# Patient Record
Sex: MALE | Race: WHITE | NOT HISPANIC OR LATINO | URBAN - METROPOLITAN AREA
[De-identification: names, ages, dates, MRNs, and addresses within clinical notes are randomized per-mention and may not be internally consistent; named-entity substitution may affect disease eponyms.]

---

## 2017-10-26 ENCOUNTER — ON CAMPUS - OUTPATIENT (AMBULATORY)
Dept: URBAN - METROPOLITAN AREA HOSPITAL 2 | Facility: HOSPITAL | Age: 57
End: 2017-10-26
Payer: COMMERCIAL

## 2017-10-26 ENCOUNTER — OFFICE (AMBULATORY)
Dept: URBAN - METROPOLITAN AREA CLINIC 64 | Facility: CLINIC | Age: 57
End: 2017-10-26

## 2017-10-26 VITALS
HEART RATE: 64 BPM | HEART RATE: 57 BPM | DIASTOLIC BLOOD PRESSURE: 84 MMHG | SYSTOLIC BLOOD PRESSURE: 209 MMHG | SYSTOLIC BLOOD PRESSURE: 157 MMHG | SYSTOLIC BLOOD PRESSURE: 140 MMHG | SYSTOLIC BLOOD PRESSURE: 167 MMHG | HEIGHT: 69 IN | SYSTOLIC BLOOD PRESSURE: 175 MMHG | OXYGEN SATURATION: 97 % | HEART RATE: 59 BPM | SYSTOLIC BLOOD PRESSURE: 134 MMHG | SYSTOLIC BLOOD PRESSURE: 122 MMHG | DIASTOLIC BLOOD PRESSURE: 76 MMHG | HEART RATE: 62 BPM | DIASTOLIC BLOOD PRESSURE: 92 MMHG | SYSTOLIC BLOOD PRESSURE: 166 MMHG | OXYGEN SATURATION: 98 % | DIASTOLIC BLOOD PRESSURE: 89 MMHG | DIASTOLIC BLOOD PRESSURE: 101 MMHG | OXYGEN SATURATION: 96 % | TEMPERATURE: 97.5 F | DIASTOLIC BLOOD PRESSURE: 95 MMHG | SYSTOLIC BLOOD PRESSURE: 138 MMHG | HEART RATE: 55 BPM | HEART RATE: 54 BPM | HEART RATE: 51 BPM | DIASTOLIC BLOOD PRESSURE: 75 MMHG | DIASTOLIC BLOOD PRESSURE: 67 MMHG | RESPIRATION RATE: 16 BRPM | WEIGHT: 184 LBS | HEART RATE: 52 BPM | DIASTOLIC BLOOD PRESSURE: 87 MMHG | RESPIRATION RATE: 17 BRPM

## 2017-10-26 DIAGNOSIS — D12.3 BENIGN NEOPLASM OF TRANSVERSE COLON: ICD-10-CM

## 2017-10-26 DIAGNOSIS — Z86.010 PERSONAL HISTORY OF COLONIC POLYPS: ICD-10-CM

## 2017-10-26 LAB
GI HISTOLOGY: A. UNSPECIFIED: (no result)
GI HISTOLOGY: PDF REPORT: (no result)

## 2017-10-26 PROCEDURE — 88305 TISSUE EXAM BY PATHOLOGIST: CPT

## 2017-10-26 PROCEDURE — 45385 COLONOSCOPY W/LESION REMOVAL: CPT | Mod: 33

## 2017-10-26 RX ADMIN — PROPOFOL: 10 INJECTION, EMULSION INTRAVENOUS at 07:31

## 2017-11-22 ENCOUNTER — ON CAMPUS - OUTPATIENT (AMBULATORY)
Dept: URBAN - METROPOLITAN AREA HOSPITAL 2 | Facility: HOSPITAL | Age: 57
End: 2017-11-22

## 2017-11-22 ENCOUNTER — HOSPITAL ENCOUNTER (OUTPATIENT)
Dept: OTHER | Facility: HOSPITAL | Age: 57
Setting detail: SPECIMEN
Discharge: HOME | End: 2017-11-22
Attending: INTERNAL MEDICINE | Admitting: INTERNAL MEDICINE

## 2017-11-22 ENCOUNTER — OFFICE (AMBULATORY)
Dept: URBAN - METROPOLITAN AREA CLINIC 64 | Facility: CLINIC | Age: 57
End: 2017-11-22
Payer: COMMERCIAL

## 2017-11-22 VITALS
SYSTOLIC BLOOD PRESSURE: 126 MMHG | OXYGEN SATURATION: 97 % | RESPIRATION RATE: 18 BRPM | SYSTOLIC BLOOD PRESSURE: 197 MMHG | WEIGHT: 182 LBS | HEIGHT: 69 IN | SYSTOLIC BLOOD PRESSURE: 143 MMHG | TEMPERATURE: 97.2 F | OXYGEN SATURATION: 100 % | DIASTOLIC BLOOD PRESSURE: 100 MMHG | OXYGEN SATURATION: 93 % | DIASTOLIC BLOOD PRESSURE: 111 MMHG | HEART RATE: 61 BPM | SYSTOLIC BLOOD PRESSURE: 195 MMHG | DIASTOLIC BLOOD PRESSURE: 88 MMHG | HEART RATE: 55 BPM | DIASTOLIC BLOOD PRESSURE: 92 MMHG | DIASTOLIC BLOOD PRESSURE: 83 MMHG | RESPIRATION RATE: 16 BRPM | HEART RATE: 60 BPM | HEART RATE: 54 BPM | SYSTOLIC BLOOD PRESSURE: 140 MMHG

## 2017-11-22 DIAGNOSIS — R10.13 EPIGASTRIC PAIN: ICD-10-CM

## 2017-11-22 DIAGNOSIS — K29.70 GASTRITIS, UNSPECIFIED, WITHOUT BLEEDING: ICD-10-CM

## 2017-11-22 DIAGNOSIS — R93.3 ABNORMAL FINDINGS ON DIAGNOSTIC IMAGING OF OTHER PARTS OF DI: ICD-10-CM

## 2017-11-22 DIAGNOSIS — B96.81 HELICOBACTER PYLORI [H. PYLORI] AS THE CAUSE OF DISEASES CLA: ICD-10-CM

## 2017-11-22 DIAGNOSIS — K26.9 DUODENAL ULCER, UNSPECIFIED AS ACUTE OR CHRONIC, WITHOUT HEM: ICD-10-CM

## 2017-11-22 DIAGNOSIS — K20.9 ESOPHAGITIS, UNSPECIFIED: ICD-10-CM

## 2017-11-22 DIAGNOSIS — K29.50 UNSPECIFIED CHRONIC GASTRITIS WITHOUT BLEEDING: ICD-10-CM

## 2017-11-22 LAB
GI HISTOLOGY: A. SELECT: (no result)
GI HISTOLOGY: PDF REPORT: (no result)

## 2017-11-22 PROCEDURE — 88305 TISSUE EXAM BY PATHOLOGIST: CPT

## 2017-11-22 PROCEDURE — 43239 EGD BIOPSY SINGLE/MULTIPLE: CPT

## 2017-11-22 RX ORDER — OMEPRAZOLE 40 MG/1
40 CAPSULE, DELAYED RELEASE ORAL
Qty: 30 | Refills: 11 | Status: ACTIVE
Start: 2017-11-22

## 2017-11-22 RX ADMIN — PROPOFOL: 10 INJECTION, EMULSION INTRAVENOUS at 13:29

## 2017-12-28 ENCOUNTER — CONVERSION ENCOUNTER (OUTPATIENT)
Dept: FAMILY MEDICINE CLINIC | Facility: CLINIC | Age: 57
End: 2017-12-28

## 2017-12-30 ENCOUNTER — HOSPITAL ENCOUNTER (OUTPATIENT)
Dept: MRI IMAGING | Facility: HOSPITAL | Age: 57
Discharge: HOME | End: 2017-12-30
Attending: FAMILY MEDICINE | Admitting: FAMILY MEDICINE

## 2018-01-03 LAB
25(OH)D3 SERPL-MCNC: 27 NG/ML (ref 30–100)
ALBUMIN SERPL-MCNC: 4.6 G/DL (ref 3.6–5.1)
ALP SERPL-CCNC: 124 U/L (ref 40–115)
ALT SERPL-CCNC: 18 U/L (ref 9–46)
AST SERPL-CCNC: 13 U/L (ref 10–35)
BASOPHILS # BLD AUTO: 31 CELLS/UL (ref 0–200)
BASOPHILS NFR BLD AUTO: 0.4 %
BILIRUB SERPL-MCNC: 0.6 MG/DL (ref 0.2–1.2)
BUN SERPL-MCNC: 16 MG/DL (ref 7–25)
BUN/CREAT SERPL: ABNORMAL (CALC) (ref 6–22)
CALCIUM SERPL-MCNC: 9.7 MG/DL (ref 8.6–10.3)
CHLORIDE SERPL-SCNC: 105 MMOL/L (ref 98–110)
CHOLEST SERPL-MCNC: 218 MG/DL
CHOLEST/HDLC SERPL: 4.4 (CALC)
CONV CO2: 30 MMOL/L (ref 20–31)
CONV TOTAL PROTEIN: 7.5 G/DL (ref 6.1–8.1)
CREAT UR-MCNC: 0.75 MG/DL (ref 0.7–1.33)
EOSINOPHIL # BLD AUTO: 0 %
EOSINOPHIL # BLD AUTO: 0 CELLS/UL (ref 15–500)
ERYTHROCYTE [DISTWIDTH] IN BLOOD BY AUTOMATED COUNT: 12.5 % (ref 11–15)
FOLATE SERPL-MCNC: 6.6 NG/ML
GLOBULIN UR ELPH-MCNC: 2.9 MG/DL (ref 1.9–3.7)
GLUCOSE UR QL: 89 MG/DL (ref 65–99)
HCT VFR BLD AUTO: 46.6 % (ref 38.5–50)
HDLC SERPL-MCNC: 49 MG/DL
HGB BLD-MCNC: 16.3 G/DL (ref 13.2–17.1)
INSULIN SERPL-ACNC: 1.6 (CALC) (ref 1–2.5)
LDLC SERPL CALC-MCNC: 147 MG/DL
LYMPHOCYTES # BLD AUTO: 2010 CELLS/UL (ref 850–3900)
LYMPHOCYTES NFR BLD AUTO: 26.1 %
MCH RBC QN AUTO: 31.3 PG (ref 27–33)
MCHC RBC AUTO-ENTMCNC: 35 G/DL (ref 32–36)
MCV RBC AUTO: 89.4 FL (ref 80–100)
MONOCYTES # BLD AUTO: 578 CELLS/UL (ref 200–950)
MONOCYTES NFR BLD AUTO: 7.5 %
NEUTROPHILS # BLD AUTO: 5082 CELLS/UL (ref 1500–7800)
NEUTROPHILS NFR BLD AUTO: 66 %
NONHDLC SERPL-MCNC: 169 MG/DL
PLATELET # BLD AUTO: 164 10*3/UL (ref 140–400)
PMV BLD AUTO: 11.8 FL (ref 7.5–12.5)
POTASSIUM SERPL-SCNC: 4.7 MMOL/L (ref 3.5–5.3)
RBC # BLD AUTO: 5.21 MILLION/UL (ref 4.2–5.8)
SODIUM SERPL-SCNC: 143 MMOL/L (ref 135–146)
T3 SERPL-MCNC: 129 NG/DL (ref 76–181)
T4 SERPL-MCNC: 9.3 MCG/DL (ref 4.5–12)
TRIGL SERPL-MCNC: 105 MG/DL
TSH SERPL-ACNC: 3.18 MIU/L (ref 0.4–4.5)
VIT B12 SERPL-MCNC: 465 PG/ML (ref 200–1100)
WBC # BLD AUTO: 7.7 10*3/UL (ref 3.8–10.8)

## 2018-02-19 ENCOUNTER — HOSPITAL ENCOUNTER (OUTPATIENT)
Dept: OTHER | Facility: HOSPITAL | Age: 58
Setting detail: SPECIMEN
Discharge: HOME | End: 2018-02-19
Attending: UROLOGY | Admitting: UROLOGY

## 2019-07-08 PROBLEM — E78.5 HYPERLIPIDEMIA: Status: ACTIVE | Noted: 2019-07-08

## 2019-07-08 PROBLEM — I63.9 CVA (CEREBRAL VASCULAR ACCIDENT): Status: ACTIVE | Noted: 2019-07-08

## 2019-07-08 PROBLEM — F33.9 RECURRENT MAJOR DEPRESSIVE DISORDER (HCC): Status: ACTIVE | Noted: 2019-07-08

## 2019-07-08 PROBLEM — I10 HYPERTENSION: Status: ACTIVE | Noted: 2019-07-08

## 2019-07-08 RX ORDER — SILDENAFIL 100 MG/1
100 TABLET, FILM COATED ORAL DAILY PRN
COMMUNITY
End: 2021-04-25

## 2019-07-08 RX ORDER — TERBINAFINE HYDROCHLORIDE 250 MG/1
250 TABLET ORAL DAILY
COMMUNITY
End: 2021-04-25

## 2019-07-08 RX ORDER — AMLODIPINE BESYLATE AND BENAZEPRIL HYDROCHLORIDE 5; 10 MG/1; MG/1
1 CAPSULE ORAL DAILY
COMMUNITY
End: 2021-04-21 | Stop reason: SDUPTHER

## 2019-07-08 RX ORDER — VENLAFAXINE HYDROCHLORIDE 150 MG/1
1 TABLET, EXTENDED RELEASE ORAL DAILY
COMMUNITY
End: 2021-07-22

## 2019-07-08 RX ORDER — PROCHLORPERAZINE MALEATE 10 MG
10 TABLET ORAL EVERY 6 HOURS PRN
COMMUNITY
End: 2021-04-25

## 2019-07-08 RX ORDER — ATORVASTATIN CALCIUM 20 MG/1
20 TABLET, FILM COATED ORAL DAILY
COMMUNITY
End: 2021-04-21 | Stop reason: SDUPTHER

## 2019-07-08 RX ORDER — ASPIRIN 81 MG/1
81 TABLET, CHEWABLE ORAL DAILY
COMMUNITY
End: 2022-01-27 | Stop reason: HOSPADM

## 2019-07-09 ENCOUNTER — OFFICE VISIT (OUTPATIENT)
Dept: FAMILY MEDICINE CLINIC | Facility: CLINIC | Age: 59
End: 2019-07-09

## 2019-07-09 VITALS
TEMPERATURE: 97.7 F | SYSTOLIC BLOOD PRESSURE: 122 MMHG | BODY MASS INDEX: 28.05 KG/M2 | DIASTOLIC BLOOD PRESSURE: 80 MMHG | OXYGEN SATURATION: 98 % | HEIGHT: 69 IN | RESPIRATION RATE: 18 BRPM | WEIGHT: 189.4 LBS | HEART RATE: 80 BPM

## 2019-07-09 DIAGNOSIS — R05.9 COUGH: Primary | ICD-10-CM

## 2019-07-09 PROBLEM — G47.52 RBD (REM BEHAVIORAL DISORDER): Status: ACTIVE | Noted: 2018-08-17

## 2019-07-09 PROBLEM — G62.9 NEUROPATHY: Status: ACTIVE | Noted: 2019-02-26

## 2019-07-09 PROBLEM — R26.89 BALANCE PROBLEM: Status: ACTIVE | Noted: 2019-02-26

## 2019-07-09 PROBLEM — G20 PARKINSON DISEASE: Status: ACTIVE | Noted: 2018-08-17

## 2019-07-09 PROBLEM — C85.89: Status: ACTIVE | Noted: 2018-03-06

## 2019-07-09 PROBLEM — G20.A1 PARKINSON DISEASE: Status: ACTIVE | Noted: 2018-08-17

## 2019-07-09 PROCEDURE — 99213 OFFICE O/P EST LOW 20 MIN: CPT | Performed by: FAMILY MEDICINE

## 2019-07-09 RX ORDER — ACYCLOVIR 800 MG/1
800 TABLET ORAL 2 TIMES DAILY
Refills: 1 | COMMUNITY
Start: 2019-04-25 | End: 2021-04-25

## 2019-07-09 RX ORDER — CLONAZEPAM 0.5 MG/1
0.5 TABLET ORAL
Refills: 4 | COMMUNITY
Start: 2019-04-24 | End: 2021-04-21 | Stop reason: SDUPTHER

## 2019-07-09 RX ORDER — AZITHROMYCIN 250 MG/1
TABLET, FILM COATED ORAL
Qty: 6 TABLET | Refills: 0 | Status: SHIPPED | OUTPATIENT
Start: 2019-07-09 | End: 2021-04-21 | Stop reason: SDUPTHER

## 2019-07-22 ENCOUNTER — TELEPHONE (OUTPATIENT)
Dept: FAMILY MEDICINE CLINIC | Facility: CLINIC | Age: 59
End: 2019-07-22

## 2019-07-25 ENCOUNTER — OFFICE VISIT (OUTPATIENT)
Dept: FAMILY MEDICINE CLINIC | Facility: CLINIC | Age: 59
End: 2019-07-25

## 2019-07-25 VITALS
TEMPERATURE: 98.2 F | HEIGHT: 70 IN | WEIGHT: 189.6 LBS | SYSTOLIC BLOOD PRESSURE: 137 MMHG | HEART RATE: 68 BPM | RESPIRATION RATE: 17 BRPM | DIASTOLIC BLOOD PRESSURE: 87 MMHG | OXYGEN SATURATION: 95 % | BODY MASS INDEX: 27.14 KG/M2

## 2019-07-25 DIAGNOSIS — R05.9 COUGH: ICD-10-CM
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
## 2019-07-25 DIAGNOSIS — S20.212A BRUISED RIB, LEFT, INITIAL ENCOUNTER: ICD-10-CM

## 2019-07-25 DIAGNOSIS — E34.9 TESTOSTERONE DEFICIENCY: ICD-10-CM

## 2019-07-25 PROBLEM — G20 PARKINSON DISEASE: Status: ACTIVE | Noted: 2019-07-25

## 2019-07-25 PROBLEM — G20.A1 PARKINSON DISEASE: Status: ACTIVE | Noted: 2019-07-25

## 2019-07-25 PROCEDURE — 99214 OFFICE O/P EST MOD 30 MIN: CPT | Performed by: FAMILY MEDICINE

## 2019-07-25 RX ORDER — AZITHROMYCIN 250 MG/1
TABLET, FILM COATED ORAL SEE ADMIN INSTRUCTIONS
Refills: 0 | COMMUNITY
Start: 2019-07-09 | End: 2021-04-21 | Stop reason: SDUPTHER

## 2019-07-25 RX ORDER — CIPROFLOXACIN 500 MG/1
500 TABLET, FILM COATED ORAL 2 TIMES DAILY
Qty: 20 TABLET | Refills: 0 | Status: SHIPPED | OUTPATIENT
Start: 2019-07-25 | End: 2021-04-21 | Stop reason: SDUPTHER

## 2019-07-25 RX ORDER — HYDROCODONE BITARTRATE AND ACETAMINOPHEN 5; 325 MG/1; MG/1
1 TABLET ORAL EVERY 6 HOURS PRN
Qty: 28 TABLET | Refills: 0 | Status: SHIPPED | OUTPATIENT
Start: 2019-07-25 | End: 2019-08-01

## 2019-07-25 RX ORDER — TERBINAFINE HYDROCHLORIDE 250 MG/1
TABLET ORAL
Refills: 3 | COMMUNITY
Start: 2019-06-20 | End: 2021-04-21 | Stop reason: SDUPTHER

## 2019-07-25 RX ORDER — ATORVASTATIN CALCIUM 20 MG/1
20 TABLET, FILM COATED ORAL DAILY
Refills: 0 | COMMUNITY
Start: 2019-07-03 | End: 2019-09-24 | Stop reason: SDUPTHER

## 2019-07-25 RX ORDER — AMLODIPINE BESYLATE AND BENAZEPRIL HYDROCHLORIDE 5; 10 MG/1; MG/1
CAPSULE ORAL
COMMUNITY
Start: 2019-07-24 | End: 2019-10-26 | Stop reason: SDUPTHER

## 2019-07-25 RX ORDER — TESTOSTERONE CYPIONATE 200 MG/ML
200 INJECTION, SOLUTION INTRAMUSCULAR
Qty: 10 ML | Refills: 0 | Status: SHIPPED | OUTPATIENT
Start: 2019-07-25 | End: 2021-04-25

## 2019-07-25 RX ORDER — ASPIRIN 81 MG/1
TABLET ORAL
COMMUNITY
Start: 2015-05-07 | End: 2021-04-21 | Stop reason: SDUPTHER

## 2019-07-25 RX ORDER — VENLAFAXINE HYDROCHLORIDE 150 MG/1
150 CAPSULE, EXTENDED RELEASE ORAL DAILY
Refills: 0 | COMMUNITY
Start: 2019-07-03 | End: 2019-12-14 | Stop reason: SDUPTHER

## 2019-07-25 RX ORDER — ACYCLOVIR 800 MG/1
800 TABLET ORAL 2 TIMES DAILY
Refills: 0 | COMMUNITY
Start: 2019-07-09 | End: 2021-04-21 | Stop reason: SDUPTHER

## 2019-09-24 RX ORDER — ATORVASTATIN CALCIUM 20 MG/1
TABLET, FILM COATED ORAL
Qty: 90 TABLET | Refills: 2 | Status: SHIPPED | OUTPATIENT
Start: 2019-09-24 | End: 2020-06-22

## 2019-10-27 RX ORDER — AMLODIPINE BESYLATE AND BENAZEPRIL HYDROCHLORIDE 5; 10 MG/1; MG/1
CAPSULE ORAL
Qty: 90 CAPSULE | Refills: 2 | Status: SHIPPED | OUTPATIENT
Start: 2019-10-27 | End: 2020-08-17

## 2019-12-18 RX ORDER — VENLAFAXINE HYDROCHLORIDE 150 MG/1
CAPSULE, EXTENDED RELEASE ORAL
Qty: 90 CAPSULE | Refills: 3 | Status: SHIPPED | OUTPATIENT
Start: 2019-12-18 | End: 2020-08-17

## 2020-06-22 RX ORDER — ATORVASTATIN CALCIUM 20 MG/1
TABLET, FILM COATED ORAL
Qty: 30 TABLET | Refills: 5 | Status: SHIPPED | OUTPATIENT
Start: 2020-06-22 | End: 2021-05-14

## 2020-08-12 RX ORDER — AMLODIPINE BESYLATE AND BENAZEPRIL HYDROCHLORIDE 5; 10 MG/1; MG/1
CAPSULE ORAL
Qty: 90 CAPSULE | Refills: 2 | OUTPATIENT
Start: 2020-08-12

## 2020-08-17 RX ORDER — VENLAFAXINE HYDROCHLORIDE 150 MG/1
CAPSULE, EXTENDED RELEASE ORAL
Qty: 90 CAPSULE | Refills: 3 | Status: SHIPPED | OUTPATIENT
Start: 2020-08-17 | End: 2021-04-25

## 2020-08-17 RX ORDER — AMLODIPINE BESYLATE AND BENAZEPRIL HYDROCHLORIDE 5; 10 MG/1; MG/1
CAPSULE ORAL
Qty: 90 CAPSULE | Refills: 2 | Status: SHIPPED | OUTPATIENT
Start: 2020-08-17 | End: 2021-05-12

## 2020-12-17 RX ORDER — ATORVASTATIN CALCIUM 20 MG/1
TABLET, FILM COATED ORAL
Qty: 30 TABLET | Refills: 5 | OUTPATIENT
Start: 2020-12-17

## 2021-03-31 DIAGNOSIS — E78.2 MIXED HYPERLIPIDEMIA: Primary | ICD-10-CM

## 2021-04-01 RX ORDER — ATORVASTATIN CALCIUM 20 MG/1
TABLET, FILM COATED ORAL
Qty: 30 TABLET | Refills: 5 | OUTPATIENT
Start: 2021-04-01

## 2021-04-09 ENCOUNTER — TELEPHONE (OUTPATIENT)
Dept: FAMILY MEDICINE CLINIC | Facility: CLINIC | Age: 61
End: 2021-04-09

## 2021-04-21 ENCOUNTER — OFFICE VISIT (OUTPATIENT)
Dept: FAMILY MEDICINE CLINIC | Facility: CLINIC | Age: 61
End: 2021-04-21

## 2021-04-21 VITALS
SYSTOLIC BLOOD PRESSURE: 139 MMHG | WEIGHT: 194.6 LBS | HEIGHT: 69 IN | DIASTOLIC BLOOD PRESSURE: 84 MMHG | RESPIRATION RATE: 18 BRPM | OXYGEN SATURATION: 98 % | BODY MASS INDEX: 28.82 KG/M2 | HEART RATE: 70 BPM

## 2021-04-21 DIAGNOSIS — R53.81 MALAISE AND FATIGUE: ICD-10-CM

## 2021-04-21 DIAGNOSIS — F10.20 UNCOMPLICATED ALCOHOL DEPENDENCE (HCC): ICD-10-CM

## 2021-04-21 DIAGNOSIS — R53.1 WEAKNESS: ICD-10-CM

## 2021-04-21 DIAGNOSIS — E34.9 TESTOSTERONE DEFICIENCY: ICD-10-CM

## 2021-04-21 DIAGNOSIS — E78.2 MIXED HYPERLIPIDEMIA: ICD-10-CM

## 2021-04-21 DIAGNOSIS — I10 ESSENTIAL HYPERTENSION: ICD-10-CM

## 2021-04-21 DIAGNOSIS — E03.9 ACQUIRED HYPOTHYROIDISM: ICD-10-CM

## 2021-04-21 DIAGNOSIS — R53.83 MALAISE AND FATIGUE: ICD-10-CM

## 2021-04-21 DIAGNOSIS — Z00.00 ANNUAL PHYSICAL EXAM: Primary | ICD-10-CM

## 2021-04-21 DIAGNOSIS — R07.81 RIB PAIN ON LEFT SIDE: ICD-10-CM

## 2021-04-21 DIAGNOSIS — G20 PARKINSON DISEASE (HCC): ICD-10-CM

## 2021-04-21 DIAGNOSIS — F41.8 MIXED ANXIETY DEPRESSIVE DISORDER: ICD-10-CM

## 2021-04-21 DIAGNOSIS — Z12.5 SCREENING FOR PROSTATE CANCER: ICD-10-CM

## 2021-04-21 PROCEDURE — 99396 PREV VISIT EST AGE 40-64: CPT | Performed by: FAMILY MEDICINE

## 2021-04-21 PROCEDURE — 99214 OFFICE O/P EST MOD 30 MIN: CPT | Performed by: FAMILY MEDICINE

## 2021-04-21 RX ORDER — GABAPENTIN 300 MG/1
300 CAPSULE ORAL DAILY
Qty: 30 CAPSULE | Refills: 12 | Status: SHIPPED | OUTPATIENT
Start: 2021-04-21 | End: 2021-08-30 | Stop reason: SDUPTHER

## 2021-04-21 RX ORDER — DONEPEZIL HYDROCHLORIDE 5 MG/1
5 TABLET, FILM COATED ORAL NIGHTLY
Qty: 30 TABLET | Refills: 12 | Status: SHIPPED | OUTPATIENT
Start: 2021-04-21 | End: 2021-10-20 | Stop reason: SDUPTHER

## 2021-04-21 RX ORDER — VENLAFAXINE HYDROCHLORIDE 225 MG/1
225 TABLET, EXTENDED RELEASE ORAL DAILY
COMMUNITY
Start: 2021-02-15 | End: 2022-07-14 | Stop reason: SDUPTHER

## 2021-04-21 RX ORDER — CLONAZEPAM 1 MG/1
TABLET ORAL
COMMUNITY
Start: 2021-04-12 | End: 2021-08-30 | Stop reason: SDUPTHER

## 2021-04-22 PROBLEM — Z00.00 ANNUAL PHYSICAL EXAM: Status: ACTIVE | Noted: 2021-04-22

## 2021-04-25 PROBLEM — C85.90 LYMPHOMA (HCC): Status: ACTIVE | Noted: 2021-04-25

## 2021-04-25 PROBLEM — Z92.3 HX OF RADIATION THERAPY: Status: ACTIVE | Noted: 2021-04-25

## 2021-04-25 PROBLEM — Z92.21 HISTORY OF CHEMOTHERAPY: Status: ACTIVE | Noted: 2021-04-25

## 2021-04-25 PROBLEM — R07.81 RIB PAIN ON LEFT SIDE: Status: ACTIVE | Noted: 2021-04-25

## 2021-04-25 PROBLEM — F10.20 UNCOMPLICATED ALCOHOL DEPENDENCE: Status: ACTIVE | Noted: 2021-04-25

## 2021-04-25 PROBLEM — R53.1 WEAKNESS: Status: ACTIVE | Noted: 2021-04-25

## 2021-04-25 PROBLEM — R45.3 APATHY: Status: ACTIVE | Noted: 2021-04-12

## 2021-04-25 PROBLEM — C62.90 MALIGNANT NEOPLASM OF TESTICLE: Status: ACTIVE | Noted: 2021-04-25

## 2021-04-25 LAB
25(OH)D3+25(OH)D2 SERPL-MCNC: 38.3 NG/ML (ref 30–100)
ALBUMIN SERPL-MCNC: 4.2 G/DL (ref 3.8–4.8)
ALBUMIN/GLOB SERPL: 1.7 {RATIO} (ref 1.2–2.2)
ALP SERPL-CCNC: 174 IU/L (ref 39–117)
ALT SERPL-CCNC: 14 IU/L (ref 0–44)
AST SERPL-CCNC: 16 IU/L (ref 0–40)
BASOPHILS # BLD AUTO: 0 X10E3/UL (ref 0–0.2)
BASOPHILS NFR BLD AUTO: 0 %
BILIRUB SERPL-MCNC: 0.4 MG/DL (ref 0–1.2)
BUN SERPL-MCNC: 10 MG/DL (ref 8–27)
BUN/CREAT SERPL: 17 (ref 10–24)
CALCIUM SERPL-MCNC: 9.4 MG/DL (ref 8.6–10.2)
CHLORIDE SERPL-SCNC: 105 MMOL/L (ref 96–106)
CHOLEST SERPL-MCNC: 171 MG/DL (ref 100–199)
CHOLEST/HDLC SERPL: 4.3 RATIO (ref 0–5)
CO2 SERPL-SCNC: 25 MMOL/L (ref 20–29)
CREAT SERPL-MCNC: 0.6 MG/DL (ref 0.76–1.27)
EOSINOPHIL # BLD AUTO: 0 X10E3/UL (ref 0–0.4)
EOSINOPHIL NFR BLD AUTO: 0 %
ERYTHROCYTE [DISTWIDTH] IN BLOOD BY AUTOMATED COUNT: 12.6 % (ref 11.6–15.4)
FOLATE SERPL-MCNC: 7.2 NG/ML
GLOBULIN SER CALC-MCNC: 2.5 G/DL (ref 1.5–4.5)
GLUCOSE SERPL-MCNC: 97 MG/DL (ref 65–99)
HCT VFR BLD AUTO: 39.3 % (ref 37.5–51)
HDLC SERPL-MCNC: 40 MG/DL
HGB BLD-MCNC: 13.6 G/DL (ref 13–17.7)
IMM GRANULOCYTES # BLD AUTO: 0 X10E3/UL (ref 0–0.1)
IMM GRANULOCYTES NFR BLD AUTO: 0 %
LDLC SERPL CALC-MCNC: 108 MG/DL (ref 0–99)
LYMPHOCYTES # BLD AUTO: 2.8 X10E3/UL (ref 0.7–3.1)
LYMPHOCYTES NFR BLD AUTO: 32 %
MCH RBC QN AUTO: 32.9 PG (ref 26.6–33)
MCHC RBC AUTO-ENTMCNC: 34.6 G/DL (ref 31.5–35.7)
MCV RBC AUTO: 95 FL (ref 79–97)
MONOCYTES # BLD AUTO: 0.7 X10E3/UL (ref 0.1–0.9)
MONOCYTES NFR BLD AUTO: 8 %
NEUTROPHILS # BLD AUTO: 5.3 X10E3/UL (ref 1.4–7)
NEUTROPHILS NFR BLD AUTO: 60 %
PLATELET # BLD AUTO: 192 X10E3/UL (ref 150–450)
POTASSIUM SERPL-SCNC: 4.6 MMOL/L (ref 3.5–5.2)
PROT SERPL-MCNC: 6.7 G/DL (ref 6–8.5)
PSA SERPL-MCNC: 0.4 NG/ML (ref 0–4)
RBC # BLD AUTO: 4.14 X10E6/UL (ref 4.14–5.8)
SODIUM SERPL-SCNC: 143 MMOL/L (ref 134–144)
TESTOST FREE SERPL-MCNC: 1.8 PG/ML (ref 6.6–18.1)
TESTOST SERPL-MCNC: 74.8 NG/DL (ref 264–916)
TRIGL SERPL-MCNC: 129 MG/DL (ref 0–149)
TSH SERPL DL<=0.005 MIU/L-ACNC: 1.59 UIU/ML (ref 0.45–4.5)
VIT B12 SERPL-MCNC: 506 PG/ML (ref 232–1245)
VLDLC SERPL CALC-MCNC: 23 MG/DL (ref 5–40)
WBC # BLD AUTO: 8.7 X10E3/UL (ref 3.4–10.8)

## 2021-05-12 RX ORDER — AMLODIPINE BESYLATE AND BENAZEPRIL HYDROCHLORIDE 5; 10 MG/1; MG/1
CAPSULE ORAL
Qty: 30 CAPSULE | Refills: 8 | Status: SHIPPED | OUTPATIENT
Start: 2021-05-12 | End: 2022-01-07

## 2021-05-14 RX ORDER — ATORVASTATIN CALCIUM 20 MG/1
TABLET, FILM COATED ORAL
Qty: 30 TABLET | Refills: 5 | Status: SHIPPED | OUTPATIENT
Start: 2021-05-14 | End: 2021-05-19 | Stop reason: SDUPTHER

## 2021-05-19 ENCOUNTER — HOSPITAL ENCOUNTER (OUTPATIENT)
Dept: GENERAL RADIOLOGY | Facility: HOSPITAL | Age: 61
Discharge: HOME | End: 2021-05-19
Admitting: FAMILY MEDICINE

## 2021-05-19 ENCOUNTER — OFFICE VISIT (OUTPATIENT)
Dept: FAMILY MEDICINE CLINIC | Facility: CLINIC | Age: 61
End: 2021-05-19

## 2021-05-19 VITALS
DIASTOLIC BLOOD PRESSURE: 84 MMHG | BODY MASS INDEX: 28.14 KG/M2 | SYSTOLIC BLOOD PRESSURE: 135 MMHG | WEIGHT: 190 LBS | TEMPERATURE: 98 F | HEIGHT: 69 IN | RESPIRATION RATE: 18 BRPM | OXYGEN SATURATION: 96 % | HEART RATE: 64 BPM

## 2021-05-19 DIAGNOSIS — G89.29 CHRONIC BILATERAL LOW BACK PAIN WITHOUT SCIATICA: Primary | ICD-10-CM

## 2021-05-19 DIAGNOSIS — E78.2 MIXED HYPERLIPIDEMIA: ICD-10-CM

## 2021-05-19 DIAGNOSIS — R06.2 WHEEZING: ICD-10-CM

## 2021-05-19 DIAGNOSIS — M54.50 CHRONIC BILATERAL LOW BACK PAIN WITHOUT SCIATICA: Primary | ICD-10-CM
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
## 2021-05-19 PROCEDURE — 99215 OFFICE O/P EST HI 40 MIN: CPT | Performed by: FAMILY MEDICINE

## 2021-05-19 PROCEDURE — 72110 X-RAY EXAM L-2 SPINE 4/>VWS: CPT

## 2021-05-19 RX ORDER — BUDESONIDE AND FORMOTEROL FUMARATE DIHYDRATE 160; 4.5 UG/1; UG/1
2 AEROSOL RESPIRATORY (INHALATION)
Qty: 10.2 G | Refills: 1 | Status: SHIPPED | OUTPATIENT
Start: 2021-05-19 | End: 2021-07-15

## 2021-05-19 RX ORDER — DOXYCYCLINE 100 MG/1
100 CAPSULE ORAL 2 TIMES DAILY
Qty: 28 CAPSULE | Refills: 0 | Status: SHIPPED | OUTPATIENT
Start: 2021-05-19 | End: 2021-06-02

## 2021-05-19 RX ORDER — ATORVASTATIN CALCIUM 20 MG/1
20 TABLET, FILM COATED ORAL DAILY
Qty: 90 TABLET | Refills: 1 | Status: SHIPPED | OUTPATIENT
Start: 2021-05-19 | End: 2022-01-26 | Stop reason: SDUPTHER

## 2021-05-20 PROBLEM — R06.2 WHEEZING: Status: ACTIVE | Noted: 2021-05-20

## 2021-05-20 PROBLEM — M54.50 CHRONIC BILATERAL LOW BACK PAIN WITHOUT SCIATICA: Status: ACTIVE | Noted: 2021-05-20
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
## 2021-05-20 PROBLEM — G89.29 CHRONIC BILATERAL LOW BACK PAIN WITHOUT SCIATICA: Status: ACTIVE | Noted: 2021-05-20

## 2021-07-14 DIAGNOSIS — R06.2 WHEEZING: ICD-10-CM

## 2021-07-15 RX ORDER — BUDESONIDE AND FORMOTEROL FUMARATE DIHYDRATE 160; 4.5 UG/1; UG/1
AEROSOL RESPIRATORY (INHALATION)
Qty: 10.2 G | Refills: 4 | Status: SHIPPED | OUTPATIENT
Start: 2021-07-15 | End: 2022-01-26 | Stop reason: SDUPTHER

## 2021-07-22 ENCOUNTER — OFFICE VISIT (OUTPATIENT)
Dept: FAMILY MEDICINE CLINIC | Facility: CLINIC | Age: 61
End: 2021-07-22

## 2021-07-22 VITALS
HEIGHT: 69 IN | RESPIRATION RATE: 18 BRPM | BODY MASS INDEX: 28.14 KG/M2 | WEIGHT: 190 LBS | HEART RATE: 65 BPM | OXYGEN SATURATION: 97 % | SYSTOLIC BLOOD PRESSURE: 115 MMHG | DIASTOLIC BLOOD PRESSURE: 75 MMHG | TEMPERATURE: 98 F

## 2021-07-22 DIAGNOSIS — F33.1 MODERATE EPISODE OF RECURRENT MAJOR DEPRESSIVE DISORDER (HCC): Primary | ICD-10-CM

## 2021-07-22 DIAGNOSIS — M41.80 DEXTROSCOLIOSIS: ICD-10-CM

## 2021-07-22 DIAGNOSIS — S32.010A COMPRESSION FRACTURE OF L1 VERTEBRA, INITIAL ENCOUNTER (HCC): ICD-10-CM

## 2021-07-22 PROCEDURE — 99214 OFFICE O/P EST MOD 30 MIN: CPT | Performed by: FAMILY MEDICINE

## 2021-07-22 RX ORDER — ARIPIPRAZOLE 2 MG/1
2 TABLET ORAL DAILY
Qty: 30 TABLET | Refills: 2 | Status: SHIPPED | OUTPATIENT
Start: 2021-07-22 | End: 2021-08-13

## 2021-08-01 PROBLEM — M41.80 DEXTROSCOLIOSIS: Status: ACTIVE | Noted: 2021-08-01

## 2021-08-01 PROBLEM — S32.010A COMPRESSION FRACTURE OF L1 LUMBAR VERTEBRA: Status: ACTIVE | Noted: 2021-08-01

## 2021-08-11 ENCOUNTER — TELEPHONE (OUTPATIENT)
Dept: FAMILY MEDICINE CLINIC | Facility: CLINIC | Age: 61
End: 2021-08-11

## 2021-08-13 DIAGNOSIS — F33.1 MODERATE EPISODE OF RECURRENT MAJOR DEPRESSIVE DISORDER (HCC): ICD-10-CM

## 2021-08-13 RX ORDER — ARIPIPRAZOLE 2 MG/1
TABLET ORAL
Qty: 30 TABLET | Refills: 2 | Status: SHIPPED | OUTPATIENT
Start: 2021-08-13 | End: 2021-11-15

## 2021-08-28 ENCOUNTER — APPOINTMENT (OUTPATIENT)
Dept: CT IMAGING | Facility: HOSPITAL | Age: 61
End: 2021-08-28

## 2021-08-28 ENCOUNTER — APPOINTMENT (OUTPATIENT)
Dept: GENERAL RADIOLOGY | Facility: HOSPITAL | Age: 61
End: 2021-08-28

## 2021-08-28 ENCOUNTER — HOSPITAL ENCOUNTER (OUTPATIENT)
Dept: GENERAL RADIOLOGY | Facility: HOSPITAL | Age: 61
Discharge: HOME | End: 2021-08-28

## 2021-08-28 ENCOUNTER — HOSPITAL ENCOUNTER (EMERGENCY)
Facility: HOSPITAL | Age: 61
Discharge: HOME | End: 2021-08-28
Admitting: EMERGENCY MEDICINE

## 2021-08-28 VITALS
TEMPERATURE: 97.8 F | HEART RATE: 68 BPM | BODY MASS INDEX: 27.16 KG/M2 | OXYGEN SATURATION: 97 % | WEIGHT: 194 LBS | SYSTOLIC BLOOD PRESSURE: 141 MMHG | HEIGHT: 71 IN | DIASTOLIC BLOOD PRESSURE: 82 MMHG | RESPIRATION RATE: 17 BRPM

## 2021-08-28 DIAGNOSIS — S61.411A LACERATION OF RIGHT HAND WITHOUT FOREIGN BODY, INITIAL ENCOUNTER: ICD-10-CM

## 2021-08-28 DIAGNOSIS — R07.9 CHEST PAIN: ICD-10-CM

## 2021-08-28 DIAGNOSIS — J45.20 MILD INTERMITTENT ASTHMA WITHOUT COMPLICATION: ICD-10-CM

## 2021-08-28 DIAGNOSIS — S51.811A LACERATION OF RIGHT FOREARM, INITIAL ENCOUNTER: ICD-10-CM

## 2021-08-28 DIAGNOSIS — Z79.899 CHRONIC PRESCRIPTION BENZODIAZEPINE USE: ICD-10-CM

## 2021-08-28 DIAGNOSIS — R07.89 LEFT-SIDED CHEST WALL PAIN: ICD-10-CM

## 2021-08-28 DIAGNOSIS — F10.920 ALCOHOLIC INTOXICATION WITHOUT COMPLICATION (HCC): Primary | ICD-10-CM
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
## 2021-08-28 LAB
ALBUMIN SERPL-MCNC: 4.7 G/DL (ref 3.5–5.2)
ALBUMIN/GLOB SERPL: 1.7 G/DL
ALP SERPL-CCNC: 161 U/L (ref 39–117)
ALT SERPL W P-5'-P-CCNC: 5 U/L (ref 1–41)
AMPHET+METHAMPHET UR QL: NEGATIVE
ANION GAP SERPL CALCULATED.3IONS-SCNC: 15 MMOL/L (ref 5–15)
AST SERPL-CCNC: 26 U/L (ref 1–40)
BARBITURATES UR QL SCN: NEGATIVE
BASOPHILS # BLD AUTO: 0.1 10*3/MM3 (ref 0–0.2)
BASOPHILS NFR BLD AUTO: 1.3 % (ref 0–1.5)
BENZODIAZ UR QL SCN: POSITIVE
BILIRUB SERPL-MCNC: 0.4 MG/DL (ref 0–1.2)
BILIRUB UR QL STRIP: NEGATIVE
BUN SERPL-MCNC: 10 MG/DL (ref 8–23)
BUN/CREAT SERPL: 17.2 (ref 7–25)
CALCIUM SPEC-SCNC: 9.3 MG/DL (ref 8.6–10.5)
CANNABINOIDS SERPL QL: NEGATIVE
CHLORIDE SERPL-SCNC: 101 MMOL/L (ref 98–107)
CLARITY UR: ABNORMAL
CO2 SERPL-SCNC: 24 MMOL/L (ref 22–29)
COCAINE UR QL: NEGATIVE
COLOR UR: YELLOW
CREAT SERPL-MCNC: 0.58 MG/DL (ref 0.76–1.27)
DEPRECATED RDW RBC AUTO: 44.2 FL (ref 37–54)
EOSINOPHIL # BLD AUTO: 0.3 10*3/MM3 (ref 0–0.4)
EOSINOPHIL NFR BLD AUTO: 3.7 % (ref 0.3–6.2)
ERYTHROCYTE [DISTWIDTH] IN BLOOD BY AUTOMATED COUNT: 13.1 % (ref 12.3–15.4)
ETHANOL UR QL: 0.01 %
GFR SERPL CREATININE-BSD FRML MDRD: 142 ML/MIN/1.73
GLOBULIN UR ELPH-MCNC: 2.8 GM/DL
GLUCOSE SERPL-MCNC: 84 MG/DL (ref 65–99)
GLUCOSE UR STRIP-MCNC: NEGATIVE MG/DL
HCT VFR BLD AUTO: 42 % (ref 37.5–51)
HGB BLD-MCNC: 14.4 G/DL (ref 13–17.7)
HGB UR QL STRIP.AUTO: NEGATIVE
KETONES UR QL STRIP: NEGATIVE
LEUKOCYTE ESTERASE UR QL STRIP.AUTO: NEGATIVE
LYMPHOCYTES # BLD AUTO: 2.5 10*3/MM3 (ref 0.7–3.1)
LYMPHOCYTES NFR BLD AUTO: 32 % (ref 19.6–45.3)
MCH RBC QN AUTO: 32.8 PG (ref 26.6–33)
MCHC RBC AUTO-ENTMCNC: 34.2 G/DL (ref 31.5–35.7)
MCV RBC AUTO: 95.9 FL (ref 79–97)
METHADONE UR QL SCN: NEGATIVE
MONOCYTES # BLD AUTO: 0.6 10*3/MM3 (ref 0.1–0.9)
MONOCYTES NFR BLD AUTO: 7.2 % (ref 5–12)
NEUTROPHILS NFR BLD AUTO: 4.3 10*3/MM3 (ref 1.7–7)
NEUTROPHILS NFR BLD AUTO: 55.8 % (ref 42.7–76)
NITRITE UR QL STRIP: NEGATIVE
NRBC BLD AUTO-RTO: 0.1 /100 WBC (ref 0–0.2)
OPIATES UR QL: NEGATIVE
OXYCODONE UR QL SCN: NEGATIVE
PH UR STRIP.AUTO: 5.5 [PH] (ref 5–8)
PLATELET # BLD AUTO: 169 10*3/MM3 (ref 140–450)
PMV BLD AUTO: 8.7 FL (ref 6–12)
POTASSIUM SERPL-SCNC: 3.5 MMOL/L (ref 3.5–5.2)
PROT SERPL-MCNC: 7.5 G/DL (ref 6–8.5)
PROT UR QL STRIP: NEGATIVE
QT INTERVAL: 431 MS
RBC # BLD AUTO: 4.38 10*6/MM3 (ref 4.14–5.8)
SODIUM SERPL-SCNC: 140 MMOL/L (ref 136–145)
SP GR UR STRIP: 1.02 (ref 1–1.03)
UROBILINOGEN UR QL STRIP: ABNORMAL
WBC # BLD AUTO: 7.7 10*3/MM3 (ref 3.4–10.8)

## 2021-08-28 PROCEDURE — 80307 DRUG TEST PRSMV CHEM ANLYZR: CPT | Performed by: NURSE PRACTITIONER

## 2021-08-28 PROCEDURE — 71045 X-RAY EXAM CHEST 1 VIEW: CPT

## 2021-08-28 PROCEDURE — 80053 COMPREHEN METABOLIC PANEL: CPT | Performed by: NURSE PRACTITIONER

## 2021-08-28 PROCEDURE — 70450 CT HEAD/BRAIN W/O DYE: CPT

## 2021-08-28 PROCEDURE — 94799 UNLISTED PULMONARY SVC/PX: CPT

## 2021-08-28 PROCEDURE — 71100 X-RAY EXAM RIBS UNI 2 VIEWS: CPT

## 2021-08-28 PROCEDURE — 99283 EMERGENCY DEPT VISIT LOW MDM: CPT

## 2021-08-28 PROCEDURE — 81003 URINALYSIS AUTO W/O SCOPE: CPT | Performed by: NURSE PRACTITIONER

## 2021-08-28 PROCEDURE — 93005 ELECTROCARDIOGRAM TRACING: CPT

## 2021-08-28 PROCEDURE — 93005 ELECTROCARDIOGRAM TRACING: CPT | Performed by: NURSE PRACTITIONER

## 2021-08-28 PROCEDURE — 85025 COMPLETE CBC W/AUTO DIFF WBC: CPT | Performed by: NURSE PRACTITIONER

## 2021-08-28 PROCEDURE — 71101 X-RAY EXAM UNILAT RIBS/CHEST: CPT

## 2021-08-28 PROCEDURE — 82077 ASSAY SPEC XCP UR&BREATH IA: CPT | Performed by: NURSE PRACTITIONER

## 2021-08-28 PROCEDURE — 94640 AIRWAY INHALATION TREATMENT: CPT

## 2021-08-28 RX ORDER — IPRATROPIUM BROMIDE AND ALBUTEROL SULFATE 2.5; .5 MG/3ML; MG/3ML
3 SOLUTION RESPIRATORY (INHALATION) ONCE
Status: COMPLETED | OUTPATIENT
Start: 2021-08-28 | End: 2021-08-28

## 2021-08-28 RX ORDER — SODIUM CHLORIDE 0.9 % (FLUSH) 0.9 %
10 SYRINGE (ML) INJECTION AS NEEDED
Status: DISCONTINUED | OUTPATIENT
Start: 2021-08-28 | End: 2021-08-29 | Stop reason: HOSPADM

## 2021-08-28 RX ADMIN — IPRATROPIUM BROMIDE AND ALBUTEROL SULFATE 3 ML: 2.5; .5 SOLUTION RESPIRATORY (INHALATION) at 20:23

## 2021-08-30 ENCOUNTER — OFFICE VISIT (OUTPATIENT)
Dept: FAMILY MEDICINE CLINIC | Facility: CLINIC | Age: 61
End: 2021-08-30

## 2021-08-30 VITALS
SYSTOLIC BLOOD PRESSURE: 118 MMHG | HEIGHT: 71 IN | HEART RATE: 70 BPM | RESPIRATION RATE: 18 BRPM | WEIGHT: 193.4 LBS | DIASTOLIC BLOOD PRESSURE: 66 MMHG | TEMPERATURE: 97.3 F | BODY MASS INDEX: 27.07 KG/M2 | OXYGEN SATURATION: 97 %

## 2021-08-30 DIAGNOSIS — G20 PARKINSON DISEASE (HCC): Primary | ICD-10-CM

## 2021-08-30 DIAGNOSIS — R06.2 WHEEZING: ICD-10-CM

## 2021-08-30 DIAGNOSIS — M54.50 CHRONIC BILATERAL LOW BACK PAIN WITHOUT SCIATICA: ICD-10-CM

## 2021-08-30 DIAGNOSIS — S32.010A COMPRESSION FRACTURE OF L1 VERTEBRA, INITIAL ENCOUNTER (HCC): ICD-10-CM

## 2021-08-30 DIAGNOSIS — F41.8 MIXED ANXIETY DEPRESSIVE DISORDER: ICD-10-CM

## 2021-08-30 DIAGNOSIS — F41.9 ANXIETY: ICD-10-CM

## 2021-08-30 DIAGNOSIS — G89.29 CHRONIC BILATERAL LOW BACK PAIN WITHOUT SCIATICA: ICD-10-CM

## 2021-08-30 PROCEDURE — 99214 OFFICE O/P EST MOD 30 MIN: CPT | Performed by: FAMILY MEDICINE

## 2021-08-30 RX ORDER — GABAPENTIN 300 MG/1
300 CAPSULE ORAL 3 TIMES DAILY
Qty: 90 CAPSULE | Refills: 2 | Status: SHIPPED | OUTPATIENT
Start: 2021-08-30 | End: 2021-10-21 | Stop reason: DRUGHIGH

## 2021-08-30 RX ORDER — ALBUTEROL SULFATE 90 UG/1
2 AEROSOL, METERED RESPIRATORY (INHALATION) EVERY 4 HOURS PRN
Qty: 18 G | Refills: 3 | Status: SHIPPED | OUTPATIENT
Start: 2021-08-30 | End: 2021-10-13 | Stop reason: SDUPTHER

## 2021-08-30 RX ORDER — CLONAZEPAM 0.5 MG/1
0.5 TABLET ORAL 3 TIMES DAILY PRN
Qty: 90 TABLET | Refills: 1 | Status: SHIPPED | OUTPATIENT
Start: 2021-08-30 | End: 2021-11-22 | Stop reason: SDUPTHER

## 2021-09-09 ENCOUNTER — OFFICE VISIT (OUTPATIENT)
Dept: FAMILY MEDICINE CLINIC | Facility: CLINIC | Age: 61
End: 2021-09-09

## 2021-09-09 VITALS
HEIGHT: 71 IN | OXYGEN SATURATION: 97 % | SYSTOLIC BLOOD PRESSURE: 120 MMHG | WEIGHT: 193.2 LBS | HEART RATE: 73 BPM | DIASTOLIC BLOOD PRESSURE: 74 MMHG | TEMPERATURE: 97.3 F | RESPIRATION RATE: 18 BRPM | BODY MASS INDEX: 27.05 KG/M2

## 2021-09-09 DIAGNOSIS — Z48.02 VISIT FOR SUTURE REMOVAL: Primary | ICD-10-CM

## 2021-09-09 PROCEDURE — 99212 OFFICE O/P EST SF 10 MIN: CPT | Performed by: FAMILY MEDICINE

## 2021-09-14 ENCOUNTER — TELEPHONE (OUTPATIENT)
Dept: FAMILY MEDICINE CLINIC | Facility: CLINIC | Age: 61
End: 2021-09-14

## 2021-09-17 ENCOUNTER — OFFICE VISIT (OUTPATIENT)
Dept: NEUROSURGERY | Facility: CLINIC | Age: 61
End: 2021-09-17

## 2021-09-17 ENCOUNTER — TELEMEDICINE (OUTPATIENT)
Dept: FAMILY MEDICINE CLINIC | Facility: CLINIC | Age: 61
End: 2021-09-17

## 2021-09-17 VITALS
BODY MASS INDEX: 27.3 KG/M2 | HEART RATE: 72 BPM | DIASTOLIC BLOOD PRESSURE: 80 MMHG | WEIGHT: 195 LBS | HEIGHT: 71 IN | SYSTOLIC BLOOD PRESSURE: 127 MMHG | TEMPERATURE: 98.2 F

## 2021-09-17 DIAGNOSIS — R06.2 WHEEZING: Primary | ICD-10-CM

## 2021-09-17 DIAGNOSIS — G89.29 CHRONIC BILATERAL LOW BACK PAIN WITHOUT SCIATICA: Primary | ICD-10-CM

## 2021-09-17 DIAGNOSIS — M41.80 DEXTROSCOLIOSIS: ICD-10-CM

## 2021-09-17 DIAGNOSIS — S32.010D COMPRESSION FRACTURE OF L1 VERTEBRA WITH ROUTINE HEALING, SUBSEQUENT ENCOUNTER: ICD-10-CM

## 2021-09-17 DIAGNOSIS — M54.50 CHRONIC BILATERAL LOW BACK PAIN WITHOUT SCIATICA: Primary | ICD-10-CM

## 2021-09-17 PROCEDURE — 99213 OFFICE O/P EST LOW 20 MIN: CPT | Performed by: FAMILY MEDICINE

## 2021-09-17 PROCEDURE — 99204 OFFICE O/P NEW MOD 45 MIN: CPT | Performed by: NEUROLOGICAL SURGERY

## 2021-09-17 RX ORDER — ALBUTEROL SULFATE 2.5 MG/3ML
2.5 SOLUTION RESPIRATORY (INHALATION) EVERY 4 HOURS PRN
Qty: 300 ML | Refills: 1 | Status: SHIPPED | OUTPATIENT
Start: 2021-09-17 | End: 2022-07-14 | Stop reason: SDUPTHER

## 2021-09-17 RX ORDER — PREDNISONE 20 MG/1
40 TABLET ORAL DAILY
Qty: 10 TABLET | Refills: 0 | Status: SHIPPED | OUTPATIENT
Start: 2021-09-17 | End: 2021-09-22

## 2021-09-17 RX ORDER — MELOXICAM 15 MG/1
15 TABLET ORAL DAILY
Qty: 30 TABLET | Refills: 2 | Status: SHIPPED | OUTPATIENT
Start: 2021-09-17 | End: 2021-12-22

## 2021-09-17 RX ORDER — GUAIFENESIN AND CODEINE PHOSPHATE 100; 10 MG/5ML; MG/5ML
5 SOLUTION ORAL 3 TIMES DAILY PRN
Qty: 180 ML | Refills: 0 | Status: SHIPPED | OUTPATIENT
Start: 2021-09-17 | End: 2021-09-27

## 2021-10-01 PROBLEM — Z48.02 VISIT FOR SUTURE REMOVAL: Status: ACTIVE | Noted: 2021-10-01

## 2021-10-13 ENCOUNTER — HOSPITAL ENCOUNTER (OUTPATIENT)
Dept: GENERAL RADIOLOGY | Facility: HOSPITAL | Age: 61
Discharge: HOME | End: 2021-10-13
Admitting: FAMILY MEDICINE

## 2021-10-13 ENCOUNTER — OFFICE VISIT (OUTPATIENT)
Dept: FAMILY MEDICINE CLINIC | Facility: CLINIC | Age: 61
End: 2021-10-13

## 2021-10-13 VITALS
WEIGHT: 203.6 LBS | HEART RATE: 76 BPM | DIASTOLIC BLOOD PRESSURE: 70 MMHG | SYSTOLIC BLOOD PRESSURE: 132 MMHG | HEIGHT: 69 IN | TEMPERATURE: 96.6 F | OXYGEN SATURATION: 98 % | BODY MASS INDEX: 30.16 KG/M2

## 2021-10-13 DIAGNOSIS — R06.2 WHEEZING: ICD-10-CM

## 2021-10-13 DIAGNOSIS — R91.1 PULMONARY NODULE: ICD-10-CM

## 2021-10-13 DIAGNOSIS — R06.02 SHORTNESS OF BREATH: Primary | ICD-10-CM

## 2021-10-13 DIAGNOSIS — Z72.0 TOBACCO ABUSE: ICD-10-CM

## 2021-10-13 PROCEDURE — 71046 X-RAY EXAM CHEST 2 VIEWS: CPT

## 2021-10-13 PROCEDURE — 99213 OFFICE O/P EST LOW 20 MIN: CPT | Performed by: FAMILY MEDICINE

## 2021-10-13 RX ORDER — FUROSEMIDE 20 MG/1
20 TABLET ORAL DAILY
Qty: 5 TABLET | Refills: 0 | Status: SHIPPED | OUTPATIENT
Start: 2021-10-13 | End: 2021-10-20 | Stop reason: DRUGHIGH

## 2021-10-13 RX ORDER — METHYLPREDNISOLONE 4 MG/1
TABLET ORAL
Qty: 21 TABLET | Refills: 0 | Status: SHIPPED | OUTPATIENT
Start: 2021-10-13 | End: 2021-12-22

## 2021-10-13 RX ORDER — AZITHROMYCIN 250 MG/1
TABLET, FILM COATED ORAL
Qty: 6 TABLET | Refills: 0 | Status: SHIPPED | OUTPATIENT
Start: 2021-10-13 | End: 2021-10-20

## 2021-10-13 RX ORDER — ALBUTEROL SULFATE 90 UG/1
2 AEROSOL, METERED RESPIRATORY (INHALATION) EVERY 4 HOURS PRN
Qty: 18 G | Refills: 0 | Status: SHIPPED | OUTPATIENT
Start: 2021-10-13 | End: 2022-01-26 | Stop reason: SDUPTHER

## 2021-10-14 ENCOUNTER — TELEPHONE (OUTPATIENT)
Dept: FAMILY MEDICINE CLINIC | Facility: CLINIC | Age: 61
End: 2021-10-14

## 2021-10-15 ENCOUNTER — HOSPITAL ENCOUNTER (OUTPATIENT)
Dept: CT IMAGING | Facility: HOSPITAL | Age: 61
Discharge: HOME | End: 2021-10-15
Admitting: FAMILY MEDICINE

## 2021-10-15 ENCOUNTER — TELEPHONE (OUTPATIENT)
Dept: FAMILY MEDICINE CLINIC | Facility: CLINIC | Age: 61
End: 2021-10-15

## 2021-10-15 PROCEDURE — 71250 CT THORAX DX C-: CPT

## 2021-10-20 ENCOUNTER — OFFICE VISIT (OUTPATIENT)
Dept: FAMILY MEDICINE CLINIC | Facility: CLINIC | Age: 61
End: 2021-10-20

## 2021-10-20 VITALS
OXYGEN SATURATION: 96 % | DIASTOLIC BLOOD PRESSURE: 82 MMHG | RESPIRATION RATE: 17 BRPM | BODY MASS INDEX: 30.54 KG/M2 | WEIGHT: 206.2 LBS | SYSTOLIC BLOOD PRESSURE: 127 MMHG | HEART RATE: 68 BPM | HEIGHT: 69 IN

## 2021-10-20 DIAGNOSIS — R60.9 EDEMA, UNSPECIFIED TYPE: ICD-10-CM

## 2021-10-20 DIAGNOSIS — K59.00 CONSTIPATION, UNSPECIFIED CONSTIPATION TYPE: ICD-10-CM

## 2021-10-20 DIAGNOSIS — R53.83 FATIGUE, UNSPECIFIED TYPE: ICD-10-CM

## 2021-10-20 DIAGNOSIS — E34.9 TESTOSTERONE DEFICIENCY: ICD-10-CM

## 2021-10-20 DIAGNOSIS — G62.9 NEUROPATHY: ICD-10-CM

## 2021-10-20 DIAGNOSIS — R06.02 SHORTNESS OF BREATH: ICD-10-CM

## 2021-10-20 DIAGNOSIS — G20 PARKINSON DISEASE (HCC): Primary | ICD-10-CM

## 2021-10-20 PROCEDURE — 99214 OFFICE O/P EST MOD 30 MIN: CPT | Performed by: FAMILY MEDICINE

## 2021-10-20 RX ORDER — DONEPEZIL HYDROCHLORIDE 10 MG/1
10 TABLET, FILM COATED ORAL NIGHTLY
Qty: 90 TABLET | Refills: 2 | Status: SHIPPED | OUTPATIENT
Start: 2021-10-20 | End: 2022-10-04 | Stop reason: SDUPTHER

## 2021-10-20 RX ORDER — SYRINGE W-NEEDLE,DISPOSAB,3 ML 25GX5/8"
1 SYRINGE, EMPTY DISPOSABLE MISCELLANEOUS WEEKLY
Qty: 50 EACH | Refills: 3 | Status: SHIPPED | OUTPATIENT
Start: 2021-10-20 | End: 2022-02-21 | Stop reason: SDUPTHER

## 2021-10-20 RX ORDER — TESTOSTERONE CYPIONATE 200 MG/ML
100 INJECTION, SOLUTION INTRAMUSCULAR
Qty: 10 ML | Refills: 0 | Status: SHIPPED | OUTPATIENT
Start: 2021-10-20 | End: 2022-02-21 | Stop reason: SDUPTHER

## 2021-10-20 RX ORDER — DOCUSATE SODIUM 100 MG/1
100 CAPSULE, LIQUID FILLED ORAL 2 TIMES DAILY
Qty: 60 CAPSULE | Refills: 3 | Status: SHIPPED | OUTPATIENT
Start: 2021-10-20 | End: 2021-12-22

## 2021-10-20 RX ORDER — FUROSEMIDE 40 MG/1
40 TABLET ORAL DAILY
Qty: 30 TABLET | Refills: 1 | Status: SHIPPED | OUTPATIENT
Start: 2021-10-20 | End: 2021-11-10 | Stop reason: SDUPTHER

## 2021-10-20 RX ORDER — POTASSIUM CHLORIDE 20 MEQ/1
20 TABLET, EXTENDED RELEASE ORAL DAILY
Qty: 30 TABLET | Refills: 2 | Status: SHIPPED | OUTPATIENT
Start: 2021-10-20 | End: 2021-11-11

## 2021-10-21 ENCOUNTER — TELEPHONE (OUTPATIENT)
Dept: FAMILY MEDICINE CLINIC | Facility: CLINIC | Age: 61
End: 2021-10-21

## 2021-10-21 RX ORDER — GABAPENTIN 600 MG/1
600 TABLET ORAL 3 TIMES DAILY
Qty: 90 TABLET | Refills: 1 | Status: SHIPPED | OUTPATIENT
Start: 2021-10-21 | End: 2021-11-23 | Stop reason: SDUPTHER

## 2021-11-10 ENCOUNTER — OFFICE VISIT (OUTPATIENT)
Dept: FAMILY MEDICINE CLINIC | Facility: CLINIC | Age: 61
End: 2021-11-10

## 2021-11-10 VITALS
TEMPERATURE: 98 F | BODY MASS INDEX: 30.01 KG/M2 | DIASTOLIC BLOOD PRESSURE: 60 MMHG | OXYGEN SATURATION: 98 % | HEIGHT: 69 IN | WEIGHT: 202.6 LBS | HEART RATE: 71 BPM | SYSTOLIC BLOOD PRESSURE: 140 MMHG | RESPIRATION RATE: 18 BRPM

## 2021-11-10 DIAGNOSIS — R60.9 EDEMA, UNSPECIFIED TYPE: ICD-10-CM

## 2021-11-10 DIAGNOSIS — M79.604 PAIN OF RIGHT LOWER EXTREMITY: Primary | ICD-10-CM

## 2021-11-10 PROCEDURE — 99214 OFFICE O/P EST MOD 30 MIN: CPT | Performed by: FAMILY MEDICINE

## 2021-11-10 RX ORDER — TRAMADOL HYDROCHLORIDE 50 MG/1
50 TABLET ORAL EVERY 6 HOURS PRN
Qty: 28 TABLET | Refills: 0 | Status: SHIPPED | OUTPATIENT
Start: 2021-11-10 | End: 2022-07-14

## 2021-11-10 RX ORDER — GABAPENTIN 300 MG/1
CAPSULE ORAL
COMMUNITY
Start: 2021-11-07 | End: 2022-05-12 | Stop reason: SDUPTHER

## 2021-11-10 RX ORDER — FUROSEMIDE 40 MG/1
40 TABLET ORAL 2 TIMES DAILY
Qty: 60 TABLET | Refills: 1 | Status: SHIPPED | OUTPATIENT
Start: 2021-11-10 | End: 2021-11-11

## 2021-11-10 RX ORDER — SYRINGE WITH NEEDLE, 1 ML 25GX5/8"
SYRINGE, EMPTY DISPOSABLE MISCELLANEOUS
COMMUNITY
Start: 2021-10-20 | End: 2022-11-02 | Stop reason: SDUPTHER

## 2021-11-11 ENCOUNTER — TELEPHONE (OUTPATIENT)
Dept: FAMILY MEDICINE CLINIC | Facility: CLINIC | Age: 61
End: 2021-11-11

## 2021-11-11 DIAGNOSIS — E78.2 MIXED HYPERLIPIDEMIA: ICD-10-CM

## 2021-11-11 DIAGNOSIS — R53.81 MALAISE AND FATIGUE: ICD-10-CM

## 2021-11-11 DIAGNOSIS — R53.83 MALAISE AND FATIGUE: ICD-10-CM

## 2021-11-11 DIAGNOSIS — E34.9 TESTOSTERONE DEFICIENCY: ICD-10-CM

## 2021-11-11 DIAGNOSIS — E03.9 ACQUIRED HYPOTHYROIDISM: ICD-10-CM

## 2021-11-11 DIAGNOSIS — E55.9 VITAMIN D DEFICIENCY: ICD-10-CM

## 2021-11-11 DIAGNOSIS — I10 PRIMARY HYPERTENSION: Primary | ICD-10-CM

## 2021-11-11 DIAGNOSIS — R60.9 EDEMA, UNSPECIFIED TYPE: ICD-10-CM

## 2021-11-11 RX ORDER — POTASSIUM CHLORIDE 1500 MG/1
TABLET, EXTENDED RELEASE ORAL
Qty: 30 TABLET | Refills: 2 | Status: SHIPPED | OUTPATIENT
Start: 2021-11-11 | End: 2021-11-24

## 2021-11-11 RX ORDER — FUROSEMIDE 40 MG/1
TABLET ORAL
Qty: 30 TABLET | Refills: 1 | Status: SHIPPED | OUTPATIENT
Start: 2021-11-11 | End: 2021-12-02

## 2021-11-12 DIAGNOSIS — R06.2 WHEEZING: ICD-10-CM

## 2021-11-14 PROBLEM — K59.00 CONSTIPATION: Status: ACTIVE | Noted: 2021-11-14

## 2021-11-14 PROBLEM — R60.9 EDEMA: Status: ACTIVE | Noted: 2021-11-14

## 2021-11-14 PROBLEM — R06.02 SHORTNESS OF BREATH: Status: ACTIVE | Noted: 2021-11-14

## 2021-11-15 DIAGNOSIS — F33.1 MODERATE EPISODE OF RECURRENT MAJOR DEPRESSIVE DISORDER (HCC): ICD-10-CM

## 2021-11-15 RX ORDER — ARIPIPRAZOLE 2 MG/1
TABLET ORAL
Qty: 90 TABLET | Refills: 1 | Status: SHIPPED | OUTPATIENT
Start: 2021-11-15 | End: 2022-04-25 | Stop reason: SDUPTHER

## 2021-11-22 DIAGNOSIS — F41.9 ANXIETY: ICD-10-CM

## 2021-11-22 RX ORDER — CLONAZEPAM 0.5 MG/1
0.5 TABLET ORAL 3 TIMES DAILY PRN
Qty: 90 TABLET | Refills: 1 | Status: SHIPPED | OUTPATIENT
Start: 2021-11-22 | End: 2022-01-24 | Stop reason: SDUPTHER

## 2021-11-23 DIAGNOSIS — G62.9 NEUROPATHY: ICD-10-CM

## 2021-11-23 DIAGNOSIS — R60.9 EDEMA, UNSPECIFIED TYPE: ICD-10-CM

## 2021-11-24 RX ORDER — GABAPENTIN 600 MG/1
600 TABLET ORAL 3 TIMES DAILY
Qty: 90 TABLET | Refills: 1 | Status: SHIPPED | OUTPATIENT
Start: 2021-11-24 | End: 2022-02-23 | Stop reason: SDUPTHER

## 2021-11-24 RX ORDER — POTASSIUM CHLORIDE 1500 MG/1
TABLET, EXTENDED RELEASE ORAL
Qty: 90 TABLET | Refills: 1 | Status: SHIPPED | OUTPATIENT
Start: 2021-11-24 | End: 2022-08-16 | Stop reason: SDUPTHER

## 2021-11-25 PROBLEM — M79.604 PAIN OF RIGHT LOWER EXTREMITY: Status: ACTIVE | Noted: 2021-11-25

## 2021-12-02 DIAGNOSIS — R60.9 EDEMA, UNSPECIFIED TYPE: ICD-10-CM

## 2021-12-02 RX ORDER — FUROSEMIDE 40 MG/1
TABLET ORAL
Qty: 60 TABLET | Refills: 1 | Status: SHIPPED | OUTPATIENT
Start: 2021-12-02 | End: 2021-12-10 | Stop reason: SDUPTHER

## 2021-12-10 DIAGNOSIS — R60.9 EDEMA, UNSPECIFIED TYPE: ICD-10-CM

## 2021-12-10 RX ORDER — FUROSEMIDE 40 MG/1
40 TABLET ORAL DAILY
Qty: 60 TABLET | Refills: 1 | Status: SHIPPED | OUTPATIENT
Start: 2021-12-10 | End: 2022-01-03

## 2021-12-15 DIAGNOSIS — M79.604 PAIN OF RIGHT LOWER EXTREMITY: ICD-10-CM

## 2021-12-15 RX ORDER — TRAMADOL HYDROCHLORIDE 50 MG/1
50 TABLET ORAL EVERY 6 HOURS PRN
Refills: 0 | OUTPATIENT
Start: 2021-12-15

## 2021-12-22 ENCOUNTER — OFFICE VISIT (OUTPATIENT)
Dept: FAMILY MEDICINE CLINIC | Facility: CLINIC | Age: 61
End: 2021-12-22

## 2021-12-22 VITALS
DIASTOLIC BLOOD PRESSURE: 62 MMHG | RESPIRATION RATE: 18 BRPM | WEIGHT: 192 LBS | OXYGEN SATURATION: 97 % | SYSTOLIC BLOOD PRESSURE: 138 MMHG | TEMPERATURE: 97.3 F | HEART RATE: 70 BPM | BODY MASS INDEX: 28.44 KG/M2 | HEIGHT: 69 IN

## 2021-12-22 DIAGNOSIS — M54.50 CHRONIC BILATERAL LOW BACK PAIN WITHOUT SCIATICA: ICD-10-CM

## 2021-12-22 DIAGNOSIS — C62.90 MALIGNANT NEOPLASM OF TESTICLE, UNSPECIFIED LATERALITY, UNSPECIFIED WHETHER DESCENDED OR UNDESCENDED (HCC): Primary | ICD-10-CM

## 2021-12-22 DIAGNOSIS — N52.9 MALE ERECTILE DISORDER: ICD-10-CM

## 2021-12-22 DIAGNOSIS — G89.29 CHRONIC BILATERAL LOW BACK PAIN WITHOUT SCIATICA: ICD-10-CM

## 2021-12-22 PROCEDURE — 99214 OFFICE O/P EST MOD 30 MIN: CPT | Performed by: FAMILY MEDICINE

## 2021-12-22 RX ORDER — HYDROCODONE BITARTRATE AND ACETAMINOPHEN 5; 325 MG/1; MG/1
1 TABLET ORAL EVERY 6 HOURS PRN
Qty: 28 TABLET | Refills: 0 | Status: SHIPPED | OUTPATIENT
Start: 2021-12-22 | End: 2021-12-30 | Stop reason: SDUPTHER

## 2021-12-22 RX ORDER — SILDENAFIL CITRATE 20 MG/1
20 TABLET ORAL 3 TIMES DAILY
Qty: 90 TABLET | Refills: 3 | Status: SHIPPED | OUTPATIENT
Start: 2021-12-22 | End: 2022-07-14

## 2021-12-23 LAB
25(OH)D3+25(OH)D2 SERPL-MCNC: 60.4 NG/ML (ref 30–100)
ALBUMIN SERPL-MCNC: 5 G/DL (ref 3.8–4.8)
ALBUMIN/GLOB SERPL: 1.6 {RATIO} (ref 1.2–2.2)
ALP SERPL-CCNC: 166 IU/L (ref 44–121)
ALT SERPL-CCNC: 17 IU/L (ref 0–44)
AST SERPL-CCNC: 23 IU/L (ref 0–40)
BASOPHILS # BLD AUTO: 0 X10E3/UL (ref 0–0.2)
BASOPHILS NFR BLD AUTO: 1 %
BILIRUB SERPL-MCNC: 0.5 MG/DL (ref 0–1.2)
BUN SERPL-MCNC: 12 MG/DL (ref 8–27)
BUN/CREAT SERPL: 14 (ref 10–24)
CALCIUM SERPL-MCNC: 10 MG/DL (ref 8.6–10.2)
CHLORIDE SERPL-SCNC: 98 MMOL/L (ref 96–106)
CHOLEST SERPL-MCNC: 135 MG/DL (ref 100–199)
CHOLEST/HDLC SERPL: 4.1 RATIO (ref 0–5)
CO2 SERPL-SCNC: 26 MMOL/L (ref 20–29)
CREAT SERPL-MCNC: 0.87 MG/DL (ref 0.76–1.27)
EOSINOPHIL # BLD AUTO: 0.2 X10E3/UL (ref 0–0.4)
EOSINOPHIL NFR BLD AUTO: 2 %
ERYTHROCYTE [DISTWIDTH] IN BLOOD BY AUTOMATED COUNT: 11.8 % (ref 11.6–15.4)
FOLATE SERPL-MCNC: >20 NG/ML
GLOBULIN SER CALC-MCNC: 3.2 G/DL (ref 1.5–4.5)
GLUCOSE SERPL-MCNC: 89 MG/DL (ref 65–99)
HCT VFR BLD AUTO: 51.7 % (ref 37.5–51)
HDLC SERPL-MCNC: 33 MG/DL
HGB BLD-MCNC: 17.3 G/DL (ref 13–17.7)
IMM GRANULOCYTES # BLD AUTO: 0 X10E3/UL (ref 0–0.1)
IMM GRANULOCYTES NFR BLD AUTO: 0 %
LDLC SERPL CALC-MCNC: 70 MG/DL (ref 0–99)
LYMPHOCYTES # BLD AUTO: 3.3 X10E3/UL (ref 0.7–3.1)
LYMPHOCYTES NFR BLD AUTO: 43 %
MCH RBC QN AUTO: 30.9 PG (ref 26.6–33)
MCHC RBC AUTO-ENTMCNC: 33.5 G/DL (ref 31.5–35.7)
MCV RBC AUTO: 93 FL (ref 79–97)
MONOCYTES # BLD AUTO: 0.5 X10E3/UL (ref 0.1–0.9)
MONOCYTES NFR BLD AUTO: 7 %
NEUTROPHILS # BLD AUTO: 3.7 X10E3/UL (ref 1.4–7)
NEUTROPHILS NFR BLD AUTO: 47 %
PLATELET # BLD AUTO: 174 X10E3/UL (ref 150–450)
POTASSIUM SERPL-SCNC: 4.8 MMOL/L (ref 3.5–5.2)
PROT SERPL-MCNC: 8.2 G/DL (ref 6–8.5)
RBC # BLD AUTO: 5.59 X10E6/UL (ref 4.14–5.8)
SODIUM SERPL-SCNC: 139 MMOL/L (ref 134–144)
T3 SERPL-MCNC: 159 NG/DL (ref 71–180)
T3FREE SERPL-MCNC: 4.2 PG/ML (ref 2–4.4)
T4 FREE SERPL-MCNC: 1.34 NG/DL (ref 0.82–1.77)
T4 SERPL-MCNC: 8.6 UG/DL (ref 4.5–12)
TRIGL SERPL-MCNC: 189 MG/DL (ref 0–149)
TSH SERPL DL<=0.005 MIU/L-ACNC: 4.07 UIU/ML (ref 0.45–4.5)
VIT B12 SERPL-MCNC: 796 PG/ML (ref 232–1245)
VLDLC SERPL CALC-MCNC: 32 MG/DL (ref 5–40)
WBC # BLD AUTO: 7.7 X10E3/UL (ref 3.4–10.8)

## 2021-12-27 LAB
TESTOST FREE SERPL-MCNC: 19.7 PG/ML (ref 6.6–18.1)
TESTOST SERPL-MCNC: 349.6 NG/DL (ref 264–916)

## 2021-12-30 ENCOUNTER — TELEPHONE (OUTPATIENT)
Dept: FAMILY MEDICINE CLINIC | Facility: CLINIC | Age: 61
End: 2021-12-30

## 2021-12-30 DIAGNOSIS — C62.90 MALIGNANT NEOPLASM OF TESTICLE, UNSPECIFIED LATERALITY, UNSPECIFIED WHETHER DESCENDED OR UNDESCENDED (HCC): ICD-10-CM

## 2021-12-30 RX ORDER — HYDROCODONE BITARTRATE AND ACETAMINOPHEN 5; 325 MG/1; MG/1
1 TABLET ORAL EVERY 6 HOURS PRN
Qty: 28 TABLET | Refills: 0 | Status: SHIPPED | OUTPATIENT
Start: 2021-12-30 | End: 2022-01-13 | Stop reason: SDUPTHER

## 2021-12-31 DIAGNOSIS — R60.9 EDEMA, UNSPECIFIED TYPE: ICD-10-CM

## 2022-01-03 RX ORDER — FUROSEMIDE 40 MG/1
TABLET ORAL
Qty: 60 TABLET | Refills: 1 | Status: SHIPPED | OUTPATIENT
Start: 2022-01-03 | End: 2022-02-21 | Stop reason: SDUPTHER

## 2022-01-07 RX ORDER — AMLODIPINE BESYLATE AND BENAZEPRIL HYDROCHLORIDE 5; 10 MG/1; MG/1
CAPSULE ORAL
Qty: 30 CAPSULE | Refills: 8 | Status: SHIPPED | OUTPATIENT
Start: 2022-01-07 | End: 2022-11-08 | Stop reason: SDUPTHER

## 2022-01-08 DIAGNOSIS — R06.2 WHEEZING: ICD-10-CM

## 2022-01-13 DIAGNOSIS — C62.90 MALIGNANT NEOPLASM OF TESTICLE, UNSPECIFIED LATERALITY, UNSPECIFIED WHETHER DESCENDED OR UNDESCENDED: ICD-10-CM

## 2022-01-13 RX ORDER — HYDROCODONE BITARTRATE AND ACETAMINOPHEN 5; 325 MG/1; MG/1
1 TABLET ORAL EVERY 6 HOURS PRN
Qty: 28 TABLET | Refills: 0 | Status: SHIPPED | OUTPATIENT
Start: 2022-01-13 | End: 2022-01-19 | Stop reason: SDUPTHER

## 2022-01-19 DIAGNOSIS — C62.90 MALIGNANT NEOPLASM OF TESTICLE, UNSPECIFIED LATERALITY, UNSPECIFIED WHETHER DESCENDED OR UNDESCENDED: ICD-10-CM

## 2022-01-19 RX ORDER — HYDROCODONE BITARTRATE AND ACETAMINOPHEN 5; 325 MG/1; MG/1
1 TABLET ORAL EVERY 6 HOURS PRN
Qty: 28 TABLET | Refills: 0 | Status: SHIPPED | OUTPATIENT
Start: 2022-01-19 | End: 2022-02-02 | Stop reason: SDUPTHER

## 2022-01-24 DIAGNOSIS — F41.9 ANXIETY: ICD-10-CM

## 2022-01-24 RX ORDER — CLONAZEPAM 0.5 MG/1
0.5 TABLET ORAL 3 TIMES DAILY PRN
Qty: 90 TABLET | Refills: 1 | Status: SHIPPED | OUTPATIENT
Start: 2022-01-24 | End: 2022-02-21 | Stop reason: SDUPTHER

## 2022-01-26 ENCOUNTER — OFFICE VISIT (OUTPATIENT)
Dept: FAMILY MEDICINE CLINIC | Facility: CLINIC | Age: 62
End: 2022-01-26

## 2022-01-26 VITALS
DIASTOLIC BLOOD PRESSURE: 76 MMHG | HEIGHT: 69 IN | OXYGEN SATURATION: 98 % | HEART RATE: 69 BPM | BODY MASS INDEX: 28.76 KG/M2 | RESPIRATION RATE: 18 BRPM | SYSTOLIC BLOOD PRESSURE: 132 MMHG | TEMPERATURE: 97.5 F | WEIGHT: 194.2 LBS

## 2022-01-26 DIAGNOSIS — I10 PRIMARY HYPERTENSION: Primary | ICD-10-CM

## 2022-01-26 DIAGNOSIS — R06.2 WHEEZING: ICD-10-CM

## 2022-01-26 DIAGNOSIS — E78.2 MIXED HYPERLIPIDEMIA: ICD-10-CM

## 2022-01-26 DIAGNOSIS — G89.29 CHRONIC BILATERAL LOW BACK PAIN WITHOUT SCIATICA: ICD-10-CM

## 2022-01-26 DIAGNOSIS — M54.50 CHRONIC BILATERAL LOW BACK PAIN WITHOUT SCIATICA: ICD-10-CM

## 2022-01-26 PROCEDURE — 99214 OFFICE O/P EST MOD 30 MIN: CPT | Performed by: FAMILY MEDICINE

## 2022-01-26 RX ORDER — ATORVASTATIN CALCIUM 20 MG/1
20 TABLET, FILM COATED ORAL DAILY
Qty: 90 TABLET | Refills: 1 | Status: SHIPPED | OUTPATIENT
Start: 2022-01-26 | End: 2022-04-25 | Stop reason: SDUPTHER

## 2022-01-26 RX ORDER — BUDESONIDE AND FORMOTEROL FUMARATE DIHYDRATE 160; 4.5 UG/1; UG/1
2 AEROSOL RESPIRATORY (INHALATION) 2 TIMES DAILY
Qty: 10.2 G | Refills: 4 | Status: SHIPPED | OUTPATIENT
Start: 2022-01-26 | End: 2022-04-25 | Stop reason: SDUPTHER

## 2022-01-26 RX ORDER — ALBUTEROL SULFATE 90 UG/1
2 AEROSOL, METERED RESPIRATORY (INHALATION) EVERY 4 HOURS PRN
Qty: 18 G | Refills: 0 | Status: SHIPPED | OUTPATIENT
Start: 2022-01-26 | End: 2022-03-21 | Stop reason: SDUPTHER

## 2022-01-26 RX ORDER — DOCUSATE SODIUM 100 MG/1
CAPSULE, LIQUID FILLED ORAL
COMMUNITY
Start: 2022-01-12 | End: 2022-03-02

## 2022-01-27 ENCOUNTER — HOSPITAL ENCOUNTER (OUTPATIENT)
Facility: HOSPITAL | Age: 62
Discharge: HOME | End: 2022-01-27
Attending: INTERNAL MEDICINE | Admitting: INTERNAL MEDICINE

## 2022-01-27 ENCOUNTER — READMISSION MANAGEMENT (OUTPATIENT)
Dept: CALL CENTER | Facility: HOSPITAL | Age: 62
End: 2022-01-27

## 2022-01-27 ENCOUNTER — INPATIENT HOSPITAL (AMBULATORY)
Dept: URBAN - METROPOLITAN AREA HOSPITAL 84 | Facility: HOSPITAL | Age: 62
End: 2022-01-27
Payer: COMMERCIAL

## 2022-01-27 ENCOUNTER — INPATIENT HOSPITAL (AMBULATORY)
Dept: URBAN - METROPOLITAN AREA HOSPITAL 84 | Facility: HOSPITAL | Age: 62
End: 2022-01-27

## 2022-01-27 ENCOUNTER — ANESTHESIA EVENT (OUTPATIENT)
Dept: GASTROENTEROLOGY | Facility: HOSPITAL | Age: 62
End: 2022-01-27

## 2022-01-27 ENCOUNTER — ANESTHESIA (OUTPATIENT)
Dept: GASTROENTEROLOGY | Facility: HOSPITAL | Age: 62
End: 2022-01-27

## 2022-01-27 VITALS
DIASTOLIC BLOOD PRESSURE: 83 MMHG | RESPIRATION RATE: 18 BRPM | HEIGHT: 69 IN | WEIGHT: 194 LBS | HEART RATE: 72 BPM | OXYGEN SATURATION: 93 % | SYSTOLIC BLOOD PRESSURE: 144 MMHG | BODY MASS INDEX: 28.73 KG/M2 | TEMPERATURE: 97.7 F

## 2022-01-27 DIAGNOSIS — K21.00 GASTRO-ESOPHAGEAL REFLUX DISEASE WITH ESOPHAGITIS, WITHOUT B: ICD-10-CM

## 2022-01-27 DIAGNOSIS — K52.9 NONINFECTIVE GASTROENTERITIS AND COLITIS, UNSPECIFIED: ICD-10-CM

## 2022-01-27 DIAGNOSIS — T18.128A FOOD IMPACTION OF ESOPHAGUS, INITIAL ENCOUNTER: Primary | ICD-10-CM

## 2022-01-27 DIAGNOSIS — K26.9 DUODENAL ULCER, UNSPECIFIED AS ACUTE OR CHRONIC, WITHOUT HEM: ICD-10-CM

## 2022-01-27 DIAGNOSIS — T18.128A FOOD IN ESOPHAGUS CAUSING OTHER INJURY, INITIAL ENCOUNTER: ICD-10-CM

## 2022-01-27 DIAGNOSIS — K22.2 ESOPHAGEAL OBSTRUCTION: ICD-10-CM

## 2022-01-27 DIAGNOSIS — K25.9 GASTRIC ULCER, UNSPECIFIED AS ACUTE OR CHRONIC, WITHOUT HEMO: ICD-10-CM

## 2022-01-27 DIAGNOSIS — K29.50 UNSPECIFIED CHRONIC GASTRITIS WITHOUT BLEEDING: ICD-10-CM

## 2022-01-27 PROBLEM — K29.60 EROSIVE GASTRITIS: Status: ACTIVE | Noted: 2022-01-27

## 2022-01-27 PROBLEM — Z00.00 ANNUAL PHYSICAL EXAM: Status: RESOLVED | Noted: 2021-04-22 | Resolved: 2022-01-27
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
## 2022-01-27 PROBLEM — K29.80 DUODENITIS: Status: ACTIVE | Noted: 2022-01-27

## 2022-01-27 PROBLEM — G20.A1 PARKINSON'S DISEASE: Status: RESOLVED | Noted: 2019-07-25 | Resolved: 2022-01-27

## 2022-01-27 PROBLEM — K22.10 ULCER OF ESOPHAGUS WITHOUT BLEEDING: Status: ACTIVE | Noted: 2022-01-27

## 2022-01-27 PROBLEM — Z48.02 VISIT FOR SUTURE REMOVAL: Status: RESOLVED | Noted: 2021-10-01 | Resolved: 2022-01-27

## 2022-01-27 PROBLEM — F33.9 RECURRENT MAJOR DEPRESSIVE DISORDER: Status: RESOLVED | Noted: 2019-07-08 | Resolved: 2022-01-27

## 2022-01-27 PROBLEM — R07.81 RIB PAIN ON LEFT SIDE: Status: RESOLVED | Noted: 2021-04-25 | Resolved: 2022-01-27

## 2022-01-27 PROBLEM — G20 PARKINSON'S DISEASE: Status: RESOLVED | Noted: 2019-07-25 | Resolved: 2022-01-27

## 2022-01-27 PROBLEM — C62.90 MALIGNANT NEOPLASM OF TESTICLE: Status: RESOLVED | Noted: 2021-04-25 | Resolved: 2022-01-27

## 2022-01-27 LAB
ALBUMIN SERPL-MCNC: 4.2 G/DL (ref 3.5–5.2)
ALBUMIN/GLOB SERPL: 1.5 G/DL
ALP SERPL-CCNC: 143 U/L (ref 39–117)
ALT SERPL W P-5'-P-CCNC: 20 U/L (ref 1–41)
ANION GAP SERPL CALCULATED.3IONS-SCNC: 13 MMOL/L (ref 5–15)
AST SERPL-CCNC: 16 U/L (ref 1–40)
BASOPHILS # BLD AUTO: 0 10*3/MM3 (ref 0–0.2)
BASOPHILS NFR BLD AUTO: 0.4 % (ref 0–1.5)
BILIRUB SERPL-MCNC: 0.5 MG/DL (ref 0–1.2)
BUN SERPL-MCNC: 14 MG/DL (ref 8–23)
BUN/CREAT SERPL: 20.6 (ref 7–25)
CALCIUM SPEC-SCNC: 9.2 MG/DL (ref 8.6–10.5)
CHLORIDE SERPL-SCNC: 103 MMOL/L (ref 98–107)
CO2 SERPL-SCNC: 24 MMOL/L (ref 22–29)
CREAT SERPL-MCNC: 0.68 MG/DL (ref 0.76–1.27)
DEPRECATED RDW RBC AUTO: 43.8 FL (ref 37–54)
EOSINOPHIL # BLD AUTO: 0.2 10*3/MM3 (ref 0–0.4)
EOSINOPHIL NFR BLD AUTO: 1.6 % (ref 0.3–6.2)
ERYTHROCYTE [DISTWIDTH] IN BLOOD BY AUTOMATED COUNT: 13.4 % (ref 12.3–15.4)
GFR SERPL CREATININE-BSD FRML MDRD: 118 ML/MIN/1.73
GLOBULIN UR ELPH-MCNC: 2.8 GM/DL
GLUCOSE SERPL-MCNC: 88 MG/DL (ref 65–99)
HCT VFR BLD AUTO: 43.7 % (ref 37.5–51)
HGB BLD-MCNC: 15.3 G/DL (ref 13–17.7)
LYMPHOCYTES # BLD AUTO: 2.1 10*3/MM3 (ref 0.7–3.1)
LYMPHOCYTES NFR BLD AUTO: 21.7 % (ref 19.6–45.3)
MCH RBC QN AUTO: 32.4 PG (ref 26.6–33)
MCHC RBC AUTO-ENTMCNC: 35.1 G/DL (ref 31.5–35.7)
MCV RBC AUTO: 92.2 FL (ref 79–97)
MONOCYTES # BLD AUTO: 0.8 10*3/MM3 (ref 0.1–0.9)
MONOCYTES NFR BLD AUTO: 8.8 % (ref 5–12)
NEUTROPHILS NFR BLD AUTO: 6.5 10*3/MM3 (ref 1.7–7)
NEUTROPHILS NFR BLD AUTO: 67.5 % (ref 42.7–76)
NRBC BLD AUTO-RTO: 0.1 /100 WBC (ref 0–0.2)
PLATELET # BLD AUTO: 163 10*3/MM3 (ref 140–450)
PMV BLD AUTO: 8.9 FL (ref 6–12)
POTASSIUM SERPL-SCNC: 4 MMOL/L (ref 3.5–5.2)
PROT SERPL-MCNC: 7 G/DL (ref 6–8.5)
RBC # BLD AUTO: 4.74 10*6/MM3 (ref 4.14–5.8)
SARS-COV-2 RNA PNL SPEC NAA+PROBE: NOT DETECTED
SODIUM SERPL-SCNC: 140 MMOL/L (ref 136–145)
WBC NRBC COR # BLD: 9.6 10*3/MM3 (ref 3.4–10.8)

## 2022-01-27 PROCEDURE — G0378 HOSPITAL OBSERVATION PER HR: HCPCS

## 2022-01-27 PROCEDURE — 80053 COMPREHEN METABOLIC PANEL: CPT | Performed by: INTERNAL MEDICINE

## 2022-01-27 PROCEDURE — 25010000002 PROPOFOL 200 MG/20ML EMULSION

## 2022-01-27 PROCEDURE — 43247 EGD REMOVE FOREIGN BODY: CPT | Performed by: INTERNAL MEDICINE

## 2022-01-27 PROCEDURE — G0379 DIRECT REFER HOSPITAL OBSERV: HCPCS

## 2022-01-27 PROCEDURE — 43239 EGD BIOPSY SINGLE/MULTIPLE: CPT | Performed by: INTERNAL MEDICINE

## 2022-01-27 PROCEDURE — C9803 HOPD COVID-19 SPEC COLLECT: HCPCS

## 2022-01-27 PROCEDURE — 88305 TISSUE EXAM BY PATHOLOGIST: CPT | Performed by: INTERNAL MEDICINE

## 2022-01-27 PROCEDURE — 25010000002 ONDANSETRON PER 1 MG

## 2022-01-27 PROCEDURE — 87635 SARS-COV-2 COVID-19 AMP PRB: CPT | Performed by: INTERNAL MEDICINE

## 2022-01-27 PROCEDURE — 99234 HOSP IP/OBS SM DT SF/LOW 45: CPT | Performed by: INTERNAL MEDICINE

## 2022-01-27 PROCEDURE — 25010000002 DEXAMETHASONE PER 1 MG

## 2022-01-27 PROCEDURE — 85025 COMPLETE CBC W/AUTO DIFF WBC: CPT | Performed by: INTERNAL MEDICINE

## 2022-01-27 PROCEDURE — 25010000002 SUCCINYLCHOLINE PER 20 MG

## 2022-01-27 RX ORDER — ONDANSETRON 2 MG/ML
INJECTION INTRAMUSCULAR; INTRAVENOUS AS NEEDED
Status: DISCONTINUED | OUTPATIENT
Start: 2022-01-27 | End: 2022-01-27 | Stop reason: SURG

## 2022-01-27 RX ORDER — ACETAMINOPHEN 650 MG/1
650 SUPPOSITORY RECTAL ONCE AS NEEDED
Status: DISCONTINUED | OUTPATIENT
Start: 2022-01-27 | End: 2022-01-27 | Stop reason: HOSPADM

## 2022-01-27 RX ORDER — PANTOPRAZOLE SODIUM 40 MG/1
40 TABLET, DELAYED RELEASE ORAL
Qty: 60 TABLET | Refills: 1 | Status: SHIPPED | OUTPATIENT
Start: 2022-01-27 | End: 2022-12-04 | Stop reason: SDUPTHER

## 2022-01-27 RX ORDER — ONDANSETRON 2 MG/ML
4 INJECTION INTRAMUSCULAR; INTRAVENOUS ONCE AS NEEDED
Status: DISCONTINUED | OUTPATIENT
Start: 2022-01-27 | End: 2022-01-27 | Stop reason: HOSPADM

## 2022-01-27 RX ORDER — CLONAZEPAM 0.5 MG/1
0.5 TABLET ORAL 3 TIMES DAILY PRN
Status: DISCONTINUED | OUTPATIENT
Start: 2022-01-27 | End: 2022-01-27 | Stop reason: HOSPADM

## 2022-01-27 RX ORDER — ONDANSETRON 4 MG/1
4 TABLET, FILM COATED ORAL EVERY 6 HOURS PRN
Status: DISCONTINUED | OUTPATIENT
Start: 2022-01-27 | End: 2022-01-27 | Stop reason: HOSPADM

## 2022-01-27 RX ORDER — BUDESONIDE AND FORMOTEROL FUMARATE DIHYDRATE 160; 4.5 UG/1; UG/1
2 AEROSOL RESPIRATORY (INHALATION) 2 TIMES DAILY
Status: DISCONTINUED | OUTPATIENT
Start: 2022-01-27 | End: 2022-01-27 | Stop reason: HOSPADM

## 2022-01-27 RX ORDER — ARIPIPRAZOLE 2 MG/1
2 TABLET ORAL DAILY
Status: DISCONTINUED | OUTPATIENT
Start: 2022-01-27 | End: 2022-01-27 | Stop reason: HOSPADM

## 2022-01-27 RX ORDER — SUCCINYLCHOLINE CHLORIDE 20 MG/ML
INJECTION INTRAMUSCULAR; INTRAVENOUS AS NEEDED
Status: DISCONTINUED | OUTPATIENT
Start: 2022-01-27 | End: 2022-01-27 | Stop reason: SURG

## 2022-01-27 RX ORDER — SODIUM CHLORIDE 0.9 % (FLUSH) 0.9 %
10 SYRINGE (ML) INJECTION EVERY 12 HOURS SCHEDULED
Status: DISCONTINUED | OUTPATIENT
Start: 2022-01-27 | End: 2022-01-27 | Stop reason: HOSPADM

## 2022-01-27 RX ORDER — SODIUM CHLORIDE 0.9 % (FLUSH) 0.9 %
3-10 SYRINGE (ML) INJECTION AS NEEDED
Status: DISCONTINUED | OUTPATIENT
Start: 2022-01-27 | End: 2022-01-27 | Stop reason: HOSPADM

## 2022-01-27 RX ORDER — SODIUM CHLORIDE 0.9 % (FLUSH) 0.9 %
3 SYRINGE (ML) INJECTION EVERY 12 HOURS SCHEDULED
Status: DISCONTINUED | OUTPATIENT
Start: 2022-01-27 | End: 2022-01-27 | Stop reason: HOSPADM

## 2022-01-27 RX ORDER — PROMETHAZINE HYDROCHLORIDE 25 MG/1
25 TABLET ORAL ONCE AS NEEDED
Status: DISCONTINUED | OUTPATIENT
Start: 2022-01-27 | End: 2022-01-27 | Stop reason: HOSPADM

## 2022-01-27 RX ORDER — DOCUSATE SODIUM 100 MG/1
100 CAPSULE, LIQUID FILLED ORAL DAILY PRN
Status: DISCONTINUED | OUTPATIENT
Start: 2022-01-27 | End: 2022-01-27 | Stop reason: HOSPADM

## 2022-01-27 RX ORDER — GABAPENTIN 600 MG/1
600 TABLET ORAL 3 TIMES DAILY
Status: DISCONTINUED | OUTPATIENT
Start: 2022-01-27 | End: 2022-01-27 | Stop reason: HOSPADM

## 2022-01-27 RX ORDER — SODIUM CHLORIDE 0.9 % (FLUSH) 0.9 %
10 SYRINGE (ML) INJECTION AS NEEDED
Status: DISCONTINUED | OUTPATIENT
Start: 2022-01-27 | End: 2022-01-27 | Stop reason: HOSPADM

## 2022-01-27 RX ORDER — HEPARIN SODIUM 5000 [USP'U]/ML
5000 INJECTION, SOLUTION INTRAVENOUS; SUBCUTANEOUS EVERY 8 HOURS SCHEDULED
Status: DISCONTINUED | OUTPATIENT
Start: 2022-01-27 | End: 2022-01-27

## 2022-01-27 RX ORDER — SODIUM CHLORIDE 9 MG/ML
100 INJECTION, SOLUTION INTRAVENOUS CONTINUOUS
Status: DISCONTINUED | OUTPATIENT
Start: 2022-01-27 | End: 2022-01-27 | Stop reason: HOSPADM

## 2022-01-27 RX ORDER — HYDROCODONE BITARTRATE AND ACETAMINOPHEN 5; 325 MG/1; MG/1
1 TABLET ORAL EVERY 6 HOURS PRN
Status: DISCONTINUED | OUTPATIENT
Start: 2022-01-27 | End: 2022-01-27 | Stop reason: HOSPADM

## 2022-01-27 RX ORDER — ACETAMINOPHEN 325 MG/1
650 TABLET ORAL ONCE AS NEEDED
Status: DISCONTINUED | OUTPATIENT
Start: 2022-01-27 | End: 2022-01-27 | Stop reason: HOSPADM

## 2022-01-27 RX ORDER — LIDOCAINE HYDROCHLORIDE 20 MG/ML
INJECTION, SOLUTION EPIDURAL; INFILTRATION; INTRACAUDAL; PERINEURAL AS NEEDED
Status: DISCONTINUED | OUTPATIENT
Start: 2022-01-27 | End: 2022-01-27 | Stop reason: SURG

## 2022-01-27 RX ORDER — DIPHENHYDRAMINE HYDROCHLORIDE 50 MG/ML
12.5 INJECTION INTRAMUSCULAR; INTRAVENOUS
Status: DISCONTINUED | OUTPATIENT
Start: 2022-01-27 | End: 2022-01-27 | Stop reason: HOSPADM

## 2022-01-27 RX ORDER — DONEPEZIL HYDROCHLORIDE 5 MG/1
10 TABLET, FILM COATED ORAL NIGHTLY
Status: DISCONTINUED | OUTPATIENT
Start: 2022-01-27 | End: 2022-01-27 | Stop reason: HOSPADM

## 2022-01-27 RX ORDER — PROPOFOL 10 MG/ML
INJECTION, EMULSION INTRAVENOUS AS NEEDED
Status: DISCONTINUED | OUTPATIENT
Start: 2022-01-27 | End: 2022-01-27 | Stop reason: SURG

## 2022-01-27 RX ORDER — PANTOPRAZOLE SODIUM 40 MG/1
40 TABLET, DELAYED RELEASE ORAL
Status: DISCONTINUED | OUTPATIENT
Start: 2022-01-27 | End: 2022-01-27 | Stop reason: HOSPADM

## 2022-01-27 RX ORDER — ALBUTEROL SULFATE 2.5 MG/3ML
2.5 SOLUTION RESPIRATORY (INHALATION) EVERY 4 HOURS PRN
Status: DISCONTINUED | OUTPATIENT
Start: 2022-01-27 | End: 2022-01-27 | Stop reason: HOSPADM

## 2022-01-27 RX ORDER — NALOXONE HCL 0.4 MG/ML
0.4 VIAL (ML) INJECTION AS NEEDED
Status: DISCONTINUED | OUTPATIENT
Start: 2022-01-27 | End: 2022-01-27 | Stop reason: HOSPADM

## 2022-01-27 RX ORDER — DEXAMETHASONE SODIUM PHOSPHATE 4 MG/ML
INJECTION, SOLUTION INTRA-ARTICULAR; INTRALESIONAL; INTRAMUSCULAR; INTRAVENOUS; SOFT TISSUE AS NEEDED
Status: DISCONTINUED | OUTPATIENT
Start: 2022-01-27 | End: 2022-01-27 | Stop reason: SURG

## 2022-01-27 RX ORDER — ATORVASTATIN CALCIUM 20 MG/1
20 TABLET, FILM COATED ORAL NIGHTLY
Status: DISCONTINUED | OUTPATIENT
Start: 2022-01-27 | End: 2022-01-27 | Stop reason: HOSPADM

## 2022-01-27 RX ORDER — SODIUM CHLORIDE 9 MG/ML
100 INJECTION, SOLUTION INTRAVENOUS CONTINUOUS
Status: DISCONTINUED | OUTPATIENT
Start: 2022-01-27 | End: 2022-01-27

## 2022-01-27 RX ORDER — DEXAMETHASONE SODIUM PHOSPHATE 4 MG/ML
8 INJECTION, SOLUTION INTRA-ARTICULAR; INTRALESIONAL; INTRAMUSCULAR; INTRAVENOUS; SOFT TISSUE ONCE AS NEEDED
Status: DISCONTINUED | OUTPATIENT
Start: 2022-01-27 | End: 2022-01-27 | Stop reason: HOSPADM

## 2022-01-27 RX ORDER — FLUMAZENIL 0.1 MG/ML
0.2 INJECTION INTRAVENOUS AS NEEDED
Status: DISCONTINUED | OUTPATIENT
Start: 2022-01-27 | End: 2022-01-27 | Stop reason: HOSPADM

## 2022-01-27 RX ORDER — ONDANSETRON 2 MG/ML
4 INJECTION INTRAMUSCULAR; INTRAVENOUS EVERY 6 HOURS PRN
Status: DISCONTINUED | OUTPATIENT
Start: 2022-01-27 | End: 2022-01-27 | Stop reason: HOSPADM

## 2022-01-27 RX ORDER — PROMETHAZINE HYDROCHLORIDE 25 MG/1
25 SUPPOSITORY RECTAL ONCE AS NEEDED
Status: DISCONTINUED | OUTPATIENT
Start: 2022-01-27 | End: 2022-01-27 | Stop reason: HOSPADM

## 2022-01-27 RX ADMIN — SODIUM CHLORIDE 100 ML/HR: 0.9 INJECTION, SOLUTION INTRAVENOUS at 10:16

## 2022-01-27 RX ADMIN — LIDOCAINE HYDROCHLORIDE 100 MG: 20 INJECTION, SOLUTION EPIDURAL; INFILTRATION; INTRACAUDAL; PERINEURAL at 08:26

## 2022-01-27 RX ADMIN — HYDROCODONE BITARTRATE AND ACETAMINOPHEN 1 TABLET: 5; 325 TABLET ORAL at 11:01

## 2022-01-27 RX ADMIN — PANTOPRAZOLE SODIUM 40 MG: 40 TABLET, DELAYED RELEASE ORAL at 11:01

## 2022-01-27 RX ADMIN — CARBIDOPA AND LEVODOPA 1.5 TABLET: 25; 100 TABLET ORAL at 11:01

## 2022-01-27 RX ADMIN — Medication 3 ML: at 10:16

## 2022-01-27 RX ADMIN — LISINOPRIL: 5 TABLET ORAL at 11:01

## 2022-01-27 RX ADMIN — GABAPENTIN 600 MG: 600 TABLET, FILM COATED ORAL at 11:01

## 2022-01-27 RX ADMIN — PROPOFOL 200 MG: 10 INJECTION, EMULSION INTRAVENOUS at 08:26

## 2022-01-27 RX ADMIN — DEXAMETHASONE SODIUM PHOSPHATE 4 MG: 4 INJECTION, SOLUTION INTRAMUSCULAR; INTRAVENOUS at 08:26

## 2022-01-27 RX ADMIN — ONDANSETRON 4 MG: 2 INJECTION INTRAMUSCULAR; INTRAVENOUS at 08:26

## 2022-01-27 RX ADMIN — Medication 10 ML: at 08:16

## 2022-01-27 RX ADMIN — SODIUM CHLORIDE 100 ML/HR: 0.9 INJECTION, SOLUTION INTRAVENOUS at 07:19

## 2022-01-27 RX ADMIN — SUCCINYLCHOLINE CHLORIDE 100 MG: 20 INJECTION INTRAMUSCULAR; INTRAVENOUS at 08:26

## 2022-01-28 ENCOUNTER — TRANSITIONAL CARE MANAGEMENT TELEPHONE ENCOUNTER (OUTPATIENT)
Dept: CALL CENTER | Facility: HOSPITAL | Age: 62
End: 2022-01-28

## 2022-01-28 LAB
LAB AP CASE REPORT: NORMAL
LAB AP DIAGNOSIS COMMENT: NORMAL
PATH REPORT.FINAL DX SPEC: NORMAL
PATH REPORT.GROSS SPEC: NORMAL

## 2022-01-31 ENCOUNTER — TRANSITIONAL CARE MANAGEMENT TELEPHONE ENCOUNTER (OUTPATIENT)
Dept: CALL CENTER | Facility: HOSPITAL | Age: 62
End: 2022-01-31

## 2022-02-02 DIAGNOSIS — C62.90 MALIGNANT NEOPLASM OF TESTICLE, UNSPECIFIED LATERALITY, UNSPECIFIED WHETHER DESCENDED OR UNDESCENDED: ICD-10-CM

## 2022-02-03 RX ORDER — HYDROCODONE BITARTRATE AND ACETAMINOPHEN 5; 325 MG/1; MG/1
1 TABLET ORAL EVERY 6 HOURS PRN
Qty: 28 TABLET | Refills: 0 | Status: SHIPPED | OUTPATIENT
Start: 2022-02-03 | End: 2022-02-11 | Stop reason: SDUPTHER

## 2022-02-11 DIAGNOSIS — C62.90 MALIGNANT NEOPLASM OF TESTICLE, UNSPECIFIED LATERALITY, UNSPECIFIED WHETHER DESCENDED OR UNDESCENDED: ICD-10-CM

## 2022-02-11 RX ORDER — HYDROCODONE BITARTRATE AND ACETAMINOPHEN 5; 325 MG/1; MG/1
1 TABLET ORAL EVERY 6 HOURS PRN
Qty: 28 TABLET | Refills: 0 | Status: SHIPPED | OUTPATIENT
Start: 2022-02-11 | End: 2022-03-02 | Stop reason: SDUPTHER

## 2022-02-14 ENCOUNTER — TELEPHONE (OUTPATIENT)
Dept: FAMILY MEDICINE CLINIC | Facility: CLINIC | Age: 62
End: 2022-02-14

## 2022-02-14 DIAGNOSIS — G20 PARKINSON'S DISEASE: Primary | ICD-10-CM

## 2022-02-21 DIAGNOSIS — E34.9 TESTOSTERONE DEFICIENCY: ICD-10-CM

## 2022-02-21 DIAGNOSIS — R60.9 EDEMA, UNSPECIFIED TYPE: ICD-10-CM

## 2022-02-21 DIAGNOSIS — F41.9 ANXIETY: ICD-10-CM

## 2022-02-21 RX ORDER — FUROSEMIDE 40 MG/1
40 TABLET ORAL 2 TIMES DAILY
Qty: 60 TABLET | Refills: 1 | Status: SHIPPED | OUTPATIENT
Start: 2022-02-21 | End: 2022-03-09

## 2022-02-22 RX ORDER — CLONAZEPAM 0.5 MG/1
0.5 TABLET ORAL 3 TIMES DAILY PRN
Qty: 90 TABLET | Refills: 1 | Status: SHIPPED | OUTPATIENT
Start: 2022-02-22 | End: 2022-03-21 | Stop reason: SDUPTHER

## 2022-02-22 RX ORDER — SYRINGE W-NEEDLE,DISPOSAB,3 ML 25GX5/8"
1 SYRINGE, EMPTY DISPOSABLE MISCELLANEOUS WEEKLY
Qty: 50 EACH | Refills: 3 | Status: SHIPPED | OUTPATIENT
Start: 2022-02-22 | End: 2022-07-14 | Stop reason: SDUPTHER

## 2022-02-22 RX ORDER — TESTOSTERONE CYPIONATE 200 MG/ML
100 INJECTION, SOLUTION INTRAMUSCULAR
Qty: 10 ML | Refills: 0 | Status: SHIPPED | OUTPATIENT
Start: 2022-02-22 | End: 2022-06-06 | Stop reason: SDUPTHER

## 2022-02-23 DIAGNOSIS — G62.9 NEUROPATHY: ICD-10-CM

## 2022-02-23 RX ORDER — GABAPENTIN 600 MG/1
600 TABLET ORAL 3 TIMES DAILY
Qty: 90 TABLET | Refills: 1 | Status: SHIPPED | OUTPATIENT
Start: 2022-02-23 | End: 2022-03-21 | Stop reason: SDUPTHER

## 2022-03-02 DIAGNOSIS — C62.90 MALIGNANT NEOPLASM OF TESTICLE, UNSPECIFIED LATERALITY, UNSPECIFIED WHETHER DESCENDED OR UNDESCENDED: ICD-10-CM

## 2022-03-02 DIAGNOSIS — K59.00 CONSTIPATION, UNSPECIFIED: ICD-10-CM

## 2022-03-02 RX ORDER — DOCUSATE SODIUM 100 MG/1
CAPSULE, LIQUID FILLED ORAL
Qty: 60 CAPSULE | Refills: 3 | Status: ON HOLD | OUTPATIENT
Start: 2022-03-02 | End: 2023-03-25

## 2022-03-03 RX ORDER — HYDROCODONE BITARTRATE AND ACETAMINOPHEN 5; 325 MG/1; MG/1
1 TABLET ORAL EVERY 6 HOURS PRN
Qty: 28 TABLET | Refills: 0 | Status: SHIPPED | OUTPATIENT
Start: 2022-03-03 | End: 2022-03-21 | Stop reason: SDUPTHER

## 2022-03-07 ENCOUNTER — TELEPHONE (OUTPATIENT)
Dept: FAMILY MEDICINE CLINIC | Facility: CLINIC | Age: 62
End: 2022-03-07

## 2022-03-09 DIAGNOSIS — R60.9 EDEMA, UNSPECIFIED TYPE: ICD-10-CM

## 2022-03-09 RX ORDER — FUROSEMIDE 40 MG/1
TABLET ORAL
Qty: 90 TABLET | Refills: 1 | Status: SHIPPED | OUTPATIENT
Start: 2022-03-09 | End: 2022-03-21 | Stop reason: SDUPTHER

## 2022-03-21 DIAGNOSIS — G62.9 NEUROPATHY: ICD-10-CM

## 2022-03-21 DIAGNOSIS — C62.90 MALIGNANT NEOPLASM OF TESTICLE, UNSPECIFIED LATERALITY, UNSPECIFIED WHETHER DESCENDED OR UNDESCENDED: ICD-10-CM

## 2022-03-21 DIAGNOSIS — R60.9 EDEMA, UNSPECIFIED TYPE: ICD-10-CM

## 2022-03-21 DIAGNOSIS — F41.9 ANXIETY: ICD-10-CM

## 2022-03-21 DIAGNOSIS — R06.2 WHEEZING: ICD-10-CM

## 2022-03-21 RX ORDER — GABAPENTIN 600 MG/1
600 TABLET ORAL 3 TIMES DAILY
Qty: 90 TABLET | Refills: 1 | Status: SHIPPED | OUTPATIENT
Start: 2022-03-21 | End: 2022-04-20 | Stop reason: SDUPTHER

## 2022-03-21 RX ORDER — CLONAZEPAM 0.5 MG/1
0.5 TABLET ORAL 3 TIMES DAILY PRN
Qty: 90 TABLET | Refills: 1 | Status: SHIPPED | OUTPATIENT
Start: 2022-03-21 | End: 2022-04-20 | Stop reason: SDUPTHER

## 2022-03-21 RX ORDER — HYDROCODONE BITARTRATE AND ACETAMINOPHEN 5; 325 MG/1; MG/1
1 TABLET ORAL EVERY 6 HOURS PRN
Qty: 28 TABLET | Refills: 0 | Status: SHIPPED | OUTPATIENT
Start: 2022-03-21 | End: 2022-04-01 | Stop reason: SDUPTHER

## 2022-03-21 RX ORDER — ALBUTEROL SULFATE 90 UG/1
2 AEROSOL, METERED RESPIRATORY (INHALATION) EVERY 4 HOURS PRN
Qty: 18 G | Refills: 0 | Status: SHIPPED | OUTPATIENT
Start: 2022-03-21 | End: 2022-04-25 | Stop reason: SDUPTHER

## 2022-03-21 RX ORDER — FUROSEMIDE 40 MG/1
40 TABLET ORAL DAILY
Qty: 90 TABLET | Refills: 1 | Status: SHIPPED | OUTPATIENT
Start: 2022-03-21 | End: 2022-11-21 | Stop reason: SDUPTHER

## 2022-04-01 DIAGNOSIS — C62.90 MALIGNANT NEOPLASM OF TESTICLE, UNSPECIFIED LATERALITY, UNSPECIFIED WHETHER DESCENDED OR UNDESCENDED: ICD-10-CM

## 2022-04-02 RX ORDER — HYDROCODONE BITARTRATE AND ACETAMINOPHEN 5; 325 MG/1; MG/1
1 TABLET ORAL EVERY 6 HOURS PRN
Qty: 28 TABLET | Refills: 0 | Status: SHIPPED | OUTPATIENT
Start: 2022-04-02 | End: 2022-04-13 | Stop reason: SDUPTHER

## 2022-04-13 DIAGNOSIS — C62.90 MALIGNANT NEOPLASM OF TESTICLE, UNSPECIFIED LATERALITY, UNSPECIFIED WHETHER DESCENDED OR UNDESCENDED: ICD-10-CM

## 2022-04-13 RX ORDER — HYDROCODONE BITARTRATE AND ACETAMINOPHEN 5; 325 MG/1; MG/1
1 TABLET ORAL EVERY 6 HOURS PRN
Qty: 28 TABLET | Refills: 0 | Status: SHIPPED | OUTPATIENT
Start: 2022-04-13 | End: 2022-04-25 | Stop reason: SDUPTHER

## 2022-04-20 DIAGNOSIS — G62.9 NEUROPATHY: ICD-10-CM

## 2022-04-20 DIAGNOSIS — F41.9 ANXIETY: ICD-10-CM

## 2022-04-20 RX ORDER — GABAPENTIN 600 MG/1
600 TABLET ORAL 3 TIMES DAILY
Qty: 90 TABLET | Refills: 1 | Status: SHIPPED | OUTPATIENT
Start: 2022-04-20 | End: 2022-08-16 | Stop reason: SDUPTHER

## 2022-04-20 RX ORDER — CLONAZEPAM 0.5 MG/1
0.5 TABLET ORAL 3 TIMES DAILY PRN
Qty: 90 TABLET | Refills: 1 | Status: SHIPPED | OUTPATIENT
Start: 2022-04-20 | End: 2022-05-12 | Stop reason: SDUPTHER

## 2022-04-25 DIAGNOSIS — C62.90 MALIGNANT NEOPLASM OF TESTICLE, UNSPECIFIED LATERALITY, UNSPECIFIED WHETHER DESCENDED OR UNDESCENDED: ICD-10-CM

## 2022-04-25 DIAGNOSIS — F33.1 MODERATE EPISODE OF RECURRENT MAJOR DEPRESSIVE DISORDER: ICD-10-CM

## 2022-04-25 DIAGNOSIS — E78.2 MIXED HYPERLIPIDEMIA: ICD-10-CM

## 2022-04-25 DIAGNOSIS — R06.2 WHEEZING: ICD-10-CM

## 2022-04-25 RX ORDER — ALBUTEROL SULFATE 90 UG/1
2 AEROSOL, METERED RESPIRATORY (INHALATION) EVERY 4 HOURS PRN
Qty: 18 G | Refills: 0 | Status: ON HOLD | OUTPATIENT
Start: 2022-04-25 | End: 2023-03-25

## 2022-04-25 RX ORDER — BUDESONIDE AND FORMOTEROL FUMARATE DIHYDRATE 160; 4.5 UG/1; UG/1
2 AEROSOL RESPIRATORY (INHALATION) 2 TIMES DAILY
Qty: 10.2 G | Refills: 4 | Status: ON HOLD | OUTPATIENT
Start: 2022-04-25

## 2022-04-25 RX ORDER — HYDROCODONE BITARTRATE AND ACETAMINOPHEN 5; 325 MG/1; MG/1
1 TABLET ORAL EVERY 6 HOURS PRN
Qty: 28 TABLET | Refills: 0 | Status: SHIPPED | OUTPATIENT
Start: 2022-04-25 | End: 2022-05-02 | Stop reason: SDUPTHER

## 2022-04-25 RX ORDER — ATORVASTATIN CALCIUM 20 MG/1
20 TABLET, FILM COATED ORAL DAILY
Qty: 90 TABLET | Refills: 1 | Status: SHIPPED | OUTPATIENT
Start: 2022-04-25 | End: 2022-11-21 | Stop reason: SDUPTHER

## 2022-04-25 RX ORDER — ARIPIPRAZOLE 2 MG/1
2 TABLET ORAL DAILY
Qty: 90 TABLET | Refills: 1 | Status: SHIPPED | OUTPATIENT
Start: 2022-04-25 | End: 2022-07-14 | Stop reason: SDUPTHER

## 2022-05-02 DIAGNOSIS — C62.90 MALIGNANT NEOPLASM OF TESTICLE, UNSPECIFIED LATERALITY, UNSPECIFIED WHETHER DESCENDED OR UNDESCENDED: ICD-10-CM

## 2022-05-02 RX ORDER — HYDROCODONE BITARTRATE AND ACETAMINOPHEN 5; 325 MG/1; MG/1
1 TABLET ORAL EVERY 6 HOURS PRN
Qty: 28 TABLET | Refills: 0 | Status: SHIPPED | OUTPATIENT
Start: 2022-05-02 | End: 2022-05-12 | Stop reason: SDUPTHER

## 2022-05-12 ENCOUNTER — OFFICE VISIT (OUTPATIENT)
Dept: FAMILY MEDICINE CLINIC | Facility: CLINIC | Age: 62
End: 2022-05-12

## 2022-05-12 VITALS
SYSTOLIC BLOOD PRESSURE: 120 MMHG | HEART RATE: 75 BPM | RESPIRATION RATE: 18 BRPM | WEIGHT: 192.2 LBS | BODY MASS INDEX: 28.47 KG/M2 | HEIGHT: 69 IN | TEMPERATURE: 97.3 F | DIASTOLIC BLOOD PRESSURE: 71 MMHG | OXYGEN SATURATION: 95 %

## 2022-05-12 DIAGNOSIS — G89.29 CHRONIC LOW BACK PAIN WITHOUT SCIATICA, UNSPECIFIED BACK PAIN LATERALITY: ICD-10-CM

## 2022-05-12 DIAGNOSIS — C62.90 MALIGNANT NEOPLASM OF TESTICLE, UNSPECIFIED LATERALITY, UNSPECIFIED WHETHER DESCENDED OR UNDESCENDED: ICD-10-CM

## 2022-05-12 DIAGNOSIS — F41.9 ANXIETY: ICD-10-CM

## 2022-05-12 DIAGNOSIS — M54.50 CHRONIC LOW BACK PAIN WITHOUT SCIATICA, UNSPECIFIED BACK PAIN LATERALITY: ICD-10-CM

## 2022-05-12 DIAGNOSIS — E34.9 TESTOSTERONE DEFICIENCY: ICD-10-CM

## 2022-05-12 DIAGNOSIS — F51.01 PRIMARY INSOMNIA: Primary | ICD-10-CM

## 2022-05-12 PROCEDURE — 99213 OFFICE O/P EST LOW 20 MIN: CPT | Performed by: HOSPITALIST

## 2022-05-12 RX ORDER — HYDROCODONE BITARTRATE AND ACETAMINOPHEN 5; 325 MG/1; MG/1
1 TABLET ORAL EVERY 6 HOURS PRN
Qty: 28 TABLET | Refills: 0 | Status: SHIPPED | OUTPATIENT
Start: 2022-05-12 | End: 2022-05-19 | Stop reason: SDUPTHER

## 2022-05-12 RX ORDER — ZOLPIDEM TARTRATE 5 MG/1
5 TABLET ORAL NIGHTLY PRN
Qty: 30 TABLET | Refills: 0 | Status: SHIPPED | OUTPATIENT
Start: 2022-05-12 | End: 2022-07-14 | Stop reason: ALTCHOICE

## 2022-05-12 RX ORDER — HYDROCODONE BITARTRATE AND ACETAMINOPHEN 5; 325 MG/1; MG/1
1 TABLET ORAL EVERY 6 HOURS PRN
Qty: 28 TABLET | Refills: 0 | OUTPATIENT
Start: 2022-05-12

## 2022-05-12 RX ORDER — GABAPENTIN 300 MG/1
300 CAPSULE ORAL 3 TIMES DAILY
Qty: 90 CAPSULE | Refills: 3 | Status: SHIPPED | OUTPATIENT
Start: 2022-05-12 | End: 2022-08-23 | Stop reason: HOSPADM

## 2022-05-12 RX ORDER — CLONAZEPAM 0.5 MG/1
0.5 TABLET ORAL 3 TIMES DAILY PRN
Qty: 90 TABLET | Refills: 1 | Status: SHIPPED | OUTPATIENT
Start: 2022-05-12 | End: 2022-06-22 | Stop reason: SDUPTHER

## 2022-05-14 RX ORDER — PREDNISONE 10 MG/1
10 TABLET ORAL DAILY
Qty: 7 TABLET | Refills: 0 | Status: SHIPPED | OUTPATIENT
Start: 2022-05-14 | End: 2022-05-21

## 2022-05-17 DIAGNOSIS — F41.9 ANXIETY: Primary | ICD-10-CM

## 2022-05-19 DIAGNOSIS — C62.90 MALIGNANT NEOPLASM OF TESTICLE, UNSPECIFIED LATERALITY, UNSPECIFIED WHETHER DESCENDED OR UNDESCENDED: ICD-10-CM

## 2022-05-19 RX ORDER — HYDROCODONE BITARTRATE AND ACETAMINOPHEN 5; 325 MG/1; MG/1
1 TABLET ORAL EVERY 6 HOURS PRN
Qty: 28 TABLET | Refills: 0 | Status: SHIPPED | OUTPATIENT
Start: 2022-05-19 | End: 2022-05-28 | Stop reason: SDUPTHER

## 2022-05-28 DIAGNOSIS — C62.90 MALIGNANT NEOPLASM OF TESTICLE, UNSPECIFIED LATERALITY, UNSPECIFIED WHETHER DESCENDED OR UNDESCENDED: ICD-10-CM

## 2022-05-28 DIAGNOSIS — R52 PAIN: Primary | ICD-10-CM

## 2022-05-28 RX ORDER — HYDROCODONE BITARTRATE AND ACETAMINOPHEN 5; 325 MG/1; MG/1
1 TABLET ORAL EVERY 6 HOURS PRN
Qty: 28 TABLET | Refills: 0 | Status: SHIPPED | OUTPATIENT
Start: 2022-05-28 | End: 2022-06-22 | Stop reason: SDUPTHER

## 2022-06-04 DIAGNOSIS — E34.9 TESTOSTERONE DEFICIENCY: ICD-10-CM

## 2022-06-05 RX ORDER — TESTOSTERONE CYPIONATE 200 MG/ML
100 INJECTION, SOLUTION INTRAMUSCULAR
Qty: 10 ML | Refills: 0 | OUTPATIENT
Start: 2022-06-05

## 2022-06-05 RX ORDER — SYRINGE W-NEEDLE,DISPOSAB,3 ML 25GX5/8"
1 SYRINGE, EMPTY DISPOSABLE MISCELLANEOUS WEEKLY
Qty: 50 EACH | Refills: 3 | OUTPATIENT
Start: 2022-06-05

## 2022-06-06 RX ORDER — TESTOSTERONE CYPIONATE 200 MG/ML
100 INJECTION, SOLUTION INTRAMUSCULAR
Qty: 10 ML | Refills: 0 | Status: SHIPPED | OUTPATIENT
Start: 2022-06-06 | End: 2022-09-08 | Stop reason: SDUPTHER

## 2022-06-16 DIAGNOSIS — C62.90 MALIGNANT NEOPLASM OF TESTICLE, UNSPECIFIED LATERALITY, UNSPECIFIED WHETHER DESCENDED OR UNDESCENDED: ICD-10-CM

## 2022-06-16 RX ORDER — HYDROCODONE BITARTRATE AND ACETAMINOPHEN 5; 325 MG/1; MG/1
1 TABLET ORAL EVERY 6 HOURS PRN
Qty: 28 TABLET | Refills: 0 | Status: CANCELLED | OUTPATIENT
Start: 2022-06-16

## 2022-06-21 ENCOUNTER — TELEPHONE (OUTPATIENT)
Dept: FAMILY MEDICINE CLINIC | Facility: CLINIC | Age: 62
End: 2022-06-21

## 2022-06-21 DIAGNOSIS — C62.90 MALIGNANT NEOPLASM OF TESTICLE, UNSPECIFIED LATERALITY, UNSPECIFIED WHETHER DESCENDED OR UNDESCENDED: ICD-10-CM

## 2022-06-22 DIAGNOSIS — G62.9 NEUROPATHY: Primary | ICD-10-CM

## 2022-06-22 DIAGNOSIS — F41.9 ANXIETY: ICD-10-CM

## 2022-06-22 DIAGNOSIS — C62.90 MALIGNANT NEOPLASM OF TESTICLE, UNSPECIFIED LATERALITY, UNSPECIFIED WHETHER DESCENDED OR UNDESCENDED: ICD-10-CM

## 2022-06-22 RX ORDER — CLONAZEPAM 0.5 MG/1
0.5 TABLET ORAL 3 TIMES DAILY PRN
Qty: 90 TABLET | Refills: 1 | Status: SHIPPED | OUTPATIENT
Start: 2022-06-22 | End: 2022-07-14 | Stop reason: SDUPTHER

## 2022-06-22 RX ORDER — HYDROCODONE BITARTRATE AND ACETAMINOPHEN 5; 325 MG/1; MG/1
1 TABLET ORAL EVERY 6 HOURS PRN
Qty: 28 TABLET | Refills: 0 | Status: SHIPPED | OUTPATIENT
Start: 2022-06-22 | End: 2022-07-20 | Stop reason: SDUPTHER

## 2022-07-14 ENCOUNTER — LAB (OUTPATIENT)
Dept: FAMILY MEDICINE CLINIC | Facility: CLINIC | Age: 62
End: 2022-07-14

## 2022-07-14 ENCOUNTER — OFFICE VISIT (OUTPATIENT)
Dept: FAMILY MEDICINE CLINIC | Facility: CLINIC | Age: 62
End: 2022-07-14

## 2022-07-14 VITALS
OXYGEN SATURATION: 100 % | TEMPERATURE: 97.3 F | DIASTOLIC BLOOD PRESSURE: 70 MMHG | HEIGHT: 69 IN | RESPIRATION RATE: 18 BRPM | SYSTOLIC BLOOD PRESSURE: 132 MMHG | WEIGHT: 197.4 LBS | BODY MASS INDEX: 29.24 KG/M2 | HEART RATE: 60 BPM

## 2022-07-14 DIAGNOSIS — M54.50 CHRONIC LOW BACK PAIN WITHOUT SCIATICA, UNSPECIFIED BACK PAIN LATERALITY: Primary | ICD-10-CM

## 2022-07-14 DIAGNOSIS — Z12.5 SCREENING PSA (PROSTATE SPECIFIC ANTIGEN): ICD-10-CM

## 2022-07-14 DIAGNOSIS — F33.1 MODERATE EPISODE OF RECURRENT MAJOR DEPRESSIVE DISORDER: ICD-10-CM

## 2022-07-14 DIAGNOSIS — F41.9 ANXIETY: ICD-10-CM

## 2022-07-14 DIAGNOSIS — E34.9 TESTOSTERONE DEFICIENCY: ICD-10-CM

## 2022-07-14 DIAGNOSIS — J44.9 CHRONIC OBSTRUCTIVE PULMONARY DISEASE, UNSPECIFIED COPD TYPE: ICD-10-CM

## 2022-07-14 DIAGNOSIS — G89.29 CHRONIC LOW BACK PAIN WITHOUT SCIATICA, UNSPECIFIED BACK PAIN LATERALITY: Primary | ICD-10-CM

## 2022-07-14 DIAGNOSIS — G20 PARKINSON'S DISEASE: ICD-10-CM

## 2022-07-14 PROBLEM — R06.02 SHORTNESS OF BREATH: Status: RESOLVED | Noted: 2021-11-14 | Resolved: 2022-07-14

## 2022-07-14 PROBLEM — C62.90 MALIGNANT NEOPLASM OF TESTICLE (HCC): Status: ACTIVE | Noted: 2022-07-14

## 2022-07-14 PROBLEM — R06.2 WHEEZING: Status: RESOLVED | Noted: 2021-05-20 | Resolved: 2022-07-14

## 2022-07-14 LAB
ALBUMIN SERPL-MCNC: 4.5 G/DL (ref 3.5–5.2)
ALBUMIN/GLOB SERPL: 2 G/DL
ALP SERPL-CCNC: 137 U/L (ref 39–117)
ALT SERPL W P-5'-P-CCNC: 19 U/L (ref 1–41)
ANION GAP SERPL CALCULATED.3IONS-SCNC: 13.6 MMOL/L (ref 5–15)
AST SERPL-CCNC: 17 U/L (ref 1–40)
BASOPHILS # BLD AUTO: 0.02 10*3/MM3 (ref 0–0.2)
BASOPHILS NFR BLD AUTO: 0.3 % (ref 0–1.5)
BILIRUB SERPL-MCNC: <0.2 MG/DL (ref 0–1.2)
BUN SERPL-MCNC: 12 MG/DL (ref 8–23)
BUN/CREAT SERPL: 18.5 (ref 7–25)
CALCIUM SPEC-SCNC: 9.3 MG/DL (ref 8.6–10.5)
CHLORIDE SERPL-SCNC: 106 MMOL/L (ref 98–107)
CO2 SERPL-SCNC: 24.4 MMOL/L (ref 22–29)
CREAT SERPL-MCNC: 0.65 MG/DL (ref 0.76–1.27)
DEPRECATED RDW RBC AUTO: 45.7 FL (ref 37–54)
EGFRCR SERPLBLD CKD-EPI 2021: 105.9 ML/MIN/1.73
EOSINOPHIL # BLD AUTO: 0.16 10*3/MM3 (ref 0–0.4)
EOSINOPHIL NFR BLD AUTO: 2.7 % (ref 0.3–6.2)
ERYTHROCYTE [DISTWIDTH] IN BLOOD BY AUTOMATED COUNT: 13.1 % (ref 12.3–15.4)
GLOBULIN UR ELPH-MCNC: 2.2 GM/DL
GLUCOSE SERPL-MCNC: 110 MG/DL (ref 65–99)
HCT VFR BLD AUTO: 38 % (ref 37.5–51)
HGB BLD-MCNC: 13.3 G/DL (ref 13–17.7)
IMM GRANULOCYTES # BLD AUTO: 0.01 10*3/MM3 (ref 0–0.05)
IMM GRANULOCYTES NFR BLD AUTO: 0.2 % (ref 0–0.5)
LYMPHOCYTES # BLD AUTO: 1.85 10*3/MM3 (ref 0.7–3.1)
LYMPHOCYTES NFR BLD AUTO: 31.6 % (ref 19.6–45.3)
MCH RBC QN AUTO: 33.8 PG (ref 26.6–33)
MCHC RBC AUTO-ENTMCNC: 35 G/DL (ref 31.5–35.7)
MCV RBC AUTO: 96.4 FL (ref 79–97)
MONOCYTES # BLD AUTO: 0.56 10*3/MM3 (ref 0.1–0.9)
MONOCYTES NFR BLD AUTO: 9.6 % (ref 5–12)
NEUTROPHILS NFR BLD AUTO: 3.26 10*3/MM3 (ref 1.7–7)
NEUTROPHILS NFR BLD AUTO: 55.6 % (ref 42.7–76)
NRBC BLD AUTO-RTO: 0 /100 WBC (ref 0–0.2)
PLATELET # BLD AUTO: 163 10*3/MM3 (ref 140–450)
PMV BLD AUTO: 11.1 FL (ref 6–12)
POTASSIUM SERPL-SCNC: 3.7 MMOL/L (ref 3.5–5.2)
PROT SERPL-MCNC: 6.7 G/DL (ref 6–8.5)
PSA SERPL-MCNC: 1.34 NG/ML (ref 0–4)
RBC # BLD AUTO: 3.94 10*6/MM3 (ref 4.14–5.8)
SODIUM SERPL-SCNC: 144 MMOL/L (ref 136–145)
WBC NRBC COR # BLD: 5.86 10*3/MM3 (ref 3.4–10.8)

## 2022-07-14 PROCEDURE — 36415 COLL VENOUS BLD VENIPUNCTURE: CPT

## 2022-07-14 PROCEDURE — G0103 PSA SCREENING: HCPCS | Performed by: PHYSICIAN ASSISTANT

## 2022-07-14 PROCEDURE — 80053 COMPREHEN METABOLIC PANEL: CPT | Performed by: PHYSICIAN ASSISTANT

## 2022-07-14 PROCEDURE — 99214 OFFICE O/P EST MOD 30 MIN: CPT | Performed by: PHYSICIAN ASSISTANT

## 2022-07-14 PROCEDURE — 85025 COMPLETE CBC W/AUTO DIFF WBC: CPT | Performed by: PHYSICIAN ASSISTANT

## 2022-07-14 RX ORDER — ALBUTEROL SULFATE 2.5 MG/3ML
2.5 SOLUTION RESPIRATORY (INHALATION) EVERY 4 HOURS PRN
Qty: 300 ML | Refills: 1 | Status: ON HOLD | OUTPATIENT
Start: 2022-07-14 | End: 2023-03-25

## 2022-07-14 RX ORDER — SYRINGE W-NEEDLE,DISPOSAB,3 ML 25GX5/8"
1 SYRINGE, EMPTY DISPOSABLE MISCELLANEOUS WEEKLY
Qty: 50 EACH | Refills: 3 | Status: SHIPPED | OUTPATIENT
Start: 2022-07-14 | End: 2022-11-08 | Stop reason: SDUPTHER

## 2022-07-14 RX ORDER — CARBIDOPA AND LEVODOPA 50; 200 MG/1; MG/1
1 TABLET, EXTENDED RELEASE ORAL DAILY
COMMUNITY
Start: 2022-05-19 | End: 2022-12-08 | Stop reason: HOSPADM

## 2022-07-14 RX ORDER — TADALAFIL 20 MG/1
20 TABLET ORAL DAILY PRN
Qty: 30 TABLET | Refills: 0 | Status: ON HOLD | OUTPATIENT
Start: 2022-07-14

## 2022-07-14 RX ORDER — CLONAZEPAM 0.5 MG/1
0.5 TABLET ORAL 4 TIMES DAILY PRN
Qty: 120 TABLET | Refills: 0 | Status: SHIPPED | OUTPATIENT
Start: 2022-07-14 | End: 2022-08-18

## 2022-07-14 RX ORDER — VENLAFAXINE HYDROCHLORIDE 225 MG/1
225 TABLET, EXTENDED RELEASE ORAL DAILY
Qty: 90 EACH | Refills: 3 | Status: SHIPPED | OUTPATIENT
Start: 2022-07-14 | End: 2022-07-23 | Stop reason: SDUPTHER

## 2022-07-14 RX ORDER — ARIPIPRAZOLE 2 MG/1
2 TABLET ORAL DAILY
Qty: 90 TABLET | Refills: 1 | Status: SHIPPED | OUTPATIENT
Start: 2022-07-14 | End: 2023-01-09

## 2022-07-15 DIAGNOSIS — R60.9 EDEMA, UNSPECIFIED TYPE: ICD-10-CM

## 2022-07-15 DIAGNOSIS — G20 PARKINSON DISEASE: ICD-10-CM

## 2022-07-15 RX ORDER — POTASSIUM CHLORIDE 1500 MG/1
TABLET, EXTENDED RELEASE ORAL
Qty: 90 TABLET | Refills: 1 | OUTPATIENT
Start: 2022-07-15

## 2022-07-15 RX ORDER — DONEPEZIL HYDROCHLORIDE 10 MG/1
TABLET, FILM COATED ORAL
Qty: 90 TABLET | Refills: 2 | OUTPATIENT
Start: 2022-07-15

## 2022-07-20 DIAGNOSIS — C62.90 MALIGNANT NEOPLASM OF TESTICLE, UNSPECIFIED LATERALITY, UNSPECIFIED WHETHER DESCENDED OR UNDESCENDED: ICD-10-CM

## 2022-07-20 DIAGNOSIS — G62.9 NEUROPATHY: ICD-10-CM

## 2022-07-21 ENCOUNTER — TELEPHONE (OUTPATIENT)
Dept: FAMILY MEDICINE CLINIC | Facility: CLINIC | Age: 62
End: 2022-07-21

## 2022-07-21 DIAGNOSIS — G62.9 NEUROPATHY: ICD-10-CM

## 2022-07-21 DIAGNOSIS — C62.90 MALIGNANT NEOPLASM OF TESTICLE, UNSPECIFIED LATERALITY, UNSPECIFIED WHETHER DESCENDED OR UNDESCENDED: ICD-10-CM

## 2022-07-21 DIAGNOSIS — F41.9 ANXIETY: ICD-10-CM

## 2022-07-21 RX ORDER — HYDROCODONE BITARTRATE AND ACETAMINOPHEN 5; 325 MG/1; MG/1
1 TABLET ORAL EVERY 6 HOURS PRN
Qty: 28 TABLET | Refills: 0 | Status: CANCELLED | OUTPATIENT
Start: 2022-07-21

## 2022-07-21 RX ORDER — HYDROCODONE BITARTRATE AND ACETAMINOPHEN 5; 325 MG/1; MG/1
1 TABLET ORAL EVERY 6 HOURS PRN
Qty: 28 TABLET | Refills: 0 | Status: SHIPPED | OUTPATIENT
Start: 2022-07-21 | End: 2022-08-03 | Stop reason: SDUPTHER

## 2022-07-25 RX ORDER — VENLAFAXINE HYDROCHLORIDE 225 MG/1
225 TABLET, EXTENDED RELEASE ORAL DAILY
Qty: 90 EACH | Refills: 3 | Status: SHIPPED | OUTPATIENT
Start: 2022-07-25 | End: 2022-12-04

## 2022-08-03 DIAGNOSIS — G62.9 NEUROPATHY: ICD-10-CM

## 2022-08-03 DIAGNOSIS — R60.9 EDEMA, UNSPECIFIED TYPE: ICD-10-CM

## 2022-08-03 DIAGNOSIS — C62.90 MALIGNANT NEOPLASM OF TESTICLE, UNSPECIFIED LATERALITY, UNSPECIFIED WHETHER DESCENDED OR UNDESCENDED: ICD-10-CM

## 2022-08-03 RX ORDER — POTASSIUM CHLORIDE 20 MEQ/1
20 TABLET, EXTENDED RELEASE ORAL DAILY
Qty: 90 TABLET | Refills: 1 | OUTPATIENT
Start: 2022-08-03

## 2022-08-04 RX ORDER — HYDROCODONE BITARTRATE AND ACETAMINOPHEN 5; 325 MG/1; MG/1
1 TABLET ORAL EVERY 6 HOURS PRN
Qty: 28 TABLET | Refills: 0 | Status: SHIPPED | OUTPATIENT
Start: 2022-08-04 | End: 2022-08-15 | Stop reason: SDUPTHER

## 2022-08-15 DIAGNOSIS — C62.90 MALIGNANT NEOPLASM OF TESTICLE, UNSPECIFIED LATERALITY, UNSPECIFIED WHETHER DESCENDED OR UNDESCENDED: ICD-10-CM

## 2022-08-15 DIAGNOSIS — G62.9 NEUROPATHY: ICD-10-CM

## 2022-08-15 DIAGNOSIS — R60.9 EDEMA, UNSPECIFIED TYPE: ICD-10-CM

## 2022-08-15 RX ORDER — GABAPENTIN 600 MG/1
600 TABLET ORAL 3 TIMES DAILY
Qty: 90 TABLET | Refills: 1 | OUTPATIENT
Start: 2022-08-15

## 2022-08-15 RX ORDER — POTASSIUM CHLORIDE 20 MEQ/1
20 TABLET, EXTENDED RELEASE ORAL DAILY
Qty: 90 TABLET | Refills: 1 | OUTPATIENT
Start: 2022-08-15

## 2022-08-15 RX ORDER — ASPIRIN 81 MG/1
81 TABLET ORAL DAILY
COMMUNITY
End: 2023-02-17 | Stop reason: SDUPTHER

## 2022-08-16 DIAGNOSIS — R60.9 EDEMA, UNSPECIFIED TYPE: ICD-10-CM

## 2022-08-16 DIAGNOSIS — G62.9 NEUROPATHY: ICD-10-CM

## 2022-08-16 RX ORDER — HYDROCODONE BITARTRATE AND ACETAMINOPHEN 5; 325 MG/1; MG/1
1 TABLET ORAL EVERY 6 HOURS PRN
Qty: 28 TABLET | Refills: 0 | Status: SHIPPED | OUTPATIENT
Start: 2022-08-16 | End: 2022-08-29 | Stop reason: SDUPTHER

## 2022-08-16 RX ORDER — GABAPENTIN 600 MG/1
600 TABLET ORAL 3 TIMES DAILY
Qty: 90 TABLET | Refills: 1 | Status: SHIPPED | OUTPATIENT
Start: 2022-08-16 | End: 2022-10-10 | Stop reason: SDUPTHER

## 2022-08-16 RX ORDER — POTASSIUM CHLORIDE 20 MEQ/1
20 TABLET, EXTENDED RELEASE ORAL DAILY
Qty: 90 TABLET | Refills: 1 | Status: SHIPPED | OUTPATIENT
Start: 2022-08-16 | End: 2023-01-09

## 2022-08-17 DIAGNOSIS — R06.2 WHEEZING: ICD-10-CM

## 2022-08-17 DIAGNOSIS — K59.00 CONSTIPATION, UNSPECIFIED: ICD-10-CM

## 2022-08-17 DIAGNOSIS — G20 PARKINSON DISEASE: ICD-10-CM

## 2022-08-17 DIAGNOSIS — F41.9 ANXIETY: ICD-10-CM

## 2022-08-17 RX ORDER — DOCUSATE SODIUM 100 MG/1
CAPSULE, LIQUID FILLED ORAL
Qty: 60 CAPSULE | Refills: 3 | OUTPATIENT
Start: 2022-08-17

## 2022-08-17 RX ORDER — ALBUTEROL SULFATE 90 UG/1
AEROSOL, METERED RESPIRATORY (INHALATION)
OUTPATIENT
Start: 2022-08-17

## 2022-08-17 RX ORDER — DONEPEZIL HYDROCHLORIDE 10 MG/1
TABLET, FILM COATED ORAL
Qty: 90 TABLET | Refills: 2 | OUTPATIENT
Start: 2022-08-17

## 2022-08-18 RX ORDER — CLONAZEPAM 0.5 MG/1
TABLET ORAL
Qty: 120 TABLET | Refills: 0 | Status: SHIPPED | OUTPATIENT
Start: 2022-08-18 | End: 2022-09-21 | Stop reason: SDUPTHER

## 2022-08-20 ENCOUNTER — LAB (OUTPATIENT)
Dept: LAB | Facility: HOSPITAL | Age: 62
End: 2022-08-20

## 2022-08-20 LAB — SARS-COV-2 ORF1AB RESP QL NAA+PROBE: NOT DETECTED

## 2022-08-20 PROCEDURE — U0004 COV-19 TEST NON-CDC HGH THRU: HCPCS

## 2022-08-20 PROCEDURE — C9803 HOPD COVID-19 SPEC COLLECT: HCPCS

## 2022-08-20 PROCEDURE — U0005 INFEC AGEN DETEC AMPLI PROBE: HCPCS

## 2022-08-23 ENCOUNTER — ANESTHESIA (OUTPATIENT)
Dept: GASTROENTEROLOGY | Facility: HOSPITAL | Age: 62
End: 2022-08-23

## 2022-08-23 ENCOUNTER — HOSPITAL ENCOUNTER (OUTPATIENT)
Facility: HOSPITAL | Age: 62
Setting detail: HOSPITAL OUTPATIENT SURGERY
Discharge: HOME | End: 2022-08-23
Attending: INTERNAL MEDICINE | Admitting: INTERNAL MEDICINE

## 2022-08-23 ENCOUNTER — ANESTHESIA EVENT (OUTPATIENT)
Dept: GASTROENTEROLOGY | Facility: HOSPITAL | Age: 62
End: 2022-08-23

## 2022-08-23 ENCOUNTER — ON CAMPUS - OUTPATIENT (AMBULATORY)
Dept: URBAN - METROPOLITAN AREA HOSPITAL 85 | Facility: HOSPITAL | Age: 62
End: 2022-08-23

## 2022-08-23 VITALS
WEIGHT: 191.14 LBS | RESPIRATION RATE: 14 BRPM | BODY MASS INDEX: 28.31 KG/M2 | DIASTOLIC BLOOD PRESSURE: 79 MMHG | OXYGEN SATURATION: 98 % | TEMPERATURE: 98 F | HEIGHT: 69 IN | HEART RATE: 58 BPM | SYSTOLIC BLOOD PRESSURE: 130 MMHG

## 2022-08-23 DIAGNOSIS — K29.60 EROSIVE GASTRITIS: ICD-10-CM

## 2022-08-23 DIAGNOSIS — K29.70 GASTRITIS, UNSPECIFIED, WITHOUT BLEEDING: ICD-10-CM

## 2022-08-23 DIAGNOSIS — K21.00 GASTRO-ESOPHAGEAL REFLUX DISEASE WITH ESOPHAGITIS, WITHOUT B: ICD-10-CM

## 2022-08-23 DIAGNOSIS — R13.10 DYSPHAGIA: ICD-10-CM

## 2022-08-23 DIAGNOSIS — R13.10 DYSPHAGIA, UNSPECIFIED: ICD-10-CM

## 2022-08-23 DIAGNOSIS — K26.9 DUODENAL ULCER, UNSPECIFIED AS ACUTE OR CHRONIC, WITHOUT HEM: ICD-10-CM

## 2022-08-23 DIAGNOSIS — K22.10 ESOPHAGEAL ULCER: ICD-10-CM

## 2022-08-23 PROCEDURE — 43450 DILATE ESOPHAGUS 1/MULT PASS: CPT | Mod: 59 | Performed by: INTERNAL MEDICINE

## 2022-08-23 PROCEDURE — 88305 TISSUE EXAM BY PATHOLOGIST: CPT | Performed by: INTERNAL MEDICINE

## 2022-08-23 PROCEDURE — 25010000002 PROPOFOL 10 MG/ML EMULSION: Performed by: ANESTHESIOLOGY

## 2022-08-23 PROCEDURE — 43239 EGD BIOPSY SINGLE/MULTIPLE: CPT | Performed by: INTERNAL MEDICINE

## 2022-08-23 RX ORDER — PROPOFOL 10 MG/ML
VIAL (ML) INTRAVENOUS AS NEEDED
Status: DISCONTINUED | OUTPATIENT
Start: 2022-08-23 | End: 2022-08-23 | Stop reason: SURG

## 2022-08-23 RX ORDER — ONDANSETRON 2 MG/ML
4 INJECTION INTRAMUSCULAR; INTRAVENOUS ONCE AS NEEDED
Status: DISCONTINUED | OUTPATIENT
Start: 2022-08-23 | End: 2022-08-23 | Stop reason: HOSPADM

## 2022-08-23 RX ORDER — SODIUM CHLORIDE 0.9 % (FLUSH) 0.9 %
3-10 SYRINGE (ML) INJECTION AS NEEDED
Status: DISCONTINUED | OUTPATIENT
Start: 2022-08-23 | End: 2022-08-23 | Stop reason: HOSPADM

## 2022-08-23 RX ORDER — SODIUM CHLORIDE 9 MG/ML
INJECTION, SOLUTION INTRAVENOUS CONTINUOUS PRN
Status: DISCONTINUED | OUTPATIENT
Start: 2022-08-23 | End: 2022-08-23 | Stop reason: SURG

## 2022-08-23 RX ORDER — SODIUM CHLORIDE 9 MG/ML
9 INJECTION, SOLUTION INTRAVENOUS CONTINUOUS
Status: DISCONTINUED | OUTPATIENT
Start: 2022-08-23 | End: 2022-08-23 | Stop reason: HOSPADM

## 2022-08-23 RX ORDER — SODIUM CHLORIDE 0.9 % (FLUSH) 0.9 %
3 SYRINGE (ML) INJECTION EVERY 12 HOURS SCHEDULED
Status: DISCONTINUED | OUTPATIENT
Start: 2022-08-23 | End: 2022-08-23 | Stop reason: HOSPADM

## 2022-08-23 RX ORDER — SODIUM CHLORIDE 9 MG/ML
30 INJECTION, SOLUTION INTRAVENOUS CONTINUOUS PRN
Status: DISCONTINUED | OUTPATIENT
Start: 2022-08-23 | End: 2022-08-23 | Stop reason: HOSPADM

## 2022-08-23 RX ADMIN — SODIUM CHLORIDE: 0.9 INJECTION, SOLUTION INTRAVENOUS at 11:14

## 2022-08-23 RX ADMIN — PROPOFOL 20 MG: 10 INJECTION, EMULSION INTRAVENOUS at 11:20

## 2022-08-23 RX ADMIN — PROPOFOL 150 MG: 10 INJECTION, EMULSION INTRAVENOUS at 11:14

## 2022-08-23 RX ADMIN — PROPOFOL 50 MG: 10 INJECTION, EMULSION INTRAVENOUS at 11:17

## 2022-08-23 RX ADMIN — SODIUM CHLORIDE 9 ML/HR: 9 INJECTION, SOLUTION INTRAVENOUS at 11:08

## 2022-08-24 LAB
LAB AP CASE REPORT: NORMAL
PATH REPORT.FINAL DX SPEC: NORMAL
PATH REPORT.GROSS SPEC: NORMAL

## 2022-08-29 DIAGNOSIS — C62.90 MALIGNANT NEOPLASM OF TESTICLE, UNSPECIFIED LATERALITY, UNSPECIFIED WHETHER DESCENDED OR UNDESCENDED: ICD-10-CM

## 2022-08-29 DIAGNOSIS — G62.9 NEUROPATHY: ICD-10-CM

## 2022-08-29 RX ORDER — HYDROCODONE BITARTRATE AND ACETAMINOPHEN 5; 325 MG/1; MG/1
1 TABLET ORAL EVERY 6 HOURS PRN
Qty: 28 TABLET | Refills: 0 | Status: SHIPPED | OUTPATIENT
Start: 2022-08-29 | End: 2022-09-08 | Stop reason: SDUPTHER

## 2022-08-29 RX ORDER — PANTOPRAZOLE SODIUM 40 MG/1
40 TABLET, DELAYED RELEASE ORAL DAILY
Qty: 90 TABLET | Refills: 3 | Status: SHIPPED | OUTPATIENT
Start: 2022-08-29 | End: 2022-11-08 | Stop reason: SDUPTHER

## 2022-09-01 DIAGNOSIS — N52.9 ERECTILE DYSFUNCTION, UNSPECIFIED ERECTILE DYSFUNCTION TYPE: Primary | ICD-10-CM

## 2022-09-02 ENCOUNTER — TELEPHONE (OUTPATIENT)
Dept: FAMILY MEDICINE CLINIC | Facility: CLINIC | Age: 62
End: 2022-09-02

## 2022-09-02 DIAGNOSIS — G20 PARKINSON DISEASE: Primary | ICD-10-CM

## 2022-09-08 DIAGNOSIS — C62.90 MALIGNANT NEOPLASM OF TESTICLE, UNSPECIFIED LATERALITY, UNSPECIFIED WHETHER DESCENDED OR UNDESCENDED: ICD-10-CM

## 2022-09-08 DIAGNOSIS — G62.9 NEUROPATHY: ICD-10-CM

## 2022-09-08 DIAGNOSIS — E34.9 TESTOSTERONE DEFICIENCY: ICD-10-CM

## 2022-09-08 RX ORDER — HYDROCODONE BITARTRATE AND ACETAMINOPHEN 5; 325 MG/1; MG/1
1 TABLET ORAL EVERY 6 HOURS PRN
Qty: 28 TABLET | Refills: 0 | Status: SHIPPED | OUTPATIENT
Start: 2022-09-08 | End: 2022-12-04 | Stop reason: SDUPTHER

## 2022-09-08 RX ORDER — TESTOSTERONE CYPIONATE 200 MG/ML
100 INJECTION, SOLUTION INTRAMUSCULAR
Qty: 10 ML | Refills: 0 | Status: SHIPPED | OUTPATIENT
Start: 2022-09-08 | End: 2022-10-10 | Stop reason: SDUPTHER

## 2022-09-12 DIAGNOSIS — G20 PARKINSON DISEASE: ICD-10-CM

## 2022-09-12 DIAGNOSIS — K59.00 CONSTIPATION, UNSPECIFIED: ICD-10-CM

## 2022-09-12 RX ORDER — DONEPEZIL HYDROCHLORIDE 10 MG/1
TABLET, FILM COATED ORAL
Qty: 90 TABLET | Refills: 2 | OUTPATIENT
Start: 2022-09-12

## 2022-09-12 RX ORDER — AMLODIPINE BESYLATE AND BENAZEPRIL HYDROCHLORIDE 5; 10 MG/1; MG/1
CAPSULE ORAL
Qty: 90 CAPSULE | Refills: 2 | OUTPATIENT
Start: 2022-09-12

## 2022-09-12 RX ORDER — DOCUSATE SODIUM 100 MG/1
CAPSULE, LIQUID FILLED ORAL
Qty: 60 CAPSULE | Refills: 3 | OUTPATIENT
Start: 2022-09-12

## 2022-09-13 ENCOUNTER — OFFICE VISIT (OUTPATIENT)
Dept: FAMILY MEDICINE CLINIC | Facility: CLINIC | Age: 62
End: 2022-09-13

## 2022-09-13 ENCOUNTER — LAB (OUTPATIENT)
Dept: FAMILY MEDICINE CLINIC | Facility: CLINIC | Age: 62
End: 2022-09-13

## 2022-09-13 VITALS
TEMPERATURE: 97.7 F | HEART RATE: 71 BPM | DIASTOLIC BLOOD PRESSURE: 82 MMHG | WEIGHT: 194.6 LBS | SYSTOLIC BLOOD PRESSURE: 118 MMHG | BODY MASS INDEX: 29.49 KG/M2 | OXYGEN SATURATION: 98 % | HEIGHT: 68 IN | RESPIRATION RATE: 24 BRPM

## 2022-09-13 DIAGNOSIS — G20 PARKINSON DISEASE: ICD-10-CM

## 2022-09-13 DIAGNOSIS — Z79.891 LONG TERM (CURRENT) USE OF OPIATE ANALGESIC: ICD-10-CM

## 2022-09-13 DIAGNOSIS — M54.50 CHRONIC LOW BACK PAIN WITHOUT SCIATICA, UNSPECIFIED BACK PAIN LATERALITY: Primary | ICD-10-CM

## 2022-09-13 DIAGNOSIS — G62.9 NEUROPATHY: ICD-10-CM

## 2022-09-13 DIAGNOSIS — G89.29 CHRONIC LOW BACK PAIN WITHOUT SCIATICA, UNSPECIFIED BACK PAIN LATERALITY: Primary | ICD-10-CM

## 2022-09-13 DIAGNOSIS — F11.90 CHRONIC, CONTINUOUS USE OF OPIOIDS: ICD-10-CM

## 2022-09-13 PROBLEM — G20.A1 PARKINSON'S DISEASE: Status: RESOLVED | Noted: 2019-07-25 | Resolved: 2022-09-13

## 2022-09-13 PROCEDURE — 80307 DRUG TEST PRSMV CHEM ANLYZR: CPT | Performed by: PHYSICIAN ASSISTANT

## 2022-09-13 PROCEDURE — 99214 OFFICE O/P EST MOD 30 MIN: CPT | Performed by: PHYSICIAN ASSISTANT

## 2022-09-13 RX ORDER — MELOXICAM 15 MG/1
15 TABLET ORAL DAILY
Qty: 90 TABLET | Refills: 3 | Status: ON HOLD | OUTPATIENT
Start: 2022-09-13

## 2022-09-13 RX ORDER — HYDROCODONE BITARTRATE AND ACETAMINOPHEN 7.5; 325 MG/1; MG/1
1 TABLET ORAL EVERY 8 HOURS PRN
Qty: 60 TABLET | Refills: 0 | Status: SHIPPED | OUTPATIENT
Start: 2022-09-13 | End: 2022-10-10 | Stop reason: SDUPTHER

## 2022-09-14 LAB
AMPHET+METHAMPHET UR QL: NEGATIVE
BARBITURATES UR QL SCN: NEGATIVE
BENZODIAZ UR QL SCN: POSITIVE
CANNABINOIDS SERPL QL: POSITIVE
COCAINE UR QL: NEGATIVE
METHADONE UR QL SCN: NEGATIVE
OPIATES UR QL: NEGATIVE
OXYCODONE UR QL SCN: NEGATIVE

## 2022-09-19 DIAGNOSIS — G20 PARKINSON DISEASE: ICD-10-CM

## 2022-09-21 DIAGNOSIS — F41.9 ANXIETY: ICD-10-CM

## 2022-09-21 RX ORDER — DONEPEZIL HYDROCHLORIDE 10 MG/1
10 TABLET, FILM COATED ORAL NIGHTLY
Qty: 90 TABLET | Refills: 2 | OUTPATIENT
Start: 2022-09-21

## 2022-09-22 RX ORDER — CLONAZEPAM 0.5 MG/1
0.5 TABLET ORAL 4 TIMES DAILY
Qty: 120 TABLET | Refills: 1 | Status: SHIPPED | OUTPATIENT
Start: 2022-09-22 | End: 2022-11-28 | Stop reason: SDUPTHER

## 2022-10-04 DIAGNOSIS — G20 PARKINSON DISEASE: ICD-10-CM

## 2022-10-04 RX ORDER — DONEPEZIL HYDROCHLORIDE 10 MG/1
10 TABLET, FILM COATED ORAL NIGHTLY
Qty: 90 TABLET | Refills: 2 | Status: ON HOLD | OUTPATIENT
Start: 2022-10-04

## 2022-10-10 DIAGNOSIS — M54.50 CHRONIC LOW BACK PAIN WITHOUT SCIATICA, UNSPECIFIED BACK PAIN LATERALITY: ICD-10-CM

## 2022-10-10 DIAGNOSIS — G89.29 CHRONIC LOW BACK PAIN WITHOUT SCIATICA, UNSPECIFIED BACK PAIN LATERALITY: ICD-10-CM

## 2022-10-10 DIAGNOSIS — E34.9 TESTOSTERONE DEFICIENCY: ICD-10-CM

## 2022-10-10 DIAGNOSIS — G62.9 NEUROPATHY: ICD-10-CM

## 2022-10-10 RX ORDER — HYDROCODONE BITARTRATE AND ACETAMINOPHEN 7.5; 325 MG/1; MG/1
1 TABLET ORAL EVERY 8 HOURS PRN
Qty: 60 TABLET | Refills: 0 | Status: SHIPPED | OUTPATIENT
Start: 2022-10-10 | End: 2022-11-02 | Stop reason: SDUPTHER

## 2022-10-10 RX ORDER — GABAPENTIN 600 MG/1
600 TABLET ORAL 3 TIMES DAILY
Qty: 90 TABLET | Refills: 1 | Status: SHIPPED | OUTPATIENT
Start: 2022-10-10 | End: 2022-12-16 | Stop reason: SDUPTHER

## 2022-10-10 RX ORDER — TESTOSTERONE CYPIONATE 200 MG/ML
100 INJECTION, SOLUTION INTRAMUSCULAR
Qty: 10 ML | Refills: 0 | Status: SHIPPED | OUTPATIENT
Start: 2022-10-10 | End: 2022-12-16 | Stop reason: SDUPTHER

## 2022-10-26 DIAGNOSIS — K59.00 CONSTIPATION, UNSPECIFIED: ICD-10-CM

## 2022-10-26 DIAGNOSIS — R60.9 EDEMA, UNSPECIFIED TYPE: ICD-10-CM

## 2022-10-26 DIAGNOSIS — R06.2 WHEEZING: ICD-10-CM

## 2022-10-26 RX ORDER — ALBUTEROL SULFATE 90 UG/1
AEROSOL, METERED RESPIRATORY (INHALATION)
OUTPATIENT
Start: 2022-10-26

## 2022-10-26 RX ORDER — DOCUSATE SODIUM 100 MG/1
CAPSULE, LIQUID FILLED ORAL
Qty: 60 CAPSULE | Refills: 3 | OUTPATIENT
Start: 2022-10-26

## 2022-10-26 RX ORDER — FUROSEMIDE 40 MG/1
TABLET ORAL
Qty: 60 TABLET | Refills: 1 | OUTPATIENT
Start: 2022-10-26

## 2022-10-26 RX ORDER — AMLODIPINE BESYLATE AND BENAZEPRIL HYDROCHLORIDE 5; 10 MG/1; MG/1
CAPSULE ORAL
Qty: 90 CAPSULE | Refills: 2 | OUTPATIENT
Start: 2022-10-26

## 2022-11-01 RX ORDER — AMLODIPINE BESYLATE AND BENAZEPRIL HYDROCHLORIDE 5; 10 MG/1; MG/1
CAPSULE ORAL
Qty: 90 CAPSULE | Refills: 2 | OUTPATIENT
Start: 2022-11-01

## 2022-11-02 DIAGNOSIS — E34.9 TESTOSTERONE DEFICIENCY: ICD-10-CM

## 2022-11-02 DIAGNOSIS — M54.50 CHRONIC LOW BACK PAIN WITHOUT SCIATICA, UNSPECIFIED BACK PAIN LATERALITY: ICD-10-CM

## 2022-11-02 DIAGNOSIS — G89.29 CHRONIC LOW BACK PAIN WITHOUT SCIATICA, UNSPECIFIED BACK PAIN LATERALITY: ICD-10-CM

## 2022-11-02 RX ORDER — SYRINGE W-NEEDLE,DISPOSAB,3 ML 25GX5/8"
1 SYRINGE, EMPTY DISPOSABLE MISCELLANEOUS WEEKLY
Qty: 50 EACH | Refills: 3 | Status: CANCELLED | OUTPATIENT
Start: 2022-11-02

## 2022-11-02 RX ORDER — SYRINGE WITH NEEDLE, 1 ML 25GX5/8"
SYRINGE, EMPTY DISPOSABLE MISCELLANEOUS
Status: CANCELLED | OUTPATIENT
Start: 2022-11-02

## 2022-11-02 RX ORDER — SYRINGE WITH NEEDLE, 1 ML 25GX5/8"
1 SYRINGE, EMPTY DISPOSABLE MISCELLANEOUS
Qty: 12 EACH | Refills: 1 | Status: SHIPPED | OUTPATIENT
Start: 2022-11-02 | End: 2022-12-04

## 2022-11-03 RX ORDER — HYDROCODONE BITARTRATE AND ACETAMINOPHEN 7.5; 325 MG/1; MG/1
1 TABLET ORAL EVERY 8 HOURS PRN
Qty: 60 TABLET | Refills: 0 | Status: SHIPPED | OUTPATIENT
Start: 2022-11-03 | End: 2022-11-28 | Stop reason: SDUPTHER

## 2022-11-03 RX ORDER — AMLODIPINE BESYLATE AND BENAZEPRIL HYDROCHLORIDE 5; 10 MG/1; MG/1
1 CAPSULE ORAL DAILY
Qty: 30 CAPSULE | Refills: 8 | OUTPATIENT
Start: 2022-11-03

## 2022-11-08 DIAGNOSIS — E34.9 TESTOSTERONE DEFICIENCY: ICD-10-CM

## 2022-11-08 RX ORDER — PANTOPRAZOLE SODIUM 40 MG/1
40 TABLET, DELAYED RELEASE ORAL DAILY
Qty: 90 TABLET | Refills: 3 | Status: SHIPPED | OUTPATIENT
Start: 2022-11-08 | End: 2023-01-16 | Stop reason: SDUPTHER

## 2022-11-08 RX ORDER — AMLODIPINE BESYLATE AND BENAZEPRIL HYDROCHLORIDE 5; 10 MG/1; MG/1
1 CAPSULE ORAL DAILY
Qty: 30 CAPSULE | Refills: 8 | Status: SHIPPED | OUTPATIENT
Start: 2022-11-08 | End: 2023-01-16 | Stop reason: SDUPTHER

## 2022-11-08 RX ORDER — SYRINGE W-NEEDLE,DISPOSAB,3 ML 25GX5/8"
1 SYRINGE, EMPTY DISPOSABLE MISCELLANEOUS WEEKLY
Qty: 50 EACH | Refills: 3 | Status: SHIPPED | OUTPATIENT
Start: 2022-11-08 | End: 2022-12-04

## 2022-11-21 DIAGNOSIS — E78.2 MIXED HYPERLIPIDEMIA: ICD-10-CM

## 2022-11-21 DIAGNOSIS — R60.9 EDEMA, UNSPECIFIED TYPE: ICD-10-CM

## 2022-11-21 RX ORDER — ATORVASTATIN CALCIUM 20 MG/1
TABLET, FILM COATED ORAL
Qty: 90 TABLET | Refills: 1 | OUTPATIENT
Start: 2022-11-21

## 2022-11-21 RX ORDER — FUROSEMIDE 40 MG/1
40 TABLET ORAL DAILY
Qty: 90 TABLET | Refills: 1 | Status: ON HOLD | OUTPATIENT
Start: 2022-11-21

## 2022-11-21 RX ORDER — ATORVASTATIN CALCIUM 20 MG/1
20 TABLET, FILM COATED ORAL DAILY
Qty: 90 TABLET | Refills: 1 | Status: ON HOLD | OUTPATIENT
Start: 2022-11-21

## 2022-11-28 DIAGNOSIS — G89.29 CHRONIC LOW BACK PAIN WITHOUT SCIATICA, UNSPECIFIED BACK PAIN LATERALITY: ICD-10-CM

## 2022-11-28 DIAGNOSIS — F41.9 ANXIETY: ICD-10-CM

## 2022-11-28 DIAGNOSIS — M54.50 CHRONIC LOW BACK PAIN WITHOUT SCIATICA, UNSPECIFIED BACK PAIN LATERALITY: ICD-10-CM

## 2022-11-28 RX ORDER — HYDROCODONE BITARTRATE AND ACETAMINOPHEN 7.5; 325 MG/1; MG/1
1 TABLET ORAL EVERY 8 HOURS PRN
Qty: 60 TABLET | Refills: 0 | Status: SHIPPED | OUTPATIENT
Start: 2022-11-28 | End: 2022-12-16 | Stop reason: SDUPTHER

## 2022-11-28 RX ORDER — CLONAZEPAM 0.5 MG/1
0.5 TABLET ORAL 4 TIMES DAILY
Qty: 120 TABLET | Refills: 1 | Status: SHIPPED | OUTPATIENT
Start: 2022-11-28 | End: 2023-01-25 | Stop reason: SDUPTHER

## 2022-12-03 ENCOUNTER — APPOINTMENT (OUTPATIENT)
Dept: GENERAL RADIOLOGY | Facility: HOSPITAL | Age: 62
End: 2022-12-03

## 2022-12-03 ENCOUNTER — HOSPITAL ENCOUNTER (INPATIENT)
Facility: HOSPITAL | Age: 62
LOS: 2 days | Discharge: HOME | End: 2022-12-08
Attending: EMERGENCY MEDICINE

## 2022-12-03 DIAGNOSIS — J18.9 PNEUMONIA OF RIGHT UPPER LOBE DUE TO INFECTIOUS ORGANISM: Primary | ICD-10-CM

## 2022-12-03 DIAGNOSIS — R53.1 WEAKNESS: ICD-10-CM

## 2022-12-03 LAB
ALBUMIN SERPL-MCNC: 4 G/DL (ref 3.5–5.2)
ALBUMIN/GLOB SERPL: 1.1 G/DL
ALP SERPL-CCNC: 117 U/L (ref 39–117)
ALT SERPL W P-5'-P-CCNC: <5 U/L (ref 1–41)
ANION GAP SERPL CALCULATED.3IONS-SCNC: 16 MMOL/L (ref 5–15)
AST SERPL-CCNC: 16 U/L (ref 1–40)
B PARAPERT DNA SPEC QL NAA+PROBE: NOT DETECTED
B PERT DNA SPEC QL NAA+PROBE: NOT DETECTED
BASOPHILS # BLD AUTO: 0.1 10*3/MM3 (ref 0–0.2)
BASOPHILS NFR BLD AUTO: 1 % (ref 0–1.5)
BILIRUB SERPL-MCNC: 0.4 MG/DL (ref 0–1.2)
BUN SERPL-MCNC: 14 MG/DL (ref 8–23)
BUN/CREAT SERPL: 16.1 (ref 7–25)
C PNEUM DNA NPH QL NAA+NON-PROBE: NOT DETECTED
CALCIUM SPEC-SCNC: 9 MG/DL (ref 8.6–10.5)
CHLORIDE SERPL-SCNC: 98 MMOL/L (ref 98–107)
CO2 SERPL-SCNC: 24 MMOL/L (ref 22–29)
CREAT SERPL-MCNC: 0.87 MG/DL (ref 0.76–1.27)
DEPRECATED RDW RBC AUTO: 46.4 FL (ref 37–54)
EGFRCR SERPLBLD CKD-EPI 2021: 96.4 ML/MIN/1.73
EOSINOPHIL # BLD AUTO: 0.1 10*3/MM3 (ref 0–0.4)
EOSINOPHIL NFR BLD AUTO: 0.4 % (ref 0.3–6.2)
ERYTHROCYTE [DISTWIDTH] IN BLOOD BY AUTOMATED COUNT: 13.2 % (ref 12.3–15.4)
FLUAV SUBTYP SPEC NAA+PROBE: NOT DETECTED
FLUBV RNA ISLT QL NAA+PROBE: NOT DETECTED
GLOBULIN UR ELPH-MCNC: 3.6 GM/DL
GLUCOSE SERPL-MCNC: 146 MG/DL (ref 65–99)
HADV DNA SPEC NAA+PROBE: NOT DETECTED
HCOV 229E RNA SPEC QL NAA+PROBE: NOT DETECTED
HCOV HKU1 RNA SPEC QL NAA+PROBE: NOT DETECTED
HCOV NL63 RNA SPEC QL NAA+PROBE: NOT DETECTED
HCOV OC43 RNA SPEC QL NAA+PROBE: NOT DETECTED
HCT VFR BLD AUTO: 37.8 % (ref 37.5–51)
HGB BLD-MCNC: 12.7 G/DL (ref 13–17.7)
HMPV RNA NPH QL NAA+NON-PROBE: NOT DETECTED
HPIV1 RNA ISLT QL NAA+PROBE: NOT DETECTED
HPIV2 RNA SPEC QL NAA+PROBE: NOT DETECTED
HPIV3 RNA NPH QL NAA+PROBE: NOT DETECTED
HPIV4 P GENE NPH QL NAA+PROBE: NOT DETECTED
LYMPHOCYTES # BLD AUTO: 2.6 10*3/MM3 (ref 0.7–3.1)
LYMPHOCYTES NFR BLD AUTO: 20 % (ref 19.6–45.3)
M PNEUMO IGG SER IA-ACNC: NOT DETECTED
MCH RBC QN AUTO: 31.9 PG (ref 26.6–33)
MCHC RBC AUTO-ENTMCNC: 33.7 G/DL (ref 31.5–35.7)
MCV RBC AUTO: 94.6 FL (ref 79–97)
MONOCYTES # BLD AUTO: 1.3 10*3/MM3 (ref 0.1–0.9)
MONOCYTES NFR BLD AUTO: 9.8 % (ref 5–12)
NEUTROPHILS NFR BLD AUTO: 68.8 % (ref 42.7–76)
NEUTROPHILS NFR BLD AUTO: 9 10*3/MM3 (ref 1.7–7)
NRBC BLD AUTO-RTO: 0 /100 WBC (ref 0–0.2)
NT-PROBNP SERPL-MCNC: 197.3 PG/ML (ref 0–900)
PLATELET # BLD AUTO: 271 10*3/MM3 (ref 140–450)
PMV BLD AUTO: 8.3 FL (ref 6–12)
POTASSIUM SERPL-SCNC: 4.1 MMOL/L (ref 3.5–5.2)
PROT SERPL-MCNC: 7.6 G/DL (ref 6–8.5)
RBC # BLD AUTO: 3.99 10*6/MM3 (ref 4.14–5.8)
RHINOVIRUS RNA SPEC NAA+PROBE: NOT DETECTED
RSV RNA NPH QL NAA+NON-PROBE: NOT DETECTED
SARS-COV-2 RNA NPH QL NAA+NON-PROBE: NOT DETECTED
SODIUM SERPL-SCNC: 138 MMOL/L (ref 136–145)
TROPONIN T SERPL-MCNC: 0.05 NG/ML (ref 0–0.03)
WBC NRBC COR # BLD: 13.1 10*3/MM3 (ref 3.4–10.8)

## 2022-12-03 PROCEDURE — 85025 COMPLETE CBC W/AUTO DIFF WBC: CPT | Performed by: PHYSICIAN ASSISTANT

## 2022-12-03 PROCEDURE — 99285 EMERGENCY DEPT VISIT HI MDM: CPT

## 2022-12-03 PROCEDURE — 83880 ASSAY OF NATRIURETIC PEPTIDE: CPT | Performed by: PHYSICIAN ASSISTANT

## 2022-12-03 PROCEDURE — 71045 X-RAY EXAM CHEST 1 VIEW: CPT

## 2022-12-03 PROCEDURE — 0202U NFCT DS 22 TRGT SARS-COV-2: CPT | Performed by: PHYSICIAN ASSISTANT

## 2022-12-03 PROCEDURE — 93005 ELECTROCARDIOGRAM TRACING: CPT | Performed by: PHYSICIAN ASSISTANT

## 2022-12-03 PROCEDURE — 80053 COMPREHEN METABOLIC PANEL: CPT | Performed by: PHYSICIAN ASSISTANT

## 2022-12-03 PROCEDURE — 84145 PROCALCITONIN (PCT): CPT | Performed by: EMERGENCY MEDICINE

## 2022-12-03 PROCEDURE — 84484 ASSAY OF TROPONIN QUANT: CPT | Performed by: PHYSICIAN ASSISTANT

## 2022-12-03 RX ORDER — SODIUM CHLORIDE 0.9 % (FLUSH) 0.9 %
10 SYRINGE (ML) INJECTION AS NEEDED
Status: DISCONTINUED | OUTPATIENT
Start: 2022-12-03 | End: 2022-12-08 | Stop reason: HOSPADM

## 2022-12-04 PROBLEM — J18.9 PNEUMONIA OF RIGHT UPPER LOBE DUE TO INFECTIOUS ORGANISM: Status: ACTIVE | Noted: 2022-12-04

## 2022-12-04 LAB
ANION GAP SERPL CALCULATED.3IONS-SCNC: 13 MMOL/L (ref 5–15)
BASOPHILS # BLD AUTO: 0.1 10*3/MM3 (ref 0–0.2)
BASOPHILS NFR BLD AUTO: 0.4 % (ref 0–1.5)
BUN SERPL-MCNC: 13 MG/DL (ref 8–23)
BUN/CREAT SERPL: 21 (ref 7–25)
CALCIUM SPEC-SCNC: 8.4 MG/DL (ref 8.6–10.5)
CHLORIDE SERPL-SCNC: 103 MMOL/L (ref 98–107)
CO2 SERPL-SCNC: 21 MMOL/L (ref 22–29)
CREAT SERPL-MCNC: 0.62 MG/DL (ref 0.76–1.27)
DEPRECATED RDW RBC AUTO: 42.9 FL (ref 37–54)
EGFRCR SERPLBLD CKD-EPI 2021: 106.7 ML/MIN/1.73
EOSINOPHIL # BLD AUTO: 0.1 10*3/MM3 (ref 0–0.4)
EOSINOPHIL NFR BLD AUTO: 0.5 % (ref 0.3–6.2)
ERYTHROCYTE [DISTWIDTH] IN BLOOD BY AUTOMATED COUNT: 12.8 % (ref 12.3–15.4)
GLUCOSE SERPL-MCNC: 114 MG/DL (ref 65–99)
HCT VFR BLD AUTO: 37.1 % (ref 37.5–51)
HGB BLD-MCNC: 12.2 G/DL (ref 13–17.7)
LYMPHOCYTES # BLD AUTO: 1.5 10*3/MM3 (ref 0.7–3.1)
LYMPHOCYTES NFR BLD AUTO: 11.4 % (ref 19.6–45.3)
MCH RBC QN AUTO: 31.5 PG (ref 26.6–33)
MCHC RBC AUTO-ENTMCNC: 32.7 G/DL (ref 31.5–35.7)
MCV RBC AUTO: 96.1 FL (ref 79–97)
MONOCYTES # BLD AUTO: 1.4 10*3/MM3 (ref 0.1–0.9)
MONOCYTES NFR BLD AUTO: 11.1 % (ref 5–12)
NEUTROPHILS NFR BLD AUTO: 76.6 % (ref 42.7–76)
NEUTROPHILS NFR BLD AUTO: 9.8 10*3/MM3 (ref 1.7–7)
NRBC BLD AUTO-RTO: 0 /100 WBC (ref 0–0.2)
PLATELET # BLD AUTO: 234 10*3/MM3 (ref 140–450)
PMV BLD AUTO: 8.3 FL (ref 6–12)
POTASSIUM SERPL-SCNC: 3.8 MMOL/L (ref 3.5–5.2)
PROCALCITONIN SERPL-MCNC: 0.13 NG/ML (ref 0–0.25)
RBC # BLD AUTO: 3.86 10*6/MM3 (ref 4.14–5.8)
SODIUM SERPL-SCNC: 137 MMOL/L (ref 136–145)
TROPONIN T SERPL-MCNC: 0.02 NG/ML (ref 0–0.03)
TROPONIN T SERPL-MCNC: 0.02 NG/ML (ref 0–0.03)
WBC NRBC COR # BLD: 12.8 10*3/MM3 (ref 3.4–10.8)

## 2022-12-04 PROCEDURE — 94761 N-INVAS EAR/PLS OXIMETRY MLT: CPT

## 2022-12-04 PROCEDURE — 87040 BLOOD CULTURE FOR BACTERIA: CPT | Performed by: EMERGENCY MEDICINE

## 2022-12-04 PROCEDURE — 84484 ASSAY OF TROPONIN QUANT: CPT | Performed by: INTERNAL MEDICINE

## 2022-12-04 PROCEDURE — 94799 UNLISTED PULMONARY SVC/PX: CPT

## 2022-12-04 PROCEDURE — 25010000002 CEFTRIAXONE PER 250 MG: Performed by: EMERGENCY MEDICINE

## 2022-12-04 PROCEDURE — 36415 COLL VENOUS BLD VENIPUNCTURE: CPT | Performed by: INTERNAL MEDICINE

## 2022-12-04 PROCEDURE — G0378 HOSPITAL OBSERVATION PER HR: HCPCS

## 2022-12-04 PROCEDURE — 25010000002 ENOXAPARIN PER 10 MG: Performed by: INTERNAL MEDICINE

## 2022-12-04 PROCEDURE — 94664 DEMO&/EVAL PT USE INHALER: CPT

## 2022-12-04 PROCEDURE — 94760 N-INVAS EAR/PLS OXIMETRY 1: CPT

## 2022-12-04 PROCEDURE — 80048 BASIC METABOLIC PNL TOTAL CA: CPT | Performed by: INTERNAL MEDICINE

## 2022-12-04 PROCEDURE — 83036 HEMOGLOBIN GLYCOSYLATED A1C: CPT | Performed by: INTERNAL MEDICINE

## 2022-12-04 PROCEDURE — 94640 AIRWAY INHALATION TREATMENT: CPT

## 2022-12-04 PROCEDURE — 92610 EVALUATE SWALLOWING FUNCTION: CPT

## 2022-12-04 PROCEDURE — 85025 COMPLETE CBC W/AUTO DIFF WBC: CPT | Performed by: INTERNAL MEDICINE

## 2022-12-04 RX ORDER — SODIUM CHLORIDE 0.9 % (FLUSH) 0.9 %
10 SYRINGE (ML) INJECTION AS NEEDED
Status: DISCONTINUED | OUTPATIENT
Start: 2022-12-04 | End: 2022-12-08 | Stop reason: HOSPADM

## 2022-12-04 RX ORDER — NICOTINE 21 MG/24HR
1 PATCH, TRANSDERMAL 24 HOURS TRANSDERMAL
Status: DISCONTINUED | OUTPATIENT
Start: 2022-12-04 | End: 2022-12-08 | Stop reason: HOSPADM

## 2022-12-04 RX ORDER — HYDROCODONE BITARTRATE AND ACETAMINOPHEN 5; 325 MG/1; MG/1
1 TABLET ORAL EVERY 6 HOURS PRN
Status: DISCONTINUED | OUTPATIENT
Start: 2022-12-04 | End: 2022-12-08 | Stop reason: HOSPADM

## 2022-12-04 RX ORDER — VENLAFAXINE HYDROCHLORIDE 150 MG/1
150 CAPSULE, EXTENDED RELEASE ORAL DAILY
Status: ON HOLD | COMMUNITY

## 2022-12-04 RX ORDER — ARIPIPRAZOLE 2 MG/1
2 TABLET ORAL DAILY
Status: DISCONTINUED | OUTPATIENT
Start: 2022-12-04 | End: 2022-12-08 | Stop reason: HOSPADM

## 2022-12-04 RX ORDER — SODIUM CHLORIDE 0.9 % (FLUSH) 0.9 %
10 SYRINGE (ML) INJECTION EVERY 12 HOURS SCHEDULED
Status: DISCONTINUED | OUTPATIENT
Start: 2022-12-04 | End: 2022-12-08 | Stop reason: HOSPADM

## 2022-12-04 RX ORDER — ACETAMINOPHEN 325 MG/1
650 TABLET ORAL EVERY 4 HOURS PRN
Status: DISCONTINUED | OUTPATIENT
Start: 2022-12-04 | End: 2022-12-08 | Stop reason: HOSPADM

## 2022-12-04 RX ORDER — DOCUSATE SODIUM 100 MG/1
100 CAPSULE, LIQUID FILLED ORAL 2 TIMES DAILY
Status: DISCONTINUED | OUTPATIENT
Start: 2022-12-04 | End: 2022-12-08 | Stop reason: HOSPADM

## 2022-12-04 RX ORDER — ATORVASTATIN CALCIUM 20 MG/1
20 TABLET, FILM COATED ORAL DAILY
Status: DISCONTINUED | OUTPATIENT
Start: 2022-12-04 | End: 2022-12-08 | Stop reason: HOSPADM

## 2022-12-04 RX ORDER — FUROSEMIDE 40 MG/1
40 TABLET ORAL DAILY
Status: DISCONTINUED | OUTPATIENT
Start: 2022-12-04 | End: 2022-12-08 | Stop reason: HOSPADM

## 2022-12-04 RX ORDER — AMLODIPINE BESYLATE 5 MG/1
5 TABLET ORAL
Status: DISCONTINUED | OUTPATIENT
Start: 2022-12-04 | End: 2022-12-08 | Stop reason: HOSPADM

## 2022-12-04 RX ORDER — ARFORMOTEROL TARTRATE 15 UG/2ML
15 SOLUTION RESPIRATORY (INHALATION)
Status: DISCONTINUED | OUTPATIENT
Start: 2022-12-04 | End: 2022-12-06

## 2022-12-04 RX ORDER — CARBIDOPA AND LEVODOPA 25; 100 MG/1; MG/1
2 TABLET, EXTENDED RELEASE ORAL DAILY
Status: DISCONTINUED | OUTPATIENT
Start: 2022-12-04 | End: 2022-12-06

## 2022-12-04 RX ORDER — PANTOPRAZOLE SODIUM 40 MG/1
40 TABLET, DELAYED RELEASE ORAL
Status: DISCONTINUED | OUTPATIENT
Start: 2022-12-05 | End: 2022-12-06

## 2022-12-04 RX ORDER — CLONAZEPAM 0.5 MG/1
0.5 TABLET ORAL 4 TIMES DAILY
Status: DISCONTINUED | OUTPATIENT
Start: 2022-12-04 | End: 2022-12-08 | Stop reason: HOSPADM

## 2022-12-04 RX ORDER — ONDANSETRON 2 MG/ML
4 INJECTION INTRAMUSCULAR; INTRAVENOUS EVERY 6 HOURS PRN
Status: DISCONTINUED | OUTPATIENT
Start: 2022-12-04 | End: 2022-12-08 | Stop reason: HOSPADM

## 2022-12-04 RX ORDER — DONEPEZIL HYDROCHLORIDE 5 MG/1
10 TABLET, FILM COATED ORAL NIGHTLY
Status: DISCONTINUED | OUTPATIENT
Start: 2022-12-04 | End: 2022-12-08 | Stop reason: HOSPADM

## 2022-12-04 RX ORDER — ALBUTEROL SULFATE 2.5 MG/3ML
2.5 SOLUTION RESPIRATORY (INHALATION) EVERY 6 HOURS PRN
Status: DISCONTINUED | OUTPATIENT
Start: 2022-12-04 | End: 2022-12-08 | Stop reason: HOSPADM

## 2022-12-04 RX ORDER — ENOXAPARIN SODIUM 100 MG/ML
40 INJECTION SUBCUTANEOUS DAILY
Status: DISCONTINUED | OUTPATIENT
Start: 2022-12-04 | End: 2022-12-08 | Stop reason: HOSPADM

## 2022-12-04 RX ORDER — GABAPENTIN 600 MG/1
600 TABLET ORAL 3 TIMES DAILY
Status: DISCONTINUED | OUTPATIENT
Start: 2022-12-04 | End: 2022-12-08 | Stop reason: HOSPADM

## 2022-12-04 RX ORDER — DOXYCYCLINE 100 MG/1
100 TABLET ORAL EVERY 12 HOURS SCHEDULED
Status: DISCONTINUED | OUTPATIENT
Start: 2022-12-04 | End: 2022-12-06

## 2022-12-04 RX ORDER — SODIUM CHLORIDE 9 MG/ML
40 INJECTION, SOLUTION INTRAVENOUS AS NEEDED
Status: DISCONTINUED | OUTPATIENT
Start: 2022-12-04 | End: 2022-12-08 | Stop reason: HOSPADM

## 2022-12-04 RX ORDER — ASPIRIN 81 MG/1
81 TABLET ORAL DAILY
Status: DISCONTINUED | OUTPATIENT
Start: 2022-12-04 | End: 2022-12-08 | Stop reason: HOSPADM

## 2022-12-04 RX ORDER — LISINOPRIL 5 MG/1
10 TABLET ORAL
Status: DISCONTINUED | OUTPATIENT
Start: 2022-12-04 | End: 2022-12-08 | Stop reason: HOSPADM

## 2022-12-04 RX ORDER — PANTOPRAZOLE SODIUM 40 MG/1
40 TABLET, DELAYED RELEASE ORAL
Status: DISCONTINUED | OUTPATIENT
Start: 2022-12-04 | End: 2022-12-04

## 2022-12-04 RX ORDER — ASPIRIN 81 MG/1
324 TABLET, CHEWABLE ORAL ONCE
Status: COMPLETED | OUTPATIENT
Start: 2022-12-04 | End: 2022-12-04

## 2022-12-04 RX ADMIN — DOCUSATE SODIUM 100 MG: 100 CAPSULE, LIQUID FILLED ORAL at 20:58

## 2022-12-04 RX ADMIN — CLONAZEPAM 0.5 MG: 0.5 TABLET ORAL at 16:43

## 2022-12-04 RX ADMIN — ENOXAPARIN SODIUM 40 MG: 100 INJECTION SUBCUTANEOUS at 05:15

## 2022-12-04 RX ADMIN — SODIUM CHLORIDE 1000 ML: 9 INJECTION, SOLUTION INTRAVENOUS at 01:05

## 2022-12-04 RX ADMIN — Medication 10 ML: at 05:16

## 2022-12-04 RX ADMIN — Medication 10 ML: at 20:53

## 2022-12-04 RX ADMIN — ASPIRIN 81 MG CHEWABLE TABLET 324 MG: 81 TABLET CHEWABLE at 01:06

## 2022-12-04 RX ADMIN — CARBIDOPA AND LEVODOPA 2 TABLET: 25; 100 TABLET, EXTENDED RELEASE ORAL at 11:11

## 2022-12-04 RX ADMIN — GABAPENTIN 600 MG: 600 TABLET, FILM COATED ORAL at 16:43

## 2022-12-04 RX ADMIN — ALBUTEROL SULFATE 2.5 MG: 2.5 SOLUTION RESPIRATORY (INHALATION) at 11:18

## 2022-12-04 RX ADMIN — CEFTRIAXONE 2 G: 2 INJECTION, POWDER, FOR SOLUTION INTRAMUSCULAR; INTRAVENOUS at 01:05

## 2022-12-04 RX ADMIN — CLONAZEPAM 0.5 MG: 0.5 TABLET ORAL at 20:53

## 2022-12-04 RX ADMIN — DOXYCYCLINE 100 MG: 100 TABLET ORAL at 20:53

## 2022-12-04 RX ADMIN — ENOXAPARIN SODIUM 40 MG: 100 INJECTION SUBCUTANEOUS at 17:11

## 2022-12-04 RX ADMIN — LISINOPRIL 10 MG: 5 TABLET ORAL at 11:11

## 2022-12-04 RX ADMIN — AMLODIPINE BESYLATE 5 MG: 5 TABLET ORAL at 11:11

## 2022-12-04 RX ADMIN — DOXYCYCLINE 100 MG: 100 TABLET ORAL at 11:14

## 2022-12-04 RX ADMIN — NICOTINE 1 PATCH: 21 PATCH, EXTENDED RELEASE TRANSDERMAL at 09:04

## 2022-12-04 RX ADMIN — ALBUTEROL SULFATE 2.5 MG: 2.5 SOLUTION RESPIRATORY (INHALATION) at 17:25

## 2022-12-04 RX ADMIN — GABAPENTIN 600 MG: 600 TABLET, FILM COATED ORAL at 20:53

## 2022-12-04 RX ADMIN — DOXYCYCLINE 100 MG: 100 INJECTION, POWDER, LYOPHILIZED, FOR SOLUTION INTRAVENOUS at 05:16

## 2022-12-04 RX ADMIN — ALBUTEROL SULFATE 2.5 MG: 2.5 SOLUTION RESPIRATORY (INHALATION) at 20:59

## 2022-12-04 RX ADMIN — DONEPEZIL HYDROCHLORIDE 10 MG: 5 TABLET, FILM COATED ORAL at 20:53

## 2022-12-04 RX ADMIN — Medication 10 ML: at 11:11

## 2022-12-05 ENCOUNTER — TELEPHONE (OUTPATIENT)
Dept: FAMILY MEDICINE CLINIC | Facility: CLINIC | Age: 62
End: 2022-12-05

## 2022-12-05 ENCOUNTER — APPOINTMENT (OUTPATIENT)
Dept: GENERAL RADIOLOGY | Facility: HOSPITAL | Age: 62
End: 2022-12-05

## 2022-12-05 LAB
ALBUMIN SERPL-MCNC: 3.6 G/DL (ref 3.5–5.2)
ALBUMIN/GLOB SERPL: 1.1 G/DL
ALP SERPL-CCNC: 105 U/L (ref 39–117)
ALT SERPL W P-5'-P-CCNC: 17 U/L (ref 1–41)
ANION GAP SERPL CALCULATED.3IONS-SCNC: 14 MMOL/L (ref 5–15)
AST SERPL-CCNC: 16 U/L (ref 1–40)
BASOPHILS # BLD AUTO: 0 10*3/MM3 (ref 0–0.2)
BASOPHILS NFR BLD AUTO: 0.5 % (ref 0–1.5)
BILIRUB SERPL-MCNC: 0.4 MG/DL (ref 0–1.2)
BUN SERPL-MCNC: 11 MG/DL (ref 8–23)
BUN/CREAT SERPL: 16.9 (ref 7–25)
CALCIUM SPEC-SCNC: 8.8 MG/DL (ref 8.6–10.5)
CHLORIDE SERPL-SCNC: 104 MMOL/L (ref 98–107)
CO2 SERPL-SCNC: 22 MMOL/L (ref 22–29)
CREAT SERPL-MCNC: 0.65 MG/DL (ref 0.76–1.27)
CRP SERPL-MCNC: 22.09 MG/DL (ref 0–0.5)
DEPRECATED RDW RBC AUTO: 43.8 FL (ref 37–54)
EGFRCR SERPLBLD CKD-EPI 2021: 105.2 ML/MIN/1.73
EOSINOPHIL # BLD AUTO: 0.1 10*3/MM3 (ref 0–0.4)
EOSINOPHIL NFR BLD AUTO: 0.6 % (ref 0.3–6.2)
ERYTHROCYTE [DISTWIDTH] IN BLOOD BY AUTOMATED COUNT: 13 % (ref 12.3–15.4)
ERYTHROCYTE [SEDIMENTATION RATE] IN BLOOD: 66 MM/HR (ref 0–20)
GLOBULIN UR ELPH-MCNC: 3.3 GM/DL
GLUCOSE SERPL-MCNC: 95 MG/DL (ref 65–99)
HBA1C MFR BLD: 5.6 % (ref 3.5–5.6)
HCT VFR BLD AUTO: 37.4 % (ref 37.5–51)
HGB BLD-MCNC: 12.2 G/DL (ref 13–17.7)
LYMPHOCYTES # BLD AUTO: 1.8 10*3/MM3 (ref 0.7–3.1)
LYMPHOCYTES NFR BLD AUTO: 17.4 % (ref 19.6–45.3)
MAGNESIUM SERPL-MCNC: 1.6 MG/DL (ref 1.6–2.4)
MCH RBC QN AUTO: 31.6 PG (ref 26.6–33)
MCHC RBC AUTO-ENTMCNC: 32.7 G/DL (ref 31.5–35.7)
MCV RBC AUTO: 96.8 FL (ref 79–97)
MONOCYTES # BLD AUTO: 0.9 10*3/MM3 (ref 0.1–0.9)
MONOCYTES NFR BLD AUTO: 8.7 % (ref 5–12)
NEUTROPHILS NFR BLD AUTO: 7.6 10*3/MM3 (ref 1.7–7)
NEUTROPHILS NFR BLD AUTO: 72.8 % (ref 42.7–76)
NRBC BLD AUTO-RTO: 0 /100 WBC (ref 0–0.2)
PHOSPHATE SERPL-MCNC: 2.5 MG/DL (ref 2.5–4.5)
PLATELET # BLD AUTO: 227 10*3/MM3 (ref 140–450)
PMV BLD AUTO: 8.5 FL (ref 6–12)
POTASSIUM SERPL-SCNC: 3.5 MMOL/L (ref 3.5–5.2)
PROT SERPL-MCNC: 6.9 G/DL (ref 6–8.5)
RBC # BLD AUTO: 3.86 10*6/MM3 (ref 4.14–5.8)
SODIUM SERPL-SCNC: 140 MMOL/L (ref 136–145)
WBC NRBC COR # BLD: 10.5 10*3/MM3 (ref 3.4–10.8)

## 2022-12-05 PROCEDURE — 85025 COMPLETE CBC W/AUTO DIFF WBC: CPT | Performed by: INTERNAL MEDICINE

## 2022-12-05 PROCEDURE — 25010000002 CEFTRIAXONE PER 250 MG: Performed by: INTERNAL MEDICINE

## 2022-12-05 PROCEDURE — 84100 ASSAY OF PHOSPHORUS: CPT | Performed by: INTERNAL MEDICINE

## 2022-12-05 PROCEDURE — 94799 UNLISTED PULMONARY SVC/PX: CPT

## 2022-12-05 PROCEDURE — G0378 HOSPITAL OBSERVATION PER HR: HCPCS

## 2022-12-05 PROCEDURE — 86140 C-REACTIVE PROTEIN: CPT | Performed by: INTERNAL MEDICINE

## 2022-12-05 PROCEDURE — 25010000002 ENOXAPARIN PER 10 MG: Performed by: INTERNAL MEDICINE

## 2022-12-05 PROCEDURE — 36415 COLL VENOUS BLD VENIPUNCTURE: CPT | Performed by: INTERNAL MEDICINE

## 2022-12-05 PROCEDURE — 94761 N-INVAS EAR/PLS OXIMETRY MLT: CPT

## 2022-12-05 PROCEDURE — 92611 MOTION FLUOROSCOPY/SWALLOW: CPT

## 2022-12-05 PROCEDURE — 74230 X-RAY XM SWLNG FUNCJ C+: CPT

## 2022-12-05 PROCEDURE — 83735 ASSAY OF MAGNESIUM: CPT | Performed by: INTERNAL MEDICINE

## 2022-12-05 PROCEDURE — 85652 RBC SED RATE AUTOMATED: CPT | Performed by: INTERNAL MEDICINE

## 2022-12-05 PROCEDURE — 94664 DEMO&/EVAL PT USE INHALER: CPT

## 2022-12-05 PROCEDURE — 80053 COMPREHEN METABOLIC PANEL: CPT | Performed by: INTERNAL MEDICINE

## 2022-12-05 RX ORDER — IPRATROPIUM BROMIDE AND ALBUTEROL SULFATE 2.5; .5 MG/3ML; MG/3ML
3 SOLUTION RESPIRATORY (INHALATION)
Status: DISCONTINUED | OUTPATIENT
Start: 2022-12-05 | End: 2022-12-05

## 2022-12-05 RX ORDER — DIPHENHYDRAMINE HYDROCHLORIDE AND LIDOCAINE HYDROCHLORIDE AND ALUMINUM HYDROXIDE AND MAGNESIUM HYDRO
10 KIT EVERY 6 HOURS
Status: DISCONTINUED | OUTPATIENT
Start: 2022-12-05 | End: 2022-12-08 | Stop reason: HOSPADM

## 2022-12-05 RX ADMIN — ARFORMOTEROL TARTRATE 15 MCG: 15 SOLUTION RESPIRATORY (INHALATION) at 19:30

## 2022-12-05 RX ADMIN — BARIUM SULFATE 50 ML: 400 SUSPENSION ORAL at 10:31

## 2022-12-05 RX ADMIN — DONEPEZIL HYDROCHLORIDE 10 MG: 5 TABLET, FILM COATED ORAL at 21:49

## 2022-12-05 RX ADMIN — GABAPENTIN 600 MG: 600 TABLET, FILM COATED ORAL at 21:34

## 2022-12-05 RX ADMIN — ATORVASTATIN CALCIUM 20 MG: 20 TABLET, FILM COATED ORAL at 08:32

## 2022-12-05 RX ADMIN — CEFTRIAXONE 1 G: 1 INJECTION, POWDER, FOR SOLUTION INTRAMUSCULAR; INTRAVENOUS at 00:10

## 2022-12-05 RX ADMIN — HYDROCODONE BITARTRATE AND ACETAMINOPHEN 1 TABLET: 5; 325 TABLET ORAL at 12:01

## 2022-12-05 RX ADMIN — FUROSEMIDE 40 MG: 40 TABLET ORAL at 08:32

## 2022-12-05 RX ADMIN — CLONAZEPAM 0.5 MG: 0.5 TABLET ORAL at 21:35

## 2022-12-05 RX ADMIN — DOCUSATE SODIUM 100 MG: 100 CAPSULE, LIQUID FILLED ORAL at 21:34

## 2022-12-05 RX ADMIN — DIPHENHYDRAMINE HYDROCHLORIDE AND LIDOCAINE HYDROCHLORIDE AND ALUMINUM HYDROXIDE AND MAGNESIUM HYDRO 10 ML: KIT at 21:49

## 2022-12-05 RX ADMIN — AMLODIPINE BESYLATE 5 MG: 5 TABLET ORAL at 08:31

## 2022-12-05 RX ADMIN — ENOXAPARIN SODIUM 40 MG: 100 INJECTION SUBCUTANEOUS at 15:31

## 2022-12-05 RX ADMIN — Medication 10 ML: at 23:18

## 2022-12-05 RX ADMIN — Medication 10 ML: at 08:32

## 2022-12-05 RX ADMIN — DOXYCYCLINE 100 MG: 100 TABLET ORAL at 21:34

## 2022-12-05 RX ADMIN — CARBIDOPA AND LEVODOPA 2 TABLET: 25; 100 TABLET, EXTENDED RELEASE ORAL at 08:32

## 2022-12-05 RX ADMIN — CEFTRIAXONE 1 G: 1 INJECTION, POWDER, FOR SOLUTION INTRAMUSCULAR; INTRAVENOUS at 23:18

## 2022-12-05 RX ADMIN — CLONAZEPAM 0.5 MG: 0.5 TABLET ORAL at 17:02

## 2022-12-05 RX ADMIN — Medication 10 ML: at 21:35

## 2022-12-05 RX ADMIN — CLONAZEPAM 0.5 MG: 0.5 TABLET ORAL at 08:32

## 2022-12-05 RX ADMIN — HYDROCODONE BITARTRATE AND ACETAMINOPHEN 1 TABLET: 5; 325 TABLET ORAL at 21:59

## 2022-12-05 RX ADMIN — DOXYCYCLINE 100 MG: 100 TABLET ORAL at 08:31

## 2022-12-05 RX ADMIN — DOCUSATE SODIUM 100 MG: 100 CAPSULE, LIQUID FILLED ORAL at 08:31

## 2022-12-05 RX ADMIN — NICOTINE 1 PATCH: 21 PATCH, EXTENDED RELEASE TRANSDERMAL at 08:30

## 2022-12-05 RX ADMIN — PANTOPRAZOLE SODIUM 40 MG: 40 TABLET, DELAYED RELEASE ORAL at 05:51

## 2022-12-05 RX ADMIN — DIPHENHYDRAMINE HYDROCHLORIDE AND LIDOCAINE HYDROCHLORIDE AND ALUMINUM HYDROXIDE AND MAGNESIUM HYDRO 10 ML: KIT at 15:31

## 2022-12-05 RX ADMIN — ARFORMOTEROL TARTRATE 15 MCG: 15 SOLUTION RESPIRATORY (INHALATION) at 11:11

## 2022-12-05 RX ADMIN — CLONAZEPAM 0.5 MG: 0.5 TABLET ORAL at 12:01

## 2022-12-05 RX ADMIN — ARIPIPRAZOLE 2 MG: 2 TABLET ORAL at 08:32

## 2022-12-05 RX ADMIN — ASPIRIN 81 MG: 81 TABLET, COATED ORAL at 08:31

## 2022-12-05 RX ADMIN — GABAPENTIN 600 MG: 600 TABLET, FILM COATED ORAL at 15:31

## 2022-12-05 RX ADMIN — GABAPENTIN 600 MG: 600 TABLET, FILM COATED ORAL at 08:32

## 2022-12-05 RX ADMIN — LISINOPRIL 10 MG: 5 TABLET ORAL at 08:32

## 2022-12-06 LAB
ALBUMIN SERPL-MCNC: 3.4 G/DL (ref 3.5–5.2)
ALBUMIN/GLOB SERPL: 1 G/DL
ALP SERPL-CCNC: 99 U/L (ref 39–117)
ALT SERPL W P-5'-P-CCNC: 20 U/L (ref 1–41)
ANION GAP SERPL CALCULATED.3IONS-SCNC: 11 MMOL/L (ref 5–15)
AST SERPL-CCNC: 18 U/L (ref 1–40)
BASOPHILS # BLD AUTO: 0 10*3/MM3 (ref 0–0.2)
BASOPHILS NFR BLD AUTO: 0.2 % (ref 0–1.5)
BILIRUB SERPL-MCNC: 0.2 MG/DL (ref 0–1.2)
BUN SERPL-MCNC: 12 MG/DL (ref 8–23)
BUN/CREAT SERPL: 17.9 (ref 7–25)
CALCIUM SPEC-SCNC: 9 MG/DL (ref 8.6–10.5)
CHLORIDE SERPL-SCNC: 104 MMOL/L (ref 98–107)
CO2 SERPL-SCNC: 27 MMOL/L (ref 22–29)
CREAT SERPL-MCNC: 0.67 MG/DL (ref 0.76–1.27)
CRP SERPL-MCNC: 14.95 MG/DL (ref 0–0.5)
DEPRECATED RDW RBC AUTO: 46.8 FL (ref 37–54)
EGFRCR SERPLBLD CKD-EPI 2021: 104.3 ML/MIN/1.73
EOSINOPHIL # BLD AUTO: 0.2 10*3/MM3 (ref 0–0.4)
EOSINOPHIL NFR BLD AUTO: 2.4 % (ref 0.3–6.2)
ERYTHROCYTE [DISTWIDTH] IN BLOOD BY AUTOMATED COUNT: 13.2 % (ref 12.3–15.4)
ERYTHROCYTE [SEDIMENTATION RATE] IN BLOOD: 64 MM/HR (ref 0–20)
GLOBULIN UR ELPH-MCNC: 3.3 GM/DL
GLUCOSE SERPL-MCNC: 93 MG/DL (ref 65–99)
HCT VFR BLD AUTO: 34.5 % (ref 37.5–51)
HGB BLD-MCNC: 11.7 G/DL (ref 13–17.7)
LYMPHOCYTES # BLD AUTO: 1.6 10*3/MM3 (ref 0.7–3.1)
LYMPHOCYTES NFR BLD AUTO: 22.7 % (ref 19.6–45.3)
MAGNESIUM SERPL-MCNC: 1.7 MG/DL (ref 1.6–2.4)
MCH RBC QN AUTO: 32.3 PG (ref 26.6–33)
MCHC RBC AUTO-ENTMCNC: 33.9 G/DL (ref 31.5–35.7)
MCV RBC AUTO: 95.3 FL (ref 79–97)
MONOCYTES # BLD AUTO: 0.7 10*3/MM3 (ref 0.1–0.9)
MONOCYTES NFR BLD AUTO: 10 % (ref 5–12)
NEUTROPHILS NFR BLD AUTO: 4.6 10*3/MM3 (ref 1.7–7)
NEUTROPHILS NFR BLD AUTO: 64.7 % (ref 42.7–76)
NRBC BLD AUTO-RTO: 0.1 /100 WBC (ref 0–0.2)
PHOSPHATE SERPL-MCNC: 2.8 MG/DL (ref 2.5–4.5)
PLATELET # BLD AUTO: 212 10*3/MM3 (ref 140–450)
PMV BLD AUTO: 8.9 FL (ref 6–12)
POTASSIUM SERPL-SCNC: 3.5 MMOL/L (ref 3.5–5.2)
PROCALCITONIN SERPL-MCNC: 0.07 NG/ML (ref 0–0.25)
PROT SERPL-MCNC: 6.7 G/DL (ref 6–8.5)
QT INTERVAL: 395 MS
RBC # BLD AUTO: 3.62 10*6/MM3 (ref 4.14–5.8)
SODIUM SERPL-SCNC: 142 MMOL/L (ref 136–145)
WBC NRBC COR # BLD: 7.1 10*3/MM3 (ref 3.4–10.8)

## 2022-12-06 PROCEDURE — 83735 ASSAY OF MAGNESIUM: CPT | Performed by: INTERNAL MEDICINE

## 2022-12-06 PROCEDURE — 86140 C-REACTIVE PROTEIN: CPT | Performed by: INTERNAL MEDICINE

## 2022-12-06 PROCEDURE — 94799 UNLISTED PULMONARY SVC/PX: CPT

## 2022-12-06 PROCEDURE — 84100 ASSAY OF PHOSPHORUS: CPT | Performed by: INTERNAL MEDICINE

## 2022-12-06 PROCEDURE — 25010000002 AMPICILLIN-SULBACTAM PER 1.5 G: Performed by: INTERNAL MEDICINE

## 2022-12-06 PROCEDURE — 97162 PT EVAL MOD COMPLEX 30 MIN: CPT

## 2022-12-06 PROCEDURE — 84145 PROCALCITONIN (PCT): CPT | Performed by: INTERNAL MEDICINE

## 2022-12-06 PROCEDURE — 85652 RBC SED RATE AUTOMATED: CPT | Performed by: INTERNAL MEDICINE

## 2022-12-06 PROCEDURE — 94664 DEMO&/EVAL PT USE INHALER: CPT

## 2022-12-06 PROCEDURE — 80053 COMPREHEN METABOLIC PANEL: CPT | Performed by: INTERNAL MEDICINE

## 2022-12-06 PROCEDURE — 92526 ORAL FUNCTION THERAPY: CPT

## 2022-12-06 PROCEDURE — 25010000002 ENOXAPARIN PER 10 MG: Performed by: INTERNAL MEDICINE

## 2022-12-06 PROCEDURE — 85025 COMPLETE CBC W/AUTO DIFF WBC: CPT | Performed by: INTERNAL MEDICINE

## 2022-12-06 RX ORDER — BUDESONIDE AND FORMOTEROL FUMARATE DIHYDRATE 160; 4.5 UG/1; UG/1
2 AEROSOL RESPIRATORY (INHALATION) 2 TIMES DAILY
Status: DISCONTINUED | OUTPATIENT
Start: 2022-12-06 | End: 2022-12-08 | Stop reason: HOSPADM

## 2022-12-06 RX ORDER — PANTOPRAZOLE SODIUM 40 MG/1
40 TABLET, DELAYED RELEASE ORAL DAILY
Status: DISCONTINUED | OUTPATIENT
Start: 2022-12-07 | End: 2022-12-08 | Stop reason: HOSPADM

## 2022-12-06 RX ORDER — VENLAFAXINE HYDROCHLORIDE 75 MG/1
150 CAPSULE, EXTENDED RELEASE ORAL DAILY
Status: DISCONTINUED | OUTPATIENT
Start: 2022-12-06 | End: 2022-12-08 | Stop reason: HOSPADM

## 2022-12-06 RX ORDER — CARBIDOPA AND LEVODOPA 25; 100 MG/1; MG/1
2 TABLET, EXTENDED RELEASE ORAL NIGHTLY
Status: DISCONTINUED | OUTPATIENT
Start: 2022-12-06 | End: 2022-12-07

## 2022-12-06 RX ORDER — POTASSIUM CHLORIDE 20 MEQ/1
20 TABLET, EXTENDED RELEASE ORAL DAILY
Status: DISCONTINUED | OUTPATIENT
Start: 2022-12-06 | End: 2022-12-08 | Stop reason: HOSPADM

## 2022-12-06 RX ORDER — HYDROCODONE BITARTRATE AND ACETAMINOPHEN 7.5; 325 MG/1; MG/1
1 TABLET ORAL EVERY 8 HOURS PRN
Status: DISCONTINUED | OUTPATIENT
Start: 2022-12-06 | End: 2022-12-06

## 2022-12-06 RX ORDER — ALBUTEROL SULFATE 2.5 MG/3ML
2.5 SOLUTION RESPIRATORY (INHALATION) EVERY 4 HOURS PRN
Status: DISCONTINUED | OUTPATIENT
Start: 2022-12-06 | End: 2022-12-08 | Stop reason: HOSPADM

## 2022-12-06 RX ADMIN — CLONAZEPAM 0.5 MG: 0.5 TABLET ORAL at 21:31

## 2022-12-06 RX ADMIN — Medication 10 ML: at 21:31

## 2022-12-06 RX ADMIN — GABAPENTIN 600 MG: 600 TABLET, FILM COATED ORAL at 21:31

## 2022-12-06 RX ADMIN — ENOXAPARIN SODIUM 40 MG: 100 INJECTION SUBCUTANEOUS at 16:59

## 2022-12-06 RX ADMIN — DOCUSATE SODIUM 100 MG: 100 CAPSULE, LIQUID FILLED ORAL at 09:09

## 2022-12-06 RX ADMIN — PANTOPRAZOLE SODIUM 40 MG: 40 TABLET, DELAYED RELEASE ORAL at 06:30

## 2022-12-06 RX ADMIN — VENLAFAXINE HYDROCHLORIDE 150 MG: 75 CAPSULE, EXTENDED RELEASE ORAL at 12:59

## 2022-12-06 RX ADMIN — GABAPENTIN 600 MG: 600 TABLET, FILM COATED ORAL at 16:59

## 2022-12-06 RX ADMIN — AMLODIPINE BESYLATE 5 MG: 5 TABLET ORAL at 09:09

## 2022-12-06 RX ADMIN — CLONAZEPAM 0.5 MG: 0.5 TABLET ORAL at 09:09

## 2022-12-06 RX ADMIN — FUROSEMIDE 40 MG: 40 TABLET ORAL at 09:09

## 2022-12-06 RX ADMIN — DOCUSATE SODIUM 100 MG: 100 CAPSULE, LIQUID FILLED ORAL at 21:31

## 2022-12-06 RX ADMIN — Medication 10 ML: at 09:20

## 2022-12-06 RX ADMIN — LISINOPRIL 10 MG: 5 TABLET ORAL at 09:09

## 2022-12-06 RX ADMIN — DIPHENHYDRAMINE HYDROCHLORIDE AND LIDOCAINE HYDROCHLORIDE AND ALUMINUM HYDROXIDE AND MAGNESIUM HYDRO 10 ML: KIT at 21:00

## 2022-12-06 RX ADMIN — BUDESONIDE AND FORMOTEROL FUMARATE DIHYDRATE 2 PUFF: 160; 4.5 AEROSOL RESPIRATORY (INHALATION) at 18:39

## 2022-12-06 RX ADMIN — SODIUM CHLORIDE 1.5 G: 900 INJECTION INTRAVENOUS at 16:59

## 2022-12-06 RX ADMIN — ASPIRIN 81 MG: 81 TABLET, COATED ORAL at 09:09

## 2022-12-06 RX ADMIN — DOXYCYCLINE 100 MG: 100 TABLET ORAL at 09:09

## 2022-12-06 RX ADMIN — CLONAZEPAM 0.5 MG: 0.5 TABLET ORAL at 17:00

## 2022-12-06 RX ADMIN — CLONAZEPAM 0.5 MG: 0.5 TABLET ORAL at 12:59

## 2022-12-06 RX ADMIN — DIPHENHYDRAMINE HYDROCHLORIDE AND LIDOCAINE HYDROCHLORIDE AND ALUMINUM HYDROXIDE AND MAGNESIUM HYDRO 10 ML: KIT at 16:58

## 2022-12-06 RX ADMIN — ARFORMOTEROL TARTRATE 15 MCG: 15 SOLUTION RESPIRATORY (INHALATION) at 07:32

## 2022-12-06 RX ADMIN — GABAPENTIN 600 MG: 600 TABLET, FILM COATED ORAL at 09:09

## 2022-12-06 RX ADMIN — HYDROCODONE BITARTRATE AND ACETAMINOPHEN 1 TABLET: 5; 325 TABLET ORAL at 17:14

## 2022-12-06 RX ADMIN — DONEPEZIL HYDROCHLORIDE 10 MG: 5 TABLET, FILM COATED ORAL at 21:31

## 2022-12-06 RX ADMIN — CARBIDOPA AND LEVODOPA 2 TABLET: 25; 100 TABLET, EXTENDED RELEASE ORAL at 21:31

## 2022-12-06 RX ADMIN — SODIUM CHLORIDE 1.5 G: 900 INJECTION INTRAVENOUS at 09:21

## 2022-12-06 RX ADMIN — ARIPIPRAZOLE 2 MG: 2 TABLET ORAL at 09:09

## 2022-12-06 RX ADMIN — POTASSIUM CHLORIDE 20 MEQ: 1500 TABLET, EXTENDED RELEASE ORAL at 12:59

## 2022-12-06 RX ADMIN — SODIUM CHLORIDE 1.5 G: 900 INJECTION INTRAVENOUS at 21:30

## 2022-12-06 RX ADMIN — ATORVASTATIN CALCIUM 20 MG: 20 TABLET, FILM COATED ORAL at 09:09

## 2022-12-06 RX ADMIN — NICOTINE 1 PATCH: 21 PATCH, EXTENDED RELEASE TRANSDERMAL at 09:09

## 2022-12-07 LAB
ANION GAP SERPL CALCULATED.3IONS-SCNC: 13 MMOL/L (ref 5–15)
BASOPHILS # BLD AUTO: 0 10*3/MM3 (ref 0–0.2)
BASOPHILS NFR BLD AUTO: 0.3 % (ref 0–1.5)
BUN SERPL-MCNC: 12 MG/DL (ref 8–23)
BUN/CREAT SERPL: 21.1 (ref 7–25)
CALCIUM SPEC-SCNC: 9 MG/DL (ref 8.6–10.5)
CHLORIDE SERPL-SCNC: 106 MMOL/L (ref 98–107)
CO2 SERPL-SCNC: 27 MMOL/L (ref 22–29)
CREAT SERPL-MCNC: 0.57 MG/DL (ref 0.76–1.27)
DEPRECATED RDW RBC AUTO: 45.9 FL (ref 37–54)
EGFRCR SERPLBLD CKD-EPI 2021: 109.5 ML/MIN/1.73
EOSINOPHIL # BLD AUTO: 0.2 10*3/MM3 (ref 0–0.4)
EOSINOPHIL NFR BLD AUTO: 2.8 % (ref 0.3–6.2)
ERYTHROCYTE [DISTWIDTH] IN BLOOD BY AUTOMATED COUNT: 13.1 % (ref 12.3–15.4)
GLUCOSE SERPL-MCNC: 120 MG/DL (ref 65–99)
HCT VFR BLD AUTO: 35.6 % (ref 37.5–51)
HGB BLD-MCNC: 11.8 G/DL (ref 13–17.7)
LYMPHOCYTES # BLD AUTO: 1.6 10*3/MM3 (ref 0.7–3.1)
LYMPHOCYTES NFR BLD AUTO: 24.4 % (ref 19.6–45.3)
MCH RBC QN AUTO: 31.8 PG (ref 26.6–33)
MCHC RBC AUTO-ENTMCNC: 33.3 G/DL (ref 31.5–35.7)
MCV RBC AUTO: 95.4 FL (ref 79–97)
MONOCYTES # BLD AUTO: 0.7 10*3/MM3 (ref 0.1–0.9)
MONOCYTES NFR BLD AUTO: 10.5 % (ref 5–12)
NEUTROPHILS NFR BLD AUTO: 4 10*3/MM3 (ref 1.7–7)
NEUTROPHILS NFR BLD AUTO: 62 % (ref 42.7–76)
NRBC BLD AUTO-RTO: 0 /100 WBC (ref 0–0.2)
PLATELET # BLD AUTO: 214 10*3/MM3 (ref 140–450)
PMV BLD AUTO: 9 FL (ref 6–12)
POTASSIUM SERPL-SCNC: 3.5 MMOL/L (ref 3.5–5.2)
RBC # BLD AUTO: 3.73 10*6/MM3 (ref 4.14–5.8)
SODIUM SERPL-SCNC: 146 MMOL/L (ref 136–145)
WBC NRBC COR # BLD: 6.4 10*3/MM3 (ref 3.4–10.8)

## 2022-12-07 PROCEDURE — 25010000002 ENOXAPARIN PER 10 MG: Performed by: INTERNAL MEDICINE

## 2022-12-07 PROCEDURE — 80048 BASIC METABOLIC PNL TOTAL CA: CPT | Performed by: INTERNAL MEDICINE

## 2022-12-07 PROCEDURE — 94799 UNLISTED PULMONARY SVC/PX: CPT

## 2022-12-07 PROCEDURE — 85025 COMPLETE CBC W/AUTO DIFF WBC: CPT | Performed by: INTERNAL MEDICINE

## 2022-12-07 PROCEDURE — 94664 DEMO&/EVAL PT USE INHALER: CPT

## 2022-12-07 PROCEDURE — 25010000002 AMPICILLIN-SULBACTAM PER 1.5 G: Performed by: INTERNAL MEDICINE

## 2022-12-07 RX ORDER — CARBIDOPA AND LEVODOPA 25; 100 MG/1; MG/1
2 TABLET, EXTENDED RELEASE ORAL NIGHTLY
Status: DISCONTINUED | OUTPATIENT
Start: 2022-12-07 | End: 2022-12-08 | Stop reason: HOSPADM

## 2022-12-07 RX ORDER — AMOXICILLIN AND CLAVULANATE POTASSIUM 875; 125 MG/1; MG/1
1 TABLET, FILM COATED ORAL EVERY 12 HOURS SCHEDULED
Status: DISCONTINUED | OUTPATIENT
Start: 2022-12-07 | End: 2022-12-08 | Stop reason: HOSPADM

## 2022-12-07 RX ADMIN — AMOXICILLIN AND CLAVULANATE POTASSIUM 1 TABLET: 875; 125 TABLET, FILM COATED ORAL at 11:01

## 2022-12-07 RX ADMIN — DIPHENHYDRAMINE HYDROCHLORIDE AND LIDOCAINE HYDROCHLORIDE AND ALUMINUM HYDROXIDE AND MAGNESIUM HYDRO 10 ML: KIT at 03:00

## 2022-12-07 RX ADMIN — ASPIRIN 81 MG: 81 TABLET, COATED ORAL at 09:19

## 2022-12-07 RX ADMIN — DIPHENHYDRAMINE HYDROCHLORIDE AND LIDOCAINE HYDROCHLORIDE AND ALUMINUM HYDROXIDE AND MAGNESIUM HYDRO 10 ML: KIT at 14:20

## 2022-12-07 RX ADMIN — LISINOPRIL 10 MG: 5 TABLET ORAL at 09:19

## 2022-12-07 RX ADMIN — Medication 10 ML: at 09:21

## 2022-12-07 RX ADMIN — FUROSEMIDE 40 MG: 40 TABLET ORAL at 09:19

## 2022-12-07 RX ADMIN — DIPHENHYDRAMINE HYDROCHLORIDE AND LIDOCAINE HYDROCHLORIDE AND ALUMINUM HYDROXIDE AND MAGNESIUM HYDRO 10 ML: KIT at 20:15

## 2022-12-07 RX ADMIN — ALBUTEROL SULFATE 2.5 MG: 2.5 SOLUTION RESPIRATORY (INHALATION) at 12:35

## 2022-12-07 RX ADMIN — GABAPENTIN 600 MG: 600 TABLET, FILM COATED ORAL at 15:48

## 2022-12-07 RX ADMIN — POTASSIUM CHLORIDE 20 MEQ: 1500 TABLET, EXTENDED RELEASE ORAL at 09:19

## 2022-12-07 RX ADMIN — AMOXICILLIN AND CLAVULANATE POTASSIUM 1 TABLET: 875; 125 TABLET, FILM COATED ORAL at 20:15

## 2022-12-07 RX ADMIN — ATORVASTATIN CALCIUM 20 MG: 20 TABLET, FILM COATED ORAL at 09:19

## 2022-12-07 RX ADMIN — DOCUSATE SODIUM 100 MG: 100 CAPSULE, LIQUID FILLED ORAL at 09:19

## 2022-12-07 RX ADMIN — BUDESONIDE AND FORMOTEROL FUMARATE DIHYDRATE 2 PUFF: 160; 4.5 AEROSOL RESPIRATORY (INHALATION) at 19:16

## 2022-12-07 RX ADMIN — VENLAFAXINE HYDROCHLORIDE 150 MG: 75 CAPSULE, EXTENDED RELEASE ORAL at 09:19

## 2022-12-07 RX ADMIN — GABAPENTIN 600 MG: 600 TABLET, FILM COATED ORAL at 09:19

## 2022-12-07 RX ADMIN — BUDESONIDE AND FORMOTEROL FUMARATE DIHYDRATE 2 PUFF: 160; 4.5 AEROSOL RESPIRATORY (INHALATION) at 07:15

## 2022-12-07 RX ADMIN — CLONAZEPAM 0.5 MG: 0.5 TABLET ORAL at 20:15

## 2022-12-07 RX ADMIN — SODIUM CHLORIDE 1.5 G: 900 INJECTION INTRAVENOUS at 03:02

## 2022-12-07 RX ADMIN — CLONAZEPAM 0.5 MG: 0.5 TABLET ORAL at 09:19

## 2022-12-07 RX ADMIN — PANTOPRAZOLE SODIUM 40 MG: 40 TABLET, DELAYED RELEASE ORAL at 09:19

## 2022-12-07 RX ADMIN — Medication 10 ML: at 20:15

## 2022-12-07 RX ADMIN — CLONAZEPAM 0.5 MG: 0.5 TABLET ORAL at 11:01

## 2022-12-07 RX ADMIN — AMLODIPINE BESYLATE 5 MG: 5 TABLET ORAL at 09:19

## 2022-12-07 RX ADMIN — DOCUSATE SODIUM 100 MG: 100 CAPSULE, LIQUID FILLED ORAL at 20:15

## 2022-12-07 RX ADMIN — HYDROCODONE BITARTRATE AND ACETAMINOPHEN 1 TABLET: 5; 325 TABLET ORAL at 00:08

## 2022-12-07 RX ADMIN — CLONAZEPAM 0.5 MG: 0.5 TABLET ORAL at 17:02

## 2022-12-07 RX ADMIN — CARBIDOPA AND LEVODOPA 2 TABLET: 25; 100 TABLET ORAL at 11:01

## 2022-12-07 RX ADMIN — ARIPIPRAZOLE 2 MG: 2 TABLET ORAL at 09:19

## 2022-12-07 RX ADMIN — DONEPEZIL HYDROCHLORIDE 10 MG: 5 TABLET, FILM COATED ORAL at 20:15

## 2022-12-07 RX ADMIN — CARBIDOPA AND LEVODOPA 2 TABLET: 25; 100 TABLET, EXTENDED RELEASE ORAL at 20:15

## 2022-12-07 RX ADMIN — CARBIDOPA AND LEVODOPA 2 TABLET: 25; 100 TABLET ORAL at 17:02

## 2022-12-07 RX ADMIN — ENOXAPARIN SODIUM 40 MG: 100 INJECTION SUBCUTANEOUS at 15:48

## 2022-12-07 RX ADMIN — GABAPENTIN 600 MG: 600 TABLET, FILM COATED ORAL at 20:15

## 2022-12-07 RX ADMIN — NICOTINE 1 PATCH: 21 PATCH, EXTENDED RELEASE TRANSDERMAL at 09:33

## 2022-12-07 RX ADMIN — DIPHENHYDRAMINE HYDROCHLORIDE AND LIDOCAINE HYDROCHLORIDE AND ALUMINUM HYDROXIDE AND MAGNESIUM HYDRO 10 ML: KIT at 09:20

## 2022-12-08 ENCOUNTER — TELEPHONE (OUTPATIENT)
Dept: FAMILY MEDICINE CLINIC | Facility: CLINIC | Age: 62
End: 2022-12-08

## 2022-12-08 ENCOUNTER — READMISSION MANAGEMENT (OUTPATIENT)
Dept: CALL CENTER | Facility: HOSPITAL | Age: 62
End: 2022-12-08

## 2022-12-08 VITALS
BODY MASS INDEX: 31.41 KG/M2 | TEMPERATURE: 98.4 F | HEIGHT: 69 IN | OXYGEN SATURATION: 96 % | DIASTOLIC BLOOD PRESSURE: 83 MMHG | WEIGHT: 212.08 LBS | RESPIRATION RATE: 18 BRPM | HEART RATE: 62 BPM | SYSTOLIC BLOOD PRESSURE: 144 MMHG

## 2022-12-08 DIAGNOSIS — G89.29 CHRONIC LOW BACK PAIN WITHOUT SCIATICA, UNSPECIFIED BACK PAIN LATERALITY: Primary | ICD-10-CM

## 2022-12-08 DIAGNOSIS — M54.50 CHRONIC LOW BACK PAIN WITHOUT SCIATICA, UNSPECIFIED BACK PAIN LATERALITY: Primary | ICD-10-CM

## 2022-12-08 LAB
ANION GAP SERPL CALCULATED.3IONS-SCNC: 9 MMOL/L (ref 5–15)
BASOPHILS # BLD AUTO: 0 10*3/MM3 (ref 0–0.2)
BASOPHILS NFR BLD AUTO: 0.4 % (ref 0–1.5)
BUN SERPL-MCNC: 10 MG/DL (ref 8–23)
BUN/CREAT SERPL: 18.9 (ref 7–25)
CALCIUM SPEC-SCNC: 9 MG/DL (ref 8.6–10.5)
CHLORIDE SERPL-SCNC: 102 MMOL/L (ref 98–107)
CO2 SERPL-SCNC: 29 MMOL/L (ref 22–29)
CREAT SERPL-MCNC: 0.53 MG/DL (ref 0.76–1.27)
DEPRECATED RDW RBC AUTO: 44.2 FL (ref 37–54)
EGFRCR SERPLBLD CKD-EPI 2021: 111.9 ML/MIN/1.73
EOSINOPHIL # BLD AUTO: 0.2 10*3/MM3 (ref 0–0.4)
EOSINOPHIL NFR BLD AUTO: 2.9 % (ref 0.3–6.2)
ERYTHROCYTE [DISTWIDTH] IN BLOOD BY AUTOMATED COUNT: 13 % (ref 12.3–15.4)
GLUCOSE SERPL-MCNC: 118 MG/DL (ref 65–99)
HCT VFR BLD AUTO: 36.1 % (ref 37.5–51)
HGB BLD-MCNC: 11.8 G/DL (ref 13–17.7)
LYMPHOCYTES # BLD AUTO: 1.7 10*3/MM3 (ref 0.7–3.1)
LYMPHOCYTES NFR BLD AUTO: 26.4 % (ref 19.6–45.3)
MCH RBC QN AUTO: 31.8 PG (ref 26.6–33)
MCHC RBC AUTO-ENTMCNC: 32.8 G/DL (ref 31.5–35.7)
MCV RBC AUTO: 97.1 FL (ref 79–97)
MONOCYTES # BLD AUTO: 0.6 10*3/MM3 (ref 0.1–0.9)
MONOCYTES NFR BLD AUTO: 9.6 % (ref 5–12)
NEUTROPHILS NFR BLD AUTO: 3.9 10*3/MM3 (ref 1.7–7)
NEUTROPHILS NFR BLD AUTO: 60.7 % (ref 42.7–76)
NRBC BLD AUTO-RTO: 0 /100 WBC (ref 0–0.2)
PLATELET # BLD AUTO: 233 10*3/MM3 (ref 140–450)
PMV BLD AUTO: 9.2 FL (ref 6–12)
POTASSIUM SERPL-SCNC: 3.6 MMOL/L (ref 3.5–5.2)
RBC # BLD AUTO: 3.72 10*6/MM3 (ref 4.14–5.8)
SODIUM SERPL-SCNC: 140 MMOL/L (ref 136–145)
WBC NRBC COR # BLD: 6.4 10*3/MM3 (ref 3.4–10.8)

## 2022-12-08 PROCEDURE — 94664 DEMO&/EVAL PT USE INHALER: CPT

## 2022-12-08 PROCEDURE — 97116 GAIT TRAINING THERAPY: CPT

## 2022-12-08 PROCEDURE — 85025 COMPLETE CBC W/AUTO DIFF WBC: CPT | Performed by: INTERNAL MEDICINE

## 2022-12-08 PROCEDURE — 80048 BASIC METABOLIC PNL TOTAL CA: CPT | Performed by: INTERNAL MEDICINE

## 2022-12-08 PROCEDURE — 94799 UNLISTED PULMONARY SVC/PX: CPT

## 2022-12-08 PROCEDURE — 97530 THERAPEUTIC ACTIVITIES: CPT

## 2022-12-08 PROCEDURE — 94618 PULMONARY STRESS TESTING: CPT

## 2022-12-08 RX ORDER — CARBIDOPA AND LEVODOPA 25; 100 MG/1; MG/1
2 TABLET, EXTENDED RELEASE ORAL NIGHTLY
Qty: 60 TABLET | Refills: 11
Start: 2022-12-08 | End: 2022-12-29

## 2022-12-08 RX ORDER — AMOXICILLIN AND CLAVULANATE POTASSIUM 875; 125 MG/1; MG/1
1 TABLET, FILM COATED ORAL EVERY 12 HOURS SCHEDULED
Qty: 5 TABLET | Refills: 0 | Status: SHIPPED | OUTPATIENT
Start: 2022-12-08 | End: 2022-12-11

## 2022-12-08 RX ADMIN — GABAPENTIN 600 MG: 600 TABLET, FILM COATED ORAL at 08:06

## 2022-12-08 RX ADMIN — CARBIDOPA AND LEVODOPA 2 TABLET: 25; 100 TABLET ORAL at 05:51

## 2022-12-08 RX ADMIN — CLONAZEPAM 0.5 MG: 0.5 TABLET ORAL at 08:06

## 2022-12-08 RX ADMIN — POTASSIUM CHLORIDE 20 MEQ: 1500 TABLET, EXTENDED RELEASE ORAL at 08:06

## 2022-12-08 RX ADMIN — HYDROCODONE BITARTRATE AND ACETAMINOPHEN 1 TABLET: 5; 325 TABLET ORAL at 12:16

## 2022-12-08 RX ADMIN — DIPHENHYDRAMINE HYDROCHLORIDE AND LIDOCAINE HYDROCHLORIDE AND ALUMINUM HYDROXIDE AND MAGNESIUM HYDRO 10 ML: KIT at 02:13

## 2022-12-08 RX ADMIN — DIPHENHYDRAMINE HYDROCHLORIDE AND LIDOCAINE HYDROCHLORIDE AND ALUMINUM HYDROXIDE AND MAGNESIUM HYDRO 10 ML: KIT at 08:44

## 2022-12-08 RX ADMIN — DOCUSATE SODIUM 100 MG: 100 CAPSULE, LIQUID FILLED ORAL at 08:06

## 2022-12-08 RX ADMIN — CARBIDOPA AND LEVODOPA 2 TABLET: 25; 100 TABLET ORAL at 11:54

## 2022-12-08 RX ADMIN — AMLODIPINE BESYLATE 5 MG: 5 TABLET ORAL at 08:06

## 2022-12-08 RX ADMIN — LISINOPRIL 10 MG: 5 TABLET ORAL at 08:05

## 2022-12-08 RX ADMIN — CLONAZEPAM 0.5 MG: 0.5 TABLET ORAL at 11:54

## 2022-12-08 RX ADMIN — NICOTINE 1 PATCH: 21 PATCH, EXTENDED RELEASE TRANSDERMAL at 08:06

## 2022-12-08 RX ADMIN — Medication 10 ML: at 08:07

## 2022-12-08 RX ADMIN — AMOXICILLIN AND CLAVULANATE POTASSIUM 1 TABLET: 875; 125 TABLET, FILM COATED ORAL at 08:05

## 2022-12-08 RX ADMIN — PANTOPRAZOLE SODIUM 40 MG: 40 TABLET, DELAYED RELEASE ORAL at 08:06

## 2022-12-08 RX ADMIN — ASPIRIN 81 MG: 81 TABLET, COATED ORAL at 08:05

## 2022-12-08 RX ADMIN — ATORVASTATIN CALCIUM 20 MG: 20 TABLET, FILM COATED ORAL at 08:06

## 2022-12-08 RX ADMIN — BUDESONIDE AND FORMOTEROL FUMARATE DIHYDRATE 2 PUFF: 160; 4.5 AEROSOL RESPIRATORY (INHALATION) at 07:05

## 2022-12-08 RX ADMIN — ARIPIPRAZOLE 2 MG: 2 TABLET ORAL at 08:06

## 2022-12-08 RX ADMIN — VENLAFAXINE HYDROCHLORIDE 150 MG: 75 CAPSULE, EXTENDED RELEASE ORAL at 08:06

## 2022-12-08 RX ADMIN — FUROSEMIDE 40 MG: 40 TABLET ORAL at 08:06

## 2022-12-09 ENCOUNTER — TRANSITIONAL CARE MANAGEMENT TELEPHONE ENCOUNTER (OUTPATIENT)
Dept: CALL CENTER | Facility: HOSPITAL | Age: 62
End: 2022-12-09

## 2022-12-09 LAB
BACTERIA SPEC AEROBE CULT: NORMAL
BACTERIA SPEC AEROBE CULT: NORMAL

## 2022-12-13 DIAGNOSIS — G89.29 CHRONIC LOW BACK PAIN WITHOUT SCIATICA, UNSPECIFIED BACK PAIN LATERALITY: Primary | ICD-10-CM

## 2022-12-13 DIAGNOSIS — G20 PARKINSON'S DISEASE: ICD-10-CM

## 2022-12-13 DIAGNOSIS — M54.50 CHRONIC LOW BACK PAIN WITHOUT SCIATICA, UNSPECIFIED BACK PAIN LATERALITY: Primary | ICD-10-CM

## 2022-12-16 DIAGNOSIS — E34.9 TESTOSTERONE DEFICIENCY: ICD-10-CM

## 2022-12-16 DIAGNOSIS — G62.9 NEUROPATHY: ICD-10-CM

## 2022-12-16 DIAGNOSIS — M54.50 CHRONIC LOW BACK PAIN WITHOUT SCIATICA, UNSPECIFIED BACK PAIN LATERALITY: ICD-10-CM

## 2022-12-16 DIAGNOSIS — G89.29 CHRONIC LOW BACK PAIN WITHOUT SCIATICA, UNSPECIFIED BACK PAIN LATERALITY: ICD-10-CM

## 2022-12-16 RX ORDER — TESTOSTERONE CYPIONATE 200 MG/ML
100 INJECTION, SOLUTION INTRAMUSCULAR
Qty: 10 ML | Refills: 0 | Status: SHIPPED | OUTPATIENT
Start: 2022-12-16 | End: 2022-12-22

## 2022-12-16 RX ORDER — GABAPENTIN 600 MG/1
600 TABLET ORAL 3 TIMES DAILY
Qty: 90 TABLET | Refills: 1 | Status: SHIPPED | OUTPATIENT
Start: 2022-12-16 | End: 2023-01-16 | Stop reason: SDUPTHER

## 2022-12-16 RX ORDER — HYDROCODONE BITARTRATE AND ACETAMINOPHEN 7.5; 325 MG/1; MG/1
1 TABLET ORAL EVERY 8 HOURS PRN
Qty: 60 TABLET | Refills: 0 | Status: SHIPPED | OUTPATIENT
Start: 2022-12-16 | End: 2023-01-06 | Stop reason: SDUPTHER

## 2022-12-21 DIAGNOSIS — E34.9 TESTOSTERONE DEFICIENCY: ICD-10-CM

## 2022-12-22 RX ORDER — TESTOSTERONE CYPIONATE 200 MG/ML
100 INJECTION, SOLUTION INTRAMUSCULAR
Qty: 6 ML | Refills: 1 | Status: SHIPPED | OUTPATIENT
Start: 2022-12-22 | End: 2023-03-16

## 2022-12-29 ENCOUNTER — OFFICE VISIT (OUTPATIENT)
Dept: FAMILY MEDICINE CLINIC | Facility: CLINIC | Age: 62
End: 2022-12-29

## 2022-12-29 VITALS
BODY MASS INDEX: 28.56 KG/M2 | RESPIRATION RATE: 16 BRPM | SYSTOLIC BLOOD PRESSURE: 104 MMHG | HEART RATE: 67 BPM | WEIGHT: 192.8 LBS | TEMPERATURE: 98 F | DIASTOLIC BLOOD PRESSURE: 72 MMHG | OXYGEN SATURATION: 97 % | HEIGHT: 69 IN

## 2022-12-29 DIAGNOSIS — E53.9 VITAMIN B DEFICIENCY: ICD-10-CM

## 2022-12-29 DIAGNOSIS — J18.9 PNEUMONIA OF RIGHT UPPER LOBE DUE TO INFECTIOUS ORGANISM: Primary | ICD-10-CM

## 2022-12-29 DIAGNOSIS — T17.928A ASPIRATION OF FOOD, INITIAL ENCOUNTER: ICD-10-CM

## 2022-12-29 DIAGNOSIS — E53.8 B12 DEFICIENCY: Primary | ICD-10-CM

## 2022-12-29 PROCEDURE — 1111F DSCHRG MED/CURRENT MED MERGE: CPT | Performed by: PHYSICIAN ASSISTANT

## 2022-12-29 PROCEDURE — 96372 THER/PROPH/DIAG INJ SC/IM: CPT | Performed by: PHYSICIAN ASSISTANT

## 2022-12-29 PROCEDURE — 99496 TRANSJ CARE MGMT HIGH F2F 7D: CPT | Performed by: PHYSICIAN ASSISTANT

## 2022-12-29 RX ORDER — CARBIDOPA AND LEVODOPA 50; 200 MG/1; MG/1
1 TABLET, EXTENDED RELEASE ORAL NIGHTLY
Status: ON HOLD | COMMUNITY
Start: 2022-12-28

## 2022-12-29 RX ORDER — CYANOCOBALAMIN 1000 UG/ML
1000 INJECTION, SOLUTION INTRAMUSCULAR; SUBCUTANEOUS
Status: DISCONTINUED | OUTPATIENT
Start: 2022-12-29 | End: 2023-04-06

## 2022-12-29 RX ADMIN — CYANOCOBALAMIN 1000 MCG: 1000 INJECTION, SOLUTION INTRAMUSCULAR; SUBCUTANEOUS at 12:03

## 2023-01-02 DIAGNOSIS — R13.10 DYSPHAGIA, UNSPECIFIED TYPE: Primary | ICD-10-CM

## 2023-01-02 DIAGNOSIS — G20 PARKINSON'S DISEASE: ICD-10-CM

## 2023-01-02 DIAGNOSIS — T17.928A ASPIRATION OF FOOD, INITIAL ENCOUNTER: ICD-10-CM

## 2023-01-06 DIAGNOSIS — G89.29 CHRONIC LOW BACK PAIN WITHOUT SCIATICA, UNSPECIFIED BACK PAIN LATERALITY: ICD-10-CM

## 2023-01-06 DIAGNOSIS — M54.50 CHRONIC LOW BACK PAIN WITHOUT SCIATICA, UNSPECIFIED BACK PAIN LATERALITY: ICD-10-CM

## 2023-01-06 RX ORDER — HYDROCODONE BITARTRATE AND ACETAMINOPHEN 7.5; 325 MG/1; MG/1
1 TABLET ORAL EVERY 8 HOURS PRN
Qty: 60 TABLET | Refills: 0 | Status: SHIPPED | OUTPATIENT
Start: 2023-01-06 | End: 2023-01-25 | Stop reason: SDUPTHER

## 2023-01-09 DIAGNOSIS — F33.1 MODERATE EPISODE OF RECURRENT MAJOR DEPRESSIVE DISORDER: ICD-10-CM

## 2023-01-09 DIAGNOSIS — R60.9 EDEMA, UNSPECIFIED TYPE: ICD-10-CM

## 2023-01-09 RX ORDER — ARIPIPRAZOLE 2 MG/1
TABLET ORAL
Qty: 90 TABLET | Refills: 1 | Status: SHIPPED | OUTPATIENT
Start: 2023-01-09

## 2023-01-09 RX ORDER — POTASSIUM CHLORIDE 1500 MG/1
TABLET, EXTENDED RELEASE ORAL
Qty: 90 TABLET | Refills: 1 | Status: SHIPPED | OUTPATIENT
Start: 2023-01-09 | End: 2023-04-07 | Stop reason: HOSPADM

## 2023-01-16 DIAGNOSIS — G62.9 NEUROPATHY: ICD-10-CM

## 2023-01-17 RX ORDER — PANTOPRAZOLE SODIUM 40 MG/1
40 TABLET, DELAYED RELEASE ORAL DAILY
Qty: 90 TABLET | Refills: 3 | Status: SHIPPED | OUTPATIENT
Start: 2023-01-17

## 2023-01-17 RX ORDER — AMLODIPINE BESYLATE AND BENAZEPRIL HYDROCHLORIDE 5; 10 MG/1; MG/1
1 CAPSULE ORAL DAILY
Qty: 30 CAPSULE | Refills: 8 | Status: SHIPPED | OUTPATIENT
Start: 2023-01-17 | End: 2023-04-07 | Stop reason: HOSPADM

## 2023-01-17 RX ORDER — GABAPENTIN 600 MG/1
600 TABLET ORAL 3 TIMES DAILY
Qty: 90 TABLET | Refills: 1 | Status: SHIPPED | OUTPATIENT
Start: 2023-01-17 | End: 2023-02-17 | Stop reason: SDUPTHER

## 2023-01-25 DIAGNOSIS — G89.29 CHRONIC LOW BACK PAIN WITHOUT SCIATICA, UNSPECIFIED BACK PAIN LATERALITY: ICD-10-CM

## 2023-01-25 DIAGNOSIS — F41.9 ANXIETY: ICD-10-CM

## 2023-01-25 DIAGNOSIS — M54.50 CHRONIC LOW BACK PAIN WITHOUT SCIATICA, UNSPECIFIED BACK PAIN LATERALITY: ICD-10-CM

## 2023-01-26 RX ORDER — CLONAZEPAM 0.5 MG/1
0.5 TABLET ORAL 4 TIMES DAILY
Qty: 120 TABLET | Refills: 1 | Status: SHIPPED | OUTPATIENT
Start: 2023-01-26 | End: 2023-02-27 | Stop reason: SDUPTHER

## 2023-01-26 RX ORDER — HYDROCODONE BITARTRATE AND ACETAMINOPHEN 7.5; 325 MG/1; MG/1
1 TABLET ORAL EVERY 8 HOURS PRN
Qty: 60 TABLET | Refills: 0 | Status: SHIPPED | OUTPATIENT
Start: 2023-01-26 | End: 2023-02-17 | Stop reason: SDUPTHER

## 2023-02-17 DIAGNOSIS — G89.29 CHRONIC LOW BACK PAIN WITHOUT SCIATICA, UNSPECIFIED BACK PAIN LATERALITY: ICD-10-CM

## 2023-02-17 DIAGNOSIS — G62.9 NEUROPATHY: ICD-10-CM

## 2023-02-17 DIAGNOSIS — M54.50 CHRONIC LOW BACK PAIN WITHOUT SCIATICA, UNSPECIFIED BACK PAIN LATERALITY: ICD-10-CM

## 2023-02-17 RX ORDER — GABAPENTIN 600 MG/1
600 TABLET ORAL 3 TIMES DAILY
Qty: 90 TABLET | Refills: 1 | Status: SHIPPED | OUTPATIENT
Start: 2023-02-17 | End: 2023-03-09 | Stop reason: SDUPTHER

## 2023-02-17 RX ORDER — HYDROCODONE BITARTRATE AND ACETAMINOPHEN 7.5; 325 MG/1; MG/1
1 TABLET ORAL EVERY 8 HOURS PRN
Qty: 60 TABLET | Refills: 0 | Status: SHIPPED | OUTPATIENT
Start: 2023-02-17 | End: 2023-03-09 | Stop reason: SDUPTHER

## 2023-02-17 RX ORDER — ASPIRIN 81 MG/1
81 TABLET ORAL DAILY
Qty: 90 TABLET | Refills: 3 | Status: SHIPPED | OUTPATIENT
Start: 2023-02-17

## 2023-02-27 DIAGNOSIS — F41.9 ANXIETY: ICD-10-CM

## 2023-02-27 RX ORDER — CLONAZEPAM 0.5 MG/1
0.5 TABLET ORAL 4 TIMES DAILY
Qty: 120 TABLET | Refills: 1 | Status: SHIPPED | OUTPATIENT
Start: 2023-02-27 | End: 2023-03-27 | Stop reason: SDUPTHER

## 2023-03-09 DIAGNOSIS — G62.9 NEUROPATHY: ICD-10-CM

## 2023-03-09 DIAGNOSIS — G89.29 CHRONIC LOW BACK PAIN WITHOUT SCIATICA, UNSPECIFIED BACK PAIN LATERALITY: ICD-10-CM

## 2023-03-09 DIAGNOSIS — M54.50 CHRONIC LOW BACK PAIN WITHOUT SCIATICA, UNSPECIFIED BACK PAIN LATERALITY: ICD-10-CM

## 2023-03-09 RX ORDER — GABAPENTIN 600 MG/1
600 TABLET ORAL 3 TIMES DAILY
Qty: 90 TABLET | Refills: 1 | Status: SHIPPED | OUTPATIENT
Start: 2023-03-09

## 2023-03-09 RX ORDER — HYDROCODONE BITARTRATE AND ACETAMINOPHEN 7.5; 325 MG/1; MG/1
1 TABLET ORAL EVERY 8 HOURS PRN
Qty: 60 TABLET | Refills: 0 | Status: SHIPPED | OUTPATIENT
Start: 2023-03-09

## 2023-03-16 DIAGNOSIS — E34.9 TESTOSTERONE DEFICIENCY: ICD-10-CM

## 2023-03-16 RX ORDER — TESTOSTERONE CYPIONATE 200 MG/ML
100 INJECTION, SOLUTION INTRAMUSCULAR
Qty: 6 ML | Refills: 1 | Status: SHIPPED | OUTPATIENT
Start: 2023-03-16

## 2023-03-24 ENCOUNTER — APPOINTMENT (OUTPATIENT)
Dept: CT IMAGING | Facility: HOSPITAL | Age: 63
DRG: 177 | End: 2023-03-24
Payer: COMMERCIAL

## 2023-03-24 ENCOUNTER — HOSPITAL ENCOUNTER (INPATIENT)
Facility: HOSPITAL | Age: 63
LOS: 2 days | Discharge: HOME | DRG: 177 | End: 2023-03-26
Attending: EMERGENCY MEDICINE | Admitting: STUDENT IN AN ORGANIZED HEALTH CARE EDUCATION/TRAINING PROGRAM
Payer: COMMERCIAL

## 2023-03-24 ENCOUNTER — APPOINTMENT (OUTPATIENT)
Dept: GENERAL RADIOLOGY | Facility: HOSPITAL | Age: 63
DRG: 177 | End: 2023-03-24
Payer: COMMERCIAL

## 2023-03-24 DIAGNOSIS — R26.89 BALANCE PROBLEM: ICD-10-CM

## 2023-03-24 DIAGNOSIS — R41.82 ALTERED MENTAL STATUS, UNSPECIFIED ALTERED MENTAL STATUS TYPE: Primary | ICD-10-CM

## 2023-03-24 DIAGNOSIS — J18.9 PNEUMONIA OF LEFT LUNG DUE TO INFECTIOUS ORGANISM, UNSPECIFIED PART OF LUNG: ICD-10-CM

## 2023-03-24 LAB
ALBUMIN SERPL-MCNC: 4.7 G/DL (ref 3.5–5.2)
ALBUMIN/GLOB SERPL: 1.6 G/DL
ALP SERPL-CCNC: 151 U/L (ref 39–117)
ALT SERPL W P-5'-P-CCNC: 7 U/L (ref 1–41)
ANION GAP SERPL CALCULATED.3IONS-SCNC: 11 MMOL/L (ref 5–15)
ARTERIAL PATENCY WRIST A: POSITIVE
AST SERPL-CCNC: 27 U/L (ref 1–40)
ATMOSPHERIC PRESS: ABNORMAL MM[HG]
B PARAPERT DNA SPEC QL NAA+PROBE: NOT DETECTED
B PERT DNA SPEC QL NAA+PROBE: NOT DETECTED
BASE EXCESS BLDA CALC-SCNC: 1.7 MMOL/L (ref 0–3)
BASOPHILS # BLD AUTO: 0 10*3/MM3 (ref 0–0.2)
BASOPHILS NFR BLD AUTO: 0.6 % (ref 0–1.5)
BDY SITE: ABNORMAL
BILIRUB SERPL-MCNC: 0.8 MG/DL (ref 0–1.2)
BUN SERPL-MCNC: 11 MG/DL (ref 8–23)
BUN/CREAT SERPL: 13.3 (ref 7–25)
C PNEUM DNA NPH QL NAA+NON-PROBE: NOT DETECTED
CALCIUM SPEC-SCNC: 9.4 MG/DL (ref 8.6–10.5)
CHLORIDE SERPL-SCNC: 101 MMOL/L (ref 98–107)
CO2 BLDA-SCNC: 28.6 MMOL/L (ref 22–29)
CO2 SERPL-SCNC: 28 MMOL/L (ref 22–29)
CREAT SERPL-MCNC: 0.83 MG/DL (ref 0.76–1.27)
D-LACTATE SERPL-SCNC: 1 MMOL/L (ref 0.3–2)
DEPRECATED RDW RBC AUTO: 48.1 FL (ref 37–54)
EGFRCR SERPLBLD CKD-EPI 2021: 97.7 ML/MIN/1.73
EOSINOPHIL # BLD AUTO: 0.2 10*3/MM3 (ref 0–0.4)
EOSINOPHIL NFR BLD AUTO: 2.4 % (ref 0.3–6.2)
ERYTHROCYTE [DISTWIDTH] IN BLOOD BY AUTOMATED COUNT: 14.6 % (ref 12.3–15.4)
FLUAV SUBTYP SPEC NAA+PROBE: NOT DETECTED
FLUBV RNA ISLT QL NAA+PROBE: NOT DETECTED
GLOBULIN UR ELPH-MCNC: 2.9 GM/DL
GLUCOSE SERPL-MCNC: 121 MG/DL (ref 65–99)
HADV DNA SPEC NAA+PROBE: NOT DETECTED
HCO3 BLDA-SCNC: 27.2 MMOL/L (ref 21–28)
HCOV 229E RNA SPEC QL NAA+PROBE: NOT DETECTED
HCOV HKU1 RNA SPEC QL NAA+PROBE: NOT DETECTED
HCOV NL63 RNA SPEC QL NAA+PROBE: NOT DETECTED
HCOV OC43 RNA SPEC QL NAA+PROBE: NOT DETECTED
HCT VFR BLD AUTO: 40.9 % (ref 37.5–51)
HEMODILUTION: NO
HGB BLD-MCNC: 13.5 G/DL (ref 13–17.7)
HMPV RNA NPH QL NAA+NON-PROBE: NOT DETECTED
HPIV1 RNA ISLT QL NAA+PROBE: NOT DETECTED
HPIV2 RNA SPEC QL NAA+PROBE: NOT DETECTED
HPIV3 RNA NPH QL NAA+PROBE: NOT DETECTED
HPIV4 P GENE NPH QL NAA+PROBE: NOT DETECTED
INHALED O2 CONCENTRATION: 21 %
LYMPHOCYTES # BLD AUTO: 2.4 10*3/MM3 (ref 0.7–3.1)
LYMPHOCYTES NFR BLD AUTO: 31.8 % (ref 19.6–45.3)
M PNEUMO IGG SER IA-ACNC: NOT DETECTED
MCH RBC QN AUTO: 31.5 PG (ref 26.6–33)
MCHC RBC AUTO-ENTMCNC: 33.1 G/DL (ref 31.5–35.7)
MCV RBC AUTO: 95 FL (ref 79–97)
MODALITY: ABNORMAL
MONOCYTES # BLD AUTO: 0.7 10*3/MM3 (ref 0.1–0.9)
MONOCYTES NFR BLD AUTO: 9.8 % (ref 5–12)
NEUTROPHILS NFR BLD AUTO: 4.2 10*3/MM3 (ref 1.7–7)
NEUTROPHILS NFR BLD AUTO: 55.4 % (ref 42.7–76)
NRBC BLD AUTO-RTO: 0 /100 WBC (ref 0–0.2)
PCO2 BLDA: 45 MM HG (ref 35–48)
PH BLDA: 7.39 PH UNITS (ref 7.35–7.45)
PLATELET # BLD AUTO: 176 10*3/MM3 (ref 140–450)
PMV BLD AUTO: 8.3 FL (ref 6–12)
PO2 BLDA: 65.2 MM HG (ref 83–108)
POTASSIUM SERPL-SCNC: 3.9 MMOL/L (ref 3.5–5.2)
PROCALCITONIN SERPL-MCNC: 0.08 NG/ML (ref 0–0.25)
PROT SERPL-MCNC: 7.6 G/DL (ref 6–8.5)
RBC # BLD AUTO: 4.3 10*6/MM3 (ref 4.14–5.8)
RHINOVIRUS RNA SPEC NAA+PROBE: NOT DETECTED
RSV RNA NPH QL NAA+NON-PROBE: NOT DETECTED
SAO2 % BLDCOA: 92.1 % (ref 94–98)
SARS-COV-2 RNA NPH QL NAA+NON-PROBE: NOT DETECTED
SODIUM SERPL-SCNC: 140 MMOL/L (ref 136–145)
WBC NRBC COR # BLD: 7.5 10*3/MM3 (ref 3.4–10.8)

## 2023-03-24 PROCEDURE — 25010000002 LEVOFLOXACIN PER 250 MG: Performed by: STUDENT IN AN ORGANIZED HEALTH CARE EDUCATION/TRAINING PROGRAM

## 2023-03-24 PROCEDURE — 87040 BLOOD CULTURE FOR BACTERIA: CPT | Performed by: NURSE PRACTITIONER

## 2023-03-24 PROCEDURE — 70450 CT HEAD/BRAIN W/O DYE: CPT

## 2023-03-24 PROCEDURE — 25010000002 PIPERACILLIN SOD-TAZOBACTAM PER 1 G: Performed by: EMERGENCY MEDICINE

## 2023-03-24 PROCEDURE — 83605 ASSAY OF LACTIC ACID: CPT

## 2023-03-24 PROCEDURE — 84145 PROCALCITONIN (PCT): CPT | Performed by: NURSE PRACTITIONER

## 2023-03-24 PROCEDURE — 94640 AIRWAY INHALATION TREATMENT: CPT

## 2023-03-24 PROCEDURE — 71045 X-RAY EXAM CHEST 1 VIEW: CPT

## 2023-03-24 PROCEDURE — 99285 EMERGENCY DEPT VISIT HI MDM: CPT

## 2023-03-24 PROCEDURE — 94761 N-INVAS EAR/PLS OXIMETRY MLT: CPT

## 2023-03-24 PROCEDURE — 82803 BLOOD GASES ANY COMBINATION: CPT

## 2023-03-24 PROCEDURE — 85025 COMPLETE CBC W/AUTO DIFF WBC: CPT | Performed by: NURSE PRACTITIONER

## 2023-03-24 PROCEDURE — 0202U NFCT DS 22 TRGT SARS-COV-2: CPT | Performed by: NURSE PRACTITIONER

## 2023-03-24 PROCEDURE — 80053 COMPREHEN METABOLIC PANEL: CPT | Performed by: NURSE PRACTITIONER

## 2023-03-24 PROCEDURE — 94799 UNLISTED PULMONARY SVC/PX: CPT

## 2023-03-24 PROCEDURE — 93005 ELECTROCARDIOGRAM TRACING: CPT | Performed by: NURSE PRACTITIONER

## 2023-03-24 PROCEDURE — 36415 COLL VENOUS BLD VENIPUNCTURE: CPT

## 2023-03-24 PROCEDURE — 25010000002 CEFEPIME PER 500 MG: Performed by: EMERGENCY MEDICINE

## 2023-03-24 PROCEDURE — 36600 WITHDRAWAL OF ARTERIAL BLOOD: CPT

## 2023-03-24 RX ORDER — VENLAFAXINE HYDROCHLORIDE 75 MG/1
150 CAPSULE, EXTENDED RELEASE ORAL DAILY
Status: DISCONTINUED | OUTPATIENT
Start: 2023-03-25 | End: 2023-03-26 | Stop reason: HOSPADM

## 2023-03-24 RX ORDER — IPRATROPIUM BROMIDE AND ALBUTEROL SULFATE 2.5; .5 MG/3ML; MG/3ML
3 SOLUTION RESPIRATORY (INHALATION)
Status: DISCONTINUED | OUTPATIENT
Start: 2023-03-24 | End: 2023-03-26 | Stop reason: HOSPADM

## 2023-03-24 RX ORDER — HYDRALAZINE HYDROCHLORIDE 20 MG/ML
10 INJECTION INTRAMUSCULAR; INTRAVENOUS EVERY 6 HOURS PRN
Status: DISCONTINUED | OUTPATIENT
Start: 2023-03-24 | End: 2023-03-26 | Stop reason: HOSPADM

## 2023-03-24 RX ORDER — DONEPEZIL HYDROCHLORIDE 5 MG/1
10 TABLET, FILM COATED ORAL NIGHTLY
Status: DISCONTINUED | OUTPATIENT
Start: 2023-03-24 | End: 2023-03-26 | Stop reason: HOSPADM

## 2023-03-24 RX ORDER — ONDANSETRON 2 MG/ML
4 INJECTION INTRAMUSCULAR; INTRAVENOUS EVERY 6 HOURS PRN
Status: DISCONTINUED | OUTPATIENT
Start: 2023-03-24 | End: 2023-03-26 | Stop reason: HOSPADM

## 2023-03-24 RX ORDER — NITROGLYCERIN 0.4 MG/1
0.4 TABLET SUBLINGUAL
Status: DISCONTINUED | OUTPATIENT
Start: 2023-03-24 | End: 2023-03-26 | Stop reason: HOSPADM

## 2023-03-24 RX ORDER — SODIUM CHLORIDE 0.9 % (FLUSH) 0.9 %
10 SYRINGE (ML) INJECTION AS NEEDED
Status: DISCONTINUED | OUTPATIENT
Start: 2023-03-24 | End: 2023-03-26 | Stop reason: HOSPADM

## 2023-03-24 RX ORDER — BUDESONIDE AND FORMOTEROL FUMARATE DIHYDRATE 160; 4.5 UG/1; UG/1
2 AEROSOL RESPIRATORY (INHALATION)
Status: DISCONTINUED | OUTPATIENT
Start: 2023-03-24 | End: 2023-03-26 | Stop reason: HOSPADM

## 2023-03-24 RX ORDER — PANTOPRAZOLE SODIUM 40 MG/10ML
40 INJECTION, POWDER, LYOPHILIZED, FOR SOLUTION INTRAVENOUS
Status: DISCONTINUED | OUTPATIENT
Start: 2023-03-25 | End: 2023-03-26 | Stop reason: HOSPADM

## 2023-03-24 RX ORDER — HYDROCODONE BITARTRATE AND ACETAMINOPHEN 7.5; 325 MG/1; MG/1
1 TABLET ORAL EVERY 8 HOURS PRN
Status: DISCONTINUED | OUTPATIENT
Start: 2023-03-24 | End: 2023-03-26 | Stop reason: HOSPADM

## 2023-03-24 RX ORDER — GABAPENTIN 600 MG/1
600 TABLET ORAL 3 TIMES DAILY
Status: DISCONTINUED | OUTPATIENT
Start: 2023-03-24 | End: 2023-03-26 | Stop reason: HOSPADM

## 2023-03-24 RX ORDER — SODIUM CHLORIDE 9 MG/ML
40 INJECTION, SOLUTION INTRAVENOUS AS NEEDED
Status: DISCONTINUED | OUTPATIENT
Start: 2023-03-24 | End: 2023-03-26 | Stop reason: HOSPADM

## 2023-03-24 RX ORDER — BUDESONIDE AND FORMOTEROL FUMARATE DIHYDRATE 160; 4.5 UG/1; UG/1
2 AEROSOL RESPIRATORY (INHALATION) 2 TIMES DAILY
Status: DISCONTINUED | OUTPATIENT
Start: 2023-03-24 | End: 2023-03-24

## 2023-03-24 RX ORDER — SODIUM CHLORIDE, SODIUM LACTATE, POTASSIUM CHLORIDE, CALCIUM CHLORIDE 600; 310; 30; 20 MG/100ML; MG/100ML; MG/100ML; MG/100ML
75 INJECTION, SOLUTION INTRAVENOUS CONTINUOUS
Status: DISCONTINUED | OUTPATIENT
Start: 2023-03-24 | End: 2023-03-26

## 2023-03-24 RX ORDER — LEVOFLOXACIN 5 MG/ML
750 INJECTION, SOLUTION INTRAVENOUS EVERY 24 HOURS
Status: DISCONTINUED | OUTPATIENT
Start: 2023-03-25 | End: 2023-03-26 | Stop reason: HOSPADM

## 2023-03-24 RX ORDER — ONDANSETRON 4 MG/1
4 TABLET, FILM COATED ORAL EVERY 6 HOURS PRN
Status: DISCONTINUED | OUTPATIENT
Start: 2023-03-24 | End: 2023-03-26 | Stop reason: HOSPADM

## 2023-03-24 RX ORDER — CARBIDOPA AND LEVODOPA 25; 100 MG/1; MG/1
2 TABLET, EXTENDED RELEASE ORAL NIGHTLY
Status: DISCONTINUED | OUTPATIENT
Start: 2023-03-24 | End: 2023-03-26 | Stop reason: HOSPADM

## 2023-03-24 RX ORDER — SODIUM CHLORIDE 0.9 % (FLUSH) 0.9 %
10 SYRINGE (ML) INJECTION EVERY 12 HOURS SCHEDULED
Status: DISCONTINUED | OUTPATIENT
Start: 2023-03-24 | End: 2023-03-26 | Stop reason: HOSPADM

## 2023-03-24 RX ADMIN — Medication 10 ML: at 22:38

## 2023-03-24 RX ADMIN — SODIUM CHLORIDE, POTASSIUM CHLORIDE, SODIUM LACTATE AND CALCIUM CHLORIDE 75 ML/HR: 600; 310; 30; 20 INJECTION, SOLUTION INTRAVENOUS at 22:39

## 2023-03-24 RX ADMIN — IPRATROPIUM BROMIDE AND ALBUTEROL SULFATE 3 ML: .5; 2.5 SOLUTION RESPIRATORY (INHALATION) at 23:47

## 2023-03-24 RX ADMIN — CEFEPIME 2 G: 2 INJECTION, POWDER, FOR SOLUTION INTRAVENOUS at 17:27

## 2023-03-24 RX ADMIN — LEVOFLOXACIN 750 MG: 5 INJECTION, SOLUTION INTRAVENOUS at 23:06

## 2023-03-24 RX ADMIN — BUDESONIDE AND FORMOTEROL FUMARATE DIHYDRATE 2 PUFF: 160; 4.5 AEROSOL RESPIRATORY (INHALATION) at 23:47

## 2023-03-24 RX ADMIN — PIPERACILLIN AND TAZOBACTAM 3.38 G: 3; .375 INJECTION, POWDER, LYOPHILIZED, FOR SOLUTION INTRAVENOUS at 18:43

## 2023-03-25 ENCOUNTER — APPOINTMENT (OUTPATIENT)
Dept: GENERAL RADIOLOGY | Facility: HOSPITAL | Age: 63
DRG: 177 | End: 2023-03-25
Payer: COMMERCIAL

## 2023-03-25 LAB
ANION GAP SERPL CALCULATED.3IONS-SCNC: 11 MMOL/L (ref 5–15)
BUN SERPL-MCNC: 13 MG/DL (ref 8–23)
BUN/CREAT SERPL: 18.3 (ref 7–25)
CALCIUM SPEC-SCNC: 9 MG/DL (ref 8.6–10.5)
CHLORIDE SERPL-SCNC: 102 MMOL/L (ref 98–107)
CO2 SERPL-SCNC: 25 MMOL/L (ref 22–29)
CREAT SERPL-MCNC: 0.71 MG/DL (ref 0.76–1.27)
DEPRECATED RDW RBC AUTO: 47.7 FL (ref 37–54)
EGFRCR SERPLBLD CKD-EPI 2021: 102.5 ML/MIN/1.73
ERYTHROCYTE [DISTWIDTH] IN BLOOD BY AUTOMATED COUNT: 14.5 % (ref 12.3–15.4)
GLUCOSE BLDC GLUCOMTR-MCNC: 90 MG/DL (ref 70–105)
GLUCOSE BLDC GLUCOMTR-MCNC: 91 MG/DL (ref 70–105)
GLUCOSE SERPL-MCNC: 84 MG/DL (ref 65–99)
HCT VFR BLD AUTO: 40.8 % (ref 37.5–51)
HGB BLD-MCNC: 13.3 G/DL (ref 13–17.7)
MCH RBC QN AUTO: 31 PG (ref 26.6–33)
MCHC RBC AUTO-ENTMCNC: 32.6 G/DL (ref 31.5–35.7)
MCV RBC AUTO: 95.1 FL (ref 79–97)
PLATELET # BLD AUTO: 162 10*3/MM3 (ref 140–450)
PMV BLD AUTO: 8.6 FL (ref 6–12)
POTASSIUM SERPL-SCNC: 3.7 MMOL/L (ref 3.5–5.2)
QT INTERVAL: 408 MS
RBC # BLD AUTO: 4.29 10*6/MM3 (ref 4.14–5.8)
SODIUM SERPL-SCNC: 138 MMOL/L (ref 136–145)
WBC NRBC COR # BLD: 5.8 10*3/MM3 (ref 3.4–10.8)

## 2023-03-25 PROCEDURE — 94664 DEMO&/EVAL PT USE INHALER: CPT

## 2023-03-25 PROCEDURE — 80048 BASIC METABOLIC PNL TOTAL CA: CPT | Performed by: STUDENT IN AN ORGANIZED HEALTH CARE EDUCATION/TRAINING PROGRAM

## 2023-03-25 PROCEDURE — 92610 EVALUATE SWALLOWING FUNCTION: CPT

## 2023-03-25 PROCEDURE — 82962 GLUCOSE BLOOD TEST: CPT

## 2023-03-25 PROCEDURE — 94761 N-INVAS EAR/PLS OXIMETRY MLT: CPT

## 2023-03-25 PROCEDURE — 85027 COMPLETE CBC AUTOMATED: CPT | Performed by: STUDENT IN AN ORGANIZED HEALTH CARE EDUCATION/TRAINING PROGRAM

## 2023-03-25 PROCEDURE — 97162 PT EVAL MOD COMPLEX 30 MIN: CPT

## 2023-03-25 PROCEDURE — 74230 X-RAY XM SWLNG FUNCJ C+: CPT

## 2023-03-25 PROCEDURE — 94799 UNLISTED PULMONARY SVC/PX: CPT

## 2023-03-25 PROCEDURE — 92611 MOTION FLUOROSCOPY/SWALLOW: CPT

## 2023-03-25 PROCEDURE — 25010000002 HYDRALAZINE PER 20 MG: Performed by: STUDENT IN AN ORGANIZED HEALTH CARE EDUCATION/TRAINING PROGRAM

## 2023-03-25 RX ORDER — IPRATROPIUM BROMIDE AND ALBUTEROL SULFATE 2.5; .5 MG/3ML; MG/3ML
3 SOLUTION RESPIRATORY (INHALATION) EVERY 4 HOURS PRN
Status: DISCONTINUED | OUTPATIENT
Start: 2023-03-25 | End: 2023-03-26 | Stop reason: HOSPADM

## 2023-03-25 RX ORDER — ASPIRIN 81 MG/1
81 TABLET, CHEWABLE ORAL DAILY
Status: DISCONTINUED | OUTPATIENT
Start: 2023-03-25 | End: 2023-03-26 | Stop reason: HOSPADM

## 2023-03-25 RX ORDER — CLONAZEPAM 0.5 MG/1
0.5 TABLET ORAL ONCE AS NEEDED
Status: COMPLETED | OUTPATIENT
Start: 2023-03-25 | End: 2023-03-25

## 2023-03-25 RX ORDER — NICOTINE 21 MG/24HR
1 PATCH, TRANSDERMAL 24 HOURS TRANSDERMAL
Status: DISCONTINUED | OUTPATIENT
Start: 2023-03-25 | End: 2023-03-26 | Stop reason: HOSPADM

## 2023-03-25 RX ADMIN — HYDRALAZINE HYDROCHLORIDE 10 MG: 20 INJECTION INTRAMUSCULAR; INTRAVENOUS at 05:12

## 2023-03-25 RX ADMIN — Medication 10 ML: at 09:31

## 2023-03-25 RX ADMIN — CARBIDOPA AND LEVODOPA 2 TABLET: 25; 100 TABLET ORAL at 12:54

## 2023-03-25 RX ADMIN — DONEPEZIL HYDROCHLORIDE 10 MG: 5 TABLET ORAL at 20:44

## 2023-03-25 RX ADMIN — IPRATROPIUM BROMIDE AND ALBUTEROL SULFATE 3 ML: .5; 2.5 SOLUTION RESPIRATORY (INHALATION) at 14:57

## 2023-03-25 RX ADMIN — HYDROCODONE BITARTRATE AND ACETAMINOPHEN 1 TABLET: 7.5; 325 TABLET ORAL at 05:11

## 2023-03-25 RX ADMIN — CARBIDOPA AND LEVODOPA 2 TABLET: 25; 100 TABLET, EXTENDED RELEASE ORAL at 20:44

## 2023-03-25 RX ADMIN — CARBIDOPA AND LEVODOPA 2 TABLET: 25; 100 TABLET ORAL at 17:39

## 2023-03-25 RX ADMIN — BUDESONIDE AND FORMOTEROL FUMARATE DIHYDRATE 2 PUFF: 160; 4.5 AEROSOL RESPIRATORY (INHALATION) at 07:30

## 2023-03-25 RX ADMIN — NICOTINE 1 PATCH: 21 PATCH, EXTENDED RELEASE TRANSDERMAL at 20:46

## 2023-03-25 RX ADMIN — IPRATROPIUM BROMIDE AND ALBUTEROL SULFATE 3 ML: .5; 2.5 SOLUTION RESPIRATORY (INHALATION) at 07:26

## 2023-03-25 RX ADMIN — BARIUM SULFATE 50 ML: 400 SUSPENSION ORAL at 10:56

## 2023-03-25 RX ADMIN — ASPIRIN 81 MG CHEWABLE TABLET 81 MG: 81 TABLET CHEWABLE at 12:54

## 2023-03-25 RX ADMIN — PANTOPRAZOLE SODIUM 40 MG: 40 INJECTION, POWDER, LYOPHILIZED, FOR SOLUTION INTRAVENOUS at 05:06

## 2023-03-25 RX ADMIN — BARIUM SULFATE 1 TEASPOON(S): 0.6 CREAM ORAL at 19:09

## 2023-03-25 RX ADMIN — Medication 10 ML: at 20:46

## 2023-03-25 RX ADMIN — IPRATROPIUM BROMIDE AND ALBUTEROL SULFATE 3 ML: .5; 2.5 SOLUTION RESPIRATORY (INHALATION) at 11:26

## 2023-03-25 RX ADMIN — IPRATROPIUM BROMIDE AND ALBUTEROL SULFATE 3 ML: .5; 2.5 SOLUTION RESPIRATORY (INHALATION) at 19:47

## 2023-03-25 RX ADMIN — GABAPENTIN 600 MG: 600 TABLET, FILM COATED ORAL at 20:45

## 2023-03-25 RX ADMIN — BUDESONIDE AND FORMOTEROL FUMARATE DIHYDRATE 2 PUFF: 160; 4.5 AEROSOL RESPIRATORY (INHALATION) at 19:44

## 2023-03-25 RX ADMIN — CLONAZEPAM 0.5 MG: 0.5 TABLET ORAL at 20:44

## 2023-03-25 RX ADMIN — GABAPENTIN 600 MG: 600 TABLET, FILM COATED ORAL at 17:39

## 2023-03-26 ENCOUNTER — READMISSION MANAGEMENT (OUTPATIENT)
Dept: CALL CENTER | Facility: HOSPITAL | Age: 63
End: 2023-03-26
Payer: COMMERCIAL

## 2023-03-26 VITALS
DIASTOLIC BLOOD PRESSURE: 77 MMHG | WEIGHT: 192.02 LBS | OXYGEN SATURATION: 96 % | SYSTOLIC BLOOD PRESSURE: 126 MMHG | HEART RATE: 55 BPM | RESPIRATION RATE: 16 BRPM | HEIGHT: 69 IN | TEMPERATURE: 98.9 F | BODY MASS INDEX: 28.44 KG/M2

## 2023-03-26 LAB
ANION GAP SERPL CALCULATED.3IONS-SCNC: 12 MMOL/L (ref 5–15)
BUN SERPL-MCNC: 12 MG/DL (ref 8–23)
BUN/CREAT SERPL: 16.7 (ref 7–25)
CALCIUM SPEC-SCNC: 9 MG/DL (ref 8.6–10.5)
CHLORIDE SERPL-SCNC: 105 MMOL/L (ref 98–107)
CO2 SERPL-SCNC: 24 MMOL/L (ref 22–29)
CREAT SERPL-MCNC: 0.72 MG/DL (ref 0.76–1.27)
DEPRECATED RDW RBC AUTO: 46.4 FL (ref 37–54)
EGFRCR SERPLBLD CKD-EPI 2021: 102 ML/MIN/1.73
ERYTHROCYTE [DISTWIDTH] IN BLOOD BY AUTOMATED COUNT: 13.8 % (ref 12.3–15.4)
GLUCOSE SERPL-MCNC: 93 MG/DL (ref 65–99)
HCT VFR BLD AUTO: 38.3 % (ref 37.5–51)
HGB BLD-MCNC: 12.5 G/DL (ref 13–17.7)
MCH RBC QN AUTO: 31.1 PG (ref 26.6–33)
MCHC RBC AUTO-ENTMCNC: 32.7 G/DL (ref 31.5–35.7)
MCV RBC AUTO: 95.3 FL (ref 79–97)
PLATELET # BLD AUTO: 153 10*3/MM3 (ref 140–450)
PMV BLD AUTO: 8.9 FL (ref 6–12)
POTASSIUM SERPL-SCNC: 3.6 MMOL/L (ref 3.5–5.2)
RBC # BLD AUTO: 4.02 10*6/MM3 (ref 4.14–5.8)
SODIUM SERPL-SCNC: 141 MMOL/L (ref 136–145)
WBC NRBC COR # BLD: 4.9 10*3/MM3 (ref 3.4–10.8)

## 2023-03-26 PROCEDURE — 97165 OT EVAL LOW COMPLEX 30 MIN: CPT

## 2023-03-26 PROCEDURE — 94761 N-INVAS EAR/PLS OXIMETRY MLT: CPT

## 2023-03-26 PROCEDURE — 25010000002 LEVOFLOXACIN PER 250 MG: Performed by: STUDENT IN AN ORGANIZED HEALTH CARE EDUCATION/TRAINING PROGRAM

## 2023-03-26 PROCEDURE — 94664 DEMO&/EVAL PT USE INHALER: CPT

## 2023-03-26 PROCEDURE — 85027 COMPLETE CBC AUTOMATED: CPT | Performed by: STUDENT IN AN ORGANIZED HEALTH CARE EDUCATION/TRAINING PROGRAM

## 2023-03-26 PROCEDURE — 80048 BASIC METABOLIC PNL TOTAL CA: CPT | Performed by: STUDENT IN AN ORGANIZED HEALTH CARE EDUCATION/TRAINING PROGRAM

## 2023-03-26 PROCEDURE — 94799 UNLISTED PULMONARY SVC/PX: CPT

## 2023-03-26 RX ORDER — LEVOFLOXACIN 750 MG/1
750 TABLET ORAL DAILY
Qty: 3 TABLET | Refills: 0 | Status: ON HOLD | OUTPATIENT
Start: 2023-03-27 | End: 2023-04-06

## 2023-03-26 RX ADMIN — BUDESONIDE AND FORMOTEROL FUMARATE DIHYDRATE 2 PUFF: 160; 4.5 AEROSOL RESPIRATORY (INHALATION) at 07:18

## 2023-03-26 RX ADMIN — VENLAFAXINE HYDROCHLORIDE 150 MG: 75 CAPSULE, EXTENDED RELEASE ORAL at 08:42

## 2023-03-26 RX ADMIN — ASPIRIN 81 MG CHEWABLE TABLET 81 MG: 81 TABLET CHEWABLE at 08:42

## 2023-03-26 RX ADMIN — CARBIDOPA AND LEVODOPA 2 TABLET: 25; 100 TABLET ORAL at 08:42

## 2023-03-26 RX ADMIN — CARBIDOPA AND LEVODOPA 2 TABLET: 25; 100 TABLET ORAL at 11:47

## 2023-03-26 RX ADMIN — Medication 10 ML: at 08:42

## 2023-03-26 RX ADMIN — GABAPENTIN 600 MG: 600 TABLET, FILM COATED ORAL at 08:41

## 2023-03-26 RX ADMIN — LEVOFLOXACIN 750 MG: 5 INJECTION, SOLUTION INTRAVENOUS at 00:23

## 2023-03-26 RX ADMIN — IPRATROPIUM BROMIDE AND ALBUTEROL SULFATE 3 ML: .5; 2.5 SOLUTION RESPIRATORY (INHALATION) at 07:14

## 2023-03-26 RX ADMIN — PANTOPRAZOLE SODIUM 40 MG: 40 INJECTION, POWDER, LYOPHILIZED, FOR SOLUTION INTRAVENOUS at 05:30

## 2023-03-26 RX ADMIN — NICOTINE 1 PATCH: 21 PATCH, EXTENDED RELEASE TRANSDERMAL at 08:47

## 2023-03-27 ENCOUNTER — TRANSITIONAL CARE MANAGEMENT TELEPHONE ENCOUNTER (OUTPATIENT)
Dept: CALL CENTER | Facility: HOSPITAL | Age: 63
End: 2023-03-27
Payer: COMMERCIAL

## 2023-03-27 DIAGNOSIS — F41.9 ANXIETY: ICD-10-CM

## 2023-03-28 RX ORDER — CLONAZEPAM 0.5 MG/1
0.5 TABLET ORAL 4 TIMES DAILY
Qty: 120 TABLET | Refills: 1 | Status: SHIPPED | OUTPATIENT
Start: 2023-03-28

## 2023-03-29 LAB
BACTERIA SPEC AEROBE CULT: NORMAL
BACTERIA SPEC AEROBE CULT: NORMAL

## 2023-04-05 ENCOUNTER — HOSPITAL ENCOUNTER (INPATIENT)
Facility: HOSPITAL | Age: 63
LOS: 2 days | Discharge: HOME HEALTH | DRG: 177 | End: 2023-04-07
Attending: EMERGENCY MEDICINE | Admitting: INTERNAL MEDICINE
Payer: MEDICARE

## 2023-04-05 ENCOUNTER — APPOINTMENT (OUTPATIENT)
Dept: GENERAL RADIOLOGY | Facility: HOSPITAL | Age: 63
DRG: 177 | End: 2023-04-05
Payer: MEDICARE

## 2023-04-05 ENCOUNTER — APPOINTMENT (OUTPATIENT)
Dept: CT IMAGING | Facility: HOSPITAL | Age: 63
DRG: 177 | End: 2023-04-05
Payer: MEDICARE

## 2023-04-05 DIAGNOSIS — J18.9 PNEUMONIA OF RIGHT LOWER LOBE DUE TO INFECTIOUS ORGANISM: Primary | ICD-10-CM

## 2023-04-05 DIAGNOSIS — G20 PARKINSON DISEASE: ICD-10-CM

## 2023-04-05 DIAGNOSIS — R53.1 GENERALIZED WEAKNESS: ICD-10-CM

## 2023-04-05 LAB
ALBUMIN SERPL-MCNC: 4.5 G/DL (ref 3.5–5.2)
ALBUMIN/GLOB SERPL: 2.3 G/DL
ALP SERPL-CCNC: 120 U/L (ref 39–117)
ALT SERPL W P-5'-P-CCNC: 7 U/L (ref 1–41)
ANION GAP SERPL CALCULATED.3IONS-SCNC: 10 MMOL/L (ref 5–15)
ARTERIAL PATENCY WRIST A: POSITIVE
AST SERPL-CCNC: 23 U/L (ref 1–40)
ATMOSPHERIC PRESS: ABNORMAL MM[HG]
BASE EXCESS BLDA CALC-SCNC: 3.5 MMOL/L (ref 0–3)
BASOPHILS # BLD AUTO: 0.1 10*3/MM3 (ref 0–0.2)
BASOPHILS NFR BLD AUTO: 0.7 % (ref 0–1.5)
BDY SITE: ABNORMAL
BILIRUB SERPL-MCNC: <0.2 MG/DL (ref 0–1.2)
BILIRUB UR QL STRIP: NEGATIVE
BUN SERPL-MCNC: 10 MG/DL (ref 8–23)
BUN/CREAT SERPL: 14.3 (ref 7–25)
CALCIUM SPEC-SCNC: 9 MG/DL (ref 8.6–10.5)
CHLORIDE SERPL-SCNC: 107 MMOL/L (ref 98–107)
CLARITY UR: CLEAR
CO2 BLDA-SCNC: 31.2 MMOL/L (ref 22–29)
CO2 SERPL-SCNC: 27 MMOL/L (ref 22–29)
COLOR UR: ABNORMAL
CREAT SERPL-MCNC: 0.7 MG/DL (ref 0.76–1.27)
D-LACTATE SERPL-SCNC: 1.7 MMOL/L (ref 0.3–2)
DEPRECATED RDW RBC AUTO: 47.7 FL (ref 37–54)
EGFRCR SERPLBLD CKD-EPI 2021: 102.9 ML/MIN/1.73
EOSINOPHIL # BLD AUTO: 0.2 10*3/MM3 (ref 0–0.4)
EOSINOPHIL NFR BLD AUTO: 3 % (ref 0.3–6.2)
ERYTHROCYTE [DISTWIDTH] IN BLOOD BY AUTOMATED COUNT: 13.7 % (ref 12.3–15.4)
FLUAV RNA RESP QL NAA+PROBE: NOT DETECTED
FLUBV RNA RESP QL NAA+PROBE: NOT DETECTED
GEN 5 2HR TROPONIN T REFLEX: 40 NG/L
GLOBULIN UR ELPH-MCNC: 2 GM/DL
GLUCOSE SERPL-MCNC: 115 MG/DL (ref 65–99)
GLUCOSE UR STRIP-MCNC: NEGATIVE MG/DL
HCO3 BLDA-SCNC: 29.6 MMOL/L (ref 21–28)
HCT VFR BLD AUTO: 39 % (ref 37.5–51)
HEMODILUTION: NO
HGB BLD-MCNC: 13.4 G/DL (ref 13–17.7)
HGB UR QL STRIP.AUTO: NEGATIVE
HOLD SPECIMEN: NORMAL
HOLD SPECIMEN: NORMAL
INHALED O2 CONCENTRATION: 21 %
KETONES UR QL STRIP: ABNORMAL
LEUKOCYTE ESTERASE UR QL STRIP.AUTO: NEGATIVE
LYMPHOCYTES # BLD AUTO: 3 10*3/MM3 (ref 0.7–3.1)
LYMPHOCYTES NFR BLD AUTO: 43.4 % (ref 19.6–45.3)
MCH RBC QN AUTO: 32.5 PG (ref 26.6–33)
MCHC RBC AUTO-ENTMCNC: 34.3 G/DL (ref 31.5–35.7)
MCV RBC AUTO: 94.5 FL (ref 79–97)
MODALITY: ABNORMAL
MONOCYTES # BLD AUTO: 0.6 10*3/MM3 (ref 0.1–0.9)
MONOCYTES NFR BLD AUTO: 9.2 % (ref 5–12)
NEUTROPHILS NFR BLD AUTO: 3.1 10*3/MM3 (ref 1.7–7)
NEUTROPHILS NFR BLD AUTO: 43.7 % (ref 42.7–76)
NITRITE UR QL STRIP: NEGATIVE
NRBC BLD AUTO-RTO: 0.1 /100 WBC (ref 0–0.2)
NT-PROBNP SERPL-MCNC: 105.3 PG/ML (ref 0–900)
PCO2 BLDA: 50.7 MM HG (ref 35–48)
PH BLDA: 7.38 PH UNITS (ref 7.35–7.45)
PH UR STRIP.AUTO: 5.5 [PH] (ref 5–8)
PLATELET # BLD AUTO: 200 10*3/MM3 (ref 140–450)
PMV BLD AUTO: 8.6 FL (ref 6–12)
PO2 BLDA: 65.6 MM HG (ref 83–108)
POTASSIUM SERPL-SCNC: 3.9 MMOL/L (ref 3.5–5.2)
PROT SERPL-MCNC: 6.5 G/DL (ref 6–8.5)
PROT UR QL STRIP: ABNORMAL
RBC # BLD AUTO: 4.13 10*6/MM3 (ref 4.14–5.8)
SAO2 % BLDCOA: 91.7 % (ref 94–98)
SARS-COV-2 RNA RESP QL NAA+PROBE: NOT DETECTED
SODIUM SERPL-SCNC: 144 MMOL/L (ref 136–145)
SP GR UR STRIP: 1.04 (ref 1–1.03)
TROPONIN T DELTA: -3 NG/L
TROPONIN T SERPL HS-MCNC: 43 NG/L
UROBILINOGEN UR QL STRIP: ABNORMAL
WBC NRBC COR # BLD: 7 10*3/MM3 (ref 3.4–10.8)
WHOLE BLOOD HOLD COAG: NORMAL
WHOLE BLOOD HOLD SPECIMEN: NORMAL

## 2023-04-05 PROCEDURE — 99285 EMERGENCY DEPT VISIT HI MDM: CPT

## 2023-04-05 PROCEDURE — 85025 COMPLETE CBC W/AUTO DIFF WBC: CPT | Performed by: NURSE PRACTITIONER

## 2023-04-05 PROCEDURE — 87636 SARSCOV2 & INF A&B AMP PRB: CPT | Performed by: NURSE PRACTITIONER

## 2023-04-05 PROCEDURE — 93005 ELECTROCARDIOGRAM TRACING: CPT | Performed by: NURSE PRACTITIONER

## 2023-04-05 PROCEDURE — 83880 ASSAY OF NATRIURETIC PEPTIDE: CPT | Performed by: NURSE PRACTITIONER

## 2023-04-05 PROCEDURE — 36600 WITHDRAWAL OF ARTERIAL BLOOD: CPT

## 2023-04-05 PROCEDURE — 81003 URINALYSIS AUTO W/O SCOPE: CPT | Performed by: NURSE PRACTITIONER

## 2023-04-05 PROCEDURE — 80053 COMPREHEN METABOLIC PANEL: CPT | Performed by: NURSE PRACTITIONER

## 2023-04-05 PROCEDURE — 83605 ASSAY OF LACTIC ACID: CPT

## 2023-04-05 PROCEDURE — 70450 CT HEAD/BRAIN W/O DYE: CPT

## 2023-04-05 PROCEDURE — 25010000002 VANCOMYCIN HCL IN NACL 2-0.9 GM/500ML-% SOLUTION: Performed by: NURSE PRACTITIONER

## 2023-04-05 PROCEDURE — 94799 UNLISTED PULMONARY SVC/PX: CPT

## 2023-04-05 PROCEDURE — 71045 X-RAY EXAM CHEST 1 VIEW: CPT

## 2023-04-05 PROCEDURE — 82803 BLOOD GASES ANY COMBINATION: CPT

## 2023-04-05 PROCEDURE — 84484 ASSAY OF TROPONIN QUANT: CPT | Performed by: NURSE PRACTITIONER

## 2023-04-05 PROCEDURE — 25010000002 CEFEPIME PER 500 MG: Performed by: NURSE PRACTITIONER

## 2023-04-05 PROCEDURE — 94640 AIRWAY INHALATION TREATMENT: CPT

## 2023-04-05 PROCEDURE — 87040 BLOOD CULTURE FOR BACTERIA: CPT | Performed by: NURSE PRACTITIONER

## 2023-04-05 RX ORDER — ENOXAPARIN SODIUM 100 MG/ML
40 INJECTION SUBCUTANEOUS DAILY
Status: DISCONTINUED | OUTPATIENT
Start: 2023-04-05 | End: 2023-04-07 | Stop reason: HOSPADM

## 2023-04-05 RX ORDER — HYDRALAZINE HYDROCHLORIDE 20 MG/ML
10 INJECTION INTRAMUSCULAR; INTRAVENOUS EVERY 6 HOURS PRN
Status: DISCONTINUED | OUTPATIENT
Start: 2023-04-05 | End: 2023-04-07 | Stop reason: HOSPADM

## 2023-04-05 RX ORDER — SODIUM CHLORIDE 0.9 % (FLUSH) 0.9 %
10 SYRINGE (ML) INJECTION EVERY 12 HOURS SCHEDULED
Status: DISCONTINUED | OUTPATIENT
Start: 2023-04-05 | End: 2023-04-07 | Stop reason: HOSPADM

## 2023-04-05 RX ORDER — ACETAMINOPHEN 325 MG/1
650 TABLET ORAL EVERY 4 HOURS PRN
Status: DISCONTINUED | OUTPATIENT
Start: 2023-04-05 | End: 2023-04-07 | Stop reason: HOSPADM

## 2023-04-05 RX ORDER — NICOTINE 21 MG/24HR
1 PATCH, TRANSDERMAL 24 HOURS TRANSDERMAL
Status: DISCONTINUED | OUTPATIENT
Start: 2023-04-05 | End: 2023-04-07 | Stop reason: HOSPADM

## 2023-04-05 RX ORDER — SODIUM CHLORIDE 0.9 % (FLUSH) 0.9 %
10 SYRINGE (ML) INJECTION AS NEEDED
Status: DISCONTINUED | OUTPATIENT
Start: 2023-04-05 | End: 2023-04-07 | Stop reason: HOSPADM

## 2023-04-05 RX ORDER — ONDANSETRON 2 MG/ML
4 INJECTION INTRAMUSCULAR; INTRAVENOUS EVERY 6 HOURS PRN
Status: DISCONTINUED | OUTPATIENT
Start: 2023-04-05 | End: 2023-04-07 | Stop reason: HOSPADM

## 2023-04-05 RX ORDER — SODIUM CHLORIDE 9 MG/ML
40 INJECTION, SOLUTION INTRAVENOUS AS NEEDED
Status: DISCONTINUED | OUTPATIENT
Start: 2023-04-05 | End: 2023-04-07 | Stop reason: HOSPADM

## 2023-04-05 RX ORDER — VANCOMYCIN 2 GRAM/500 ML IN 0.9 % SODIUM CHLORIDE INTRAVENOUS
20 ONCE
Status: COMPLETED | OUTPATIENT
Start: 2023-04-05 | End: 2023-04-05

## 2023-04-05 RX ORDER — FAMOTIDINE 10 MG/ML
20 INJECTION, SOLUTION INTRAVENOUS EVERY 12 HOURS SCHEDULED
Status: DISCONTINUED | OUTPATIENT
Start: 2023-04-05 | End: 2023-04-07

## 2023-04-05 RX ORDER — SODIUM CHLORIDE 9 MG/ML
100 INJECTION, SOLUTION INTRAVENOUS CONTINUOUS
Status: DISCONTINUED | OUTPATIENT
Start: 2023-04-05 | End: 2023-04-07

## 2023-04-05 RX ORDER — IPRATROPIUM BROMIDE AND ALBUTEROL SULFATE 2.5; .5 MG/3ML; MG/3ML
3 SOLUTION RESPIRATORY (INHALATION)
Status: DISCONTINUED | OUTPATIENT
Start: 2023-04-05 | End: 2023-04-06

## 2023-04-05 RX ADMIN — Medication 2000 MG: at 21:49

## 2023-04-05 RX ADMIN — IPRATROPIUM BROMIDE AND ALBUTEROL SULFATE 3 ML: 2.5; .5 SOLUTION RESPIRATORY (INHALATION) at 23:01

## 2023-04-05 RX ADMIN — NICOTINE 1 PATCH: 21 PATCH, EXTENDED RELEASE TRANSDERMAL at 20:53

## 2023-04-05 RX ADMIN — CEFEPIME 2 G: 2 INJECTION, POWDER, FOR SOLUTION INTRAVENOUS at 21:19

## 2023-04-06 LAB
ANION GAP SERPL CALCULATED.3IONS-SCNC: 10 MMOL/L (ref 5–15)
BASOPHILS # BLD AUTO: 0 10*3/MM3 (ref 0–0.2)
BASOPHILS NFR BLD AUTO: 0.7 % (ref 0–1.5)
BUN SERPL-MCNC: 11 MG/DL (ref 8–23)
BUN/CREAT SERPL: 18.3 (ref 7–25)
CALCIUM SPEC-SCNC: 8.6 MG/DL (ref 8.6–10.5)
CHLORIDE SERPL-SCNC: 109 MMOL/L (ref 98–107)
CO2 SERPL-SCNC: 26 MMOL/L (ref 22–29)
CREAT SERPL-MCNC: 0.6 MG/DL (ref 0.76–1.27)
D-LACTATE SERPL-SCNC: 1.7 MMOL/L (ref 0.5–2)
DEPRECATED RDW RBC AUTO: 49 FL (ref 37–54)
EGFRCR SERPLBLD CKD-EPI 2021: 107.8 ML/MIN/1.73
EOSINOPHIL # BLD AUTO: 0.2 10*3/MM3 (ref 0–0.4)
EOSINOPHIL NFR BLD AUTO: 4.1 % (ref 0.3–6.2)
ERYTHROCYTE [DISTWIDTH] IN BLOOD BY AUTOMATED COUNT: 14.1 % (ref 12.3–15.4)
GLUCOSE BLDC GLUCOMTR-MCNC: 82 MG/DL (ref 70–105)
GLUCOSE SERPL-MCNC: 99 MG/DL (ref 65–99)
HCT VFR BLD AUTO: 36.5 % (ref 37.5–51)
HGB BLD-MCNC: 12.4 G/DL (ref 13–17.7)
LYMPHOCYTES # BLD AUTO: 2.9 10*3/MM3 (ref 0.7–3.1)
LYMPHOCYTES NFR BLD AUTO: 53.4 % (ref 19.6–45.3)
MCH RBC QN AUTO: 31.7 PG (ref 26.6–33)
MCHC RBC AUTO-ENTMCNC: 33.8 G/DL (ref 31.5–35.7)
MCV RBC AUTO: 93.8 FL (ref 79–97)
MONOCYTES # BLD AUTO: 0.5 10*3/MM3 (ref 0.1–0.9)
MONOCYTES NFR BLD AUTO: 9.2 % (ref 5–12)
MRSA DNA SPEC QL NAA+PROBE: NORMAL
NEUTROPHILS NFR BLD AUTO: 1.7 10*3/MM3 (ref 1.7–7)
NEUTROPHILS NFR BLD AUTO: 32.6 % (ref 42.7–76)
NRBC BLD AUTO-RTO: 0.4 /100 WBC (ref 0–0.2)
PLATELET # BLD AUTO: 174 10*3/MM3 (ref 140–450)
PMV BLD AUTO: 8.1 FL (ref 6–12)
POTASSIUM SERPL-SCNC: 3.8 MMOL/L (ref 3.5–5.2)
QT INTERVAL: 428 MS
RBC # BLD AUTO: 3.89 10*6/MM3 (ref 4.14–5.8)
SODIUM SERPL-SCNC: 145 MMOL/L (ref 136–145)
TROPONIN T SERPL HS-MCNC: 33 NG/L
WBC NRBC COR # BLD: 5.4 10*3/MM3 (ref 3.4–10.8)

## 2023-04-06 PROCEDURE — 82962 GLUCOSE BLOOD TEST: CPT

## 2023-04-06 PROCEDURE — 84484 ASSAY OF TROPONIN QUANT: CPT | Performed by: INTERNAL MEDICINE

## 2023-04-06 PROCEDURE — 94761 N-INVAS EAR/PLS OXIMETRY MLT: CPT

## 2023-04-06 PROCEDURE — 94664 DEMO&/EVAL PT USE INHALER: CPT

## 2023-04-06 PROCEDURE — 25010000002 AMPICILLIN-SULBACTAM PER 1.5 G: Performed by: INTERNAL MEDICINE

## 2023-04-06 PROCEDURE — 97161 PT EVAL LOW COMPLEX 20 MIN: CPT

## 2023-04-06 PROCEDURE — 36415 COLL VENOUS BLD VENIPUNCTURE: CPT | Performed by: INTERNAL MEDICINE

## 2023-04-06 PROCEDURE — 25010000002 ENOXAPARIN PER 10 MG: Performed by: INTERNAL MEDICINE

## 2023-04-06 PROCEDURE — 87641 MR-STAPH DNA AMP PROBE: CPT | Performed by: INTERNAL MEDICINE

## 2023-04-06 PROCEDURE — 85025 COMPLETE CBC W/AUTO DIFF WBC: CPT | Performed by: INTERNAL MEDICINE

## 2023-04-06 PROCEDURE — 83605 ASSAY OF LACTIC ACID: CPT | Performed by: INTERNAL MEDICINE

## 2023-04-06 PROCEDURE — 80048 BASIC METABOLIC PNL TOTAL CA: CPT | Performed by: INTERNAL MEDICINE

## 2023-04-06 PROCEDURE — 94799 UNLISTED PULMONARY SVC/PX: CPT

## 2023-04-06 PROCEDURE — 25010000002 HYDRALAZINE PER 20 MG: Performed by: INTERNAL MEDICINE

## 2023-04-06 RX ORDER — ASPIRIN 81 MG/1
81 TABLET ORAL DAILY
Status: DISCONTINUED | OUTPATIENT
Start: 2023-04-07 | End: 2023-04-07 | Stop reason: HOSPADM

## 2023-04-06 RX ORDER — LISINOPRIL 5 MG/1
10 TABLET ORAL
Status: DISCONTINUED | OUTPATIENT
Start: 2023-04-07 | End: 2023-04-07 | Stop reason: HOSPADM

## 2023-04-06 RX ORDER — DONEPEZIL HYDROCHLORIDE 5 MG/1
10 TABLET, FILM COATED ORAL NIGHTLY
Status: DISCONTINUED | OUTPATIENT
Start: 2023-04-06 | End: 2023-04-07 | Stop reason: HOSPADM

## 2023-04-06 RX ORDER — ATORVASTATIN CALCIUM 20 MG/1
20 TABLET, FILM COATED ORAL DAILY
Status: DISCONTINUED | OUTPATIENT
Start: 2023-04-07 | End: 2023-04-07 | Stop reason: HOSPADM

## 2023-04-06 RX ORDER — GABAPENTIN 600 MG/1
600 TABLET ORAL 3 TIMES DAILY
Status: DISCONTINUED | OUTPATIENT
Start: 2023-04-06 | End: 2023-04-07 | Stop reason: HOSPADM

## 2023-04-06 RX ORDER — BUDESONIDE AND FORMOTEROL FUMARATE DIHYDRATE 160; 4.5 UG/1; UG/1
2 AEROSOL RESPIRATORY (INHALATION)
Status: DISCONTINUED | OUTPATIENT
Start: 2023-04-06 | End: 2023-04-07 | Stop reason: HOSPADM

## 2023-04-06 RX ORDER — VENLAFAXINE HYDROCHLORIDE 75 MG/1
150 CAPSULE, EXTENDED RELEASE ORAL DAILY
Status: DISCONTINUED | OUTPATIENT
Start: 2023-04-07 | End: 2023-04-07 | Stop reason: HOSPADM

## 2023-04-06 RX ORDER — IPRATROPIUM BROMIDE AND ALBUTEROL SULFATE 2.5; .5 MG/3ML; MG/3ML
3 SOLUTION RESPIRATORY (INHALATION) EVERY 6 HOURS PRN
Status: DISCONTINUED | OUTPATIENT
Start: 2023-04-06 | End: 2023-04-07 | Stop reason: HOSPADM

## 2023-04-06 RX ORDER — CARBIDOPA AND LEVODOPA 25; 100 MG/1; MG/1
2 TABLET, EXTENDED RELEASE ORAL NIGHTLY
Status: DISCONTINUED | OUTPATIENT
Start: 2023-04-06 | End: 2023-04-07 | Stop reason: HOSPADM

## 2023-04-06 RX ORDER — AMLODIPINE BESYLATE 5 MG/1
5 TABLET ORAL
Status: DISCONTINUED | OUTPATIENT
Start: 2023-04-07 | End: 2023-04-07 | Stop reason: HOSPADM

## 2023-04-06 RX ADMIN — GABAPENTIN 600 MG: 600 TABLET, FILM COATED ORAL at 16:11

## 2023-04-06 RX ADMIN — Medication 10 ML: at 09:16

## 2023-04-06 RX ADMIN — AMPICILLIN SODIUM AND SULBACTAM SODIUM 1.5 G: 1; .5 INJECTION, POWDER, FOR SOLUTION INTRAMUSCULAR; INTRAVENOUS at 19:39

## 2023-04-06 RX ADMIN — BUDESONIDE AND FORMOTEROL FUMARATE DIHYDRATE 2 PUFF: 160; 4.5 AEROSOL RESPIRATORY (INHALATION) at 21:57

## 2023-04-06 RX ADMIN — DOXYCYCLINE 100 MG: 100 INJECTION, POWDER, LYOPHILIZED, FOR SOLUTION INTRAVENOUS at 02:17

## 2023-04-06 RX ADMIN — FAMOTIDINE 20 MG: 10 INJECTION INTRAVENOUS at 09:15

## 2023-04-06 RX ADMIN — HYDRALAZINE HYDROCHLORIDE 10 MG: 20 INJECTION INTRAMUSCULAR; INTRAVENOUS at 04:35

## 2023-04-06 RX ADMIN — AMPICILLIN SODIUM AND SULBACTAM SODIUM 1.5 G: 1; .5 INJECTION, POWDER, FOR SOLUTION INTRAMUSCULAR; INTRAVENOUS at 09:15

## 2023-04-06 RX ADMIN — Medication 10 ML: at 02:16

## 2023-04-06 RX ADMIN — IPRATROPIUM BROMIDE AND ALBUTEROL SULFATE 3 ML: 2.5; .5 SOLUTION RESPIRATORY (INHALATION) at 15:33

## 2023-04-06 RX ADMIN — AMPICILLIN SODIUM AND SULBACTAM SODIUM 1.5 G: 1; .5 INJECTION, POWDER, FOR SOLUTION INTRAMUSCULAR; INTRAVENOUS at 02:21

## 2023-04-06 RX ADMIN — ENOXAPARIN SODIUM 40 MG: 100 INJECTION SUBCUTANEOUS at 02:21

## 2023-04-06 RX ADMIN — AMPICILLIN SODIUM AND SULBACTAM SODIUM 1.5 G: 1; .5 INJECTION, POWDER, FOR SOLUTION INTRAMUSCULAR; INTRAVENOUS at 14:10

## 2023-04-06 RX ADMIN — IPRATROPIUM BROMIDE AND ALBUTEROL SULFATE 3 ML: 2.5; .5 SOLUTION RESPIRATORY (INHALATION) at 11:14

## 2023-04-06 RX ADMIN — CARBIDOPA AND LEVODOPA 2 TABLET: 25; 100 TABLET ORAL at 16:11

## 2023-04-06 RX ADMIN — ENOXAPARIN SODIUM 40 MG: 100 INJECTION SUBCUTANEOUS at 16:11

## 2023-04-06 RX ADMIN — FAMOTIDINE 20 MG: 10 INJECTION INTRAVENOUS at 22:38

## 2023-04-06 RX ADMIN — SODIUM CHLORIDE 100 ML/HR: 0.9 INJECTION, SOLUTION INTRAVENOUS at 02:22

## 2023-04-06 RX ADMIN — FAMOTIDINE 20 MG: 10 INJECTION INTRAVENOUS at 02:21

## 2023-04-06 RX ADMIN — IPRATROPIUM BROMIDE AND ALBUTEROL SULFATE 3 ML: 2.5; .5 SOLUTION RESPIRATORY (INHALATION) at 07:12

## 2023-04-06 RX ADMIN — NICOTINE 1 PATCH: 21 PATCH, EXTENDED RELEASE TRANSDERMAL at 09:15

## 2023-04-06 RX ADMIN — Medication 10 ML: at 22:38

## 2023-04-07 ENCOUNTER — APPOINTMENT (OUTPATIENT)
Dept: GENERAL RADIOLOGY | Facility: HOSPITAL | Age: 63
DRG: 177 | End: 2023-04-07
Payer: MEDICARE

## 2023-04-07 ENCOUNTER — READMISSION MANAGEMENT (OUTPATIENT)
Dept: CALL CENTER | Facility: HOSPITAL | Age: 63
End: 2023-04-07
Payer: MEDICARE

## 2023-04-07 VITALS
HEIGHT: 69 IN | TEMPERATURE: 98.3 F | WEIGHT: 205.03 LBS | OXYGEN SATURATION: 94 % | RESPIRATION RATE: 20 BRPM | HEART RATE: 63 BPM | DIASTOLIC BLOOD PRESSURE: 71 MMHG | SYSTOLIC BLOOD PRESSURE: 149 MMHG | BODY MASS INDEX: 30.37 KG/M2

## 2023-04-07 LAB
ALBUMIN SERPL-MCNC: 3.8 G/DL (ref 3.5–5.2)
ALBUMIN/GLOB SERPL: 1.7 G/DL
ALP SERPL-CCNC: 114 U/L (ref 39–117)
ALT SERPL W P-5'-P-CCNC: 15 U/L (ref 1–41)
AMMONIA BLD-SCNC: 24 UMOL/L (ref 16–60)
ANION GAP SERPL CALCULATED.3IONS-SCNC: 10 MMOL/L (ref 5–15)
AST SERPL-CCNC: 16 U/L (ref 1–40)
BASOPHILS # BLD AUTO: 0 10*3/MM3 (ref 0–0.2)
BASOPHILS NFR BLD AUTO: 0.7 % (ref 0–1.5)
BILIRUB SERPL-MCNC: 0.4 MG/DL (ref 0–1.2)
BUN SERPL-MCNC: 8 MG/DL (ref 8–23)
BUN/CREAT SERPL: 14.5 (ref 7–25)
CALCIUM SPEC-SCNC: 8.8 MG/DL (ref 8.6–10.5)
CHLORIDE SERPL-SCNC: 112 MMOL/L (ref 98–107)
CO2 SERPL-SCNC: 23 MMOL/L (ref 22–29)
CREAT SERPL-MCNC: 0.55 MG/DL (ref 0.76–1.27)
DEPRECATED RDW RBC AUTO: 45.9 FL (ref 37–54)
EGFRCR SERPLBLD CKD-EPI 2021: 110.7 ML/MIN/1.73
EOSINOPHIL # BLD AUTO: 0.2 10*3/MM3 (ref 0–0.4)
EOSINOPHIL NFR BLD AUTO: 3.5 % (ref 0.3–6.2)
ERYTHROCYTE [DISTWIDTH] IN BLOOD BY AUTOMATED COUNT: 13.7 % (ref 12.3–15.4)
FOLATE SERPL-MCNC: >20 NG/ML (ref 4.78–24.2)
GLOBULIN UR ELPH-MCNC: 2.3 GM/DL
GLUCOSE SERPL-MCNC: 87 MG/DL (ref 65–99)
HCT VFR BLD AUTO: 39.7 % (ref 37.5–51)
HGB BLD-MCNC: 13 G/DL (ref 13–17.7)
LYMPHOCYTES # BLD AUTO: 2.6 10*3/MM3 (ref 0.7–3.1)
LYMPHOCYTES NFR BLD AUTO: 41.8 % (ref 19.6–45.3)
MAGNESIUM SERPL-MCNC: 1.9 MG/DL (ref 1.6–2.4)
MCH RBC QN AUTO: 31.4 PG (ref 26.6–33)
MCHC RBC AUTO-ENTMCNC: 32.7 G/DL (ref 31.5–35.7)
MCV RBC AUTO: 96 FL (ref 79–97)
MONOCYTES # BLD AUTO: 0.5 10*3/MM3 (ref 0.1–0.9)
MONOCYTES NFR BLD AUTO: 7.3 % (ref 5–12)
NEUTROPHILS NFR BLD AUTO: 2.9 10*3/MM3 (ref 1.7–7)
NEUTROPHILS NFR BLD AUTO: 46.7 % (ref 42.7–76)
NRBC BLD AUTO-RTO: 0.2 /100 WBC (ref 0–0.2)
PHOSPHATE SERPL-MCNC: 2.8 MG/DL (ref 2.5–4.5)
PLATELET # BLD AUTO: 184 10*3/MM3 (ref 140–450)
PMV BLD AUTO: 8.4 FL (ref 6–12)
POTASSIUM SERPL-SCNC: 3.6 MMOL/L (ref 3.5–5.2)
PROT SERPL-MCNC: 6.1 G/DL (ref 6–8.5)
RBC # BLD AUTO: 4.14 10*6/MM3 (ref 4.14–5.8)
SODIUM SERPL-SCNC: 145 MMOL/L (ref 136–145)
TSH SERPL DL<=0.05 MIU/L-ACNC: 4.21 UIU/ML (ref 0.27–4.2)
VIT B12 BLD-MCNC: 498 PG/ML (ref 211–946)
WBC NRBC COR # BLD: 6.2 10*3/MM3 (ref 3.4–10.8)

## 2023-04-07 PROCEDURE — 97166 OT EVAL MOD COMPLEX 45 MIN: CPT

## 2023-04-07 PROCEDURE — 25010000002 AMPICILLIN-SULBACTAM PER 1.5 G: Performed by: INTERNAL MEDICINE

## 2023-04-07 PROCEDURE — 82746 ASSAY OF FOLIC ACID SERUM: CPT | Performed by: HOSPITALIST

## 2023-04-07 PROCEDURE — 83735 ASSAY OF MAGNESIUM: CPT | Performed by: HOSPITALIST

## 2023-04-07 PROCEDURE — 84443 ASSAY THYROID STIM HORMONE: CPT | Performed by: HOSPITALIST

## 2023-04-07 PROCEDURE — 84100 ASSAY OF PHOSPHORUS: CPT | Performed by: HOSPITALIST

## 2023-04-07 PROCEDURE — 92611 MOTION FLUOROSCOPY/SWALLOW: CPT

## 2023-04-07 PROCEDURE — 80053 COMPREHEN METABOLIC PANEL: CPT | Performed by: HOSPITALIST

## 2023-04-07 PROCEDURE — 74230 X-RAY XM SWLNG FUNCJ C+: CPT

## 2023-04-07 PROCEDURE — 85025 COMPLETE CBC W/AUTO DIFF WBC: CPT | Performed by: HOSPITALIST

## 2023-04-07 PROCEDURE — 82140 ASSAY OF AMMONIA: CPT | Performed by: HOSPITALIST

## 2023-04-07 PROCEDURE — 82607 VITAMIN B-12: CPT | Performed by: HOSPITALIST

## 2023-04-07 RX ORDER — FAMOTIDINE 20 MG/1
20 TABLET, FILM COATED ORAL
Status: DISCONTINUED | OUTPATIENT
Start: 2023-04-07 | End: 2023-04-07 | Stop reason: HOSPADM

## 2023-04-07 RX ORDER — POTASSIUM CHLORIDE 7.45 MG/ML
10 INJECTION INTRAVENOUS
Status: DISPENSED | OUTPATIENT
Start: 2023-04-07 | End: 2023-04-07

## 2023-04-07 RX ORDER — AMOXICILLIN AND CLAVULANATE POTASSIUM 875; 125 MG/1; MG/1
1 TABLET, FILM COATED ORAL EVERY 12 HOURS SCHEDULED
Qty: 7 TABLET | Refills: 0 | Status: SHIPPED | OUTPATIENT
Start: 2023-04-07 | End: 2023-04-11

## 2023-04-07 RX ORDER — AMOXICILLIN AND CLAVULANATE POTASSIUM 875; 125 MG/1; MG/1
1 TABLET, FILM COATED ORAL EVERY 12 HOURS SCHEDULED
Status: DISCONTINUED | OUTPATIENT
Start: 2023-04-07 | End: 2023-04-07 | Stop reason: HOSPADM

## 2023-04-07 RX ORDER — AMLODIPINE BESYLATE AND BENAZEPRIL HYDROCHLORIDE 10; 20 MG/1; MG/1
1 CAPSULE ORAL DAILY
Qty: 30 CAPSULE | Refills: 0 | Status: SHIPPED | OUTPATIENT
Start: 2023-04-07

## 2023-04-07 RX ADMIN — Medication 10 ML: at 12:51

## 2023-04-07 RX ADMIN — NICOTINE 1 PATCH: 21 PATCH, EXTENDED RELEASE TRANSDERMAL at 10:01

## 2023-04-07 RX ADMIN — VENLAFAXINE HYDROCHLORIDE 150 MG: 75 CAPSULE, EXTENDED RELEASE ORAL at 12:52

## 2023-04-07 RX ADMIN — FAMOTIDINE 20 MG: 10 INJECTION INTRAVENOUS at 10:00

## 2023-04-07 RX ADMIN — AMPICILLIN SODIUM AND SULBACTAM SODIUM 1.5 G: 1; .5 INJECTION, POWDER, FOR SOLUTION INTRAMUSCULAR; INTRAVENOUS at 10:00

## 2023-04-07 RX ADMIN — AMPICILLIN SODIUM AND SULBACTAM SODIUM 1.5 G: 1; .5 INJECTION, POWDER, FOR SOLUTION INTRAMUSCULAR; INTRAVENOUS at 01:06

## 2023-04-07 RX ADMIN — ASPIRIN 81 MG: 81 TABLET, COATED ORAL at 12:52

## 2023-04-07 RX ADMIN — AMLODIPINE BESYLATE 5 MG: 5 TABLET ORAL at 12:53

## 2023-04-07 RX ADMIN — LISINOPRIL 10 MG: 5 TABLET ORAL at 12:52

## 2023-04-07 RX ADMIN — CARBIDOPA AND LEVODOPA 2 TABLET: 25; 100 TABLET ORAL at 12:52

## 2023-04-07 RX ADMIN — AMOXICILLIN AND CLAVULANATE POTASSIUM 1 TABLET: 875; 125 TABLET, FILM COATED ORAL at 12:53

## 2023-04-07 RX ADMIN — GABAPENTIN 600 MG: 600 TABLET, FILM COATED ORAL at 12:53

## 2023-04-07 RX ADMIN — ATORVASTATIN CALCIUM 20 MG: 20 TABLET, FILM COATED ORAL at 12:53

## 2023-04-10 ENCOUNTER — TRANSITIONAL CARE MANAGEMENT TELEPHONE ENCOUNTER (OUTPATIENT)
Dept: CALL CENTER | Facility: HOSPITAL | Age: 63
End: 2023-04-10
Payer: MEDICARE

## 2023-04-10 ENCOUNTER — TELEPHONE (OUTPATIENT)
Dept: FAMILY MEDICINE CLINIC | Facility: CLINIC | Age: 63
End: 2023-04-10
Payer: MEDICARE

## 2023-04-10 DIAGNOSIS — M54.50 CHRONIC LOW BACK PAIN WITHOUT SCIATICA, UNSPECIFIED BACK PAIN LATERALITY: ICD-10-CM

## 2023-04-10 DIAGNOSIS — G47.30 SLEEP APNEA, UNSPECIFIED TYPE: Primary | ICD-10-CM

## 2023-04-10 DIAGNOSIS — G89.29 CHRONIC LOW BACK PAIN WITHOUT SCIATICA, UNSPECIFIED BACK PAIN LATERALITY: ICD-10-CM

## 2023-04-10 LAB
BACTERIA SPEC AEROBE CULT: NORMAL
BACTERIA SPEC AEROBE CULT: NORMAL

## 2023-04-11 RX ORDER — HYDROCODONE BITARTRATE AND ACETAMINOPHEN 7.5; 325 MG/1; MG/1
1 TABLET ORAL EVERY 8 HOURS PRN
Qty: 60 TABLET | Refills: 0 | Status: SHIPPED | OUTPATIENT
Start: 2023-04-11

## 2023-04-13 ENCOUNTER — TELEPHONE (OUTPATIENT)
Dept: FAMILY MEDICINE CLINIC | Facility: CLINIC | Age: 63
End: 2023-04-13

## 2023-04-14 ENCOUNTER — HOME HEALTH ADMISSION (OUTPATIENT)
Dept: HOME HEALTH SERVICES | Facility: HOME HEALTHCARE | Age: 63
End: 2023-04-14
Payer: MEDICARE

## 2023-04-14 DIAGNOSIS — G47.19 EXCESSIVE DAYTIME SLEEPINESS: Primary | ICD-10-CM

## 2023-04-18 ENCOUNTER — READMISSION MANAGEMENT (OUTPATIENT)
Dept: CALL CENTER | Facility: HOSPITAL | Age: 63
End: 2023-04-18
Payer: MEDICARE

## 2023-05-01 DIAGNOSIS — G89.29 CHRONIC LOW BACK PAIN WITHOUT SCIATICA, UNSPECIFIED BACK PAIN LATERALITY: ICD-10-CM

## 2023-05-01 DIAGNOSIS — M54.50 CHRONIC LOW BACK PAIN WITHOUT SCIATICA, UNSPECIFIED BACK PAIN LATERALITY: ICD-10-CM

## 2023-05-01 RX ORDER — PANTOPRAZOLE SODIUM 40 MG/1
40 TABLET, DELAYED RELEASE ORAL DAILY
Qty: 90 TABLET | Refills: 3 | Status: SHIPPED | OUTPATIENT
Start: 2023-05-01

## 2023-05-02 ENCOUNTER — TELEPHONE (OUTPATIENT)
Dept: FAMILY MEDICINE CLINIC | Facility: CLINIC | Age: 63
End: 2023-05-02
Payer: COMMERCIAL

## 2023-05-02 DIAGNOSIS — R09.02 HYPOXIA: Primary | ICD-10-CM

## 2023-05-02 RX ORDER — HYDROCODONE BITARTRATE AND ACETAMINOPHEN 7.5; 325 MG/1; MG/1
1 TABLET ORAL EVERY 8 HOURS PRN
Qty: 60 TABLET | Refills: 0 | Status: SHIPPED | OUTPATIENT
Start: 2023-05-02

## 2023-05-04 ENCOUNTER — HOSPITAL ENCOUNTER (OUTPATIENT)
Dept: GENERAL RADIOLOGY | Facility: HOSPITAL | Age: 63
Discharge: HOME | End: 2023-05-04
Payer: MEDICARE

## 2023-05-04 DIAGNOSIS — R09.02 HYPOXIA: ICD-10-CM

## 2023-05-04 PROCEDURE — 71046 X-RAY EXAM CHEST 2 VIEWS: CPT

## 2023-05-05 ENCOUNTER — HOSPITAL ENCOUNTER (OUTPATIENT)
Facility: HOSPITAL | Age: 63
Setting detail: OBSERVATION
Discharge: HOME | End: 2023-05-06
Attending: EMERGENCY MEDICINE | Admitting: EMERGENCY MEDICINE
Payer: COMMERCIAL

## 2023-05-05 ENCOUNTER — TELEPHONE (OUTPATIENT)
Dept: FAMILY MEDICINE CLINIC | Facility: CLINIC | Age: 63
End: 2023-05-05
Payer: COMMERCIAL

## 2023-05-05 DIAGNOSIS — R06.2 WHEEZING: ICD-10-CM

## 2023-05-05 DIAGNOSIS — J44.1 ACUTE EXACERBATION OF CHRONIC OBSTRUCTIVE PULMONARY DISEASE (COPD): Primary | ICD-10-CM

## 2023-05-05 DIAGNOSIS — J44.1 COPD EXACERBATION: ICD-10-CM

## 2023-05-05 LAB
ALBUMIN SERPL-MCNC: 4.4 G/DL (ref 3.5–5.2)
ALBUMIN/GLOB SERPL: 1.8 G/DL
ALP SERPL-CCNC: 141 U/L (ref 39–117)
ALT SERPL W P-5'-P-CCNC: 7 U/L (ref 1–41)
ANION GAP SERPL CALCULATED.3IONS-SCNC: 14 MMOL/L (ref 5–15)
ARTERIAL PATENCY WRIST A: POSITIVE
AST SERPL-CCNC: 14 U/L (ref 1–40)
ATMOSPHERIC PRESS: ABNORMAL MM[HG]
BASE EXCESS BLDA CALC-SCNC: 1.9 MMOL/L (ref 0–3)
BASOPHILS # BLD AUTO: 0.1 10*3/MM3 (ref 0–0.2)
BASOPHILS NFR BLD AUTO: 0.9 % (ref 0–1.5)
BDY SITE: ABNORMAL
BILIRUB SERPL-MCNC: 0.2 MG/DL (ref 0–1.2)
BUN SERPL-MCNC: 8 MG/DL (ref 8–23)
BUN/CREAT SERPL: 11.3 (ref 7–25)
CALCIUM SPEC-SCNC: 9.1 MG/DL (ref 8.6–10.5)
CHLORIDE SERPL-SCNC: 107 MMOL/L (ref 98–107)
CO2 BLDA-SCNC: 28.2 MMOL/L (ref 22–29)
CO2 SERPL-SCNC: 22 MMOL/L (ref 22–29)
CREAT SERPL-MCNC: 0.71 MG/DL (ref 0.76–1.27)
D DIMER PPP FEU-MCNC: 0.28 MG/L (FEU) (ref 0–0.64)
D-LACTATE SERPL-SCNC: 0.6 MMOL/L (ref 0.2–2)
DEPRECATED RDW RBC AUTO: 46.4 FL (ref 37–54)
EGFRCR SERPLBLD CKD-EPI 2021: 102.5 ML/MIN/1.73
EOSINOPHIL # BLD AUTO: 0.2 10*3/MM3 (ref 0–0.4)
EOSINOPHIL NFR BLD AUTO: 3.1 % (ref 0.3–6.2)
ERYTHROCYTE [DISTWIDTH] IN BLOOD BY AUTOMATED COUNT: 13.4 % (ref 12.3–15.4)
GEN 5 2HR TROPONIN T REFLEX: 24 NG/L
GLOBULIN UR ELPH-MCNC: 2.4 GM/DL
GLUCOSE BLDC GLUCOMTR-MCNC: 126 MG/DL (ref 70–105)
GLUCOSE SERPL-MCNC: 133 MG/DL (ref 65–99)
HBA1C MFR BLD: 5.5 % (ref 4.8–5.6)
HCO3 BLDA-SCNC: 26.9 MMOL/L (ref 21–28)
HCT VFR BLD AUTO: 41 % (ref 37.5–51)
HEMODILUTION: NO
HGB BLD-MCNC: 13.8 G/DL (ref 13–17.7)
INHALED O2 CONCENTRATION: 21 %
LYMPHOCYTES # BLD AUTO: 3.1 10*3/MM3 (ref 0.7–3.1)
LYMPHOCYTES NFR BLD AUTO: 42.2 % (ref 19.6–45.3)
MCH RBC QN AUTO: 31.7 PG (ref 26.6–33)
MCHC RBC AUTO-ENTMCNC: 33.5 G/DL (ref 31.5–35.7)
MCV RBC AUTO: 94.5 FL (ref 79–97)
MODALITY: ABNORMAL
MONOCYTES # BLD AUTO: 0.5 10*3/MM3 (ref 0.1–0.9)
MONOCYTES NFR BLD AUTO: 6.3 % (ref 5–12)
NEUTROPHILS NFR BLD AUTO: 3.5 10*3/MM3 (ref 1.7–7)
NEUTROPHILS NFR BLD AUTO: 47.5 % (ref 42.7–76)
NRBC BLD AUTO-RTO: 0.1 /100 WBC (ref 0–0.2)
NT-PROBNP SERPL-MCNC: 53.1 PG/ML (ref 0–900)
PCO2 BLDA: 42.5 MM HG (ref 35–48)
PH BLDA: 7.41 PH UNITS (ref 7.35–7.45)
PLATELET # BLD AUTO: 179 10*3/MM3 (ref 140–450)
PMV BLD AUTO: 8.6 FL (ref 6–12)
PO2 BLDA: 53.6 MM HG (ref 83–108)
POTASSIUM SERPL-SCNC: 3.7 MMOL/L (ref 3.5–5.2)
PROT SERPL-MCNC: 6.8 G/DL (ref 6–8.5)
RBC # BLD AUTO: 4.34 10*6/MM3 (ref 4.14–5.8)
SAO2 % BLDCOA: 87.5 % (ref 94–98)
SODIUM SERPL-SCNC: 143 MMOL/L (ref 136–145)
TROPONIN T DELTA: 3 NG/L
TROPONIN T SERPL HS-MCNC: 21 NG/L
TSH SERPL DL<=0.05 MIU/L-ACNC: 1.74 UIU/ML (ref 0.27–4.2)
WBC NRBC COR # BLD: 7.3 10*3/MM3 (ref 3.4–10.8)

## 2023-05-05 PROCEDURE — 94640 AIRWAY INHALATION TREATMENT: CPT

## 2023-05-05 PROCEDURE — 80050 GENERAL HEALTH PANEL: CPT | Performed by: EMERGENCY MEDICINE

## 2023-05-05 PROCEDURE — 82803 BLOOD GASES ANY COMBINATION: CPT

## 2023-05-05 PROCEDURE — 93005 ELECTROCARDIOGRAM TRACING: CPT | Performed by: EMERGENCY MEDICINE

## 2023-05-05 PROCEDURE — 83036 HEMOGLOBIN GLYCOSYLATED A1C: CPT | Performed by: EMERGENCY MEDICINE

## 2023-05-05 PROCEDURE — 85379 FIBRIN DEGRADATION QUANT: CPT | Performed by: EMERGENCY MEDICINE

## 2023-05-05 PROCEDURE — 83605 ASSAY OF LACTIC ACID: CPT

## 2023-05-05 PROCEDURE — 93005 ELECTROCARDIOGRAM TRACING: CPT

## 2023-05-05 PROCEDURE — 83880 ASSAY OF NATRIURETIC PEPTIDE: CPT | Performed by: EMERGENCY MEDICINE

## 2023-05-05 PROCEDURE — 99285 EMERGENCY DEPT VISIT HI MDM: CPT

## 2023-05-05 PROCEDURE — 25010000002 METHYLPREDNISOLONE PER 125 MG: Performed by: EMERGENCY MEDICINE

## 2023-05-05 PROCEDURE — 36600 WITHDRAWAL OF ARTERIAL BLOOD: CPT

## 2023-05-05 PROCEDURE — 94799 UNLISTED PULMONARY SVC/PX: CPT

## 2023-05-05 PROCEDURE — 36415 COLL VENOUS BLD VENIPUNCTURE: CPT

## 2023-05-05 PROCEDURE — 84484 ASSAY OF TROPONIN QUANT: CPT | Performed by: EMERGENCY MEDICINE

## 2023-05-05 PROCEDURE — 82948 REAGENT STRIP/BLOOD GLUCOSE: CPT

## 2023-05-05 PROCEDURE — 96374 THER/PROPH/DIAG INJ IV PUSH: CPT

## 2023-05-05 RX ORDER — IPRATROPIUM BROMIDE AND ALBUTEROL SULFATE 2.5; .5 MG/3ML; MG/3ML
3 SOLUTION RESPIRATORY (INHALATION) ONCE
Status: COMPLETED | OUTPATIENT
Start: 2023-05-05 | End: 2023-05-05

## 2023-05-05 RX ORDER — PREDNISONE 20 MG/1
20 TABLET ORAL DAILY
Qty: 3 TABLET | Refills: 0 | Status: SHIPPED | OUTPATIENT
Start: 2023-05-05 | End: 2023-05-06 | Stop reason: SDUPTHER

## 2023-05-05 RX ORDER — SODIUM CHLORIDE 0.9 % (FLUSH) 0.9 %
10 SYRINGE (ML) INJECTION AS NEEDED
Status: DISCONTINUED | OUTPATIENT
Start: 2023-05-05 | End: 2023-05-06 | Stop reason: HOSPADM

## 2023-05-05 RX ORDER — DOXYCYCLINE 100 MG/1
100 CAPSULE ORAL 2 TIMES DAILY
Qty: 10 CAPSULE | Refills: 0 | Status: SHIPPED | OUTPATIENT
Start: 2023-05-05 | End: 2023-05-06 | Stop reason: SDUPTHER

## 2023-05-05 RX ORDER — ALBUTEROL SULFATE 2.5 MG/3ML
2.5 SOLUTION RESPIRATORY (INHALATION) ONCE
Status: COMPLETED | OUTPATIENT
Start: 2023-05-05 | End: 2023-05-05

## 2023-05-05 RX ORDER — METHYLPREDNISOLONE SODIUM SUCCINATE 125 MG/2ML
125 INJECTION, POWDER, LYOPHILIZED, FOR SOLUTION INTRAMUSCULAR; INTRAVENOUS ONCE
Status: COMPLETED | OUTPATIENT
Start: 2023-05-05 | End: 2023-05-05

## 2023-05-05 RX ADMIN — ALBUTEROL SULFATE 2.5 MG: 2.5 SOLUTION RESPIRATORY (INHALATION) at 22:10

## 2023-05-05 RX ADMIN — METHYLPREDNISOLONE SODIUM SUCCINATE 125 MG: 125 INJECTION, POWDER, FOR SOLUTION INTRAMUSCULAR; INTRAVENOUS at 22:02

## 2023-05-05 RX ADMIN — IPRATROPIUM BROMIDE AND ALBUTEROL SULFATE 3 ML: .5; 3 SOLUTION RESPIRATORY (INHALATION) at 20:00

## 2023-05-06 ENCOUNTER — READMISSION MANAGEMENT (OUTPATIENT)
Dept: CALL CENTER | Facility: HOSPITAL | Age: 63
End: 2023-05-06
Payer: COMMERCIAL

## 2023-05-06 VITALS
DIASTOLIC BLOOD PRESSURE: 81 MMHG | RESPIRATION RATE: 16 BRPM | TEMPERATURE: 97.8 F | HEART RATE: 74 BPM | WEIGHT: 208.56 LBS | BODY MASS INDEX: 30.89 KG/M2 | OXYGEN SATURATION: 100 % | HEIGHT: 69 IN | SYSTOLIC BLOOD PRESSURE: 153 MMHG

## 2023-05-06 PROBLEM — J44.1 COPD EXACERBATION: Status: ACTIVE | Noted: 2023-05-06

## 2023-05-06 PROCEDURE — 94618 PULMONARY STRESS TESTING: CPT

## 2023-05-06 PROCEDURE — 94799 UNLISTED PULMONARY SVC/PX: CPT

## 2023-05-06 PROCEDURE — 96376 TX/PRO/DX INJ SAME DRUG ADON: CPT

## 2023-05-06 PROCEDURE — G0378 HOSPITAL OBSERVATION PER HR: HCPCS

## 2023-05-06 PROCEDURE — 94761 N-INVAS EAR/PLS OXIMETRY MLT: CPT

## 2023-05-06 PROCEDURE — 25010000002 METHYLPREDNISOLONE PER 125 MG: Performed by: PHYSICIAN ASSISTANT

## 2023-05-06 RX ORDER — CARBIDOPA AND LEVODOPA 25; 100 MG/1; MG/1
2 TABLET, EXTENDED RELEASE ORAL EVERY 12 HOURS
Status: DISCONTINUED | OUTPATIENT
Start: 2023-05-06 | End: 2023-05-06

## 2023-05-06 RX ORDER — HYDROCODONE BITARTRATE AND ACETAMINOPHEN 7.5; 325 MG/1; MG/1
1 TABLET ORAL EVERY 8 HOURS PRN
Status: DISCONTINUED | OUTPATIENT
Start: 2023-05-06 | End: 2023-05-06 | Stop reason: HOSPADM

## 2023-05-06 RX ORDER — BUDESONIDE AND FORMOTEROL FUMARATE DIHYDRATE 160; 4.5 UG/1; UG/1
2 AEROSOL RESPIRATORY (INHALATION)
Status: DISCONTINUED | OUTPATIENT
Start: 2023-05-06 | End: 2023-05-06 | Stop reason: HOSPADM

## 2023-05-06 RX ORDER — METHYLPREDNISOLONE SODIUM SUCCINATE 125 MG/2ML
0.5 INJECTION, POWDER, LYOPHILIZED, FOR SOLUTION INTRAMUSCULAR; INTRAVENOUS 2 TIMES DAILY
Status: DISCONTINUED | OUTPATIENT
Start: 2023-05-06 | End: 2023-05-06 | Stop reason: HOSPADM

## 2023-05-06 RX ORDER — HYDROCODONE BITARTRATE AND ACETAMINOPHEN 7.5; 325 MG/1; MG/1
1 TABLET ORAL ONCE
Status: COMPLETED | OUTPATIENT
Start: 2023-05-06 | End: 2023-05-06

## 2023-05-06 RX ORDER — ONDANSETRON 2 MG/ML
4 INJECTION INTRAMUSCULAR; INTRAVENOUS EVERY 6 HOURS PRN
Status: DISCONTINUED | OUTPATIENT
Start: 2023-05-06 | End: 2023-05-06 | Stop reason: HOSPADM

## 2023-05-06 RX ORDER — ACETAMINOPHEN 325 MG/1
650 TABLET ORAL EVERY 4 HOURS PRN
Status: DISCONTINUED | OUTPATIENT
Start: 2023-05-06 | End: 2023-05-06 | Stop reason: HOSPADM

## 2023-05-06 RX ORDER — BUDESONIDE AND FORMOTEROL FUMARATE DIHYDRATE 160; 4.5 UG/1; UG/1
2 AEROSOL RESPIRATORY (INHALATION) 2 TIMES DAILY
Qty: 10.2 G | Refills: 4 | Status: SHIPPED | OUTPATIENT
Start: 2023-05-06

## 2023-05-06 RX ORDER — GABAPENTIN 600 MG/1
600 TABLET ORAL 3 TIMES DAILY
Status: DISCONTINUED | OUTPATIENT
Start: 2023-05-06 | End: 2023-05-06 | Stop reason: HOSPADM

## 2023-05-06 RX ORDER — GUAIFENESIN/DEXTROMETHORPHAN 100-10MG/5
10 SYRUP ORAL EVERY 6 HOURS PRN
Status: DISCONTINUED | OUTPATIENT
Start: 2023-05-06 | End: 2023-05-06 | Stop reason: HOSPADM

## 2023-05-06 RX ORDER — CLONAZEPAM 0.5 MG/1
0.5 TABLET ORAL 4 TIMES DAILY
Status: DISCONTINUED | OUTPATIENT
Start: 2023-05-06 | End: 2023-05-06 | Stop reason: HOSPADM

## 2023-05-06 RX ORDER — SODIUM CHLORIDE 9 MG/ML
40 INJECTION, SOLUTION INTRAVENOUS AS NEEDED
Status: DISCONTINUED | OUTPATIENT
Start: 2023-05-06 | End: 2023-05-06 | Stop reason: HOSPADM

## 2023-05-06 RX ORDER — DOXYCYCLINE 100 MG/1
100 CAPSULE ORAL 2 TIMES DAILY
Qty: 10 CAPSULE | Refills: 0 | Status: SHIPPED | OUTPATIENT
Start: 2023-05-06 | End: 2023-05-11

## 2023-05-06 RX ORDER — DONEPEZIL HYDROCHLORIDE 5 MG/1
10 TABLET, FILM COATED ORAL NIGHTLY
Status: DISCONTINUED | OUTPATIENT
Start: 2023-05-06 | End: 2023-05-06 | Stop reason: HOSPADM

## 2023-05-06 RX ORDER — IPRATROPIUM BROMIDE AND ALBUTEROL SULFATE 2.5; .5 MG/3ML; MG/3ML
3 SOLUTION RESPIRATORY (INHALATION)
Status: DISCONTINUED | OUTPATIENT
Start: 2023-05-06 | End: 2023-05-06 | Stop reason: HOSPADM

## 2023-05-06 RX ORDER — ASPIRIN 81 MG/1
81 TABLET ORAL DAILY
Status: DISCONTINUED | OUTPATIENT
Start: 2023-05-06 | End: 2023-05-06 | Stop reason: HOSPADM

## 2023-05-06 RX ORDER — ARIPIPRAZOLE 2 MG/1
2 TABLET ORAL DAILY
Status: DISCONTINUED | OUTPATIENT
Start: 2023-05-06 | End: 2023-05-06 | Stop reason: HOSPADM

## 2023-05-06 RX ORDER — IPRATROPIUM BROMIDE AND ALBUTEROL SULFATE 2.5; .5 MG/3ML; MG/3ML
3 SOLUTION RESPIRATORY (INHALATION) EVERY 6 HOURS PRN
Qty: 360 ML | Refills: 0 | Status: SHIPPED | OUTPATIENT
Start: 2023-05-06

## 2023-05-06 RX ORDER — PREDNISONE 20 MG/1
20 TABLET ORAL DAILY
Qty: 3 TABLET | Refills: 0 | Status: SHIPPED | OUTPATIENT
Start: 2023-05-06 | End: 2023-05-09

## 2023-05-06 RX ORDER — AMLODIPINE BESYLATE 5 MG/1
10 TABLET ORAL
Status: DISCONTINUED | OUTPATIENT
Start: 2023-05-06 | End: 2023-05-06 | Stop reason: HOSPADM

## 2023-05-06 RX ORDER — CHOLECALCIFEROL (VITAMIN D3) 125 MCG
5 CAPSULE ORAL NIGHTLY PRN
Status: DISCONTINUED | OUTPATIENT
Start: 2023-05-06 | End: 2023-05-06 | Stop reason: HOSPADM

## 2023-05-06 RX ORDER — SODIUM CHLORIDE 0.9 % (FLUSH) 0.9 %
10 SYRINGE (ML) INJECTION AS NEEDED
Status: DISCONTINUED | OUTPATIENT
Start: 2023-05-06 | End: 2023-05-06 | Stop reason: HOSPADM

## 2023-05-06 RX ORDER — CARBIDOPA AND LEVODOPA 25; 100 MG/1; MG/1
2 TABLET, EXTENDED RELEASE ORAL NIGHTLY
Status: DISCONTINUED | OUTPATIENT
Start: 2023-05-06 | End: 2023-05-06 | Stop reason: HOSPADM

## 2023-05-06 RX ORDER — SODIUM CHLORIDE 0.9 % (FLUSH) 0.9 %
10 SYRINGE (ML) INJECTION EVERY 12 HOURS SCHEDULED
Status: DISCONTINUED | OUTPATIENT
Start: 2023-05-06 | End: 2023-05-06 | Stop reason: HOSPADM

## 2023-05-06 RX ORDER — ATORVASTATIN CALCIUM 20 MG/1
20 TABLET, FILM COATED ORAL DAILY
Status: DISCONTINUED | OUTPATIENT
Start: 2023-05-06 | End: 2023-05-06 | Stop reason: HOSPADM

## 2023-05-06 RX ORDER — VENLAFAXINE HYDROCHLORIDE 75 MG/1
150 CAPSULE, EXTENDED RELEASE ORAL DAILY
Status: DISCONTINUED | OUTPATIENT
Start: 2023-05-06 | End: 2023-05-06 | Stop reason: HOSPADM

## 2023-05-06 RX ORDER — LISINOPRIL 20 MG/1
20 TABLET ORAL
Status: DISCONTINUED | OUTPATIENT
Start: 2023-05-06 | End: 2023-05-06 | Stop reason: HOSPADM

## 2023-05-06 RX ORDER — PANTOPRAZOLE SODIUM 40 MG/1
40 TABLET, DELAYED RELEASE ORAL
Status: DISCONTINUED | OUTPATIENT
Start: 2023-05-06 | End: 2023-05-06 | Stop reason: HOSPADM

## 2023-05-06 RX ORDER — MELOXICAM 15 MG/1
15 TABLET ORAL DAILY
Status: DISCONTINUED | OUTPATIENT
Start: 2023-05-06 | End: 2023-05-06 | Stop reason: HOSPADM

## 2023-05-06 RX ADMIN — CLONAZEPAM 0.5 MG: 0.5 TABLET ORAL at 14:25

## 2023-05-06 RX ADMIN — CLONAZEPAM 0.5 MG: 0.5 TABLET ORAL at 09:45

## 2023-05-06 RX ADMIN — ARIPIPRAZOLE 2 MG: 2 TABLET ORAL at 09:43

## 2023-05-06 RX ADMIN — PANTOPRAZOLE SODIUM 40 MG: 40 TABLET, DELAYED RELEASE ORAL at 08:00

## 2023-05-06 RX ADMIN — HYDROCODONE BITARTRATE AND ACETAMINOPHEN 1 TABLET: 7.5; 325 TABLET ORAL at 10:11

## 2023-05-06 RX ADMIN — VENLAFAXINE HYDROCHLORIDE 150 MG: 75 CAPSULE, EXTENDED RELEASE ORAL at 09:45

## 2023-05-06 RX ADMIN — IPRATROPIUM BROMIDE AND ALBUTEROL SULFATE 3 ML: .5; 3 SOLUTION RESPIRATORY (INHALATION) at 11:26

## 2023-05-06 RX ADMIN — IPRATROPIUM BROMIDE AND ALBUTEROL SULFATE 3 ML: .5; 3 SOLUTION RESPIRATORY (INHALATION) at 07:30

## 2023-05-06 RX ADMIN — MELOXICAM 15 MG: 15 TABLET ORAL at 09:44

## 2023-05-06 RX ADMIN — METHYLPREDNISOLONE SODIUM SUCCINATE 47.5 MG: 125 INJECTION, POWDER, FOR SOLUTION INTRAMUSCULAR; INTRAVENOUS at 09:42

## 2023-05-06 RX ADMIN — CARBIDOPA AND LEVODOPA 2 TABLET: 25; 100 TABLET ORAL at 14:25

## 2023-05-06 RX ADMIN — Medication 10 ML: at 09:45

## 2023-05-06 RX ADMIN — AMLODIPINE BESYLATE 10 MG: 5 TABLET ORAL at 09:42

## 2023-05-06 RX ADMIN — HYDROCODONE BITARTRATE AND ACETAMINOPHEN 1 TABLET: 7.5; 325 TABLET ORAL at 14:25

## 2023-05-06 RX ADMIN — LISINOPRIL 20 MG: 20 TABLET ORAL at 09:44

## 2023-05-06 RX ADMIN — CARBIDOPA AND LEVODOPA 2 TABLET: 25; 100 TABLET ORAL at 09:43

## 2023-05-06 RX ADMIN — BUDESONIDE AND FORMOTEROL FUMARATE DIHYDRATE 2 PUFF: 160; 4.5 AEROSOL RESPIRATORY (INHALATION) at 07:30

## 2023-05-06 RX ADMIN — ATORVASTATIN CALCIUM 20 MG: 20 TABLET, FILM COATED ORAL at 09:45

## 2023-05-06 RX ADMIN — GABAPENTIN 600 MG: 600 TABLET, FILM COATED ORAL at 09:44

## 2023-05-06 RX ADMIN — Medication 81 MG: at 09:43

## 2023-05-07 LAB — QT INTERVAL: 413 MS

## 2023-05-08 ENCOUNTER — TRANSITIONAL CARE MANAGEMENT TELEPHONE ENCOUNTER (OUTPATIENT)
Dept: CALL CENTER | Facility: HOSPITAL | Age: 63
End: 2023-05-08
Payer: COMMERCIAL

## 2023-05-15 ENCOUNTER — OFFICE VISIT (OUTPATIENT)
Dept: FAMILY MEDICINE CLINIC | Facility: CLINIC | Age: 63
End: 2023-05-15
Payer: MEDICARE

## 2023-05-15 VITALS
SYSTOLIC BLOOD PRESSURE: 126 MMHG | HEART RATE: 67 BPM | HEIGHT: 69 IN | BODY MASS INDEX: 30.57 KG/M2 | DIASTOLIC BLOOD PRESSURE: 82 MMHG | RESPIRATION RATE: 20 BRPM | TEMPERATURE: 98.2 F | WEIGHT: 206.4 LBS | OXYGEN SATURATION: 96 %

## 2023-05-15 DIAGNOSIS — I35.9 AORTIC VALVE CALCIFICATION: ICD-10-CM

## 2023-05-15 DIAGNOSIS — M54.50 CHRONIC BILATERAL LOW BACK PAIN WITHOUT SCIATICA: Primary | ICD-10-CM

## 2023-05-15 DIAGNOSIS — R01.1 HEART MURMUR: ICD-10-CM

## 2023-05-15 DIAGNOSIS — G89.29 CHRONIC BILATERAL LOW BACK PAIN WITHOUT SCIATICA: Primary | ICD-10-CM

## 2023-05-15 RX ORDER — AMLODIPINE BESYLATE AND BENAZEPRIL HYDROCHLORIDE 10; 20 MG/1; MG/1
1 CAPSULE ORAL NIGHTLY
Qty: 90 CAPSULE | Refills: 3 | Status: SHIPPED | OUTPATIENT
Start: 2023-05-15

## 2023-05-16 ENCOUNTER — READMISSION MANAGEMENT (OUTPATIENT)
Dept: CALL CENTER | Facility: HOSPITAL | Age: 63
End: 2023-05-16
Payer: COMMERCIAL

## 2023-05-19 ENCOUNTER — TELEPHONE (OUTPATIENT)
Dept: FAMILY MEDICINE CLINIC | Facility: CLINIC | Age: 63
End: 2023-05-19
Payer: COMMERCIAL

## 2023-05-22 ENCOUNTER — READMISSION MANAGEMENT (OUTPATIENT)
Dept: CALL CENTER | Facility: HOSPITAL | Age: 63
End: 2023-05-22
Payer: COMMERCIAL

## 2023-05-22 DIAGNOSIS — G89.29 CHRONIC LOW BACK PAIN WITHOUT SCIATICA, UNSPECIFIED BACK PAIN LATERALITY: ICD-10-CM

## 2023-05-22 DIAGNOSIS — M54.50 CHRONIC LOW BACK PAIN WITHOUT SCIATICA, UNSPECIFIED BACK PAIN LATERALITY: ICD-10-CM

## 2023-05-24 ENCOUNTER — TELEPHONE (OUTPATIENT)
Dept: FAMILY MEDICINE CLINIC | Facility: CLINIC | Age: 63
End: 2023-05-24
Payer: COMMERCIAL

## 2023-05-25 ENCOUNTER — TELEPHONE (OUTPATIENT)
Dept: FAMILY MEDICINE CLINIC | Facility: CLINIC | Age: 63
End: 2023-05-25

## 2023-05-25 DIAGNOSIS — M54.50 CHRONIC LOW BACK PAIN WITHOUT SCIATICA, UNSPECIFIED BACK PAIN LATERALITY: Primary | ICD-10-CM

## 2023-05-25 DIAGNOSIS — G89.29 CHRONIC LOW BACK PAIN WITHOUT SCIATICA, UNSPECIFIED BACK PAIN LATERALITY: Primary | ICD-10-CM

## 2023-05-25 RX ORDER — HYDROCODONE BITARTRATE AND ACETAMINOPHEN 10; 325 MG/1; MG/1
1 TABLET ORAL EVERY 8 HOURS PRN
Qty: 45 TABLET | Refills: 0 | Status: SHIPPED | OUTPATIENT
Start: 2023-05-25

## 2023-05-25 RX ORDER — HYDROCODONE BITARTRATE AND ACETAMINOPHEN 7.5; 325 MG/1; MG/1
1 TABLET ORAL EVERY 8 HOURS PRN
Qty: 60 TABLET | Refills: 0 | Status: SHIPPED | OUTPATIENT
Start: 2023-05-25

## 2023-05-26 ENCOUNTER — OFFICE VISIT (OUTPATIENT)
Dept: CARDIOLOGY | Facility: CLINIC | Age: 63
End: 2023-05-26
Payer: MEDICARE

## 2023-05-26 VITALS
WEIGHT: 200 LBS | BODY MASS INDEX: 29.62 KG/M2 | DIASTOLIC BLOOD PRESSURE: 72 MMHG | SYSTOLIC BLOOD PRESSURE: 102 MMHG | OXYGEN SATURATION: 97 % | HEIGHT: 69 IN | HEART RATE: 71 BPM

## 2023-05-26 DIAGNOSIS — E78.2 MIXED HYPERLIPIDEMIA: ICD-10-CM

## 2023-05-26 DIAGNOSIS — I25.10 CORONARY ARTERY CALCIFICATION SEEN ON CAT SCAN: ICD-10-CM

## 2023-05-26 DIAGNOSIS — I63.9 CEREBROVASCULAR ACCIDENT (CVA), UNSPECIFIED MECHANISM: ICD-10-CM

## 2023-05-26 DIAGNOSIS — M54.50 CHRONIC LOW BACK PAIN WITHOUT SCIATICA, UNSPECIFIED BACK PAIN LATERALITY: Primary | ICD-10-CM

## 2023-05-26 DIAGNOSIS — G89.29 CHRONIC LOW BACK PAIN WITHOUT SCIATICA, UNSPECIFIED BACK PAIN LATERALITY: Primary | ICD-10-CM

## 2023-05-26 DIAGNOSIS — I73.9 PVD (PERIPHERAL VASCULAR DISEASE) WITH CLAUDICATION: Primary | ICD-10-CM

## 2023-05-26 DIAGNOSIS — Z72.0 TOBACCO ABUSE: ICD-10-CM

## 2023-05-26 DIAGNOSIS — I10 PRIMARY HYPERTENSION: ICD-10-CM

## 2023-05-26 DIAGNOSIS — E78.5 HYPERLIPIDEMIA LDL GOAL <70: ICD-10-CM

## 2023-05-26 RX ORDER — OXYCODONE AND ACETAMINOPHEN 7.5; 325 MG/1; MG/1
1 TABLET ORAL EVERY 6 HOURS PRN
Qty: 60 TABLET | Refills: 0 | Status: SHIPPED | OUTPATIENT
Start: 2023-05-26

## 2023-05-30 ENCOUNTER — TELEPHONE (OUTPATIENT)
Dept: FAMILY MEDICINE CLINIC | Facility: CLINIC | Age: 63
End: 2023-05-30

## 2023-05-31 ENCOUNTER — READMISSION MANAGEMENT (OUTPATIENT)
Dept: CALL CENTER | Facility: HOSPITAL | Age: 63
End: 2023-05-31

## 2023-06-01 DIAGNOSIS — G62.9 NEUROPATHY: ICD-10-CM

## 2023-06-01 DIAGNOSIS — M54.50 CHRONIC LOW BACK PAIN WITHOUT SCIATICA, UNSPECIFIED BACK PAIN LATERALITY: ICD-10-CM

## 2023-06-01 DIAGNOSIS — G89.29 CHRONIC LOW BACK PAIN WITHOUT SCIATICA, UNSPECIFIED BACK PAIN LATERALITY: ICD-10-CM

## 2023-06-01 DIAGNOSIS — F41.9 ANXIETY: ICD-10-CM

## 2023-06-01 RX ORDER — GABAPENTIN 600 MG/1
600 TABLET ORAL 3 TIMES DAILY
Qty: 90 TABLET | Refills: 1 | Status: SHIPPED | OUTPATIENT
Start: 2023-06-01

## 2023-06-01 RX ORDER — ASPIRIN 81 MG/1
81 TABLET ORAL DAILY
Qty: 90 TABLET | Refills: 3 | Status: SHIPPED | OUTPATIENT
Start: 2023-06-01

## 2023-06-01 RX ORDER — OXYCODONE AND ACETAMINOPHEN 7.5; 325 MG/1; MG/1
1 TABLET ORAL EVERY 6 HOURS PRN
Qty: 60 TABLET | Refills: 0 | Status: SHIPPED | OUTPATIENT
Start: 2023-06-01

## 2023-06-01 RX ORDER — CLONAZEPAM 0.5 MG/1
0.5 TABLET ORAL 4 TIMES DAILY
Qty: 120 TABLET | Refills: 1 | Status: SHIPPED | OUTPATIENT
Start: 2023-06-01

## 2023-06-12 ENCOUNTER — OFFICE VISIT (OUTPATIENT)
Dept: SLEEP MEDICINE | Facility: CLINIC | Age: 63
End: 2023-06-12
Payer: COMMERCIAL

## 2023-06-12 VITALS
HEART RATE: 68 BPM | BODY MASS INDEX: 30.66 KG/M2 | WEIGHT: 207 LBS | OXYGEN SATURATION: 94 % | RESPIRATION RATE: 14 BRPM | HEIGHT: 69 IN

## 2023-06-12 DIAGNOSIS — G47.8 NON-RESTORATIVE SLEEP: ICD-10-CM

## 2023-06-12 DIAGNOSIS — E66.3 OVERWEIGHT WITH BODY MASS INDEX (BMI) 25.0-29.9: ICD-10-CM

## 2023-06-12 DIAGNOSIS — R06.83 SNORING: ICD-10-CM

## 2023-06-12 DIAGNOSIS — G47.10 HYPERSOMNIA: ICD-10-CM

## 2023-06-12 DIAGNOSIS — I63.9 CEREBROVASCULAR ACCIDENT (CVA), UNSPECIFIED MECHANISM: ICD-10-CM

## 2023-06-12 DIAGNOSIS — G47.34 SLEEP RELATED HYPOXIA: ICD-10-CM

## 2023-06-12 DIAGNOSIS — G47.30 SLEEP APNEA, UNSPECIFIED TYPE: Primary | ICD-10-CM

## 2023-06-12 DIAGNOSIS — N39.44 NOCTURNAL ENURESIS: ICD-10-CM

## 2023-06-12 DIAGNOSIS — G47.52 RBD (REM BEHAVIORAL DISORDER): ICD-10-CM

## 2023-07-13 PROBLEM — J44.9 COPD (CHRONIC OBSTRUCTIVE PULMONARY DISEASE): Status: ACTIVE | Noted: 2023-07-13

## 2023-07-25 ENCOUNTER — READMISSION MANAGEMENT (OUTPATIENT)
Dept: CALL CENTER | Facility: HOSPITAL | Age: 63
End: 2023-07-25
Payer: MEDICARE

## 2023-07-28 ENCOUNTER — OFFICE VISIT (OUTPATIENT)
Dept: PAIN MEDICINE | Facility: CLINIC | Age: 63
End: 2023-07-28
Payer: MEDICARE

## 2023-07-28 VITALS
SYSTOLIC BLOOD PRESSURE: 128 MMHG | HEART RATE: 72 BPM | OXYGEN SATURATION: 96 % | RESPIRATION RATE: 16 BRPM | DIASTOLIC BLOOD PRESSURE: 83 MMHG

## 2023-07-28 DIAGNOSIS — M51.36 DDD (DEGENERATIVE DISC DISEASE), LUMBAR: ICD-10-CM

## 2023-07-28 DIAGNOSIS — M25.562 CHRONIC PAIN OF BOTH KNEES: ICD-10-CM

## 2023-07-28 DIAGNOSIS — G89.29 CHRONIC PAIN OF BOTH KNEES: ICD-10-CM

## 2023-07-28 DIAGNOSIS — M47.816 SPONDYLOSIS OF LUMBAR REGION WITHOUT MYELOPATHY OR RADICULOPATHY: Primary | ICD-10-CM

## 2023-07-28 DIAGNOSIS — M25.561 CHRONIC PAIN OF BOTH KNEES: ICD-10-CM

## 2023-07-28 PROCEDURE — 1159F MED LIST DOCD IN RCRD: CPT | Performed by: STUDENT IN AN ORGANIZED HEALTH CARE EDUCATION/TRAINING PROGRAM

## 2023-07-28 PROCEDURE — 3074F SYST BP LT 130 MM HG: CPT | Performed by: STUDENT IN AN ORGANIZED HEALTH CARE EDUCATION/TRAINING PROGRAM

## 2023-07-28 PROCEDURE — 99204 OFFICE O/P NEW MOD 45 MIN: CPT | Performed by: STUDENT IN AN ORGANIZED HEALTH CARE EDUCATION/TRAINING PROGRAM

## 2023-07-28 PROCEDURE — G0463 HOSPITAL OUTPT CLINIC VISIT: HCPCS | Performed by: STUDENT IN AN ORGANIZED HEALTH CARE EDUCATION/TRAINING PROGRAM

## 2023-07-28 PROCEDURE — 3079F DIAST BP 80-89 MM HG: CPT | Performed by: STUDENT IN AN ORGANIZED HEALTH CARE EDUCATION/TRAINING PROGRAM

## 2023-07-28 PROCEDURE — 1125F AMNT PAIN NOTED PAIN PRSNT: CPT | Performed by: STUDENT IN AN ORGANIZED HEALTH CARE EDUCATION/TRAINING PROGRAM

## 2023-07-28 PROCEDURE — 1160F RVW MEDS BY RX/DR IN RCRD: CPT | Performed by: STUDENT IN AN ORGANIZED HEALTH CARE EDUCATION/TRAINING PROGRAM

## 2023-07-28 RX ORDER — BACLOFEN 10 MG/1
10 TABLET ORAL 3 TIMES DAILY
Qty: 90 TABLET | Refills: 0 | Status: SHIPPED | OUTPATIENT
Start: 2023-07-28

## 2023-08-01 ENCOUNTER — OFFICE VISIT (OUTPATIENT)
Dept: FAMILY MEDICINE CLINIC | Facility: CLINIC | Age: 63
End: 2023-08-01
Payer: COMMERCIAL

## 2023-08-01 VITALS
HEIGHT: 69 IN | SYSTOLIC BLOOD PRESSURE: 100 MMHG | BODY MASS INDEX: 31.37 KG/M2 | DIASTOLIC BLOOD PRESSURE: 64 MMHG | WEIGHT: 211.8 LBS | HEART RATE: 64 BPM | OXYGEN SATURATION: 92 %

## 2023-08-01 DIAGNOSIS — Z72.0 TOBACCO USE: ICD-10-CM

## 2023-08-01 DIAGNOSIS — E53.8 B12 DEFICIENCY: ICD-10-CM

## 2023-08-01 DIAGNOSIS — J41.1 MUCOPURULENT CHRONIC BRONCHITIS: Primary | ICD-10-CM

## 2023-08-01 DIAGNOSIS — B07.9 WART OF HAND: ICD-10-CM

## 2023-08-01 RX ORDER — BUDESONIDE, GLYCOPYRROLATE, AND FORMOTEROL FUMARATE 160; 9; 4.8 UG/1; UG/1; UG/1
2 AEROSOL, METERED RESPIRATORY (INHALATION) 2 TIMES DAILY
Qty: 10.7 G | Refills: 6 | Status: SHIPPED | OUTPATIENT
Start: 2023-08-01

## 2023-08-01 RX ORDER — CYANOCOBALAMIN 1000 UG/ML
1000 INJECTION, SOLUTION INTRAMUSCULAR; SUBCUTANEOUS
Status: SHIPPED | OUTPATIENT
Start: 2023-08-01

## 2023-08-01 RX ORDER — VARENICLINE TARTRATE 1 MG/1
1 TABLET, FILM COATED ORAL 2 TIMES DAILY
Qty: 56 TABLET | Refills: 1 | Status: SHIPPED | OUTPATIENT
Start: 2023-08-29 | End: 2023-10-24

## 2023-08-02 ENCOUNTER — READMISSION MANAGEMENT (OUTPATIENT)
Dept: CALL CENTER | Facility: HOSPITAL | Age: 63
End: 2023-08-02
Payer: MEDICARE

## 2023-08-03 ENCOUNTER — TELEPHONE (OUTPATIENT)
Dept: FAMILY MEDICINE CLINIC | Facility: CLINIC | Age: 63
End: 2023-08-03
Payer: MEDICARE

## 2023-08-07 DIAGNOSIS — M54.50 CHRONIC LOW BACK PAIN WITHOUT SCIATICA, UNSPECIFIED BACK PAIN LATERALITY: ICD-10-CM

## 2023-08-07 DIAGNOSIS — F41.9 ANXIETY: ICD-10-CM

## 2023-08-07 DIAGNOSIS — G89.29 CHRONIC LOW BACK PAIN WITHOUT SCIATICA, UNSPECIFIED BACK PAIN LATERALITY: ICD-10-CM

## 2023-08-07 RX ORDER — AMLODIPINE BESYLATE AND BENAZEPRIL HYDROCHLORIDE 10; 20 MG/1; MG/1
1 CAPSULE ORAL NIGHTLY
Qty: 90 CAPSULE | Refills: 1 | Status: SHIPPED | OUTPATIENT
Start: 2023-08-07

## 2023-08-07 RX ORDER — CLONAZEPAM 0.5 MG/1
0.5 TABLET ORAL 4 TIMES DAILY
Qty: 120 TABLET | Refills: 1 | Status: SHIPPED | OUTPATIENT
Start: 2023-08-07

## 2023-08-07 RX ORDER — OXYCODONE AND ACETAMINOPHEN 7.5; 325 MG/1; MG/1
1 TABLET ORAL EVERY 6 HOURS PRN
Qty: 60 TABLET | Refills: 0 | Status: SHIPPED | OUTPATIENT
Start: 2023-08-07

## 2023-08-10 ENCOUNTER — HOSPITAL ENCOUNTER (OUTPATIENT)
Dept: GENERAL RADIOLOGY | Facility: HOSPITAL | Age: 63
Discharge: HOME | End: 2023-08-10
Payer: MEDICARE

## 2023-08-10 ENCOUNTER — OFFICE VISIT (OUTPATIENT)
Dept: FAMILY MEDICINE CLINIC | Facility: CLINIC | Age: 63
End: 2023-08-10
Payer: MEDICARE

## 2023-08-10 ENCOUNTER — TELEPHONE (OUTPATIENT)
Dept: PAIN MEDICINE | Facility: CLINIC | Age: 63
End: 2023-08-10

## 2023-08-10 VITALS
DIASTOLIC BLOOD PRESSURE: 77 MMHG | RESPIRATION RATE: 12 BRPM | HEIGHT: 69 IN | WEIGHT: 210.6 LBS | TEMPERATURE: 98.2 F | OXYGEN SATURATION: 95 % | HEART RATE: 68 BPM | SYSTOLIC BLOOD PRESSURE: 132 MMHG | BODY MASS INDEX: 31.19 KG/M2

## 2023-08-10 DIAGNOSIS — M25.561 CHRONIC PAIN OF BOTH KNEES: ICD-10-CM

## 2023-08-10 DIAGNOSIS — G89.29 CHRONIC PAIN OF BOTH KNEES: ICD-10-CM

## 2023-08-10 DIAGNOSIS — M25.562 CHRONIC PAIN OF BOTH KNEES: ICD-10-CM

## 2023-08-10 DIAGNOSIS — R41.3 MEMORY LOSS: Primary | ICD-10-CM

## 2023-08-10 DIAGNOSIS — M51.36 DDD (DEGENERATIVE DISC DISEASE), LUMBAR: ICD-10-CM

## 2023-08-10 PROCEDURE — 73560 X-RAY EXAM OF KNEE 1 OR 2: CPT

## 2023-08-10 PROCEDURE — 72100 X-RAY EXAM L-S SPINE 2/3 VWS: CPT

## 2023-08-10 RX ORDER — MEMANTINE HYDROCHLORIDE 5 MG/1
5 TABLET ORAL DAILY
Qty: 30 TABLET | Refills: 2 | Status: SHIPPED | OUTPATIENT
Start: 2023-08-10 | End: 2023-11-08

## 2023-08-12 DIAGNOSIS — E78.2 MIXED HYPERLIPIDEMIA: ICD-10-CM

## 2023-08-14 RX ORDER — ATORVASTATIN CALCIUM 20 MG/1
TABLET, FILM COATED ORAL
Qty: 90 TABLET | Refills: 1 | Status: SHIPPED | OUTPATIENT
Start: 2023-08-14

## 2023-08-23 ENCOUNTER — OFFICE VISIT (OUTPATIENT)
Dept: PULMONOLOGY | Facility: HOSPITAL | Age: 63
End: 2023-08-23
Payer: MEDICARE

## 2023-08-23 VITALS
RESPIRATION RATE: 12 BRPM | SYSTOLIC BLOOD PRESSURE: 160 MMHG | BODY MASS INDEX: 30.96 KG/M2 | DIASTOLIC BLOOD PRESSURE: 90 MMHG | HEIGHT: 69 IN | OXYGEN SATURATION: 94 % | HEART RATE: 58 BPM | WEIGHT: 209 LBS

## 2023-08-23 DIAGNOSIS — F17.210 NICOTINE DEPENDENCE, CIGARETTES, UNCOMPLICATED: ICD-10-CM

## 2023-08-23 DIAGNOSIS — M54.50 CHRONIC LOW BACK PAIN WITHOUT SCIATICA, UNSPECIFIED BACK PAIN LATERALITY: ICD-10-CM

## 2023-08-23 DIAGNOSIS — G89.29 CHRONIC LOW BACK PAIN WITHOUT SCIATICA, UNSPECIFIED BACK PAIN LATERALITY: ICD-10-CM

## 2023-08-23 DIAGNOSIS — G47.33 OSA (OBSTRUCTIVE SLEEP APNEA): Primary | ICD-10-CM

## 2023-08-23 DIAGNOSIS — J44.9 CHRONIC OBSTRUCTIVE PULMONARY DISEASE, UNSPECIFIED COPD TYPE: ICD-10-CM

## 2023-08-23 PROCEDURE — G0463 HOSPITAL OUTPT CLINIC VISIT: HCPCS

## 2023-08-23 RX ORDER — VENLAFAXINE HYDROCHLORIDE 75 MG/1
75 CAPSULE, EXTENDED RELEASE ORAL DAILY
Qty: 30 CAPSULE | Refills: 2 | COMMUNITY
Start: 2023-08-15 | End: 2023-11-13

## 2023-08-24 DIAGNOSIS — Z72.0 TOBACCO USE: ICD-10-CM

## 2023-08-24 RX ORDER — OXYCODONE AND ACETAMINOPHEN 7.5; 325 MG/1; MG/1
1 TABLET ORAL EVERY 6 HOURS PRN
Qty: 60 TABLET | Refills: 0 | OUTPATIENT
Start: 2023-08-24

## 2023-08-24 RX ORDER — VARENICLINE TARTRATE 1 MG/1
1 TABLET, FILM COATED ORAL 2 TIMES DAILY
Qty: 56 TABLET | Refills: 1 | OUTPATIENT
Start: 2023-08-24 | End: 2023-10-19

## 2023-09-06 ENCOUNTER — HOSPITAL ENCOUNTER (OUTPATIENT)
Dept: SLEEP MEDICINE | Facility: HOSPITAL | Age: 63
End: 2023-09-06
Payer: MEDICARE

## 2023-09-06 VITALS — HEIGHT: 69 IN | WEIGHT: 209 LBS | BODY MASS INDEX: 30.96 KG/M2

## 2023-09-06 DIAGNOSIS — G47.33 OSA (OBSTRUCTIVE SLEEP APNEA): ICD-10-CM

## 2023-09-06 PROCEDURE — 95810 POLYSOM 6/> YRS 4/> PARAM: CPT

## 2023-09-12 DIAGNOSIS — M54.50 CHRONIC LOW BACK PAIN WITHOUT SCIATICA, UNSPECIFIED BACK PAIN LATERALITY: ICD-10-CM

## 2023-09-12 DIAGNOSIS — G89.29 CHRONIC LOW BACK PAIN WITHOUT SCIATICA, UNSPECIFIED BACK PAIN LATERALITY: ICD-10-CM

## 2023-09-12 RX ORDER — OXYCODONE AND ACETAMINOPHEN 7.5; 325 MG/1; MG/1
1 TABLET ORAL EVERY 6 HOURS PRN
Qty: 60 TABLET | Refills: 0 | Status: SHIPPED | OUTPATIENT
Start: 2023-09-12

## 2023-09-18 RX ORDER — MEMANTINE HYDROCHLORIDE 5 MG/1
TABLET ORAL
Qty: 30 TABLET | Refills: 2 | Status: SHIPPED | OUTPATIENT
Start: 2023-09-18

## 2023-09-19 ENCOUNTER — HOSPITAL ENCOUNTER (OUTPATIENT)
Dept: CT IMAGING | Facility: HOSPITAL | Age: 63
Discharge: HOME | End: 2023-09-19
Payer: MEDICARE

## 2023-09-19 ENCOUNTER — HOSPITAL ENCOUNTER (OUTPATIENT)
Dept: RESPIRATORY THERAPY | Facility: HOSPITAL | Age: 63
Discharge: HOME | End: 2023-09-19
Payer: MEDICARE

## 2023-09-19 VITALS — RESPIRATION RATE: 12 BRPM | HEART RATE: 64 BPM | OXYGEN SATURATION: 91 %

## 2023-09-19 DIAGNOSIS — F17.210 NICOTINE DEPENDENCE, CIGARETTES, UNCOMPLICATED: ICD-10-CM

## 2023-09-19 DIAGNOSIS — J44.9 CHRONIC OBSTRUCTIVE PULMONARY DISEASE, UNSPECIFIED COPD TYPE: ICD-10-CM

## 2023-09-19 PROCEDURE — 71271 CT THORAX LUNG CANCER SCR C-: CPT

## 2023-09-19 PROCEDURE — 94726 PLETHYSMOGRAPHY LUNG VOLUMES: CPT

## 2023-09-19 PROCEDURE — 94060 EVALUATION OF WHEEZING: CPT

## 2023-09-19 PROCEDURE — 94664 DEMO&/EVAL PT USE INHALER: CPT

## 2023-09-19 PROCEDURE — 94729 DIFFUSING CAPACITY: CPT

## 2023-09-19 PROCEDURE — 94799 UNLISTED PULMONARY SVC/PX: CPT

## 2023-09-19 RX ORDER — MELOXICAM 15 MG/1
TABLET ORAL
Qty: 90 TABLET | Refills: 3 | Status: SHIPPED | OUTPATIENT
Start: 2023-09-19

## 2023-09-19 RX ORDER — ALBUTEROL SULFATE 90 UG/1
2 AEROSOL, METERED RESPIRATORY (INHALATION) ONCE
Status: COMPLETED | OUTPATIENT
Start: 2023-09-19 | End: 2023-09-19

## 2023-09-19 RX ADMIN — ALBUTEROL SULFATE 2 PUFF: 108 AEROSOL, METERED RESPIRATORY (INHALATION) at 14:01

## 2023-09-27 DIAGNOSIS — M54.50 CHRONIC LOW BACK PAIN WITHOUT SCIATICA, UNSPECIFIED BACK PAIN LATERALITY: ICD-10-CM

## 2023-09-27 DIAGNOSIS — G89.29 CHRONIC LOW BACK PAIN WITHOUT SCIATICA, UNSPECIFIED BACK PAIN LATERALITY: ICD-10-CM

## 2023-09-28 RX ORDER — OXYCODONE AND ACETAMINOPHEN 7.5; 325 MG/1; MG/1
1 TABLET ORAL EVERY 6 HOURS PRN
Qty: 60 TABLET | Refills: 0 | Status: SHIPPED | OUTPATIENT
Start: 2023-09-28

## 2023-10-12 DIAGNOSIS — F41.9 ANXIETY: ICD-10-CM

## 2023-10-12 DIAGNOSIS — M54.50 CHRONIC LOW BACK PAIN WITHOUT SCIATICA, UNSPECIFIED BACK PAIN LATERALITY: ICD-10-CM

## 2023-10-12 DIAGNOSIS — G89.29 CHRONIC LOW BACK PAIN WITHOUT SCIATICA, UNSPECIFIED BACK PAIN LATERALITY: ICD-10-CM

## 2023-10-12 RX ORDER — PANTOPRAZOLE SODIUM 40 MG/1
40 TABLET, DELAYED RELEASE ORAL DAILY
Qty: 90 TABLET | Refills: 1 | Status: SHIPPED | OUTPATIENT
Start: 2023-10-12

## 2023-10-12 RX ORDER — VENLAFAXINE HYDROCHLORIDE 75 MG/1
75 CAPSULE, EXTENDED RELEASE ORAL DAILY
Qty: 90 CAPSULE | Refills: 1 | Status: SHIPPED | OUTPATIENT
Start: 2023-10-12

## 2023-10-13 RX ORDER — CLONAZEPAM 0.5 MG/1
0.5 TABLET ORAL 4 TIMES DAILY
Qty: 120 TABLET | Refills: 1 | Status: SHIPPED | OUTPATIENT
Start: 2023-10-13

## 2023-10-13 RX ORDER — OXYCODONE AND ACETAMINOPHEN 7.5; 325 MG/1; MG/1
1 TABLET ORAL EVERY 6 HOURS PRN
Qty: 60 TABLET | Refills: 0 | Status: SHIPPED | OUTPATIENT
Start: 2023-10-13

## 2023-10-17 ENCOUNTER — PATIENT MESSAGE (OUTPATIENT)
Dept: FAMILY MEDICINE CLINIC | Facility: CLINIC | Age: 63
End: 2023-10-17
Payer: MEDICARE

## 2023-10-19 ENCOUNTER — TELEPHONE (OUTPATIENT)
Dept: FAMILY MEDICINE CLINIC | Facility: CLINIC | Age: 63
End: 2023-10-19
Payer: MEDICARE

## 2023-10-26 DIAGNOSIS — G89.29 CHRONIC LOW BACK PAIN WITHOUT SCIATICA, UNSPECIFIED BACK PAIN LATERALITY: ICD-10-CM

## 2023-10-26 DIAGNOSIS — M54.50 CHRONIC LOW BACK PAIN WITHOUT SCIATICA, UNSPECIFIED BACK PAIN LATERALITY: ICD-10-CM

## 2023-10-26 RX ORDER — OXYCODONE AND ACETAMINOPHEN 7.5; 325 MG/1; MG/1
1 TABLET ORAL EVERY 6 HOURS PRN
Qty: 60 TABLET | Refills: 0 | Status: SHIPPED | OUTPATIENT
Start: 2023-10-26

## 2023-11-15 ENCOUNTER — APPOINTMENT (OUTPATIENT)
Dept: GENERAL RADIOLOGY | Facility: HOSPITAL | Age: 63
End: 2023-11-15
Payer: MEDICARE

## 2023-11-15 ENCOUNTER — HOSPITAL ENCOUNTER (EMERGENCY)
Facility: HOSPITAL | Age: 63
Discharge: HOME | End: 2023-11-15
Attending: EMERGENCY MEDICINE | Admitting: EMERGENCY MEDICINE
Payer: MEDICARE

## 2023-11-15 VITALS
SYSTOLIC BLOOD PRESSURE: 125 MMHG | HEIGHT: 68 IN | OXYGEN SATURATION: 94 % | BODY MASS INDEX: 27.7 KG/M2 | WEIGHT: 182.76 LBS | RESPIRATION RATE: 19 BRPM | TEMPERATURE: 98 F | HEART RATE: 55 BPM | DIASTOLIC BLOOD PRESSURE: 81 MMHG

## 2023-11-15 DIAGNOSIS — G89.29 CHRONIC LOW BACK PAIN WITHOUT SCIATICA, UNSPECIFIED BACK PAIN LATERALITY: ICD-10-CM

## 2023-11-15 DIAGNOSIS — B34.9 VIRAL SYNDROME: ICD-10-CM

## 2023-11-15 DIAGNOSIS — R53.1 WEAKNESS: Primary | ICD-10-CM

## 2023-11-15 DIAGNOSIS — M54.50 CHRONIC LOW BACK PAIN WITHOUT SCIATICA, UNSPECIFIED BACK PAIN LATERALITY: ICD-10-CM

## 2023-11-15 DIAGNOSIS — M79.10 MYALGIA: ICD-10-CM

## 2023-11-15 LAB
ANION GAP SERPL CALCULATED.3IONS-SCNC: 11 MMOL/L (ref 5–15)
B PARAPERT DNA SPEC QL NAA+PROBE: NOT DETECTED
B PERT DNA SPEC QL NAA+PROBE: NOT DETECTED
BACTERIA UR QL AUTO: NORMAL /HPF
BASOPHILS # BLD AUTO: 0 10*3/MM3 (ref 0–0.2)
BASOPHILS NFR BLD AUTO: 0.7 % (ref 0–1.5)
BILIRUB UR QL STRIP: NEGATIVE
BUN SERPL-MCNC: 16 MG/DL (ref 8–23)
BUN/CREAT SERPL: 22.5 (ref 7–25)
C PNEUM DNA NPH QL NAA+NON-PROBE: NOT DETECTED
CALCIUM SPEC-SCNC: 9.2 MG/DL (ref 8.6–10.5)
CHLORIDE SERPL-SCNC: 105 MMOL/L (ref 98–107)
CLARITY UR: CLEAR
CO2 SERPL-SCNC: 25 MMOL/L (ref 22–29)
COLOR UR: ABNORMAL
CREAT SERPL-MCNC: 0.71 MG/DL (ref 0.76–1.27)
DEPRECATED RDW RBC AUTO: 46.4 FL (ref 37–54)
EGFRCR SERPLBLD CKD-EPI 2021: 102.5 ML/MIN/1.73
EOSINOPHIL # BLD AUTO: 0.1 10*3/MM3 (ref 0–0.4)
EOSINOPHIL NFR BLD AUTO: 1.3 % (ref 0.3–6.2)
ERYTHROCYTE [DISTWIDTH] IN BLOOD BY AUTOMATED COUNT: 14.2 % (ref 12.3–15.4)
FLUAV SUBTYP SPEC NAA+PROBE: NOT DETECTED
FLUBV RNA ISLT QL NAA+PROBE: NOT DETECTED
GLUCOSE SERPL-MCNC: 94 MG/DL (ref 65–99)
GLUCOSE UR STRIP-MCNC: NEGATIVE MG/DL
HADV DNA SPEC NAA+PROBE: NOT DETECTED
HCOV 229E RNA SPEC QL NAA+PROBE: NOT DETECTED
HCOV HKU1 RNA SPEC QL NAA+PROBE: NOT DETECTED
HCOV NL63 RNA SPEC QL NAA+PROBE: NOT DETECTED
HCOV OC43 RNA SPEC QL NAA+PROBE: NOT DETECTED
HCT VFR BLD AUTO: 45.7 % (ref 37.5–51)
HGB BLD-MCNC: 15.2 G/DL (ref 13–17.7)
HGB UR QL STRIP.AUTO: NEGATIVE
HMPV RNA NPH QL NAA+NON-PROBE: NOT DETECTED
HPIV1 RNA ISLT QL NAA+PROBE: NOT DETECTED
HPIV2 RNA SPEC QL NAA+PROBE: NOT DETECTED
HPIV3 RNA NPH QL NAA+PROBE: NOT DETECTED
HPIV4 P GENE NPH QL NAA+PROBE: NOT DETECTED
HYALINE CASTS UR QL AUTO: NORMAL /LPF
KETONES UR QL STRIP: ABNORMAL
LEUKOCYTE ESTERASE UR QL STRIP.AUTO: ABNORMAL
LYMPHOCYTES # BLD AUTO: 2.3 10*3/MM3 (ref 0.7–3.1)
LYMPHOCYTES NFR BLD AUTO: 33.2 % (ref 19.6–45.3)
M PNEUMO IGG SER IA-ACNC: NOT DETECTED
MCH RBC QN AUTO: 31.6 PG (ref 26.6–33)
MCHC RBC AUTO-ENTMCNC: 33.3 G/DL (ref 31.5–35.7)
MCV RBC AUTO: 94.8 FL (ref 79–97)
MONOCYTES # BLD AUTO: 0.6 10*3/MM3 (ref 0.1–0.9)
MONOCYTES NFR BLD AUTO: 8.4 % (ref 5–12)
NEUTROPHILS NFR BLD AUTO: 3.8 10*3/MM3 (ref 1.7–7)
NEUTROPHILS NFR BLD AUTO: 56.4 % (ref 42.7–76)
NITRITE UR QL STRIP: NEGATIVE
NRBC BLD AUTO-RTO: 0 /100 WBC (ref 0–0.2)
PH UR STRIP.AUTO: 5.5 [PH] (ref 5–8)
PLATELET # BLD AUTO: 193 10*3/MM3 (ref 140–450)
PMV BLD AUTO: 8.4 FL (ref 6–12)
POTASSIUM SERPL-SCNC: 3.8 MMOL/L (ref 3.5–5.2)
PROT UR QL STRIP: ABNORMAL
RBC # BLD AUTO: 4.82 10*6/MM3 (ref 4.14–5.8)
RBC # UR STRIP: NORMAL /HPF
REF LAB TEST METHOD: NORMAL
RHINOVIRUS RNA SPEC NAA+PROBE: NOT DETECTED
RSV RNA NPH QL NAA+NON-PROBE: NOT DETECTED
SARS-COV-2 RNA NPH QL NAA+NON-PROBE: NOT DETECTED
SODIUM SERPL-SCNC: 141 MMOL/L (ref 136–145)
SP GR UR STRIP: 1.03 (ref 1–1.03)
SQUAMOUS #/AREA URNS HPF: NORMAL /HPF
UROBILINOGEN UR QL STRIP: ABNORMAL
WBC # UR STRIP: NORMAL /HPF
WBC NRBC COR # BLD: 6.8 10*3/MM3 (ref 3.4–10.8)

## 2023-11-15 PROCEDURE — 71045 X-RAY EXAM CHEST 1 VIEW: CPT

## 2023-11-15 PROCEDURE — 81001 URINALYSIS AUTO W/SCOPE: CPT | Performed by: EMERGENCY MEDICINE

## 2023-11-15 PROCEDURE — 0202U NFCT DS 22 TRGT SARS-COV-2: CPT | Performed by: EMERGENCY MEDICINE

## 2023-11-15 PROCEDURE — 80048 BASIC METABOLIC PNL TOTAL CA: CPT | Performed by: EMERGENCY MEDICINE

## 2023-11-15 PROCEDURE — 99283 EMERGENCY DEPT VISIT LOW MDM: CPT

## 2023-11-15 PROCEDURE — 85025 COMPLETE CBC W/AUTO DIFF WBC: CPT | Performed by: EMERGENCY MEDICINE

## 2023-11-15 RX ORDER — SODIUM CHLORIDE 0.9 % (FLUSH) 0.9 %
10 SYRINGE (ML) INJECTION AS NEEDED
Status: DISCONTINUED | OUTPATIENT
Start: 2023-11-15 | End: 2023-11-15 | Stop reason: HOSPADM

## 2023-11-16 ENCOUNTER — PATIENT OUTREACH (OUTPATIENT)
Dept: CASE MANAGEMENT | Facility: OTHER | Age: 63
End: 2023-11-16
Payer: MEDICARE

## 2023-11-16 RX ORDER — OXYCODONE AND ACETAMINOPHEN 7.5; 325 MG/1; MG/1
1 TABLET ORAL EVERY 6 HOURS PRN
Qty: 60 TABLET | Refills: 0 | Status: SHIPPED | OUTPATIENT
Start: 2023-11-16

## 2023-11-29 DIAGNOSIS — G89.29 CHRONIC LOW BACK PAIN WITHOUT SCIATICA, UNSPECIFIED BACK PAIN LATERALITY: ICD-10-CM

## 2023-11-29 DIAGNOSIS — M54.50 CHRONIC LOW BACK PAIN WITHOUT SCIATICA, UNSPECIFIED BACK PAIN LATERALITY: ICD-10-CM

## 2023-11-30 RX ORDER — OXYCODONE AND ACETAMINOPHEN 7.5; 325 MG/1; MG/1
1 TABLET ORAL EVERY 6 HOURS PRN
Qty: 60 TABLET | Refills: 0 | Status: SHIPPED | OUTPATIENT
Start: 2023-11-30

## 2023-12-05 ENCOUNTER — OFFICE VISIT (OUTPATIENT)
Dept: PULMONOLOGY | Facility: HOSPITAL | Age: 63
End: 2023-12-05
Payer: MEDICARE

## 2023-12-05 VITALS
WEIGHT: 186 LBS | HEART RATE: 57 BPM | RESPIRATION RATE: 14 BRPM | HEIGHT: 68 IN | SYSTOLIC BLOOD PRESSURE: 112 MMHG | DIASTOLIC BLOOD PRESSURE: 73 MMHG | BODY MASS INDEX: 28.19 KG/M2 | OXYGEN SATURATION: 95 %

## 2023-12-05 DIAGNOSIS — F17.210 NICOTINE DEPENDENCE, CIGARETTES, UNCOMPLICATED: Primary | ICD-10-CM

## 2023-12-05 PROCEDURE — G0463 HOSPITAL OUTPT CLINIC VISIT: HCPCS

## 2023-12-14 DIAGNOSIS — M54.50 CHRONIC LOW BACK PAIN WITHOUT SCIATICA, UNSPECIFIED BACK PAIN LATERALITY: ICD-10-CM

## 2023-12-14 DIAGNOSIS — G89.29 CHRONIC LOW BACK PAIN WITHOUT SCIATICA, UNSPECIFIED BACK PAIN LATERALITY: ICD-10-CM

## 2023-12-15 RX ORDER — AMLODIPINE BESYLATE AND BENAZEPRIL HYDROCHLORIDE 10; 20 MG/1; MG/1
1 CAPSULE ORAL NIGHTLY
Qty: 90 CAPSULE | Refills: 1 | Status: SHIPPED | OUTPATIENT
Start: 2023-12-15

## 2023-12-15 RX ORDER — OXYCODONE AND ACETAMINOPHEN 7.5; 325 MG/1; MG/1
1 TABLET ORAL EVERY 6 HOURS PRN
Qty: 60 TABLET | Refills: 0 | Status: SHIPPED | OUTPATIENT
Start: 2023-12-15

## 2023-12-15 RX ORDER — ASPIRIN 81 MG/1
81 TABLET ORAL DAILY
Qty: 90 TABLET | Refills: 3 | Status: SHIPPED | OUTPATIENT
Start: 2023-12-15

## 2023-12-19 DIAGNOSIS — F41.9 ANXIETY: ICD-10-CM

## 2023-12-20 DIAGNOSIS — G62.9 NEUROPATHY: ICD-10-CM

## 2023-12-20 RX ORDER — GABAPENTIN 600 MG/1
600 TABLET ORAL 3 TIMES DAILY
Qty: 90 TABLET | Refills: 1 | Status: SHIPPED | OUTPATIENT
Start: 2023-12-20

## 2023-12-21 RX ORDER — PANTOPRAZOLE SODIUM 40 MG/1
40 TABLET, DELAYED RELEASE ORAL DAILY
Qty: 90 TABLET | Refills: 1 | Status: SHIPPED | OUTPATIENT
Start: 2023-12-21

## 2023-12-21 RX ORDER — CLONAZEPAM 0.5 MG/1
0.5 TABLET ORAL 4 TIMES DAILY
Qty: 120 TABLET | Refills: 1 | Status: SHIPPED | OUTPATIENT
Start: 2023-12-21

## 2023-12-26 RX ORDER — MEMANTINE HYDROCHLORIDE 5 MG/1
TABLET ORAL
Qty: 90 TABLET | Refills: 1 | Status: SHIPPED | OUTPATIENT
Start: 2023-12-26

## 2024-01-02 DIAGNOSIS — M54.50 CHRONIC LOW BACK PAIN WITHOUT SCIATICA, UNSPECIFIED BACK PAIN LATERALITY: ICD-10-CM

## 2024-01-02 DIAGNOSIS — G89.29 CHRONIC LOW BACK PAIN WITHOUT SCIATICA, UNSPECIFIED BACK PAIN LATERALITY: ICD-10-CM

## 2024-01-02 RX ORDER — OXYCODONE AND ACETAMINOPHEN 7.5; 325 MG/1; MG/1
1 TABLET ORAL EVERY 6 HOURS PRN
Qty: 60 TABLET | Refills: 0 | Status: SHIPPED | OUTPATIENT
Start: 2024-01-02

## 2024-01-12 DIAGNOSIS — E34.9 TESTOSTERONE DEFICIENCY: ICD-10-CM

## 2024-01-12 RX ORDER — TESTOSTERONE CYPIONATE 200 MG/ML
100 INJECTION, SOLUTION INTRAMUSCULAR
Qty: 4 ML | Refills: 0 | Status: SHIPPED | OUTPATIENT
Start: 2024-01-12

## 2024-01-16 DIAGNOSIS — G89.29 CHRONIC LOW BACK PAIN WITHOUT SCIATICA, UNSPECIFIED BACK PAIN LATERALITY: ICD-10-CM

## 2024-01-16 DIAGNOSIS — G62.9 NEUROPATHY: ICD-10-CM

## 2024-01-16 DIAGNOSIS — F41.9 ANXIETY: ICD-10-CM

## 2024-01-16 DIAGNOSIS — E78.2 MIXED HYPERLIPIDEMIA: ICD-10-CM

## 2024-01-16 DIAGNOSIS — M54.50 CHRONIC LOW BACK PAIN WITHOUT SCIATICA, UNSPECIFIED BACK PAIN LATERALITY: ICD-10-CM

## 2024-01-18 ENCOUNTER — TELEPHONE (OUTPATIENT)
Dept: PAIN MEDICINE | Facility: HOSPITAL | Age: 64
End: 2024-01-18
Payer: MEDICARE

## 2024-01-18 RX ORDER — CLONAZEPAM 0.5 MG/1
0.5 TABLET ORAL 4 TIMES DAILY
Qty: 120 TABLET | Refills: 1 | Status: SHIPPED | OUTPATIENT
Start: 2024-01-18

## 2024-01-18 RX ORDER — AMLODIPINE BESYLATE AND BENAZEPRIL HYDROCHLORIDE 10; 20 MG/1; MG/1
1 CAPSULE ORAL NIGHTLY
Qty: 90 CAPSULE | Refills: 1 | Status: SHIPPED | OUTPATIENT
Start: 2024-01-18

## 2024-01-18 RX ORDER — ATORVASTATIN CALCIUM 20 MG/1
20 TABLET, FILM COATED ORAL DAILY
Qty: 90 TABLET | Refills: 1 | Status: SHIPPED | OUTPATIENT
Start: 2024-01-18

## 2024-01-18 RX ORDER — GABAPENTIN 600 MG/1
600 TABLET ORAL 3 TIMES DAILY
Qty: 90 TABLET | Refills: 1 | Status: SHIPPED | OUTPATIENT
Start: 2024-01-18

## 2024-01-18 RX ORDER — OXYCODONE AND ACETAMINOPHEN 7.5; 325 MG/1; MG/1
1 TABLET ORAL EVERY 6 HOURS PRN
Qty: 60 TABLET | Refills: 0 | Status: SHIPPED | OUTPATIENT
Start: 2024-01-18

## 2024-01-19 DIAGNOSIS — M47.816 SPONDYLOSIS OF LUMBAR REGION WITHOUT MYELOPATHY OR RADICULOPATHY: Primary | ICD-10-CM

## 2024-02-06 DIAGNOSIS — J41.1 MUCOPURULENT CHRONIC BRONCHITIS: ICD-10-CM

## 2024-02-06 DIAGNOSIS — G89.29 CHRONIC LOW BACK PAIN WITHOUT SCIATICA, UNSPECIFIED BACK PAIN LATERALITY: ICD-10-CM

## 2024-02-06 DIAGNOSIS — M54.50 CHRONIC LOW BACK PAIN WITHOUT SCIATICA, UNSPECIFIED BACK PAIN LATERALITY: ICD-10-CM

## 2024-02-06 RX ORDER — BUDESONIDE, GLYCOPYRROLATE, AND FORMOTEROL FUMARATE 160; 9; 4.8 UG/1; UG/1; UG/1
2 AEROSOL, METERED RESPIRATORY (INHALATION) 2 TIMES DAILY
Qty: 10.7 G | Refills: 6 | Status: SHIPPED | OUTPATIENT
Start: 2024-02-06

## 2024-02-08 RX ORDER — AMLODIPINE BESYLATE AND BENAZEPRIL HYDROCHLORIDE 10; 20 MG/1; MG/1
1 CAPSULE ORAL NIGHTLY
Qty: 90 CAPSULE | Refills: 1 | Status: SHIPPED | OUTPATIENT
Start: 2024-02-08

## 2024-02-08 RX ORDER — OXYCODONE AND ACETAMINOPHEN 7.5; 325 MG/1; MG/1
1 TABLET ORAL EVERY 6 HOURS PRN
Qty: 60 TABLET | Refills: 0 | Status: SHIPPED | OUTPATIENT
Start: 2024-02-08

## 2024-02-15 ENCOUNTER — OFFICE VISIT (OUTPATIENT)
Dept: CARDIOLOGY | Facility: CLINIC | Age: 64
End: 2024-02-15
Payer: MEDICARE

## 2024-02-15 VITALS
HEIGHT: 68 IN | BODY MASS INDEX: 27.81 KG/M2 | DIASTOLIC BLOOD PRESSURE: 70 MMHG | WEIGHT: 183.5 LBS | SYSTOLIC BLOOD PRESSURE: 130 MMHG | OXYGEN SATURATION: 99 % | HEART RATE: 61 BPM

## 2024-02-15 DIAGNOSIS — E78.2 MIXED HYPERLIPIDEMIA: ICD-10-CM

## 2024-02-15 DIAGNOSIS — Z72.0 TOBACCO ABUSE: ICD-10-CM

## 2024-02-15 DIAGNOSIS — E78.5 HYPERLIPIDEMIA LDL GOAL <70: ICD-10-CM

## 2024-02-15 DIAGNOSIS — Z91.89 CHRONIC CHEST PAIN WITH LOW TO MODERATE RISK FOR CAD: Primary | ICD-10-CM

## 2024-02-15 DIAGNOSIS — I10 PRIMARY HYPERTENSION: ICD-10-CM

## 2024-02-15 DIAGNOSIS — R07.9 CHRONIC CHEST PAIN WITH LOW TO MODERATE RISK FOR CAD: Primary | ICD-10-CM

## 2024-02-15 DIAGNOSIS — I25.10 CORONARY ARTERY CALCIFICATION SEEN ON CAT SCAN: ICD-10-CM

## 2024-02-15 DIAGNOSIS — I73.9 PVD (PERIPHERAL VASCULAR DISEASE) WITH CLAUDICATION: ICD-10-CM

## 2024-02-15 DIAGNOSIS — I63.9 CEREBROVASCULAR ACCIDENT (CVA), UNSPECIFIED MECHANISM: ICD-10-CM

## 2024-02-15 DIAGNOSIS — G89.29 CHRONIC CHEST PAIN WITH LOW TO MODERATE RISK FOR CAD: Primary | ICD-10-CM

## 2024-02-15 DIAGNOSIS — I35.9 AORTIC VALVE CALCIFICATION: ICD-10-CM

## 2024-02-15 PROCEDURE — 1160F RVW MEDS BY RX/DR IN RCRD: CPT | Performed by: INTERNAL MEDICINE

## 2024-02-15 PROCEDURE — 93000 ELECTROCARDIOGRAM COMPLETE: CPT | Performed by: INTERNAL MEDICINE

## 2024-02-15 PROCEDURE — 1159F MED LIST DOCD IN RCRD: CPT | Performed by: INTERNAL MEDICINE

## 2024-02-15 PROCEDURE — 99214 OFFICE O/P EST MOD 30 MIN: CPT | Performed by: INTERNAL MEDICINE

## 2024-02-15 PROCEDURE — 3078F DIAST BP <80 MM HG: CPT | Performed by: INTERNAL MEDICINE

## 2024-02-15 PROCEDURE — 3075F SYST BP GE 130 - 139MM HG: CPT | Performed by: INTERNAL MEDICINE

## 2024-02-20 ENCOUNTER — HOSPITAL ENCOUNTER (OUTPATIENT)
Dept: CARDIOLOGY | Facility: HOSPITAL | Age: 64
Discharge: HOME | End: 2024-02-20
Payer: MEDICARE

## 2024-02-20 ENCOUNTER — TELEPHONE (OUTPATIENT)
Dept: CARDIOLOGY | Facility: CLINIC | Age: 64
End: 2024-02-20

## 2024-02-20 DIAGNOSIS — Z91.89 CHRONIC CHEST PAIN WITH LOW TO MODERATE RISK FOR CAD: ICD-10-CM

## 2024-02-20 DIAGNOSIS — G89.29 CHRONIC CHEST PAIN WITH LOW TO MODERATE RISK FOR CAD: ICD-10-CM

## 2024-02-20 DIAGNOSIS — R07.9 CHRONIC CHEST PAIN WITH LOW TO MODERATE RISK FOR CAD: ICD-10-CM

## 2024-02-20 LAB
BH CV REST NUCLEAR ISOTOPE DOSE: 10.4 MCI
BH CV STRESS COMMENTS STAGE 1: NORMAL
BH CV STRESS DOSE REGADENOSON STAGE 1: 0.4
BH CV STRESS DURATION MIN STAGE 1: 0
BH CV STRESS DURATION SEC STAGE 1: 10
BH CV STRESS NUCLEAR ISOTOPE DOSE: 33.4 MCI
BH CV STRESS PROTOCOL 1: NORMAL
BH CV STRESS RECOVERY BP: NORMAL MMHG
BH CV STRESS RECOVERY HR: 73 BPM
BH CV STRESS STAGE 1: 1
LV EF NUC BP: 65 %
MAXIMAL PREDICTED HEART RATE: 155 BPM
STRESS BASELINE BP: NORMAL MMHG
STRESS BASELINE HR: 55 BPM
STRESS TARGET HR: 132 BPM

## 2024-02-20 PROCEDURE — 0 TECHNETIUM SESTAMIBI: Performed by: INTERNAL MEDICINE

## 2024-02-20 PROCEDURE — A9500 TC99M SESTAMIBI: HCPCS | Performed by: INTERNAL MEDICINE

## 2024-02-20 PROCEDURE — 25010000002 REGADENOSON 0.4 MG/5ML SOLUTION: Performed by: INTERNAL MEDICINE

## 2024-02-20 PROCEDURE — 93018 CV STRESS TEST I&R ONLY: CPT | Performed by: INTERNAL MEDICINE

## 2024-02-20 PROCEDURE — 78452 HT MUSCLE IMAGE SPECT MULT: CPT

## 2024-02-20 PROCEDURE — 78452 HT MUSCLE IMAGE SPECT MULT: CPT | Performed by: INTERNAL MEDICINE

## 2024-02-20 PROCEDURE — 93017 CV STRESS TEST TRACING ONLY: CPT

## 2024-02-20 RX ORDER — REGADENOSON 0.08 MG/ML
0.4 INJECTION, SOLUTION INTRAVENOUS
Status: COMPLETED | OUTPATIENT
Start: 2024-02-20 | End: 2024-02-20

## 2024-02-20 RX ADMIN — TECHNETIUM TC 99M SESTAMIBI 1 DOSE: 1 INJECTION INTRAVENOUS at 08:51

## 2024-02-20 RX ADMIN — REGADENOSON 0.4 MG: 0.08 INJECTION, SOLUTION INTRAVENOUS at 08:52

## 2024-02-20 RX ADMIN — TECHNETIUM TC 99M SESTAMIBI 1 DOSE: 1 INJECTION INTRAVENOUS at 08:48

## 2024-02-21 ENCOUNTER — TELEPHONE (OUTPATIENT)
Dept: CARDIOLOGY | Facility: CLINIC | Age: 64
End: 2024-02-21
Payer: MEDICARE

## 2024-02-21 DIAGNOSIS — E78.2 MIXED HYPERLIPIDEMIA: ICD-10-CM

## 2024-02-21 DIAGNOSIS — R94.39 POSITIVE CARDIAC STRESS TEST: Primary | ICD-10-CM

## 2024-02-22 PROBLEM — R94.39 POSITIVE CARDIAC STRESS TEST: Status: ACTIVE | Noted: 2024-02-21

## 2024-02-23 ENCOUNTER — LAB (OUTPATIENT)
Dept: LAB | Facility: HOSPITAL | Age: 64
End: 2024-02-23
Payer: MEDICARE

## 2024-02-23 DIAGNOSIS — R94.39 POSITIVE CARDIAC STRESS TEST: ICD-10-CM

## 2024-02-23 DIAGNOSIS — E78.2 MIXED HYPERLIPIDEMIA: ICD-10-CM

## 2024-02-23 DIAGNOSIS — M54.50 CHRONIC LOW BACK PAIN WITHOUT SCIATICA, UNSPECIFIED BACK PAIN LATERALITY: ICD-10-CM

## 2024-02-23 DIAGNOSIS — G89.29 CHRONIC LOW BACK PAIN WITHOUT SCIATICA, UNSPECIFIED BACK PAIN LATERALITY: ICD-10-CM

## 2024-02-23 LAB
ANION GAP SERPL CALCULATED.3IONS-SCNC: 8 MMOL/L (ref 5–15)
BUN SERPL-MCNC: 8 MG/DL (ref 8–23)
BUN/CREAT SERPL: 10 (ref 7–25)
CALCIUM SPEC-SCNC: 9.5 MG/DL (ref 8.6–10.5)
CHLORIDE SERPL-SCNC: 105 MMOL/L (ref 98–107)
CHOLEST SERPL-MCNC: 114 MG/DL (ref 0–200)
CO2 SERPL-SCNC: 29 MMOL/L (ref 22–29)
CREAT SERPL-MCNC: 0.8 MG/DL (ref 0.76–1.27)
DEPRECATED RDW RBC AUTO: 45.9 FL (ref 37–54)
EGFRCR SERPLBLD CKD-EPI 2021: 98.2 ML/MIN/1.73
ERYTHROCYTE [DISTWIDTH] IN BLOOD BY AUTOMATED COUNT: 13.6 % (ref 12.3–15.4)
GLUCOSE SERPL-MCNC: 87 MG/DL (ref 65–99)
HCT VFR BLD AUTO: 43.2 % (ref 37.5–51)
HDLC SERPL-MCNC: 38 MG/DL (ref 40–60)
HGB BLD-MCNC: 14.5 G/DL (ref 13–17.7)
LDLC SERPL CALC-MCNC: 50 MG/DL (ref 0–100)
LDLC/HDLC SERPL: 1.21 {RATIO}
MCH RBC QN AUTO: 32.5 PG (ref 26.6–33)
MCHC RBC AUTO-ENTMCNC: 33.5 G/DL (ref 31.5–35.7)
MCV RBC AUTO: 97 FL (ref 79–97)
PLATELET # BLD AUTO: 175 10*3/MM3 (ref 140–450)
PMV BLD AUTO: 8.8 FL (ref 6–12)
POTASSIUM SERPL-SCNC: 4 MMOL/L (ref 3.5–5.2)
RBC # BLD AUTO: 4.46 10*6/MM3 (ref 4.14–5.8)
SODIUM SERPL-SCNC: 142 MMOL/L (ref 136–145)
TRIGL SERPL-MCNC: 150 MG/DL (ref 0–150)
VLDLC SERPL-MCNC: 26 MG/DL (ref 5–40)
WBC NRBC COR # BLD AUTO: 5.7 10*3/MM3 (ref 3.4–10.8)

## 2024-02-23 PROCEDURE — 80061 LIPID PANEL: CPT

## 2024-02-23 PROCEDURE — 36415 COLL VENOUS BLD VENIPUNCTURE: CPT

## 2024-02-23 PROCEDURE — 85027 COMPLETE CBC AUTOMATED: CPT

## 2024-02-23 PROCEDURE — 80048 BASIC METABOLIC PNL TOTAL CA: CPT

## 2024-02-26 ENCOUNTER — HOSPITAL ENCOUNTER (OUTPATIENT)
Facility: HOSPITAL | Age: 64
Discharge: HOME | End: 2024-02-27
Attending: INTERNAL MEDICINE | Admitting: INTERNAL MEDICINE
Payer: MEDICARE

## 2024-02-26 DIAGNOSIS — R94.39 POSITIVE CARDIAC STRESS TEST: ICD-10-CM

## 2024-02-26 DIAGNOSIS — Z95.5 S/P CORONARY ARTERY STENT PLACEMENT: Primary | ICD-10-CM

## 2024-02-26 DIAGNOSIS — E53.8 B12 DEFICIENCY: ICD-10-CM

## 2024-02-26 PROBLEM — I25.10 CAD (CORONARY ARTERY DISEASE): Status: ACTIVE | Noted: 2024-02-26

## 2024-02-26 LAB
ACT BLD: 277 SECONDS (ref 89–137)
BASE DEFICIT: ABNORMAL
BASE DEFICIT: ABNORMAL
BASE EXCESS BLDA CALC-SCNC: <0 MMOL/L (ref 0–3)
BASE EXCESS BLDV CALC-SCNC: ABNORMAL MMOL/L
CA-I BLDA-SCNC: 1.27 MMOL/L (ref 1.12–1.32)
CA-I BLDA-SCNC: 1.28 MMOL/L (ref 1.12–1.32)
CO2 BLDA-SCNC: 25 MMOL/L (ref 23–27)
CO2 CONTENT VENOUS: ABNORMAL
GLUCOSE BLDC GLUCOMTR-MCNC: 90 MG/DL (ref 70–105)
GLUCOSE BLDC GLUCOMTR-MCNC: 93 MG/DL (ref 70–105)
HCO3 BLDA-SCNC: 23.5 MMOL/L (ref 22–26)
HCO3 BLDV-SCNC: 26.2 MMOL/L (ref 23–28)
HCT VFR BLDA CALC: 40 % (ref 38–51)
HCT VFR BLDA CALC: 40 % (ref 38–51)
HGB BLDA-MCNC: 13.6 G/DL (ref 12–17)
HGB BLDA-MCNC: 13.6 G/DL (ref 12–17)
PCO2 BLDA: 44.4 MM HG (ref 35–45)
PCO2 BLDV: 48.9 MM HG (ref 41–51)
PH BLDA: 7.33 PH UNITS (ref 7.35–7.45)
PH BLDV: 7.34 PH UNITS (ref 7.31–7.41)
PO2 BLDA: 184 MM HG (ref 80–105)
PO2 BLDV: 45 MM HG (ref 35–42)
POTASSIUM BLDA-SCNC: 4.1 MMOL/L (ref 3.5–4.9)
POTASSIUM BLDA-SCNC: 4.2 MMOL/L (ref 3.5–4.9)
SAO2 % BLDCOA: 100 % (ref 95–98)
SAO2 % BLDCOV: 28 % (ref 45–75)
SODIUM BLD-SCNC: 143 MMOL/L (ref 138–146)
SODIUM BLD-SCNC: 143 MMOL/L (ref 138–146)

## 2024-02-26 PROCEDURE — C1769 GUIDE WIRE: HCPCS | Performed by: INTERNAL MEDICINE

## 2024-02-26 PROCEDURE — 99152 MOD SED SAME PHYS/QHP 5/>YRS: CPT | Performed by: INTERNAL MEDICINE

## 2024-02-26 PROCEDURE — 85014 HEMATOCRIT: CPT

## 2024-02-26 PROCEDURE — 99153 MOD SED SAME PHYS/QHP EA: CPT | Performed by: INTERNAL MEDICINE

## 2024-02-26 PROCEDURE — C1725 CATH, TRANSLUMIN NON-LASER: HCPCS | Performed by: INTERNAL MEDICINE

## 2024-02-26 PROCEDURE — 25010000002 CYANOCOBALAMIN PER 1000 MCG: Performed by: INTERNAL MEDICINE

## 2024-02-26 PROCEDURE — 82947 ASSAY GLUCOSE BLOOD QUANT: CPT

## 2024-02-26 PROCEDURE — 82330 ASSAY OF CALCIUM: CPT

## 2024-02-26 PROCEDURE — C1887 CATHETER, GUIDING: HCPCS | Performed by: INTERNAL MEDICINE

## 2024-02-26 PROCEDURE — 93460 R&L HRT ART/VENTRICLE ANGIO: CPT | Performed by: INTERNAL MEDICINE

## 2024-02-26 PROCEDURE — 25810000003 SODIUM CHLORIDE 0.9 % SOLUTION: Performed by: INTERNAL MEDICINE

## 2024-02-26 PROCEDURE — G0378 HOSPITAL OBSERVATION PER HR: HCPCS

## 2024-02-26 PROCEDURE — C1894 INTRO/SHEATH, NON-LASER: HCPCS | Performed by: INTERNAL MEDICINE

## 2024-02-26 PROCEDURE — C1751 CATH, INF, PER/CENT/MIDLINE: HCPCS | Performed by: INTERNAL MEDICINE

## 2024-02-26 PROCEDURE — 25010000002 MIDAZOLAM PER 1 MG: Performed by: INTERNAL MEDICINE

## 2024-02-26 PROCEDURE — C9600 PERC DRUG-EL COR STENT SING: HCPCS | Performed by: INTERNAL MEDICINE

## 2024-02-26 PROCEDURE — 84295 ASSAY OF SERUM SODIUM: CPT

## 2024-02-26 PROCEDURE — 25010000002 NICARDIPINE 2.5 MG/ML SOLUTION: Performed by: INTERNAL MEDICINE

## 2024-02-26 PROCEDURE — 25010000002 HEPARIN (PORCINE) PER 1000 UNITS: Performed by: INTERNAL MEDICINE

## 2024-02-26 PROCEDURE — 25010000002 FENTANYL CITRATE (PF) 100 MCG/2ML SOLUTION: Performed by: INTERNAL MEDICINE

## 2024-02-26 PROCEDURE — C1874 STENT, COATED/COV W/DEL SYS: HCPCS | Performed by: INTERNAL MEDICINE

## 2024-02-26 PROCEDURE — 84132 ASSAY OF SERUM POTASSIUM: CPT

## 2024-02-26 PROCEDURE — 92928 PRQ TCAT PLMT NTRAC ST 1 LES: CPT | Performed by: INTERNAL MEDICINE

## 2024-02-26 PROCEDURE — C1753 CATH, INTRAVAS ULTRASOUND: HCPCS | Performed by: INTERNAL MEDICINE

## 2024-02-26 PROCEDURE — 92978 ENDOLUMINL IVUS OCT C 1ST: CPT | Performed by: INTERNAL MEDICINE

## 2024-02-26 PROCEDURE — 82803 BLOOD GASES ANY COMBINATION: CPT

## 2024-02-26 PROCEDURE — 25510000001 IOPAMIDOL PER 1 ML: Performed by: INTERNAL MEDICINE

## 2024-02-26 PROCEDURE — 25010000002 NITROGLYCERIN 5 MG/ML SOLUTION: Performed by: INTERNAL MEDICINE

## 2024-02-26 PROCEDURE — 85347 COAGULATION TIME ACTIVATED: CPT

## 2024-02-26 DEVICE — IMPLANTABLE DEVICE: Type: IMPLANTABLE DEVICE | Site: CORONARY | Status: FUNCTIONAL

## 2024-02-26 RX ORDER — DONEPEZIL HYDROCHLORIDE 5 MG/1
10 TABLET, FILM COATED ORAL NIGHTLY
Status: DISCONTINUED | OUTPATIENT
Start: 2024-02-26 | End: 2024-02-27 | Stop reason: HOSPADM

## 2024-02-26 RX ORDER — NICARDIPINE HYDROCHLORIDE 2.5 MG/ML
INJECTION INTRAVENOUS
Status: DISCONTINUED | OUTPATIENT
Start: 2024-02-26 | End: 2024-02-26 | Stop reason: HOSPADM

## 2024-02-26 RX ORDER — LIDOCAINE HYDROCHLORIDE 10 MG/ML
INJECTION, SOLUTION EPIDURAL; INFILTRATION; INTRACAUDAL; PERINEURAL
Status: DISCONTINUED | OUTPATIENT
Start: 2024-02-26 | End: 2024-02-26 | Stop reason: HOSPADM

## 2024-02-26 RX ORDER — OXYCODONE HYDROCHLORIDE 5 MG/1
7.5 TABLET ORAL EVERY 4 HOURS PRN
Status: DISCONTINUED | OUTPATIENT
Start: 2024-02-26 | End: 2024-02-27 | Stop reason: HOSPADM

## 2024-02-26 RX ORDER — ONDANSETRON 4 MG/1
4 TABLET, ORALLY DISINTEGRATING ORAL EVERY 6 HOURS PRN
Status: DISCONTINUED | OUTPATIENT
Start: 2024-02-26 | End: 2024-02-27 | Stop reason: HOSPADM

## 2024-02-26 RX ORDER — FENTANYL CITRATE 50 UG/ML
INJECTION, SOLUTION INTRAMUSCULAR; INTRAVENOUS
Status: DISCONTINUED | OUTPATIENT
Start: 2024-02-26 | End: 2024-02-26 | Stop reason: HOSPADM

## 2024-02-26 RX ORDER — CLONAZEPAM 0.5 MG/1
0.5 TABLET ORAL 4 TIMES DAILY
Status: DISCONTINUED | OUTPATIENT
Start: 2024-02-26 | End: 2024-02-27 | Stop reason: HOSPADM

## 2024-02-26 RX ORDER — ONDANSETRON 2 MG/ML
4 INJECTION INTRAMUSCULAR; INTRAVENOUS EVERY 6 HOURS PRN
Status: DISCONTINUED | OUTPATIENT
Start: 2024-02-26 | End: 2024-02-27 | Stop reason: HOSPADM

## 2024-02-26 RX ORDER — DIPHENHYDRAMINE HCL 25 MG
25 CAPSULE ORAL EVERY 6 HOURS PRN
Status: DISCONTINUED | OUTPATIENT
Start: 2024-02-26 | End: 2024-02-27 | Stop reason: HOSPADM

## 2024-02-26 RX ORDER — SODIUM CHLORIDE 9 MG/ML
INJECTION, SOLUTION INTRAVENOUS
Status: COMPLETED | OUTPATIENT
Start: 2024-02-26 | End: 2024-02-26

## 2024-02-26 RX ORDER — ATORVASTATIN CALCIUM 20 MG/1
20 TABLET, FILM COATED ORAL NIGHTLY
Status: DISCONTINUED | OUTPATIENT
Start: 2024-02-26 | End: 2024-02-27 | Stop reason: HOSPADM

## 2024-02-26 RX ORDER — AMLODIPINE BESYLATE 5 MG/1
5 TABLET ORAL
Status: DISCONTINUED | OUTPATIENT
Start: 2024-02-26 | End: 2024-02-26

## 2024-02-26 RX ORDER — MIDAZOLAM HYDROCHLORIDE 1 MG/ML
INJECTION INTRAMUSCULAR; INTRAVENOUS
Status: DISCONTINUED | OUTPATIENT
Start: 2024-02-26 | End: 2024-02-26 | Stop reason: HOSPADM

## 2024-02-26 RX ORDER — MELOXICAM 15 MG/1
15 TABLET ORAL DAILY
Status: DISCONTINUED | OUTPATIENT
Start: 2024-02-27 | End: 2024-02-27 | Stop reason: HOSPADM

## 2024-02-26 RX ORDER — CARVEDILOL 6.25 MG/1
6.25 TABLET ORAL 2 TIMES DAILY WITH MEALS
Status: DISCONTINUED | OUTPATIENT
Start: 2024-02-26 | End: 2024-02-27 | Stop reason: HOSPADM

## 2024-02-26 RX ORDER — ACETAMINOPHEN 325 MG/1
650 TABLET ORAL EVERY 4 HOURS PRN
Status: DISCONTINUED | OUTPATIENT
Start: 2024-02-26 | End: 2024-02-27 | Stop reason: HOSPADM

## 2024-02-26 RX ORDER — CARBIDOPA AND LEVODOPA 25; 100 MG/1; MG/1
2 TABLET, EXTENDED RELEASE ORAL NIGHTLY
Status: DISCONTINUED | OUTPATIENT
Start: 2024-02-26 | End: 2024-02-27 | Stop reason: HOSPADM

## 2024-02-26 RX ORDER — HEPARIN SODIUM 1000 [USP'U]/ML
INJECTION, SOLUTION INTRAVENOUS; SUBCUTANEOUS
Status: DISCONTINUED | OUTPATIENT
Start: 2024-02-26 | End: 2024-02-26 | Stop reason: HOSPADM

## 2024-02-26 RX ORDER — GABAPENTIN 300 MG/1
600 CAPSULE ORAL EVERY 8 HOURS SCHEDULED
Status: DISCONTINUED | OUTPATIENT
Start: 2024-02-26 | End: 2024-02-27 | Stop reason: HOSPADM

## 2024-02-26 RX ORDER — ASPIRIN 81 MG/1
81 TABLET ORAL DAILY
Status: DISCONTINUED | OUTPATIENT
Start: 2024-02-27 | End: 2024-02-27 | Stop reason: HOSPADM

## 2024-02-26 RX ORDER — AMLODIPINE BESYLATE 5 MG/1
10 TABLET ORAL
Status: DISCONTINUED | OUTPATIENT
Start: 2024-02-27 | End: 2024-02-27 | Stop reason: HOSPADM

## 2024-02-26 RX ORDER — SODIUM CHLORIDE 9 MG/ML
30 INJECTION, SOLUTION INTRAVENOUS CONTINUOUS
Status: DISCONTINUED | OUTPATIENT
Start: 2024-02-26 | End: 2024-02-27

## 2024-02-26 RX ORDER — VENLAFAXINE HYDROCHLORIDE 75 MG/1
75 CAPSULE, EXTENDED RELEASE ORAL DAILY
Status: DISCONTINUED | OUTPATIENT
Start: 2024-02-27 | End: 2024-02-27 | Stop reason: HOSPADM

## 2024-02-26 RX ORDER — NITROGLYCERIN 5 MG/ML
INJECTION, SOLUTION INTRAVENOUS
Status: DISCONTINUED | OUTPATIENT
Start: 2024-02-26 | End: 2024-02-26 | Stop reason: HOSPADM

## 2024-02-26 RX ORDER — CYANOCOBALAMIN 1000 UG/ML
1000 INJECTION, SOLUTION INTRAMUSCULAR; SUBCUTANEOUS
Status: DISCONTINUED | OUTPATIENT
Start: 2024-02-26 | End: 2024-02-27 | Stop reason: HOSPADM

## 2024-02-26 RX ORDER — LISINOPRIL 20 MG/1
20 TABLET ORAL
Status: DISCONTINUED | OUTPATIENT
Start: 2024-02-27 | End: 2024-02-27 | Stop reason: HOSPADM

## 2024-02-26 RX ORDER — OXYCODONE AND ACETAMINOPHEN 7.5; 325 MG/1; MG/1
1 TABLET ORAL EVERY 6 HOURS PRN
Qty: 60 TABLET | Refills: 0 | Status: SHIPPED | OUTPATIENT
Start: 2024-02-26

## 2024-02-26 RX ORDER — ASPIRIN 325 MG
TABLET ORAL
Status: DISCONTINUED | OUTPATIENT
Start: 2024-02-26 | End: 2024-02-26 | Stop reason: HOSPADM

## 2024-02-26 RX ORDER — CARVEDILOL 6.25 MG/1
6.25 TABLET ORAL 2 TIMES DAILY
Qty: 90 TABLET | Refills: 3 | Status: SHIPPED | OUTPATIENT
Start: 2024-02-26 | End: 2024-02-27 | Stop reason: SDUPTHER

## 2024-02-26 RX ORDER — CLOPIDOGREL BISULFATE 75 MG/1
75 TABLET ORAL DAILY
Status: DISCONTINUED | OUTPATIENT
Start: 2024-02-26 | End: 2024-02-26 | Stop reason: SDUPTHER

## 2024-02-26 RX ORDER — CLOPIDOGREL BISULFATE 75 MG/1
75 TABLET ORAL DAILY
Qty: 90 TABLET | Refills: 3 | Status: SHIPPED | OUTPATIENT
Start: 2024-02-26 | End: 2024-02-27 | Stop reason: SDUPTHER

## 2024-02-26 RX ORDER — GABAPENTIN 300 MG/1
600 CAPSULE ORAL EVERY 8 HOURS SCHEDULED
Status: DISCONTINUED | OUTPATIENT
Start: 2024-02-26 | End: 2024-02-26

## 2024-02-26 RX ORDER — IPRATROPIUM BROMIDE AND ALBUTEROL SULFATE 2.5; .5 MG/3ML; MG/3ML
3 SOLUTION RESPIRATORY (INHALATION) EVERY 6 HOURS PRN
Status: DISCONTINUED | OUTPATIENT
Start: 2024-02-26 | End: 2024-02-27 | Stop reason: HOSPADM

## 2024-02-26 RX ORDER — CLOPIDOGREL BISULFATE 75 MG/1
75 TABLET ORAL DAILY
Status: DISCONTINUED | OUTPATIENT
Start: 2024-02-27 | End: 2024-02-27 | Stop reason: HOSPADM

## 2024-02-26 RX ORDER — PANTOPRAZOLE SODIUM 40 MG/1
40 TABLET, DELAYED RELEASE ORAL DAILY
Status: DISCONTINUED | OUTPATIENT
Start: 2024-02-27 | End: 2024-02-27 | Stop reason: HOSPADM

## 2024-02-26 RX ORDER — CLOPIDOGREL 300 MG/1
TABLET, FILM COATED ORAL
Status: DISCONTINUED | OUTPATIENT
Start: 2024-02-26 | End: 2024-02-26 | Stop reason: HOSPADM

## 2024-02-26 RX ORDER — NITROGLYCERIN 0.4 MG/1
0.4 TABLET SUBLINGUAL
Status: DISCONTINUED | OUTPATIENT
Start: 2024-02-26 | End: 2024-02-27 | Stop reason: HOSPADM

## 2024-02-26 RX ADMIN — DONEPEZIL HYDROCHLORIDE 10 MG: 5 TABLET, FILM COATED ORAL at 22:02

## 2024-02-26 RX ADMIN — CARVEDILOL 6.25 MG: 6.25 TABLET, FILM COATED ORAL at 17:12

## 2024-02-26 RX ADMIN — CARBIDOPA AND LEVODOPA 2 TABLET: 25; 100 TABLET ORAL at 18:29

## 2024-02-26 RX ADMIN — GABAPENTIN 600 MG: 300 CAPSULE ORAL at 17:12

## 2024-02-26 RX ADMIN — CARBIDOPA AND LEVODOPA 2 TABLET: 25; 100 TABLET, EXTENDED RELEASE ORAL at 21:58

## 2024-02-26 RX ADMIN — SODIUM CHLORIDE 30 ML/HR: 9 INJECTION, SOLUTION INTRAVENOUS at 09:44

## 2024-02-26 RX ADMIN — GABAPENTIN 600 MG: 300 CAPSULE ORAL at 21:58

## 2024-02-26 RX ADMIN — CLONAZEPAM 0.5 MG: 0.5 TABLET ORAL at 21:58

## 2024-02-26 RX ADMIN — CYANOCOBALAMIN 1000 MCG: 1000 INJECTION, SOLUTION INTRAMUSCULAR; SUBCUTANEOUS at 17:09

## 2024-02-26 RX ADMIN — CLONAZEPAM 0.5 MG: 0.5 TABLET ORAL at 17:12

## 2024-02-26 RX ADMIN — ATORVASTATIN CALCIUM 20 MG: 20 TABLET, FILM COATED ORAL at 21:58

## 2024-02-27 ENCOUNTER — TELEPHONE (OUTPATIENT)
Dept: CARDIOLOGY | Facility: CLINIC | Age: 64
End: 2024-02-27
Payer: MEDICARE

## 2024-02-27 VITALS
TEMPERATURE: 97.7 F | WEIGHT: 195.99 LBS | HEIGHT: 68 IN | BODY MASS INDEX: 29.7 KG/M2 | OXYGEN SATURATION: 96 % | RESPIRATION RATE: 20 BRPM | SYSTOLIC BLOOD PRESSURE: 145 MMHG | HEART RATE: 57 BPM | DIASTOLIC BLOOD PRESSURE: 74 MMHG

## 2024-02-27 PROBLEM — N40.1 BENIGN PROSTATIC HYPERPLASIA WITH LOWER URINARY TRACT SYMPTOMS: Status: ACTIVE | Noted: 2024-02-27

## 2024-02-27 PROBLEM — R94.39 POSITIVE CARDIAC STRESS TEST: Status: RESOLVED | Noted: 2024-02-21 | Resolved: 2024-02-27

## 2024-02-27 PROBLEM — Z95.5 STATUS POST CORONARY ARTERY STENT PLACEMENT: Status: ACTIVE | Noted: 2024-02-26

## 2024-02-27 PROBLEM — N39.0 ACUTE URINARY TRACT INFECTION: Status: ACTIVE | Noted: 2024-02-27

## 2024-02-27 PROBLEM — I25.110 CORONARY ARTERY DISEASE INVOLVING NATIVE CORONARY ARTERY OF NATIVE HEART WITH UNSTABLE ANGINA PECTORIS: Status: ACTIVE | Noted: 2024-02-26

## 2024-02-27 LAB
ANION GAP SERPL CALCULATED.3IONS-SCNC: 8 MMOL/L (ref 5–15)
BILIRUB UR QL STRIP: NEGATIVE
BUN SERPL-MCNC: 17 MG/DL (ref 8–23)
BUN/CREAT SERPL: 25.8 (ref 7–25)
CALCIUM SPEC-SCNC: 9.1 MG/DL (ref 8.6–10.5)
CHLORIDE SERPL-SCNC: 110 MMOL/L (ref 98–107)
CLARITY UR: CLEAR
CO2 SERPL-SCNC: 27 MMOL/L (ref 22–29)
COLOR UR: YELLOW
CREAT SERPL-MCNC: 0.66 MG/DL (ref 0.76–1.27)
EGFRCR SERPLBLD CKD-EPI 2021: 104.1 ML/MIN/1.73
GLUCOSE SERPL-MCNC: 112 MG/DL (ref 65–99)
GLUCOSE UR STRIP-MCNC: NEGATIVE MG/DL
HGB UR QL STRIP.AUTO: NEGATIVE
KETONES UR QL STRIP: ABNORMAL
LEUKOCYTE ESTERASE UR QL STRIP.AUTO: NEGATIVE
NITRITE UR QL STRIP: NEGATIVE
PH UR STRIP.AUTO: 7 [PH] (ref 5–8)
POTASSIUM SERPL-SCNC: 3.9 MMOL/L (ref 3.5–5.2)
PROT UR QL STRIP: NEGATIVE
SODIUM SERPL-SCNC: 145 MMOL/L (ref 136–145)
SP GR UR STRIP: 1.02 (ref 1–1.03)
UROBILINOGEN UR QL STRIP: ABNORMAL

## 2024-02-27 PROCEDURE — 80048 BASIC METABOLIC PNL TOTAL CA: CPT | Performed by: INTERNAL MEDICINE

## 2024-02-27 PROCEDURE — 81003 URINALYSIS AUTO W/O SCOPE: CPT | Performed by: NURSE PRACTITIONER

## 2024-02-27 PROCEDURE — 99239 HOSP IP/OBS DSCHRG MGMT >30: CPT | Performed by: NURSE PRACTITIONER

## 2024-02-27 RX ORDER — CARVEDILOL 6.25 MG/1
6.25 TABLET ORAL 2 TIMES DAILY
Qty: 180 TABLET | Refills: 3 | Status: SHIPPED | OUTPATIENT
Start: 2024-02-27

## 2024-02-27 RX ORDER — TAMSULOSIN HYDROCHLORIDE 0.4 MG/1
1 CAPSULE ORAL DAILY
Qty: 90 CAPSULE | Refills: 3 | Status: SHIPPED | OUTPATIENT
Start: 2024-02-27

## 2024-02-27 RX ORDER — NITROGLYCERIN 0.4 MG/1
0.4 TABLET SUBLINGUAL
Qty: 25 TABLET | Refills: 1 | Status: SHIPPED | OUTPATIENT
Start: 2024-02-27

## 2024-02-27 RX ORDER — NITROFURANTOIN 25; 75 MG/1; MG/1
100 CAPSULE ORAL 2 TIMES DAILY
Qty: 14 CAPSULE | Refills: 0 | Status: SHIPPED | OUTPATIENT
Start: 2024-02-27

## 2024-02-27 RX ORDER — CLOPIDOGREL BISULFATE 75 MG/1
75 TABLET ORAL DAILY
Qty: 90 TABLET | Refills: 3 | Status: SHIPPED | OUTPATIENT
Start: 2024-02-27 | End: 2024-02-27 | Stop reason: SDUPTHER

## 2024-02-27 RX ORDER — CARVEDILOL 6.25 MG/1
6.25 TABLET ORAL 2 TIMES DAILY
Qty: 180 TABLET | Refills: 3 | Status: SHIPPED | OUTPATIENT
Start: 2024-02-27 | End: 2024-02-27 | Stop reason: SDUPTHER

## 2024-02-27 RX ORDER — NITROGLYCERIN 0.4 MG/1
0.4 TABLET SUBLINGUAL
Qty: 25 TABLET | Refills: 1 | Status: SHIPPED | OUTPATIENT
Start: 2024-02-27 | End: 2024-02-27 | Stop reason: SDUPTHER

## 2024-02-27 RX ORDER — TAMSULOSIN HYDROCHLORIDE 0.4 MG/1
1 CAPSULE ORAL DAILY
Qty: 90 CAPSULE | Refills: 3 | Status: SHIPPED | OUTPATIENT
Start: 2024-02-27 | End: 2024-02-27 | Stop reason: SDUPTHER

## 2024-02-27 RX ORDER — CLOPIDOGREL BISULFATE 75 MG/1
75 TABLET ORAL DAILY
Qty: 90 TABLET | Refills: 3 | Status: SHIPPED | OUTPATIENT
Start: 2024-02-27

## 2024-02-27 RX ORDER — NITROFURANTOIN 25; 75 MG/1; MG/1
100 CAPSULE ORAL 2 TIMES DAILY
Qty: 14 CAPSULE | Refills: 0 | Status: SHIPPED | OUTPATIENT
Start: 2024-02-27 | End: 2024-02-27 | Stop reason: SDUPTHER

## 2024-02-27 RX ADMIN — CARVEDILOL 6.25 MG: 6.25 TABLET, FILM COATED ORAL at 09:15

## 2024-02-27 RX ADMIN — CLONAZEPAM 0.5 MG: 0.5 TABLET ORAL at 11:10

## 2024-02-27 RX ADMIN — PANTOPRAZOLE SODIUM 40 MG: 40 TABLET, DELAYED RELEASE ORAL at 09:16

## 2024-02-27 RX ADMIN — CLONAZEPAM 0.5 MG: 0.5 TABLET ORAL at 09:16

## 2024-02-27 RX ADMIN — MELOXICAM 15 MG: 15 TABLET ORAL at 09:16

## 2024-02-27 RX ADMIN — CARBIDOPA AND LEVODOPA 2 TABLET: 25; 100 TABLET ORAL at 09:36

## 2024-02-27 RX ADMIN — VENLAFAXINE HYDROCHLORIDE 75 MG: 75 CAPSULE, EXTENDED RELEASE ORAL at 09:16

## 2024-02-27 RX ADMIN — GABAPENTIN 600 MG: 300 CAPSULE ORAL at 05:06

## 2024-02-27 RX ADMIN — LISINOPRIL 20 MG: 20 TABLET ORAL at 09:15

## 2024-02-27 RX ADMIN — CARBIDOPA AND LEVODOPA 2 TABLET: 25; 100 TABLET ORAL at 11:10

## 2024-02-27 RX ADMIN — AMLODIPINE BESYLATE 10 MG: 5 TABLET ORAL at 09:16

## 2024-02-29 ENCOUNTER — TELEPHONE (OUTPATIENT)
Dept: CARDIAC REHAB | Facility: HOSPITAL | Age: 64
End: 2024-02-29
Payer: MEDICARE

## 2024-03-01 ENCOUNTER — TELEPHONE (OUTPATIENT)
Dept: CARDIAC REHAB | Facility: HOSPITAL | Age: 64
End: 2024-03-01
Payer: MEDICARE

## 2024-03-10 DIAGNOSIS — M54.50 CHRONIC LOW BACK PAIN WITHOUT SCIATICA, UNSPECIFIED BACK PAIN LATERALITY: ICD-10-CM

## 2024-03-10 DIAGNOSIS — G89.29 CHRONIC LOW BACK PAIN WITHOUT SCIATICA, UNSPECIFIED BACK PAIN LATERALITY: ICD-10-CM

## 2024-03-11 RX ORDER — OXYCODONE AND ACETAMINOPHEN 7.5; 325 MG/1; MG/1
1 TABLET ORAL EVERY 6 HOURS PRN
Qty: 60 TABLET | Refills: 0 | Status: SHIPPED | OUTPATIENT
Start: 2024-03-11

## 2024-03-15 ENCOUNTER — OFFICE VISIT (OUTPATIENT)
Dept: CARDIOLOGY | Facility: CLINIC | Age: 64
End: 2024-03-15
Payer: MEDICARE

## 2024-03-15 VITALS
OXYGEN SATURATION: 94 % | BODY MASS INDEX: 27.28 KG/M2 | DIASTOLIC BLOOD PRESSURE: 69 MMHG | HEIGHT: 68 IN | WEIGHT: 180 LBS | SYSTOLIC BLOOD PRESSURE: 115 MMHG | HEART RATE: 67 BPM

## 2024-03-15 DIAGNOSIS — I10 PRIMARY HYPERTENSION: ICD-10-CM

## 2024-03-15 DIAGNOSIS — Z95.5 STATUS POST CORONARY ARTERY STENT PLACEMENT: ICD-10-CM

## 2024-03-15 DIAGNOSIS — E78.2 MIXED HYPERLIPIDEMIA: ICD-10-CM

## 2024-03-15 DIAGNOSIS — I73.9 PVD (PERIPHERAL VASCULAR DISEASE) WITH CLAUDICATION: ICD-10-CM

## 2024-03-15 DIAGNOSIS — I25.10 CORONARY ARTERY CALCIFICATION SEEN ON CAT SCAN: ICD-10-CM

## 2024-03-15 DIAGNOSIS — I25.110 CORONARY ARTERY DISEASE INVOLVING NATIVE CORONARY ARTERY OF NATIVE HEART WITH UNSTABLE ANGINA PECTORIS: Primary | ICD-10-CM

## 2024-03-15 DIAGNOSIS — I63.9 CEREBROVASCULAR ACCIDENT (CVA), UNSPECIFIED MECHANISM: ICD-10-CM

## 2024-03-15 DIAGNOSIS — Z72.0 TOBACCO ABUSE: ICD-10-CM

## 2024-03-15 DIAGNOSIS — E78.5 HYPERLIPIDEMIA LDL GOAL <70: ICD-10-CM

## 2024-03-19 DIAGNOSIS — G62.9 NEUROPATHY: ICD-10-CM

## 2024-03-20 RX ORDER — VENLAFAXINE HYDROCHLORIDE 75 MG/1
75 CAPSULE, EXTENDED RELEASE ORAL DAILY
Qty: 90 CAPSULE | Refills: 1 | Status: SHIPPED | OUTPATIENT
Start: 2024-03-20

## 2024-03-20 RX ORDER — GABAPENTIN 600 MG/1
600 TABLET ORAL 3 TIMES DAILY
Qty: 90 TABLET | Refills: 1 | Status: SHIPPED | OUTPATIENT
Start: 2024-03-20 | End: 2024-03-22 | Stop reason: SDUPTHER

## 2024-03-21 DIAGNOSIS — F41.9 ANXIETY: ICD-10-CM

## 2024-03-22 ENCOUNTER — OFFICE VISIT (OUTPATIENT)
Dept: PAIN MEDICINE | Facility: CLINIC | Age: 64
End: 2024-03-22
Payer: MEDICARE

## 2024-03-22 VITALS
SYSTOLIC BLOOD PRESSURE: 117 MMHG | OXYGEN SATURATION: 99 % | RESPIRATION RATE: 16 BRPM | HEART RATE: 64 BPM | DIASTOLIC BLOOD PRESSURE: 69 MMHG

## 2024-03-22 DIAGNOSIS — M47.816 SPONDYLOSIS OF LUMBAR REGION WITHOUT MYELOPATHY OR RADICULOPATHY: Primary | ICD-10-CM

## 2024-03-22 DIAGNOSIS — G62.9 NEUROPATHY: ICD-10-CM

## 2024-03-22 DIAGNOSIS — M51.36 DDD (DEGENERATIVE DISC DISEASE), LUMBAR: ICD-10-CM

## 2024-03-22 PROCEDURE — G0463 HOSPITAL OUTPT CLINIC VISIT: HCPCS | Performed by: STUDENT IN AN ORGANIZED HEALTH CARE EDUCATION/TRAINING PROGRAM

## 2024-03-22 PROCEDURE — 1160F RVW MEDS BY RX/DR IN RCRD: CPT | Performed by: STUDENT IN AN ORGANIZED HEALTH CARE EDUCATION/TRAINING PROGRAM

## 2024-03-22 PROCEDURE — 3078F DIAST BP <80 MM HG: CPT | Performed by: STUDENT IN AN ORGANIZED HEALTH CARE EDUCATION/TRAINING PROGRAM

## 2024-03-22 PROCEDURE — 1159F MED LIST DOCD IN RCRD: CPT | Performed by: STUDENT IN AN ORGANIZED HEALTH CARE EDUCATION/TRAINING PROGRAM

## 2024-03-22 PROCEDURE — 1125F AMNT PAIN NOTED PAIN PRSNT: CPT | Performed by: STUDENT IN AN ORGANIZED HEALTH CARE EDUCATION/TRAINING PROGRAM

## 2024-03-22 PROCEDURE — 99213 OFFICE O/P EST LOW 20 MIN: CPT | Performed by: STUDENT IN AN ORGANIZED HEALTH CARE EDUCATION/TRAINING PROGRAM

## 2024-03-22 PROCEDURE — 3074F SYST BP LT 130 MM HG: CPT | Performed by: STUDENT IN AN ORGANIZED HEALTH CARE EDUCATION/TRAINING PROGRAM

## 2024-03-22 RX ORDER — GABAPENTIN 600 MG/1
600 TABLET ORAL 3 TIMES DAILY
Qty: 90 TABLET | Refills: 1 | Status: SHIPPED | OUTPATIENT
Start: 2024-03-22

## 2024-03-22 RX ORDER — CLONAZEPAM 0.5 MG/1
0.5 TABLET ORAL 4 TIMES DAILY
Qty: 120 TABLET | Refills: 1 | Status: SHIPPED | OUTPATIENT
Start: 2024-03-22

## 2024-03-25 DIAGNOSIS — E34.9 TESTOSTERONE DEFICIENCY: ICD-10-CM

## 2024-03-25 DIAGNOSIS — M54.50 CHRONIC LOW BACK PAIN WITHOUT SCIATICA, UNSPECIFIED BACK PAIN LATERALITY: ICD-10-CM

## 2024-03-25 DIAGNOSIS — E78.2 MIXED HYPERLIPIDEMIA: ICD-10-CM

## 2024-03-25 DIAGNOSIS — G89.29 CHRONIC LOW BACK PAIN WITHOUT SCIATICA, UNSPECIFIED BACK PAIN LATERALITY: ICD-10-CM

## 2024-03-26 RX ORDER — ATORVASTATIN CALCIUM 20 MG/1
20 TABLET, FILM COATED ORAL DAILY
Qty: 90 TABLET | Refills: 1 | Status: SHIPPED | OUTPATIENT
Start: 2024-03-26

## 2024-03-26 RX ORDER — TESTOSTERONE CYPIONATE 200 MG/ML
100 INJECTION, SOLUTION INTRAMUSCULAR
Qty: 4 ML | Refills: 0 | Status: SHIPPED | OUTPATIENT
Start: 2024-03-26

## 2024-03-26 RX ORDER — AMLODIPINE BESYLATE AND BENAZEPRIL HYDROCHLORIDE 10; 20 MG/1; MG/1
1 CAPSULE ORAL NIGHTLY
Qty: 90 CAPSULE | Refills: 1 | Status: SHIPPED | OUTPATIENT
Start: 2024-03-26

## 2024-03-26 RX ORDER — OXYCODONE AND ACETAMINOPHEN 7.5; 325 MG/1; MG/1
1 TABLET ORAL EVERY 6 HOURS PRN
Qty: 60 TABLET | Refills: 0 | OUTPATIENT
Start: 2024-03-26

## 2024-03-28 ENCOUNTER — LAB (OUTPATIENT)
Dept: FAMILY MEDICINE CLINIC | Facility: CLINIC | Age: 64
End: 2024-03-28
Payer: MEDICARE

## 2024-03-28 ENCOUNTER — OFFICE VISIT (OUTPATIENT)
Dept: FAMILY MEDICINE CLINIC | Facility: CLINIC | Age: 64
End: 2024-03-28
Payer: MEDICARE

## 2024-03-28 VITALS
DIASTOLIC BLOOD PRESSURE: 78 MMHG | OXYGEN SATURATION: 90 % | WEIGHT: 185.2 LBS | HEART RATE: 61 BPM | BODY MASS INDEX: 28.07 KG/M2 | SYSTOLIC BLOOD PRESSURE: 142 MMHG | HEIGHT: 68 IN | RESPIRATION RATE: 20 BRPM

## 2024-03-28 DIAGNOSIS — G89.29 CHRONIC RIGHT-SIDED LOW BACK PAIN WITHOUT SCIATICA: ICD-10-CM

## 2024-03-28 DIAGNOSIS — J41.1 MUCOPURULENT CHRONIC BRONCHITIS: Primary | ICD-10-CM

## 2024-03-28 DIAGNOSIS — M54.50 CHRONIC RIGHT-SIDED LOW BACK PAIN WITHOUT SCIATICA: ICD-10-CM

## 2024-03-28 DIAGNOSIS — R35.0 FREQUENCY OF URINATION: ICD-10-CM

## 2024-03-28 DIAGNOSIS — G62.9 NEUROPATHY: ICD-10-CM

## 2024-03-28 DIAGNOSIS — E53.8 B12 DEFICIENCY: ICD-10-CM

## 2024-03-28 DIAGNOSIS — H61.22 HEARING LOSS DUE TO CERUMEN IMPACTION, LEFT: ICD-10-CM

## 2024-03-28 LAB
BILIRUB UR QL STRIP: NEGATIVE
CLARITY UR: CLEAR
COLOR UR: YELLOW
GLUCOSE UR STRIP-MCNC: NEGATIVE MG/DL
HGB UR QL STRIP.AUTO: NEGATIVE
KETONES UR QL STRIP: NEGATIVE
LEUKOCYTE ESTERASE UR QL STRIP.AUTO: NEGATIVE
NITRITE UR QL STRIP: NEGATIVE
PH UR STRIP.AUTO: 6.5 [PH] (ref 5–8)
PROT UR QL STRIP: NEGATIVE
SP GR UR STRIP: 1.01 (ref 1–1.03)
UROBILINOGEN UR QL STRIP: NORMAL

## 2024-03-28 PROCEDURE — 81003 URINALYSIS AUTO W/O SCOPE: CPT | Performed by: PHYSICIAN ASSISTANT

## 2024-03-28 RX ORDER — BUDESONIDE 0.5 MG/2ML
0.5 INHALANT ORAL
Qty: 180 ML | Refills: 3 | Status: SHIPPED | OUTPATIENT
Start: 2024-03-28 | End: 2025-03-23

## 2024-03-28 RX ADMIN — CYANOCOBALAMIN 1000 MCG: 1000 INJECTION, SOLUTION INTRAMUSCULAR; SUBCUTANEOUS at 15:40

## 2024-03-29 DIAGNOSIS — M54.50 CHRONIC LOW BACK PAIN WITHOUT SCIATICA, UNSPECIFIED BACK PAIN LATERALITY: ICD-10-CM

## 2024-03-29 DIAGNOSIS — G89.29 CHRONIC LOW BACK PAIN WITHOUT SCIATICA, UNSPECIFIED BACK PAIN LATERALITY: ICD-10-CM

## 2024-03-29 LAB — HOLD SPECIMEN: NORMAL

## 2024-04-01 RX ORDER — OXYCODONE AND ACETAMINOPHEN 7.5; 325 MG/1; MG/1
1 TABLET ORAL EVERY 6 HOURS PRN
Qty: 60 TABLET | Refills: 0 | Status: SHIPPED | OUTPATIENT
Start: 2024-04-01

## 2024-04-04 ENCOUNTER — TELEPHONE (OUTPATIENT)
Dept: PAIN MEDICINE | Facility: HOSPITAL | Age: 64
End: 2024-04-04
Payer: MEDICARE

## 2024-04-04 ENCOUNTER — HOSPITAL ENCOUNTER (OUTPATIENT)
Dept: PAIN MEDICINE | Facility: HOSPITAL | Age: 64
Discharge: HOME | End: 2024-04-04
Payer: MEDICARE

## 2024-04-04 ENCOUNTER — HOSPITAL ENCOUNTER (OUTPATIENT)
Dept: GENERAL RADIOLOGY | Facility: HOSPITAL | Age: 64
Discharge: HOME | End: 2024-04-04
Payer: MEDICARE

## 2024-04-04 VITALS
RESPIRATION RATE: 16 BRPM | SYSTOLIC BLOOD PRESSURE: 90 MMHG | OXYGEN SATURATION: 95 % | TEMPERATURE: 96.9 F | DIASTOLIC BLOOD PRESSURE: 53 MMHG | HEART RATE: 60 BPM

## 2024-04-04 DIAGNOSIS — M47.816 SPONDYLOSIS OF LUMBAR REGION WITHOUT MYELOPATHY OR RADICULOPATHY: Primary | ICD-10-CM

## 2024-04-04 DIAGNOSIS — R52 PAIN: ICD-10-CM

## 2024-04-04 DIAGNOSIS — M51.36 DDD (DEGENERATIVE DISC DISEASE), LUMBAR: ICD-10-CM

## 2024-04-04 PROCEDURE — 25510000001 IOPAMIDOL 41 % SOLUTION: Performed by: STUDENT IN AN ORGANIZED HEALTH CARE EDUCATION/TRAINING PROGRAM

## 2024-04-04 PROCEDURE — 64493 INJ PARAVERT F JNT L/S 1 LEV: CPT | Performed by: STUDENT IN AN ORGANIZED HEALTH CARE EDUCATION/TRAINING PROGRAM

## 2024-04-04 PROCEDURE — 64494 INJ PARAVERT F JNT L/S 2 LEV: CPT | Performed by: STUDENT IN AN ORGANIZED HEALTH CARE EDUCATION/TRAINING PROGRAM

## 2024-04-04 PROCEDURE — 25010000002 METHYLPREDNISOLONE PER 40 MG: Performed by: STUDENT IN AN ORGANIZED HEALTH CARE EDUCATION/TRAINING PROGRAM

## 2024-04-04 PROCEDURE — 77003 FLUOROGUIDE FOR SPINE INJECT: CPT

## 2024-04-04 PROCEDURE — 25010000002 BUPIVACAINE (PF) 0.25 % SOLUTION: Performed by: STUDENT IN AN ORGANIZED HEALTH CARE EDUCATION/TRAINING PROGRAM

## 2024-04-04 RX ORDER — BUPIVACAINE HYDROCHLORIDE 2.5 MG/ML
5 INJECTION, SOLUTION EPIDURAL; INFILTRATION; INTRACAUDAL ONCE
Status: COMPLETED | OUTPATIENT
Start: 2024-04-04 | End: 2024-04-04

## 2024-04-04 RX ORDER — IOPAMIDOL 408 MG/ML
5 INJECTION, SOLUTION INTRATHECAL
Status: COMPLETED | OUTPATIENT
Start: 2024-04-04 | End: 2024-04-04

## 2024-04-04 RX ORDER — METHYLPREDNISOLONE ACETATE 40 MG/ML
40 INJECTION, SUSPENSION INTRA-ARTICULAR; INTRALESIONAL; INTRAMUSCULAR; SOFT TISSUE ONCE
Status: COMPLETED | OUTPATIENT
Start: 2024-04-04 | End: 2024-04-04

## 2024-04-04 RX ADMIN — METHYLPREDNISOLONE ACETATE 40 MG: 40 INJECTION, SUSPENSION INTRA-ARTICULAR; INTRALESIONAL; INTRAMUSCULAR; SOFT TISSUE at 14:53

## 2024-04-04 RX ADMIN — BUPIVACAINE HYDROCHLORIDE 5 ML: 2.5 INJECTION, SOLUTION EPIDURAL; INFILTRATION; INTRACAUDAL; PERINEURAL at 14:53

## 2024-04-04 RX ADMIN — IOPAMIDOL 5 ML: 408 INJECTION, SOLUTION INTRATHECAL at 14:53

## 2024-04-15 DIAGNOSIS — M54.50 CHRONIC LOW BACK PAIN WITHOUT SCIATICA, UNSPECIFIED BACK PAIN LATERALITY: ICD-10-CM

## 2024-04-15 DIAGNOSIS — G89.29 CHRONIC LOW BACK PAIN WITHOUT SCIATICA, UNSPECIFIED BACK PAIN LATERALITY: ICD-10-CM

## 2024-04-15 RX ORDER — OXYCODONE AND ACETAMINOPHEN 7.5; 325 MG/1; MG/1
1 TABLET ORAL EVERY 6 HOURS PRN
Qty: 60 TABLET | Refills: 0 | Status: SHIPPED | OUTPATIENT
Start: 2024-04-15

## 2024-04-20 DIAGNOSIS — G62.9 NEUROPATHY: ICD-10-CM

## 2024-04-22 ENCOUNTER — HOSPITAL ENCOUNTER (EMERGENCY)
Facility: HOSPITAL | Age: 64
Discharge: HOME | End: 2024-04-22
Attending: EMERGENCY MEDICINE | Admitting: EMERGENCY MEDICINE
Payer: MEDICARE

## 2024-04-22 ENCOUNTER — APPOINTMENT (OUTPATIENT)
Dept: GENERAL RADIOLOGY | Facility: HOSPITAL | Age: 64
End: 2024-04-22
Payer: MEDICARE

## 2024-04-22 VITALS
SYSTOLIC BLOOD PRESSURE: 130 MMHG | HEIGHT: 69 IN | RESPIRATION RATE: 20 BRPM | HEART RATE: 54 BPM | TEMPERATURE: 97.3 F | BODY MASS INDEX: 27.23 KG/M2 | DIASTOLIC BLOOD PRESSURE: 77 MMHG | WEIGHT: 183.86 LBS | OXYGEN SATURATION: 93 %

## 2024-04-22 DIAGNOSIS — M54.16 LUMBAR RADICULOPATHY: Primary | ICD-10-CM

## 2024-04-22 PROCEDURE — 96374 THER/PROPH/DIAG INJ IV PUSH: CPT

## 2024-04-22 PROCEDURE — 25010000002 METHYLPREDNISOLONE PER 125 MG: Performed by: EMERGENCY MEDICINE

## 2024-04-22 PROCEDURE — 72100 X-RAY EXAM L-S SPINE 2/3 VWS: CPT

## 2024-04-22 PROCEDURE — 25010000002 ONDANSETRON PER 1 MG: Performed by: EMERGENCY MEDICINE

## 2024-04-22 PROCEDURE — 25010000002 MORPHINE PER 10 MG: Performed by: EMERGENCY MEDICINE

## 2024-04-22 PROCEDURE — 96375 TX/PRO/DX INJ NEW DRUG ADDON: CPT

## 2024-04-22 PROCEDURE — 99283 EMERGENCY DEPT VISIT LOW MDM: CPT

## 2024-04-22 RX ORDER — PREDNISONE 20 MG/1
20 TABLET ORAL 2 TIMES DAILY
Qty: 10 TABLET | Refills: 0 | Status: SHIPPED | OUTPATIENT
Start: 2024-04-22

## 2024-04-22 RX ORDER — GABAPENTIN 600 MG/1
600 TABLET ORAL 3 TIMES DAILY
Qty: 90 TABLET | Refills: 1 | Status: SHIPPED | OUTPATIENT
Start: 2024-04-22

## 2024-04-22 RX ORDER — METHYLPREDNISOLONE SODIUM SUCCINATE 125 MG/2ML
125 INJECTION, POWDER, LYOPHILIZED, FOR SOLUTION INTRAMUSCULAR; INTRAVENOUS ONCE
Status: COMPLETED | OUTPATIENT
Start: 2024-04-22 | End: 2024-04-22

## 2024-04-22 RX ORDER — BACLOFEN 10 MG/1
10 TABLET ORAL 3 TIMES DAILY PRN
Qty: 21 TABLET | Refills: 0 | Status: SHIPPED | OUTPATIENT
Start: 2024-04-22

## 2024-04-22 RX ORDER — ONDANSETRON 2 MG/ML
4 INJECTION INTRAMUSCULAR; INTRAVENOUS ONCE
Status: COMPLETED | OUTPATIENT
Start: 2024-04-22 | End: 2024-04-22

## 2024-04-22 RX ORDER — BACLOFEN 10 MG/1
10 TABLET ORAL ONCE
Status: COMPLETED | OUTPATIENT
Start: 2024-04-22 | End: 2024-04-22

## 2024-04-22 RX ORDER — ONDANSETRON 2 MG/ML
4 INJECTION INTRAMUSCULAR; INTRAVENOUS ONCE
Status: DISCONTINUED | OUTPATIENT
Start: 2024-04-22 | End: 2024-04-22

## 2024-04-22 RX ORDER — PANTOPRAZOLE SODIUM 40 MG/1
40 TABLET, DELAYED RELEASE ORAL DAILY
Qty: 90 TABLET | Refills: 1 | Status: SHIPPED | OUTPATIENT
Start: 2024-04-22

## 2024-04-22 RX ORDER — BACLOFEN 10 MG/1
5 TABLET ORAL ONCE
Status: DISCONTINUED | OUTPATIENT
Start: 2024-04-22 | End: 2024-04-22

## 2024-04-22 RX ADMIN — ONDANSETRON 4 MG: 2 INJECTION INTRAMUSCULAR; INTRAVENOUS at 02:15

## 2024-04-22 RX ADMIN — MORPHINE SULFATE 4 MG: 4 INJECTION, SOLUTION INTRAMUSCULAR; INTRAVENOUS at 02:16

## 2024-04-22 RX ADMIN — BACLOFEN 10 MG: 10 TABLET ORAL at 03:22

## 2024-04-22 RX ADMIN — METHYLPREDNISOLONE SODIUM SUCCINATE 125 MG: 125 INJECTION, POWDER, FOR SOLUTION INTRAMUSCULAR; INTRAVENOUS at 02:16

## 2024-04-28 DIAGNOSIS — G89.29 CHRONIC LOW BACK PAIN WITHOUT SCIATICA, UNSPECIFIED BACK PAIN LATERALITY: ICD-10-CM

## 2024-04-28 DIAGNOSIS — G20.A1 PARKINSON DISEASE: ICD-10-CM

## 2024-04-28 DIAGNOSIS — E78.2 MIXED HYPERLIPIDEMIA: ICD-10-CM

## 2024-04-28 DIAGNOSIS — M54.50 CHRONIC LOW BACK PAIN WITHOUT SCIATICA, UNSPECIFIED BACK PAIN LATERALITY: ICD-10-CM

## 2024-04-28 DIAGNOSIS — F41.9 ANXIETY: ICD-10-CM

## 2024-04-29 RX ORDER — DONEPEZIL HYDROCHLORIDE 10 MG/1
10 TABLET, FILM COATED ORAL
Qty: 90 TABLET | Refills: 2 | Status: SHIPPED | OUTPATIENT
Start: 2024-04-29

## 2024-04-29 RX ORDER — CLONAZEPAM 0.5 MG/1
0.5 TABLET ORAL 4 TIMES DAILY
Qty: 120 TABLET | Refills: 1 | Status: SHIPPED | OUTPATIENT
Start: 2024-04-29

## 2024-04-29 RX ORDER — ATORVASTATIN CALCIUM 20 MG/1
20 TABLET, FILM COATED ORAL DAILY
Qty: 90 TABLET | Refills: 1 | Status: SHIPPED | OUTPATIENT
Start: 2024-04-29

## 2024-04-29 RX ORDER — OXYCODONE AND ACETAMINOPHEN 7.5; 325 MG/1; MG/1
1 TABLET ORAL EVERY 6 HOURS PRN
Qty: 60 TABLET | Refills: 0 | Status: SHIPPED | OUTPATIENT
Start: 2024-04-29

## 2024-05-03 ENCOUNTER — OFFICE VISIT (OUTPATIENT)
Dept: PAIN MEDICINE | Facility: CLINIC | Age: 64
End: 2024-05-03
Payer: MEDICARE

## 2024-05-03 VITALS
HEART RATE: 58 BPM | RESPIRATION RATE: 16 BRPM | OXYGEN SATURATION: 95 % | DIASTOLIC BLOOD PRESSURE: 80 MMHG | SYSTOLIC BLOOD PRESSURE: 125 MMHG

## 2024-05-03 DIAGNOSIS — M47.816 SPONDYLOSIS OF LUMBAR REGION WITHOUT MYELOPATHY OR RADICULOPATHY: Primary | ICD-10-CM

## 2024-05-03 DIAGNOSIS — M51.36 DDD (DEGENERATIVE DISC DISEASE), LUMBAR: ICD-10-CM

## 2024-05-03 PROCEDURE — 1160F RVW MEDS BY RX/DR IN RCRD: CPT | Performed by: STUDENT IN AN ORGANIZED HEALTH CARE EDUCATION/TRAINING PROGRAM

## 2024-05-03 PROCEDURE — 3074F SYST BP LT 130 MM HG: CPT | Performed by: STUDENT IN AN ORGANIZED HEALTH CARE EDUCATION/TRAINING PROGRAM

## 2024-05-03 PROCEDURE — 1125F AMNT PAIN NOTED PAIN PRSNT: CPT | Performed by: STUDENT IN AN ORGANIZED HEALTH CARE EDUCATION/TRAINING PROGRAM

## 2024-05-03 PROCEDURE — G0463 HOSPITAL OUTPT CLINIC VISIT: HCPCS | Performed by: STUDENT IN AN ORGANIZED HEALTH CARE EDUCATION/TRAINING PROGRAM

## 2024-05-03 PROCEDURE — 99213 OFFICE O/P EST LOW 20 MIN: CPT | Performed by: STUDENT IN AN ORGANIZED HEALTH CARE EDUCATION/TRAINING PROGRAM

## 2024-05-03 PROCEDURE — 3079F DIAST BP 80-89 MM HG: CPT | Performed by: STUDENT IN AN ORGANIZED HEALTH CARE EDUCATION/TRAINING PROGRAM

## 2024-05-03 PROCEDURE — 1159F MED LIST DOCD IN RCRD: CPT | Performed by: STUDENT IN AN ORGANIZED HEALTH CARE EDUCATION/TRAINING PROGRAM

## 2024-05-14 DIAGNOSIS — G89.29 CHRONIC LOW BACK PAIN WITHOUT SCIATICA, UNSPECIFIED BACK PAIN LATERALITY: ICD-10-CM

## 2024-05-14 DIAGNOSIS — M54.50 CHRONIC LOW BACK PAIN WITHOUT SCIATICA, UNSPECIFIED BACK PAIN LATERALITY: ICD-10-CM

## 2024-05-14 RX ORDER — OXYCODONE AND ACETAMINOPHEN 7.5; 325 MG/1; MG/1
1 TABLET ORAL EVERY 6 HOURS PRN
Qty: 60 TABLET | Refills: 0 | Status: SHIPPED | OUTPATIENT
Start: 2024-05-14

## 2024-05-28 DIAGNOSIS — M54.50 CHRONIC LOW BACK PAIN WITHOUT SCIATICA, UNSPECIFIED BACK PAIN LATERALITY: ICD-10-CM

## 2024-05-28 DIAGNOSIS — G89.29 CHRONIC LOW BACK PAIN WITHOUT SCIATICA, UNSPECIFIED BACK PAIN LATERALITY: ICD-10-CM

## 2024-05-28 DIAGNOSIS — G62.9 NEUROPATHY: ICD-10-CM

## 2024-05-28 RX ORDER — OXYCODONE AND ACETAMINOPHEN 7.5; 325 MG/1; MG/1
1 TABLET ORAL EVERY 6 HOURS PRN
Qty: 60 TABLET | Refills: 0 | Status: SHIPPED | OUTPATIENT
Start: 2024-05-28

## 2024-05-28 RX ORDER — GABAPENTIN 600 MG/1
600 TABLET ORAL 3 TIMES DAILY
Qty: 90 TABLET | Refills: 1 | Status: SHIPPED | OUTPATIENT
Start: 2024-05-28

## 2024-06-11 ENCOUNTER — OFFICE VISIT (OUTPATIENT)
Dept: PULMONOLOGY | Facility: HOSPITAL | Age: 64
End: 2024-06-11
Payer: MEDICARE

## 2024-06-11 VITALS
DIASTOLIC BLOOD PRESSURE: 74 MMHG | RESPIRATION RATE: 14 BRPM | HEART RATE: 54 BPM | HEIGHT: 69 IN | WEIGHT: 183 LBS | BODY MASS INDEX: 27.11 KG/M2 | OXYGEN SATURATION: 98 % | SYSTOLIC BLOOD PRESSURE: 122 MMHG

## 2024-06-11 DIAGNOSIS — F17.210 NICOTINE DEPENDENCE, CIGARETTES, UNCOMPLICATED: Primary | ICD-10-CM

## 2024-06-11 PROCEDURE — G0463 HOSPITAL OUTPT CLINIC VISIT: HCPCS

## 2024-06-11 RX ORDER — ALBUTEROL SULFATE 90 UG/1
2 AEROSOL, METERED RESPIRATORY (INHALATION) EVERY 4 HOURS PRN
Qty: 18 G | Refills: 5 | Status: SHIPPED | OUTPATIENT
Start: 2024-06-11

## 2024-06-13 DIAGNOSIS — M54.50 CHRONIC LOW BACK PAIN WITHOUT SCIATICA, UNSPECIFIED BACK PAIN LATERALITY: ICD-10-CM

## 2024-06-13 DIAGNOSIS — G89.29 CHRONIC LOW BACK PAIN WITHOUT SCIATICA, UNSPECIFIED BACK PAIN LATERALITY: ICD-10-CM

## 2024-06-14 RX ORDER — OXYCODONE AND ACETAMINOPHEN 7.5; 325 MG/1; MG/1
1 TABLET ORAL EVERY 6 HOURS PRN
Qty: 60 TABLET | Refills: 0 | Status: SHIPPED | OUTPATIENT
Start: 2024-06-14

## 2024-06-27 DIAGNOSIS — G62.9 NEUROPATHY: ICD-10-CM

## 2024-06-27 DIAGNOSIS — F41.9 ANXIETY: ICD-10-CM

## 2024-06-27 DIAGNOSIS — M54.50 CHRONIC LOW BACK PAIN WITHOUT SCIATICA, UNSPECIFIED BACK PAIN LATERALITY: ICD-10-CM

## 2024-06-27 DIAGNOSIS — G89.29 CHRONIC LOW BACK PAIN WITHOUT SCIATICA, UNSPECIFIED BACK PAIN LATERALITY: ICD-10-CM

## 2024-06-28 RX ORDER — PANTOPRAZOLE SODIUM 40 MG/1
40 TABLET, DELAYED RELEASE ORAL DAILY
Qty: 90 TABLET | Refills: 1 | Status: SHIPPED | OUTPATIENT
Start: 2024-06-28

## 2024-06-28 RX ORDER — CLONAZEPAM 0.5 MG/1
0.5 TABLET ORAL 4 TIMES DAILY
Qty: 120 TABLET | Refills: 1 | Status: SHIPPED | OUTPATIENT
Start: 2024-06-28

## 2024-06-28 RX ORDER — GABAPENTIN 600 MG/1
600 TABLET ORAL 3 TIMES DAILY
Qty: 90 TABLET | Refills: 1 | Status: SHIPPED | OUTPATIENT
Start: 2024-06-28

## 2024-06-28 RX ORDER — OXYCODONE AND ACETAMINOPHEN 7.5; 325 MG/1; MG/1
1 TABLET ORAL EVERY 6 HOURS PRN
Qty: 60 TABLET | Refills: 0 | Status: SHIPPED | OUTPATIENT
Start: 2024-06-28

## 2024-07-12 DIAGNOSIS — G89.29 CHRONIC LOW BACK PAIN WITHOUT SCIATICA, UNSPECIFIED BACK PAIN LATERALITY: ICD-10-CM

## 2024-07-12 DIAGNOSIS — M54.50 CHRONIC LOW BACK PAIN WITHOUT SCIATICA, UNSPECIFIED BACK PAIN LATERALITY: ICD-10-CM

## 2024-07-15 RX ORDER — OXYCODONE AND ACETAMINOPHEN 7.5; 325 MG/1; MG/1
1 TABLET ORAL EVERY 6 HOURS PRN
Qty: 60 TABLET | Refills: 0 | Status: SHIPPED | OUTPATIENT
Start: 2024-07-15

## 2024-07-29 DIAGNOSIS — F41.9 ANXIETY: ICD-10-CM

## 2024-07-29 DIAGNOSIS — G62.9 NEUROPATHY: ICD-10-CM

## 2024-07-29 DIAGNOSIS — G89.29 CHRONIC LOW BACK PAIN WITHOUT SCIATICA, UNSPECIFIED BACK PAIN LATERALITY: ICD-10-CM

## 2024-07-29 DIAGNOSIS — M54.50 CHRONIC LOW BACK PAIN WITHOUT SCIATICA, UNSPECIFIED BACK PAIN LATERALITY: ICD-10-CM

## 2024-07-29 RX ORDER — GABAPENTIN 600 MG/1
600 TABLET ORAL 3 TIMES DAILY
Qty: 90 TABLET | Refills: 1 | Status: SHIPPED | OUTPATIENT
Start: 2024-07-29

## 2024-07-29 RX ORDER — OXYCODONE AND ACETAMINOPHEN 7.5; 325 MG/1; MG/1
1 TABLET ORAL EVERY 6 HOURS PRN
Qty: 60 TABLET | Refills: 0 | Status: SHIPPED | OUTPATIENT
Start: 2024-07-29

## 2024-07-29 RX ORDER — PANTOPRAZOLE SODIUM 40 MG/1
40 TABLET, DELAYED RELEASE ORAL DAILY
Qty: 90 TABLET | Refills: 1 | Status: SHIPPED | OUTPATIENT
Start: 2024-07-29

## 2024-07-29 RX ORDER — TAMSULOSIN HYDROCHLORIDE 0.4 MG/1
1 CAPSULE ORAL DAILY
Qty: 90 CAPSULE | Refills: 1 | Status: SHIPPED | OUTPATIENT
Start: 2024-07-29

## 2024-07-29 RX ORDER — CLONAZEPAM 0.5 MG/1
0.5 TABLET ORAL 4 TIMES DAILY
Qty: 120 TABLET | Refills: 1 | Status: SHIPPED | OUTPATIENT
Start: 2024-07-29

## 2024-08-15 DIAGNOSIS — M54.50 CHRONIC LOW BACK PAIN WITHOUT SCIATICA, UNSPECIFIED BACK PAIN LATERALITY: ICD-10-CM

## 2024-08-15 DIAGNOSIS — G89.29 CHRONIC LOW BACK PAIN WITHOUT SCIATICA, UNSPECIFIED BACK PAIN LATERALITY: ICD-10-CM

## 2024-08-15 RX ORDER — OXYCODONE AND ACETAMINOPHEN 7.5; 325 MG/1; MG/1
1 TABLET ORAL EVERY 6 HOURS PRN
Qty: 60 TABLET | Refills: 0 | Status: SHIPPED | OUTPATIENT
Start: 2024-08-15

## 2024-08-25 ENCOUNTER — HOSPITAL ENCOUNTER (INPATIENT)
Facility: HOSPITAL | Age: 64
LOS: 3 days | Discharge: NURSING, REHAB OR LONG-TERM CARE | DRG: 482 | End: 2024-08-28
Attending: EMERGENCY MEDICINE | Admitting: INTERNAL MEDICINE
Payer: MEDICARE

## 2024-08-25 ENCOUNTER — APPOINTMENT (OUTPATIENT)
Dept: GENERAL RADIOLOGY | Facility: HOSPITAL | Age: 64
DRG: 482 | End: 2024-08-25
Payer: MEDICARE
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
## 2024-08-25 DIAGNOSIS — S72.142A INTERTROCHANTERIC FRACTURE OF LEFT FEMUR, CLOSED, INITIAL ENCOUNTER: ICD-10-CM

## 2024-08-25 DIAGNOSIS — M25.552 LEFT HIP PAIN: ICD-10-CM

## 2024-08-25 DIAGNOSIS — S72.145A CLOSED NONDISPLACED INTERTROCHANTERIC FRACTURE OF LEFT FEMUR, INITIAL ENCOUNTER: Primary | ICD-10-CM

## 2024-08-25 LAB
ABO GROUP BLD: NORMAL
ANION GAP SERPL CALCULATED.3IONS-SCNC: 12.3 MMOL/L (ref 5–15)
APTT PPP: 30.2 SECONDS (ref 61–76.5)
BASOPHILS # BLD AUTO: 0.02 10*3/MM3 (ref 0–0.2)
BASOPHILS NFR BLD AUTO: 0.2 % (ref 0–1.5)
BLD GP AB SCN SERPL QL: NEGATIVE
BUN SERPL-MCNC: 16 MG/DL (ref 8–23)
BUN/CREAT SERPL: 22.9 (ref 7–25)
CALCIUM SPEC-SCNC: 9.2 MG/DL (ref 8.6–10.5)
CHLORIDE SERPL-SCNC: 103 MMOL/L (ref 98–107)
CO2 SERPL-SCNC: 24.7 MMOL/L (ref 22–29)
CREAT SERPL-MCNC: 0.7 MG/DL (ref 0.76–1.27)
DEPRECATED RDW RBC AUTO: 43.8 FL (ref 37–54)
EGFRCR SERPLBLD CKD-EPI 2021: 102.3 ML/MIN/1.73
EOSINOPHIL # BLD AUTO: 0 10*3/MM3 (ref 0–0.4)
EOSINOPHIL NFR BLD AUTO: 0 % (ref 0.3–6.2)
ERYTHROCYTE [DISTWIDTH] IN BLOOD BY AUTOMATED COUNT: 12 % (ref 12.3–15.4)
GLUCOSE SERPL-MCNC: 107 MG/DL (ref 65–99)
HCT VFR BLD AUTO: 35.7 % (ref 37.5–51)
HGB BLD-MCNC: 11.9 G/DL (ref 13–17.7)
HOLD SPECIMEN: NORMAL
IMM GRANULOCYTES # BLD AUTO: 0.02 10*3/MM3 (ref 0–0.05)
IMM GRANULOCYTES NFR BLD AUTO: 0.2 % (ref 0–0.5)
INR PPP: 0.97 (ref 0.93–1.1)
LYMPHOCYTES # BLD AUTO: 1.98 10*3/MM3 (ref 0.7–3.1)
LYMPHOCYTES NFR BLD AUTO: 24.7 % (ref 19.6–45.3)
MCH RBC QN AUTO: 32.4 PG (ref 26.6–33)
MCHC RBC AUTO-ENTMCNC: 33.3 G/DL (ref 31.5–35.7)
MCV RBC AUTO: 97.3 FL (ref 79–97)
MONOCYTES # BLD AUTO: 0.85 10*3/MM3 (ref 0.1–0.9)
MONOCYTES NFR BLD AUTO: 10.6 % (ref 5–12)
NEUTROPHILS NFR BLD AUTO: 5.15 10*3/MM3 (ref 1.7–7)
NEUTROPHILS NFR BLD AUTO: 64.3 % (ref 42.7–76)
NRBC BLD AUTO-RTO: 0 /100 WBC (ref 0–0.2)
PLATELET # BLD AUTO: 161 10*3/MM3 (ref 140–450)
PMV BLD AUTO: 10.2 FL (ref 6–12)
POTASSIUM SERPL-SCNC: 4.1 MMOL/L (ref 3.5–5.2)
PROTHROMBIN TIME: 10.6 SECONDS (ref 9.6–11.7)
RBC # BLD AUTO: 3.67 10*6/MM3 (ref 4.14–5.8)
RH BLD: POSITIVE
SODIUM SERPL-SCNC: 140 MMOL/L (ref 136–145)
T&S EXPIRATION DATE: NORMAL
WBC NRBC COR # BLD AUTO: 8.02 10*3/MM3 (ref 3.4–10.8)

## 2024-08-25 PROCEDURE — 73502 X-RAY EXAM HIP UNI 2-3 VIEWS: CPT

## 2024-08-25 PROCEDURE — 86901 BLOOD TYPING SEROLOGIC RH(D): CPT

## 2024-08-25 PROCEDURE — 86901 BLOOD TYPING SEROLOGIC RH(D): CPT | Performed by: STUDENT IN AN ORGANIZED HEALTH CARE EDUCATION/TRAINING PROGRAM

## 2024-08-25 PROCEDURE — 71045 X-RAY EXAM CHEST 1 VIEW: CPT

## 2024-08-25 PROCEDURE — 25010000002 HYDROMORPHONE 1 MG/ML SOLUTION: Performed by: NURSE PRACTITIONER

## 2024-08-25 PROCEDURE — 85730 THROMBOPLASTIN TIME PARTIAL: CPT | Performed by: PHYSICIAN ASSISTANT

## 2024-08-25 PROCEDURE — 80048 BASIC METABOLIC PNL TOTAL CA: CPT | Performed by: PHYSICIAN ASSISTANT

## 2024-08-25 PROCEDURE — 99285 EMERGENCY DEPT VISIT HI MDM: CPT

## 2024-08-25 PROCEDURE — 85610 PROTHROMBIN TIME: CPT | Performed by: PHYSICIAN ASSISTANT

## 2024-08-25 PROCEDURE — 94799 UNLISTED PULMONARY SVC/PX: CPT

## 2024-08-25 PROCEDURE — 25810000003 SODIUM CHLORIDE 0.9 % SOLUTION: Performed by: NURSE PRACTITIONER

## 2024-08-25 PROCEDURE — 25010000002 MORPHINE PER 10 MG: Performed by: PHYSICIAN ASSISTANT

## 2024-08-25 PROCEDURE — 85025 COMPLETE CBC W/AUTO DIFF WBC: CPT | Performed by: PHYSICIAN ASSISTANT

## 2024-08-25 PROCEDURE — 94761 N-INVAS EAR/PLS OXIMETRY MLT: CPT

## 2024-08-25 PROCEDURE — 36415 COLL VENOUS BLD VENIPUNCTURE: CPT

## 2024-08-25 PROCEDURE — 86900 BLOOD TYPING SEROLOGIC ABO: CPT

## 2024-08-25 PROCEDURE — 25010000002 ONDANSETRON PER 1 MG: Performed by: PHYSICIAN ASSISTANT

## 2024-08-25 PROCEDURE — 86900 BLOOD TYPING SEROLOGIC ABO: CPT | Performed by: STUDENT IN AN ORGANIZED HEALTH CARE EDUCATION/TRAINING PROGRAM

## 2024-08-25 PROCEDURE — 86850 RBC ANTIBODY SCREEN: CPT | Performed by: STUDENT IN AN ORGANIZED HEALTH CARE EDUCATION/TRAINING PROGRAM

## 2024-08-25 PROCEDURE — 25010000002 HYDROMORPHONE 1 MG/ML SOLUTION: Performed by: INTERNAL MEDICINE

## 2024-08-25 PROCEDURE — 93005 ELECTROCARDIOGRAM TRACING: CPT | Performed by: PHYSICIAN ASSISTANT

## 2024-08-25 RX ORDER — SODIUM CHLORIDE 9 MG/ML
40 INJECTION, SOLUTION INTRAVENOUS AS NEEDED
Status: DISCONTINUED | OUTPATIENT
Start: 2024-08-25 | End: 2024-08-28 | Stop reason: HOSPADM

## 2024-08-25 RX ORDER — SODIUM CHLORIDE 0.9 % (FLUSH) 0.9 %
10 SYRINGE (ML) INJECTION EVERY 12 HOURS SCHEDULED
Status: DISCONTINUED | OUTPATIENT
Start: 2024-08-25 | End: 2024-08-28 | Stop reason: HOSPADM

## 2024-08-25 RX ORDER — CARVEDILOL 6.25 MG/1
6.25 TABLET ORAL 2 TIMES DAILY
Status: DISCONTINUED | OUTPATIENT
Start: 2024-08-25 | End: 2024-08-28 | Stop reason: HOSPADM

## 2024-08-25 RX ORDER — ATORVASTATIN CALCIUM 20 MG/1
20 TABLET, FILM COATED ORAL DAILY
Status: DISCONTINUED | OUTPATIENT
Start: 2024-08-26 | End: 2024-08-28 | Stop reason: HOSPADM

## 2024-08-25 RX ORDER — SODIUM CHLORIDE 0.9 % (FLUSH) 0.9 %
10 SYRINGE (ML) INJECTION AS NEEDED
Status: DISCONTINUED | OUTPATIENT
Start: 2024-08-25 | End: 2024-08-28 | Stop reason: HOSPADM

## 2024-08-25 RX ORDER — CLONAZEPAM 0.5 MG/1
0.5 TABLET ORAL EVERY MORNING
COMMUNITY

## 2024-08-25 RX ORDER — TAMSULOSIN HYDROCHLORIDE 0.4 MG/1
0.4 CAPSULE ORAL DAILY
Status: DISCONTINUED | OUTPATIENT
Start: 2024-08-25 | End: 2024-08-28 | Stop reason: HOSPADM

## 2024-08-25 RX ORDER — NALOXONE HCL 0.4 MG/ML
0.4 VIAL (ML) INJECTION
Status: DISCONTINUED | OUTPATIENT
Start: 2024-08-25 | End: 2024-08-28 | Stop reason: HOSPADM

## 2024-08-25 RX ORDER — GABAPENTIN 300 MG/1
600 CAPSULE ORAL EVERY 8 HOURS SCHEDULED
Status: DISCONTINUED | OUTPATIENT
Start: 2024-08-25 | End: 2024-08-28 | Stop reason: HOSPADM

## 2024-08-25 RX ORDER — CARBIDOPA AND LEVODOPA 25; 100 MG/1; MG/1
2 TABLET, EXTENDED RELEASE ORAL NIGHTLY
Status: DISCONTINUED | OUTPATIENT
Start: 2024-08-25 | End: 2024-08-28 | Stop reason: HOSPADM

## 2024-08-25 RX ORDER — BISACODYL 10 MG
10 SUPPOSITORY, RECTAL RECTAL DAILY PRN
Status: DISCONTINUED | OUTPATIENT
Start: 2024-08-25 | End: 2024-08-28 | Stop reason: HOSPADM

## 2024-08-25 RX ORDER — AMOXICILLIN 250 MG
2 CAPSULE ORAL 2 TIMES DAILY PRN
Status: DISCONTINUED | OUTPATIENT
Start: 2024-08-25 | End: 2024-08-28 | Stop reason: HOSPADM

## 2024-08-25 RX ORDER — CLONAZEPAM 0.5 MG/1
0.5 TABLET ORAL
Status: DISCONTINUED | OUTPATIENT
Start: 2024-08-25 | End: 2024-08-28 | Stop reason: HOSPADM

## 2024-08-25 RX ORDER — VENLAFAXINE HYDROCHLORIDE 75 MG/1
75 CAPSULE, EXTENDED RELEASE ORAL DAILY
Status: DISCONTINUED | OUTPATIENT
Start: 2024-08-25 | End: 2024-08-28 | Stop reason: HOSPADM

## 2024-08-25 RX ORDER — AMLODIPINE BESYLATE 5 MG/1
10 TABLET ORAL
Status: DISCONTINUED | OUTPATIENT
Start: 2024-08-26 | End: 2024-08-28 | Stop reason: HOSPADM

## 2024-08-25 RX ORDER — TRANEXAMIC ACID 10 MG/ML
1000 INJECTION, SOLUTION INTRAVENOUS ONCE
Status: COMPLETED | OUTPATIENT
Start: 2024-08-25 | End: 2024-08-25

## 2024-08-25 RX ORDER — BISACODYL 5 MG/1
5 TABLET, DELAYED RELEASE ORAL DAILY PRN
Status: DISCONTINUED | OUTPATIENT
Start: 2024-08-25 | End: 2024-08-28 | Stop reason: HOSPADM

## 2024-08-25 RX ORDER — NITROGLYCERIN 0.4 MG/1
0.4 TABLET SUBLINGUAL
Status: DISCONTINUED | OUTPATIENT
Start: 2024-08-25 | End: 2024-08-28 | Stop reason: HOSPADM

## 2024-08-25 RX ORDER — HYDROCODONE BITARTRATE AND ACETAMINOPHEN 5; 325 MG/1; MG/1
1 TABLET ORAL EVERY 6 HOURS PRN
Status: DISCONTINUED | OUTPATIENT
Start: 2024-08-25 | End: 2024-08-28 | Stop reason: HOSPADM

## 2024-08-25 RX ORDER — SODIUM CHLORIDE 9 MG/ML
75 INJECTION, SOLUTION INTRAVENOUS CONTINUOUS
Status: DISCONTINUED | OUTPATIENT
Start: 2024-08-25 | End: 2024-08-28 | Stop reason: HOSPADM

## 2024-08-25 RX ORDER — CLONAZEPAM 0.5 MG/1
0.5 TABLET ORAL
COMMUNITY

## 2024-08-25 RX ORDER — CLONAZEPAM 0.5 MG/1
1 TABLET ORAL NIGHTLY
COMMUNITY

## 2024-08-25 RX ORDER — IPRATROPIUM BROMIDE AND ALBUTEROL SULFATE 2.5; .5 MG/3ML; MG/3ML
3 SOLUTION RESPIRATORY (INHALATION) EVERY 6 HOURS PRN
Status: DISCONTINUED | OUTPATIENT
Start: 2024-08-25 | End: 2024-08-28 | Stop reason: HOSPADM

## 2024-08-25 RX ORDER — AMLODIPINE AND BENAZEPRIL HYDROCHLORIDE 10; 20 MG/1; MG/1
1 CAPSULE ORAL
COMMUNITY
End: 2024-08-28 | Stop reason: HOSPADM

## 2024-08-25 RX ORDER — GUAIFENESIN 600 MG/1
600 TABLET, EXTENDED RELEASE ORAL DAILY
Status: DISCONTINUED | OUTPATIENT
Start: 2024-08-26 | End: 2024-08-28 | Stop reason: HOSPADM

## 2024-08-25 RX ORDER — CLONAZEPAM 1 MG/1
1 TABLET ORAL NIGHTLY
Status: DISCONTINUED | OUTPATIENT
Start: 2024-08-25 | End: 2024-08-28 | Stop reason: HOSPADM

## 2024-08-25 RX ORDER — ONDANSETRON 2 MG/ML
4 INJECTION INTRAMUSCULAR; INTRAVENOUS EVERY 6 HOURS PRN
Status: DISCONTINUED | OUTPATIENT
Start: 2024-08-25 | End: 2024-08-28 | Stop reason: HOSPADM

## 2024-08-25 RX ORDER — CARBIDOPA AND LEVODOPA 25; 100 MG/1; MG/1
2 TABLET ORAL 4 TIMES DAILY
Status: DISCONTINUED | OUTPATIENT
Start: 2024-08-25 | End: 2024-08-28 | Stop reason: HOSPADM

## 2024-08-25 RX ORDER — BUDESONIDE 0.5 MG/2ML
0.5 INHALANT ORAL
Status: DISCONTINUED | OUTPATIENT
Start: 2024-08-25 | End: 2024-08-28

## 2024-08-25 RX ORDER — HYDROCODONE BITARTRATE AND ACETAMINOPHEN 7.5; 325 MG/1; MG/1
1 TABLET ORAL EVERY 6 HOURS PRN
Status: DISCONTINUED | OUTPATIENT
Start: 2024-08-25 | End: 2024-08-28 | Stop reason: HOSPADM

## 2024-08-25 RX ORDER — PANTOPRAZOLE SODIUM 40 MG/1
40 TABLET, DELAYED RELEASE ORAL DAILY
Status: DISCONTINUED | OUTPATIENT
Start: 2024-08-25 | End: 2024-08-28 | Stop reason: HOSPADM

## 2024-08-25 RX ORDER — GUAIFENESIN 600 MG/1
600 TABLET, EXTENDED RELEASE ORAL DAILY
COMMUNITY

## 2024-08-25 RX ORDER — ACETAMINOPHEN 325 MG/1
650 TABLET ORAL EVERY 4 HOURS PRN
Status: DISCONTINUED | OUTPATIENT
Start: 2024-08-25 | End: 2024-08-28 | Stop reason: HOSPADM

## 2024-08-25 RX ORDER — POLYETHYLENE GLYCOL 3350 17 G/17G
17 POWDER, FOR SOLUTION ORAL DAILY PRN
Status: DISCONTINUED | OUTPATIENT
Start: 2024-08-25 | End: 2024-08-28 | Stop reason: HOSPADM

## 2024-08-25 RX ORDER — BACLOFEN 10 MG/1
10 TABLET ORAL 3 TIMES DAILY PRN
Status: DISCONTINUED | OUTPATIENT
Start: 2024-08-25 | End: 2024-08-28 | Stop reason: HOSPADM

## 2024-08-25 RX ORDER — CLONAZEPAM 0.5 MG/1
0.5 TABLET ORAL EVERY MORNING
Status: DISCONTINUED | OUTPATIENT
Start: 2024-08-26 | End: 2024-08-28 | Stop reason: HOSPADM

## 2024-08-25 RX ORDER — CARBIDOPA AND LEVODOPA 25; 100 MG/1; MG/1
2 TABLET ORAL 4 TIMES DAILY
COMMUNITY

## 2024-08-25 RX ORDER — ONDANSETRON 2 MG/ML
4 INJECTION INTRAMUSCULAR; INTRAVENOUS ONCE
Status: COMPLETED | OUTPATIENT
Start: 2024-08-25 | End: 2024-08-25

## 2024-08-25 RX ORDER — DONEPEZIL HYDROCHLORIDE 5 MG/1
10 TABLET, FILM COATED ORAL NIGHTLY
Status: DISCONTINUED | OUTPATIENT
Start: 2024-08-25 | End: 2024-08-28 | Stop reason: HOSPADM

## 2024-08-25 RX ADMIN — HYDROMORPHONE HYDROCHLORIDE 0.5 MG: 1 INJECTION, SOLUTION INTRAMUSCULAR; INTRAVENOUS; SUBCUTANEOUS at 20:53

## 2024-08-25 RX ADMIN — CARVEDILOL 6.25 MG: 6.25 TABLET, FILM COATED ORAL at 20:46

## 2024-08-25 RX ADMIN — MORPHINE SULFATE 4 MG: 4 INJECTION, SOLUTION INTRAMUSCULAR; INTRAVENOUS at 11:06

## 2024-08-25 RX ADMIN — CARBIDOPA AND LEVODOPA 2 TABLET: 25; 100 TABLET ORAL at 17:46

## 2024-08-25 RX ADMIN — GABAPENTIN 600 MG: 300 CAPSULE ORAL at 20:46

## 2024-08-25 RX ADMIN — PANTOPRAZOLE SODIUM 40 MG: 40 TABLET, DELAYED RELEASE ORAL at 17:45

## 2024-08-25 RX ADMIN — HYDROMORPHONE HYDROCHLORIDE 1 MG: 1 INJECTION, SOLUTION INTRAMUSCULAR; INTRAVENOUS; SUBCUTANEOUS at 12:17

## 2024-08-25 RX ADMIN — CLONAZEPAM 1 MG: 1 TABLET ORAL at 20:46

## 2024-08-25 RX ADMIN — CLONAZEPAM 0.5 MG: 0.5 TABLET ORAL at 17:46

## 2024-08-25 RX ADMIN — ONDANSETRON 4 MG: 2 INJECTION INTRAMUSCULAR; INTRAVENOUS at 11:06

## 2024-08-25 RX ADMIN — HYDROCODONE BITARTRATE AND ACETAMINOPHEN 1 TABLET: 7.5; 325 TABLET ORAL at 19:58

## 2024-08-25 RX ADMIN — TRANEXAMIC ACID 1000 MG: 10 INJECTION, SOLUTION INTRAVENOUS at 11:12

## 2024-08-25 RX ADMIN — SODIUM CHLORIDE 75 ML/HR: 9 INJECTION, SOLUTION INTRAVENOUS at 17:46

## 2024-08-25 RX ADMIN — VENLAFAXINE HYDROCHLORIDE 75 MG: 75 CAPSULE, EXTENDED RELEASE ORAL at 17:46

## 2024-08-25 RX ADMIN — HYDROMORPHONE HYDROCHLORIDE 1 MG: 1 INJECTION, SOLUTION INTRAMUSCULAR; INTRAVENOUS; SUBCUTANEOUS at 15:11

## 2024-08-25 RX ADMIN — HYDROCODONE BITARTRATE AND ACETAMINOPHEN 1 TABLET: 5; 325 TABLET ORAL at 12:17

## 2024-08-25 RX ADMIN — CARBIDOPA AND LEVODOPA 2 TABLET: 25; 100 TABLET, EXTENDED RELEASE ORAL at 20:46

## 2024-08-25 RX ADMIN — DONEPEZIL HYDROCHLORIDE 10 MG: 5 TABLET, FILM COATED ORAL at 20:46

## 2024-08-25 RX ADMIN — TAMSULOSIN HYDROCHLORIDE 0.4 MG: 0.4 CAPSULE ORAL at 17:45

## 2024-08-25 RX ADMIN — Medication 10 ML: at 20:46

## 2024-08-25 RX ADMIN — BUDESONIDE 0.5 MG: 0.5 INHALANT RESPIRATORY (INHALATION) at 19:46

## 2024-08-26 ENCOUNTER — ANESTHESIA (OUTPATIENT)
Dept: PERIOP | Facility: HOSPITAL | Age: 64
End: 2024-08-26
Payer: MEDICARE

## 2024-08-26 ENCOUNTER — APPOINTMENT (OUTPATIENT)
Dept: GENERAL RADIOLOGY | Facility: HOSPITAL | Age: 64
DRG: 482 | End: 2024-08-26
Payer: MEDICARE

## 2024-08-26 ENCOUNTER — ANESTHESIA EVENT (OUTPATIENT)
Dept: PERIOP | Facility: HOSPITAL | Age: 64
End: 2024-08-26
Payer: MEDICARE

## 2024-08-26 LAB
ANION GAP SERPL CALCULATED.3IONS-SCNC: 9.7 MMOL/L (ref 5–15)
BASOPHILS # BLD AUTO: 0.03 10*3/MM3 (ref 0–0.2)
BASOPHILS NFR BLD AUTO: 0.4 % (ref 0–1.5)
BUN SERPL-MCNC: 16 MG/DL (ref 8–23)
BUN/CREAT SERPL: 31.4 (ref 7–25)
CALCIUM SPEC-SCNC: 9 MG/DL (ref 8.6–10.5)
CHLORIDE SERPL-SCNC: 104 MMOL/L (ref 98–107)
CO2 SERPL-SCNC: 24.3 MMOL/L (ref 22–29)
CREAT SERPL-MCNC: 0.51 MG/DL (ref 0.76–1.27)
DEPRECATED RDW RBC AUTO: 42.8 FL (ref 37–54)
EGFRCR SERPLBLD CKD-EPI 2021: 112.5 ML/MIN/1.73
EOSINOPHIL # BLD AUTO: 0 10*3/MM3 (ref 0–0.4)
EOSINOPHIL NFR BLD AUTO: 0 % (ref 0.3–6.2)
ERYTHROCYTE [DISTWIDTH] IN BLOOD BY AUTOMATED COUNT: 11.9 % (ref 12.3–15.4)
GLUCOSE SERPL-MCNC: 124 MG/DL (ref 65–99)
HCT VFR BLD AUTO: 35.3 % (ref 37.5–51)
HGB BLD-MCNC: 11.7 G/DL (ref 13–17.7)
IMM GRANULOCYTES # BLD AUTO: 0.03 10*3/MM3 (ref 0–0.05)
IMM GRANULOCYTES NFR BLD AUTO: 0.4 % (ref 0–0.5)
LYMPHOCYTES # BLD AUTO: 2.25 10*3/MM3 (ref 0.7–3.1)
LYMPHOCYTES NFR BLD AUTO: 27.1 % (ref 19.6–45.3)
MAGNESIUM SERPL-MCNC: 2 MG/DL (ref 1.6–2.4)
MCH RBC QN AUTO: 32.1 PG (ref 26.6–33)
MCHC RBC AUTO-ENTMCNC: 33.1 G/DL (ref 31.5–35.7)
MCV RBC AUTO: 97 FL (ref 79–97)
MONOCYTES # BLD AUTO: 0.89 10*3/MM3 (ref 0.1–0.9)
MONOCYTES NFR BLD AUTO: 10.7 % (ref 5–12)
NEUTROPHILS NFR BLD AUTO: 5.09 10*3/MM3 (ref 1.7–7)
NEUTROPHILS NFR BLD AUTO: 61.4 % (ref 42.7–76)
NRBC BLD AUTO-RTO: 0 /100 WBC (ref 0–0.2)
PLATELET # BLD AUTO: 165 10*3/MM3 (ref 140–450)
PMV BLD AUTO: 9.9 FL (ref 6–12)
POTASSIUM SERPL-SCNC: 3.9 MMOL/L (ref 3.5–5.2)
QT INTERVAL: 442 MS
QTC INTERVAL: 437 MS
RBC # BLD AUTO: 3.64 10*6/MM3 (ref 4.14–5.8)
SODIUM SERPL-SCNC: 138 MMOL/L (ref 136–145)
WBC NRBC COR # BLD AUTO: 8.29 10*3/MM3 (ref 3.4–10.8)

## 2024-08-26 PROCEDURE — 73501 X-RAY EXAM HIP UNI 1 VIEW: CPT

## 2024-08-26 PROCEDURE — 73502 X-RAY EXAM HIP UNI 2-3 VIEWS: CPT

## 2024-08-26 PROCEDURE — 25010000002 MAGNESIUM SULFATE PER 500 MG OF MAGNESIUM: Performed by: NURSE ANESTHETIST, CERTIFIED REGISTERED

## 2024-08-26 PROCEDURE — 25010000002 ONDANSETRON PER 1 MG: Performed by: NURSE ANESTHETIST, CERTIFIED REGISTERED

## 2024-08-26 PROCEDURE — 85025 COMPLETE CBC W/AUTO DIFF WBC: CPT | Performed by: NURSE PRACTITIONER

## 2024-08-26 PROCEDURE — 25010000002 CEFAZOLIN PER 500 MG: Performed by: STUDENT IN AN ORGANIZED HEALTH CARE EDUCATION/TRAINING PROGRAM

## 2024-08-26 PROCEDURE — 25010000002 GLYCOPYRROLATE 1 MG/5ML SOLUTION: Performed by: NURSE ANESTHETIST, CERTIFIED REGISTERED

## 2024-08-26 PROCEDURE — 25810000003 LACTATED RINGERS PER 1000 ML: Performed by: STUDENT IN AN ORGANIZED HEALTH CARE EDUCATION/TRAINING PROGRAM

## 2024-08-26 PROCEDURE — 80048 BASIC METABOLIC PNL TOTAL CA: CPT | Performed by: NURSE PRACTITIONER

## 2024-08-26 PROCEDURE — 0QSC36Z REPOSITION LEFT LOWER FEMUR WITH INTRAMEDULLARY INTERNAL FIXATION DEVICE, PERCUTANEOUS APPROACH: ICD-10-PCS | Performed by: STUDENT IN AN ORGANIZED HEALTH CARE EDUCATION/TRAINING PROGRAM

## 2024-08-26 PROCEDURE — 25010000002 BUPIVACAINE (PF) 0.25 % SOLUTION: Performed by: STUDENT IN AN ORGANIZED HEALTH CARE EDUCATION/TRAINING PROGRAM

## 2024-08-26 PROCEDURE — C1713 ANCHOR/SCREW BN/BN,TIS/BN: HCPCS | Performed by: STUDENT IN AN ORGANIZED HEALTH CARE EDUCATION/TRAINING PROGRAM

## 2024-08-26 PROCEDURE — 25010000002 SUGAMMADEX 200 MG/2ML SOLUTION: Performed by: NURSE ANESTHETIST, CERTIFIED REGISTERED

## 2024-08-26 PROCEDURE — 83735 ASSAY OF MAGNESIUM: CPT | Performed by: NURSE PRACTITIONER

## 2024-08-26 PROCEDURE — 25010000002 DEXAMETHASONE PER 1 MG: Performed by: NURSE ANESTHETIST, CERTIFIED REGISTERED

## 2024-08-26 PROCEDURE — 76000 FLUOROSCOPY <1 HR PHYS/QHP: CPT

## 2024-08-26 PROCEDURE — 25810000003 SODIUM CHLORIDE 0.9 % SOLUTION 250 ML FLEX CONT: Performed by: NURSE ANESTHETIST, CERTIFIED REGISTERED

## 2024-08-26 PROCEDURE — 25010000002 PHENYLEPHRINE 10 MG/ML SOLUTION 5 ML VIAL: Performed by: NURSE ANESTHETIST, CERTIFIED REGISTERED

## 2024-08-26 PROCEDURE — C1769 GUIDE WIRE: HCPCS | Performed by: STUDENT IN AN ORGANIZED HEALTH CARE EDUCATION/TRAINING PROGRAM

## 2024-08-26 PROCEDURE — 25810000003 LACTATED RINGERS PER 1000 ML: Performed by: NURSE ANESTHETIST, CERTIFIED REGISTERED

## 2024-08-26 PROCEDURE — 25010000002 HYDROMORPHONE 1 MG/ML SOLUTION: Performed by: INTERNAL MEDICINE

## 2024-08-26 PROCEDURE — 25010000002 PROPOFOL 200 MG/20ML EMULSION: Performed by: NURSE ANESTHETIST, CERTIFIED REGISTERED

## 2024-08-26 DEVICE — DEV CONTRL TISS STRATAFIX SPIRAL PDS PLS CT1 2-0 1/2 30CM: Type: IMPLANTABLE DEVICE | Site: HIP | Status: FUNCTIONAL

## 2024-08-26 DEVICE — INTERTAN LAG/COMPRESSION SCREW KIT                                    95MM / 90MM
Type: IMPLANTABLE DEVICE | Site: HIP | Status: FUNCTIONAL
Brand: TRIGEN

## 2024-08-26 DEVICE — TRIGEN LOW PROFILE SCREW 5.0MM X 35MM
Type: IMPLANTABLE DEVICE | Site: HIP | Status: FUNCTIONAL
Brand: TRIGEN

## 2024-08-26 DEVICE — TRIGEN INTERTAN 10S 10MM X 18CM 125DEGREE
Type: IMPLANTABLE DEVICE | Site: HIP | Status: FUNCTIONAL
Brand: TRIGEN

## 2024-08-26 RX ORDER — SODIUM CHLORIDE, SODIUM LACTATE, POTASSIUM CHLORIDE, CALCIUM CHLORIDE 600; 310; 30; 20 MG/100ML; MG/100ML; MG/100ML; MG/100ML
1000 INJECTION, SOLUTION INTRAVENOUS CONTINUOUS
Status: DISPENSED | OUTPATIENT
Start: 2024-08-26 | End: 2024-08-28

## 2024-08-26 RX ORDER — OXYCODONE HYDROCHLORIDE 5 MG/1
10 TABLET ORAL EVERY 4 HOURS PRN
Status: DISCONTINUED | OUTPATIENT
Start: 2024-08-26 | End: 2024-08-26 | Stop reason: HOSPADM

## 2024-08-26 RX ORDER — TRANEXAMIC ACID 10 MG/ML
1000 INJECTION, SOLUTION INTRAVENOUS ONCE
Status: DISCONTINUED | OUTPATIENT
Start: 2024-08-26 | End: 2024-08-28 | Stop reason: HOSPADM

## 2024-08-26 RX ORDER — SODIUM CHLORIDE, SODIUM LACTATE, POTASSIUM CHLORIDE, CALCIUM CHLORIDE 600; 310; 30; 20 MG/100ML; MG/100ML; MG/100ML; MG/100ML
INJECTION, SOLUTION INTRAVENOUS CONTINUOUS PRN
Status: DISCONTINUED | OUTPATIENT
Start: 2024-08-26 | End: 2024-08-26 | Stop reason: SURG

## 2024-08-26 RX ORDER — TRANEXAMIC ACID 10 MG/ML
1000 INJECTION, SOLUTION INTRAVENOUS ONCE
Status: COMPLETED | OUTPATIENT
Start: 2024-08-26 | End: 2024-08-26

## 2024-08-26 RX ORDER — EPHEDRINE SULFATE 5 MG/ML
INJECTION INTRAVENOUS AS NEEDED
Status: DISCONTINUED | OUTPATIENT
Start: 2024-08-26 | End: 2024-08-26 | Stop reason: SURG

## 2024-08-26 RX ORDER — ASPIRIN 81 MG/1
81 TABLET ORAL 2 TIMES DAILY
Status: DISCONTINUED | OUTPATIENT
Start: 2024-08-27 | End: 2024-08-28 | Stop reason: HOSPADM

## 2024-08-26 RX ORDER — MAGNESIUM SULFATE HEPTAHYDRATE 500 MG/ML
INJECTION, SOLUTION INTRAMUSCULAR; INTRAVENOUS AS NEEDED
Status: DISCONTINUED | OUTPATIENT
Start: 2024-08-26 | End: 2024-08-26 | Stop reason: SURG

## 2024-08-26 RX ORDER — PROPOFOL 10 MG/ML
INJECTION, EMULSION INTRAVENOUS AS NEEDED
Status: DISCONTINUED | OUTPATIENT
Start: 2024-08-26 | End: 2024-08-26 | Stop reason: SURG

## 2024-08-26 RX ORDER — ROCURONIUM BROMIDE 10 MG/ML
INJECTION, SOLUTION INTRAVENOUS AS NEEDED
Status: DISCONTINUED | OUTPATIENT
Start: 2024-08-26 | End: 2024-08-26 | Stop reason: SURG

## 2024-08-26 RX ORDER — DIPHENHYDRAMINE HYDROCHLORIDE 50 MG/ML
12.5 INJECTION INTRAMUSCULAR; INTRAVENOUS
Status: DISCONTINUED | OUTPATIENT
Start: 2024-08-26 | End: 2024-08-26 | Stop reason: HOSPADM

## 2024-08-26 RX ORDER — ONDANSETRON 2 MG/ML
4 INJECTION INTRAMUSCULAR; INTRAVENOUS ONCE AS NEEDED
Status: DISCONTINUED | OUTPATIENT
Start: 2024-08-26 | End: 2024-08-26 | Stop reason: HOSPADM

## 2024-08-26 RX ORDER — NALOXONE HCL 0.4 MG/ML
0.4 VIAL (ML) INJECTION AS NEEDED
Status: DISCONTINUED | OUTPATIENT
Start: 2024-08-26 | End: 2024-08-26 | Stop reason: HOSPADM

## 2024-08-26 RX ORDER — ONDANSETRON 2 MG/ML
INJECTION INTRAMUSCULAR; INTRAVENOUS AS NEEDED
Status: DISCONTINUED | OUTPATIENT
Start: 2024-08-26 | End: 2024-08-26 | Stop reason: SURG

## 2024-08-26 RX ORDER — GLYCOPYRROLATE 0.2 MG/ML
INJECTION INTRAMUSCULAR; INTRAVENOUS AS NEEDED
Status: DISCONTINUED | OUTPATIENT
Start: 2024-08-26 | End: 2024-08-26 | Stop reason: SURG

## 2024-08-26 RX ORDER — BUPIVACAINE HYDROCHLORIDE 2.5 MG/ML
INJECTION, SOLUTION EPIDURAL; INFILTRATION; INTRACAUDAL AS NEEDED
Status: DISCONTINUED | OUTPATIENT
Start: 2024-08-26 | End: 2024-08-26 | Stop reason: HOSPADM

## 2024-08-26 RX ORDER — OXYCODONE HYDROCHLORIDE 5 MG/1
5 TABLET ORAL ONCE AS NEEDED
Status: DISCONTINUED | OUTPATIENT
Start: 2024-08-26 | End: 2024-08-26 | Stop reason: HOSPADM

## 2024-08-26 RX ORDER — LABETALOL HYDROCHLORIDE 5 MG/ML
5 INJECTION, SOLUTION INTRAVENOUS
Status: DISCONTINUED | OUTPATIENT
Start: 2024-08-26 | End: 2024-08-26 | Stop reason: HOSPADM

## 2024-08-26 RX ORDER — IPRATROPIUM BROMIDE AND ALBUTEROL SULFATE 2.5; .5 MG/3ML; MG/3ML
3 SOLUTION RESPIRATORY (INHALATION) ONCE AS NEEDED
Status: DISCONTINUED | OUTPATIENT
Start: 2024-08-26 | End: 2024-08-26 | Stop reason: HOSPADM

## 2024-08-26 RX ORDER — DEXMEDETOMIDINE HYDROCHLORIDE 100 UG/ML
INJECTION, SOLUTION INTRAVENOUS AS NEEDED
Status: DISCONTINUED | OUTPATIENT
Start: 2024-08-26 | End: 2024-08-26 | Stop reason: SURG

## 2024-08-26 RX ORDER — DEXAMETHASONE SODIUM PHOSPHATE 4 MG/ML
INJECTION, SOLUTION INTRA-ARTICULAR; INTRALESIONAL; INTRAMUSCULAR; INTRAVENOUS; SOFT TISSUE AS NEEDED
Status: DISCONTINUED | OUTPATIENT
Start: 2024-08-26 | End: 2024-08-26 | Stop reason: SURG

## 2024-08-26 RX ORDER — HYDRALAZINE HYDROCHLORIDE 20 MG/ML
5 INJECTION INTRAMUSCULAR; INTRAVENOUS
Status: DISCONTINUED | OUTPATIENT
Start: 2024-08-26 | End: 2024-08-26 | Stop reason: HOSPADM

## 2024-08-26 RX ORDER — EPHEDRINE SULFATE 5 MG/ML
5 INJECTION INTRAVENOUS ONCE AS NEEDED
Status: DISCONTINUED | OUTPATIENT
Start: 2024-08-26 | End: 2024-08-26 | Stop reason: HOSPADM

## 2024-08-26 RX ORDER — DIPHENHYDRAMINE HYDROCHLORIDE 50 MG/ML
12.5 INJECTION INTRAMUSCULAR; INTRAVENOUS ONCE AS NEEDED
Status: DISCONTINUED | OUTPATIENT
Start: 2024-08-26 | End: 2024-08-26 | Stop reason: HOSPADM

## 2024-08-26 RX ADMIN — PROPOFOL 150 MG: 10 INJECTION, EMULSION INTRAVENOUS at 17:37

## 2024-08-26 RX ADMIN — CARVEDILOL 6.25 MG: 6.25 TABLET, FILM COATED ORAL at 21:28

## 2024-08-26 RX ADMIN — GABAPENTIN 600 MG: 300 CAPSULE ORAL at 21:28

## 2024-08-26 RX ADMIN — MAGNESIUM SULFATE HEPTAHYDRATE 0.4 G: 500 INJECTION, SOLUTION INTRAMUSCULAR; INTRAVENOUS at 17:58

## 2024-08-26 RX ADMIN — PHENYLEPHRINE HYDROCHLORIDE 0.5 MCG/KG/MIN: 10 INJECTION INTRAVENOUS at 18:05

## 2024-08-26 RX ADMIN — EPHEDRINE SULFATE 10 MG: 5 INJECTION INTRAVENOUS at 18:01

## 2024-08-26 RX ADMIN — CARBIDOPA AND LEVODOPA 2 TABLET: 25; 100 TABLET, EXTENDED RELEASE ORAL at 21:27

## 2024-08-26 RX ADMIN — SODIUM CHLORIDE 2 G: 900 INJECTION INTRAVENOUS at 17:27

## 2024-08-26 RX ADMIN — LIDOCAINE HYDROCHLORIDE 80 MG: 20 INJECTION, SOLUTION EPIDURAL; INFILTRATION; INTRACAUDAL; PERINEURAL at 17:37

## 2024-08-26 RX ADMIN — VENLAFAXINE HYDROCHLORIDE 75 MG: 75 CAPSULE, EXTENDED RELEASE ORAL at 10:32

## 2024-08-26 RX ADMIN — SUGAMMADEX 200 MG: 100 INJECTION, SOLUTION INTRAVENOUS at 18:32

## 2024-08-26 RX ADMIN — ROCURONIUM BROMIDE 40 MG: 10 INJECTION, SOLUTION INTRAVENOUS at 17:37

## 2024-08-26 RX ADMIN — DONEPEZIL HYDROCHLORIDE 10 MG: 5 TABLET, FILM COATED ORAL at 21:27

## 2024-08-26 RX ADMIN — CLONAZEPAM 0.5 MG: 0.5 TABLET ORAL at 06:12

## 2024-08-26 RX ADMIN — CARVEDILOL 6.25 MG: 6.25 TABLET, FILM COATED ORAL at 10:06

## 2024-08-26 RX ADMIN — CARBIDOPA AND LEVODOPA 2 TABLET: 25; 100 TABLET ORAL at 12:21

## 2024-08-26 RX ADMIN — SODIUM CHLORIDE, SODIUM LACTATE, POTASSIUM CHLORIDE, AND CALCIUM CHLORIDE: .6; .31; .03; .02 INJECTION, SOLUTION INTRAVENOUS at 17:31

## 2024-08-26 RX ADMIN — ATORVASTATIN CALCIUM 20 MG: 20 TABLET, FILM COATED ORAL at 10:06

## 2024-08-26 RX ADMIN — GUAIFENESIN 600 MG: 600 TABLET, EXTENDED RELEASE ORAL at 10:06

## 2024-08-26 RX ADMIN — CLONAZEPAM 0.5 MG: 0.5 TABLET ORAL at 15:04

## 2024-08-26 RX ADMIN — MAGNESIUM SULFATE HEPTAHYDRATE 0.4 G: 500 INJECTION, SOLUTION INTRAMUSCULAR; INTRAVENOUS at 18:13

## 2024-08-26 RX ADMIN — GLYCOPYRROLATE 0.2 MG: 0.2 INJECTION INTRAMUSCULAR; INTRAVENOUS at 18:04

## 2024-08-26 RX ADMIN — MAGNESIUM SULFATE HEPTAHYDRATE 0.4 G: 500 INJECTION, SOLUTION INTRAMUSCULAR; INTRAVENOUS at 18:07

## 2024-08-26 RX ADMIN — DEXAMETHASONE SODIUM PHOSPHATE 4 MG: 4 INJECTION, SOLUTION INTRAMUSCULAR; INTRAVENOUS at 17:48

## 2024-08-26 RX ADMIN — HYDROCODONE BITARTRATE AND ACETAMINOPHEN 1 TABLET: 7.5; 325 TABLET ORAL at 06:12

## 2024-08-26 RX ADMIN — AMLODIPINE BESYLATE 10 MG: 5 TABLET ORAL at 12:21

## 2024-08-26 RX ADMIN — SODIUM CHLORIDE, POTASSIUM CHLORIDE, SODIUM LACTATE AND CALCIUM CHLORIDE 1000 ML: 600; 310; 30; 20 INJECTION, SOLUTION INTRAVENOUS at 17:28

## 2024-08-26 RX ADMIN — CARBIDOPA AND LEVODOPA 2 TABLET: 25; 100 TABLET ORAL at 07:58

## 2024-08-26 RX ADMIN — DEXMEDETOMIDINE HYDROCHLORIDE 4 MCG: 100 INJECTION, SOLUTION INTRAVENOUS at 17:57

## 2024-08-26 RX ADMIN — TRANEXAMIC ACID 1000 MG: 10 INJECTION, SOLUTION INTRAVENOUS at 17:42

## 2024-08-26 RX ADMIN — GABAPENTIN 600 MG: 300 CAPSULE ORAL at 15:04

## 2024-08-26 RX ADMIN — PANTOPRAZOLE SODIUM 40 MG: 40 TABLET, DELAYED RELEASE ORAL at 10:06

## 2024-08-26 RX ADMIN — HYDROCODONE BITARTRATE AND ACETAMINOPHEN 1 TABLET: 7.5; 325 TABLET ORAL at 21:34

## 2024-08-26 RX ADMIN — HYDROMORPHONE HYDROCHLORIDE 1 MG: 1 INJECTION, SOLUTION INTRAMUSCULAR; INTRAVENOUS; SUBCUTANEOUS at 11:13

## 2024-08-26 RX ADMIN — PROPOFOL 150 MCG/KG/MIN: 10 INJECTION, EMULSION INTRAVENOUS at 17:41

## 2024-08-26 RX ADMIN — MAGNESIUM SULFATE HEPTAHYDRATE 0.4 G: 500 INJECTION, SOLUTION INTRAMUSCULAR; INTRAVENOUS at 18:23

## 2024-08-26 RX ADMIN — ROCURONIUM BROMIDE 10 MG: 10 INJECTION, SOLUTION INTRAVENOUS at 17:52

## 2024-08-26 RX ADMIN — GABAPENTIN 600 MG: 300 CAPSULE ORAL at 06:12

## 2024-08-26 RX ADMIN — MAGNESIUM SULFATE HEPTAHYDRATE 0.4 G: 500 INJECTION, SOLUTION INTRAMUSCULAR; INTRAVENOUS at 18:19

## 2024-08-26 RX ADMIN — TAMSULOSIN HYDROCHLORIDE 0.4 MG: 0.4 CAPSULE ORAL at 10:06

## 2024-08-26 RX ADMIN — ONDANSETRON 4 MG: 2 INJECTION INTRAMUSCULAR; INTRAVENOUS at 18:31

## 2024-08-27 LAB
HCT VFR BLD AUTO: 32.9 % (ref 37.5–51)
HGB BLD-MCNC: 11.1 G/DL (ref 13–17.7)

## 2024-08-27 PROCEDURE — 97162 PT EVAL MOD COMPLEX 30 MIN: CPT

## 2024-08-27 PROCEDURE — 97116 GAIT TRAINING THERAPY: CPT

## 2024-08-27 PROCEDURE — 25010000002 HYDROMORPHONE 1 MG/ML SOLUTION: Performed by: STUDENT IN AN ORGANIZED HEALTH CARE EDUCATION/TRAINING PROGRAM

## 2024-08-27 PROCEDURE — 94799 UNLISTED PULMONARY SVC/PX: CPT

## 2024-08-27 PROCEDURE — 97166 OT EVAL MOD COMPLEX 45 MIN: CPT

## 2024-08-27 PROCEDURE — 25010000002 CEFAZOLIN PER 500 MG: Performed by: STUDENT IN AN ORGANIZED HEALTH CARE EDUCATION/TRAINING PROGRAM

## 2024-08-27 PROCEDURE — 85014 HEMATOCRIT: CPT | Performed by: STUDENT IN AN ORGANIZED HEALTH CARE EDUCATION/TRAINING PROGRAM

## 2024-08-27 PROCEDURE — 85018 HEMOGLOBIN: CPT | Performed by: STUDENT IN AN ORGANIZED HEALTH CARE EDUCATION/TRAINING PROGRAM

## 2024-08-27 RX ORDER — CLOPIDOGREL BISULFATE 75 MG/1
75 TABLET ORAL DAILY
Status: DISCONTINUED | OUTPATIENT
Start: 2024-08-27 | End: 2024-08-28 | Stop reason: HOSPADM

## 2024-08-27 RX ADMIN — GUAIFENESIN 600 MG: 600 TABLET, EXTENDED RELEASE ORAL at 09:18

## 2024-08-27 RX ADMIN — CARBIDOPA AND LEVODOPA 2 TABLET: 25; 100 TABLET ORAL at 12:58

## 2024-08-27 RX ADMIN — SENNOSIDES AND DOCUSATE SODIUM 2 TABLET: 50; 8.6 TABLET ORAL at 09:20

## 2024-08-27 RX ADMIN — CARBIDOPA AND LEVODOPA 2 TABLET: 25; 100 TABLET ORAL at 16:22

## 2024-08-27 RX ADMIN — SODIUM CHLORIDE 2000 MG: 900 INJECTION INTRAVENOUS at 09:18

## 2024-08-27 RX ADMIN — HYDROMORPHONE HYDROCHLORIDE 0.5 MG: 1 INJECTION, SOLUTION INTRAMUSCULAR; INTRAVENOUS; SUBCUTANEOUS at 21:32

## 2024-08-27 RX ADMIN — HYDROCODONE BITARTRATE AND ACETAMINOPHEN 1 TABLET: 7.5; 325 TABLET ORAL at 16:58

## 2024-08-27 RX ADMIN — IPRATROPIUM BROMIDE AND ALBUTEROL SULFATE 3 ML: .5; 3 SOLUTION RESPIRATORY (INHALATION) at 17:51

## 2024-08-27 RX ADMIN — CLONAZEPAM 1 MG: 1 TABLET ORAL at 21:33

## 2024-08-27 RX ADMIN — CARBIDOPA AND LEVODOPA 2 TABLET: 25; 100 TABLET ORAL at 19:46

## 2024-08-27 RX ADMIN — Medication 10 ML: at 21:34

## 2024-08-27 RX ADMIN — CARBIDOPA AND LEVODOPA 2 TABLET: 25; 100 TABLET, EXTENDED RELEASE ORAL at 21:34

## 2024-08-27 RX ADMIN — CLOPIDOGREL BISULFATE 75 MG: 75 TABLET ORAL at 14:38

## 2024-08-27 RX ADMIN — GABAPENTIN 600 MG: 300 CAPSULE ORAL at 14:38

## 2024-08-27 RX ADMIN — CARVEDILOL 6.25 MG: 6.25 TABLET, FILM COATED ORAL at 09:17

## 2024-08-27 RX ADMIN — CARVEDILOL 6.25 MG: 6.25 TABLET, FILM COATED ORAL at 21:34

## 2024-08-27 RX ADMIN — GABAPENTIN 600 MG: 300 CAPSULE ORAL at 06:58

## 2024-08-27 RX ADMIN — ASPIRIN 81 MG: 81 TABLET, COATED ORAL at 09:17

## 2024-08-27 RX ADMIN — DONEPEZIL HYDROCHLORIDE 10 MG: 5 TABLET, FILM COATED ORAL at 21:34

## 2024-08-27 RX ADMIN — GABAPENTIN 600 MG: 300 CAPSULE ORAL at 21:33

## 2024-08-27 RX ADMIN — TAMSULOSIN HYDROCHLORIDE 0.4 MG: 0.4 CAPSULE ORAL at 09:18

## 2024-08-27 RX ADMIN — ASPIRIN 81 MG: 81 TABLET, COATED ORAL at 21:33

## 2024-08-27 RX ADMIN — SODIUM CHLORIDE 2000 MG: 900 INJECTION INTRAVENOUS at 01:27

## 2024-08-27 RX ADMIN — Medication 10 ML: at 09:19

## 2024-08-27 RX ADMIN — ATORVASTATIN CALCIUM 20 MG: 20 TABLET, FILM COATED ORAL at 09:18

## 2024-08-27 RX ADMIN — CLONAZEPAM 0.5 MG: 0.5 TABLET ORAL at 06:58

## 2024-08-27 RX ADMIN — PANTOPRAZOLE SODIUM 40 MG: 40 TABLET, DELAYED RELEASE ORAL at 09:18

## 2024-08-27 RX ADMIN — BUDESONIDE 0.5 MG: 0.5 INHALANT RESPIRATORY (INHALATION) at 17:54

## 2024-08-27 RX ADMIN — HYDROCODONE BITARTRATE AND ACETAMINOPHEN 1 TABLET: 7.5; 325 TABLET ORAL at 11:00

## 2024-08-27 RX ADMIN — CARBIDOPA AND LEVODOPA 2 TABLET: 25; 100 TABLET ORAL at 09:18

## 2024-08-27 RX ADMIN — CLONAZEPAM 0.5 MG: 0.5 TABLET ORAL at 16:22

## 2024-08-27 RX ADMIN — AMLODIPINE BESYLATE 10 MG: 5 TABLET ORAL at 12:58

## 2024-08-27 RX ADMIN — VENLAFAXINE HYDROCHLORIDE 75 MG: 75 CAPSULE, EXTENDED RELEASE ORAL at 09:17

## 2024-08-28 VITALS
WEIGHT: 190 LBS | BODY MASS INDEX: 28.14 KG/M2 | HEIGHT: 69 IN | OXYGEN SATURATION: 99 % | HEART RATE: 65 BPM | TEMPERATURE: 98.1 F | DIASTOLIC BLOOD PRESSURE: 80 MMHG | RESPIRATION RATE: 18 BRPM | SYSTOLIC BLOOD PRESSURE: 123 MMHG

## 2024-08-28 LAB
ALBUMIN SERPL-MCNC: 3.6 G/DL (ref 3.5–5.2)
ALBUMIN/GLOB SERPL: 1.3 G/DL
ALP SERPL-CCNC: 106 U/L (ref 39–117)
ALT SERPL W P-5'-P-CCNC: 9 U/L (ref 1–41)
ANION GAP SERPL CALCULATED.3IONS-SCNC: 9 MMOL/L (ref 5–15)
AST SERPL-CCNC: 16 U/L (ref 1–40)
BASOPHILS # BLD AUTO: 0.02 10*3/MM3 (ref 0–0.2)
BASOPHILS NFR BLD AUTO: 0.3 % (ref 0–1.5)
BILIRUB SERPL-MCNC: 0.3 MG/DL (ref 0–1.2)
BUN SERPL-MCNC: 13 MG/DL (ref 8–23)
BUN/CREAT SERPL: 19.4 (ref 7–25)
CALCIUM SPEC-SCNC: 8.6 MG/DL (ref 8.6–10.5)
CHLORIDE SERPL-SCNC: 103 MMOL/L (ref 98–107)
CO2 SERPL-SCNC: 28 MMOL/L (ref 22–29)
CREAT SERPL-MCNC: 0.67 MG/DL (ref 0.76–1.27)
DEPRECATED RDW RBC AUTO: 41.7 FL (ref 37–54)
EGFRCR SERPLBLD CKD-EPI 2021: 103.6 ML/MIN/1.73
EOSINOPHIL # BLD AUTO: 0 10*3/MM3 (ref 0–0.4)
EOSINOPHIL NFR BLD AUTO: 0 % (ref 0.3–6.2)
ERYTHROCYTE [DISTWIDTH] IN BLOOD BY AUTOMATED COUNT: 11.9 % (ref 12.3–15.4)
GLOBULIN UR ELPH-MCNC: 2.7 GM/DL
GLUCOSE SERPL-MCNC: 132 MG/DL (ref 65–99)
HCT VFR BLD AUTO: 28.7 % (ref 37.5–51)
HGB BLD-MCNC: 9.7 G/DL (ref 13–17.7)
IMM GRANULOCYTES # BLD AUTO: 0.02 10*3/MM3 (ref 0–0.05)
IMM GRANULOCYTES NFR BLD AUTO: 0.3 % (ref 0–0.5)
LYMPHOCYTES # BLD AUTO: 2.75 10*3/MM3 (ref 0.7–3.1)
LYMPHOCYTES NFR BLD AUTO: 34.9 % (ref 19.6–45.3)
MCH RBC QN AUTO: 32.6 PG (ref 26.6–33)
MCHC RBC AUTO-ENTMCNC: 33.8 G/DL (ref 31.5–35.7)
MCV RBC AUTO: 96.3 FL (ref 79–97)
MONOCYTES # BLD AUTO: 0.82 10*3/MM3 (ref 0.1–0.9)
MONOCYTES NFR BLD AUTO: 10.4 % (ref 5–12)
NEUTROPHILS NFR BLD AUTO: 4.26 10*3/MM3 (ref 1.7–7)
NEUTROPHILS NFR BLD AUTO: 54.1 % (ref 42.7–76)
NRBC BLD AUTO-RTO: 0 /100 WBC (ref 0–0.2)
PLATELET # BLD AUTO: 152 10*3/MM3 (ref 140–450)
PMV BLD AUTO: 9.9 FL (ref 6–12)
POTASSIUM SERPL-SCNC: 3.5 MMOL/L (ref 3.5–5.2)
POTASSIUM SERPL-SCNC: 4.2 MMOL/L (ref 3.5–5.2)
PROT SERPL-MCNC: 6.3 G/DL (ref 6–8.5)
RBC # BLD AUTO: 2.98 10*6/MM3 (ref 4.14–5.8)
SODIUM SERPL-SCNC: 140 MMOL/L (ref 136–145)
WBC NRBC COR # BLD AUTO: 7.87 10*3/MM3 (ref 3.4–10.8)

## 2024-08-28 PROCEDURE — 84132 ASSAY OF SERUM POTASSIUM: CPT | Performed by: HOSPITALIST

## 2024-08-28 PROCEDURE — 94799 UNLISTED PULMONARY SVC/PX: CPT

## 2024-08-28 PROCEDURE — 94761 N-INVAS EAR/PLS OXIMETRY MLT: CPT

## 2024-08-28 PROCEDURE — 85025 COMPLETE CBC W/AUTO DIFF WBC: CPT | Performed by: HOSPITALIST

## 2024-08-28 PROCEDURE — 80053 COMPREHEN METABOLIC PANEL: CPT | Performed by: HOSPITALIST

## 2024-08-28 PROCEDURE — 97110 THERAPEUTIC EXERCISES: CPT

## 2024-08-28 PROCEDURE — 97116 GAIT TRAINING THERAPY: CPT

## 2024-08-28 PROCEDURE — 97530 THERAPEUTIC ACTIVITIES: CPT

## 2024-08-28 RX ORDER — POTASSIUM CHLORIDE 1500 MG/1
40 TABLET, EXTENDED RELEASE ORAL ONCE
Status: DISCONTINUED | OUTPATIENT
Start: 2024-08-28 | End: 2024-08-28

## 2024-08-28 RX ORDER — BISACODYL 5 MG/1
5 TABLET, DELAYED RELEASE ORAL DAILY PRN
Qty: 10 TABLET | Refills: 0 | Status: SHIPPED | OUTPATIENT
Start: 2024-08-28

## 2024-08-28 RX ORDER — BISACODYL 10 MG
10 SUPPOSITORY, RECTAL RECTAL DAILY PRN
Qty: 10 EACH | Refills: 0 | Status: SHIPPED | OUTPATIENT
Start: 2024-08-28

## 2024-08-28 RX ORDER — POLYETHYLENE GLYCOL 3350 17 G/17G
17 POWDER, FOR SOLUTION ORAL DAILY PRN
Qty: 238 G | Refills: 0 | Status: SHIPPED | OUTPATIENT
Start: 2024-08-28

## 2024-08-28 RX ORDER — ASPIRIN 81 MG/1
81 TABLET ORAL 2 TIMES DAILY
Qty: 60 TABLET | Refills: 1 | Status: SHIPPED | OUTPATIENT
Start: 2024-08-28 | End: 2024-10-11

## 2024-08-28 RX ORDER — POTASSIUM CHLORIDE 1500 MG/1
40 TABLET, EXTENDED RELEASE ORAL EVERY 4 HOURS
Status: COMPLETED | OUTPATIENT
Start: 2024-08-28 | End: 2024-08-28

## 2024-08-28 RX ORDER — IPRATROPIUM BROMIDE AND ALBUTEROL SULFATE 2.5; .5 MG/3ML; MG/3ML
3 SOLUTION RESPIRATORY (INHALATION)
Status: DISCONTINUED | OUTPATIENT
Start: 2024-08-28 | End: 2024-08-28 | Stop reason: HOSPADM

## 2024-08-28 RX ORDER — AMOXICILLIN 250 MG
2 CAPSULE ORAL 2 TIMES DAILY PRN
Qty: 10 TABLET | Refills: 0 | Status: SHIPPED | OUTPATIENT
Start: 2024-08-28

## 2024-08-28 RX ORDER — BUDESONIDE 0.25 MG/2ML
0.25 INHALANT ORAL
Status: DISCONTINUED | OUTPATIENT
Start: 2024-08-28 | End: 2024-08-28 | Stop reason: HOSPADM

## 2024-08-28 RX ADMIN — POTASSIUM CHLORIDE 40 MEQ: 1500 TABLET, EXTENDED RELEASE ORAL at 08:41

## 2024-08-28 RX ADMIN — TAMSULOSIN HYDROCHLORIDE 0.4 MG: 0.4 CAPSULE ORAL at 08:42

## 2024-08-28 RX ADMIN — AMLODIPINE BESYLATE 10 MG: 5 TABLET ORAL at 11:52

## 2024-08-28 RX ADMIN — ATORVASTATIN CALCIUM 20 MG: 20 TABLET, FILM COATED ORAL at 08:42

## 2024-08-28 RX ADMIN — Medication 10 ML: at 08:54

## 2024-08-28 RX ADMIN — GABAPENTIN 600 MG: 300 CAPSULE ORAL at 06:30

## 2024-08-28 RX ADMIN — VENLAFAXINE HYDROCHLORIDE 75 MG: 75 CAPSULE, EXTENDED RELEASE ORAL at 08:42

## 2024-08-28 RX ADMIN — ASPIRIN 81 MG: 81 TABLET, COATED ORAL at 08:42

## 2024-08-28 RX ADMIN — GABAPENTIN 600 MG: 300 CAPSULE ORAL at 15:04

## 2024-08-28 RX ADMIN — IPRATROPIUM BROMIDE AND ALBUTEROL SULFATE 3 ML: .5; 3 SOLUTION RESPIRATORY (INHALATION) at 15:25

## 2024-08-28 RX ADMIN — CARBIDOPA AND LEVODOPA 2 TABLET: 25; 100 TABLET ORAL at 08:42

## 2024-08-28 RX ADMIN — HYDROCODONE BITARTRATE AND ACETAMINOPHEN 1 TABLET: 7.5; 325 TABLET ORAL at 08:54

## 2024-08-28 RX ADMIN — CARBIDOPA AND LEVODOPA 2 TABLET: 25; 100 TABLET ORAL at 11:52

## 2024-08-28 RX ADMIN — HYDROCODONE BITARTRATE AND ACETAMINOPHEN 1 TABLET: 7.5; 325 TABLET ORAL at 15:04

## 2024-08-28 RX ADMIN — CLONAZEPAM 0.5 MG: 0.5 TABLET ORAL at 06:30

## 2024-08-28 RX ADMIN — POTASSIUM CHLORIDE 40 MEQ: 1500 TABLET, EXTENDED RELEASE ORAL at 06:30

## 2024-08-28 RX ADMIN — GUAIFENESIN 600 MG: 600 TABLET, EXTENDED RELEASE ORAL at 08:42

## 2024-08-28 RX ADMIN — CLONAZEPAM 0.5 MG: 0.5 TABLET ORAL at 16:58

## 2024-08-28 RX ADMIN — CARBIDOPA AND LEVODOPA 2 TABLET: 25; 100 TABLET ORAL at 16:57

## 2024-08-28 RX ADMIN — CLOPIDOGREL BISULFATE 75 MG: 75 TABLET ORAL at 08:42

## 2024-08-28 RX ADMIN — CARVEDILOL 6.25 MG: 6.25 TABLET, FILM COATED ORAL at 08:42

## 2024-08-28 RX ADMIN — PANTOPRAZOLE SODIUM 40 MG: 40 TABLET, DELAYED RELEASE ORAL at 08:42

## 2024-09-10 ENCOUNTER — HOSPITAL ENCOUNTER (INPATIENT)
Facility: HOSPITAL | Age: 64
LOS: 2 days | Discharge: NURSING, REHAB OR LONG-TERM CARE | DRG: 871 | End: 2024-09-13
Attending: EMERGENCY MEDICINE | Admitting: STUDENT IN AN ORGANIZED HEALTH CARE EDUCATION/TRAINING PROGRAM
Payer: MEDICARE

## 2024-09-10 ENCOUNTER — APPOINTMENT (OUTPATIENT)
Dept: GENERAL RADIOLOGY | Facility: HOSPITAL | Age: 64
DRG: 871 | End: 2024-09-10
Payer: MEDICARE

## 2024-09-10 DIAGNOSIS — A41.9 SEPSIS, DUE TO UNSPECIFIED ORGANISM, UNSPECIFIED WHETHER ACUTE ORGAN DYSFUNCTION PRESENT: Primary | ICD-10-CM

## 2024-09-10 DIAGNOSIS — N39.0 ACUTE UTI: ICD-10-CM

## 2024-09-10 PROBLEM — G93.40 ENCEPHALOPATHY ACUTE: Status: ACTIVE | Noted: 2024-09-10

## 2024-09-10 LAB
ARTERIAL PATENCY WRIST A: POSITIVE
ATMOSPHERIC PRESS: ABNORMAL MM[HG]
BASE EXCESS BLDA CALC-SCNC: 0.9 MMOL/L (ref 0–3)
BDY SITE: ABNORMAL
CO2 BLDA-SCNC: 26.5 MMOL/L (ref 22–29)
D-LACTATE SERPL-SCNC: 0.8 MMOL/L (ref 0.3–2)
HCO3 BLDA-SCNC: 25.3 MMOL/L (ref 21–28)
HEMODILUTION: NO
HOLD SPECIMEN: NORMAL
HOLD SPECIMEN: NORMAL
INHALED O2 CONCENTRATION: 28 %
MODALITY: ABNORMAL
PCO2 BLDA: 38.3 MM HG (ref 35–48)
PH BLDA: 7.43 PH UNITS (ref 7.35–7.45)
PO2 BLD: 275 MM[HG] (ref 0–500)
PO2 BLDA: 77.1 MM HG (ref 83–108)
PROCALCITONIN SERPL-MCNC: 1.85 NG/ML (ref 0–0.25)
QT INTERVAL: 404 MS
QTC INTERVAL: 454 MS
SAO2 % BLDCOA: 95.6 % (ref 94–98)
WHOLE BLOOD HOLD COAG: NORMAL
WHOLE BLOOD HOLD SPECIMEN: NORMAL

## 2024-09-10 PROCEDURE — G0378 HOSPITAL OBSERVATION PER HR: HCPCS

## 2024-09-10 PROCEDURE — 93005 ELECTROCARDIOGRAM TRACING: CPT

## 2024-09-10 PROCEDURE — 83605 ASSAY OF LACTIC ACID: CPT | Performed by: EMERGENCY MEDICINE

## 2024-09-10 PROCEDURE — 94799 UNLISTED PULMONARY SVC/PX: CPT

## 2024-09-10 PROCEDURE — 94640 AIRWAY INHALATION TREATMENT: CPT

## 2024-09-10 PROCEDURE — 99285 EMERGENCY DEPT VISIT HI MDM: CPT

## 2024-09-10 PROCEDURE — 25810000003 SODIUM CHLORIDE 0.9 % SOLUTION: Performed by: NURSE PRACTITIONER

## 2024-09-10 PROCEDURE — 25810000003 SODIUM CHLORIDE 0.9 % SOLUTION: Performed by: EMERGENCY MEDICINE

## 2024-09-10 PROCEDURE — 71045 X-RAY EXAM CHEST 1 VIEW: CPT

## 2024-09-10 PROCEDURE — 97165 OT EVAL LOW COMPLEX 30 MIN: CPT

## 2024-09-10 PROCEDURE — 94664 DEMO&/EVAL PT USE INHALER: CPT

## 2024-09-10 PROCEDURE — 82803 BLOOD GASES ANY COMBINATION: CPT

## 2024-09-10 PROCEDURE — 36600 WITHDRAWAL OF ARTERIAL BLOOD: CPT

## 2024-09-10 PROCEDURE — 94761 N-INVAS EAR/PLS OXIMETRY MLT: CPT

## 2024-09-10 PROCEDURE — 84145 PROCALCITONIN (PCT): CPT | Performed by: NURSE PRACTITIONER

## 2024-09-10 PROCEDURE — 25010000002 CEFTRIAXONE PER 250 MG: Performed by: EMERGENCY MEDICINE

## 2024-09-10 PROCEDURE — 93005 ELECTROCARDIOGRAM TRACING: CPT | Performed by: EMERGENCY MEDICINE

## 2024-09-10 RX ORDER — DONEPEZIL HYDROCHLORIDE 5 MG/1
10 TABLET, FILM COATED ORAL NIGHTLY
Status: DISCONTINUED | OUTPATIENT
Start: 2024-09-10 | End: 2024-09-13 | Stop reason: HOSPADM

## 2024-09-10 RX ORDER — CARBIDOPA AND LEVODOPA 25; 100 MG/1; MG/1
2 TABLET, EXTENDED RELEASE ORAL 4 TIMES DAILY
Status: ON HOLD | COMMUNITY

## 2024-09-10 RX ORDER — TRAMADOL HYDROCHLORIDE 50 MG/1
50 TABLET ORAL EVERY 6 HOURS PRN
Status: ON HOLD | COMMUNITY

## 2024-09-10 RX ORDER — CARBIDOPA AND LEVODOPA 25; 100 MG/1; MG/1
2 TABLET ORAL 4 TIMES DAILY
Status: DISCONTINUED | OUTPATIENT
Start: 2024-09-10 | End: 2024-09-13 | Stop reason: HOSPADM

## 2024-09-10 RX ORDER — LOPERAMIDE HCL 2 MG
2 CAPSULE ORAL 4 TIMES DAILY PRN
Status: ON HOLD | COMMUNITY

## 2024-09-10 RX ORDER — BACLOFEN 10 MG/1
10 TABLET ORAL 3 TIMES DAILY PRN
Status: DISCONTINUED | OUTPATIENT
Start: 2024-09-10 | End: 2024-09-13 | Stop reason: HOSPADM

## 2024-09-10 RX ORDER — TAMSULOSIN HYDROCHLORIDE 0.4 MG/1
0.4 CAPSULE ORAL DAILY
Status: DISCONTINUED | OUTPATIENT
Start: 2024-09-10 | End: 2024-09-13 | Stop reason: HOSPADM

## 2024-09-10 RX ORDER — DONEPEZIL HYDROCHLORIDE 5 MG/1
10 TABLET, FILM COATED ORAL
Status: ON HOLD | COMMUNITY

## 2024-09-10 RX ORDER — CARVEDILOL 6.25 MG/1
6.25 TABLET ORAL 2 TIMES DAILY
Status: DISCONTINUED | OUTPATIENT
Start: 2024-09-10 | End: 2024-09-13 | Stop reason: HOSPADM

## 2024-09-10 RX ORDER — CLONAZEPAM 1 MG/1
1 TABLET ORAL
COMMUNITY
End: 2024-09-23 | Stop reason: ALTCHOICE

## 2024-09-10 RX ORDER — ONDANSETRON 4 MG/1
4 TABLET, FILM COATED ORAL EVERY 4 HOURS PRN
Status: ON HOLD | COMMUNITY

## 2024-09-10 RX ORDER — DOCUSATE SODIUM 100 MG/1
100 CAPSULE, LIQUID FILLED ORAL 2 TIMES DAILY PRN
Status: DISCONTINUED | OUTPATIENT
Start: 2024-09-10 | End: 2024-09-13 | Stop reason: HOSPADM

## 2024-09-10 RX ORDER — TRAMADOL HYDROCHLORIDE 50 MG/1
50 TABLET ORAL EVERY 6 HOURS PRN
Status: DISCONTINUED | OUTPATIENT
Start: 2024-09-10 | End: 2024-09-13 | Stop reason: HOSPADM

## 2024-09-10 RX ORDER — IPRATROPIUM BROMIDE AND ALBUTEROL SULFATE 2.5; .5 MG/3ML; MG/3ML
3 SOLUTION RESPIRATORY (INHALATION) EVERY 6 HOURS PRN
Status: DISCONTINUED | OUTPATIENT
Start: 2024-09-10 | End: 2024-09-10 | Stop reason: SDUPTHER

## 2024-09-10 RX ORDER — CLOPIDOGREL BISULFATE 75 MG/1
75 TABLET ORAL DAILY
Status: DISCONTINUED | OUTPATIENT
Start: 2024-09-10 | End: 2024-09-13 | Stop reason: HOSPADM

## 2024-09-10 RX ORDER — ONDANSETRON 4 MG/1
4 TABLET, ORALLY DISINTEGRATING ORAL EVERY 6 HOURS PRN
Status: DISCONTINUED | OUTPATIENT
Start: 2024-09-10 | End: 2024-09-13 | Stop reason: HOSPADM

## 2024-09-10 RX ORDER — SODIUM CHLORIDE 9 MG/ML
40 INJECTION, SOLUTION INTRAVENOUS AS NEEDED
Status: DISCONTINUED | OUTPATIENT
Start: 2024-09-10 | End: 2024-09-13 | Stop reason: HOSPADM

## 2024-09-10 RX ORDER — SIMETHICONE 80 MG
80 TABLET,CHEWABLE ORAL EVERY 6 HOURS PRN
Status: ON HOLD | COMMUNITY

## 2024-09-10 RX ORDER — SODIUM CHLORIDE 9 MG/ML
100 INJECTION, SOLUTION INTRAVENOUS CONTINUOUS
Status: DISCONTINUED | OUTPATIENT
Start: 2024-09-10 | End: 2024-09-13 | Stop reason: HOSPADM

## 2024-09-10 RX ORDER — DIPHENHYDRAMINE HYDROCHLORIDE, ZINC ACETATE 2; .1 G/100G; G/100G
1 CREAM TOPICAL 2 TIMES DAILY PRN
Status: DISCONTINUED | OUTPATIENT
Start: 2024-09-10 | End: 2024-09-12

## 2024-09-10 RX ORDER — BISACODYL 10 MG
10 SUPPOSITORY, RECTAL RECTAL DAILY PRN
Status: DISCONTINUED | OUTPATIENT
Start: 2024-09-10 | End: 2024-09-13 | Stop reason: HOSPADM

## 2024-09-10 RX ORDER — IPRATROPIUM BROMIDE AND ALBUTEROL SULFATE 2.5; .5 MG/3ML; MG/3ML
3 SOLUTION RESPIRATORY (INHALATION) EVERY 4 HOURS PRN
Status: DISCONTINUED | OUTPATIENT
Start: 2024-09-10 | End: 2024-09-13 | Stop reason: HOSPADM

## 2024-09-10 RX ORDER — SIMETHICONE 80 MG
80 TABLET,CHEWABLE ORAL EVERY 6 HOURS PRN
Status: DISCONTINUED | OUTPATIENT
Start: 2024-09-10 | End: 2024-09-13 | Stop reason: HOSPADM

## 2024-09-10 RX ORDER — GUAIFENESIN 600 MG/1
600 TABLET, EXTENDED RELEASE ORAL DAILY
Status: DISCONTINUED | OUTPATIENT
Start: 2024-09-10 | End: 2024-09-13 | Stop reason: HOSPADM

## 2024-09-10 RX ORDER — DIPHENHYDRAMINE HYDROCHLORIDE, ZINC ACETATE 2; .1 G/100G; G/100G
1 CREAM TOPICAL 2 TIMES DAILY PRN
COMMUNITY
End: 2024-09-23 | Stop reason: ALTCHOICE

## 2024-09-10 RX ORDER — ACETAMINOPHEN 650 MG/1
650 SUPPOSITORY RECTAL EVERY 6 HOURS PRN
COMMUNITY
End: 2024-09-23 | Stop reason: ALTCHOICE

## 2024-09-10 RX ORDER — ONDANSETRON 2 MG/ML
4 INJECTION INTRAMUSCULAR; INTRAVENOUS EVERY 4 HOURS PRN
Status: DISCONTINUED | OUTPATIENT
Start: 2024-09-10 | End: 2024-09-10 | Stop reason: SDUPTHER

## 2024-09-10 RX ORDER — CLONAZEPAM 1 MG/1
1 TABLET ORAL NIGHTLY
Status: DISCONTINUED | OUTPATIENT
Start: 2024-09-10 | End: 2024-09-13 | Stop reason: HOSPADM

## 2024-09-10 RX ORDER — HYDROCODONE BITARTRATE AND ACETAMINOPHEN 5; 325 MG/1; MG/1
1 TABLET ORAL 3 TIMES DAILY PRN
COMMUNITY
End: 2024-09-23 | Stop reason: ALTCHOICE

## 2024-09-10 RX ORDER — BUDESONIDE 0.5 MG/2ML
0.5 INHALANT ORAL
Status: DISCONTINUED | OUTPATIENT
Start: 2024-09-10 | End: 2024-09-13 | Stop reason: HOSPADM

## 2024-09-10 RX ORDER — BISACODYL 5 MG/1
5 TABLET, DELAYED RELEASE ORAL DAILY PRN
Status: DISCONTINUED | OUTPATIENT
Start: 2024-09-10 | End: 2024-09-13 | Stop reason: HOSPADM

## 2024-09-10 RX ORDER — CLONAZEPAM 1 MG/1
0.5 TABLET ORAL 2 TIMES DAILY
COMMUNITY
End: 2024-09-23 | Stop reason: ALTCHOICE

## 2024-09-10 RX ORDER — HYDROCODONE BITARTRATE AND ACETAMINOPHEN 5; 325 MG/1; MG/1
1 TABLET ORAL 3 TIMES DAILY PRN
Status: DISCONTINUED | OUTPATIENT
Start: 2024-09-10 | End: 2024-09-13 | Stop reason: HOSPADM

## 2024-09-10 RX ORDER — CALCIUM CARBONATE 500 MG/1
1 TABLET, CHEWABLE ORAL 3 TIMES DAILY PRN
Status: ON HOLD | COMMUNITY

## 2024-09-10 RX ORDER — POLYETHYLENE GLYCOL 3350 17 G/17G
17 POWDER, FOR SOLUTION ORAL DAILY PRN
Status: DISCONTINUED | OUTPATIENT
Start: 2024-09-10 | End: 2024-09-13 | Stop reason: HOSPADM

## 2024-09-10 RX ORDER — DIPHENHYDRAMINE HCL 25 MG
12.5 TABLET ORAL 3 TIMES DAILY PRN
COMMUNITY
End: 2024-09-23 | Stop reason: ALTCHOICE

## 2024-09-10 RX ORDER — PANTOPRAZOLE SODIUM 40 MG/1
40 TABLET, DELAYED RELEASE ORAL
Status: DISCONTINUED | OUTPATIENT
Start: 2024-09-10 | End: 2024-09-13 | Stop reason: HOSPADM

## 2024-09-10 RX ORDER — ATORVASTATIN CALCIUM 20 MG/1
20 TABLET, FILM COATED ORAL DAILY
Status: DISCONTINUED | OUTPATIENT
Start: 2024-09-10 | End: 2024-09-13 | Stop reason: HOSPADM

## 2024-09-10 RX ORDER — SODIUM CHLORIDE 0.9 % (FLUSH) 0.9 %
10 SYRINGE (ML) INJECTION AS NEEDED
Status: DISCONTINUED | OUTPATIENT
Start: 2024-09-10 | End: 2024-09-13 | Stop reason: HOSPADM

## 2024-09-10 RX ORDER — DIPHENHYDRAMINE HCL 25 MG
25 CAPSULE ORAL EVERY 6 HOURS PRN
Status: DISCONTINUED | OUTPATIENT
Start: 2024-09-10 | End: 2024-09-12

## 2024-09-10 RX ORDER — ACETAMINOPHEN 325 MG/1
650 TABLET ORAL EVERY 6 HOURS PRN
Status: DISCONTINUED | OUTPATIENT
Start: 2024-09-10 | End: 2024-09-13 | Stop reason: HOSPADM

## 2024-09-10 RX ORDER — SODIUM CHLORIDE 0.9 % (FLUSH) 0.9 %
10 SYRINGE (ML) INJECTION EVERY 12 HOURS SCHEDULED
Status: DISCONTINUED | OUTPATIENT
Start: 2024-09-10 | End: 2024-09-13 | Stop reason: HOSPADM

## 2024-09-10 RX ORDER — DOCUSATE SODIUM 100 MG/1
100 CAPSULE, LIQUID FILLED ORAL 2 TIMES DAILY PRN
Status: ON HOLD | COMMUNITY

## 2024-09-10 RX ORDER — CARBIDOPA AND LEVODOPA 25; 100 MG/1; MG/1
2 TABLET, EXTENDED RELEASE ORAL NIGHTLY
Status: DISCONTINUED | OUTPATIENT
Start: 2024-09-10 | End: 2024-09-13 | Stop reason: HOSPADM

## 2024-09-10 RX ORDER — ASPIRIN 81 MG/1
81 TABLET ORAL 2 TIMES DAILY
Status: DISCONTINUED | OUTPATIENT
Start: 2024-09-10 | End: 2024-09-13 | Stop reason: HOSPADM

## 2024-09-10 RX ORDER — ALUMINA, MAGNESIA, AND SIMETHICONE 2400; 2400; 240 MG/30ML; MG/30ML; MG/30ML
15 SUSPENSION ORAL EVERY 6 HOURS PRN
Status: DISCONTINUED | OUTPATIENT
Start: 2024-09-10 | End: 2024-09-13 | Stop reason: HOSPADM

## 2024-09-10 RX ORDER — CLONAZEPAM 0.5 MG/1
0.5 TABLET ORAL 2 TIMES DAILY
Status: DISCONTINUED | OUTPATIENT
Start: 2024-09-10 | End: 2024-09-13 | Stop reason: HOSPADM

## 2024-09-10 RX ORDER — ONDANSETRON 2 MG/ML
4 INJECTION INTRAMUSCULAR; INTRAVENOUS EVERY 4 HOURS PRN
COMMUNITY
End: 2024-09-23 | Stop reason: ALTCHOICE

## 2024-09-10 RX ORDER — ACETAMINOPHEN 325 MG/1
650 TABLET ORAL EVERY 6 HOURS PRN
COMMUNITY
End: 2024-09-23 | Stop reason: ALTCHOICE

## 2024-09-10 RX ORDER — VENLAFAXINE HYDROCHLORIDE 75 MG/1
75 CAPSULE, EXTENDED RELEASE ORAL DAILY
Status: DISCONTINUED | OUTPATIENT
Start: 2024-09-10 | End: 2024-09-13 | Stop reason: HOSPADM

## 2024-09-10 RX ORDER — GABAPENTIN 300 MG/1
600 CAPSULE ORAL DAILY
Status: DISCONTINUED | OUTPATIENT
Start: 2024-09-10 | End: 2024-09-13 | Stop reason: HOSPADM

## 2024-09-10 RX ORDER — AMOXICILLIN 250 MG
2 CAPSULE ORAL 2 TIMES DAILY PRN
Status: DISCONTINUED | OUTPATIENT
Start: 2024-09-10 | End: 2024-09-13 | Stop reason: HOSPADM

## 2024-09-10 RX ORDER — LOPERAMIDE HCL 2 MG
2 CAPSULE ORAL 4 TIMES DAILY PRN
Status: DISCONTINUED | OUTPATIENT
Start: 2024-09-10 | End: 2024-09-13 | Stop reason: HOSPADM

## 2024-09-10 RX ORDER — ONDANSETRON 2 MG/ML
4 INJECTION INTRAMUSCULAR; INTRAVENOUS EVERY 6 HOURS PRN
Status: DISCONTINUED | OUTPATIENT
Start: 2024-09-10 | End: 2024-09-13 | Stop reason: HOSPADM

## 2024-09-10 RX ADMIN — Medication 10 ML: at 09:53

## 2024-09-10 RX ADMIN — SODIUM CHLORIDE 1000 ML: 9 INJECTION, SOLUTION INTRAVENOUS at 03:19

## 2024-09-10 RX ADMIN — SODIUM CHLORIDE 100 ML/HR: 9 INJECTION, SOLUTION INTRAVENOUS at 21:17

## 2024-09-10 RX ADMIN — ATORVASTATIN CALCIUM 20 MG: 20 TABLET, FILM COATED ORAL at 09:53

## 2024-09-10 RX ADMIN — BACLOFEN 10 MG: 10 TABLET ORAL at 21:18

## 2024-09-10 RX ADMIN — VENLAFAXINE HYDROCHLORIDE 75 MG: 75 CAPSULE, EXTENDED RELEASE ORAL at 09:53

## 2024-09-10 RX ADMIN — BUDESONIDE 0.5 MG: 0.5 INHALANT RESPIRATORY (INHALATION) at 19:59

## 2024-09-10 RX ADMIN — GABAPENTIN 600 MG: 300 CAPSULE ORAL at 09:52

## 2024-09-10 RX ADMIN — CEFTRIAXONE 2000 MG: 2 INJECTION, POWDER, FOR SOLUTION INTRAMUSCULAR; INTRAVENOUS at 01:57

## 2024-09-10 RX ADMIN — ASPIRIN 81 MG: 81 TABLET, COATED ORAL at 21:17

## 2024-09-10 RX ADMIN — DONEPEZIL HYDROCHLORIDE 10 MG: 5 TABLET, FILM COATED ORAL at 21:17

## 2024-09-10 RX ADMIN — CLOPIDOGREL BISULFATE 75 MG: 75 TABLET ORAL at 09:53

## 2024-09-10 RX ADMIN — SODIUM CHLORIDE 100 ML/HR: 9 INJECTION, SOLUTION INTRAVENOUS at 10:07

## 2024-09-10 RX ADMIN — CARBIDOPA AND LEVODOPA 2 TABLET: 25; 100 TABLET ORAL at 11:47

## 2024-09-10 RX ADMIN — TAMSULOSIN HYDROCHLORIDE 0.4 MG: 0.4 CAPSULE ORAL at 09:52

## 2024-09-10 RX ADMIN — BUDESONIDE 0.5 MG: 0.5 INHALANT RESPIRATORY (INHALATION) at 07:11

## 2024-09-10 RX ADMIN — CLONAZEPAM 0.5 MG: 0.5 TABLET ORAL at 09:53

## 2024-09-10 RX ADMIN — GUAIFENESIN 600 MG: 600 TABLET, EXTENDED RELEASE ORAL at 09:52

## 2024-09-10 RX ADMIN — ACETAMINOPHEN 650 MG: 325 TABLET, FILM COATED ORAL at 07:29

## 2024-09-10 RX ADMIN — HYDROCODONE BITARTRATE AND ACETAMINOPHEN 1 TABLET: 5; 325 TABLET ORAL at 21:17

## 2024-09-10 RX ADMIN — CARBIDOPA AND LEVODOPA 2 TABLET: 25; 100 TABLET ORAL at 09:52

## 2024-09-10 RX ADMIN — IPRATROPIUM BROMIDE AND ALBUTEROL SULFATE 3 ML: .5; 3 SOLUTION RESPIRATORY (INHALATION) at 07:11

## 2024-09-10 RX ADMIN — CARBIDOPA AND LEVODOPA 2 TABLET: 25; 100 TABLET, EXTENDED RELEASE ORAL at 22:59

## 2024-09-10 RX ADMIN — CLONAZEPAM 1 MG: 1 TABLET ORAL at 21:17

## 2024-09-10 RX ADMIN — CARBIDOPA AND LEVODOPA 2 TABLET: 25; 100 TABLET ORAL at 17:58

## 2024-09-10 RX ADMIN — CLONAZEPAM 0.5 MG: 0.5 TABLET ORAL at 15:44

## 2024-09-10 RX ADMIN — CARBIDOPA AND LEVODOPA 2 TABLET: 25; 100 TABLET, EXTENDED RELEASE ORAL at 16:24

## 2024-09-10 RX ADMIN — SODIUM CHLORIDE 1000 ML: 9 INJECTION, SOLUTION INTRAVENOUS at 08:02

## 2024-09-10 RX ADMIN — ASPIRIN 81 MG: 81 TABLET, COATED ORAL at 09:52

## 2024-09-10 RX ADMIN — Medication 10 ML: at 21:18

## 2024-09-11 LAB
ANION GAP SERPL CALCULATED.3IONS-SCNC: 9.4 MMOL/L (ref 5–15)
BASOPHILS # BLD AUTO: 0.03 10*3/MM3 (ref 0–0.2)
BASOPHILS NFR BLD AUTO: 0.5 % (ref 0–1.5)
BUN SERPL-MCNC: 13 MG/DL (ref 8–23)
BUN/CREAT SERPL: 27.1 (ref 7–25)
CALCIUM SPEC-SCNC: 8.4 MG/DL (ref 8.6–10.5)
CHLORIDE SERPL-SCNC: 109 MMOL/L (ref 98–107)
CO2 SERPL-SCNC: 24.6 MMOL/L (ref 22–29)
CREAT SERPL-MCNC: 0.48 MG/DL (ref 0.76–1.27)
DEPRECATED RDW RBC AUTO: 46.4 FL (ref 37–54)
EGFRCR SERPLBLD CKD-EPI 2021: 114.6 ML/MIN/1.73
EOSINOPHIL # BLD AUTO: 0.22 10*3/MM3 (ref 0–0.4)
EOSINOPHIL NFR BLD AUTO: 3.6 % (ref 0.3–6.2)
ERYTHROCYTE [DISTWIDTH] IN BLOOD BY AUTOMATED COUNT: 12.6 % (ref 12.3–15.4)
GLUCOSE SERPL-MCNC: 99 MG/DL (ref 65–99)
HCT VFR BLD AUTO: 27.4 % (ref 37.5–51)
HGB BLD-MCNC: 8.6 G/DL (ref 13–17.7)
IMM GRANULOCYTES # BLD AUTO: 0.02 10*3/MM3 (ref 0–0.05)
IMM GRANULOCYTES NFR BLD AUTO: 0.3 % (ref 0–0.5)
LYMPHOCYTES # BLD AUTO: 1.14 10*3/MM3 (ref 0.7–3.1)
LYMPHOCYTES NFR BLD AUTO: 18.8 % (ref 19.6–45.3)
MCH RBC QN AUTO: 31.3 PG (ref 26.6–33)
MCHC RBC AUTO-ENTMCNC: 31.4 G/DL (ref 31.5–35.7)
MCV RBC AUTO: 99.6 FL (ref 79–97)
MONOCYTES # BLD AUTO: 0.4 10*3/MM3 (ref 0.1–0.9)
MONOCYTES NFR BLD AUTO: 6.6 % (ref 5–12)
NEUTROPHILS NFR BLD AUTO: 4.24 10*3/MM3 (ref 1.7–7)
NEUTROPHILS NFR BLD AUTO: 70.2 % (ref 42.7–76)
NRBC BLD AUTO-RTO: 0 /100 WBC (ref 0–0.2)
PLATELET # BLD AUTO: 166 10*3/MM3 (ref 140–450)
PMV BLD AUTO: 10.2 FL (ref 6–12)
POTASSIUM SERPL-SCNC: 3.1 MMOL/L (ref 3.5–5.2)
RBC # BLD AUTO: 2.75 10*6/MM3 (ref 4.14–5.8)
SODIUM SERPL-SCNC: 143 MMOL/L (ref 136–145)
WBC NRBC COR # BLD AUTO: 6.05 10*3/MM3 (ref 3.4–10.8)

## 2024-09-11 PROCEDURE — 25010000002 CEFTRIAXONE PER 250 MG: Performed by: NURSE PRACTITIONER

## 2024-09-11 PROCEDURE — 94799 UNLISTED PULMONARY SVC/PX: CPT

## 2024-09-11 PROCEDURE — 80048 BASIC METABOLIC PNL TOTAL CA: CPT | Performed by: NURSE PRACTITIONER

## 2024-09-11 PROCEDURE — 97530 THERAPEUTIC ACTIVITIES: CPT

## 2024-09-11 PROCEDURE — 25810000003 SODIUM CHLORIDE 0.9 % SOLUTION: Performed by: NURSE PRACTITIONER

## 2024-09-11 PROCEDURE — 94761 N-INVAS EAR/PLS OXIMETRY MLT: CPT

## 2024-09-11 PROCEDURE — 94664 DEMO&/EVAL PT USE INHALER: CPT

## 2024-09-11 PROCEDURE — 97162 PT EVAL MOD COMPLEX 30 MIN: CPT

## 2024-09-11 PROCEDURE — 85025 COMPLETE CBC W/AUTO DIFF WBC: CPT | Performed by: NURSE PRACTITIONER

## 2024-09-11 PROCEDURE — 97535 SELF CARE MNGMENT TRAINING: CPT

## 2024-09-11 RX ORDER — POTASSIUM CHLORIDE 1.5 G/1.58G
40 POWDER, FOR SOLUTION ORAL EVERY 4 HOURS
Status: CANCELLED | OUTPATIENT
Start: 2024-09-11 | End: 2024-09-11

## 2024-09-11 RX ORDER — POTASSIUM CHLORIDE 1.5 G/1.58G
40 POWDER, FOR SOLUTION ORAL EVERY 4 HOURS
Status: COMPLETED | OUTPATIENT
Start: 2024-09-11 | End: 2024-09-11

## 2024-09-11 RX ADMIN — CEFTRIAXONE 1000 MG: 1 INJECTION, POWDER, FOR SOLUTION INTRAMUSCULAR; INTRAVENOUS at 02:14

## 2024-09-11 RX ADMIN — IPRATROPIUM BROMIDE AND ALBUTEROL SULFATE 3 ML: .5; 3 SOLUTION RESPIRATORY (INHALATION) at 12:47

## 2024-09-11 RX ADMIN — VENLAFAXINE HYDROCHLORIDE 75 MG: 75 CAPSULE, EXTENDED RELEASE ORAL at 08:58

## 2024-09-11 RX ADMIN — ASPIRIN 81 MG: 81 TABLET, COATED ORAL at 21:51

## 2024-09-11 RX ADMIN — CLONAZEPAM 0.5 MG: 0.5 TABLET ORAL at 14:32

## 2024-09-11 RX ADMIN — SODIUM CHLORIDE 100 ML/HR: 9 INJECTION, SOLUTION INTRAVENOUS at 18:29

## 2024-09-11 RX ADMIN — CARBIDOPA AND LEVODOPA 2 TABLET: 25; 100 TABLET ORAL at 11:29

## 2024-09-11 RX ADMIN — CLONAZEPAM 0.5 MG: 0.5 TABLET ORAL at 08:58

## 2024-09-11 RX ADMIN — ASPIRIN 81 MG: 81 TABLET, COATED ORAL at 08:58

## 2024-09-11 RX ADMIN — Medication 10 ML: at 21:51

## 2024-09-11 RX ADMIN — GABAPENTIN 600 MG: 300 CAPSULE ORAL at 08:58

## 2024-09-11 RX ADMIN — DONEPEZIL HYDROCHLORIDE 10 MG: 5 TABLET, FILM COATED ORAL at 21:51

## 2024-09-11 RX ADMIN — POTASSIUM CHLORIDE 40 MEQ: 1.5 POWDER, FOR SOLUTION ORAL at 11:29

## 2024-09-11 RX ADMIN — CLONAZEPAM 1 MG: 1 TABLET ORAL at 21:51

## 2024-09-11 RX ADMIN — CARBIDOPA AND LEVODOPA 2 TABLET: 25; 100 TABLET, EXTENDED RELEASE ORAL at 21:50

## 2024-09-11 RX ADMIN — CARBIDOPA AND LEVODOPA 2 TABLET: 25; 100 TABLET ORAL at 17:58

## 2024-09-11 RX ADMIN — BUDESONIDE 0.5 MG: 0.5 INHALANT RESPIRATORY (INHALATION) at 08:16

## 2024-09-11 RX ADMIN — BUDESONIDE 0.5 MG: 0.5 INHALANT RESPIRATORY (INHALATION) at 18:40

## 2024-09-11 RX ADMIN — SODIUM CHLORIDE 100 ML/HR: 9 INJECTION, SOLUTION INTRAVENOUS at 08:03

## 2024-09-11 RX ADMIN — POTASSIUM CHLORIDE 40 MEQ: 1.5 POWDER, FOR SOLUTION ORAL at 17:53

## 2024-09-11 RX ADMIN — TAMSULOSIN HYDROCHLORIDE 0.4 MG: 0.4 CAPSULE ORAL at 08:58

## 2024-09-11 RX ADMIN — CLOPIDOGREL BISULFATE 75 MG: 75 TABLET ORAL at 08:58

## 2024-09-11 RX ADMIN — PANTOPRAZOLE SODIUM 40 MG: 40 TABLET, DELAYED RELEASE ORAL at 08:58

## 2024-09-11 RX ADMIN — HYDROCODONE BITARTRATE AND ACETAMINOPHEN 1 TABLET: 5; 325 TABLET ORAL at 18:01

## 2024-09-11 RX ADMIN — CARBIDOPA AND LEVODOPA 2 TABLET: 25; 100 TABLET ORAL at 14:32

## 2024-09-11 RX ADMIN — CARBIDOPA AND LEVODOPA 2 TABLET: 25; 100 TABLET ORAL at 08:58

## 2024-09-11 RX ADMIN — ATORVASTATIN CALCIUM 20 MG: 20 TABLET, FILM COATED ORAL at 08:58

## 2024-09-11 RX ADMIN — GUAIFENESIN 600 MG: 600 TABLET, EXTENDED RELEASE ORAL at 08:58

## 2024-09-11 RX ADMIN — POTASSIUM CHLORIDE 40 MEQ: 1.5 POWDER, FOR SOLUTION ORAL at 08:59

## 2024-09-12 LAB
ANION GAP SERPL CALCULATED.3IONS-SCNC: 6.9 MMOL/L (ref 5–15)
BASOPHILS # BLD AUTO: 0.01 10*3/MM3 (ref 0–0.2)
BASOPHILS NFR BLD AUTO: 0.2 % (ref 0–1.5)
BUN SERPL-MCNC: 7 MG/DL (ref 8–23)
BUN/CREAT SERPL: 13.5 (ref 7–25)
CALCIUM SPEC-SCNC: 8.7 MG/DL (ref 8.6–10.5)
CHLORIDE SERPL-SCNC: 112 MMOL/L (ref 98–107)
CO2 SERPL-SCNC: 26.1 MMOL/L (ref 22–29)
CREAT SERPL-MCNC: 0.52 MG/DL (ref 0.76–1.27)
DEPRECATED RDW RBC AUTO: 45.6 FL (ref 37–54)
EGFRCR SERPLBLD CKD-EPI 2021: 111.9 ML/MIN/1.73
EOSINOPHIL # BLD AUTO: 0 10*3/MM3 (ref 0–0.4)
EOSINOPHIL NFR BLD AUTO: 0 % (ref 0.3–6.2)
ERYTHROCYTE [DISTWIDTH] IN BLOOD BY AUTOMATED COUNT: 12.5 % (ref 12.3–15.4)
GLUCOSE SERPL-MCNC: 126 MG/DL (ref 65–99)
HCT VFR BLD AUTO: 28.6 % (ref 37.5–51)
HGB BLD-MCNC: 8.9 G/DL (ref 13–17.7)
IMM GRANULOCYTES # BLD AUTO: 0.01 10*3/MM3 (ref 0–0.05)
IMM GRANULOCYTES NFR BLD AUTO: 0.2 % (ref 0–0.5)
LYMPHOCYTES # BLD AUTO: 1.74 10*3/MM3 (ref 0.7–3.1)
LYMPHOCYTES NFR BLD AUTO: 36.1 % (ref 19.6–45.3)
MCH RBC QN AUTO: 31 PG (ref 26.6–33)
MCHC RBC AUTO-ENTMCNC: 31.1 G/DL (ref 31.5–35.7)
MCV RBC AUTO: 99.7 FL (ref 79–97)
MONOCYTES # BLD AUTO: 0.57 10*3/MM3 (ref 0.1–0.9)
MONOCYTES NFR BLD AUTO: 11.8 % (ref 5–12)
NEUTROPHILS NFR BLD AUTO: 2.49 10*3/MM3 (ref 1.7–7)
NEUTROPHILS NFR BLD AUTO: 51.7 % (ref 42.7–76)
NRBC BLD AUTO-RTO: 0 /100 WBC (ref 0–0.2)
PLATELET # BLD AUTO: 167 10*3/MM3 (ref 140–450)
PMV BLD AUTO: 10.2 FL (ref 6–12)
POTASSIUM SERPL-SCNC: 3.7 MMOL/L (ref 3.5–5.2)
RBC # BLD AUTO: 2.87 10*6/MM3 (ref 4.14–5.8)
SODIUM SERPL-SCNC: 145 MMOL/L (ref 136–145)
WBC NRBC COR # BLD AUTO: 4.82 10*3/MM3 (ref 3.4–10.8)

## 2024-09-12 PROCEDURE — 25010000002 CEFTRIAXONE PER 250 MG: Performed by: NURSE PRACTITIONER

## 2024-09-12 PROCEDURE — 94761 N-INVAS EAR/PLS OXIMETRY MLT: CPT

## 2024-09-12 PROCEDURE — 97110 THERAPEUTIC EXERCISES: CPT

## 2024-09-12 PROCEDURE — 25010000002 HYDRALAZINE PER 20 MG: Performed by: NURSE PRACTITIONER

## 2024-09-12 PROCEDURE — 80048 BASIC METABOLIC PNL TOTAL CA: CPT | Performed by: NURSE PRACTITIONER

## 2024-09-12 PROCEDURE — 97116 GAIT TRAINING THERAPY: CPT

## 2024-09-12 PROCEDURE — 94799 UNLISTED PULMONARY SVC/PX: CPT

## 2024-09-12 PROCEDURE — 85025 COMPLETE CBC W/AUTO DIFF WBC: CPT | Performed by: NURSE PRACTITIONER

## 2024-09-12 PROCEDURE — 97530 THERAPEUTIC ACTIVITIES: CPT

## 2024-09-12 PROCEDURE — 94664 DEMO&/EVAL PT USE INHALER: CPT

## 2024-09-12 PROCEDURE — 25810000003 SODIUM CHLORIDE 0.9 % SOLUTION: Performed by: NURSE PRACTITIONER

## 2024-09-12 RX ORDER — HYDRALAZINE HYDROCHLORIDE 20 MG/ML
10 INJECTION INTRAMUSCULAR; INTRAVENOUS ONCE
Status: COMPLETED | OUTPATIENT
Start: 2024-09-12 | End: 2024-09-12

## 2024-09-12 RX ORDER — ENOXAPARIN SODIUM 100 MG/ML
40 INJECTION SUBCUTANEOUS EVERY 24 HOURS
Status: DISCONTINUED | OUTPATIENT
Start: 2024-09-12 | End: 2024-09-12

## 2024-09-12 RX ADMIN — DONEPEZIL HYDROCHLORIDE 10 MG: 5 TABLET, FILM COATED ORAL at 20:29

## 2024-09-12 RX ADMIN — CLONAZEPAM 0.5 MG: 0.5 TABLET ORAL at 16:03

## 2024-09-12 RX ADMIN — Medication 10 ML: at 09:29

## 2024-09-12 RX ADMIN — GUAIFENESIN 600 MG: 600 TABLET, EXTENDED RELEASE ORAL at 09:29

## 2024-09-12 RX ADMIN — HYDRALAZINE HYDROCHLORIDE 10 MG: 20 INJECTION INTRAMUSCULAR; INTRAVENOUS at 06:07

## 2024-09-12 RX ADMIN — ASPIRIN 81 MG: 81 TABLET, COATED ORAL at 20:29

## 2024-09-12 RX ADMIN — BUDESONIDE 0.5 MG: 0.5 INHALANT RESPIRATORY (INHALATION) at 18:35

## 2024-09-12 RX ADMIN — ATORVASTATIN CALCIUM 20 MG: 20 TABLET, FILM COATED ORAL at 09:29

## 2024-09-12 RX ADMIN — CLONAZEPAM 0.5 MG: 0.5 TABLET ORAL at 09:29

## 2024-09-12 RX ADMIN — CARBIDOPA AND LEVODOPA 2 TABLET: 25; 100 TABLET, EXTENDED RELEASE ORAL at 22:04

## 2024-09-12 RX ADMIN — CEFTRIAXONE 1000 MG: 1 INJECTION, POWDER, FOR SOLUTION INTRAMUSCULAR; INTRAVENOUS at 01:40

## 2024-09-12 RX ADMIN — CARBIDOPA AND LEVODOPA 2 TABLET: 25; 100 TABLET ORAL at 18:58

## 2024-09-12 RX ADMIN — CLONAZEPAM 1 MG: 1 TABLET ORAL at 20:29

## 2024-09-12 RX ADMIN — HYDROCODONE BITARTRATE AND ACETAMINOPHEN 1 TABLET: 5; 325 TABLET ORAL at 16:03

## 2024-09-12 RX ADMIN — HYDROCODONE BITARTRATE AND ACETAMINOPHEN 1 TABLET: 5; 325 TABLET ORAL at 09:33

## 2024-09-12 RX ADMIN — SODIUM CHLORIDE 100 ML/HR: 9 INJECTION, SOLUTION INTRAVENOUS at 01:40

## 2024-09-12 RX ADMIN — CLOPIDOGREL BISULFATE 75 MG: 75 TABLET ORAL at 09:29

## 2024-09-12 RX ADMIN — TAMSULOSIN HYDROCHLORIDE 0.4 MG: 0.4 CAPSULE ORAL at 09:29

## 2024-09-12 RX ADMIN — CARBIDOPA AND LEVODOPA 2 TABLET: 25; 100 TABLET ORAL at 12:05

## 2024-09-12 RX ADMIN — PANTOPRAZOLE SODIUM 40 MG: 40 TABLET, DELAYED RELEASE ORAL at 09:29

## 2024-09-12 RX ADMIN — BUDESONIDE 0.5 MG: 0.5 INHALANT RESPIRATORY (INHALATION) at 06:08

## 2024-09-12 RX ADMIN — IPRATROPIUM BROMIDE AND ALBUTEROL SULFATE 3 ML: .5; 3 SOLUTION RESPIRATORY (INHALATION) at 06:08

## 2024-09-12 RX ADMIN — ASPIRIN 81 MG: 81 TABLET, COATED ORAL at 09:29

## 2024-09-12 RX ADMIN — CARBIDOPA AND LEVODOPA 2 TABLET: 25; 100 TABLET ORAL at 09:32

## 2024-09-12 RX ADMIN — VENLAFAXINE HYDROCHLORIDE 75 MG: 75 CAPSULE, EXTENDED RELEASE ORAL at 09:29

## 2024-09-12 RX ADMIN — GABAPENTIN 600 MG: 300 CAPSULE ORAL at 09:29

## 2024-09-13 VITALS
HEIGHT: 69 IN | BODY MASS INDEX: 26.64 KG/M2 | DIASTOLIC BLOOD PRESSURE: 93 MMHG | RESPIRATION RATE: 19 BRPM | WEIGHT: 179.9 LBS | TEMPERATURE: 97.6 F | HEART RATE: 66 BPM | SYSTOLIC BLOOD PRESSURE: 165 MMHG | OXYGEN SATURATION: 95 %

## 2024-09-13 LAB
ANION GAP SERPL CALCULATED.3IONS-SCNC: 9.7 MMOL/L (ref 5–15)
BASOPHILS # BLD AUTO: 0.02 10*3/MM3 (ref 0–0.2)
BASOPHILS NFR BLD AUTO: 0.3 % (ref 0–1.5)
BUN SERPL-MCNC: 6 MG/DL (ref 8–23)
BUN/CREAT SERPL: 10.3 (ref 7–25)
CALCIUM SPEC-SCNC: 9.4 MG/DL (ref 8.6–10.5)
CHLORIDE SERPL-SCNC: 109 MMOL/L (ref 98–107)
CO2 SERPL-SCNC: 26.3 MMOL/L (ref 22–29)
CREAT SERPL-MCNC: 0.58 MG/DL (ref 0.76–1.27)
DEPRECATED RDW RBC AUTO: 45 FL (ref 37–54)
EGFRCR SERPLBLD CKD-EPI 2021: 108.2 ML/MIN/1.73
EOSINOPHIL # BLD AUTO: 0 10*3/MM3 (ref 0–0.4)
EOSINOPHIL NFR BLD AUTO: 0 % (ref 0.3–6.2)
ERYTHROCYTE [DISTWIDTH] IN BLOOD BY AUTOMATED COUNT: 12.5 % (ref 12.3–15.4)
GLUCOSE SERPL-MCNC: 104 MG/DL (ref 65–99)
HCT VFR BLD AUTO: 33.4 % (ref 37.5–51)
HGB BLD-MCNC: 10.5 G/DL (ref 13–17.7)
IMM GRANULOCYTES # BLD AUTO: 0.02 10*3/MM3 (ref 0–0.05)
IMM GRANULOCYTES NFR BLD AUTO: 0.3 % (ref 0–0.5)
LYMPHOCYTES # BLD AUTO: 2.24 10*3/MM3 (ref 0.7–3.1)
LYMPHOCYTES NFR BLD AUTO: 35.3 % (ref 19.6–45.3)
MCH RBC QN AUTO: 30.7 PG (ref 26.6–33)
MCHC RBC AUTO-ENTMCNC: 31.4 G/DL (ref 31.5–35.7)
MCV RBC AUTO: 97.7 FL (ref 79–97)
MONOCYTES # BLD AUTO: 0.52 10*3/MM3 (ref 0.1–0.9)
MONOCYTES NFR BLD AUTO: 8.2 % (ref 5–12)
NEUTROPHILS NFR BLD AUTO: 3.55 10*3/MM3 (ref 1.7–7)
NEUTROPHILS NFR BLD AUTO: 55.9 % (ref 42.7–76)
NRBC BLD AUTO-RTO: 0 /100 WBC (ref 0–0.2)
PLATELET # BLD AUTO: 202 10*3/MM3 (ref 140–450)
PMV BLD AUTO: 10.4 FL (ref 6–12)
POTASSIUM SERPL-SCNC: 3.4 MMOL/L (ref 3.5–5.2)
RBC # BLD AUTO: 3.42 10*6/MM3 (ref 4.14–5.8)
SODIUM SERPL-SCNC: 145 MMOL/L (ref 136–145)
WBC NRBC COR # BLD AUTO: 6.35 10*3/MM3 (ref 3.4–10.8)

## 2024-09-13 PROCEDURE — 80048 BASIC METABOLIC PNL TOTAL CA: CPT | Performed by: NURSE PRACTITIONER

## 2024-09-13 PROCEDURE — 94664 DEMO&/EVAL PT USE INHALER: CPT

## 2024-09-13 PROCEDURE — 85025 COMPLETE CBC W/AUTO DIFF WBC: CPT | Performed by: NURSE PRACTITIONER

## 2024-09-13 PROCEDURE — 94799 UNLISTED PULMONARY SVC/PX: CPT

## 2024-09-13 PROCEDURE — 94761 N-INVAS EAR/PLS OXIMETRY MLT: CPT

## 2024-09-13 RX ORDER — POTASSIUM CHLORIDE 1500 MG/1
40 TABLET, EXTENDED RELEASE ORAL EVERY 4 HOURS
Status: DISCONTINUED | OUTPATIENT
Start: 2024-09-13 | End: 2024-09-13 | Stop reason: HOSPADM

## 2024-09-13 RX ADMIN — CARBIDOPA AND LEVODOPA 2 TABLET: 25; 100 TABLET ORAL at 09:10

## 2024-09-13 RX ADMIN — ASPIRIN 81 MG: 81 TABLET, COATED ORAL at 09:09

## 2024-09-13 RX ADMIN — GUAIFENESIN 600 MG: 600 TABLET, EXTENDED RELEASE ORAL at 09:09

## 2024-09-13 RX ADMIN — AMOXICILLIN AND CLAVULANATE POTASSIUM 1 TABLET: 875; 125 TABLET, FILM COATED ORAL at 09:10

## 2024-09-13 RX ADMIN — ATORVASTATIN CALCIUM 20 MG: 20 TABLET, FILM COATED ORAL at 09:09

## 2024-09-13 RX ADMIN — CLOPIDOGREL BISULFATE 75 MG: 75 TABLET ORAL at 09:09

## 2024-09-13 RX ADMIN — CLONAZEPAM 0.5 MG: 0.5 TABLET ORAL at 09:09

## 2024-09-13 RX ADMIN — PANTOPRAZOLE SODIUM 40 MG: 40 TABLET, DELAYED RELEASE ORAL at 09:09

## 2024-09-13 RX ADMIN — POTASSIUM CHLORIDE 40 MEQ: 1500 TABLET, EXTENDED RELEASE ORAL at 09:09

## 2024-09-13 RX ADMIN — Medication 10 ML: at 09:10

## 2024-09-13 RX ADMIN — TAMSULOSIN HYDROCHLORIDE 0.4 MG: 0.4 CAPSULE ORAL at 09:09

## 2024-09-13 RX ADMIN — BUDESONIDE 0.5 MG: 0.5 INHALANT RESPIRATORY (INHALATION) at 06:48

## 2024-09-13 RX ADMIN — GABAPENTIN 600 MG: 300 CAPSULE ORAL at 09:09

## 2024-09-13 RX ADMIN — VENLAFAXINE HYDROCHLORIDE 75 MG: 75 CAPSULE, EXTENDED RELEASE ORAL at 09:09

## 2024-09-20 PROBLEM — J18.9 PNEUMONIA OF RIGHT UPPER LOBE DUE TO INFECTIOUS ORGANISM: Status: RESOLVED | Noted: 2022-12-04 | Resolved: 2024-09-20

## 2024-09-20 PROBLEM — E78.2 MIXED HYPERLIPIDEMIA: Chronic | Status: ACTIVE | Noted: 2019-07-08

## 2024-09-20 PROBLEM — N39.0 ACUTE URINARY TRACT INFECTION: Status: RESOLVED | Noted: 2024-02-27 | Resolved: 2024-09-20

## 2024-09-20 PROBLEM — J18.9 PNEUMONIA OF RIGHT LOWER LOBE DUE TO INFECTIOUS ORGANISM: Status: RESOLVED | Noted: 2023-04-05 | Resolved: 2024-09-20

## 2024-09-20 PROBLEM — I73.9 PVD (PERIPHERAL VASCULAR DISEASE) WITH CLAUDICATION: Chronic | Status: ACTIVE | Noted: 2024-09-20

## 2024-09-20 PROBLEM — R41.82 ALTERED MENTAL STATUS, UNSPECIFIED ALTERED MENTAL STATUS TYPE: Status: RESOLVED | Noted: 2023-03-24 | Resolved: 2024-09-20

## 2024-09-20 PROBLEM — Z86.73 HISTORY OF CVA (CEREBROVASCULAR ACCIDENT): Status: ACTIVE | Noted: 2019-07-08

## 2024-09-20 PROBLEM — N39.0 ACUTE UTI: Status: RESOLVED | Noted: 2024-09-10 | Resolved: 2024-09-20

## 2024-09-20 PROBLEM — J44.1 COPD EXACERBATION: Status: RESOLVED | Noted: 2023-05-06 | Resolved: 2024-09-20

## 2024-09-20 PROBLEM — G93.40 ENCEPHALOPATHY ACUTE: Status: RESOLVED | Noted: 2024-09-10 | Resolved: 2024-09-20

## 2024-09-20 PROBLEM — N39.0 ACUTE UTI (URINARY TRACT INFECTION): Status: RESOLVED | Noted: 2024-09-10 | Resolved: 2024-09-20

## 2024-09-20 PROBLEM — I10 PRIMARY HYPERTENSION: Chronic | Status: ACTIVE | Noted: 2019-07-08

## 2024-09-20 PROBLEM — S72.142A INTERTROCHANTERIC FRACTURE OF LEFT FEMUR, CLOSED, INITIAL ENCOUNTER: Status: RESOLVED | Noted: 2024-08-25 | Resolved: 2024-09-20

## 2024-09-20 PROBLEM — I25.10 CORONARY ARTERY DISEASE INVOLVING NATIVE CORONARY ARTERY OF NATIVE HEART WITHOUT ANGINA PECTORIS: Chronic | Status: ACTIVE | Noted: 2024-02-26

## 2024-09-23 ENCOUNTER — OFFICE VISIT (OUTPATIENT)
Dept: CARDIOLOGY | Facility: CLINIC | Age: 64
End: 2024-09-23
Payer: MEDICARE

## 2024-09-23 ENCOUNTER — APPOINTMENT (OUTPATIENT)
Dept: CT IMAGING | Facility: HOSPITAL | Age: 64
End: 2024-09-23
Payer: MEDICARE

## 2024-09-23 ENCOUNTER — HOSPITAL ENCOUNTER (OUTPATIENT)
Facility: HOSPITAL | Age: 64
Setting detail: OBSERVATION
Discharge: HOME | End: 2024-09-24
Attending: EMERGENCY MEDICINE | Admitting: EMERGENCY MEDICINE
Payer: MEDICARE

## 2024-09-23 ENCOUNTER — APPOINTMENT (OUTPATIENT)
Dept: GENERAL RADIOLOGY | Facility: HOSPITAL | Age: 64
End: 2024-09-23
Payer: MEDICARE

## 2024-09-23 VITALS
OXYGEN SATURATION: 96 % | SYSTOLIC BLOOD PRESSURE: 145 MMHG | HEART RATE: 57 BPM | DIASTOLIC BLOOD PRESSURE: 80 MMHG | WEIGHT: 190 LBS | HEIGHT: 69 IN | BODY MASS INDEX: 28.14 KG/M2

## 2024-09-23 DIAGNOSIS — G20.A1 PARKINSON'S DISEASE, UNSPECIFIED WHETHER DYSKINESIA PRESENT, UNSPECIFIED WHETHER MANIFESTATIONS FLUCTUATE: Chronic | ICD-10-CM

## 2024-09-23 DIAGNOSIS — I25.10 CORONARY ARTERY DISEASE INVOLVING NATIVE CORONARY ARTERY OF NATIVE HEART WITHOUT ANGINA PECTORIS: Chronic | ICD-10-CM

## 2024-09-23 DIAGNOSIS — N40.1 BENIGN PROSTATIC HYPERPLASIA WITH URINARY HESITANCY: Chronic | ICD-10-CM

## 2024-09-23 DIAGNOSIS — I10 PRIMARY HYPERTENSION: Chronic | ICD-10-CM

## 2024-09-23 DIAGNOSIS — Z86.73 HISTORY OF CVA (CEREBROVASCULAR ACCIDENT): Chronic | ICD-10-CM

## 2024-09-23 DIAGNOSIS — R06.02 SHORTNESS OF BREATH: Primary | ICD-10-CM

## 2024-09-23 DIAGNOSIS — J41.1 MUCOPURULENT CHRONIC BRONCHITIS: Chronic | ICD-10-CM

## 2024-09-23 DIAGNOSIS — E78.2 MIXED HYPERLIPIDEMIA: Chronic | ICD-10-CM

## 2024-09-23 DIAGNOSIS — I73.9 PVD (PERIPHERAL VASCULAR DISEASE) WITH CLAUDICATION: Chronic | ICD-10-CM

## 2024-09-23 DIAGNOSIS — Z72.0 TOBACCO ABUSE: Chronic | ICD-10-CM

## 2024-09-23 DIAGNOSIS — R39.11 BENIGN PROSTATIC HYPERPLASIA WITH URINARY HESITANCY: Chronic | ICD-10-CM

## 2024-09-23 PROBLEM — R06.00 DYSPNEA: Status: ACTIVE | Noted: 2024-09-23

## 2024-09-23 LAB
ALBUMIN SERPL-MCNC: 4.3 G/DL (ref 3.5–5.2)
ALBUMIN/GLOB SERPL: 1.4 G/DL
ALP SERPL-CCNC: 178 U/L (ref 39–117)
ALT SERPL W P-5'-P-CCNC: <5 U/L (ref 1–41)
ANION GAP SERPL CALCULATED.3IONS-SCNC: 10.2 MMOL/L (ref 5–15)
AST SERPL-CCNC: 15 U/L (ref 1–40)
BASOPHILS # BLD AUTO: 0.02 10*3/MM3 (ref 0–0.2)
BASOPHILS NFR BLD AUTO: 0.3 % (ref 0–1.5)
BILIRUB SERPL-MCNC: 0.4 MG/DL (ref 0–1.2)
BUN SERPL-MCNC: 12 MG/DL (ref 8–23)
BUN/CREAT SERPL: 19.7 (ref 7–25)
CALCIUM SPEC-SCNC: 9.6 MG/DL (ref 8.6–10.5)
CHLORIDE SERPL-SCNC: 104 MMOL/L (ref 98–107)
CO2 SERPL-SCNC: 26.8 MMOL/L (ref 22–29)
CREAT SERPL-MCNC: 0.61 MG/DL (ref 0.76–1.27)
DEPRECATED RDW RBC AUTO: 47.6 FL (ref 37–54)
EGFRCR SERPLBLD CKD-EPI 2021: 106.6 ML/MIN/1.73
EOSINOPHIL # BLD AUTO: 0 10*3/MM3 (ref 0–0.4)
EOSINOPHIL NFR BLD AUTO: 0 % (ref 0.3–6.2)
ERYTHROCYTE [DISTWIDTH] IN BLOOD BY AUTOMATED COUNT: 13.3 % (ref 12.3–15.4)
GEN 5 2HR TROPONIN T REFLEX: 26 NG/L
GLOBULIN UR ELPH-MCNC: 3.1 GM/DL
GLUCOSE SERPL-MCNC: 127 MG/DL (ref 65–99)
HCT VFR BLD AUTO: 37.8 % (ref 37.5–51)
HGB BLD-MCNC: 11.9 G/DL (ref 13–17.7)
IMM GRANULOCYTES # BLD AUTO: 0.01 10*3/MM3 (ref 0–0.05)
IMM GRANULOCYTES NFR BLD AUTO: 0.2 % (ref 0–0.5)
LYMPHOCYTES # BLD AUTO: 2.03 10*3/MM3 (ref 0.7–3.1)
LYMPHOCYTES NFR BLD AUTO: 32.4 % (ref 19.6–45.3)
MCH RBC QN AUTO: 30.7 PG (ref 26.6–33)
MCHC RBC AUTO-ENTMCNC: 31.5 G/DL (ref 31.5–35.7)
MCV RBC AUTO: 97.7 FL (ref 79–97)
MONOCYTES # BLD AUTO: 0.46 10*3/MM3 (ref 0.1–0.9)
MONOCYTES NFR BLD AUTO: 7.3 % (ref 5–12)
NEUTROPHILS NFR BLD AUTO: 3.75 10*3/MM3 (ref 1.7–7)
NEUTROPHILS NFR BLD AUTO: 59.8 % (ref 42.7–76)
NRBC BLD AUTO-RTO: 0 /100 WBC (ref 0–0.2)
PLATELET # BLD AUTO: 203 10*3/MM3 (ref 140–450)
PMV BLD AUTO: 10.3 FL (ref 6–12)
POTASSIUM SERPL-SCNC: 4 MMOL/L (ref 3.5–5.2)
PROT SERPL-MCNC: 7.4 G/DL (ref 6–8.5)
QT INTERVAL: 442 MS
QTC INTERVAL: 429 MS
RBC # BLD AUTO: 3.87 10*6/MM3 (ref 4.14–5.8)
SODIUM SERPL-SCNC: 141 MMOL/L (ref 136–145)
TROPONIN T DELTA: 7 NG/L
TROPONIN T SERPL HS-MCNC: 19 NG/L
WBC NRBC COR # BLD AUTO: 6.27 10*3/MM3 (ref 3.4–10.8)

## 2024-09-23 PROCEDURE — 93005 ELECTROCARDIOGRAM TRACING: CPT

## 2024-09-23 PROCEDURE — G0378 HOSPITAL OBSERVATION PER HR: HCPCS

## 2024-09-23 PROCEDURE — 99285 EMERGENCY DEPT VISIT HI MDM: CPT

## 2024-09-23 PROCEDURE — 3079F DIAST BP 80-89 MM HG: CPT | Performed by: NURSE PRACTITIONER

## 2024-09-23 PROCEDURE — 71045 X-RAY EXAM CHEST 1 VIEW: CPT

## 2024-09-23 PROCEDURE — 71275 CT ANGIOGRAPHY CHEST: CPT

## 2024-09-23 PROCEDURE — 93005 ELECTROCARDIOGRAM TRACING: CPT | Performed by: EMERGENCY MEDICINE

## 2024-09-23 PROCEDURE — 80053 COMPREHEN METABOLIC PANEL: CPT | Performed by: EMERGENCY MEDICINE

## 2024-09-23 PROCEDURE — 84484 ASSAY OF TROPONIN QUANT: CPT | Performed by: EMERGENCY MEDICINE

## 2024-09-23 PROCEDURE — 36415 COLL VENOUS BLD VENIPUNCTURE: CPT

## 2024-09-23 PROCEDURE — 99214 OFFICE O/P EST MOD 30 MIN: CPT | Performed by: NURSE PRACTITIONER

## 2024-09-23 PROCEDURE — 85025 COMPLETE CBC W/AUTO DIFF WBC: CPT

## 2024-09-23 PROCEDURE — 25510000001 IOPAMIDOL PER 1 ML: Performed by: EMERGENCY MEDICINE

## 2024-09-23 PROCEDURE — 3077F SYST BP >= 140 MM HG: CPT | Performed by: NURSE PRACTITIONER

## 2024-09-23 RX ORDER — OXYCODONE AND ACETAMINOPHEN 7.5; 325 MG/1; MG/1
1 TABLET ORAL EVERY 6 HOURS PRN
COMMUNITY
End: 2024-09-26 | Stop reason: SDUPTHER

## 2024-09-23 RX ORDER — ASPIRIN 325 MG
325 TABLET ORAL ONCE
Status: COMPLETED | OUTPATIENT
Start: 2024-09-23 | End: 2024-09-23

## 2024-09-23 RX ORDER — ACETAMINOPHEN 650 MG/1
650 SUPPOSITORY RECTAL EVERY 4 HOURS PRN
Status: DISCONTINUED | OUTPATIENT
Start: 2024-09-23 | End: 2024-09-24 | Stop reason: HOSPADM

## 2024-09-23 RX ORDER — ONDANSETRON 2 MG/ML
4 INJECTION INTRAMUSCULAR; INTRAVENOUS EVERY 6 HOURS PRN
Status: DISCONTINUED | OUTPATIENT
Start: 2024-09-23 | End: 2024-09-24 | Stop reason: HOSPADM

## 2024-09-23 RX ORDER — AMLODIPINE BESYLATE 5 MG/1
5 TABLET ORAL DAILY
Qty: 90 TABLET | Refills: 3 | Status: SHIPPED | OUTPATIENT
Start: 2024-09-23

## 2024-09-23 RX ORDER — BISACODYL 5 MG/1
5 TABLET, DELAYED RELEASE ORAL DAILY PRN
Status: DISCONTINUED | OUTPATIENT
Start: 2024-09-23 | End: 2024-09-24 | Stop reason: HOSPADM

## 2024-09-23 RX ORDER — ALUMINA, MAGNESIA, AND SIMETHICONE 2400; 2400; 240 MG/30ML; MG/30ML; MG/30ML
15 SUSPENSION ORAL EVERY 6 HOURS PRN
Status: DISCONTINUED | OUTPATIENT
Start: 2024-09-23 | End: 2024-09-24 | Stop reason: HOSPADM

## 2024-09-23 RX ORDER — NITROGLYCERIN 0.4 MG/1
0.4 TABLET SUBLINGUAL
Status: DISCONTINUED | OUTPATIENT
Start: 2024-09-23 | End: 2024-09-24 | Stop reason: HOSPADM

## 2024-09-23 RX ORDER — BISACODYL 10 MG
10 SUPPOSITORY, RECTAL RECTAL DAILY PRN
Status: DISCONTINUED | OUTPATIENT
Start: 2024-09-23 | End: 2024-09-24 | Stop reason: HOSPADM

## 2024-09-23 RX ORDER — POLYETHYLENE GLYCOL 3350 17 G/17G
17 POWDER, FOR SOLUTION ORAL DAILY PRN
Status: DISCONTINUED | OUTPATIENT
Start: 2024-09-23 | End: 2024-09-24 | Stop reason: HOSPADM

## 2024-09-23 RX ORDER — SODIUM CHLORIDE 0.9 % (FLUSH) 0.9 %
10 SYRINGE (ML) INJECTION AS NEEDED
Status: DISCONTINUED | OUTPATIENT
Start: 2024-09-23 | End: 2024-09-24 | Stop reason: HOSPADM

## 2024-09-23 RX ORDER — CLONAZEPAM 0.5 MG/1
0.5 TABLET ORAL 4 TIMES DAILY
COMMUNITY
Start: 2024-09-20 | End: 2024-09-26 | Stop reason: SDUPTHER

## 2024-09-23 RX ORDER — IOPAMIDOL 755 MG/ML
100 INJECTION, SOLUTION INTRAVASCULAR
Status: COMPLETED | OUTPATIENT
Start: 2024-09-23 | End: 2024-09-23

## 2024-09-23 RX ORDER — ACETAMINOPHEN 160 MG/5ML
650 SOLUTION ORAL EVERY 4 HOURS PRN
Status: DISCONTINUED | OUTPATIENT
Start: 2024-09-23 | End: 2024-09-24 | Stop reason: HOSPADM

## 2024-09-23 RX ORDER — SODIUM CHLORIDE 0.9 % (FLUSH) 0.9 %
10 SYRINGE (ML) INJECTION EVERY 12 HOURS SCHEDULED
Status: DISCONTINUED | OUTPATIENT
Start: 2024-09-23 | End: 2024-09-24 | Stop reason: HOSPADM

## 2024-09-23 RX ORDER — ACETAMINOPHEN 325 MG/1
650 TABLET ORAL EVERY 4 HOURS PRN
Status: DISCONTINUED | OUTPATIENT
Start: 2024-09-23 | End: 2024-09-24 | Stop reason: HOSPADM

## 2024-09-23 RX ORDER — AMOXICILLIN 250 MG
2 CAPSULE ORAL 2 TIMES DAILY PRN
Status: DISCONTINUED | OUTPATIENT
Start: 2024-09-23 | End: 2024-09-24 | Stop reason: HOSPADM

## 2024-09-23 RX ORDER — SODIUM CHLORIDE 9 MG/ML
40 INJECTION, SOLUTION INTRAVENOUS AS NEEDED
Status: DISCONTINUED | OUTPATIENT
Start: 2024-09-23 | End: 2024-09-24 | Stop reason: HOSPADM

## 2024-09-23 RX ORDER — ONDANSETRON 4 MG/1
4 TABLET, ORALLY DISINTEGRATING ORAL EVERY 6 HOURS PRN
Status: DISCONTINUED | OUTPATIENT
Start: 2024-09-23 | End: 2024-09-24 | Stop reason: HOSPADM

## 2024-09-23 RX ADMIN — ASPIRIN 325 MG ORAL TABLET 325 MG: 325 PILL ORAL at 13:10

## 2024-09-23 RX ADMIN — IOPAMIDOL 100 ML: 755 INJECTION, SOLUTION INTRAVENOUS at 14:28

## 2024-09-23 RX ADMIN — Medication 10 ML: at 21:38

## 2024-09-24 ENCOUNTER — TELEPHONE (OUTPATIENT)
Dept: CARDIOLOGY | Facility: CLINIC | Age: 64
End: 2024-09-24
Payer: MEDICARE

## 2024-09-24 ENCOUNTER — APPOINTMENT (OUTPATIENT)
Dept: CARDIOLOGY | Facility: HOSPITAL | Age: 64
End: 2024-09-24
Payer: MEDICARE

## 2024-09-24 ENCOUNTER — READMISSION MANAGEMENT (OUTPATIENT)
Dept: CALL CENTER | Facility: HOSPITAL | Age: 64
End: 2024-09-24
Payer: MEDICARE

## 2024-09-24 VITALS
SYSTOLIC BLOOD PRESSURE: 110 MMHG | TEMPERATURE: 97.5 F | DIASTOLIC BLOOD PRESSURE: 68 MMHG | HEIGHT: 69 IN | RESPIRATION RATE: 16 BRPM | BODY MASS INDEX: 28.14 KG/M2 | HEART RATE: 61 BPM | OXYGEN SATURATION: 93 % | WEIGHT: 190 LBS

## 2024-09-24 LAB
ANION GAP SERPL CALCULATED.3IONS-SCNC: 9.9 MMOL/L (ref 5–15)
AORTIC DIMENSIONLESS INDEX: 0.66 (DI)
B PARAPERT DNA SPEC QL NAA+PROBE: NOT DETECTED
B PERT DNA SPEC QL NAA+PROBE: NOT DETECTED
BASOPHILS # BLD AUTO: 0.02 10*3/MM3 (ref 0–0.2)
BASOPHILS NFR BLD AUTO: 0.3 % (ref 0–1.5)
BH CV ECHO LEFT VENTRICLE GLOBAL LONGITUDINAL STRAIN: -14.9 %
BH CV ECHO MEAS - AO MAX PG: 8.1 MMHG
BH CV ECHO MEAS - AO MEAN PG: 5 MMHG
BH CV ECHO MEAS - AO V2 MAX: 142 CM/SEC
BH CV ECHO MEAS - AO V2 VTI: 28.2 CM
BH CV ECHO MEAS - AVA(I,D): 2.19 CM2
BH CV ECHO MEAS - EDV(CUBED): 117.6 ML
BH CV ECHO MEAS - EDV(MOD-SP4): 96.4 ML
BH CV ECHO MEAS - EF(MOD-BP): 53 %
BH CV ECHO MEAS - EF(MOD-SP4): 53.5 %
BH CV ECHO MEAS - ESV(CUBED): 54.9 ML
BH CV ECHO MEAS - ESV(MOD-SP4): 44.8 ML
BH CV ECHO MEAS - FS: 22.4 %
BH CV ECHO MEAS - IVS/LVPW: 1 CM
BH CV ECHO MEAS - IVSD: 1 CM
BH CV ECHO MEAS - LA DIMENSION: 3.5 CM
BH CV ECHO MEAS - LAT PEAK E' VEL: 8.2 CM/SEC
BH CV ECHO MEAS - LV MASS(C)D: 176 GRAMS
BH CV ECHO MEAS - LV MAX PG: 3.5 MMHG
BH CV ECHO MEAS - LV MEAN PG: 2 MMHG
BH CV ECHO MEAS - LV V1 MAX: 94.2 CM/SEC
BH CV ECHO MEAS - LV V1 VTI: 19.7 CM
BH CV ECHO MEAS - LVIDD: 4.9 CM
BH CV ECHO MEAS - LVIDS: 3.8 CM
BH CV ECHO MEAS - LVOT AREA: 3.1 CM2
BH CV ECHO MEAS - LVOT DIAM: 2 CM
BH CV ECHO MEAS - LVPWD: 1 CM
BH CV ECHO MEAS - MED PEAK E' VEL: 7.4 CM/SEC
BH CV ECHO MEAS - MV A MAX VEL: 81.2 CM/SEC
BH CV ECHO MEAS - MV DEC SLOPE: 148 CM/SEC2
BH CV ECHO MEAS - MV DEC TIME: 0.3 SEC
BH CV ECHO MEAS - MV E MAX VEL: 62.4 CM/SEC
BH CV ECHO MEAS - MV E/A: 0.77
BH CV ECHO MEAS - MV MAX PG: 3.1 MMHG
BH CV ECHO MEAS - MV MEAN PG: 1 MMHG
BH CV ECHO MEAS - MV P1/2T: 103.7 MSEC
BH CV ECHO MEAS - MV V2 VTI: 25.4 CM
BH CV ECHO MEAS - MVA(P1/2T): 2.12 CM2
BH CV ECHO MEAS - MVA(VTI): 2.44 CM2
BH CV ECHO MEAS - PULM A REVS DUR: 0.07 SEC
BH CV ECHO MEAS - PULM A REVS VEL: 22.9 CM/SEC
BH CV ECHO MEAS - PULM DIAS VEL: 39.5 CM/SEC
BH CV ECHO MEAS - PULM S/D: 1.21
BH CV ECHO MEAS - PULM SYS VEL: 47.6 CM/SEC
BH CV ECHO MEAS - SV(LVOT): 61.9 ML
BH CV ECHO MEAS - SV(MOD-SP4): 51.6 ML
BH CV ECHO MEAS - TAPSE (>1.6): 1.97 CM
BH CV ECHO MEASUREMENTS AVERAGE E/E' RATIO: 8
BH CV LOW VAS LEFT LESSER SAPH VESSEL: 1
BH CV LOW VAS RIGHT LESSER SAPH VESSEL: 1
BH CV LOWER VASCULAR LEFT COMMON FEMORAL AUGMENT: NORMAL
BH CV LOWER VASCULAR LEFT COMMON FEMORAL COMPETENT: NORMAL
BH CV LOWER VASCULAR LEFT COMMON FEMORAL COMPRESS: NORMAL
BH CV LOWER VASCULAR LEFT COMMON FEMORAL PHASIC: NORMAL
BH CV LOWER VASCULAR LEFT COMMON FEMORAL SPONT: NORMAL
BH CV LOWER VASCULAR LEFT DISTAL FEMORAL COMPRESS: NORMAL
BH CV LOWER VASCULAR LEFT GASTRONEMIUS COMPRESS: NORMAL
BH CV LOWER VASCULAR LEFT GREATER SAPH AK COMPRESS: NORMAL
BH CV LOWER VASCULAR LEFT GREATER SAPH BK COMPRESS: NORMAL
BH CV LOWER VASCULAR LEFT LESSER SAPH COMPRESS: NORMAL
BH CV LOWER VASCULAR LEFT LESSER SAPH THROMBUS: NORMAL
BH CV LOWER VASCULAR LEFT MID FEMORAL AUGMENT: NORMAL
BH CV LOWER VASCULAR LEFT MID FEMORAL COMPETENT: NORMAL
BH CV LOWER VASCULAR LEFT MID FEMORAL COMPRESS: NORMAL
BH CV LOWER VASCULAR LEFT MID FEMORAL PHASIC: NORMAL
BH CV LOWER VASCULAR LEFT MID FEMORAL SPONT: NORMAL
BH CV LOWER VASCULAR LEFT PERONEAL COMPRESS: NORMAL
BH CV LOWER VASCULAR LEFT POPLITEAL AUGMENT: NORMAL
BH CV LOWER VASCULAR LEFT POPLITEAL COMPETENT: NORMAL
BH CV LOWER VASCULAR LEFT POPLITEAL COMPRESS: NORMAL
BH CV LOWER VASCULAR LEFT POPLITEAL PHASIC: NORMAL
BH CV LOWER VASCULAR LEFT POPLITEAL SPONT: NORMAL
BH CV LOWER VASCULAR LEFT POSTERIOR TIBIAL COMPRESS: NORMAL
BH CV LOWER VASCULAR LEFT PROXIMAL FEMORAL COMPRESS: NORMAL
BH CV LOWER VASCULAR LEFT SAPHENOFEMORAL JUNCTION COMPRESS: NORMAL
BH CV LOWER VASCULAR RIGHT COMMON FEMORAL AUGMENT: NORMAL
BH CV LOWER VASCULAR RIGHT COMMON FEMORAL COMPETENT: NORMAL
BH CV LOWER VASCULAR RIGHT COMMON FEMORAL COMPRESS: NORMAL
BH CV LOWER VASCULAR RIGHT COMMON FEMORAL PHASIC: NORMAL
BH CV LOWER VASCULAR RIGHT COMMON FEMORAL SPONT: NORMAL
BH CV LOWER VASCULAR RIGHT DISTAL FEMORAL COMPRESS: NORMAL
BH CV LOWER VASCULAR RIGHT GASTRONEMIUS COMPRESS: NORMAL
BH CV LOWER VASCULAR RIGHT GREATER SAPH AK COMPRESS: NORMAL
BH CV LOWER VASCULAR RIGHT GREATER SAPH BK COMPRESS: NORMAL
BH CV LOWER VASCULAR RIGHT LESSER SAPH COMPRESS: NORMAL
BH CV LOWER VASCULAR RIGHT LESSER SAPH THROMBUS: NORMAL
BH CV LOWER VASCULAR RIGHT MID FEMORAL AUGMENT: NORMAL
BH CV LOWER VASCULAR RIGHT MID FEMORAL COMPETENT: NORMAL
BH CV LOWER VASCULAR RIGHT MID FEMORAL COMPRESS: NORMAL
BH CV LOWER VASCULAR RIGHT MID FEMORAL PHASIC: NORMAL
BH CV LOWER VASCULAR RIGHT MID FEMORAL SPONT: NORMAL
BH CV LOWER VASCULAR RIGHT PERONEAL COMPRESS: NORMAL
BH CV LOWER VASCULAR RIGHT POPLITEAL AUGMENT: NORMAL
BH CV LOWER VASCULAR RIGHT POPLITEAL COMPETENT: NORMAL
BH CV LOWER VASCULAR RIGHT POPLITEAL COMPRESS: NORMAL
BH CV LOWER VASCULAR RIGHT POPLITEAL PHASIC: NORMAL
BH CV LOWER VASCULAR RIGHT POPLITEAL SPONT: NORMAL
BH CV LOWER VASCULAR RIGHT POSTERIOR TIBIAL COMPRESS: NORMAL
BH CV LOWER VASCULAR RIGHT PROXIMAL FEMORAL COMPRESS: NORMAL
BH CV LOWER VASCULAR RIGHT SAPHENOFEMORAL JUNCTION COMPRESS: NORMAL
BH CV XLRA - TDI S': 15.3 CM/SEC
BUN SERPL-MCNC: 11 MG/DL (ref 8–23)
BUN/CREAT SERPL: 19 (ref 7–25)
C PNEUM DNA NPH QL NAA+NON-PROBE: NOT DETECTED
CALCIUM SPEC-SCNC: 9.5 MG/DL (ref 8.6–10.5)
CHLORIDE SERPL-SCNC: 106 MMOL/L (ref 98–107)
CO2 SERPL-SCNC: 27.1 MMOL/L (ref 22–29)
CREAT SERPL-MCNC: 0.58 MG/DL (ref 0.76–1.27)
DACRYOCYTES BLD QL SMEAR: NORMAL
DEPRECATED RDW RBC AUTO: 47.8 FL (ref 37–54)
EGFRCR SERPLBLD CKD-EPI 2021: 108.2 ML/MIN/1.73
EOSINOPHIL # BLD AUTO: 0.16 10*3/MM3 (ref 0–0.4)
EOSINOPHIL NFR BLD AUTO: 2.5 % (ref 0.3–6.2)
ERYTHROCYTE [DISTWIDTH] IN BLOOD BY AUTOMATED COUNT: 13.2 % (ref 12.3–15.4)
FLUAV SUBTYP SPEC NAA+PROBE: NOT DETECTED
FLUBV RNA ISLT QL NAA+PROBE: NOT DETECTED
GLUCOSE SERPL-MCNC: 106 MG/DL (ref 65–99)
HADV DNA SPEC NAA+PROBE: NOT DETECTED
HCOV 229E RNA SPEC QL NAA+PROBE: NOT DETECTED
HCOV HKU1 RNA SPEC QL NAA+PROBE: NOT DETECTED
HCOV NL63 RNA SPEC QL NAA+PROBE: NOT DETECTED
HCOV OC43 RNA SPEC QL NAA+PROBE: NOT DETECTED
HCT VFR BLD AUTO: 38.4 % (ref 37.5–51)
HGB BLD-MCNC: 12 G/DL (ref 13–17.7)
HMPV RNA NPH QL NAA+NON-PROBE: NOT DETECTED
HPIV1 RNA ISLT QL NAA+PROBE: NOT DETECTED
HPIV2 RNA SPEC QL NAA+PROBE: NOT DETECTED
HPIV3 RNA NPH QL NAA+PROBE: NOT DETECTED
HPIV4 P GENE NPH QL NAA+PROBE: NOT DETECTED
IMM GRANULOCYTES # BLD AUTO: 0.02 10*3/MM3 (ref 0–0.05)
IMM GRANULOCYTES NFR BLD AUTO: 0.3 % (ref 0–0.5)
LYMPHOCYTES # BLD AUTO: 2.23 10*3/MM3 (ref 0.7–3.1)
LYMPHOCYTES NFR BLD AUTO: 34.2 % (ref 19.6–45.3)
M PNEUMO IGG SER IA-ACNC: NOT DETECTED
MCH RBC QN AUTO: 30.7 PG (ref 26.6–33)
MCHC RBC AUTO-ENTMCNC: 31.3 G/DL (ref 31.5–35.7)
MCV RBC AUTO: 98.2 FL (ref 79–97)
MONOCYTES # BLD AUTO: 0.48 10*3/MM3 (ref 0.1–0.9)
MONOCYTES NFR BLD AUTO: 7.4 % (ref 5–12)
NEUTROPHILS NFR BLD AUTO: 3.62 10*3/MM3 (ref 1.7–7)
NEUTROPHILS NFR BLD AUTO: 55.3 % (ref 42.7–76)
NRBC BLD AUTO-RTO: 0 /100 WBC (ref 0–0.2)
NT-PROBNP SERPL-MCNC: 325 PG/ML (ref 0–900)
PLATELET # BLD AUTO: 188 10*3/MM3 (ref 140–450)
PMV BLD AUTO: 10.7 FL (ref 6–12)
POIKILOCYTOSIS BLD QL SMEAR: NORMAL
POTASSIUM SERPL-SCNC: 4.4 MMOL/L (ref 3.5–5.2)
RBC # BLD AUTO: 3.91 10*6/MM3 (ref 4.14–5.8)
RHINOVIRUS RNA SPEC NAA+PROBE: NOT DETECTED
RSV RNA NPH QL NAA+NON-PROBE: NOT DETECTED
SARS-COV-2 RNA NPH QL NAA+NON-PROBE: NOT DETECTED
SMALL PLATELETS BLD QL SMEAR: ADEQUATE
SODIUM SERPL-SCNC: 143 MMOL/L (ref 136–145)
TROPONIN T SERPL HS-MCNC: 19 NG/L
WBC MORPH BLD: NORMAL
WBC NRBC COR # BLD AUTO: 6.53 10*3/MM3 (ref 3.4–10.8)

## 2024-09-24 PROCEDURE — 94761 N-INVAS EAR/PLS OXIMETRY MLT: CPT

## 2024-09-24 PROCEDURE — 80048 BASIC METABOLIC PNL TOTAL CA: CPT | Performed by: NURSE PRACTITIONER

## 2024-09-24 PROCEDURE — G0378 HOSPITAL OBSERVATION PER HR: HCPCS

## 2024-09-24 PROCEDURE — 85025 COMPLETE CBC W/AUTO DIFF WBC: CPT | Performed by: NURSE PRACTITIONER

## 2024-09-24 PROCEDURE — 25010000002 CYANOCOBALAMIN PER 1000 MCG: Performed by: NURSE PRACTITIONER

## 2024-09-24 PROCEDURE — 93306 TTE W/DOPPLER COMPLETE: CPT | Performed by: INTERNAL MEDICINE

## 2024-09-24 PROCEDURE — 93970 EXTREMITY STUDY: CPT

## 2024-09-24 PROCEDURE — 93356 MYOCRD STRAIN IMG SPCKL TRCK: CPT | Performed by: INTERNAL MEDICINE

## 2024-09-24 PROCEDURE — 83880 ASSAY OF NATRIURETIC PEPTIDE: CPT | Performed by: INTERNAL MEDICINE

## 2024-09-24 PROCEDURE — 93306 TTE W/DOPPLER COMPLETE: CPT

## 2024-09-24 PROCEDURE — 93356 MYOCRD STRAIN IMG SPCKL TRCK: CPT

## 2024-09-24 PROCEDURE — 94664 DEMO&/EVAL PT USE INHALER: CPT

## 2024-09-24 PROCEDURE — 85007 BL SMEAR W/DIFF WBC COUNT: CPT | Performed by: NURSE PRACTITIONER

## 2024-09-24 PROCEDURE — 94799 UNLISTED PULMONARY SVC/PX: CPT

## 2024-09-24 PROCEDURE — 0202U NFCT DS 22 TRGT SARS-COV-2: CPT | Performed by: NURSE PRACTITIONER

## 2024-09-24 PROCEDURE — 99214 OFFICE O/P EST MOD 30 MIN: CPT | Performed by: INTERNAL MEDICINE

## 2024-09-24 PROCEDURE — 84484 ASSAY OF TROPONIN QUANT: CPT | Performed by: NURSE PRACTITIONER

## 2024-09-24 PROCEDURE — 93970 EXTREMITY STUDY: CPT | Performed by: SURGERY

## 2024-09-24 PROCEDURE — 94640 AIRWAY INHALATION TREATMENT: CPT

## 2024-09-24 PROCEDURE — 96372 THER/PROPH/DIAG INJ SC/IM: CPT

## 2024-09-24 RX ORDER — GABAPENTIN 300 MG/1
600 CAPSULE ORAL EVERY 8 HOURS SCHEDULED
Status: DISCONTINUED | OUTPATIENT
Start: 2024-09-24 | End: 2024-09-24 | Stop reason: HOSPADM

## 2024-09-24 RX ORDER — BUDESONIDE 0.5 MG/2ML
0.5 INHALANT ORAL
Status: DISCONTINUED | OUTPATIENT
Start: 2024-09-24 | End: 2024-09-24 | Stop reason: HOSPADM

## 2024-09-24 RX ORDER — BACLOFEN 10 MG/1
10 TABLET ORAL 3 TIMES DAILY PRN
Status: DISCONTINUED | OUTPATIENT
Start: 2024-09-24 | End: 2024-09-24 | Stop reason: HOSPADM

## 2024-09-24 RX ORDER — AMLODIPINE BESYLATE 5 MG/1
5 TABLET ORAL DAILY
Status: DISCONTINUED | OUTPATIENT
Start: 2024-09-24 | End: 2024-09-24 | Stop reason: HOSPADM

## 2024-09-24 RX ORDER — IPRATROPIUM BROMIDE AND ALBUTEROL SULFATE 2.5; .5 MG/3ML; MG/3ML
3 SOLUTION RESPIRATORY (INHALATION) EVERY 6 HOURS PRN
Status: DISCONTINUED | OUTPATIENT
Start: 2024-09-24 | End: 2024-09-24 | Stop reason: HOSPADM

## 2024-09-24 RX ORDER — CLOPIDOGREL BISULFATE 75 MG/1
75 TABLET ORAL DAILY
Status: DISCONTINUED | OUTPATIENT
Start: 2024-09-24 | End: 2024-09-24 | Stop reason: HOSPADM

## 2024-09-24 RX ORDER — TAMSULOSIN HYDROCHLORIDE 0.4 MG/1
0.4 CAPSULE ORAL DAILY
Status: DISCONTINUED | OUTPATIENT
Start: 2024-09-24 | End: 2024-09-24 | Stop reason: HOSPADM

## 2024-09-24 RX ORDER — PANTOPRAZOLE SODIUM 40 MG/1
40 TABLET, DELAYED RELEASE ORAL DAILY
Status: DISCONTINUED | OUTPATIENT
Start: 2024-09-24 | End: 2024-09-24 | Stop reason: HOSPADM

## 2024-09-24 RX ORDER — SIMETHICONE 80 MG
80 TABLET,CHEWABLE ORAL EVERY 6 HOURS PRN
Status: DISCONTINUED | OUTPATIENT
Start: 2024-09-24 | End: 2024-09-24 | Stop reason: HOSPADM

## 2024-09-24 RX ORDER — GUAIFENESIN 600 MG/1
600 TABLET, EXTENDED RELEASE ORAL DAILY
Status: DISCONTINUED | OUTPATIENT
Start: 2024-09-24 | End: 2024-09-24 | Stop reason: HOSPADM

## 2024-09-24 RX ORDER — ATORVASTATIN CALCIUM 20 MG/1
20 TABLET, FILM COATED ORAL NIGHTLY
Status: DISCONTINUED | OUTPATIENT
Start: 2024-09-24 | End: 2024-09-24 | Stop reason: HOSPADM

## 2024-09-24 RX ORDER — ALBUTEROL SULFATE 0.83 MG/ML
2.5 SOLUTION RESPIRATORY (INHALATION) EVERY 6 HOURS PRN
Status: DISCONTINUED | OUTPATIENT
Start: 2024-09-24 | End: 2024-09-24 | Stop reason: HOSPADM

## 2024-09-24 RX ORDER — ASPIRIN 81 MG/1
81 TABLET ORAL 2 TIMES DAILY
Status: DISCONTINUED | OUTPATIENT
Start: 2024-09-24 | End: 2024-09-24 | Stop reason: HOSPADM

## 2024-09-24 RX ORDER — OXYCODONE HYDROCHLORIDE 5 MG/1
7.5 TABLET ORAL EVERY 4 HOURS PRN
Status: DISCONTINUED | OUTPATIENT
Start: 2024-09-24 | End: 2024-09-24 | Stop reason: HOSPADM

## 2024-09-24 RX ORDER — CARBIDOPA AND LEVODOPA 25; 100 MG/1; MG/1
2 TABLET, EXTENDED RELEASE ORAL 4 TIMES DAILY
Status: DISCONTINUED | OUTPATIENT
Start: 2024-09-24 | End: 2024-09-24 | Stop reason: HOSPADM

## 2024-09-24 RX ORDER — CARVEDILOL 6.25 MG/1
6.25 TABLET ORAL 2 TIMES DAILY
Status: DISCONTINUED | OUTPATIENT
Start: 2024-09-24 | End: 2024-09-24 | Stop reason: HOSPADM

## 2024-09-24 RX ORDER — CLONAZEPAM 0.5 MG/1
0.5 TABLET ORAL 4 TIMES DAILY
Status: DISCONTINUED | OUTPATIENT
Start: 2024-09-24 | End: 2024-09-24 | Stop reason: HOSPADM

## 2024-09-24 RX ORDER — DONEPEZIL HYDROCHLORIDE 5 MG/1
10 TABLET, FILM COATED ORAL NIGHTLY
Status: DISCONTINUED | OUTPATIENT
Start: 2024-09-24 | End: 2024-09-24 | Stop reason: HOSPADM

## 2024-09-24 RX ORDER — VENLAFAXINE HYDROCHLORIDE 75 MG/1
75 CAPSULE, EXTENDED RELEASE ORAL DAILY
Status: DISCONTINUED | OUTPATIENT
Start: 2024-09-24 | End: 2024-09-24 | Stop reason: HOSPADM

## 2024-09-24 RX ORDER — CYANOCOBALAMIN 1000 UG/ML
1000 INJECTION, SOLUTION INTRAMUSCULAR; SUBCUTANEOUS ONCE
Status: COMPLETED | OUTPATIENT
Start: 2024-09-24 | End: 2024-09-24

## 2024-09-24 RX ADMIN — CYANOCOBALAMIN 1000 MCG: 1000 INJECTION, SOLUTION INTRAMUSCULAR; SUBCUTANEOUS at 16:05

## 2024-09-24 RX ADMIN — GABAPENTIN 600 MG: 300 CAPSULE ORAL at 14:43

## 2024-09-24 RX ADMIN — PANTOPRAZOLE SODIUM 40 MG: 40 TABLET, DELAYED RELEASE ORAL at 08:59

## 2024-09-24 RX ADMIN — BUDESONIDE 0.5 MG: 0.5 INHALANT RESPIRATORY (INHALATION) at 09:02

## 2024-09-24 RX ADMIN — CLONAZEPAM 0.5 MG: 0.5 TABLET ORAL at 13:22

## 2024-09-24 RX ADMIN — GUAIFENESIN 600 MG: 600 TABLET, EXTENDED RELEASE ORAL at 08:59

## 2024-09-24 RX ADMIN — AMLODIPINE BESYLATE 5 MG: 5 TABLET ORAL at 08:59

## 2024-09-24 RX ADMIN — CARBIDOPA AND LEVODOPA 2 TABLET: 25; 100 TABLET, EXTENDED RELEASE ORAL at 13:22

## 2024-09-24 RX ADMIN — CLOPIDOGREL BISULFATE 75 MG: 75 TABLET ORAL at 08:59

## 2024-09-24 RX ADMIN — Medication 10 ML: at 09:00

## 2024-09-24 RX ADMIN — TAMSULOSIN HYDROCHLORIDE 0.4 MG: 0.4 CAPSULE ORAL at 08:59

## 2024-09-24 RX ADMIN — VENLAFAXINE HYDROCHLORIDE 75 MG: 75 CAPSULE, EXTENDED RELEASE ORAL at 08:59

## 2024-09-24 RX ADMIN — ASPIRIN 81 MG: 81 TABLET, COATED ORAL at 08:59

## 2024-09-25 ENCOUNTER — TELEPHONE (OUTPATIENT)
Dept: FAMILY MEDICINE CLINIC | Facility: CLINIC | Age: 64
End: 2024-09-25
Payer: MEDICARE

## 2024-09-25 ENCOUNTER — TRANSITIONAL CARE MANAGEMENT TELEPHONE ENCOUNTER (OUTPATIENT)
Dept: CALL CENTER | Facility: HOSPITAL | Age: 64
End: 2024-09-25
Payer: MEDICARE

## 2024-09-26 ENCOUNTER — OFFICE VISIT (OUTPATIENT)
Dept: FAMILY MEDICINE CLINIC | Facility: CLINIC | Age: 64
End: 2024-09-26
Payer: MEDICARE

## 2024-09-26 VITALS
OXYGEN SATURATION: 93 % | BODY MASS INDEX: 27.76 KG/M2 | DIASTOLIC BLOOD PRESSURE: 60 MMHG | HEART RATE: 55 BPM | WEIGHT: 187.4 LBS | HEIGHT: 69 IN | SYSTOLIC BLOOD PRESSURE: 102 MMHG | RESPIRATION RATE: 16 BRPM

## 2024-09-26 DIAGNOSIS — R06.00 DYSPNEA, UNSPECIFIED TYPE: Primary | ICD-10-CM

## 2024-09-26 DIAGNOSIS — R91.1 PULMONARY NODULE: ICD-10-CM

## 2024-09-26 DIAGNOSIS — M54.50 CHRONIC LOW BACK PAIN WITHOUT SCIATICA, UNSPECIFIED BACK PAIN LATERALITY: Primary | ICD-10-CM

## 2024-09-26 DIAGNOSIS — G62.9 NEUROPATHY: ICD-10-CM

## 2024-09-26 DIAGNOSIS — F41.9 ANXIETY: ICD-10-CM

## 2024-09-26 DIAGNOSIS — N39.0 URINARY TRACT INFECTION WITHOUT HEMATURIA, SITE UNSPECIFIED: ICD-10-CM

## 2024-09-26 DIAGNOSIS — G89.29 CHRONIC LOW BACK PAIN WITHOUT SCIATICA, UNSPECIFIED BACK PAIN LATERALITY: Primary | ICD-10-CM

## 2024-09-26 DIAGNOSIS — G93.41 METABOLIC ENCEPHALOPATHY: ICD-10-CM

## 2024-09-26 DIAGNOSIS — Z87.81 S/P LEFT HIP FRACTURE: ICD-10-CM

## 2024-09-26 PROCEDURE — 1111F DSCHRG MED/CURRENT MED MERGE: CPT | Performed by: INTERNAL MEDICINE

## 2024-09-26 PROCEDURE — 1125F AMNT PAIN NOTED PAIN PRSNT: CPT | Performed by: INTERNAL MEDICINE

## 2024-09-26 PROCEDURE — 99495 TRANSJ CARE MGMT MOD F2F 14D: CPT | Performed by: INTERNAL MEDICINE

## 2024-09-26 PROCEDURE — 3074F SYST BP LT 130 MM HG: CPT | Performed by: INTERNAL MEDICINE

## 2024-09-26 PROCEDURE — 3078F DIAST BP <80 MM HG: CPT | Performed by: INTERNAL MEDICINE

## 2024-09-26 RX ORDER — MAGNESIUM 200 MG
1 TABLET ORAL DAILY
COMMUNITY
Start: 2024-09-20

## 2024-09-26 RX ORDER — OXYCODONE AND ACETAMINOPHEN 7.5; 325 MG/1; MG/1
1 TABLET ORAL EVERY 6 HOURS PRN
Qty: 60 TABLET | Refills: 0 | Status: SHIPPED | OUTPATIENT
Start: 2024-09-26

## 2024-09-26 RX ORDER — CLONAZEPAM 0.5 MG/1
0.5 TABLET ORAL 4 TIMES DAILY
Qty: 120 TABLET | Refills: 0 | Status: SHIPPED | OUTPATIENT
Start: 2024-09-26

## 2024-09-26 RX ORDER — BUDESONIDE 0.5 MG/2ML
0.5 INHALANT ORAL
Qty: 180 ML | Refills: 3 | Status: SHIPPED | OUTPATIENT
Start: 2024-09-26 | End: 2025-09-21

## 2024-09-26 RX ORDER — IPRATROPIUM BROMIDE AND ALBUTEROL SULFATE 2.5; .5 MG/3ML; MG/3ML
3 SOLUTION RESPIRATORY (INHALATION) EVERY 6 HOURS PRN
Qty: 360 ML | Refills: 3 | Status: SHIPPED | OUTPATIENT
Start: 2024-09-26

## 2024-10-04 ENCOUNTER — READMISSION MANAGEMENT (OUTPATIENT)
Dept: CALL CENTER | Facility: HOSPITAL | Age: 64
End: 2024-10-04
Payer: MEDICARE

## 2024-10-08 ENCOUNTER — TELEPHONE (OUTPATIENT)
Dept: FAMILY MEDICINE CLINIC | Facility: CLINIC | Age: 64
End: 2024-10-08
Payer: MEDICARE

## 2024-10-08 ENCOUNTER — READMISSION MANAGEMENT (OUTPATIENT)
Dept: CALL CENTER | Facility: HOSPITAL | Age: 64
End: 2024-10-08
Payer: MEDICARE

## 2024-10-08 DIAGNOSIS — G89.29 CHRONIC LOW BACK PAIN WITHOUT SCIATICA, UNSPECIFIED BACK PAIN LATERALITY: ICD-10-CM

## 2024-10-08 DIAGNOSIS — M54.50 CHRONIC LOW BACK PAIN WITHOUT SCIATICA, UNSPECIFIED BACK PAIN LATERALITY: ICD-10-CM

## 2024-10-08 DIAGNOSIS — E34.9 TESTOSTERONE DEFICIENCY: Primary | ICD-10-CM

## 2024-10-09 RX ORDER — SYRINGE W-NEEDLE,DISPOSAB,3 ML 25GX5/8"
1 SYRINGE, EMPTY DISPOSABLE MISCELLANEOUS
Qty: 6 EACH | Refills: 1 | Status: SHIPPED | OUTPATIENT
Start: 2024-10-09

## 2024-10-09 RX ORDER — IPRATROPIUM BROMIDE AND ALBUTEROL SULFATE 2.5; .5 MG/3ML; MG/3ML
3 SOLUTION RESPIRATORY (INHALATION) EVERY 6 HOURS
Qty: 360 ML | Refills: 3 | Status: SHIPPED | OUTPATIENT
Start: 2024-10-09 | End: 2024-10-11 | Stop reason: SDUPTHER

## 2024-10-10 RX ORDER — OXYCODONE AND ACETAMINOPHEN 7.5; 325 MG/1; MG/1
1 TABLET ORAL EVERY 6 HOURS PRN
Qty: 60 TABLET | Refills: 0 | Status: SHIPPED | OUTPATIENT
Start: 2024-10-10

## 2024-10-10 RX ORDER — ASPIRIN 81 MG/1
81 TABLET ORAL 2 TIMES DAILY
Qty: 60 TABLET | Refills: 1 | Status: SHIPPED | OUTPATIENT
Start: 2024-10-10 | End: 2024-11-23

## 2024-10-10 RX ORDER — BUDESONIDE 0.5 MG/2ML
0.5 INHALANT ORAL
Qty: 180 ML | Refills: 3 | Status: SHIPPED | OUTPATIENT
Start: 2024-10-10 | End: 2025-10-05

## 2024-10-10 RX ORDER — ALBUTEROL SULFATE 90 UG/1
2 INHALANT RESPIRATORY (INHALATION) EVERY 4 HOURS PRN
Qty: 1 G | Refills: 0 | Status: SHIPPED | OUTPATIENT
Start: 2024-10-10

## 2024-10-11 ENCOUNTER — OFFICE VISIT (OUTPATIENT)
Dept: PAIN MEDICINE | Facility: CLINIC | Age: 64
End: 2024-10-11
Payer: MEDICARE

## 2024-10-11 VITALS
DIASTOLIC BLOOD PRESSURE: 81 MMHG | SYSTOLIC BLOOD PRESSURE: 146 MMHG | OXYGEN SATURATION: 96 % | RESPIRATION RATE: 16 BRPM | HEART RATE: 62 BPM

## 2024-10-11 DIAGNOSIS — M47.816 SPONDYLOSIS OF LUMBAR REGION WITHOUT MYELOPATHY OR RADICULOPATHY: Primary | ICD-10-CM

## 2024-10-11 PROCEDURE — 1125F AMNT PAIN NOTED PAIN PRSNT: CPT | Performed by: STUDENT IN AN ORGANIZED HEALTH CARE EDUCATION/TRAINING PROGRAM

## 2024-10-11 PROCEDURE — 3079F DIAST BP 80-89 MM HG: CPT | Performed by: STUDENT IN AN ORGANIZED HEALTH CARE EDUCATION/TRAINING PROGRAM

## 2024-10-11 PROCEDURE — 1159F MED LIST DOCD IN RCRD: CPT | Performed by: STUDENT IN AN ORGANIZED HEALTH CARE EDUCATION/TRAINING PROGRAM

## 2024-10-11 PROCEDURE — G0463 HOSPITAL OUTPT CLINIC VISIT: HCPCS | Performed by: STUDENT IN AN ORGANIZED HEALTH CARE EDUCATION/TRAINING PROGRAM

## 2024-10-11 PROCEDURE — 99213 OFFICE O/P EST LOW 20 MIN: CPT | Performed by: STUDENT IN AN ORGANIZED HEALTH CARE EDUCATION/TRAINING PROGRAM

## 2024-10-11 PROCEDURE — 3077F SYST BP >= 140 MM HG: CPT | Performed by: STUDENT IN AN ORGANIZED HEALTH CARE EDUCATION/TRAINING PROGRAM

## 2024-10-11 PROCEDURE — 1160F RVW MEDS BY RX/DR IN RCRD: CPT | Performed by: STUDENT IN AN ORGANIZED HEALTH CARE EDUCATION/TRAINING PROGRAM

## 2024-10-11 RX ORDER — CARBIDOPA AND LEVODOPA 25; 100 MG/1; MG/1
2 TABLET ORAL 4 TIMES DAILY
COMMUNITY

## 2024-10-11 RX ORDER — IPRATROPIUM BROMIDE AND ALBUTEROL SULFATE 2.5; .5 MG/3ML; MG/3ML
3 SOLUTION RESPIRATORY (INHALATION) EVERY 6 HOURS
Qty: 360 ML | Refills: 3 | Status: SHIPPED | OUTPATIENT
Start: 2024-10-11

## 2024-10-16 ENCOUNTER — READMISSION MANAGEMENT (OUTPATIENT)
Dept: CALL CENTER | Facility: HOSPITAL | Age: 64
End: 2024-10-16
Payer: MEDICARE

## 2024-10-22 ENCOUNTER — TELEPHONE (OUTPATIENT)
Dept: PAIN MEDICINE | Facility: CLINIC | Age: 64
End: 2024-10-22
Payer: MEDICARE

## 2024-10-22 DIAGNOSIS — G62.9 NEUROPATHY: ICD-10-CM

## 2024-10-22 RX ORDER — GABAPENTIN 600 MG/1
600 TABLET ORAL 3 TIMES DAILY
Qty: 90 TABLET | Refills: 1 | Status: SHIPPED | OUTPATIENT
Start: 2024-10-22

## 2024-10-28 DIAGNOSIS — G89.29 CHRONIC LOW BACK PAIN WITHOUT SCIATICA, UNSPECIFIED BACK PAIN LATERALITY: ICD-10-CM

## 2024-10-28 DIAGNOSIS — M54.50 CHRONIC LOW BACK PAIN WITHOUT SCIATICA, UNSPECIFIED BACK PAIN LATERALITY: ICD-10-CM

## 2024-10-28 DIAGNOSIS — F41.9 ANXIETY: ICD-10-CM

## 2024-10-28 RX ORDER — MAGNESIUM 200 MG
1 TABLET ORAL DAILY
Qty: 90 TABLET | Refills: 3 | Status: SHIPPED | OUTPATIENT
Start: 2024-10-28

## 2024-10-28 RX ORDER — OXYCODONE AND ACETAMINOPHEN 7.5; 325 MG/1; MG/1
1 TABLET ORAL EVERY 6 HOURS PRN
Qty: 60 TABLET | Refills: 0 | Status: SHIPPED | OUTPATIENT
Start: 2024-10-28

## 2024-10-28 RX ORDER — SYRINGE W-NEEDLE,DISPOSAB,3 ML 25GX5/8"
1 SYRINGE, EMPTY DISPOSABLE MISCELLANEOUS
Qty: 6 EACH | Refills: 1 | Status: SHIPPED | OUTPATIENT
Start: 2024-10-28

## 2024-10-28 RX ORDER — CLONAZEPAM 0.5 MG/1
0.5 TABLET ORAL 4 TIMES DAILY
Qty: 120 TABLET | Refills: 5 | Status: SHIPPED | OUTPATIENT
Start: 2024-10-28

## 2024-11-06 RX ORDER — ASPIRIN 81 MG/1
81 TABLET ORAL 2 TIMES DAILY
Qty: 180 TABLET | Refills: 1 | Status: SHIPPED | OUTPATIENT
Start: 2024-11-06 | End: 2025-05-05

## 2024-11-07 ENCOUNTER — HOSPITAL ENCOUNTER (OUTPATIENT)
Dept: GENERAL RADIOLOGY | Facility: HOSPITAL | Age: 64
Discharge: HOME | End: 2024-11-07
Payer: MEDICARE

## 2024-11-07 ENCOUNTER — HOSPITAL ENCOUNTER (OUTPATIENT)
Dept: PAIN MEDICINE | Facility: HOSPITAL | Age: 64
Discharge: HOME | End: 2024-11-07
Payer: MEDICARE

## 2024-11-07 VITALS
SYSTOLIC BLOOD PRESSURE: 99 MMHG | OXYGEN SATURATION: 96 % | HEART RATE: 53 BPM | DIASTOLIC BLOOD PRESSURE: 63 MMHG | RESPIRATION RATE: 16 BRPM | TEMPERATURE: 98.2 F

## 2024-11-07 DIAGNOSIS — M47.816 SPONDYLOSIS OF LUMBAR REGION WITHOUT MYELOPATHY OR RADICULOPATHY: Primary | ICD-10-CM

## 2024-11-07 DIAGNOSIS — R52 PAIN: ICD-10-CM

## 2024-11-07 PROCEDURE — 25510000001 IOPAMIDOL 41 % SOLUTION: Performed by: STUDENT IN AN ORGANIZED HEALTH CARE EDUCATION/TRAINING PROGRAM

## 2024-11-07 PROCEDURE — 25010000002 BUPIVACAINE (PF) 0.25 % SOLUTION: Performed by: STUDENT IN AN ORGANIZED HEALTH CARE EDUCATION/TRAINING PROGRAM

## 2024-11-07 PROCEDURE — 77003 FLUOROGUIDE FOR SPINE INJECT: CPT

## 2024-11-07 RX ORDER — DONEPEZIL HYDROCHLORIDE 10 MG/1
10 TABLET, FILM COATED ORAL
COMMUNITY
Start: 2024-10-24

## 2024-11-07 RX ORDER — BUPIVACAINE HYDROCHLORIDE 2.5 MG/ML
10 INJECTION, SOLUTION EPIDURAL; INFILTRATION; INTRACAUDAL ONCE
Status: COMPLETED | OUTPATIENT
Start: 2024-11-07 | End: 2024-11-07

## 2024-11-07 RX ORDER — IOPAMIDOL 408 MG/ML
3 INJECTION, SOLUTION INTRATHECAL
Status: COMPLETED | OUTPATIENT
Start: 2024-11-07 | End: 2024-11-07

## 2024-11-07 RX ADMIN — IOPAMIDOL 3 ML: 408 INJECTION, SOLUTION INTRATHECAL at 13:16

## 2024-11-07 RX ADMIN — BUPIVACAINE HYDROCHLORIDE 10 ML: 2.5 INJECTION, SOLUTION EPIDURAL; INFILTRATION; INTRACAUDAL; PERINEURAL at 13:16

## 2024-11-07 NOTE — PROCEDURES
Bilateral L3-5 Lumbar Medial Branch Blockade  [facility]      PREOPERATIVE DIAGNOSIS:  Lumbar spondylosis without myelopathy    POSTOPERATIVE DIAGNOSIS:  Lumbar spondylosis without myelopathy    PROCEDURE:   Diagnostic Bilateral Lumbar Medial Branch Nerve Blockades, with fluoroscopy:  L3, L4, and L5 nerves (at the L4 & L5 transverse processes and the sacral alar groove) to block facet joints L4-5, and L5-S1  97405-14 -- Bilateral Lumbar Facet blocks, 1st Level  [id number] -- Bilateral Lumbar Facet blocks, 2nd  Level    PRE-PROCEDURE DISCUSSION WITH PATIENT:    Risks and complications were discussed with the patient prior to starting the procedure and informed consent was obtained.      SURGEON:   Perez Hopper MD    REASON FOR PROCEDURE:    The patient complains of pain that seems to have a significant axial component, Increased back pain on range of motion exams, Pain on extension of the lumbar spine, and Positive lumbar facet loading maneuver    SEDATION:  Patient declined administration of moderate sedation    ANESTHETIC:  Marcaine 0.25%  STEROID:  NONE  TOTAL VOLUME OF SOLUTION:  6ml    DESCRIPTON OF PROCEDURE:  After obtaining informed consent, IV access was not obtained in the preoperative area.   The patient was taken to the operating room.  The patient was placed in the prone position with a pillow under the abdomen. All pressure points were well padded.  The lumbosacral area was prepped with Chloraprep and draped in a sterile fashion. Under fluoroscopic guidance the transverse processes of the L4 and L5 vertebrae at the junctions of the superior articular processes were identified on the right. Also identified was the groove between the ala and the superior articular process of the sacrum on the ipsilateral side.  Skin and subcutaneous tissue were anesthetized with 1% lidocaine above each of these points. A 22-gauge spinal needle was introduced under fluoroscopic guidance at the above  junctions. Aspiration was negative for blood and CSF.  After confirming the position of the needle with fluoroscope in all views, 0.25 mL of Omnipaque was injected, and after seeing the proper spread a total of 1 mL of the anesthetic solution noted above was injected at each of these points.  Needles were removed intact from each of the areas.  A similar procedure was repeated to block the L3, L4, and L5 nerves on the contralateral side.   Onset of analgesia was noted.  Vital signs remained stable throughout.      ESTIMATED BLOOD LOSS:  <5 mL  SPECIMENS:  none    COMPLICATIONS:   No complications were noted., There was no indication of vascular uptake on live injection of contrast dye., There was no indication of intrathecal uptake on live injection of contrast dye., and There was not any evidence of dural puncture.      TOLERANCE & DISCHARGE CONDITION:    The patient tolerated the procedure well.  The patient was transported to the recovery area without difficulties.  The patient was discharged to home under the care of family in stable and satisfactory condition.    PLAN OF CARE:  The patient was given our standard instruction sheet.  We discussed that Lumbar Medial Branch Blockade is a diagnostic procedure in consideration for radiofrequency ablation if two diagnostic procedures prove to be positive for significant benefit.  If sustained relief of 6 to eight weeks is obtained, then an alternative plan could be therapeutic lumbar branch blockades.  The patient is asked to keep a pain log each hour for 8 hours after the procedure today.  The patient will  Return to clinic PRN.  The patient will resume all medications as per the medication reconciliation sheet.

## 2024-11-12 DIAGNOSIS — M54.50 CHRONIC LOW BACK PAIN WITHOUT SCIATICA, UNSPECIFIED BACK PAIN LATERALITY: ICD-10-CM

## 2024-11-12 DIAGNOSIS — E34.9 TESTOSTERONE DEFICIENCY: ICD-10-CM

## 2024-11-12 DIAGNOSIS — G89.29 CHRONIC LOW BACK PAIN WITHOUT SCIATICA, UNSPECIFIED BACK PAIN LATERALITY: ICD-10-CM

## 2024-11-14 RX ORDER — OXYCODONE AND ACETAMINOPHEN 7.5; 325 MG/1; MG/1
1 TABLET ORAL EVERY 6 HOURS PRN
Qty: 60 TABLET | Refills: 0 | Status: SHIPPED | OUTPATIENT
Start: 2024-11-14

## 2024-11-14 RX ORDER — SYRINGE W-NEEDLE,DISPOSAB,3 ML 25GX5/8"
1 SYRINGE, EMPTY DISPOSABLE MISCELLANEOUS
Qty: 6 EACH | Refills: 1 | Status: SHIPPED | OUTPATIENT
Start: 2024-11-14

## 2024-11-18 ENCOUNTER — TELEPHONE (OUTPATIENT)
Dept: FAMILY MEDICINE CLINIC | Facility: CLINIC | Age: 64
End: 2024-11-18

## 2024-11-18 NOTE — TELEPHONE ENCOUNTER
Hub staff attempted to follow warm transfer process and was unsuccessful     Caller: REN BATISTA(POA)    Relationship to patient: Emergency Contact    Best call back number: [phone]    Patient is needing: PT WOULD LIKE TO SCHEDULE B-12 APPT FOR TOMORROW.

## 2024-11-19 ENCOUNTER — TELEPHONE (OUTPATIENT)
Dept: FAMILY MEDICINE CLINIC | Facility: CLINIC | Age: 64
End: 2024-11-19
Payer: MEDICARE

## 2024-11-26 DIAGNOSIS — G62.9 NEUROPATHY: ICD-10-CM

## 2024-11-26 DIAGNOSIS — M54.50 CHRONIC LOW BACK PAIN WITHOUT SCIATICA, UNSPECIFIED BACK PAIN LATERALITY: ICD-10-CM

## 2024-11-26 DIAGNOSIS — F41.9 ANXIETY: ICD-10-CM

## 2024-11-26 DIAGNOSIS — G89.29 CHRONIC LOW BACK PAIN WITHOUT SCIATICA, UNSPECIFIED BACK PAIN LATERALITY: ICD-10-CM

## 2024-12-02 RX ORDER — CLONAZEPAM 0.5 MG/1
0.5 TABLET ORAL 4 TIMES DAILY
Qty: 120 TABLET | Refills: 5 | Status: SHIPPED | OUTPATIENT
Start: 2024-12-02

## 2024-12-02 RX ORDER — GABAPENTIN 600 MG/1
600 TABLET ORAL 3 TIMES DAILY
Qty: 90 TABLET | Refills: 1 | Status: SHIPPED | OUTPATIENT
Start: 2024-12-02

## 2024-12-02 RX ORDER — OXYCODONE AND ACETAMINOPHEN 7.5; 325 MG/1; MG/1
1 TABLET ORAL EVERY 6 HOURS PRN
Qty: 60 TABLET | Refills: 0 | Status: SHIPPED | OUTPATIENT
Start: 2024-12-02

## 2024-12-02 RX ORDER — GUAIFENESIN 600 MG/1
600 TABLET, EXTENDED RELEASE ORAL DAILY
Qty: 90 TABLET | Refills: 0 | Status: SHIPPED | OUTPATIENT
Start: 2024-12-02 | End: 2025-03-02

## 2024-12-16 RX ORDER — VENLAFAXINE HYDROCHLORIDE 75 MG/1
75 CAPSULE, EXTENDED RELEASE ORAL DAILY
Qty: 90 CAPSULE | Refills: 1 | Status: SHIPPED | OUTPATIENT
Start: 2024-12-16

## 2024-12-17 DIAGNOSIS — M54.50 CHRONIC LOW BACK PAIN WITHOUT SCIATICA, UNSPECIFIED BACK PAIN LATERALITY: ICD-10-CM

## 2024-12-17 DIAGNOSIS — G89.29 CHRONIC LOW BACK PAIN WITHOUT SCIATICA, UNSPECIFIED BACK PAIN LATERALITY: ICD-10-CM

## 2024-12-18 RX ORDER — OXYCODONE AND ACETAMINOPHEN 7.5; 325 MG/1; MG/1
1 TABLET ORAL EVERY 6 HOURS PRN
Qty: 60 TABLET | Refills: 0 | Status: SHIPPED | OUTPATIENT
Start: 2024-12-18

## 2024-12-31 DIAGNOSIS — F41.9 ANXIETY: ICD-10-CM

## 2024-12-31 DIAGNOSIS — G62.9 NEUROPATHY: ICD-10-CM

## 2024-12-31 DIAGNOSIS — G89.29 CHRONIC LOW BACK PAIN WITHOUT SCIATICA, UNSPECIFIED BACK PAIN LATERALITY: ICD-10-CM

## 2024-12-31 DIAGNOSIS — M54.50 CHRONIC LOW BACK PAIN WITHOUT SCIATICA, UNSPECIFIED BACK PAIN LATERALITY: ICD-10-CM

## 2024-12-31 RX ORDER — CLONAZEPAM 0.5 MG/1
0.5 TABLET ORAL 4 TIMES DAILY
Qty: 120 TABLET | Refills: 5 | Status: CANCELLED | OUTPATIENT
Start: 2024-12-31

## 2024-12-31 RX ORDER — GABAPENTIN 600 MG/1
600 TABLET ORAL 3 TIMES DAILY
Qty: 90 TABLET | Refills: 1 | Status: CANCELLED | OUTPATIENT
Start: 2024-12-31

## 2024-12-31 RX ORDER — OXYCODONE AND ACETAMINOPHEN 7.5; 325 MG/1; MG/1
1 TABLET ORAL EVERY 6 HOURS PRN
Qty: 60 TABLET | Refills: 0 | Status: SHIPPED | OUTPATIENT
Start: 2024-12-31

## 2024-12-31 RX ORDER — GUAIFENESIN 600 MG/1
600 TABLET, EXTENDED RELEASE ORAL DAILY
Qty: 90 TABLET | Refills: 0 | Status: CANCELLED | OUTPATIENT
Start: 2024-12-31 | End: 2025-03-31

## 2024-12-31 RX ORDER — ONDANSETRON 4 MG/1
4 TABLET, FILM COATED ORAL EVERY 4 HOURS PRN
Status: CANCELLED | OUTPATIENT
Start: 2024-12-31

## 2024-12-31 NOTE — TELEPHONE ENCOUNTER
HUB ok to read  Patient needs to call pharmacy first for refill in the future.     Gabapentin sent with 1 refill 12/2/24  Klonopin was sent with 5 refills 12/2/24  Mucinex was sent 12/2 as 90 day   Melatonin is not on med list  and zofran is as historical medication     Oxycodone is pending to refill.  It was last filled 12/19/24 #60

## 2025-01-16 DIAGNOSIS — M54.50 CHRONIC LOW BACK PAIN WITHOUT SCIATICA, UNSPECIFIED BACK PAIN LATERALITY: ICD-10-CM

## 2025-01-16 DIAGNOSIS — G89.29 CHRONIC LOW BACK PAIN WITHOUT SCIATICA, UNSPECIFIED BACK PAIN LATERALITY: ICD-10-CM

## 2025-01-16 RX ORDER — OXYCODONE AND ACETAMINOPHEN 7.5; 325 MG/1; MG/1
1 TABLET ORAL EVERY 6 HOURS PRN
Qty: 60 TABLET | Refills: 0 | Status: SHIPPED | OUTPATIENT
Start: 2025-01-16

## 2025-01-16 RX ORDER — MAGNESIUM 200 MG
1 TABLET ORAL DAILY
Qty: 90 TABLET | Refills: 3 | Status: SHIPPED | OUTPATIENT
Start: 2025-01-16

## 2025-01-19 DIAGNOSIS — G20.A1 PARKINSON'S DISEASE WITHOUT DYSKINESIA, WITHOUT MENTION OF FLUCTUATIONS: ICD-10-CM

## 2025-01-20 RX ORDER — DONEPEZIL HYDROCHLORIDE 10 MG/1
10 TABLET, FILM COATED ORAL
Qty: 90 TABLET | Refills: 1 | Status: SHIPPED | OUTPATIENT
Start: 2025-01-20

## 2025-01-30 DIAGNOSIS — G89.29 CHRONIC LOW BACK PAIN WITHOUT SCIATICA, UNSPECIFIED BACK PAIN LATERALITY: ICD-10-CM

## 2025-01-30 DIAGNOSIS — F41.9 ANXIETY: ICD-10-CM

## 2025-01-30 DIAGNOSIS — G62.9 NEUROPATHY: ICD-10-CM

## 2025-01-30 DIAGNOSIS — M54.50 CHRONIC LOW BACK PAIN WITHOUT SCIATICA, UNSPECIFIED BACK PAIN LATERALITY: ICD-10-CM

## 2025-02-03 RX ORDER — OXYCODONE AND ACETAMINOPHEN 7.5; 325 MG/1; MG/1
1 TABLET ORAL EVERY 6 HOURS PRN
Qty: 60 TABLET | Refills: 0 | Status: SHIPPED | OUTPATIENT
Start: 2025-02-03

## 2025-02-03 RX ORDER — CLONAZEPAM 0.5 MG/1
0.5 TABLET ORAL 4 TIMES DAILY
Qty: 120 TABLET | Refills: 5 | Status: SHIPPED | OUTPATIENT
Start: 2025-02-03

## 2025-02-03 RX ORDER — GABAPENTIN 600 MG/1
600 TABLET ORAL 3 TIMES DAILY
Qty: 90 TABLET | Refills: 1 | Status: SHIPPED | OUTPATIENT
Start: 2025-02-03

## 2025-02-17 DIAGNOSIS — G89.29 CHRONIC LOW BACK PAIN WITHOUT SCIATICA, UNSPECIFIED BACK PAIN LATERALITY: ICD-10-CM

## 2025-02-17 DIAGNOSIS — M54.50 CHRONIC LOW BACK PAIN WITHOUT SCIATICA, UNSPECIFIED BACK PAIN LATERALITY: ICD-10-CM

## 2025-02-17 RX ORDER — CARBIDOPA AND LEVODOPA 25; 100 MG/1; MG/1
2 TABLET, EXTENDED RELEASE ORAL 4 TIMES DAILY
Qty: 240 TABLET | Refills: 3 | Status: SHIPPED | OUTPATIENT
Start: 2025-02-17

## 2025-02-17 RX ORDER — IPRATROPIUM BROMIDE AND ALBUTEROL SULFATE 2.5; .5 MG/3ML; MG/3ML
3 SOLUTION RESPIRATORY (INHALATION) EVERY 6 HOURS
Qty: 360 ML | Refills: 3 | Status: SHIPPED | OUTPATIENT
Start: 2025-02-17

## 2025-02-18 RX ORDER — OXYCODONE AND ACETAMINOPHEN 7.5; 325 MG/1; MG/1
1 TABLET ORAL EVERY 6 HOURS PRN
Qty: 60 TABLET | Refills: 0 | Status: SHIPPED | OUTPATIENT
Start: 2025-02-18

## 2025-03-03 DIAGNOSIS — M54.50 CHRONIC LOW BACK PAIN WITHOUT SCIATICA, UNSPECIFIED BACK PAIN LATERALITY: ICD-10-CM

## 2025-03-03 DIAGNOSIS — F41.9 ANXIETY: ICD-10-CM

## 2025-03-03 DIAGNOSIS — G89.29 CHRONIC LOW BACK PAIN WITHOUT SCIATICA, UNSPECIFIED BACK PAIN LATERALITY: ICD-10-CM

## 2025-03-04 RX ORDER — OXYCODONE AND ACETAMINOPHEN 7.5; 325 MG/1; MG/1
1 TABLET ORAL EVERY 6 HOURS PRN
Qty: 60 TABLET | Refills: 0 | Status: SHIPPED | OUTPATIENT
Start: 2025-03-04

## 2025-03-04 RX ORDER — CLONAZEPAM 0.5 MG/1
0.5 TABLET ORAL 4 TIMES DAILY
Qty: 120 TABLET | Refills: 5 | Status: SHIPPED | OUTPATIENT
Start: 2025-03-04

## 2025-03-16 NOTE — PROGRESS NOTES
Encounter Date:03/26/2025        Patient ID: Andrew Flores is a 64 y.o. male.      Chief Complaint:      History of Present Illness    Andrew Flores is a 64 y.o. male with hypertension, hyperlipidemia, coronary artery disease status post PCI to LAD in February 2024, peripheral arterial disease with claudication, stroke, Parkinson's disease, COPD and tobacco abuse who presents for follow-up.    Review of systems positive for chest pain, shortness of breath, dizziness, lightheadedness, malaise and fatigue.    Current cardiac medications include amlodipine, aspirin, atorvastatin, Plavix, Coreg    The following portions of the patient's history were reviewed and updated as appropriate: allergies, current medications, past family history, past medical history, past social history, past surgical history, and problem list.    Review of Systems   Constitutional: Positive for malaise/fatigue.   Cardiovascular:  Positive for chest pain. Negative for dyspnea on exertion, leg swelling and palpitations.   Respiratory:  Positive for shortness of breath. Negative for cough.    Gastrointestinal:  Negative for abdominal pain, nausea and vomiting.   Neurological:  Positive for dizziness, light-headedness and numbness. Negative for focal weakness and headaches.   All other systems reviewed and are negative.      Current Outpatient Medications:     albuterol sulfate  (90 Base) MCG/ACT inhaler, Inhale 2 puffs Every 4 (Four) Hours As Needed for Wheezing., Disp: 1 g, Rfl: 0    amLODIPine (NORVASC) 5 MG tablet, Take 1 tablet by mouth Daily., Disp: 90 tablet, Rfl: 3    amoxicillin-clavulanate (AUGMENTIN) 875-125 MG per tablet, Take 1 tablet by mouth 2 (Two) Times a Day., Disp: 10 tablet, Rfl: 0    aspirin 81 MG EC tablet, Take 1 tablet by mouth 2 (Two) Times a Day for 360 doses., Disp: 180 tablet, Rfl: 1    atorvastatin (LIPITOR) 20 MG tablet, Take 1 tablet by mouth Daily., Disp: 90 tablet, Rfl: 1    baclofen (LIORESAL) 10 MG  tablet, Take 1 tablet by mouth 3 (Three) Times a Day As Needed for Muscle Spasms., Disp: 21 tablet, Rfl: 0    budesonide (Pulmicort) 0.5 MG/2ML nebulizer solution, Take 2 mL by nebulization Daily for 360 days., Disp: 180 mL, Rfl: 3    LUCITA CARB-MAG HYDROX-SIMETH PO, Take 30 mL by mouth 3 (Three) Times a Day As Needed., Disp: , Rfl:     calcium carbonate (TUMS) 500 MG chewable tablet, Chew 1 tablet 3 (Three) Times a Day As Needed for Indigestion or Heartburn., Disp: , Rfl:     carbidopa-levodopa (SINEMET)  MG per tablet, Take 2 tablets by mouth 4 (Four) Times a Day. Indications: Parkinson's Disease, Parkinsonian-Like Syndrome, Disp: , Rfl:     carbidopa-levodopa ER (SINEMET CR)  MG per tablet, Take 2 tablets by mouth 4 (Four) Times a Day., Disp: 240 tablet, Rfl: 3    carvedilol (COREG) 6.25 MG tablet, Take 1 tablet by mouth 2 (Two) Times a Day., Disp: 180 tablet, Rfl: 3    clonazePAM (KlonoPIN) 0.5 MG tablet, Take 1 tablet by mouth 4 (Four) Times a Day., Disp: 120 tablet, Rfl: 5    clopidogrel (PLAVIX) 75 MG tablet, Take 1 tablet by mouth Daily., Disp: 90 tablet, Rfl: 3    Cyanocobalamin (Vitamin B-12) 1000 MCG sublingual tablet, Take 1 tablet by mouth Daily., Disp: 90 tablet, Rfl: 3    docusate sodium (COLACE) 100 MG capsule, Take 1 capsule by mouth 2 (Two) Times a Day As Needed for Constipation., Disp: , Rfl:     donepezil (ARICEPT) 10 MG tablet, TAKE 1 TABLET BY MOUTH EVERYDAY AT BEDTIME, Disp: 90 tablet, Rfl: 1    gabapentin (NEURONTIN) 600 MG tablet, Take 1 tablet by mouth 3 (Three) Times a Day., Disp: 90 tablet, Rfl: 1    ipratropium-albuterol (DUO-NEB) 0.5-2.5 mg/3 ml nebulizer, Take 3 mL by nebulization Every 6 (Six) Hours. DX J44.9 Medicare B, Disp: 360 mL, Rfl: 3    loperamide (IMODIUM) 2 MG capsule, Take 1 capsule by mouth 4 (Four) Times a Day As Needed for Diarrhea., Disp: , Rfl:     magnesium hydroxide (MILK OF MAGNESIA) 2400 MG/10ML suspension suspension, Take 30 mL by mouth Daily As Needed.,  "Disp: , Rfl:     melatonin 5 MG tablet tablet, Take 1 tablet by mouth At Night As Needed., Disp: , Rfl:     nitroglycerin (NITROSTAT) 0.4 MG SL tablet, Place 1 tablet under the tongue Every 5 (Five) Minutes As Needed for Chest Pain (Do not take if systolic BP less than 100 mmHg). Take no more than 3 doses in 15 minutes., Disp: 25 tablet, Rfl: 1    ondansetron (ZOFRAN) 4 MG tablet, Take 1 tablet by mouth Every 4 (Four) Hours As Needed for Nausea or Vomiting., Disp: , Rfl:     oxyCODONE-acetaminophen (PERCOCET) 7.5-325 MG per tablet, Take 1 tablet by mouth Every 6 (Six) Hours As Needed for Moderate Pain., Disp: 60 tablet, Rfl: 0    pantoprazole (PROTONIX) 40 MG EC tablet, Take 1 tablet by mouth Daily., Disp: 90 tablet, Rfl: 1    polyethylene glycol (MIRALAX) 17 GM/SCOOP powder, Take 17 g by mouth Daily As Needed (Use if senna-docusate is ineffective)., Disp: 238 g, Rfl: 0    sennosides-docusate (PERICOLACE) 8.6-50 MG per tablet, Take 2 tablets by mouth 2 (Two) Times a Day As Needed for Constipation., Disp: 10 tablet, Rfl: 0    simethicone (MYLICON) 80 MG chewable tablet, Chew 1 tablet Every 6 (Six) Hours As Needed for Flatulence., Disp: , Rfl:     Syringe 22G X 1-1/2\" 3 ML misc, Use 1 syringe Every 30 (Thirty) Days. With testosterone injections, Disp: 6 each, Rfl: 1    tamsulosin (FLOMAX) 0.4 MG capsule 24 hr capsule, Take 1 capsule by mouth Daily., Disp: 90 capsule, Rfl: 1    Testosterone Cypionate 200 MG/ML kit, Inject 200 mg into the appropriate muscle as directed by prescriber Every 14 (Fourteen) Days., Disp: 2 kit, Rfl: 5    traMADol (ULTRAM) 50 MG tablet, Take 1 tablet by mouth Every 6 (Six) Hours As Needed for Moderate Pain. (Patient not taking: Reported on 11/7/2024), Disp: , Rfl:     venlafaxine XR (EFFEXOR-XR) 75 MG 24 hr capsule, TAKE 1 CAPSULE BY MOUTH EVERY DAY, Disp: 90 capsule, Rfl: 1    Current outpatient and discharge medications have been reconciled for the patient.  Reviewed by: Nomi Cunningham MD   "     No Known Allergies    Family History   Problem Relation Age of Onset    Heart disease Mother     Stroke Mother     No Known Problems Father     Heart disease Brother     Heart disease Brother        Past Surgical History:   Procedure Laterality Date    CARDIAC CATHETERIZATION N/A 2/26/2024    Procedure: Left and Right Heart Cath with Coronary Angiography;  Surgeon: Lelo Tello MD;  Location: [facility] CATH INVASIVE LOCATION;  Service: Cardiology;  Laterality: N/A;    CARDIAC CATHETERIZATION N/A 2/26/2024    Procedure: Percutaneous Coronary Intervention;  Surgeon: Lelo Tello MD;  Location: [facility] CATH INVASIVE LOCATION;  Service: Cardiology;  Laterality: N/A;    ENDOSCOPY N/A 01/27/2022    Procedure: ESOPHAGOGASTRODUODENOSCOPY with foreign body removal with gastric, duodenal biopsy and GE junction biopsy;  Surgeon: Pedro De La Rosa MD;  Location: [facility] ENDOSCOPY;  Service: Gastroenterology;  Laterality: N/A;  post: foreign body removal, erosive gastritis with biopsy, erosive eduodenitis with biopsy, esophagitis, GE junction biopsy to rule out malignancy,     ENDOSCOPY N/A 08/23/2022    Procedure: ESOPHAGOGASTRODUODENOSCOPY with GE junction biopsy R/O Barretts, esophageal dilation #48 bougie;  Surgeon: Alexa Subramanian MD;  Location: [facility] ENDOSCOPY;  Service: Gastroenterology;  Laterality: N/A;  duodenal ulcer, GERD,     HIP TROCHANTERIC NAILING WITH INTRAMEDULLARY HIP SCREW Left 8/26/2024    Procedure: Left hip/femur fracture fixation with intramedullary ajit and screws;  Surgeon: Kameron Kaminski MD;  Location: [facility] MAIN OR;  Service: Orthopedics;  Laterality: Left;    INTERVENTIONAL RADIOLOGY PROCEDURE N/A 2/26/2024    Procedure: Intravascular Ultrasound;  Surgeon: Lelo Tello MD;  Location: [facility] CATH INVASIVE LOCATION;  Service: Cardiology;  Laterality: N/A;    ORCHIECTOMY  2019       Past Medical History:   Diagnosis Date    Asthma     Borderline diabetes     Cerebrovascular accident (CVA)  "2019    COPD (chronic obstructive pulmonary disease)     Dysphagia 2022    from parkinsons    Hyperlipidemia     Hypertension     Intertrochanteric fracture of left femur, closed, initial encounter 2024    Mood disorder     Non-Hodgkin's lymphoma of testis     Parkinson disease     Pneumonia of right upper lobe due to infectious organism 2022    Poor historian     PVD (peripheral vascular disease) with claudication 2024    Shortness of breath     Sleep apnea     does not have a cpap    Status post coronary artery stent placement 2024    Stroke 2017       Family History   Problem Relation Age of Onset    Heart disease Mother     Stroke Mother     No Known Problems Father     Heart disease Brother     Heart disease Brother        Social History     Socioeconomic History    Marital status: Legally    Tobacco Use    Smoking status: Former     Current packs/day: 0.00     Average packs/day: 1 pack/day for 51.3 years (51.3 ttl pk-yrs)     Types: Cigarettes     Start date:      Quit date: 2024     Years since quittin.8     Passive exposure: Current    Smokeless tobacco: Never   Vaping Use    Vaping status: Never Used   Substance and Sexual Activity    Alcohol use: Yes     Comment: \"once a month\"    Drug use: Never    Sexual activity: Defer               Objective:       Physical Exam    There were no vitals taken for this visit.  The patient is alert, oriented and in no distress.    Vital signs as noted above.    Head and neck revealed no carotid bruits or jugular venous distension.  No thyromegaly or lymphadenopathy is present.    Lungs clear.  No wheezing.  Breath sounds are normal bilaterally.    Heart normal first and second heart sounds.  No murmur..  No pericardial rub is present.  No gallop is present.    Abdomen soft and nontender.  No organomegaly is present.    Extremities revealed good peripheral pulses without any pedal edema.    Skin warm and " dry.    Musculoskeletal system is grossly normal.    CNS grossly normal.          No diagnosis found.LAB RESULTS (LAST 7 DAYS)    CBC        BMP        CMP         BNP        TROPONIN        CoAg        Creatinine Clearance  CrCl cannot be calculated (Patient's most recent lab result is older than the maximum 30 days allowed.).    ABG        Radiology  No radiology results for the last day    EKG  Procedures    Stress test  Results for orders placed during the hospital encounter of 02/20/24    Stress Test With Myocardial Perfusion One Day    Interpretation Summary    Impressions are consistent with an intermediate risk study.    Left ventricular ejection fraction is normal (Calculated EF = 65%).    Myocardial perfusion imaging indicates a small-sized infarct with mild patrica-infarct ischemia in the anteroseptal region    Findings consistent with a normal ECG stress test.      Echocardiogram  Results for orders placed during the hospital encounter of 09/23/24    Adult Transthoracic Echo Complete W/ Cont if Necessary Per Protocol    Interpretation Summary    Left ventricular systolic function is normal. Calculated left ventricular EF = 53% Left ventricular ejection fraction appears to be 51 - 55%.    Left ventricular diastolic function is consistent with (grade I) impaired relaxation. Average GLS -14.9%.    The left atrial cavity is dilated.    Estimated right ventricular systolic pressure from tricuspid regurgitation is normal (<35 mmHg).    No significant valvular abnormalities noted.      Cardiac catheterization  Results for orders placed during the hospital encounter of 02/26/24    Cardiac Catheterization/Vascular Study    Conclusion  PROCEDURES PERFORMED  Ultrasound guided Vascular access  Right heart catheterization  Left Heart Catheterization  Coronary Angiogram  Percutaneous coronary intervention with drug-eluting stent placement  Intravascular ultrasound  Moderate sedation    INDICATIONS FOR PROCEDURE  Positive  stress test, LEUNG    PROCEDURE IN DETAIL  Informed consent was obtained from the patient after explaining the risks, benefits, and alternative options of the procedure. After obtaining informed consent, the patient was brought to the cath lab and was prepped in a sterile fashion.Lidocaine 2% was used for local anesthesia into the right IJ access site. Right IJ vein was accessed using the micropuncture needle under ultrasound guidance and micropuncture wire advanced under flouroscopy. A 7 Green[place]ic vascular sheath was put into place percutaneously over guide-wire. Guide wires were removed. A 6Fr swan john catheter was advanced to wedge position. RA, RV and PA and wedge pressures were recorded.  PA sat and arterial sats recorded.    Lidocaine 2% was used for local anesthesia into the right  radial access site. The right  radial artery was accessed with a micropuncture needle via modified Seldinger technique under ultrasound guidance. A 6F introducer sheath was inserted successfully. Afterwards, 6F JR4 and JL3.5 diagnostic catheters were advanced over a wire into the ascending aorta and were used to engage the ostia of the left main and RCA respectively. JR4 used to cross the AV and obtain LV pressures and gradient across the AV measured via pullback technique. Images of the right and left coronary systems were obtained.    HEMODYNAMICS  LV: 156/8/16  AO: 149/79/107  Gradient 7 mmHg    RHC HEMODYNAMICS:  RA 9  RV 29/10/12  PA 29/10/23  PCW 11    AO Sat 100%  PA Sat 77%    Dkue CO 6.17    [facility] CI 2.93    FINDINGS  Coronary Angiogram    Right dominant circulation    Left main: Left main is a large caliber vessel which gives rise to the Left Anterior Descending and the Left circumflex.  Left main coronary artery is angiographically free from any significant disease    Left Anterior Descending Artery: LAD is a medium caliber vessel which gives rise to several septal perforators and several diagonal branches.  There is an 80%  calcified lesion in the proximal LAD at the takeoff of a large D1 branch which further bifurcates.  The ostium of D1 is free from disease.  This was followed by a 70% lesion in the mid LAD.  There is heavy calcification in the entire LAD.    Left Circumflex: Left circumflex artery runs along the AV groove and gives obtuse marginal branches.  It has moderate calcification with 30 to 40% stenosis in the midsegment.    Right Coronary Artery: The RCA is a large caliber vessel gives rise to PDA and PLV.  There is 20% stenosis in the proximal segment    Percutaneous coronary intervention:  100 units/kg of heparin was administered and ACT of more than 250 was documented.  6 Upper sorbian XB LAD 3.5 guide was used to engage the left main.  Run-through wire was advanced to the distal LAD.  IVUS of the LAD showed a distal reference vessel diameter of 3.6 mm with heavy concentric calcification in the mid to proximal LAD.  Proximal reference vessel diameter was 4.6 mm.  We then predilated the LAD with an NC 2.5 x 12 mm balloon.  It was then stented with a Xience 3.5 x 18 mm stent followed by a Xience 4.0 x 38 mm stent and postdilated with an NC 4.5 x 12 mm balloon.      Preprocedure stenosis: 80%  preprocedure ALEXI flow: 3  Lesion type: C  Postprocedure stenosis: 0%  Post procedure ALEXI flow: 3      All the catheters were exchanged over a wire and subsequently removed.    Radial sheath was removed and a TR band was applied with 15 cc of air to achieve hemostasis.        ESTIMATED BLOOD LOSS:  25 ml    COMPLICATIONS:  None    PROCEDURE DATA:  Contrast Used: 100 ml  Sedation Time:  57 minutes    IMPRESSIONS  One-vessel obstructive CAD of the proximal LAD status post successful IVUS guided PCI  No evidence of pulmonary hypertension  Normal left and right heart filling pressures    RECOMMENDATIONS  -Dual antiplatelet therapy  -Beta-blocker and high intensity statin  -ACE inhibitor if blood pressure tolerates  -Referral to cardiac  rehab  -Continue aggressive risk factor stratification and modification  -Smoking cessation          Assessment and Plan       There are no diagnoses linked to this encounter.     Shortness of breath  Pulmonary embolism ruled out  DVT has been ruled out.  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  19, 26 and 19  No ischemic workup is needed at this time.     Primary hypertension, chronic  Currently on amlodipine and Coreg.  Systolic blood pressure 200s today.  I have recommended him to take amlodipine as needed for systolic blood pressure more than 140 mmHg  Blood pressure and heart rate are well-controlled.     Mixed hyperlipidemia  Continue atorvastatin  Goal LDL <70  LDL 60, HDL 38, triglyceride 160 and total cholesterol 114  Checking A1c today     PVD (peripheral vascular disease) with claudication  Continue DAPT and statin     History of CVA (cerebrovascular accident)  Continue DAPT and statin     Parkinson's disease, unspecified whether dyskinesia present, unspecified whether manifestations fluctuate  On baclofen, Sinemet CR, clonazepam, gabapentin, and Effexor XR  Could be contributing to generalized slowing and weakness     Tobacco abuse  Mucopurulent chronic bronchitis  He no longer smokes  COPD may be contributing to shortness of breath.  Continue bronchodilators   Requesting nocturnal oxygen prescription  Follow-up with pulmonology     Benign prostatic hyperplasia with urinary hesitancy  Continue Flomax     Obesity  BMI is 30.4.  He weighs 204 pounds.  Lifestyle  modifications recommended to the patient.  Recommended dietary changes and exercise

## 2025-03-17 RX ORDER — CLOPIDOGREL BISULFATE 75 MG/1
75 TABLET ORAL DAILY
Qty: 90 TABLET | Refills: 1 | Status: SHIPPED | OUTPATIENT
Start: 2025-03-17

## 2025-03-17 RX ORDER — PANTOPRAZOLE SODIUM 40 MG/1
40 TABLET, DELAYED RELEASE ORAL DAILY
Qty: 90 TABLET | Refills: 1 | Status: SHIPPED | OUTPATIENT
Start: 2025-03-17

## 2025-03-17 RX ORDER — TAMSULOSIN HYDROCHLORIDE 0.4 MG/1
1 CAPSULE ORAL DAILY
Qty: 90 CAPSULE | Refills: 1 | Status: SHIPPED | OUTPATIENT
Start: 2025-03-17

## 2025-03-17 RX ORDER — CARVEDILOL 6.25 MG/1
6.25 TABLET ORAL 2 TIMES DAILY
Qty: 180 TABLET | Refills: 1 | Status: SHIPPED | OUTPATIENT
Start: 2025-03-17

## 2025-03-18 ENCOUNTER — APPOINTMENT (OUTPATIENT)
Dept: GENERAL RADIOLOGY | Facility: HOSPITAL | Age: 65
DRG: 178 | End: 2025-03-18
Payer: MEDICARE

## 2025-03-18 ENCOUNTER — HOSPITAL ENCOUNTER (INPATIENT)
Facility: HOSPITAL | Age: 65
LOS: 2 days | Discharge: HOME | DRG: 178 | End: 2025-03-20
Attending: EMERGENCY MEDICINE | Admitting: FAMILY MEDICINE
Payer: MEDICARE

## 2025-03-18 DIAGNOSIS — U07.1 COVID-19: ICD-10-CM

## 2025-03-18 DIAGNOSIS — J44.1 CHRONIC OBSTRUCTIVE PULMONARY DISEASE WITH ACUTE EXACERBATION: ICD-10-CM

## 2025-03-18 DIAGNOSIS — R06.03 ACUTE RESPIRATORY DISTRESS: ICD-10-CM

## 2025-03-18 DIAGNOSIS — R53.1 WEAKNESS: Primary | ICD-10-CM
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
## 2025-03-18 LAB
ALBUMIN SERPL-MCNC: 4.5 G/DL (ref 3.5–5.2)
ALBUMIN/GLOB SERPL: 1.6 G/DL
ALP SERPL-CCNC: 133 U/L (ref 39–117)
ALT SERPL W P-5'-P-CCNC: 16 U/L (ref 1–41)
AMMONIA BLD-SCNC: 30 UMOL/L (ref 16–60)
ANION GAP SERPL CALCULATED.3IONS-SCNC: 13.7 MMOL/L (ref 5–15)
ARTERIAL PATENCY WRIST A: POSITIVE
AST SERPL-CCNC: 19 U/L (ref 1–40)
ATMOSPHERIC PRESS: NORMAL MM[HG]
B PARAPERT DNA SPEC QL NAA+PROBE: NOT DETECTED
B PERT DNA SPEC QL NAA+PROBE: NOT DETECTED
BASE EXCESS BLDA CALC-SCNC: 0.2 MMOL/L (ref 0–3)
BASOPHILS # BLD AUTO: 0.01 10*3/MM3 (ref 0–0.2)
BASOPHILS NFR BLD AUTO: 0.2 % (ref 0–1.5)
BDY SITE: NORMAL
BILIRUB SERPL-MCNC: 0.4 MG/DL (ref 0–1.2)
BILIRUB UR QL STRIP: ABNORMAL
BUN SERPL-MCNC: 11 MG/DL (ref 8–23)
BUN/CREAT SERPL: 12.9 (ref 7–25)
C PNEUM DNA NPH QL NAA+NON-PROBE: NOT DETECTED
CALCIUM SPEC-SCNC: 9.6 MG/DL (ref 8.6–10.5)
CHLORIDE SERPL-SCNC: 104 MMOL/L (ref 98–107)
CLARITY UR: ABNORMAL
CO2 BLDA-SCNC: 27.1 MMOL/L (ref 22–29)
CO2 SERPL-SCNC: 22.3 MMOL/L (ref 22–29)
COLOR UR: ABNORMAL
CREAT SERPL-MCNC: 0.85 MG/DL (ref 0.76–1.27)
D-LACTATE SERPL-SCNC: 1.5 MMOL/L (ref 0.3–2)
DEPRECATED RDW RBC AUTO: 44.1 FL (ref 37–54)
EGFRCR SERPLBLD CKD-EPI 2021: 95.8 ML/MIN/1.73
EOSINOPHIL # BLD AUTO: 0 10*3/MM3 (ref 0–0.4)
EOSINOPHIL NFR BLD AUTO: 0 % (ref 0.3–6.2)
ERYTHROCYTE [DISTWIDTH] IN BLOOD BY AUTOMATED COUNT: 12 % (ref 12.3–15.4)
FLUAV SUBTYP SPEC NAA+PROBE: NOT DETECTED
FLUBV RNA ISLT QL NAA+PROBE: NOT DETECTED
GEN 5 1HR TROPONIN T REFLEX: 11 NG/L
GLOBULIN UR ELPH-MCNC: 2.8 GM/DL
GLUCOSE SERPL-MCNC: 143 MG/DL (ref 65–99)
GLUCOSE UR STRIP-MCNC: NEGATIVE MG/DL
HADV DNA SPEC NAA+PROBE: NOT DETECTED
HCO3 BLDA-SCNC: 25.8 MMOL/L (ref 21–28)
HCOV 229E RNA SPEC QL NAA+PROBE: NOT DETECTED
HCOV HKU1 RNA SPEC QL NAA+PROBE: NOT DETECTED
HCOV NL63 RNA SPEC QL NAA+PROBE: NOT DETECTED
HCOV OC43 RNA SPEC QL NAA+PROBE: NOT DETECTED
HCT VFR BLD AUTO: 42.4 % (ref 37.5–51)
HEMODILUTION: NO
HGB BLD-MCNC: 13.8 G/DL (ref 13–17.7)
HGB UR QL STRIP.AUTO: NEGATIVE
HMPV RNA NPH QL NAA+NON-PROBE: NOT DETECTED
HOLD SPECIMEN: NORMAL
HOLD SPECIMEN: NORMAL
HPIV1 RNA ISLT QL NAA+PROBE: NOT DETECTED
HPIV2 RNA SPEC QL NAA+PROBE: NOT DETECTED
HPIV3 RNA NPH QL NAA+PROBE: NOT DETECTED
HPIV4 P GENE NPH QL NAA+PROBE: NOT DETECTED
IMM GRANULOCYTES # BLD AUTO: 0.01 10*3/MM3 (ref 0–0.05)
IMM GRANULOCYTES NFR BLD AUTO: 0.2 % (ref 0–0.5)
INHALED O2 CONCENTRATION: 21 %
KETONES UR QL STRIP: ABNORMAL
LEUKOCYTE ESTERASE UR QL STRIP.AUTO: NEGATIVE
LYMPHOCYTES # BLD AUTO: 1.07 10*3/MM3 (ref 0.7–3.1)
LYMPHOCYTES NFR BLD AUTO: 16.4 % (ref 19.6–45.3)
M PNEUMO IGG SER IA-ACNC: NOT DETECTED
MCH RBC QN AUTO: 32.1 PG (ref 26.6–33)
MCHC RBC AUTO-ENTMCNC: 32.5 G/DL (ref 31.5–35.7)
MCV RBC AUTO: 98.6 FL (ref 79–97)
MODALITY: NORMAL
MONOCYTES # BLD AUTO: 0.49 10*3/MM3 (ref 0.1–0.9)
MONOCYTES NFR BLD AUTO: 7.5 % (ref 5–12)
NEUTROPHILS NFR BLD AUTO: 4.95 10*3/MM3 (ref 1.7–7)
NEUTROPHILS NFR BLD AUTO: 75.7 % (ref 42.7–76)
NITRITE UR QL STRIP: NEGATIVE
NRBC BLD AUTO-RTO: 0 /100 WBC (ref 0–0.2)
NT-PROBNP SERPL-MCNC: 195 PG/ML (ref 0–900)
PCO2 BLDA: 43.9 MM HG (ref 35–48)
PH BLDA: 7.38 PH UNITS (ref 7.35–7.45)
PH UR STRIP.AUTO: <=5 [PH] (ref 5–8)
PLATELET # BLD AUTO: 149 10*3/MM3 (ref 140–450)
PMV BLD AUTO: 10.2 FL (ref 6–12)
PO2 BLD: 397 MM[HG] (ref 0–500)
PO2 BLDA: 83.4 MM HG (ref 83–108)
POTASSIUM SERPL-SCNC: 3.7 MMOL/L (ref 3.5–5.2)
PROT SERPL-MCNC: 7.3 G/DL (ref 6–8.5)
PROT UR QL STRIP: ABNORMAL
RBC # BLD AUTO: 4.3 10*6/MM3 (ref 4.14–5.8)
RHINOVIRUS RNA SPEC NAA+PROBE: NOT DETECTED
RSV RNA NPH QL NAA+NON-PROBE: NOT DETECTED
SAO2 % BLDCOA: 95.9 % (ref 94–98)
SARS-COV-2 RNA RESP QL NAA+PROBE: DETECTED
SODIUM SERPL-SCNC: 140 MMOL/L (ref 136–145)
SP GR UR STRIP: 1.03 (ref 1–1.03)
TROPONIN T NUMERIC DELTA: -2 NG/L
TROPONIN T SERPL HS-MCNC: 13 NG/L
UROBILINOGEN UR QL STRIP: ABNORMAL
WBC NRBC COR # BLD AUTO: 6.53 10*3/MM3 (ref 3.4–10.8)
WHOLE BLOOD HOLD COAG: NORMAL
WHOLE BLOOD HOLD SPECIMEN: NORMAL

## 2025-03-18 PROCEDURE — 94799 UNLISTED PULMONARY SVC/PX: CPT

## 2025-03-18 PROCEDURE — 94640 AIRWAY INHALATION TREATMENT: CPT

## 2025-03-18 PROCEDURE — 80053 COMPREHEN METABOLIC PANEL: CPT | Performed by: EMERGENCY MEDICINE

## 2025-03-18 PROCEDURE — 84484 ASSAY OF TROPONIN QUANT: CPT | Performed by: EMERGENCY MEDICINE

## 2025-03-18 PROCEDURE — 92610 EVALUATE SWALLOWING FUNCTION: CPT

## 2025-03-18 PROCEDURE — 87040 BLOOD CULTURE FOR BACTERIA: CPT | Performed by: EMERGENCY MEDICINE

## 2025-03-18 PROCEDURE — 93005 ELECTROCARDIOGRAM TRACING: CPT

## 2025-03-18 PROCEDURE — 83735 ASSAY OF MAGNESIUM: CPT | Performed by: FAMILY MEDICINE

## 2025-03-18 PROCEDURE — 82140 ASSAY OF AMMONIA: CPT | Performed by: EMERGENCY MEDICINE

## 2025-03-18 PROCEDURE — 83880 ASSAY OF NATRIURETIC PEPTIDE: CPT | Performed by: EMERGENCY MEDICINE

## 2025-03-18 PROCEDURE — 36600 WITHDRAWAL OF ARTERIAL BLOOD: CPT

## 2025-03-18 PROCEDURE — 25010000002 DEXAMETHASONE PER 1 MG: Performed by: EMERGENCY MEDICINE

## 2025-03-18 PROCEDURE — 84100 ASSAY OF PHOSPHORUS: CPT | Performed by: FAMILY MEDICINE

## 2025-03-18 PROCEDURE — 71045 X-RAY EXAM CHEST 1 VIEW: CPT

## 2025-03-18 PROCEDURE — 81003 URINALYSIS AUTO W/O SCOPE: CPT | Performed by: EMERGENCY MEDICINE

## 2025-03-18 PROCEDURE — 94761 N-INVAS EAR/PLS OXIMETRY MLT: CPT

## 2025-03-18 PROCEDURE — 82803 BLOOD GASES ANY COMBINATION: CPT

## 2025-03-18 PROCEDURE — 93005 ELECTROCARDIOGRAM TRACING: CPT | Performed by: EMERGENCY MEDICINE

## 2025-03-18 PROCEDURE — 83605 ASSAY OF LACTIC ACID: CPT | Performed by: EMERGENCY MEDICINE

## 2025-03-18 PROCEDURE — 85027 COMPLETE CBC AUTOMATED: CPT | Performed by: FAMILY MEDICINE

## 2025-03-18 PROCEDURE — 25010000002 ENOXAPARIN PER 10 MG: Performed by: FAMILY MEDICINE

## 2025-03-18 PROCEDURE — 0202U NFCT DS 22 TRGT SARS-COV-2: CPT | Performed by: EMERGENCY MEDICINE

## 2025-03-18 PROCEDURE — 36415 COLL VENOUS BLD VENIPUNCTURE: CPT

## 2025-03-18 PROCEDURE — 99285 EMERGENCY DEPT VISIT HI MDM: CPT

## 2025-03-18 PROCEDURE — 85025 COMPLETE CBC W/AUTO DIFF WBC: CPT | Performed by: EMERGENCY MEDICINE

## 2025-03-18 RX ORDER — CARVEDILOL 6.25 MG/1
6.25 TABLET ORAL 2 TIMES DAILY WITH MEALS
Status: DISCONTINUED | OUTPATIENT
Start: 2025-03-18 | End: 2025-03-20 | Stop reason: HOSPADM

## 2025-03-18 RX ORDER — DONEPEZIL HYDROCHLORIDE 5 MG/1
10 TABLET, FILM COATED ORAL NIGHTLY
Status: DISCONTINUED | OUTPATIENT
Start: 2025-03-18 | End: 2025-03-20 | Stop reason: HOSPADM

## 2025-03-18 RX ORDER — SODIUM CHLORIDE 0.9 % (FLUSH) 0.9 %
10 SYRINGE (ML) INJECTION AS NEEDED
Status: DISCONTINUED | OUTPATIENT
Start: 2025-03-18 | End: 2025-03-20 | Stop reason: HOSPADM

## 2025-03-18 RX ORDER — ONDANSETRON 2 MG/ML
4 INJECTION INTRAMUSCULAR; INTRAVENOUS EVERY 6 HOURS PRN
Status: DISCONTINUED | OUTPATIENT
Start: 2025-03-18 | End: 2025-03-20 | Stop reason: HOSPADM

## 2025-03-18 RX ORDER — ENOXAPARIN SODIUM 100 MG/ML
40 INJECTION SUBCUTANEOUS DAILY
Status: DISCONTINUED | OUTPATIENT
Start: 2025-03-18 | End: 2025-03-20 | Stop reason: HOSPADM

## 2025-03-18 RX ORDER — GABAPENTIN 300 MG/1
600 CAPSULE ORAL EVERY 8 HOURS SCHEDULED
Status: DISCONTINUED | OUTPATIENT
Start: 2025-03-18 | End: 2025-03-20 | Stop reason: HOSPADM

## 2025-03-18 RX ORDER — BUDESONIDE 0.5 MG/2ML
0.5 INHALANT ORAL
Status: DISCONTINUED | OUTPATIENT
Start: 2025-03-19 | End: 2025-03-20 | Stop reason: HOSPADM

## 2025-03-18 RX ORDER — ALBUTEROL SULFATE 90 UG/1
2 INHALANT RESPIRATORY (INHALATION)
Status: DISCONTINUED | OUTPATIENT
Start: 2025-03-18 | End: 2025-03-20 | Stop reason: HOSPADM

## 2025-03-18 RX ORDER — CARBIDOPA AND LEVODOPA 25; 100 MG/1; MG/1
2 TABLET ORAL 4 TIMES DAILY
Status: DISCONTINUED | OUTPATIENT
Start: 2025-03-18 | End: 2025-03-20 | Stop reason: HOSPADM

## 2025-03-18 RX ORDER — BISACODYL 10 MG
10 SUPPOSITORY, RECTAL RECTAL DAILY PRN
Status: DISCONTINUED | OUTPATIENT
Start: 2025-03-18 | End: 2025-03-20 | Stop reason: HOSPADM

## 2025-03-18 RX ORDER — ALBUTEROL SULFATE 0.83 MG/ML
2.5 SOLUTION RESPIRATORY (INHALATION) ONCE
Status: COMPLETED | OUTPATIENT
Start: 2025-03-18 | End: 2025-03-18

## 2025-03-18 RX ORDER — DEXAMETHASONE SODIUM PHOSPHATE 4 MG/ML
10 INJECTION, SOLUTION INTRA-ARTICULAR; INTRALESIONAL; INTRAMUSCULAR; INTRAVENOUS; SOFT TISSUE ONCE
Status: COMPLETED | OUTPATIENT
Start: 2025-03-18 | End: 2025-03-18

## 2025-03-18 RX ORDER — AMOXICILLIN 250 MG
2 CAPSULE ORAL 2 TIMES DAILY PRN
Status: DISCONTINUED | OUTPATIENT
Start: 2025-03-18 | End: 2025-03-20 | Stop reason: HOSPADM

## 2025-03-18 RX ORDER — POLYETHYLENE GLYCOL 3350 17 G/17G
17 POWDER, FOR SOLUTION ORAL DAILY PRN
Status: DISCONTINUED | OUTPATIENT
Start: 2025-03-18 | End: 2025-03-20 | Stop reason: HOSPADM

## 2025-03-18 RX ORDER — SODIUM CHLORIDE 9 MG/ML
40 INJECTION, SOLUTION INTRAVENOUS AS NEEDED
Status: DISCONTINUED | OUTPATIENT
Start: 2025-03-18 | End: 2025-03-20 | Stop reason: HOSPADM

## 2025-03-18 RX ORDER — CLOPIDOGREL BISULFATE 75 MG/1
75 TABLET ORAL DAILY
Status: DISCONTINUED | OUTPATIENT
Start: 2025-03-19 | End: 2025-03-20 | Stop reason: HOSPADM

## 2025-03-18 RX ORDER — OXYCODONE HYDROCHLORIDE 5 MG/1
5 TABLET ORAL EVERY 6 HOURS PRN
Refills: 0 | Status: DISCONTINUED | OUTPATIENT
Start: 2025-03-18 | End: 2025-03-20 | Stop reason: HOSPADM

## 2025-03-18 RX ORDER — ACETAMINOPHEN 325 MG/1
650 TABLET ORAL EVERY 4 HOURS PRN
Status: DISCONTINUED | OUTPATIENT
Start: 2025-03-18 | End: 2025-03-20 | Stop reason: HOSPADM

## 2025-03-18 RX ORDER — PANTOPRAZOLE SODIUM 40 MG/1
40 TABLET, DELAYED RELEASE ORAL DAILY
Status: DISCONTINUED | OUTPATIENT
Start: 2025-03-19 | End: 2025-03-20 | Stop reason: HOSPADM

## 2025-03-18 RX ORDER — ASPIRIN 81 MG/1
324 TABLET, CHEWABLE ORAL ONCE
Status: DISCONTINUED | OUTPATIENT
Start: 2025-03-18 | End: 2025-03-18

## 2025-03-18 RX ORDER — ATORVASTATIN CALCIUM 20 MG/1
20 TABLET, FILM COATED ORAL DAILY
Status: DISCONTINUED | OUTPATIENT
Start: 2025-03-19 | End: 2025-03-20 | Stop reason: HOSPADM

## 2025-03-18 RX ORDER — NITROGLYCERIN 0.4 MG/1
0.4 TABLET SUBLINGUAL
Status: DISCONTINUED | OUTPATIENT
Start: 2025-03-18 | End: 2025-03-20 | Stop reason: HOSPADM

## 2025-03-18 RX ORDER — CALCIUM CARBONATE 500 MG/1
2 TABLET, CHEWABLE ORAL 2 TIMES DAILY PRN
Status: DISCONTINUED | OUTPATIENT
Start: 2025-03-18 | End: 2025-03-20 | Stop reason: HOSPADM

## 2025-03-18 RX ORDER — TAMSULOSIN HYDROCHLORIDE 0.4 MG/1
0.4 CAPSULE ORAL DAILY
Status: DISCONTINUED | OUTPATIENT
Start: 2025-03-19 | End: 2025-03-20 | Stop reason: HOSPADM

## 2025-03-18 RX ORDER — ONDANSETRON 4 MG/1
4 TABLET, ORALLY DISINTEGRATING ORAL EVERY 6 HOURS PRN
Status: DISCONTINUED | OUTPATIENT
Start: 2025-03-18 | End: 2025-03-20 | Stop reason: HOSPADM

## 2025-03-18 RX ORDER — IPRATROPIUM BROMIDE AND ALBUTEROL SULFATE 2.5; .5 MG/3ML; MG/3ML
3 SOLUTION RESPIRATORY (INHALATION)
Status: DISCONTINUED | OUTPATIENT
Start: 2025-03-18 | End: 2025-03-18

## 2025-03-18 RX ORDER — SODIUM CHLORIDE 0.9 % (FLUSH) 0.9 %
10 SYRINGE (ML) INJECTION EVERY 12 HOURS SCHEDULED
Status: DISCONTINUED | OUTPATIENT
Start: 2025-03-18 | End: 2025-03-20 | Stop reason: HOSPADM

## 2025-03-18 RX ORDER — AMLODIPINE BESYLATE 5 MG/1
5 TABLET ORAL DAILY
Status: DISCONTINUED | OUTPATIENT
Start: 2025-03-19 | End: 2025-03-20 | Stop reason: HOSPADM

## 2025-03-18 RX ORDER — ACETAMINOPHEN 650 MG/1
650 SUPPOSITORY RECTAL EVERY 4 HOURS PRN
Status: DISCONTINUED | OUTPATIENT
Start: 2025-03-18 | End: 2025-03-20 | Stop reason: HOSPADM

## 2025-03-18 RX ORDER — IPRATROPIUM BROMIDE AND ALBUTEROL SULFATE 2.5; .5 MG/3ML; MG/3ML
3 SOLUTION RESPIRATORY (INHALATION) ONCE
Status: COMPLETED | OUTPATIENT
Start: 2025-03-18 | End: 2025-03-18

## 2025-03-18 RX ORDER — TRAMADOL HYDROCHLORIDE 50 MG/1
50 TABLET ORAL EVERY 6 HOURS PRN
Status: DISCONTINUED | OUTPATIENT
Start: 2025-03-18 | End: 2025-03-20 | Stop reason: HOSPADM

## 2025-03-18 RX ORDER — ASPIRIN 81 MG/1
81 TABLET ORAL 2 TIMES DAILY
Status: DISCONTINUED | OUTPATIENT
Start: 2025-03-18 | End: 2025-03-20 | Stop reason: HOSPADM

## 2025-03-18 RX ORDER — VENLAFAXINE HYDROCHLORIDE 75 MG/1
75 CAPSULE, EXTENDED RELEASE ORAL DAILY
Status: DISCONTINUED | OUTPATIENT
Start: 2025-03-19 | End: 2025-03-20 | Stop reason: HOSPADM

## 2025-03-18 RX ORDER — ACETAMINOPHEN 160 MG/5ML
650 SOLUTION ORAL EVERY 4 HOURS PRN
Status: DISCONTINUED | OUTPATIENT
Start: 2025-03-18 | End: 2025-03-20 | Stop reason: HOSPADM

## 2025-03-18 RX ORDER — BISACODYL 5 MG/1
5 TABLET, DELAYED RELEASE ORAL DAILY PRN
Status: DISCONTINUED | OUTPATIENT
Start: 2025-03-18 | End: 2025-03-20 | Stop reason: HOSPADM

## 2025-03-18 RX ORDER — DOCUSATE SODIUM 100 MG/1
100 CAPSULE, LIQUID FILLED ORAL 2 TIMES DAILY PRN
Status: DISCONTINUED | OUTPATIENT
Start: 2025-03-18 | End: 2025-03-20 | Stop reason: HOSPADM

## 2025-03-18 RX ORDER — DEXAMETHASONE SODIUM PHOSPHATE 4 MG/ML
6 INJECTION, SOLUTION INTRA-ARTICULAR; INTRALESIONAL; INTRAMUSCULAR; INTRAVENOUS; SOFT TISSUE DAILY
Status: DISCONTINUED | OUTPATIENT
Start: 2025-03-19 | End: 2025-03-19

## 2025-03-18 RX ADMIN — ALBUTEROL SULFATE 2.5 MG: 2.5 SOLUTION RESPIRATORY (INHALATION) at 13:31

## 2025-03-18 RX ADMIN — CARVEDILOL 6.25 MG: 6.25 TABLET, FILM COATED ORAL at 20:47

## 2025-03-18 RX ADMIN — DONEPEZIL HYDROCHLORIDE 10 MG: 5 TABLET, FILM COATED ORAL at 20:47

## 2025-03-18 RX ADMIN — ALBUTEROL SULFATE 2 PUFF: 108 AEROSOL, METERED RESPIRATORY (INHALATION) at 18:38

## 2025-03-18 RX ADMIN — IPRATROPIUM BROMIDE AND ALBUTEROL SULFATE 3 ML: .5; 3 SOLUTION RESPIRATORY (INHALATION) at 13:35

## 2025-03-18 RX ADMIN — GABAPENTIN 600 MG: 300 CAPSULE ORAL at 20:48

## 2025-03-18 RX ADMIN — OXYCODONE HYDROCHLORIDE 5 MG: 5 TABLET ORAL at 20:47

## 2025-03-18 RX ADMIN — ASPIRIN 81 MG: 81 TABLET, COATED ORAL at 20:47

## 2025-03-18 RX ADMIN — DEXAMETHASONE SODIUM PHOSPHATE 10 MG: 4 INJECTION, SOLUTION INTRAMUSCULAR; INTRAVENOUS at 13:50

## 2025-03-18 RX ADMIN — ENOXAPARIN SODIUM 40 MG: 100 INJECTION SUBCUTANEOUS at 20:48

## 2025-03-18 RX ADMIN — CARBIDOPA AND LEVODOPA 2 TABLET: 25; 100 TABLET ORAL at 20:47

## 2025-03-18 RX ADMIN — Medication 10 ML: at 20:47

## 2025-03-18 NOTE — ED NOTES
Nursing report ED to floor  Andrew Flores  64 y.o.  male    HPI:   Chief Complaint   Patient presents with    Chest Pain     Chest pain, soa. Some confusion triage.  Pt was unsteady at triage, fell into family walking in.  Pt feels warm at triage.    Family states he started having issues a weeks ago, family has covid.         Admitting doctor:   Balwinder Cortez DO    Admitting diagnosis:   The primary encounter diagnosis was Weakness. Diagnoses of Chronic obstructive pulmonary disease with acute exacerbation, Acute respiratory distress, Fall, initial encounter, and COVID-19 were also pertinent to this visit.    Code status:   Current Code Status       Date Active Code Status Order ID Comments User Context       3/18/2025 1559 CPR (Attempt to Resuscitate) [id number]  Balwinder Cortez DO ED        Question Answer    Code Status (Patient has no pulse and is not breathing) CPR (Attempt to Resuscitate)    Medical Interventions (Patient has pulse or is breathing) Full Support                    Allergies:   Patient has no known allergies.    Isolation:  Enhanced Droplet/Contact      Fall Risk:  Fall Risk Assessment was completed, and patient is at moderate risk for falls.   Predictive Model Details         19 (Low) Factor Value    Calculated 3/18/2025 18:31 Age 64    Risk of Fall Model Declan Coma Scale 14     Active Peripheral IV Present     Imaging order in this encounter Present     Respiratory Rate 21     Magnesium not on file     Amado Scale not on file     Number of Distinct Medication Classes administered 2     Clinically Relevant Sex Not Female     Cardiac Assessment X     Diastolic BP 99     Duration of Current Encounter 0.263 days     Creatinine 0.85 mg/dL     Total Bilirubin 0.4 mg/dL     Tobacco Use Quit     Albumin 4.5 g/dL     Calcium 9.6 mg/dL     Chloride 104 mmol/L     Potassium 3.7 mmol/L     ALT 16 U/L         Weight:       03/18/25  1209   Weight: 92.9 kg (204 lb 12.9 oz)       Intake and  Output  No intake or output data in the 24 hours ending 03/18/25 1835    Diet:   Dietary Orders (From admission, onward)       Start     Ordered    03/18/25 1558  Diet: Cardiac, Diabetic; Healthy Heart (2-3 Na+); Consistent Carbohydrate; Fluid Consistency: Thin (IDDSI 0)  Diet Effective Now        References:    Diet Order Definitions   Question Answer Comment   Diets: Cardiac    Diets: Diabetic    Cardiac Diet: Healthy Heart (2-3 Na+)    Diabetic Diet: Consistent Carbohydrate    Fluid Consistency: Thin (IDDSI 0)        03/18/25 1559                     Most recent vitals:   Vitals:    03/18/25 1446 03/18/25 1547 03/18/25 1617 03/18/25 1649   BP: 125/79 147/90 147/76 135/99   Pulse: 61 62 65 64   Resp:       Temp:       TempSrc:       SpO2: 91% 94% 95% 95%   Weight:       Height:           Active LDAs/IV Access:   Lines, Drains & Airways       Active LDAs       Name Placement date Placement time Site Days    Peripheral IV 03/18/25 1224 Left Antecubital 03/18/25  1224  Antecubital  less than 1                    Skin Condition:   Skin Assessments (last day)       None             Labs (abnormal labs have a star):   Labs Reviewed   RESPIRATORY PANEL PCR W/ COVID-19 (SARS-COV-2), NP SWAB IN UTM/VTP, 2 HR TAT - Abnormal; Notable for the following components:       Result Value    COVID19 Detected (*)     All other components within normal limits    Narrative:     In the setting of a positive respiratory panel with a viral infection PLUS a negative procalcitonin without other underlying concern for bacterial infection, consider observing off antibiotics or discontinuation of antibiotics and continue supportive care. If the respiratory panel is positive for atypical bacterial infection (Bordetella pertussis, Chlamydophila pneumoniae, or Mycoplasma pneumoniae), consider antibiotic de-escalation to target atypical bacterial infection.   COMPREHENSIVE METABOLIC PANEL - Abnormal; Notable for the following components:     Glucose 143 (*)     Alkaline Phosphatase 133 (*)     All other components within normal limits    Narrative:     GFR Categories in Chronic Kidney Disease (CKD)      GFR Category          GFR (mL/min/1.73)    Interpretation  G1                     90 or greater         Normal or high (1)  G2                      60-89                Mild decrease (1)  G3a                   45-59                Mild to moderate decrease  G3b                   30-44                Moderate to severe decrease  G4                    15-29                Severe decrease  G5                    14 or less           Kidney failure          (1)In the absence of evidence of kidney disease, neither GFR category G1 or G2 fulfill the criteria for CKD.    eGFR calculation 2021 CKD-EPI creatinine equation, which does not include race as a factor   CBC WITH AUTO DIFFERENTIAL - Abnormal; Notable for the following components:    MCV 98.6 (*)     RDW 12.0 (*)     Lymphocyte % 16.4 (*)     Eosinophil % 0.0 (*)     All other components within normal limits   URINALYSIS W/ CULTURE IF INDICATED - Abnormal; Notable for the following components:    Color, UA Dark Yellow (*)     Appearance, UA Hazy (*)     Specific Gravity, UA 1.032 (*)     Ketones, UA 15 mg/dL (1+) (*)     Bilirubin, UA Small (1+) (*)     Protein, UA Trace (*)     All other components within normal limits    Narrative:     In absence of clinical symptoms, the presence of pyuria, bacteria, and/or nitrites on the urinalysis result does not correlate with infection.  Urine microscopic not indicated.   TROPONIN - Normal    Narrative:     High Sensitive Troponin T Reference Range:  <14.0 ng/L- Negative Female for AMI  <22.0 ng/L- Negative Male for AMI  >=14 - Abnormal Female indicating possible myocardial injury.  >=22 - Abnormal Male indicating possible myocardial injury.   Clinicians would have to utilize clinical acumen, EKG, Troponin, and serial changes to determine if it is an Acute  Myocardial Infarction or myocardial injury due to an underlying chronic condition.        BNP (IN-HOUSE) - Normal    Narrative:     This assay is used as an aid in the diagnosis of individuals suspected of having heart failure. It can be used as an aid in the diagnosis of acute decompensated heart failure (ADHF) in patients presenting with signs and symptoms of ADHF to the emergency department (ED). In addition, NT-proBNP of <300 pg/mL indicates ADHF is not likely.    Age Range Result Interpretation  NT-proBNP Concentration (pg/mL:      <50             Positive            >450                   Gray                 300-450                    Negative             <300    50-75           Positive            >900                  Gray                300-900                  Negative            <300      >75             Positive            >1800                  Gray                300-1800                  Negative            <300   AMMONIA - Normal   HIGH SENSITIVITIY TROPONIN T 1HR - Normal    Narrative:     High Sensitive Troponin T Reference Range:  <14.0 ng/L- Negative Female for AMI  <22.0 ng/L- Negative Male for AMI  >=14 - Abnormal Female indicating possible myocardial injury.  >=22 - Abnormal Male indicating possible myocardial injury.   Clinicians would have to utilize clinical acumen, EKG, Troponin, and serial changes to determine if it is an Acute Myocardial Infarction or myocardial injury due to an underlying chronic condition.        POC LACTATE - Normal   BLOOD CULTURE   BLOOD CULTURE   RAINBOW DRAW    Narrative:     The following orders were created for panel order [facility] Draw.  Procedure                               Abnormality         Status                     ---------                               -----------         ------                     Green Top (Gel)[[id number]]                                  Final result               Lavender Top[[id number]]                                     Final  result               Gold Top - [facility][[id number]]                                   Final result               Light Blue Top[[id number]]                                   Final result                 Please view results for these tests on the individual orders.   BLOOD GAS, ARTERIAL   BLOOD GAS, ARTERIAL   CBC AND DIFFERENTIAL    Narrative:     The following orders were created for panel order CBC & Differential.  Procedure                               Abnormality         Status                     ---------                               -----------         ------                     CBC Auto Differential[[id number]]        Abnormal            Final result                 Please view results for these tests on the individual orders.   GREEN TOP   LAVENDER TOP   GOLD [facility] - [facility]   LIGHT BLUE TOP       LOC: Person, Place, Time, and Situation    Telemetry:  Med/Surg    Cardiac Monitoring Ordered: yes    EKG:   ECG 12 Lead ED Triage Standing Order; Chest Pain   Preliminary Result   HEART RATE=63  bpm   RR Imwxbrjf=137  ms   [place] Ozknopmj=207  ms   P Horizontal Axis=38  deg   P Front Axis=64  deg   QRSD Ntvlmfrk=900  ms   QT Fozeruwi=215  ms   PEoE=025  ms   QRS Axis=39  deg   T Wave Axis=54  deg   - NORMAL ECG -   Sinus rhythm   Date and Time of Study:2025-03-18 12:18:57          Medications Given in the ED:   Medications   sodium chloride 0.9 % flush 10 mL (has no administration in time range)   aspirin chewable tablet 324 mg (324 mg Oral Not Given 3/18/25 1225)   sodium chloride 0.9 % flush 10 mL (has no administration in time range)   sodium chloride 0.9 % flush 10 mL (has no administration in time range)   sodium chloride 0.9 % flush 10 mL (has no administration in time range)   sodium chloride 0.9 % infusion 40 mL (has no administration in time range)   enoxaparin sodium (LOVENOX) syringe 40 mg (has no administration in time range)   nitroglycerin (NITROSTAT) SL tablet 0.4 mg (has no administration in time range)    Potassium Replacement - Follow Nurse / BPA Driven Protocol (has no administration in time range)   Magnesium Standard Dose Replacement - Follow Nurse / BPA Driven Protocol (has no administration in time range)   Phosphorus Replacement - Follow Nurse / BPA Driven Protocol (has no administration in time range)   Calcium Replacement - Follow Nurse / BPA Driven Protocol (has no administration in time range)   acetaminophen (TYLENOL) tablet 650 mg (has no administration in time range)     Or   acetaminophen (TYLENOL) 160 MG/5ML oral solution 650 mg (has no administration in time range)     Or   acetaminophen (TYLENOL) suppository 650 mg (has no administration in time range)   traMADol (ULTRAM) tablet 50 mg (has no administration in time range)   oxyCODONE (ROXICODONE) immediate release tablet 5 mg (has no administration in time range)   sennosides-docusate (PERICOLACE) 8.6-50 MG per tablet 2 tablet (has no administration in time range)     And   polyethylene glycol (MIRALAX) packet 17 g (has no administration in time range)     And   bisacodyl (DULCOLAX) EC tablet 5 mg (has no administration in time range)     And   bisacodyl (DULCOLAX) suppository 10 mg (has no administration in time range)   ondansetron ODT (ZOFRAN-ODT) disintegrating tablet 4 mg (has no administration in time range)     Or   ondansetron (ZOFRAN) injection 4 mg (has no administration in time range)   calcium carbonate (TUMS) chewable tablet 500 mg (200 mg elemental) (has no administration in time range)   dexAMETHasone (DECADRON) injection 6 mg (has no administration in time range)   albuterol sulfate HFA (PROVENTIL HFA;VENTOLIN HFA;PROAIR HFA) inhaler 2 puff (has no administration in time range)   ipratropium-albuterol (DUO-NEB) nebulizer solution 3 mL (3 mL Nebulization Given 3/18/25 1335)   albuterol (PROVENTIL) nebulizer solution 0.083% 2.5 mg/3mL (2.5 mg Nebulization Given 3/18/25 1331)   dexAMETHasone (DECADRON) injection 10 mg (10 mg  "Intravenous Given 3/18/25 1350)       Imaging results:  XR Chest 1 View  Result Date: 3/18/2025  Impression: No active disease. Electronically Signed: Nathaniel Guerrero MD  3/18/2025 12:52 PM EDT  Workstation ID: [id number]      Social issues:   Social History     Socioeconomic History    Marital status: Legally    Tobacco Use    Smoking status: Former     Current packs/day: 0.00     Average packs/day: 1 pack/day for 51.3 years (51.3 ttl pk-yrs)     Types: Cigarettes     Start date:      Quit date: 2024     Years since quittin.8     Passive exposure: Current    Smokeless tobacco: Never   Vaping Use    Vaping status: Never Used   Substance and Sexual Activity    Alcohol use: Yes     Comment: \"once a month\"    Drug use: Never    Sexual activity: Defer       NIH Stroke Scale:  Interval: (not recorded)  1a. Level of Consciousness: (not recorded)  1b. LOC Questions: (not recorded)  1c. LOC Commands: (not recorded)  2. Best Gaze: (not recorded)  3. Visual: (not recorded)  4. Facial Palsy: (not recorded)  5a. Motor Arm, Left: (not recorded)  5b. Motor Arm, Right: (not recorded)  6a. Motor Leg, Left: (not recorded)  6b. Motor Leg, Right: (not recorded)  7. Limb Ataxia: (not recorded)  8. Sensory: (not recorded)  9. Best Language: (not recorded)  10. Dysarthria: (not recorded)  11. Extinction and Inattention (formerly Neglect): (not recorded)    Total (NIH Stroke Scale): (not recorded)     Additional notable assessment information:     Nursing report ED to floor:  RENEE Ellington RN   25 18:35 EDT   "

## 2025-03-18 NOTE — THERAPY EVALUATION
Acute Care - Speech Language Pathology   Swallow Initial Evaluation  Nick     Patient Name: Andrew Flores  : 1960  MRN: [id number]  Today's Date: 3/18/2025               Admit Date: 3/18/2025    Visit Dx:     ICD-10-CM ICD-9-CM   1. Weakness  R53.1 780.79   2. Chronic obstructive pulmonary disease with acute exacerbation  J44.1 491.21   3. Acute respiratory distress  R06.03 518.82   4. Fall, initial encounter  W19.XXXA E888.9   5. COVID-19  U07.1 079.89     Patient Active Problem List   Diagnosis    Anxiety    Gastroesophageal reflux disease    Hypogonadism    Hypothyroidism    Depression    Male erectile disorder    Fall    Testosterone deficiency    Bruised rib, left, initial encounter    History of CVA (cerebrovascular accident)    Primary hypertension    Mixed hyperlipidemia    Diffuse non-Hodgkin's lymphoma of testis    Neuropathy    Parkinson disease    RBD (REM behavioral disorder)    Balance problem    Screening PSA (prostate specific antigen)    Uncomplicated alcohol dependence    Apathy    Lymphoma    Hx of radiation therapy    History of chemotherapy    Weakness    Chronic low back pain without sciatica    Tick bite    Dextroscoliosis    Compression fracture of L1 lumbar vertebra    Tobacco abuse    Edema    Constipation    Pain of right lower extremity    Food impaction of esophagus    Esophageal stricture    Ulcer of esophagus without bleeding    Erosive gastritis    Duodenitis    Malignant neoplasm of testicle    COPD (chronic obstructive pulmonary disease)    Memory loss    Coronary artery disease involving native coronary artery of native heart without angina pectoris    Status post coronary artery stent placement    Benign prostatic hyperplasia with lower urinary tract symptoms    PVD (peripheral vascular disease) with claudication    Dyspnea     Past Medical History:   Diagnosis Date    Asthma     Borderline diabetes     Cerebrovascular accident (CVA) 2019    COPD (chronic  obstructive pulmonary disease)     Dysphagia 08/2022    from parkinsons    Hyperlipidemia     Hypertension     Intertrochanteric fracture of left femur, closed, initial encounter 08/25/2024    Mood disorder     Non-Hodgkin's lymphoma of testis     Parkinson disease     Pneumonia of right upper lobe due to infectious organism 12/04/2022    Poor historian     PVD (peripheral vascular disease) with claudication 09/20/2024    Shortness of breath     Sleep apnea     does not have a cpap    Status post coronary artery stent placement 02/26/2024    Stroke 2017     Past Surgical History:   Procedure Laterality Date    CARDIAC CATHETERIZATION N/A 2/26/2024    Procedure: Left and Right Heart Cath with Coronary Angiography;  Surgeon: Lelo Tello MD;  Location: [facility] CATH INVASIVE LOCATION;  Service: Cardiology;  Laterality: N/A;    CARDIAC CATHETERIZATION N/A 2/26/2024    Procedure: Percutaneous Coronary Intervention;  Surgeon: Lelo Tello MD;  Location: [facility] CATH INVASIVE LOCATION;  Service: Cardiology;  Laterality: N/A;    ENDOSCOPY N/A 01/27/2022    Procedure: ESOPHAGOGASTRODUODENOSCOPY with foreign body removal with gastric, duodenal biopsy and GE junction biopsy;  Surgeon: Pedro De La Rosa MD;  Location: [facility] ENDOSCOPY;  Service: Gastroenterology;  Laterality: N/A;  post: foreign body removal, erosive gastritis with biopsy, erosive eduodenitis with biopsy, esophagitis, GE junction biopsy to rule out malignancy,     ENDOSCOPY N/A 08/23/2022    Procedure: ESOPHAGOGASTRODUODENOSCOPY with GE junction biopsy R/O Barretts, esophageal dilation #48 bougie;  Surgeon: Alexa Subramanian MD;  Location: [facility] ENDOSCOPY;  Service: Gastroenterology;  Laterality: N/A;  duodenal ulcer, GERD,     HIP TROCHANTERIC NAILING WITH INTRAMEDULLARY HIP SCREW Left 8/26/2024    Procedure: Left hip/femur fracture fixation with intramedullary ajit and screws;  Surgeon: Kameron Kaminski MD;  Location: [facility] MAIN OR;  Service:  "Orthopedics;  Laterality: Left;    INTERVENTIONAL RADIOLOGY PROCEDURE N/A 2/26/2024    Procedure: Intravascular Ultrasound;  Surgeon: Lelo Tello MD;  Location: [facility] INVASIVE LOCATION;  Service: Cardiology;  Laterality: N/A;    ORCHIECTOMY  2019       SLP Recommendation and Plan  SLP Swallowing Diagnosis: mild, oral dysphagia, R/O pharyngeal dysphagia (03/18/25 1500)  SLP Diet Recommendation: thin liquids, soft to chew textures, chopped (03/18/25 1500)  Recommended Precautions and Strategies: upright posture during/after eating, small bites of food and sips of liquid, general aspiration precautions, reflux precautions, alternate between small bites of food and sips of liquid (03/18/25 1500)  SLP Rec. for Method of Medication Administration: as tolerated (03/18/25 1500)     Monitor for Signs of Aspiration: yes, notify SLP if any concerns (03/18/25 1500)     Swallow Criteria for Skilled Therapeutic Interventions Met: demonstrates skilled criteria (03/18/25 1500)     Rehab Potential/Prognosis, Swallowing: good, to achieve stated therapy goals (03/18/25 1500)  Therapy Frequency (Swallow): 2 days per week, 3 days per week (03/18/25 1500)  Predicted Duration Therapy Intervention (Days): until discharge (03/18/25 1500)  Oral Care Recommendations: Oral Care BID/PRN (03/18/25 1500)    Pt seen on this date d/t orders stating: \"fal in esophagus that doesn't work right per family.\" Pt does have hx of esophageal sphincter disorder and reflux. Pt also with Parkinson's. Pt familiar to [facility] ST department, has had 3 VFSS completed in 2022 and 2023. Most recent recommendation STC/Thin liquids. Upon entry, pt awake in bed, agreeable to being raised approx 90 degrees. Pt denies any swallowing difficulties with solids or liquids, however endorses increase in mucus that he has difficulty clearing. Pt reports hx of VFSS and is knowledgeable about recommendations and compensatory strategies. Pt reports he has been eating " STC/regular textures at home. Oral mech revealed reduced strength, edentulous state (pt had teeth pulled in 2023). Pt able to self feed. PO observed: entire cup of peaches, entire fig campos, water by straw x5. Pt with adequate labial seal, slightly impaired seal with audible air escape, still able to pull liquids. Mastication prolonged and rotary chew reduced, most likely secondary to edentulous state. Good oral transit and clearance. Pt had throat clear x2, no other overt s/s of aspiration noted. Pt denies pain or globus sensation. Pt noted to take very large bites/sips in rapid succession - pt encouraged to take smaller sips/bites and finish swallowing before taking another. It is recommended pt initiate a Soft to Chew (chopped) / Thin liquid diet with meds as tolerated. Pt should utilize below listed safe swallowing strategies. ST will follow up x1-2 to ensure safety and tolerance.     SLP Plan & Recommendation:   - Soft to Chew (Chopped) / Thin liquids   - Meds: as tolerated  - Only feed when pt is fully awake and alert  - Safe swallow strategies: HOB at a 90 degree angle, SLOW rate of intake, SMALL bites/sips, alternating sips/bites  - ST continue to follow.     SWALLOW EVALUATION (Last 72 Hours)       SLP Adult Swallow Evaluation       Row Name 03/18/25 1500       Rehab Evaluation    Document Type evaluation  -AA    Subjective Information no complaints  -AA    Patient Observations alert;cooperative;agree to therapy  -AA    Patient Effort good  -AA    Symptoms Noted During/After Treatment none  -AA       General Information    Patient Profile Reviewed yes  -AA    Pertinent History Of Current Problem History of present illness is a 64-year-old gentleman with multiple health problems who was brought to the hospital today for a couple history of increasing shortness of breath cough exposure to COVID although negative test yesterday altered mental status.  He has had a cough but nonproductive.  He has had some  feeling hot and cold he has had no vomiting he has had little bit of diarrhea but seem to be black.  He denies any dysuria or frequency he denies any trauma or head injury.  No headache or speech difficulty or weakness to one-sided the other just general weakness.  He denies any long car ride plane ride immobilizations foreign travels antibiotic use.  No trauma.  No leg pain or swelling.  -AA    Current Method of Nutrition NPO  -AA    Precautions/Limitations, Vision WFL;for purposes of eval  -AA    Precautions/Limitations, Hearing WFL;for purposes of eval  -AA    Prior Level of Function-Communication other (see comments)  Mild Hypokinetic Dysarthria diagnosed in 2023  -AA    Prior Level of Function-Swallowing soft to chew;thin liquids  -AA    Plans/Goals Discussed with patient;agreed upon  -AA    Barriers to Rehab previous functional deficit  -AA       Oral Motor Structure and Function    Dentition Assessment edentulous, does not have dentures  -AA    Secretion Management WNL/WFL  -AA    Mucosal Quality moist, healthy  -AA    Volitional Swallow WFL  -AA    Volitional Cough WFL  -AA       Oral Musculature and Cranial Nerve Assessment    Oral Motor General Assessment generalized oral motor weakness  -AA       General Eating/Swallowing Observations    Respiratory Support Currently in Use room air  -AA    Eating/Swallowing Skills self-fed  -AA    Positioning During Eating upright 90 degree;upright in bed  -AA    Utensils Used spoon;straw  -AA    Consistencies Trialed thin liquids;mixed consistency;mechanical ground textures;soft to chew textures  -AA    Pre SpO2 (%) 95  -AA    Post SpO2 (%) 95  -AA       Respiratory    Respiratory Status WFL  -AA       Clinical Swallow Eval    Oral Prep Phase impaired  -AA    Oral Transit WFL  -AA    Oral Residue WFL  -AA    Pharyngeal Phase no overt signs/symptoms of pharyngeal impairment  -AA       SLP Evaluation Clinical Impression    SLP Swallowing Diagnosis mild;oral dysphagia;R/O  pharyngeal dysphagia  -AA    Functional Impact risk of aspiration/pneumonia;risk of malnutrition;risk of dehydration  -AA    Rehab Potential/Prognosis, Swallowing good, to achieve stated therapy goals  -AA    Swallow Criteria for Skilled Therapeutic Interventions Met demonstrates skilled criteria  -AA       Recommendations    Therapy Frequency (Swallow) 2 days per week;3 days per week  -AA    Predicted Duration Therapy Intervention (Days) until discharge  -AA    SLP Diet Recommendation thin liquids;soft to chew textures;chopped  -AA    Recommended Precautions and Strategies upright posture during/after eating;small bites of food and sips of liquid;general aspiration precautions;reflux precautions;alternate between small bites of food and sips of liquid  -AA    Oral Care Recommendations Oral Care BID/PRN  -AA    SLP Rec. for Method of Medication Administration as tolerated  -AA    Monitor for Signs of Aspiration yes;notify SLP if any concerns  -AA       Swallow Goals (SLP)    Swallow LTGs Swallow Long Term Goal (free text)  -AA    Swallow STGs diet tolerance goal selection (SLP)  -AA    Diet Tolerance Goal Selection (SLP) Swallow Short Term Goal 1  -AA       (LTG) Swallow    (LTG) Swallow Pt will maximize swallow function for least restrictive PO diet, exhibiting no complication associated with dysphagia, adequate PO intake, and demonstrating independent use of swallow compensation.  -AA    [place] (Swallow Long Term Goal) with minimal cues (75-90% accuracy)  -AA    Time Frame (Swallow Long Term Goal) by discharge  -AA    Progress/Outcomes (Swallow Long Term Goal) new goal  -AA       (STG) Swallow 1    (STG) Swallow 1 The patient will participate in a full meal assessment to determine safety and adequacy of recommended diet, independent use of safe swallow compensations, and additional goals/recommendations to follow.  -AA    [place] (Swallow Short Term Goal 1) with minimal cues (75-90% accuracy)  -AA     Time Frame (Swallow Short Term Goal 1) by discharge  -AA    Progress/Outcomes (Swallow Short Term Goal 1) new goal  -AA              User Key  (r) = Recorded By, (t) = Taken By, (c) = Cosigned By      Initials Name Effective Dates    Leatha Muir SLP 06/18/24 -                     EDUCATION  The patient has been educated in the following areas:   Dysphagia (Swallowing Impairment) Modified Diet Instruction.        SLP GOALS       Row Name 03/18/25 1500       (LTG) Swallow    (LTG) Swallow Pt will maximize swallow function for least restrictive PO diet, exhibiting no complication associated with dysphagia, adequate PO intake, and demonstrating independent use of swallow compensation.  -AA    [place] (Swallow Long Term Goal) with minimal cues (75-90% accuracy)  -AA    Time Frame (Swallow Long Term Goal) by discharge  -AA    Progress/Outcomes (Swallow Long Term Goal) new goal  -AA       (STG) Swallow 1    (STG) Swallow 1 The patient will participate in a full meal assessment to determine safety and adequacy of recommended diet, independent use of safe swallow compensations, and additional goals/recommendations to follow.  -AA    [place] (Swallow Short Term Goal 1) with minimal cues (75-90% accuracy)  -AA    Time Frame (Swallow Short Term Goal 1) by discharge  -AA    Progress/Outcomes (Swallow Short Term Goal 1) new goal  -AA              User Key  (r) = Recorded By, (t) = Taken By, (c) = Cosigned By      Initials Name Provider Type    Leatha Muir SLP Speech and Language Pathologist             JIM Escudero  3/18/2025

## 2025-03-18 NOTE — ED NOTES
Nursing report ED to floor  Andrew Flores  64 y.o.  male    HPI:   Chief Complaint   Patient presents with    Chest Pain     Chest pain, soa. Some confusion triage.  Pt was unsteady at triage, fell into family walking in.  Pt feels warm at triage.    Family states he started having issues a weeks ago, family has covid.         Admitting doctor:   Balwinder Cortez DO    Admitting diagnosis:   The primary encounter diagnosis was Weakness. Diagnoses of Chronic obstructive pulmonary disease with acute exacerbation, Acute respiratory distress, Fall, initial encounter, and COVID-19 were also pertinent to this visit.    Code status:   Current Code Status       Date Active Code Status Order ID Comments User Context       3/18/2025 1559 CPR (Attempt to Resuscitate) [id number]  Balwinder Cortez DO ED        Question Answer    Code Status (Patient has no pulse and is not breathing) CPR (Attempt to Resuscitate)    Medical Interventions (Patient has pulse or is breathing) Full Support                    Allergies:   Patient has no known allergies.    Isolation:  Enhanced Droplet/Contact      Fall Risk:  Fall Risk Assessment was completed, and patient is at moderate risk for falls.   Predictive Model Details         19 (Low) Factor Value    Calculated 3/18/2025 16:50 Age 64    Risk of Fall Model Declan Coma Scale 14     Active Peripheral IV Present     Imaging order in this encounter Present     Respiratory Rate 21     Magnesium not on file     Amado Scale not on file     Number of Distinct Medication Classes administered 2     Clinically Relevant Sex Not Female     Cardiac Assessment X     Diastolic BP 99     Duration of Current Encounter 0.194 days     Creatinine 0.85 mg/dL     Total Bilirubin 0.4 mg/dL     Tobacco Use Quit     Albumin 4.5 g/dL     Calcium 9.6 mg/dL     Chloride 104 mmol/L     Potassium 3.7 mmol/L     ALT 16 U/L         Weight:       03/18/25  1209   Weight: 92.9 kg (204 lb 12.9 oz)       Intake and  Output  No intake or output data in the 24 hours ending 03/18/25 1651    Diet:   Dietary Orders (From admission, onward)       Start     Ordered    03/18/25 1558  Diet: Cardiac, Diabetic; Healthy Heart (2-3 Na+); Consistent Carbohydrate; Fluid Consistency: Thin (IDDSI 0)  Diet Effective Now        References:    Diet Order Definitions   Question Answer Comment   Diets: Cardiac    Diets: Diabetic    Cardiac Diet: Healthy Heart (2-3 Na+)    Diabetic Diet: Consistent Carbohydrate    Fluid Consistency: Thin (IDDSI 0)        03/18/25 1559                     Most recent vitals:   Vitals:    03/18/25 1446 03/18/25 1547 03/18/25 1617 03/18/25 1649   BP: 125/79 147/90 147/76 135/99   Pulse: 61 62 65 64   Resp:       Temp:       TempSrc:       SpO2: 91% 94% 95% 95%   Weight:       Height:           Active LDAs/IV Access:   Lines, Drains & Airways       Active LDAs       Name Placement date Placement time Site Days    Peripheral IV 03/18/25 1224 Left Antecubital 03/18/25  1224  Antecubital  less than 1                    Skin Condition:   Skin Assessments (last day)       None             Labs (abnormal labs have a star):   Labs Reviewed   RESPIRATORY PANEL PCR W/ COVID-19 (SARS-COV-2), NP SWAB IN UTM/VTP, 2 HR TAT - Abnormal; Notable for the following components:       Result Value    COVID19 Detected (*)     All other components within normal limits    Narrative:     In the setting of a positive respiratory panel with a viral infection PLUS a negative procalcitonin without other underlying concern for bacterial infection, consider observing off antibiotics or discontinuation of antibiotics and continue supportive care. If the respiratory panel is positive for atypical bacterial infection (Bordetella pertussis, Chlamydophila pneumoniae, or Mycoplasma pneumoniae), consider antibiotic de-escalation to target atypical bacterial infection.   COMPREHENSIVE METABOLIC PANEL - Abnormal; Notable for the following components:     Glucose 143 (*)     Alkaline Phosphatase 133 (*)     All other components within normal limits    Narrative:     GFR Categories in Chronic Kidney Disease (CKD)      GFR Category          GFR (mL/min/1.73)    Interpretation  G1                     90 or greater         Normal or high (1)  G2                      60-89                Mild decrease (1)  G3a                   45-59                Mild to moderate decrease  G3b                   30-44                Moderate to severe decrease  G4                    15-29                Severe decrease  G5                    14 or less           Kidney failure          (1)In the absence of evidence of kidney disease, neither GFR category G1 or G2 fulfill the criteria for CKD.    eGFR calculation 2021 CKD-EPI creatinine equation, which does not include race as a factor   CBC WITH AUTO DIFFERENTIAL - Abnormal; Notable for the following components:    MCV 98.6 (*)     RDW 12.0 (*)     Lymphocyte % 16.4 (*)     Eosinophil % 0.0 (*)     All other components within normal limits   URINALYSIS W/ CULTURE IF INDICATED - Abnormal; Notable for the following components:    Color, UA Dark Yellow (*)     Appearance, UA Hazy (*)     Specific Gravity, UA 1.032 (*)     Ketones, UA 15 mg/dL (1+) (*)     Bilirubin, UA Small (1+) (*)     Protein, UA Trace (*)     All other components within normal limits    Narrative:     In absence of clinical symptoms, the presence of pyuria, bacteria, and/or nitrites on the urinalysis result does not correlate with infection.  Urine microscopic not indicated.   TROPONIN - Normal    Narrative:     High Sensitive Troponin T Reference Range:  <14.0 ng/L- Negative Female for AMI  <22.0 ng/L- Negative Male for AMI  >=14 - Abnormal Female indicating possible myocardial injury.  >=22 - Abnormal Male indicating possible myocardial injury.   Clinicians would have to utilize clinical acumen, EKG, Troponin, and serial changes to determine if it is an Acute  Myocardial Infarction or myocardial injury due to an underlying chronic condition.        BNP (IN-HOUSE) - Normal    Narrative:     This assay is used as an aid in the diagnosis of individuals suspected of having heart failure. It can be used as an aid in the diagnosis of acute decompensated heart failure (ADHF) in patients presenting with signs and symptoms of ADHF to the emergency department (ED). In addition, NT-proBNP of <300 pg/mL indicates ADHF is not likely.    Age Range Result Interpretation  NT-proBNP Concentration (pg/mL:      <50             Positive            >450                   Gray                 300-450                    Negative             <300    50-75           Positive            >900                  Gray                300-900                  Negative            <300      >75             Positive            >1800                  Gray                300-1800                  Negative            <300   AMMONIA - Normal   HIGH SENSITIVITIY TROPONIN T 1HR - Normal    Narrative:     High Sensitive Troponin T Reference Range:  <14.0 ng/L- Negative Female for AMI  <22.0 ng/L- Negative Male for AMI  >=14 - Abnormal Female indicating possible myocardial injury.  >=22 - Abnormal Male indicating possible myocardial injury.   Clinicians would have to utilize clinical acumen, EKG, Troponin, and serial changes to determine if it is an Acute Myocardial Infarction or myocardial injury due to an underlying chronic condition.        POC LACTATE - Normal   BLOOD CULTURE   BLOOD CULTURE   RAINBOW DRAW    Narrative:     The following orders were created for panel order [facility].  Procedure                               Abnormality         Status                     ---------                               -----------         ------                     Green Top (Gel)[[id number]]                                  Final result               Lavender Top[[id number]]                                     Final  result               Gold Top - [facility][[id number]]                                   Final result               Light Blue Top[[id number]]                                   Final result                 Please view results for these tests on the individual orders.   BLOOD GAS, ARTERIAL   BLOOD GAS, ARTERIAL   CBC AND DIFFERENTIAL    Narrative:     The following orders were created for panel order CBC & Differential.  Procedure                               Abnormality         Status                     ---------                               -----------         ------                     CBC Auto Differential[[id number]]        Abnormal            Final result                 Please view results for these tests on the individual orders.   GREEN TOP   LAVENDER TOP   GOLD [facility] - [facility]   LIGHT BLUE TOP       LOC: Person, Place, Time, and Situation    Telemetry:  Med/Surg    Cardiac Monitoring Ordered: yes    EKG:   ECG 12 Lead ED Triage Standing Order; Chest Pain   Preliminary Result   HEART RATE=63  bpm   RR Kxylhpxg=090  ms   NE Iadvyflw=514  ms   P Horizontal Axis=38  deg   P Front Axis=64  deg   QRSD Wabtouim=889  ms   QT Eiiqjmym=102  ms   SZrV=674  ms   QRS Axis=39  deg   T Wave Axis=54  deg   - NORMAL ECG -   Sinus rhythm   Date and Time of Study:2025-03-18 12:18:57          Medications Given in the ED:   Medications   sodium chloride 0.9 % flush 10 mL (has no administration in time range)   aspirin chewable tablet 324 mg (324 mg Oral Not Given 3/18/25 1225)   sodium chloride 0.9 % flush 10 mL (has no administration in time range)   sodium chloride 0.9 % flush 10 mL (has no administration in time range)   sodium chloride 0.9 % flush 10 mL (has no administration in time range)   sodium chloride 0.9 % infusion 40 mL (has no administration in time range)   enoxaparin sodium (LOVENOX) syringe 40 mg (has no administration in time range)   nitroglycerin (NITROSTAT) SL tablet 0.4 mg (has no administration in time range)    Potassium Replacement - Follow Nurse / BPA Driven Protocol (has no administration in time range)   Magnesium Standard Dose Replacement - Follow Nurse / BPA Driven Protocol (has no administration in time range)   Phosphorus Replacement - Follow Nurse / BPA Driven Protocol (has no administration in time range)   Calcium Replacement - Follow Nurse / BPA Driven Protocol (has no administration in time range)   acetaminophen (TYLENOL) tablet 650 mg (has no administration in time range)     Or   acetaminophen (TYLENOL) 160 MG/5ML oral solution 650 mg (has no administration in time range)     Or   acetaminophen (TYLENOL) suppository 650 mg (has no administration in time range)   traMADol (ULTRAM) tablet 50 mg (has no administration in time range)   oxyCODONE (ROXICODONE) immediate release tablet 5 mg (has no administration in time range)   sennosides-docusate (PERICOLACE) 8.6-50 MG per tablet 2 tablet (has no administration in time range)     And   polyethylene glycol (MIRALAX) packet 17 g (has no administration in time range)     And   bisacodyl (DULCOLAX) EC tablet 5 mg (has no administration in time range)     And   bisacodyl (DULCOLAX) suppository 10 mg (has no administration in time range)   ondansetron ODT (ZOFRAN-ODT) disintegrating tablet 4 mg (has no administration in time range)     Or   ondansetron (ZOFRAN) injection 4 mg (has no administration in time range)   calcium carbonate (TUMS) chewable tablet 500 mg (200 mg elemental) (has no administration in time range)   dexAMETHasone (DECADRON) injection 6 mg (has no administration in time range)   albuterol sulfate HFA (PROVENTIL HFA;VENTOLIN HFA;PROAIR HFA) inhaler 2 puff (has no administration in time range)   ipratropium-albuterol (DUO-NEB) nebulizer solution 3 mL (3 mL Nebulization Given 3/18/25 1335)   albuterol (PROVENTIL) nebulizer solution 0.083% 2.5 mg/3mL (2.5 mg Nebulization Given 3/18/25 1331)   dexAMETHasone (DECADRON) injection 10 mg (10 mg  "Intravenous Given 3/18/25 1350)       Imaging results:  XR Chest 1 View  Result Date: 3/18/2025  Impression: No active disease. Electronically Signed: Nathaniel Guerrero MD  3/18/2025 12:52 PM EDT  Workstation ID: [id number]      Social issues:   Social History     Socioeconomic History    Marital status: Legally    Tobacco Use    Smoking status: Former     Current packs/day: 0.00     Average packs/day: 1 pack/day for 51.3 years (51.3 ttl pk-yrs)     Types: Cigarettes     Start date:      Quit date: 2024     Years since quittin.8     Passive exposure: Current    Smokeless tobacco: Never   Vaping Use    Vaping status: Never Used   Substance and Sexual Activity    Alcohol use: Yes     Comment: \"once a month\"    Drug use: Never    Sexual activity: Defer       NIH Stroke Scale:  Interval: (not recorded)  1a. Level of Consciousness: (not recorded)  1b. LOC Questions: (not recorded)  1c. LOC Commands: (not recorded)  2. Best Gaze: (not recorded)  3. Visual: (not recorded)  4. Facial Palsy: (not recorded)  5a. Motor Arm, Left: (not recorded)  5b. Motor Arm, Right: (not recorded)  6a. Motor Leg, Left: (not recorded)  6b. Motor Leg, Right: (not recorded)  7. Limb Ataxia: (not recorded)  8. Sensory: (not recorded)  9. Best Language: (not recorded)  10. Dysarthria: (not recorded)  11. Extinction and Inattention (formerly Neglect): (not recorded)    Total (NIH Stroke Scale): (not recorded)     Additional notable assessment information:     Nursing report ED to floor:  RENEE Martins RN   25 16:51 EDT   "

## 2025-03-18 NOTE — ED PROVIDER NOTES
Subjective   History of Present Illness  Chief complaint weakness short of breath confusion    History of present illness is a 64-year-old gentleman with multiple health problems who was brought to the hospital today for a couple history of increasing shortness of breath cough exposure to COVID although negative test yesterday altered mental status.  He has had a cough but nonproductive.  He has had some feeling hot and cold he has had no vomiting he has had little bit of diarrhea but seem to be black.  He denies any dysuria or frequency he denies any trauma or head injury.  No headache or speech difficulty or weakness to one-sided the other just general weakness.  He denies any long car ride plane ride immobilizations foreign travels antibiotic use.  No trauma.  No leg pain or swelling.      Review of Systems   Constitutional:  Positive for chills, fatigue and fever.   HENT:  Positive for congestion.    Respiratory:  Positive for cough and shortness of breath. Negative for chest tightness.    Cardiovascular:  Negative for chest pain.   Gastrointestinal:  Positive for diarrhea. Negative for abdominal pain.   Genitourinary:  Negative for difficulty urinating and dysuria.   Musculoskeletal:  Negative for back pain.   Skin:  Negative for rash.   Neurological:  Positive for weakness. Negative for facial asymmetry and speech difficulty.   Psychiatric/Behavioral:  Positive for confusion.        Past Medical History:   Diagnosis Date    Asthma     Borderline diabetes     Cerebrovascular accident (CVA) 07/08/2019    COPD (chronic obstructive pulmonary disease)     Dysphagia 08/2022    from parkinsons    Hyperlipidemia     Hypertension     Intertrochanteric fracture of left femur, closed, initial encounter 08/25/2024    Mood disorder     Non-Hodgkin's lymphoma of testis     Parkinson disease     Pneumonia of right upper lobe due to infectious organism 12/04/2022    Poor historian     PVD (peripheral vascular disease) with  claudication 09/20/2024    Shortness of breath     Sleep apnea     does not have a cpap    Status post coronary artery stent placement 02/26/2024    Stroke 2017       No Known Allergies    Past Surgical History:   Procedure Laterality Date    CARDIAC CATHETERIZATION N/A 2/26/2024    Procedure: Left and Right Heart Cath with Coronary Angiography;  Surgeon: Lelo Tello MD;  Location: [facility] CATH INVASIVE LOCATION;  Service: Cardiology;  Laterality: N/A;    CARDIAC CATHETERIZATION N/A 2/26/2024    Procedure: Percutaneous Coronary Intervention;  Surgeon: Lelo Tello MD;  Location: [facility] CATH INVASIVE LOCATION;  Service: Cardiology;  Laterality: N/A;    ENDOSCOPY N/A 01/27/2022    Procedure: ESOPHAGOGASTRODUODENOSCOPY with foreign body removal with gastric, duodenal biopsy and GE junction biopsy;  Surgeon: Pedro De La Rosa MD;  Location: [facility] ENDOSCOPY;  Service: Gastroenterology;  Laterality: N/A;  post: foreign body removal, erosive gastritis with biopsy, erosive eduodenitis with biopsy, esophagitis, GE junction biopsy to rule out malignancy,     ENDOSCOPY N/A 08/23/2022    Procedure: ESOPHAGOGASTRODUODENOSCOPY with GE junction biopsy R/O Barretts, esophageal dilation #48 bougie;  Surgeon: Alexa Subramanian MD;  Location: [facility] ENDOSCOPY;  Service: Gastroenterology;  Laterality: N/A;  duodenal ulcer, GERD,     HIP TROCHANTERIC NAILING WITH INTRAMEDULLARY HIP SCREW Left 8/26/2024    Procedure: Left hip/femur fracture fixation with intramedullary ajit and screws;  Surgeon: Kameron Kaminski MD;  Location: [facility] MAIN OR;  Service: Orthopedics;  Laterality: Left;    INTERVENTIONAL RADIOLOGY PROCEDURE N/A 2/26/2024    Procedure: Intravascular Ultrasound;  Surgeon: Lelo Tello MD;  Location: [facility] CATH INVASIVE LOCATION;  Service: Cardiology;  Laterality: N/A;    ORCHIECTOMY  2019       Family History   Problem Relation Age of Onset    Heart disease Mother     Stroke Mother     No Known Problems Father      "Heart disease Brother     Heart disease Brother        Social History     Socioeconomic History    Marital status: Legally    Tobacco Use    Smoking status: Former     Current packs/day: 0.00     Average packs/day: 1 pack/day for 51.3 years (51.3 ttl pk-yrs)     Types: Cigarettes     Start date:      Quit date: 2024     Years since quittin.8     Passive exposure: Current    Smokeless tobacco: Never   Vaping Use    Vaping status: Never Used   Substance and Sexual Activity    Alcohol use: Yes     Comment: \"once a month\"    Drug use: Never    Sexual activity: Defer     Prior to Admission medications    Medication Sig Start Date End Date Taking? Authorizing Provider   albuterol sulfate  (90 Base) MCG/ACT inhaler Inhale 2 puffs Every 4 (Four) Hours As Needed for Wheezing. 10/10/24   Larry Mireles PA-C   amLODIPine (NORVASC) 5 MG tablet Take 1 tablet by mouth Daily. 24   Floresita Rubi APRN   amoxicillin-clavulanate (AUGMENTIN) 875-125 MG per tablet Take 1 tablet by mouth 2 (Two) Times a Day. 24   Sae Carter MD   aspirin 81 MG EC tablet Take 1 tablet by mouth 2 (Two) Times a Day for 360 doses. 24  Larry Mireles PA-C   atorvastatin (LIPITOR) 20 MG tablet Take 1 tablet by mouth Daily. 24   Larry Mireles PA-C   baclofen (LIORESAL) 10 MG tablet Take 1 tablet by mouth 3 (Three) Times a Day As Needed for Muscle Spasms. 24   Nathaniel Rowe MD   budesonide (Pulmicort) 0.5 MG/2ML nebulizer solution Take 2 mL by nebulization Daily for 360 days. 10/10/24 10/5/25  Larry Mireles PA-C   LUCITA CARB-MAG HYDROX-SIMETH PO Take 30 mL by mouth 3 (Three) Times a Day As Needed.    ProviderMaci MD   calcium carbonate (TUMS) 500 MG chewable tablet Chew 1 tablet 3 (Three) Times a Day As Needed for Indigestion or Heartburn.    ProviderMaci MD   carbidopa-levodopa (SINEMET)  MG per tablet Take 2 tablets by mouth 4 (Four) Times a Day. " Indications: Parkinson's Disease, Parkinsonian-Like Syndrome    Maci Downs MD   carbidopa-levodopa ER (SINEMET CR)  MG per tablet Take 2 tablets by mouth 4 (Four) Times a Day. 2/17/25   Larry Mireles PA-C   carvedilol (COREG) 6.25 MG tablet TAKE 1 TABLET BY MOUTH TWICE A DAY 3/17/25   Floresita Rubi APRN   clonazePAM (KlonoPIN) 0.5 MG tablet Take 1 tablet by mouth 4 (Four) Times a Day. 3/4/25   Larry Mireles PA-C   clopidogrel (PLAVIX) 75 MG tablet TAKE 1 TABLET BY MOUTH EVERY DAY 3/17/25   Floresita Rubi APRN   Cyanocobalamin (Vitamin B-12) 1000 MCG sublingual tablet Take 1 tablet by mouth Daily. 1/16/25   Larry Mireles PA-C   docusate sodium (COLACE) 100 MG capsule Take 1 capsule by mouth 2 (Two) Times a Day As Needed for Constipation.    Maci Downs MD   donepezil (ARICEPT) 10 MG tablet TAKE 1 TABLET BY MOUTH EVERYDAY AT BEDTIME 1/20/25   Larry Mireles PA-C   gabapentin (NEURONTIN) 600 MG tablet Take 1 tablet by mouth 3 (Three) Times a Day. 2/3/25   Larry Mireles PA-C   ipratropium-albuterol (DUO-NEB) 0.5-2.5 mg/3 ml nebulizer Take 3 mL by nebulization Every 6 (Six) Hours. DX J44.9 Medicare B 2/17/25   Larry Mireles PA-C   loperamide (IMODIUM) 2 MG capsule Take 1 capsule by mouth 4 (Four) Times a Day As Needed for Diarrhea.    Maci Downs MD   magnesium hydroxide (MILK OF MAGNESIA) 2400 MG/10ML suspension suspension Take 30 mL by mouth Daily As Needed.    Maci Downs MD   melatonin 5 MG tablet tablet Take 1 tablet by mouth At Night As Needed.    Maci Downs MD   nitroglycerin (NITROSTAT) 0.4 MG SL tablet Place 1 tablet under the tongue Every 5 (Five) Minutes As Needed for Chest Pain (Do not take if systolic BP less than 100 mmHg). Take no more than 3 doses in 15 minutes. 2/27/24   Floresita Rubi APRN   ondansetron (ZOFRAN) 4 MG tablet Take 1 tablet by mouth Every 4 (Four) Hours As Needed for Nausea or Vomiting.    Provider,  "MD Maci   oxyCODONE-acetaminophen (PERCOCET) 7.5-325 MG per tablet Take 1 tablet by mouth Every 6 (Six) Hours As Needed for Moderate Pain. 3/4/25   Larry Mireles PA-C   pantoprazole (PROTONIX) 40 MG EC tablet TAKE 1 TABLET BY MOUTH EVERY DAY 3/17/25   Larry Mireles PA-C   polyethylene glycol (MIRALAX) 17 GM/SCOOP powder Take 17 g by mouth Daily As Needed (Use if senna-docusate is ineffective). 8/28/24   Elizabeth Anna MD   sennosides-docusate (PERICOLACE) 8.6-50 MG per tablet Take 2 tablets by mouth 2 (Two) Times a Day As Needed for Constipation. 8/28/24   Elizabeth Anna MD   simethicone (MYLICON) 80 MG chewable tablet Chew 1 tablet Every 6 (Six) Hours As Needed for Flatulence.    ProviderMaci MD   Syringe 22G X 1-1/2\" 3 ML misc Use 1 syringe Every 30 (Thirty) Days. With testosterone injections 11/14/24   Larry Mireles PA-C   tamsulosin (FLOMAX) 0.4 MG capsule 24 hr capsule TAKE 1 CAPSULE BY MOUTH EVERY DAY 3/17/25   Larry Mireles PA-C   Testosterone Cypionate 200 MG/ML kit Inject 200 mg into the appropriate muscle as directed by prescriber Every 14 (Fourteen) Days. 11/14/24   Larry Mireles PA-C   traMADol (ULTRAM) 50 MG tablet Take 1 tablet by mouth Every 6 (Six) Hours As Needed for Moderate Pain.  Patient not taking: Reported on 11/7/2024    ProviderMaci MD   venlafaxine XR (EFFEXOR-XR) 75 MG 24 hr capsule TAKE 1 CAPSULE BY MOUTH EVERY DAY 12/16/24   Larry Mireles PA-C          Objective   Physical Exam  Constitutional is a 64-year-old awake alert no acute distress triage vital signs have been reviewed.  HEENT extraocular muscles are intact pupils equal round reactive sclera clear the mouth is clear neck supple no adenopathy no JV no bruits lungs diffuse scattered wheezes throughout and some rhonchi throughout but no retractions heart regular without murmur rub abdomen soft nontender good bowel sounds no peritoneal findings or pulsatile masses extremities pulses " equal upper and lower extremities no edema cords or Homans' sign no evidence of DVT skin is warm and dry without rashes neurologic he is awake alert and oriented x 3 no face asymmetry speech normal is no focal weakness  Procedures           ED Course     Results for orders placed or performed during the hospital encounter of 03/18/25   ECG 12 Lead ED Triage Standing Order; Chest Pain    Collection Time: 03/18/25 12:18 PM   Result Value Ref Range    QT Interval 423 ms    QTC Interval 434 ms   Comprehensive Metabolic Panel    Collection Time: 03/18/25 12:22 PM    Specimen: Blood   Result Value Ref Range    Glucose 143 (H) 65 - 99 mg/dL    BUN 11 8 - 23 mg/dL    Creatinine 0.85 0.76 - 1.27 mg/dL    Sodium 140 136 - 145 mmol/L    Potassium 3.7 3.5 - 5.2 mmol/L    Chloride 104 98 - 107 mmol/L    CO2 22.3 22.0 - 29.0 mmol/L    Calcium 9.6 8.6 - 10.5 mg/dL    Total Protein 7.3 6.0 - 8.5 g/dL    Albumin 4.5 3.5 - 5.2 g/dL    ALT (SGPT) 16 1 - 41 U/L    AST (SGOT) 19 1 - 40 U/L    Alkaline Phosphatase 133 (H) 39 - 117 U/L    Total Bilirubin 0.4 0.0 - 1.2 mg/dL    Globulin 2.8 gm/dL    A/G Ratio 1.6 g/dL    BUN/Creatinine Ratio 12.9 7.0 - 25.0    Anion Gap 13.7 5.0 - 15.0 mmol/L    eGFR 95.8 >60.0 mL/min/1.73   High Sensitivity Troponin T    Collection Time: 03/18/25 12:22 PM    Specimen: Blood   Result Value Ref Range    HS Troponin T 13 <22 ng/L   CBC Auto Differential    Collection Time: 03/18/25 12:22 PM    Specimen: Blood   Result Value Ref Range    WBC 6.53 3.40 - 10.80 10*3/mm3    RBC 4.30 4.14 - 5.80 10*6/mm3    Hemoglobin 13.8 13.0 - 17.7 g/dL    Hematocrit 42.4 37.5 - 51.0 %    MCV 98.6 (H) 79.0 - 97.0 fL    MCH 32.1 26.6 - 33.0 pg    MCHC 32.5 31.5 - 35.7 g/dL    RDW 12.0 (L) 12.3 - 15.4 %    RDW-SD 44.1 37.0 - 54.0 fl    MPV 10.2 6.0 - 12.0 fL    Platelets 149 140 - 450 10*3/mm3    Neutrophil % 75.7 42.7 - 76.0 %    Lymphocyte % 16.4 (L) 19.6 - 45.3 %    Monocyte % 7.5 5.0 - 12.0 %    Eosinophil % 0.0 (L) 0.3 -  6.2 %    Basophil % 0.2 0.0 - 1.5 %    Immature Grans % 0.2 0.0 - 0.5 %    Neutrophils, Absolute 4.95 1.70 - 7.00 10*3/mm3    Lymphocytes, Absolute 1.07 0.70 - 3.10 10*3/mm3    Monocytes, Absolute 0.49 0.10 - 0.90 10*3/mm3    Eosinophils, Absolute 0.00 0.00 - 0.40 10*3/mm3    Basophils, Absolute 0.01 0.00 - 0.20 10*3/mm3    Immature Grans, Absolute 0.01 0.00 - 0.05 10*3/mm3    nRBC 0.0 0.0 - 0.2 /100 WBC   BNP    Collection Time: 03/18/25 12:22 PM    Specimen: Blood   Result Value Ref Range    proBNP 195.0 0.0 - 900.0 pg/mL   Green Top (Gel)    Collection Time: 03/18/25 12:22 PM   Result Value Ref Range    Extra Tube Hold for add-ons.    Lavender Top    Collection Time: 03/18/25 12:22 PM   Result Value Ref Range    Extra Tube hold for add-on    Gold Top - SST    Collection Time: 03/18/25 12:22 PM   Result Value Ref Range    Extra Tube Hold for add-ons.    Light Blue Top    Collection Time: 03/18/25 12:22 PM   Result Value Ref Range    Extra Tube Hold for add-ons.    Respiratory Panel PCR w/COVID-19(SARS-CoV-2) [facility]STA/TYRONE/PAD/COR/PEBBLES In-House, NP Swab in UTM/VTM, 2 HR TAT - Swab, Nasopharynx    Collection Time: 03/18/25 12:24 PM    Specimen: Nasopharynx; Swab   Result Value Ref Range    ADENOVIRUS, PCR Not Detected Not Detected    Coronavirus 229E Not Detected Not Detected    Coronavirus HKU1 Not Detected Not Detected    Coronavirus NL63 Not Detected Not Detected    Coronavirus OC43 Not Detected Not Detected    COVID19 Detected (C) Not Detected - Ref. Range    Human Metapneumovirus Not Detected Not Detected    Human Rhinovirus/Enterovirus Not Detected Not Detected    Influenza A PCR Not Detected Not Detected    Influenza B PCR Not Detected Not Detected    Parainfluenza Virus 1 Not Detected Not Detected    Parainfluenza Virus 2 Not Detected Not Detected    Parainfluenza Virus 3 Not Detected Not Detected    Parainfluenza Virus 4 Not Detected Not Detected    RSV, PCR Not Detected Not Detected    Bordetella pertussis  pcr Not Detected Not Detected    Bordetella parapertussis PCR Not Detected Not Detected    Chlamydophila pneumoniae PCR Not Detected Not Detected    Mycoplasma pneumo by PCR Not Detected Not Detected   POC Lactate    Collection Time: 03/18/25 12:40 PM    Specimen: Blood   Result Value Ref Range    Lactate 1.5 0.3 - 2.0 mmol/L   Ammonia    Collection Time: 03/18/25 12:46 PM    Specimen: Blood   Result Value Ref Range    Ammonia 30 16 - 60 umol/L   Blood Gas, Arterial -    Collection Time: 03/18/25 12:47 PM    Specimen: Arterial Blood   Result Value Ref Range    Site Right Radial     Basilio's Test Positive     pH, Arterial 7.376 7.350 - 7.450 pH units    pCO2, Arterial 43.9 35.0 - 48.0 mm Hg    pO2, Arterial 83.4 83.0 - 108.0 mm Hg    HCO3, Arterial 25.8 21.0 - 28.0 mmol/L    Base Excess, Arterial 0.2 0.0 - 3.0 mmol/L    O2 Saturation, Arterial 95.9 94.0 - 98.0 %    CO2 Content 27.1 22 - 29 mmol/L    Barometric Pressure for Blood Gas      Modality Room Air     FIO2 21 %    Hemodilution No     PO2/FIO2 397 0 - 500   Urinalysis With Culture If Indicated - Urine, Clean Catch    Collection Time: 03/18/25 12:51 PM    Specimen: Urine, Clean Catch   Result Value Ref Range    Color, UA Dark Yellow (A) Yellow, Straw    Appearance, UA Hazy (A) Clear    pH, UA <=5.0 5.0 - 8.0    Specific Gravity, UA 1.032 (H) 1.005 - 1.030    Glucose, UA Negative Negative    Ketones, UA 15 mg/dL (1+) (A) Negative    Bilirubin, UA Small (1+) (A) Negative    Blood, UA Negative Negative    Protein, UA Trace (A) Negative    Leuk Esterase, UA Negative Negative    Nitrite, UA Negative Negative    Urobilinogen, UA 1.0 E.U./dL 0.2 - 1.0 E.U./dL     XR Chest 1 View  Result Date: 3/18/2025  Impression: No active disease. Electronically Signed: Nathaniel Guerrero MD  3/18/2025 12:52 PM EDT  Workstation ID: [id number]    Medications   sodium chloride 0.9 % flush 10 mL (has no administration in time range)   aspirin chewable tablet 324 mg (324 mg Oral Not Given  3/18/25 1225)   sodium chloride 0.9 % flush 10 mL (has no administration in time range)   dexAMETHasone (DECADRON) injection 10 mg (has no administration in time range)   ipratropium-albuterol (DUO-NEB) nebulizer solution 3 mL (3 mL Nebulization Given 3/18/25 1335)   albuterol (PROVENTIL) nebulizer solution 0.083% 2.5 mg/3mL (2.5 mg Nebulization Given 3/18/25 1331)                                         EKG my interpretation normal sinus rhythm rate 63 normal axis hypertrophy QTc of 434 normal EKG unchanged from 9/23/2024              Medical Decision Making  Medical Decision Making IV established monitor placement review of sinus rhythm EKG obtained my interpretation normal sinus rhythm rate of 63 normal axis hypertrophy QTc of 434 normal unchanged from 9/23/2024.  Patient given a DuoNeb and an albuterol and tendon directed on IV.  Chest x-ray my independent view no pneumonia pneumothorax failure acute findings.  Radiology review the same labs obtained by independent review comprehensive metabolic profile unremarkable troponin normal CBC normal proBNP 195 cultures pending respiratory panel was positive for COVID-19.  Other labs independently reviewed by me lactic acid was 1.5 ammonia 30 blood gas pH 7.3 pCO2 of 43 pO2 of 83.  Urine negative.  Patient repeat examination still has some diffuse scattered wheezes throughout.  The patient is been extremely weak to the point where he cannot hardly function has been falling and has been short of breath.  I do not see notes just acute DVT pulmonary embolism or dissection myocardial infarction pericarditis myocarditis pericardial effusion based on my history and physical clinical findings although not a complete list of all possibilities.  Patient will be admitted to the hospital I talked to the hospitalist we discussed the case stable otherwise unremarkable improved ER course patient agreeable    Problems Addressed:  Acute respiratory distress: complicated acute illness  or injury  Chronic obstructive pulmonary disease with acute exacerbation: complicated acute illness or injury  COVID-19: complicated acute illness or injury  Fall, initial encounter: complicated acute illness or injury  Weakness: complicated acute illness or injury    Amount and/or Complexity of Data Reviewed  External Data Reviewed: ECG.  Labs: ordered. Decision-making details documented in ED Course.  Radiology: ordered and independent interpretation performed. Decision-making details documented in ED Course.  ECG/medicine tests: ordered and independent interpretation performed. Decision-making details documented in ED Course.    Risk  Decision regarding hospitalization.        Final diagnoses:   Weakness   Chronic obstructive pulmonary disease with acute exacerbation   Acute respiratory distress   Fall, initial encounter   COVID-19       ED Disposition  ED Disposition       ED Disposition   Decision to Admit    Condition   --    Comment   Level of Care: Telemetry [5]   Admitting Physician: LUIS ANTONIO CHASE [[id number]]   Attending Physician: LUIS ANTONIO CHASE [[id number]]                 No follow-up provider specified.       Medication List      No changes were made to your prescriptions during this visit.            Nathaniel Kennedy MD  03/18/25 [id number]

## 2025-03-18 NOTE — H&P
[facility] Medicine Services  History & Physical    Patient Name: Andrew Flores  : 1960  MRN: [id number]  Primary Care Physician:  Larry Mireles PA-C  Date of admission: 3/18/2025  Date and Time of Service: 3/18/2025 at 1344    Subjective      Chief Complaint: Weakness    History of Present Illness: Andrew Flores is a 64 y.o. male with a CMH of asthma, CVA, hypertension, hyperlipidemia, mood disorder, Parkinson's disease, PVD who presented to [facility] on 3/18/2025 with weakness.    Mr. Flores is a 64-year-old gentleman as above.  He has been complaining of shortness of breath.  He was recently exposed to COVID-19.  He currently has a cough is nonproductive.  He feels very weak and has had frequent falls.  He has required rehab in the past after strokes.    Review of Systems    Review of Systems   Constitutional:  Positive for chills, fatigue and fever.   HENT:  Positive for congestion.    Respiratory:  Positive for cough and shortness of breath. Negative for chest tightness.    Cardiovascular:  Negative for chest pain.   Gastrointestinal:  Positive for diarrhea. Negative for abdominal pain.   Genitourinary:  Negative for difficulty urinating and dysuria.   Musculoskeletal:  Negative for back pain.   Skin:  Negative for rash.   Neurological:  Positive for weakness. Negative for facial asymmetry and speech difficulty.   Psychiatric/Behavioral:  Positive for confusion    Personal History     Past Medical History:   Diagnosis Date    Asthma     Borderline diabetes     Cerebrovascular accident (CVA) 2019    COPD (chronic obstructive pulmonary disease)     Dysphagia 2022    from parkinsons    Hyperlipidemia     Hypertension     Intertrochanteric fracture of left femur, closed, initial encounter 2024    Mood disorder     Non-Hodgkin's lymphoma of testis     Parkinson disease     Pneumonia of right upper lobe due to infectious organism 2022    Poor historian     PVD  (peripheral vascular disease) with claudication 09/20/2024    Shortness of breath     Sleep apnea     does not have a cpap    Status post coronary artery stent placement 02/26/2024    Stroke 2017       Past Surgical History:   Procedure Laterality Date    CARDIAC CATHETERIZATION N/A 2/26/2024    Procedure: Left and Right Heart Cath with Coronary Angiography;  Surgeon: Lelo Tello MD;  Location: [facility] CATH INVASIVE LOCATION;  Service: Cardiology;  Laterality: N/A;    CARDIAC CATHETERIZATION N/A 2/26/2024    Procedure: Percutaneous Coronary Intervention;  Surgeon: Lelo Tello MD;  Location: [facility] CATH INVASIVE LOCATION;  Service: Cardiology;  Laterality: N/A;    ENDOSCOPY N/A 01/27/2022    Procedure: ESOPHAGOGASTRODUODENOSCOPY with foreign body removal with gastric, duodenal biopsy and GE junction biopsy;  Surgeon: Pedro De La Rosa MD;  Location: [facility] ENDOSCOPY;  Service: Gastroenterology;  Laterality: N/A;  post: foreign body removal, erosive gastritis with biopsy, erosive eduodenitis with biopsy, esophagitis, GE junction biopsy to rule out malignancy,     ENDOSCOPY N/A 08/23/2022    Procedure: ESOPHAGOGASTRODUODENOSCOPY with GE junction biopsy R/O Barretts, esophageal dilation #48 bougie;  Surgeon: Alexa Subramanian MD;  Location: [facility] ENDOSCOPY;  Service: Gastroenterology;  Laterality: N/A;  duodenal ulcer, GERD,     HIP TROCHANTERIC NAILING WITH INTRAMEDULLARY HIP SCREW Left 8/26/2024    Procedure: Left hip/femur fracture fixation with intramedullary ajit and screws;  Surgeon: Kameron Kaminski MD;  Location: [facility] MAIN OR;  Service: Orthopedics;  Laterality: Left;    INTERVENTIONAL RADIOLOGY PROCEDURE N/A 2/26/2024    Procedure: Intravascular Ultrasound;  Surgeon: Lelo Tello MD;  Location: [facility] CATH INVASIVE LOCATION;  Service: Cardiology;  Laterality: N/A;    ORCHIECTOMY  2019       Family History: family history includes Heart disease in his brother, brother, and mother; No Known Problems  in his father; Stroke in his mother. Otherwise pertinent FHx was reviewed and not pertinent to current issue.    Social History:  reports that he quit smoking about 10 months ago. His smoking use included cigarettes. He started smoking about 52 years ago. He has a 51.3 pack-year smoking history. He has been exposed to tobacco smoke. He has never used smokeless tobacco. He reports current alcohol use. He reports that he does not use drugs.    Home Medications:  Prior to Admission Medications       Prescriptions Last Dose Informant Patient Reported? Taking?    albuterol sulfate  (90 Base) MCG/ACT inhaler   No Yes    Inhale 2 puffs Every 4 (Four) Hours As Needed for Wheezing.    amLODIPine (NORVASC) 5 MG tablet 3/18/2025  No Yes    Take 1 tablet by mouth Daily.    aspirin 81 MG EC tablet 3/18/2025  No Yes    Take 1 tablet by mouth 2 (Two) Times a Day for 360 doses.    atorvastatin (LIPITOR) 20 MG tablet 3/18/2025  No Yes    Take 1 tablet by mouth Daily.    budesonide (Pulmicort) 0.5 MG/2ML nebulizer solution 3/18/2025  No Yes    Take 2 mL by nebulization Daily for 360 days.    carbidopa-levodopa (SINEMET)  MG per tablet 3/18/2025  Yes Yes    Take 2 tablets by mouth 4 (Four) Times a Day. Indications: Parkinson's Disease, Parkinsonian-Like Syndrome    carbidopa-levodopa ER (SINEMET CR)  MG per tablet 3/18/2025  No Yes    Take 2 tablets by mouth 4 (Four) Times a Day.    carvedilol (COREG) 6.25 MG tablet 3/18/2025  No Yes    TAKE 1 TABLET BY MOUTH TWICE A DAY    clonazePAM (KlonoPIN) 0.5 MG tablet 3/18/2025  No Yes    Take 1 tablet by mouth 4 (Four) Times a Day.    clopidogrel (PLAVIX) 75 MG tablet 3/18/2025  No Yes    TAKE 1 TABLET BY MOUTH EVERY DAY    Cyanocobalamin (Vitamin B-12) 1000 MCG sublingual tablet 3/18/2025  No Yes    Take 1 tablet by mouth Daily.    docusate sodium (COLACE) 100 MG capsule   Yes Yes    Take 1 capsule by mouth 2 (Two) Times a Day As Needed for Constipation.    donepezil  (ARICEPT) 10 MG tablet 3/17/2025  No Yes    TAKE 1 TABLET BY MOUTH EVERYDAY AT BEDTIME    gabapentin (NEURONTIN) 600 MG tablet 3/18/2025  No Yes    Take 1 tablet by mouth 3 (Three) Times a Day.    ipratropium-albuterol (DUO-NEB) 0.5-2.5 mg/3 ml nebulizer 3/18/2025  No Yes    Take 3 mL by nebulization Every 6 (Six) Hours. DX J44.9 Medicare B    melatonin 5 MG tablet tablet 3/17/2025  Yes Yes    Take 1 tablet by mouth At Night As Needed.    nitroglycerin (NITROSTAT) 0.4 MG SL tablet   No Yes    Place 1 tablet under the tongue Every 5 (Five) Minutes As Needed for Chest Pain (Do not take if systolic BP less than 100 mmHg). Take no more than 3 doses in 15 minutes.    ondansetron (ZOFRAN) 4 MG tablet   Yes Yes    Take 1 tablet by mouth Every 4 (Four) Hours As Needed for Nausea or Vomiting.    oxyCODONE-acetaminophen (PERCOCET) 7.5-325 MG per tablet   No Yes    Take 1 tablet by mouth Every 6 (Six) Hours As Needed for Moderate Pain.    pantoprazole (PROTONIX) 40 MG EC tablet 3/18/2025  No Yes    TAKE 1 TABLET BY MOUTH EVERY DAY    polyethylene glycol (MIRALAX) 17 GM/SCOOP powder   No Yes    Take 17 g by mouth Daily As Needed (Use if senna-docusate is ineffective).    tamsulosin (FLOMAX) 0.4 MG capsule 24 hr capsule 3/18/2025  No Yes    TAKE 1 CAPSULE BY MOUTH EVERY DAY    venlafaxine XR (EFFEXOR-XR) 75 MG 24 hr capsule 3/18/2025  No Yes    TAKE 1 CAPSULE BY MOUTH EVERY DAY    Testosterone Cypionate 200 MG/ML kit More than a month  No No    Inject 200 mg into the appropriate muscle as directed by prescriber Every 14 (Fourteen) Days.              Allergies:  No Known Allergies    Objective      Vitals:   Temp:  [98.8 °F (37.1 °C)-99.1 °F (37.3 °C)] 99.1 °F (37.3 °C)  Heart Rate:  [58-67] 61  Resp:  [20-28] 21  BP: (123-137)/() 125/79  Body mass index is 30.24 kg/m².  Physical Exam    GENERAL APPEARANCE ill-appearing, older than stated age    EYES lids/conjunctiva normal. EARS/NOSE/THROAT Mucous membranes dry, nares  normal, lips/teeth     HEAD/NECK normocephalic atraumatic, no facial trauma, neck is supple.    RESPIRATORY respiratory effort normal, speaks in full sentences, no tripod position, no accessory muscle use. Lungs clear to auscultation without rhonchi, wheezes, rales    CARDIAC Regular rate and rhythm, no edema.    ABDOMINAL Soft, ND/NT. No evidence of fluid wave. No pulsatile masses on exam, rebound tenderness, Farnsworth sign or pain over Mcburney's point.    MUSCLES/EXTREMITIES No abnormal range of motion, no swelling.    SKIN Warm, pink and dry. No rashes, dermatoses, petechiae or lesions.    NEUROLOGICAL Speech is clear and appropriate. Normal level of consciousness. Gait and coordination are normal.  3/5 strength in all extremities.    PSYCH Normal mood and affect. Judgement/competence is appropriate    Diagnostic Data:  Lab Results (last 24 hours)       Procedure Component Value Units Date/Time    High Sensitivity Troponin T 1Hr [[id number]]  (Normal) Collected: 03/18/25 1351    Specimen: Blood Updated: 03/18/25 1419     HS Troponin T 11 ng/L      Troponin T Numeric Delta -2 ng/L     Narrative:      High Sensitive Troponin T Reference Range:  <14.0 ng/L- Negative Female for AMI  <22.0 ng/L- Negative Male for AMI  >=14 - Abnormal Female indicating possible myocardial injury.  >=22 - Abnormal Male indicating possible myocardial injury.   Clinicians would have to utilize clinical acumen, EKG, Troponin, and serial changes to determine if it is an Acute Myocardial Infarction or myocardial injury due to an underlying chronic condition.         Respiratory Panel PCR w/COVID-19(SARS-CoV-2) TRISTON/MODESTA/TYRONE/PAD/COR/PEBBLES In-House, NP Swab in [facility], 2 HR TAT - Swab, Nasopharynx [[id number]]  (Abnormal) Collected: 03/18/25 1224    Specimen: Swab from Nasopharynx Updated: 03/18/25 1323     ADENOVIRUS, PCR Not Detected     Coronavirus 229E Not Detected     Coronavirus HKU1 Not Detected     Coronavirus NL63 Not Detected     Coronavirus  OC43 Not Detected     COVID19 Detected     Human Metapneumovirus Not Detected     Human Rhinovirus/Enterovirus Not Detected     Influenza A PCR Not Detected     Influenza B PCR Not Detected     Parainfluenza Virus 1 Not Detected     Parainfluenza Virus 2 Not Detected     Parainfluenza Virus 3 Not Detected     Parainfluenza Virus 4 Not Detected     RSV, PCR Not Detected     Bordetella pertussis pcr Not Detected     Bordetella parapertussis PCR Not Detected     Chlamydophila pneumoniae PCR Not Detected     Mycoplasma pneumo by PCR Not Detected    Narrative:      In the setting of a positive respiratory panel with a viral infection PLUS a negative procalcitonin without other underlying concern for bacterial infection, consider observing off antibiotics or discontinuation of antibiotics and continue supportive care. If the respiratory panel is positive for atypical bacterial infection (Bordetella pertussis, Chlamydophila pneumoniae, or Mycoplasma pneumoniae), consider antibiotic de-escalation to target atypical bacterial infection.    Ammonia [[id number]]  (Normal) Collected: 03/18/25 1246    Specimen: Blood Updated: 03/18/25 1314     Ammonia 30 umol/L     Urinalysis With Culture If Indicated - Urine, Clean Catch [[id number]]  (Abnormal) Collected: 03/18/25 1251    Specimen: Urine, Clean Catch Updated: 03/18/25 1259     Color, UA Dark Yellow     Appearance, UA Hazy     pH, UA <=5.0     Specific Gravity, UA 1.032     Glucose, UA Negative     Ketones, UA 15 mg/dL (1+)     Bilirubin, UA Small (1+)     Comment: Confirmation testing is unavailable.  A serum bilirubin is recommended for further assessment.        Blood, UA Negative     Protein, UA Trace     Leuk Esterase, UA Negative     Nitrite, UA Negative     Urobilinogen, UA 1.0 E.U./dL    Narrative:      In absence of clinical symptoms, the presence of pyuria, bacteria, and/or nitrites on the urinalysis result does not correlate with infection.  Urine microscopic not  indicated.    High Sensitivity Troponin T [[id number]]  (Normal) Collected: 03/18/25 1222    Specimen: Blood Updated: 03/18/25 1257     HS Troponin T 13 ng/L     Narrative:      High Sensitive Troponin T Reference Range:  <14.0 ng/L- Negative Female for AMI  <22.0 ng/L- Negative Male for AMI  >=14 - Abnormal Female indicating possible myocardial injury.  >=22 - Abnormal Male indicating possible myocardial injury.   Clinicians would have to utilize clinical acumen, EKG, Troponin, and serial changes to determine if it is an Acute Myocardial Infarction or myocardial injury due to an underlying chronic condition.         BNP [[id number]]  (Normal) Collected: 03/18/25 1222    Specimen: Blood Updated: 03/18/25 1257     proBNP 195.0 pg/mL     Narrative:      This assay is used as an aid in the diagnosis of individuals suspected of having heart failure. It can be used as an aid in the diagnosis of acute decompensated heart failure (ADHF) in patients presenting with signs and symptoms of ADHF to the emergency department (ED). In addition, NT-proBNP of <300 pg/mL indicates ADHF is not likely.    Age Range Result Interpretation  NT-proBNP Concentration (pg/mL:      <50             Positive            >450                   Gray                 300-450                    Negative             <300    50-75           Positive            >900                  Gray                300-900                  Negative            <300      >75             Positive            >1800                  Gray                300-1800                  Negative            <300    Comprehensive Metabolic Panel [[id number]]  (Abnormal) Collected: 03/18/25 1222    Specimen: Blood Updated: 03/18/25 1257     Glucose 143 mg/dL      BUN 11 mg/dL      Creatinine 0.85 mg/dL      Sodium 140 mmol/L      Potassium 3.7 mmol/L      Chloride 104 mmol/L      CO2 22.3 mmol/L      Calcium 9.6 mg/dL      Total Protein 7.3 g/dL      Albumin 4.5 g/dL      ALT (SGPT) 16  U/L      AST (SGOT) 19 U/L      Alkaline Phosphatase 133 U/L      Total Bilirubin 0.4 mg/dL      Globulin 2.8 gm/dL      A/G Ratio 1.6 g/dL      BUN/Creatinine Ratio 12.9     Anion Gap 13.7 mmol/L      eGFR 95.8 mL/min/1.73     Narrative:      GFR Categories in Chronic Kidney Disease (CKD)      GFR Category          GFR (mL/min/1.73)    Interpretation  G1                     90 or greater         Normal or high (1)  G2                      60-89                Mild decrease (1)  G3a                   45-59                Mild to moderate decrease  G3b                   30-44                Moderate to severe decrease  G4                    15-29                Severe decrease  G5                    14 or less           Kidney failure          (1)In the absence of evidence of kidney disease, neither GFR category G1 or G2 fulfill the criteria for CKD.    eGFR calculation 2021 CKD-EPI creatinine equation, which does not include race as a factor    Blood Culture - Blood, Arm, Left [[id number]] Collected: 03/18/25 1223    Specimen: Blood from Arm, Left Updated: 03/18/25 1252    Blood Gas, Arterial - [[id number]] Collected: 03/18/25 1247    Specimen: Arterial Blood Updated: 03/18/25 1251     Site Right Radial     Basilio's Test Positive     pH, Arterial 7.376 pH units      pCO2, Arterial 43.9 mm Hg      pO2, Arterial 83.4 mm Hg      HCO3, Arterial 25.8 mmol/L      Base Excess, Arterial 0.2 mmol/L      Comment: Serial Number: [id number]:  [id number]        O2 Saturation, Arterial 95.9 %      CO2 Content 27.1 mmol/L      Barometric Pressure for Blood Gas --     Comment: N/A        Modality Room Air     FIO2 21 %      Hemodilution No     PO2/FIO2 397    POC Lactate [[id number]]  (Normal) Collected: 03/18/25 1240    Specimen: Blood Updated: 03/18/25 1242     Lactate 1.5 mmol/L      Comment: Serial Number: [id number]:  [id number]       CBC & Differential [[id number]]  (Abnormal) Collected: 03/18/25 1222    Specimen: Blood  Updated: 03/18/25 1233    Narrative:      The following orders were created for panel order CBC & Differential.  Procedure                               Abnormality         Status                     ---------                               -----------         ------                     CBC Auto Differential[[id number]]        Abnormal            Final result                 Please view results for these tests on the individual orders.    CBC Auto Differential [[id number]]  (Abnormal) Collected: 03/18/25 1222    Specimen: Blood Updated: 03/18/25 1233     WBC 6.53 10*3/mm3      RBC 4.30 10*6/mm3      Hemoglobin 13.8 g/dL      Hematocrit 42.4 %      MCV 98.6 fL      MCH 32.1 pg      MCHC 32.5 g/dL      RDW 12.0 %      RDW-SD 44.1 fl      MPV 10.2 fL      Platelets 149 10*3/mm3      Neutrophil % 75.7 %      Lymphocyte % 16.4 %      Monocyte % 7.5 %      Eosinophil % 0.0 %      Basophil % 0.2 %      Immature Grans % 0.2 %      Neutrophils, Absolute 4.95 10*3/mm3      Lymphocytes, Absolute 1.07 10*3/mm3      Monocytes, Absolute 0.49 10*3/mm3      Eosinophils, Absolute 0.00 10*3/mm3      Basophils, Absolute 0.01 10*3/mm3      Immature Grans, Absolute 0.01 10*3/mm3      nRBC 0.0 /100 WBC     Robson Draw [[id number]] Collected: 03/18/25 1222    Specimen: Blood Updated: 03/18/25 1231    Narrative:      The following orders were created for panel order Robson Draw.  Procedure                               Abnormality         Status                     ---------                               -----------         ------                     Green Top (Gel)[[id number]]                                  Final result               Lavender Top[[id number]]                                     Final result               Gold Top - SST[[id number]]                                   Final result               Light Blue Top[[id number]]                                   Final result                 Please view results for these tests on the  individual orders.    Green Top (Gel) [[id number]] Collected: 03/18/25 1222    Specimen: Blood Updated: 03/18/25 1231     Extra Tube Hold for add-ons.     Comment: Auto resulted.       Lavender Top [[id number]] Collected: 03/18/25 1222    Specimen: Blood Updated: 03/18/25 1231     Extra Tube hold for add-on     Comment: Auto resulted       Gold Top - SST [[id number]] Collected: 03/18/25 1222    Specimen: Blood Updated: 03/18/25 1231     Extra Tube Hold for add-ons.     Comment: Auto resulted.       Light Blue Top [[id number]] Collected: 03/18/25 1222    Specimen: Blood Updated: 03/18/25 1231     Extra Tube Hold for add-ons.     Comment: Auto resulted       Blood Culture - Blood, Arm, Left [[id number]] Collected: 03/18/25 1229    Specimen: Blood from Arm, Left Updated: 03/18/25 1229             Imaging Results (Last 24 Hours)       Procedure Component Value Units Date/Time    XR Chest 1 View [[id number]] Collected: 03/18/25 1252     Updated: 03/18/25 1254    Narrative:      XR CHEST 1 VW    Date of Exam: 3/18/2025 12:46 PM EDT    Indication: Chest Pain Triage Protocol    Comparison: 9/23/2024    Findings:  Heart size is within normal limits and unchanged from the last study. The pulmonary vascular markings in the chest appear normal. The lungs appear clear with no acute process identified.      Impression:      Impression:  No active disease.        Electronically Signed: Nathaniel Guerrero MD    3/18/2025 12:52 PM EDT    Workstation ID: [id number]              Assessment & Plan        This is a 64 y.o. male with: COVID-19    #COVID-19  -Without consistent hypoxemia  -With  debility  -With significant risk factors for decompensation  -PT/OT/speech consulted  -Decadron 6 mg IV daily  -Does not need remdesivir given lack of hypoxemia    # Diabetes  -Fingerstick blood glucose 4 times daily ACHS with Glucomander    # COPD with acute exacerbation  -Scheduled nebs  -Scheduled Decadron    # CAD  -Pending reconciliation but will resume  home antiplatelet, beta-blocker, statin    # Parkinson's disease  -Pending med reconciliation but will resume carbidopa-levodopa    # GERD  -Will resume home Protonix once reconciled    # Hypertension  -Will resume home amlodipine once reconciled    # Peripheral neuropathy, diabetic  -Will resume home gabapentin once reconciled    # Neurocognitive dysfunction  -Will resume home donepezil once reconciled    VTE Prophylaxis:  Lovenox        The patient desires to be as follows:    CODE STATUS: Full code       Admission Status:  I believe this patient meets inpatient status.    Expected Length of Stay: 2 midnights or greater    PDMP and Medication Dispenses via Sidebar reviewed and consistent with patient reported medications.    I discussed the patient's findings and my recommendations with patient and.      Signature:     This document has been electronically signed by Balwinder Cortez DO on March 18, 2025 15:44 EDT   [facility] Hospitalist Team

## 2025-03-18 NOTE — Clinical Note
Level of Care: Telemetry [5]   Admitting Physician: LUIS ANTONIO CHASE [[id number]]   Attending Physician: LUIS ANTONIO CHASE [[id number]]

## 2025-03-19 LAB
ANION GAP SERPL CALCULATED.3IONS-SCNC: 12.1 MMOL/L (ref 5–15)
BUN SERPL-MCNC: 11 MG/DL (ref 8–23)
BUN/CREAT SERPL: 16.7 (ref 7–25)
CALCIUM SPEC-SCNC: 9.7 MG/DL (ref 8.6–10.5)
CHLORIDE SERPL-SCNC: 103 MMOL/L (ref 98–107)
CO2 SERPL-SCNC: 24.9 MMOL/L (ref 22–29)
CREAT SERPL-MCNC: 0.66 MG/DL (ref 0.76–1.27)
DEPRECATED RDW RBC AUTO: 42.5 FL (ref 37–54)
EGFRCR SERPLBLD CKD-EPI 2021: 103.4 ML/MIN/1.73
ERYTHROCYTE [DISTWIDTH] IN BLOOD BY AUTOMATED COUNT: 11.9 % (ref 12.3–15.4)
GLUCOSE SERPL-MCNC: 209 MG/DL (ref 65–99)
HCT VFR BLD AUTO: 39.9 % (ref 37.5–51)
HGB BLD-MCNC: 13.1 G/DL (ref 13–17.7)
MAGNESIUM SERPL-MCNC: 1.8 MG/DL (ref 1.6–2.4)
MCH RBC QN AUTO: 32 PG (ref 26.6–33)
MCHC RBC AUTO-ENTMCNC: 32.8 G/DL (ref 31.5–35.7)
MCV RBC AUTO: 97.3 FL (ref 79–97)
PHOSPHATE SERPL-MCNC: 2.8 MG/DL (ref 2.5–4.5)
PLATELET # BLD AUTO: 169 10*3/MM3 (ref 140–450)
PMV BLD AUTO: 10.4 FL (ref 6–12)
POTASSIUM SERPL-SCNC: 4 MMOL/L (ref 3.5–5.2)
QT INTERVAL: 423 MS
QTC INTERVAL: 434 MS
RBC # BLD AUTO: 4.1 10*6/MM3 (ref 4.14–5.8)
SODIUM SERPL-SCNC: 140 MMOL/L (ref 136–145)
WBC NRBC COR # BLD AUTO: 5.08 10*3/MM3 (ref 3.4–10.8)

## 2025-03-19 PROCEDURE — 94799 UNLISTED PULMONARY SVC/PX: CPT

## 2025-03-19 PROCEDURE — 25010000002 DEXAMETHASONE PER 1 MG: Performed by: FAMILY MEDICINE

## 2025-03-19 PROCEDURE — 94664 DEMO&/EVAL PT USE INHALER: CPT

## 2025-03-19 PROCEDURE — 25010000002 ENOXAPARIN PER 10 MG: Performed by: FAMILY MEDICINE

## 2025-03-19 PROCEDURE — 94761 N-INVAS EAR/PLS OXIMETRY MLT: CPT

## 2025-03-19 PROCEDURE — 92526 ORAL FUNCTION THERAPY: CPT

## 2025-03-19 RX ORDER — CARBIDOPA AND LEVODOPA 25; 100 MG/1; MG/1
2 TABLET, EXTENDED RELEASE ORAL NIGHTLY
Status: DISCONTINUED | OUTPATIENT
Start: 2025-03-19 | End: 2025-03-20 | Stop reason: HOSPADM

## 2025-03-19 RX ADMIN — ALBUTEROL SULFATE 2 PUFF: 108 AEROSOL, METERED RESPIRATORY (INHALATION) at 15:50

## 2025-03-19 RX ADMIN — CARVEDILOL 6.25 MG: 6.25 TABLET, FILM COATED ORAL at 17:22

## 2025-03-19 RX ADMIN — ENOXAPARIN SODIUM 40 MG: 100 INJECTION SUBCUTANEOUS at 17:13

## 2025-03-19 RX ADMIN — ATORVASTATIN CALCIUM 20 MG: 20 TABLET, FILM COATED ORAL at 09:46

## 2025-03-19 RX ADMIN — ALBUTEROL SULFATE 2 PUFF: 108 AEROSOL, METERED RESPIRATORY (INHALATION) at 20:09

## 2025-03-19 RX ADMIN — OXYCODONE HYDROCHLORIDE 5 MG: 5 TABLET ORAL at 05:58

## 2025-03-19 RX ADMIN — Medication 10 ML: at 09:46

## 2025-03-19 RX ADMIN — GABAPENTIN 600 MG: 300 CAPSULE ORAL at 21:30

## 2025-03-19 RX ADMIN — AMLODIPINE BESYLATE 5 MG: 5 TABLET ORAL at 09:46

## 2025-03-19 RX ADMIN — GABAPENTIN 600 MG: 300 CAPSULE ORAL at 05:58

## 2025-03-19 RX ADMIN — CARBIDOPA AND LEVODOPA 2 TABLET: 25; 100 TABLET ORAL at 13:25

## 2025-03-19 RX ADMIN — ASPIRIN 81 MG: 81 TABLET, COATED ORAL at 09:47

## 2025-03-19 RX ADMIN — CARBIDOPA AND LEVODOPA 2 TABLET: 25; 100 TABLET ORAL at 21:29

## 2025-03-19 RX ADMIN — Medication 10 ML: at 21:34

## 2025-03-19 RX ADMIN — CARBIDOPA AND LEVODOPA 2 TABLET: 25; 100 TABLET ORAL at 17:23

## 2025-03-19 RX ADMIN — DEXAMETHASONE SODIUM PHOSPHATE 6 MG: 4 INJECTION, SOLUTION INTRAMUSCULAR; INTRAVENOUS at 09:47

## 2025-03-19 RX ADMIN — ALBUTEROL SULFATE 2 PUFF: 108 AEROSOL, METERED RESPIRATORY (INHALATION) at 12:29

## 2025-03-19 RX ADMIN — TAMSULOSIN HYDROCHLORIDE 0.4 MG: 0.4 CAPSULE ORAL at 09:46

## 2025-03-19 RX ADMIN — ALBUTEROL SULFATE 2 PUFF: 108 AEROSOL, METERED RESPIRATORY (INHALATION) at 07:33

## 2025-03-19 RX ADMIN — CARBIDOPA AND LEVODOPA 2 TABLET: 25; 100 TABLET ORAL at 09:47

## 2025-03-19 RX ADMIN — CLOPIDOGREL BISULFATE 75 MG: 75 TABLET ORAL at 09:46

## 2025-03-19 RX ADMIN — VENLAFAXINE HYDROCHLORIDE 75 MG: 75 CAPSULE, EXTENDED RELEASE ORAL at 09:46

## 2025-03-19 RX ADMIN — ASPIRIN 81 MG: 81 TABLET, COATED ORAL at 21:30

## 2025-03-19 RX ADMIN — CARBIDOPA AND LEVODOPA 2 TABLET: 25; 100 TABLET, EXTENDED RELEASE ORAL at 21:29

## 2025-03-19 RX ADMIN — CARVEDILOL 6.25 MG: 6.25 TABLET, FILM COATED ORAL at 09:46

## 2025-03-19 RX ADMIN — PANTOPRAZOLE SODIUM 40 MG: 40 TABLET, DELAYED RELEASE ORAL at 09:46

## 2025-03-19 RX ADMIN — DONEPEZIL HYDROCHLORIDE 10 MG: 5 TABLET, FILM COATED ORAL at 21:29

## 2025-03-19 RX ADMIN — GABAPENTIN 600 MG: 300 CAPSULE ORAL at 13:25

## 2025-03-19 NOTE — PLAN OF CARE
Goal Outcome Evaluation:  Plan of Care Reviewed With: patient           Outcome Evaluation: new admit from ER; requested pain medicine; O2 at 2L per n/c in use; up with assist; daughter at bedside; Covid precautions; continue to monitor

## 2025-03-19 NOTE — PLAN OF CARE
Goal Outcome Evaluation: Patient was admitted due to Covid. Plan is for patient to discharge home with daughter when medically stable.

## 2025-03-19 NOTE — THERAPY TREATMENT NOTE
Acute Care - Speech Language Pathology   Swallow Treatment Note [facility]     Patient Name: Andrew Flores  : 1960  MRN: [id number]  Today's Date: 3/19/2025               Admit Date: 3/18/2025    Visit Dx:     ICD-10-CM ICD-9-CM   1. Weakness  R53.1 780.79   2. Chronic obstructive pulmonary disease with acute exacerbation  J44.1 491.21   3. Acute respiratory distress  R06.03 518.82   4. Fall, initial encounter  W19.XXXA E888.9   5. COVID-19  U07.1 079.89     Patient Active Problem List   Diagnosis    Anxiety    Gastroesophageal reflux disease    Hypogonadism    Hypothyroidism    Depression    Male erectile disorder    Fall    Testosterone deficiency    Bruised rib, left, initial encounter    History of CVA (cerebrovascular accident)    Primary hypertension    Mixed hyperlipidemia    Diffuse non-Hodgkin's lymphoma of testis    Neuropathy    Parkinson disease    RBD (REM behavioral disorder)    Balance problem    Screening PSA (prostate specific antigen)    Uncomplicated alcohol dependence    Apathy    Lymphoma    Hx of radiation therapy    History of chemotherapy    Weakness    Chronic low back pain without sciatica    Tick bite    Dextroscoliosis    Compression fracture of L1 lumbar vertebra    Tobacco abuse    Edema    Constipation    Pain of right lower extremity    Food impaction of esophagus    Esophageal stricture    Ulcer of esophagus without bleeding    Erosive gastritis    Duodenitis    Malignant neoplasm of testicle    COPD (chronic obstructive pulmonary disease)    Memory loss    Coronary artery disease involving native coronary artery of native heart without angina pectoris    Status post coronary artery stent placement    Benign prostatic hyperplasia with lower urinary tract symptoms    PVD (peripheral vascular disease) with claudication    Dyspnea    COVID-19     Past Medical History:   Diagnosis Date    Asthma     Borderline diabetes     Cerebrovascular accident (CVA) 2019    COPD  (chronic obstructive pulmonary disease)     Dysphagia 08/2022    from parkinsons    Hyperlipidemia     Hypertension     Intertrochanteric fracture of left femur, closed, initial encounter 08/25/2024    Mood disorder     Non-Hodgkin's lymphoma of testis     Parkinson disease     Pneumonia of right upper lobe due to infectious organism 12/04/2022    Poor historian     PVD (peripheral vascular disease) with claudication 09/20/2024    Shortness of breath     Sleep apnea     does not have a cpap    Status post coronary artery stent placement 02/26/2024    Stroke 2017     Past Surgical History:   Procedure Laterality Date    CARDIAC CATHETERIZATION N/A 2/26/2024    Procedure: Left and Right Heart Cath with Coronary Angiography;  Surgeon: Lelo Tello MD;  Location: [facility] CATH INVASIVE LOCATION;  Service: Cardiology;  Laterality: N/A;    CARDIAC CATHETERIZATION N/A 2/26/2024    Procedure: Percutaneous Coronary Intervention;  Surgeon: Lelo Tello MD;  Location: [facility] CATH INVASIVE LOCATION;  Service: Cardiology;  Laterality: N/A;    ENDOSCOPY N/A 01/27/2022    Procedure: ESOPHAGOGASTRODUODENOSCOPY with foreign body removal with gastric, duodenal biopsy and GE junction biopsy;  Surgeon: Pedro De La Rosa MD;  Location: [facility] ENDOSCOPY;  Service: Gastroenterology;  Laterality: N/A;  post: foreign body removal, erosive gastritis with biopsy, erosive eduodenitis with biopsy, esophagitis, GE junction biopsy to rule out malignancy,     ENDOSCOPY N/A 08/23/2022    Procedure: ESOPHAGOGASTRODUODENOSCOPY with GE junction biopsy R/O Barretts, esophageal dilation #48 bougie;  Surgeon: Alexa Subramanian MD;  Location: [facility] ENDOSCOPY;  Service: Gastroenterology;  Laterality: N/A;  duodenal ulcer, GERD,     HIP TROCHANTERIC NAILING WITH INTRAMEDULLARY HIP SCREW Left 8/26/2024    Procedure: Left hip/femur fracture fixation with intramedullary ajit and screws;  Surgeon: Kameron Kaminski MD;  Location: [facility] MAIN OR;  Service:  Orthopedics;  Laterality: Left;    INTERVENTIONAL RADIOLOGY PROCEDURE N/A 2/26/2024    Procedure: Intravascular Ultrasound;  Surgeon: Lelo Tello MD;  Location: [facility] INVASIVE LOCATION;  Service: Cardiology;  Laterality: N/A;    ORCHIECTOMY  2019       SLP Recommendation and Plan  SLP Swallowing Diagnosis: mild, oral dysphagia, R/O pharyngeal dysphagia (03/18/25 1500)  SLP Diet Recommendation: thin liquids, soft to chew textures, chopped (03/18/25 1500)  Recommended Precautions and Strategies: upright posture during/after eating, small bites of food and sips of liquid, general aspiration precautions, reflux precautions, alternate between small bites of food and sips of liquid (03/18/25 1500)  SLP Rec. for Method of Medication Administration: as tolerated (03/18/25 1500)     Monitor for Signs of Aspiration: yes, notify SLP if any concerns (03/18/25 1500)     Swallow Criteria for Skilled Therapeutic Interventions Met: demonstrates skilled criteria (03/18/25 1500)     Rehab Potential/Prognosis, Swallowing: good, to achieve stated therapy goals (03/18/25 1500)  Therapy Frequency (Swallow): 2 days per week, 3 days per week (03/18/25 1500)  Predicted Duration Therapy Intervention (Days): until discharge (03/18/25 1500)  Oral Care Recommendations: Oral Care BID/PRN (03/18/25 1500)                                               SWALLOW EVALUATION (Last 72 Hours)       SLP Adult Swallow Evaluation       Row Name 03/18/25 1500                   Rehab Evaluation    Document Type evaluation  -AA        Subjective Information no complaints  -AA        Patient Observations alert;cooperative;agree to therapy  -AA        Patient Effort good  -AA        Symptoms Noted During/After Treatment none  -AA           General Information    Patient Profile Reviewed yes  -AA        Pertinent History Of Current Problem History of present illness is a 64-year-old gentleman with multiple health problems who was brought to the hospital  today for a couple history of increasing shortness of breath cough exposure to COVID although negative test yesterday altered mental status.  He has had a cough but nonproductive.  He has had some feeling hot and cold he has had no vomiting he has had little bit of diarrhea but seem to be black.  He denies any dysuria or frequency he denies any trauma or head injury.  No headache or speech difficulty or weakness to one-sided the other just general weakness.  He denies any long car ride plane ride immobilizations foreign travels antibiotic use.  No trauma.  No leg pain or swelling.  -AA        Current Method of Nutrition NPO  -AA        Precautions/Limitations, Vision WFL;for purposes of eval  -AA        Precautions/Limitations, Hearing WFL;for purposes of eval  -AA        Prior Level of Function-Communication other (see comments)  Mild Hypokinetic Dysarthria diagnosed in 2023  -AA        Prior Level of Function-Swallowing soft to chew;thin liquids  -AA        Plans/Goals Discussed with patient;agreed upon  -AA        Barriers to Rehab previous functional deficit  -AA           Oral Motor Structure and Function    Dentition Assessment edentulous, does not have dentures  -AA        Secretion Management WNL/WFL  -AA        Mucosal Quality moist, healthy  -AA        Volitional Swallow WFL  -AA        Volitional Cough WFL  -AA           Oral Musculature and Cranial Nerve Assessment    Oral Motor General Assessment generalized oral motor weakness  -AA           General Eating/Swallowing Observations    Respiratory Support Currently in Use room air  -AA        Eating/Swallowing Skills self-fed  -AA        Positioning During Eating upright 90 degree;upright in bed  -AA        Utensils Used spoon;straw  -AA        Consistencies Trialed thin liquids;mixed consistency;mechanical ground textures;soft to chew textures  -AA        Pre SpO2 (%) 95  -AA        Post SpO2 (%) 95  -AA           Respiratory    Respiratory Status WFL   -AA           Clinical Swallow Eval    Oral Prep Phase impaired  -AA        Oral Transit WFL  -AA        Oral Residue WFL  -AA        Pharyngeal Phase no overt signs/symptoms of pharyngeal impairment  -AA           SLP Evaluation Clinical Impression    SLP Swallowing Diagnosis mild;oral dysphagia;R/O pharyngeal dysphagia  -AA        Functional Impact risk of aspiration/pneumonia;risk of malnutrition;risk of dehydration  -AA        Rehab Potential/Prognosis, Swallowing good, to achieve stated therapy goals  -AA        Swallow Criteria for Skilled Therapeutic Interventions Met demonstrates skilled criteria  -AA           Recommendations    Therapy Frequency (Swallow) 2 days per week;3 days per week  -AA        Predicted Duration Therapy Intervention (Days) until discharge  -AA        SLP Diet Recommendation thin liquids;soft to chew textures;chopped  -AA        Recommended Precautions and Strategies upright posture during/after eating;small bites of food and sips of liquid;general aspiration precautions;reflux precautions;alternate between small bites of food and sips of liquid  -AA        Oral Care Recommendations Oral Care BID/PRN  -AA        SLP Rec. for Method of Medication Administration as tolerated  -AA        Monitor for Signs of Aspiration yes;notify SLP if any concerns  -AA           Swallow Goals (SLP)    Swallow LTGs Swallow Long Term Goal (free text)  -AA        Swallow STGs diet tolerance goal selection (SLP)  -AA        Diet Tolerance Goal Selection (SLP) Swallow Short Term Goal 1  -AA           (LTG) Swallow    (LTG) Swallow Pt will maximize swallow function for least restrictive PO diet, exhibiting no complication associated with dysphagia, adequate PO intake, and demonstrating independent use of swallow compensation.  -AA        [place] (Swallow Long Term Goal) with minimal cues (75-90% accuracy)  -AA        Time Frame (Swallow Long Term Goal) by discharge  -AA        Progress/Outcomes (Swallow  Long Term Goal) new goal  -AA           (STG) Swallow 1    (STG) Swallow 1 The patient will participate in a full meal assessment to determine safety and adequacy of recommended diet, independent use of safe swallow compensations, and additional goals/recommendations to follow.  -AA        [place] (Swallow Short Term Goal 1) with minimal cues (75-90% accuracy)  -AA        Time Frame (Swallow Short Term Goal 1) by discharge  -AA        Progress/Outcomes (Swallow Short Term Goal 1) new goal  -AA                  User Key  (r) = Recorded By, (t) = Taken By, (c) = Cosigned By      Initials Name Effective Dates    AA Leatha Beckford, SLP 06/18/24 -                     EDUCATION  The patient has been educated in the following areas:   Dysphagia (Swallowing Impairment).        SLP GOALS       Row Name 03/19/25 1200       (LTG) Swallow    (LTG) Swallow Pt will maximize swallow function for least restrictive PO diet, exhibiting no complication associated with dysphagia, adequate PO intake, and demonstrating independent use of swallow compensation.  -AA    [place] (Swallow Long Term Goal) with minimal cues (75-90% accuracy)  -AA    Time Frame (Swallow Long Term Goal) by discharge  -AA    Progress/Outcomes (Swallow Long Term Goal) goal met  -AA    Comment (Swallow Long Term Goal) Pt seen on this date for dysphagia therapy. Pt currently on STC/Thin liquid diet, reports tolerance. Pt able to self feed. Pt sat up 90 degrees, agreeable to regular trials. PO observed: 2 saltine crackers, water by straw x4. Pt with reduced mastication, most likely d/t edentulous state. Pt with good oral transit and clearance. No overt s/s of aspiration noted. Pt denies pain or globus sensation. Pt endorses regular textures at baseline, denies any difficulties. Safe swallowing strategies reviewed with pt, pt verbalized understanding. All questions answered, no further concerns. It is recommended pt be upgraded to Regular/thin diet with meds  as tolerated. Pt should utilize safe swallowing strategies listed below. Pt is at baseline functioning, ST with little more to offer, signing off on dysphagia.     SLP Plan & Recommendation:   - Regular / Thin   - Meds: as tolerated   - Only feed when pt is fully awake and alert  - Safe swallow strategies: HOB at a 90 degree angle, slow rate of intake, small bites/sips, alternating sips/bites  - Fatigue precautions d/t PD   - ST signing off.   -AA       (STG) Swallow 1    (STG) Swallow 1 The patient will participate in a full meal assessment to determine safety and adequacy of recommended diet, independent use of safe swallow compensations, and additional goals/recommendations to follow.  -AA    [place] (Swallow Short Term Goal 1) with minimal cues (75-90% accuracy)  -AA    Time Frame (Swallow Short Term Goal 1) by discharge  -AA    Progress/Outcomes (Swallow Short Term Goal 1) goal met  -AA    Comment (Swallow Short Term Goal 1) see above  -AA              User Key  (r) = Recorded By, (t) = Taken By, (c) = Cosigned By      Initials Name Provider Type    AA Leatha Beckford SLP Speech and Language Pathologist                         Time Calculation:       Therapy Charges for Today       Code Description Service Date Service Provider Modifiers Qty    [id number]  ST EVAL ORAL PHARYNG SWALLOW 4 3/18/2025 Leatha Beckford SLP GN 1                 JIM Escudero  3/19/2025

## 2025-03-19 NOTE — CASE MANAGEMENT/SOCIAL WORK
Discharge Planning Assessment   Nick     Patient Name: Andrew Flores  MRN: [id number]  Today's Date: 3/19/2025    Admit Date: 3/18/2025    Plan: Anticipate return home with daughter.   Discharge Needs Assessment       Row Name 03/19/25 1528       Living Environment    People in Home child(elvie), adult;grandchild(elvie)    Name(s) of People in Home Daughter-Desirae; 2 teenage grandchildren    Current Living Arrangements home    Potentially Unsafe Housing Conditions none    In the past 12 months has the electric, gas, oil, or water company threatened to shut off services in your home? No    Primary Care Provided by self;child(elvie)    Provides Primary Care For no one, unable/limited ability to care for self    Family Caregiver if Needed child(elvie), adult    Family Caregiver Names Gaurav    Quality of Family Relationships helpful;involved;supportive    Able to Return to Prior Arrangements yes       Resource/Environmental Concerns    Resource/Environmental Concerns none    Transportation Concerns none       Transportation Needs    In the past 12 months, has lack of transportation kept you from medical appointments or from getting medications? no    In the past 12 months, has lack of transportation kept you from meetings, work, or from getting things needed for daily living? No       Food Insecurity    Within the past 12 months, you worried that your food would run out before you got the money to buy more. Never true    Within the past 12 months, the food you bought just didn't last and you didn't have money to get more. Never true       Transition Planning    Patient/Family Anticipates Transition to home with family;home with help/services    Patient/Family Anticipated Services at Transition home health care;outpatient care    Transportation Anticipated family or friend will provide       Discharge Needs Assessment    Readmission Within the Last 30 Days no previous admission in last 30 days    Equipment  Currently Used at Home cane, straight;shower chair;rollator;bp cuff;commode;grab bar;wheelchair    Concerns to be Addressed denies needs/concerns at this time;no discharge needs identified    Do you want help finding or keeping work or a job? Patient declined    Do you want help with school or training? For example, starting or completing job training or getting a high school diploma, GED or equivalent No    Anticipated Changes Related to Illness none    Equipment Needed After Discharge none    Provided Post Acute Provider List? N/A    Provided Post Acute Provider Quality & Resource List? N/A                   Discharge Plan       Row Name 03/19/25 1052       Plan    Plan Anticipate return home with daughter.    Patient/Family in Agreement with Plan yes    Plan Comments CM met with patient at bedside and then spoke to daughter Desirae by phone. Pt lives at home with daughter Desirae and 2 teenage grandchildren. Pt no longer drives and requires assistance with ADL's. PCP and pharmacy confirmed- agreeable to use [facility]. DME includes straight cane, rollator, shower chair/BSC, BP cuff, and wheelchair. Pt is not current with HHC/PT services at this time but awaiting PT eval for possible services at discharge. Daughter to transport home.              Demographic Summary       Row Name 03/19/25 1528       General Information    Admission Type inpatient    Arrived From emergency department    Referral Source admission list    Reason for Consult care coordination/care conference;discharge planning    Preferred Language English       Contact Information    Permission Granted to Share Info With                    Functional Status       Row Name 03/19/25 1523       Functional Status    Usual Activity Tolerance moderate    Current Activity Tolerance moderate       Functional Status, IADL    Medications assistive person    Meal Preparation assistive person    Housekeeping assistive person    Laundry  assistive person    Shopping assistive person    If for any reason you need help with day-to-day activities such as bathing, preparing meals, shopping, managing finances, etc., do you get the help you need? I get all the help I need             Megan Naegele, RN     Office Phone: [phone]  Office Cell: [phone]

## 2025-03-20 ENCOUNTER — READMISSION MANAGEMENT (OUTPATIENT)
Dept: CALL CENTER | Facility: HOSPITAL | Age: 65
End: 2025-03-20
Payer: MEDICARE

## 2025-03-20 ENCOUNTER — DOCUMENTATION (OUTPATIENT)
Dept: HOME HEALTH SERVICES | Facility: HOME HEALTHCARE | Age: 65
End: 2025-03-20
Payer: MEDICARE

## 2025-03-20 ENCOUNTER — TRANSCRIBE ORDERS (OUTPATIENT)
Dept: HOME HEALTH SERVICES | Facility: HOME HEALTHCARE | Age: 65
End: 2025-03-20
Payer: MEDICARE

## 2025-03-20 ENCOUNTER — TELEPHONE (OUTPATIENT)
Dept: FAMILY MEDICINE CLINIC | Facility: CLINIC | Age: 65
End: 2025-03-20

## 2025-03-20 VITALS
TEMPERATURE: 98 F | OXYGEN SATURATION: 96 % | BODY MASS INDEX: 30.33 KG/M2 | HEIGHT: 69 IN | HEART RATE: 59 BPM | SYSTOLIC BLOOD PRESSURE: 156 MMHG | RESPIRATION RATE: 18 BRPM | DIASTOLIC BLOOD PRESSURE: 97 MMHG | WEIGHT: 204.81 LBS
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
## 2025-03-20 DIAGNOSIS — M54.50 CHRONIC LOW BACK PAIN WITHOUT SCIATICA, UNSPECIFIED BACK PAIN LATERALITY: ICD-10-CM

## 2025-03-20 DIAGNOSIS — U07.1 COVID-19: ICD-10-CM

## 2025-03-20 DIAGNOSIS — J44.1 CHRONIC OBSTRUCTIVE PULMONARY DISEASE WITH ACUTE EXACERBATION: Primary | ICD-10-CM

## 2025-03-20 DIAGNOSIS — G89.29 CHRONIC LOW BACK PAIN WITHOUT SCIATICA, UNSPECIFIED BACK PAIN LATERALITY: ICD-10-CM

## 2025-03-20 PROBLEM — D89.831 CYTOKINE RELEASE SYNDROME, GRADE 1: Status: ACTIVE | Noted: 2025-03-20

## 2025-03-20 PROCEDURE — 94664 DEMO&/EVAL PT USE INHALER: CPT

## 2025-03-20 PROCEDURE — 97162 PT EVAL MOD COMPLEX 30 MIN: CPT

## 2025-03-20 PROCEDURE — 87481 CANDIDA DNA AMP PROBE: CPT | Performed by: STUDENT IN AN ORGANIZED HEALTH CARE EDUCATION/TRAINING PROGRAM

## 2025-03-20 PROCEDURE — 94799 UNLISTED PULMONARY SVC/PX: CPT

## 2025-03-20 PROCEDURE — 94761 N-INVAS EAR/PLS OXIMETRY MLT: CPT

## 2025-03-20 RX ORDER — PREDNISONE 20 MG/1
20 TABLET ORAL DAILY
Qty: 5 TABLET | Refills: 0 | Status: SHIPPED | OUTPATIENT
Start: 2025-03-20

## 2025-03-20 RX ADMIN — CARBIDOPA AND LEVODOPA 2 TABLET: 25; 100 TABLET ORAL at 13:26

## 2025-03-20 RX ADMIN — ATORVASTATIN CALCIUM 20 MG: 20 TABLET, FILM COATED ORAL at 08:48

## 2025-03-20 RX ADMIN — TAMSULOSIN HYDROCHLORIDE 0.4 MG: 0.4 CAPSULE ORAL at 08:48

## 2025-03-20 RX ADMIN — CARVEDILOL 6.25 MG: 6.25 TABLET, FILM COATED ORAL at 08:48

## 2025-03-20 RX ADMIN — OXYCODONE HYDROCHLORIDE 5 MG: 5 TABLET ORAL at 14:02

## 2025-03-20 RX ADMIN — ASPIRIN 81 MG: 81 TABLET, COATED ORAL at 08:48

## 2025-03-20 RX ADMIN — PANTOPRAZOLE SODIUM 40 MG: 40 TABLET, DELAYED RELEASE ORAL at 08:48

## 2025-03-20 RX ADMIN — ALBUTEROL SULFATE 2 PUFF: 108 AEROSOL, METERED RESPIRATORY (INHALATION) at 11:44

## 2025-03-20 RX ADMIN — AMLODIPINE BESYLATE 5 MG: 5 TABLET ORAL at 08:48

## 2025-03-20 RX ADMIN — CARBIDOPA AND LEVODOPA 2 TABLET: 25; 100 TABLET ORAL at 08:49

## 2025-03-20 RX ADMIN — GABAPENTIN 600 MG: 300 CAPSULE ORAL at 05:06

## 2025-03-20 RX ADMIN — Medication 10 ML: at 08:50

## 2025-03-20 RX ADMIN — VENLAFAXINE HYDROCHLORIDE 75 MG: 75 CAPSULE, EXTENDED RELEASE ORAL at 08:48

## 2025-03-20 RX ADMIN — CLOPIDOGREL BISULFATE 75 MG: 75 TABLET ORAL at 08:48

## 2025-03-20 RX ADMIN — GABAPENTIN 600 MG: 300 CAPSULE ORAL at 13:26

## 2025-03-20 RX ADMIN — ALBUTEROL SULFATE 2 PUFF: 108 AEROSOL, METERED RESPIRATORY (INHALATION) at 07:26

## 2025-03-20 NOTE — CASE MANAGEMENT/SOCIAL WORK
Continued Stay Note  PITER Romero     Patient Name: Andrew Flores  MRN: [id number]  Today's Date: 3/20/2025    Admit Date: 3/18/2025    Plan: Return home with daughter and [facility] (accepted, order per MD).   Discharge Plan       Row Name 03/20/25 1453       Plan    Plan Return home with daughter and [facility] (accepted, order per MD).    Patient/Family in Agreement with Plan yes    Provided Post Acute Provider List? Yes    Post Acute Provider List Home Health    Delivered To Support Person    Support Person Daughter-Desirae    Method of Delivery In person    Plan Comments CM met with patient and daughter Desirae at bedside to review discharge plans. Daughter voices concern with pt going home and feels like pt will get worse. She states pt's has congestion and cough. Relayed concerns to Dr Naidu who was agreeable to send pt home on short course of steroids. Reviewed though that pt isn't requiring any O2 and PT evaluation indicated pt did well. Home health agency list provided to daughter and she requested referral to [facility]. Reviewed any equipment needs and pt had obtained wheelchair within the year, so not eligible for a walker. Received notification from liaison Alpa that pt is accepted for home health. IMM letter provided to daughter and explained how to file an appeal of discharge if daughter wanted.             Megan Naegele, RN     Office Phone: [phone]  Office Cell: [phone]

## 2025-03-20 NOTE — CASE MANAGEMENT/SOCIAL WORK
Case Management Discharge Note      Final Note: Home w/ [facility].      Selected Continued Care - Discharged on 3/20/2025 Admission date: 3/18/2025 - Discharge disposition: Home or Self Care        Home Medical Care Coordination complete.      Service Provider Services Address Phone Fax Patient Preferred    [facility] [address] [facility] [place] [address] [phone] --             Transportation Services  Private: Car    Final Discharge Disposition Code: 06 - home with home health care

## 2025-03-20 NOTE — PLAN OF CARE
"Goal Outcome Evaluation:              Outcome Evaluation: Pt is a 65 y/o male presenting to [facility] on 3/18/25 with weakness and frequent falls. .   CMH of asthma, CVA, hypertension, hyperlipidemia, mood disorder, Parkinson's disease, PVD.  Chest XR 3/18: No active disease.  Pt A&Ox4. Reports PLOF living home with daughter in 2 story home,but pt completes ADLs from main level; 4 MANOJ home with JP railings.  Pt occasionally need assist with sit to stand, but on \"good days\" able to complete household/community mobility/ambulation with Rwx and ADLs. Pt with shower chair, hospital bed. Pt's daughter works part time.   Pt completes bed mobility with supervision, transfers with Rwx and supervision, and ambulates x50 ft with Rwx and SBA with SpO2 post 95% with room air. Pt with R LE drag with gait, but no overt freezing episodes noted.   Pt with mildly impaired balance and RLE coordination, but with Rwx, not overt LOB requiring increased assist to recover.   Due to pt's PMH including Parkinson's with fall risk, and newly impaired endurance, follow-up HHPT recommended at time of d/c from [facility] with frequent, daily sup/assist from family. Pt's daughter does voice concern for d/c home with pt's worsening symptoms of congestion, cough, and continued dyspnea; concern communicated to nurse.    Anticipated Discharge Disposition (PT): home with assist, home with home health                        "

## 2025-03-20 NOTE — PLAN OF CARE
Goal Outcome Evaluation:         Pt admitted for general weakness, covid19. Pt is on room air, maintain droplet precaution. Pt slept in and out throughout the night with no new complaint at this time.

## 2025-03-20 NOTE — THERAPY EVALUATION
Patient Name: Andrew Flores  : 1960    MRN: [id number]                              Today's Date: 3/20/2025       Admit Date: 3/18/2025    Visit Dx:     ICD-10-CM ICD-9-CM   1. Weakness  R53.1 780.79   2. Chronic obstructive pulmonary disease with acute exacerbation  J44.1 491.21   3. Acute respiratory distress  R06.03 518.82   4. Fall, initial encounter  W19.XXXA E888.9   5. COVID-19  U07.1 079.89     Patient Active Problem List   Diagnosis    Anxiety    Gastroesophageal reflux disease    Hypogonadism    Hypothyroidism    Depression    Male erectile disorder    Fall    Testosterone deficiency    Bruised rib, left, initial encounter    History of CVA (cerebrovascular accident)    Primary hypertension    Mixed hyperlipidemia    Diffuse non-Hodgkin's lymphoma of testis    Neuropathy    Parkinson disease    RBD (REM behavioral disorder)    Balance problem    Screening PSA (prostate specific antigen)    Uncomplicated alcohol dependence    Apathy    Lymphoma    Hx of radiation therapy    History of chemotherapy    Weakness    Chronic low back pain without sciatica    Tick bite    Dextroscoliosis    Compression fracture of L1 lumbar vertebra    Tobacco abuse    Edema    Constipation    Pain of right lower extremity    Food impaction of esophagus    Esophageal stricture    Ulcer of esophagus without bleeding    Erosive gastritis    Duodenitis    Malignant neoplasm of testicle    COPD (chronic obstructive pulmonary disease)    Memory loss    Coronary artery disease involving native coronary artery of native heart without angina pectoris    Status post coronary artery stent placement    Benign prostatic hyperplasia with lower urinary tract symptoms    PVD (peripheral vascular disease) with claudication    Dyspnea    COVID-19    Cytokine release syndrome, grade 1     Past Medical History:   Diagnosis Date    Asthma     Borderline diabetes     Cerebrovascular accident (CVA) 2019    COPD (chronic obstructive  pulmonary disease)     Dysphagia 08/2022    from parkinsons    Hyperlipidemia     Hypertension     Intertrochanteric fracture of left femur, closed, initial encounter 08/25/2024    Mood disorder     Non-Hodgkin's lymphoma of testis     Parkinson disease     Pneumonia of right upper lobe due to infectious organism 12/04/2022    Poor historian     PVD (peripheral vascular disease) with claudication 09/20/2024    Shortness of breath     Sleep apnea     does not have a cpap    Status post coronary artery stent placement 02/26/2024    Stroke 2017     Past Surgical History:   Procedure Laterality Date    CARDIAC CATHETERIZATION N/A 2/26/2024    Procedure: Left and Right Heart Cath with Coronary Angiography;  Surgeon: Lelo Tello MD;  Location: [facility] CATH INVASIVE LOCATION;  Service: Cardiology;  Laterality: N/A;    CARDIAC CATHETERIZATION N/A 2/26/2024    Procedure: Percutaneous Coronary Intervention;  Surgeon: Lelo Tello MD;  Location: [facility] CATH INVASIVE LOCATION;  Service: Cardiology;  Laterality: N/A;    ENDOSCOPY N/A 01/27/2022    Procedure: ESOPHAGOGASTRODUODENOSCOPY with foreign body removal with gastric, duodenal biopsy and GE junction biopsy;  Surgeon: Pedro De La Rosa MD;  Location: [facility] ENDOSCOPY;  Service: Gastroenterology;  Laterality: N/A;  post: foreign body removal, erosive gastritis with biopsy, erosive eduodenitis with biopsy, esophagitis, GE junction biopsy to rule out malignancy,     ENDOSCOPY N/A 08/23/2022    Procedure: ESOPHAGOGASTRODUODENOSCOPY with GE junction biopsy R/O Barretts, esophageal dilation #48 bougie;  Surgeon: Alexa Subramanian MD;  Location: [facility] ENDOSCOPY;  Service: Gastroenterology;  Laterality: N/A;  duodenal ulcer, GERD,     HIP TROCHANTERIC NAILING WITH INTRAMEDULLARY HIP SCREW Left 8/26/2024    Procedure: Left hip/femur fracture fixation with intramedullary ajit and screws;  Surgeon: Kameron Kaminski MD;  Location: [facility] MAIN OR;  Service: Orthopedics;   Laterality: Left;    INTERVENTIONAL RADIOLOGY PROCEDURE N/A 2/26/2024    Procedure: Intravascular Ultrasound;  Surgeon: Lelo Tello MD;  Location: [facility] INVASIVE LOCATION;  Service: Cardiology;  Laterality: N/A;    ORCHIECTOMY  2019      General Information       Row Name 03/20/25 1304          Physical Therapy Time and Intention    Document Type evaluation  -JV     Mode of Treatment physical therapy  -JV       Row Name 03/20/25 1304          General Information    Patient Profile Reviewed yes  -JV     Prior Level of Function independent:;all household mobility;ADL's;using stairs  -JV     Existing Precautions/Restrictions fall  -JV     Barriers to Rehab medically complex;previous functional deficit  -JV       Row Name 03/20/25 1304          Living Environment    Current Living Arrangements home  -JV     People in Home child(elvie), adult  -JV       Row Name 03/20/25 1304          Home Main Entrance    Number of Stairs, Main Entrance four  -JV     Stair Railings, Main Entrance railings on both sides of stairs  -JV       Row Name 03/20/25 1304          Stairs Within Home, Primary    Stairs, Within Home, Primary pt with 2 story home but completes ADLs from main level  -JV     Number of Stairs, Within Home, Primary none  -JV       Row Name 03/20/25 1304          Cognition    Orientation Status (Cognition) oriented x 4  -JV       Row Name 03/20/25 1304          Safety Issues/Impairments Affecting Functional Mobility    Impairments Affecting Function (Mobility) balance;endurance/activity tolerance;coordination;motor planning;strength;shortness of breath  -JV               User Key  (r) = Recorded By, (t) = Taken By, (c) = Cosigned By      Initials Name Provider Type    JMarjorie Mobley Physical Therapist                   Mobility       Row Name 03/20/25 1307          Bed Mobility    Bed Mobility supine-sit  -JV     Supine-Sit [place] (Bed Mobility) supervision  -JV     Assistive Device (Bed Mobility)  bed rails;head of bed elevated  -JV     Comment, (Bed Mobility) pt with hospital bed in home setting  -JV       Row Name 03/20/25 1307          Bed-Chair Transfer    Bed-Chair [place] (Transfers) verbal cues;supervision  -JV     Assistive Device (Bed-Chair Transfers) walker, front-wheeled  -JV       Row Name 03/20/25 1307          Sit-Stand Transfer    Sit-Stand [place] (Transfers) standby assist;verbal cues  -JV       Row Name 03/20/25 1307          Gait/Stairs (Locomotion)    [place] Level (Gait) standby assist;nonverbal cues (demo/gesture);verbal cues  -JV     Assistive Device (Gait) walker, front-wheeled  -JV     Distance in Feet (Gait) 50  -JV     Deviations/Abnormal Patterns (Gait) ruddy decreased;gait speed decreased  -JV     Bilateral Gait Deviations forward flexed posture  -JV     Left Sided Gait Deviations heel strike decreased;weight shift ability decreased  -JV               User Key  (r) = Recorded By, (t) = Taken By, (c) = Cosigned By      Initials Name Provider Type    Marjorie Phillips Physical Therapist                   Obj/Interventions       Row Name 03/20/25 1309          Range of Motion Comprehensive    General Range of Motion bilateral lower extremity ROM WFL  -JV       Row Name 03/20/25 1309          Strength Comprehensive (MMT)    Comment, General Manual Muscle Testing (MMT) Assessment BLE grossly 4/5  -JV       Row Name 03/20/25 1309          Balance    Balance Assessment standing static balance;standing dynamic balance  -JV     Static Standing Balance standby assist  -JV     Dynamic Standing Balance standby assist  -JV     Position/Device Used, Standing Balance supported;walker, front-wheeled  -JV       Row Name 03/20/25 1309          Sensory Assessment (Somatosensory)    Sensory Assessment (Somatosensory) LE sensation intact  -JV               User Key  (r) = Recorded By, (t) = Taken By, (c) = Cosigned By      Initials Name Provider Type    Marjorie Phillips  Physical Therapist                   Goals/Plan       Row Name 03/20/25 1315          Bed Mobility Goal 1 (PT)    Activity/Assistive Device (Bed Mobility Goal 1, PT) bed mobility activities, all  -JV     [place] Level/Cues Needed (Bed Mobility Goal 1, PT) modified independence  -JV     Time Frame (Bed Mobility Goal 1, PT) long term goal (LTG);2 weeks  -JV       Row Name 03/20/25 1315          Transfer Goal 1 (PT)    Activity/Assistive Device (Transfer Goal 1, PT) sit-to-stand/stand-to-sit;bed-to-chair/chair-to-bed  -JV     [place] Level/Cues Needed (Transfer Goal 1, PT) independent  -JV     Time Frame (Transfer Goal 1, PT) long term goal (LTG);2 weeks  -JV       Row Name 03/20/25 1315          Gait Training Goal 1 (PT)    Activity/Assistive Device (Gait Training Goal 1, PT) gait (walking locomotion);assistive device use;improve balance and speed;increase endurance/gait distance;increase energy conservation  -JV     [place] Level (Gait Training Goal 1, PT) modified independence  -JV     Distance (Gait Training Goal 1, PT) 150 ft  -JV     Time Frame (Gait Training Goal 1, PT) long term goal (LTG);2 weeks  -JV       Row Name 03/20/25 1315          Stairs Goal 1 (PT)    Activity/Assistive Device (Stairs Goal 1, PT) ascending stairs;descending stairs;using handrail, left;using handrail, right;walker, rolling  -JV     [place] Level/Cues Needed (Stairs Goal 1, PT) contact guard required  -JV     Number of Stairs (Stairs Goal 1, PT) 4  -JV     Time Frame (Stairs Goal 1, PT) long term goal (LTG);2 weeks  -JV       Row Name 03/20/25 1315          Therapy Assessment/Plan (PT)    Planned Therapy Interventions (PT) balance training;bed mobility training;gait training;home exercise program;strengthening;stair training;patient/family education;transfer training;neuromuscular re-education;motor coordination training  -JV               User Key  (r) = Recorded By, (t) = Taken By, (c) = Cosigned By      Initials  "Name Provider Type    Marjroie Phillips Physical Therapist                   Clinical Impression       Row Name 03/20/25 1310          Pain    Additional Documentation Pain Scale: FACES Pre/Post-Treatment (Group)  -JV       Row Name 03/20/25 1310          Pain Scale: FACES Pre/Post-Treatment    Pain: FACES Scale, Pretreatment 0-->no hurt  -JV     Posttreatment Pain Rating 0-->no hurt  -JV       Row Name 03/20/25 1310          Plan of Care Review    Outcome Evaluation Pt is a 63 y/o male presenting to [facility] on 3/18/25 with weakness and frequent falls. .   CMH of asthma, CVA, hypertension, hyperlipidemia, mood disorder, Parkinson's disease, PVD.  Chest XR 3/18: No active disease.  Pt A&Ox4. Reports PLOF living home with daughter in 2 story home,but pt completes ADLs from main level; 4 MANOJ home with JP railings.  Pt occasionally need assist with sit to stand, but on \"good days\" able to complete household/community mobility/ambulation with Rwx and ADLs. Pt with shower chair, hospital bed. Pt's daughter works part time.   Pt completes bed mobility with supervision, transfers with Rwx and supervision, and ambulates x50 ft with Rwx and SBA with SpO2 post 95% with room air. Pt with R LE drag with gait, but no overt freezing episodes noted.   Pt with mildly impaired balance and RLE coordination, but with Rwx, not overt LOB requiring increased assist to recover.   Due to pt's PMH including Parkinson's with fall risk, and newly impaired endurance, follow-up HHPT recommended at time of d/c from [facility] with frequent, daily sup/assist from family. Pt's daughter does voice concern for d/c home with pt's worsening symptoms of congestion, cough, and continued dyspnea; concern communicated to nurse.  -JV       Row Name 03/20/25 1310          Therapy Assessment/Plan (PT)    Criteria for Skilled Interventions Met (PT) yes;meets criteria;skilled treatment is necessary  -JV     Therapy Frequency (PT) 3 times/wk  -JROSELINE     Predicted " Duration of Therapy Intervention (PT) until d/c  -JV       Row Name 03/20/25 1310          Vital Signs    Intra SpO2 (%) 95  -JV     O2 Delivery Intra Treatment room air  -JV     Intra Patient Position Sitting  -JV       Row Name 03/20/25 1310          Positioning and Restraints    Pre-Treatment Position in bed  -JV     Post Treatment Position chair  -JV     In Chair notified nsg;sitting;call light within reach;encouraged to call for assist;with family/caregiver  declines chair alarm and not listed within epic precautions  -JV               User Key  (r) = Recorded By, (t) = Taken By, (c) = Cosigned By      Initials Name Provider Type    Marjorie Phillips Physical Therapist                   Outcome Measures       Row Name 03/20/25 1000          How much help from another person do you currently need...    Turning from your back to your side while in flat bed without using bedrails? 4  -MN     Moving from lying on back to sitting on the side of a flat bed without bedrails? 3  -MN     Moving to and from a bed to a chair (including a wheelchair)? 3  -MN     Standing up from a chair using your arms (e.g., wheelchair, bedside chair)? 3  -MN     Climbing 3-5 steps with a railing? 3  -MN     To walk in hospital room? 3  -MN     AM-PAC 6 Clicks Score (PT) 19  -MN       Row Name 03/20/25 1316          Modified [place] Scale    Pre-Stroke Modified [place] Scale 2 - Slight disability.  Unable to carry out all previous activities but able to look after own affairs without assistance.  -JV     Modified [place] Scale 3 - Moderate disability.  Requiring some help, but able to walk without assistance.  -JV       Row Name 03/20/25 1316          Functional Assessment    Outcome Measure Options Modified [place]  -JV               User Key  (r) = Recorded By, (t) = Taken By, (c) = Cosigned By      Initials Name Provider Type    Marjorie Phillips Physical Therapist    Andrew Scott LPN Licensed Nurse                             "     Physical Therapy Education       Title: PT OT SLP Therapies (Done)       Topic: Physical Therapy (Done)       Point: Mobility training (Done)       Learning Progress Summary            Patient Acceptance, E,TB, VU by CARLITOS at 3/20/2025 1317                                      User Key       Initials Effective Dates Name Provider Type Discipline    CARLITOS 03/30/21 -  Marjorie Muñiz Physical Therapist PT                  PT Recommendation and Plan  Planned Therapy Interventions (PT): balance training, bed mobility training, gait training, home exercise program, strengthening, stair training, patient/family education, transfer training, neuromuscular re-education, motor coordination training  Outcome Evaluation: Pt is a 63 y/o male presenting to [facility] on 3/18/25 with weakness and frequent falls. .   CMH of asthma, CVA, hypertension, hyperlipidemia, mood disorder, Parkinson's disease, PVD.  Chest XR 3/18: No active disease.  Pt A&Ox4. Reports PLOF living home with daughter in 2 story home,but pt completes ADLs from main level; 4 MANOJ home with JP railings.  Pt occasionally need assist with sit to stand, but on \"good days\" able to complete household/community mobility/ambulation with Rwx and ADLs. Pt with shower chair, hospital bed. Pt's daughter works part time.   Pt completes bed mobility with supervision, transfers with Rwx and supervision, and ambulates x50 ft with Rwx and SBA with SpO2 post 95% with room air. Pt with R LE drag with gait, but no overt freezing episodes noted.   Pt with mildly impaired balance and RLE coordination, but with Rwx, not overt LOB requiring increased assist to recover.   Due to pt's PMH including Parkinson's with fall risk, and newly impaired endurance, follow-up HHPT recommended at time of d/c from [facility] with frequent, daily sup/assist from family. Pt's daughter does voice concern for d/c home with pt's worsening symptoms of congestion, cough, and continued dyspnea; concern communicated to " nurse.     Time Calculation:         PT Charges       Row Name 03/20/25 1319             Time Calculation    Start Time 1140  -JV      Stop Time 1201  -JV      Time Calculation (min) 21 min  -JV      PT Received On 03/20/25  -JV      PT - Next Appointment 03/21/25  -JROSELINE      PT Goal Re-Cert Due Date 04/03/25  -JROSELINE         Time Calculation- PT    Total Timed Code Minutes- PT 0 minute(s)  -JV                User Key  (r) = Recorded By, (t) = Taken By, (c) = Cosigned By      Initials Name Provider Type    Marjorie Phillips Physical Therapist                  Therapy Charges for Today       Code Description Service Date Service Provider Modifiers Qty    [id number] HC PT EVAL MOD COMPLEXITY 4 3/20/2025 Marjorie Muñiz GP 1            PT G-Codes  Outcome Measure Options: Modified [place]  AM-PAC 6 Clicks Score (PT): 19  Modified [place] Scale: 3 - Moderate disability.  Requiring some help, but able to walk without assistance.  PT Discharge Summary  Anticipated Discharge Disposition (PT): home with assist, home with home health    Marjorie Muñiz  3/20/2025

## 2025-03-20 NOTE — DISCHARGE PLACEMENT REQUEST
"Andrew Flores \"Rex\" (64 y.o. Male)       Date of Birth   1960    Social Security Number       Address   [address] [place] [address]    Home Phone   [phone]    MRN   [id number]       [facility]    Marital Status   Legally                             Admission Date   3/18/2025    Admission Type   Emergency    Admitting Provider   Balwinder Cortez DO    Attending Provider   Brien Naidu MD    Department, Room/Bed   [facility] INPATIENT, 367/1       Discharge Date       Discharge Disposition   Home or Self Care    Discharge Destination                                 Attending Provider: Brien Naidu MD    Allergies: No Known Allergies    Isolation: Enh Drop/Con, Contact   Infection: COVID (confirmed) (03/18/25), Donna Auris (rule out) (03/20/25)   Code Status: CPR    Ht: 175.3 cm (69\")   Wt: 92.9 kg (204 lb 12.9 oz)    Admission Cmt: None   Principal Problem: COVID-19 [U07.1]                   Active Insurance as of 3/18/2025       Primary Coverage       Payor Plan Insurance Group Employer/Plan Group    MEDICARE MEDICARE A & B        Payor Plan Address Payor Plan Phone Number Payor Plan Fax Number Effective Dates    [address] [phone]  8/1/2020 - None Entered    [place] [address]         Subscriber Name Subscriber Birth Date Member ID       ANDREW FLORES 1960 [id number]                     Emergency Contacts        (Rel.) Home Phone Work Phone Mobile Phone    REN BATISTA(POA) (Daughter) -- -- [phone]    denisa bateman (Daughter) -- -- [phone]    evangelina yang (Significant Other) -- -- [phone]          "

## 2025-03-20 NOTE — OUTREACH NOTE
Prep Survey      Flowsheet Row Responses   [facility] facility [facility] discharged from? [facility]   Is LACE score < 7 ? No   Eligibility [facility]   Date of Admission 03/18/25   Date of Discharge 03/20/25   Discharge Disposition Home or Self Care   Discharge diagnosis Covid 19   Does the patient have one of the following disease processes/diagnoses(primary or secondary)? Other   Does the patient have Home health ordered? Yes   What is the Home health agency?  [facility]   Is there a DME ordered? No   Prep survey completed? Yes            CONNIE A - Registered Nurse

## 2025-03-20 NOTE — TELEPHONE ENCOUNTER
Caller: REN BATISTA(POA)    Relationship: Emergency Contact    Best call back number: [phone]    Requested Prescriptions:   Requested Prescriptions     Pending Prescriptions Disp Refills    oxyCODONE-acetaminophen (PERCOCET) 7.5-325 MG per tablet 60 tablet 0     Sig: Take 1 tablet by mouth Every 6 (Six) Hours As Needed for Moderate Pain.      Pharmacy where request should be sent: [facility] #[id number] - [place], [place] [address] - [phone]  - [phone] FX     Last office visit with prescribing clinician: 3/28/2024   Last telemedicine visit with prescribing clinician: Visit date not found   Next office visit with prescribing clinician: Visit date not found     Does the patient have less than a 3 day supply:  [x] Yes  [] No    Keven Lauren Rep   03/20/25 13:48 EDT

## 2025-03-20 NOTE — TELEPHONE ENCOUNTER
Caller: REN BATISTA(POA)    Relationship to patient: Emergency Contact    Best call back number: [phone]    Patient is needing:     PATIENT IS CURRENTLY ADMITTED AT [facility]. PATIENTS DAUGHTER DOES NOT THINK HE IS READY TO DISCHARGE DUE TO HIM BEING COVID POSITIVE, BUT THE PATIENT IS READY TO GO HOME.     SHE WOULD LIKE TO KNOW IF HE THEY DO GO HOME, IF HE WOULD BE A CANDIDATE FOR PAXLOVID, AND IF NOT, WHAT ARE SOME OVER THE COUNTER MEDICATIONS THAT SHE CAN GET FOR HIM.    PLEASE CALL TO ADVISE.

## 2025-03-20 NOTE — DISCHARGE SUMMARY
"[facility] Medicine Services  Discharge Summary    Date of Service: 3/20/2025  Patient Name: Andrew Flores  : 1960  MRN: [id number]    Date of Admission: 3/18/2025  Discharge Diagnosis: COVID-19  Date of Discharge: 3/20/2025  Primary Care Physician: Larry Mireles PA-C      Presenting Problem:   Acute respiratory distress [R06.03]  Weakness [R53.1]  Chronic obstructive pulmonary disease with acute exacerbation [J44.1]  Fall, initial encounter [W19.XXXA]  COVID-19 [U07.1]    Active and Resolved Hospital Problems:  Active Hospital Problems    Diagnosis POA    **COVID-19 [U07.1] Yes    Cytokine release syndrome, grade 1 [D89.831] No      Resolved Hospital Problems   No resolved problems to display.         Hospital Course     HPI:    \"Andrew Flores is a 64 y.o. male with a CMH of asthma, CVA, hypertension, hyperlipidemia, mood disorder, Parkinson's disease, PVD who presented to [facility] on 3/18/2025 with weakness.     Mr. Flores is a 64-year-old gentleman as above.  He has been complaining of shortness of breath.  He was recently exposed to COVID-19.  He currently has a cough is nonproductive.  He feels very weak and has had frequent falls.  He has required rehab in the past after strokes.\"    Hospital Course:    64-year-old male admitted to the hospital for generalized weakness and shortness of breath secondary to COVID-19 infection.  Patient was weak but not hypoxemic on arrival.  He was treated with initially IV steroids and supportive care with improvement of his symptoms.  Patient was evaluated by physical therapy team and mobilized well with recommendations for home health physical therapy.  He remained on room air throughout his hospitalization.  Patient was discharged with a short course of steroids for home with home health physical therapy on 3/20/2025.        DISCHARGE Follow Up Recommendations for labs and diagnostics:     PCP        Day of Discharge     Vital " Signs:  Temp:  [97.3 °F (36.3 °C)-98 °F (36.7 °C)] 98 °F (36.7 °C)  Heart Rate:  [58-76] 59  Resp:  [13-25] 18  BP: (151-168)/(89-97) 156/97    Physical Exam:  Physical Exam  Constitutional:       Appearance: Normal appearance.   Cardiovascular:      Rate and Rhythm: Normal rate and regular rhythm.      Pulses: Normal pulses.      Heart sounds: Normal heart sounds.   Pulmonary:      Effort: Pulmonary effort is normal. No respiratory distress.      Breath sounds: Normal breath sounds. No rhonchi or rales.   Abdominal:      General: Abdomen is flat.      Palpations: Abdomen is soft.   Neurological:      Mental Status: He is alert.            Pertinent  and/or Most Recent Results     LAB RESULTS:      Lab 03/18/25  2336 03/18/25  1240 03/18/25  1222   WBC 5.08  --  6.53   HEMOGLOBIN 13.1  --  13.8   HEMATOCRIT 39.9  --  42.4   PLATELETS 169  --  149   NEUTROS ABS  --   --  4.95   IMMATURE GRANS (ABS)  --   --  0.01   LYMPHS ABS  --   --  1.07   MONOS ABS  --   --  0.49   EOS ABS  --   --  0.00   MCV 97.3*  --  98.6*   LACTATE  --  1.5  --          Lab 03/18/25  2336 03/18/25  1222   SODIUM 140 140   POTASSIUM 4.0 3.7   CHLORIDE 103 104   CO2 24.9 22.3   ANION GAP 12.1 13.7   BUN 11 11   CREATININE 0.66* 0.85   EGFR 103.4 95.8   GLUCOSE 209* 143*   CALCIUM 9.7 9.6   MAGNESIUM 1.8  --    PHOSPHORUS 2.8  --          Lab 03/18/25  1222   TOTAL PROTEIN 7.3   ALBUMIN 4.5   GLOBULIN 2.8   ALT (SGPT) 16   AST (SGOT) 19   BILIRUBIN 0.4   ALK PHOS 133*         Lab 03/18/25  1351 03/18/25  1222   PROBNP  --  195.0   HSTROP T 11 13                 Lab 03/18/25  1247   PH, ARTERIAL 7.376   PCO2, ARTERIAL 43.9   PO2 ART 83.4   O2 SATURATION ART 95.9   FIO2 21   HCO3 ART 25.8   BASE EXCESS ART 0.2     Brief Urine Lab Results  (Last result in the past 365 days)        Color   Clarity   Blood   Leuk Est   Nitrite   Protein   CREAT   Urine HCG        03/18/25 1251 Dark Yellow   Hazy   Negative   Negative   Negative   Trace                  Microbiology Results (last 10 days)       Procedure Component Value - Date/Time    Blood Culture - Blood, Arm, Left [[id number]]  (Normal) Collected: 03/18/25 1229    Lab Status: Preliminary result Specimen: Blood from Arm, Left Updated: 03/20/25 1230     Blood Culture No growth at 2 days    Narrative:      Less than seven (7) mL's of blood was collected.  Insufficient quantity may yield false negative results.    Respiratory Panel PCR w/COVID-19(SARS-CoV-2) [facility] In-House, NP Swab in UTM/VTM, 2 HR TAT - Swab, Nasopharynx [[id number]]  (Abnormal) Collected: 03/18/25 1224    Lab Status: Final result Specimen: Swab from Nasopharynx Updated: 03/18/25 1323     ADENOVIRUS, PCR Not Detected     Coronavirus 229E Not Detected     Coronavirus HKU1 Not Detected     Coronavirus NL63 Not Detected     Coronavirus OC43 Not Detected     COVID19 Detected     Human Metapneumovirus Not Detected     Human Rhinovirus/Enterovirus Not Detected     Influenza A PCR Not Detected     Influenza B PCR Not Detected     Parainfluenza Virus 1 Not Detected     Parainfluenza Virus 2 Not Detected     Parainfluenza Virus 3 Not Detected     Parainfluenza Virus 4 Not Detected     RSV, PCR Not Detected     Bordetella pertussis pcr Not Detected     Bordetella parapertussis PCR Not Detected     Chlamydophila pneumoniae PCR Not Detected     Mycoplasma pneumo by PCR Not Detected    Narrative:      In the setting of a positive respiratory panel with a viral infection PLUS a negative procalcitonin without other underlying concern for bacterial infection, consider observing off antibiotics or discontinuation of antibiotics and continue supportive care. If the respiratory panel is positive for atypical bacterial infection (Bordetella pertussis, Chlamydophila pneumoniae, or Mycoplasma pneumoniae), consider antibiotic de-escalation to target atypical bacterial infection.    Blood Culture - Blood, Arm, Left [[id number]]  (Normal) Collected:  03/18/25 1223    Lab Status: Preliminary result Specimen: Blood from Arm, Left Updated: 03/20/25 1300     Blood Culture No growth at 2 days            XR Chest 1 View  Result Date: 3/18/2025  Impression: Impression: No active disease. Electronically Signed: Nathaniel Guerrero MD  3/18/2025 12:52 PM EDT  Workstation ID: [id number]      Results for orders placed during the hospital encounter of 09/23/24    Duplex Venous Lower Extremity - Bilateral CAR    Interpretation Summary    Chronic right lower extremity superficial thrombophlebitis noted in the small saphenous.    Chronic left lower extremity superficial thrombophlebitis noted in the small saphenous.    All other veins appeared normal bilaterally.      Results for orders placed during the hospital encounter of 09/23/24    Duplex Venous Lower Extremity - Bilateral CAR    Interpretation Summary    Chronic right lower extremity superficial thrombophlebitis noted in the small saphenous.    Chronic left lower extremity superficial thrombophlebitis noted in the small saphenous.    All other veins appeared normal bilaterally.      Results for orders placed during the hospital encounter of 09/23/24    Adult Transthoracic Echo Complete W/ Cont if Necessary Per Protocol    Interpretation Summary    Left ventricular systolic function is normal. Calculated left ventricular EF = 53% Left ventricular ejection fraction appears to be 51 - 55%.    Left ventricular diastolic function is consistent with (grade I) impaired relaxation. Average GLS -14.9%.    The left atrial cavity is dilated.    Estimated right ventricular systolic pressure from tricuspid regurgitation is normal (<35 mmHg).    No significant valvular abnormalities noted.      Labs Pending at Discharge:  Pending Results       Procedure [Order ID] Specimen - Date/Time    CANDIDA AURIS PCR - Swab, Axilla Right, Axilla Left and Groin [[id number]] Collected: 03/20/25 0507    Specimen: Swab from Axilla Right, Axilla Left and  Li Updated: 03/20/25 0515            Procedures Performed           Consults:   Consults       Date and Time Order Name Status Description    3/18/2025  1:29 PM Hospitalist (on-call MD unless specified)                Discharge Details        Discharge Medications        New Medications        Instructions Start Date   predniSONE 20 MG tablet  Commonly known as: DELTASONE   20 mg, Oral, Daily             Continue These Medications        Instructions Start Date   albuterol sulfate  (90 Base) MCG/ACT inhaler  Commonly known as: PROVENTIL HFA;VENTOLIN HFA;PROAIR HFA   2 puffs, Inhalation, Every 4 Hours PRN      amLODIPine 5 MG tablet  Commonly known as: NORVASC   5 mg, Oral, Daily      aspirin 81 MG EC tablet   81 mg, Oral, 2 Times Daily      atorvastatin 20 MG tablet  Commonly known as: LIPITOR   20 mg, Oral, Daily      budesonide 0.5 MG/2ML nebulizer solution  Commonly known as: Pulmicort   0.5 mg, Nebulization, Daily - RT      carbidopa-levodopa ER  MG per tablet  Commonly known as: SINEMET CR   2 tablets, Oral, 4 Times Daily      carbidopa-levodopa  MG per tablet  Commonly known as: SINEMET   2 tablets, 4 Times Daily      carvedilol 6.25 MG tablet  Commonly known as: COREG   6.25 mg, Oral, 2 Times Daily      clonazePAM 0.5 MG tablet  Commonly known as: KlonoPIN   0.5 mg, Oral, 4 Times Daily      clopidogrel 75 MG tablet  Commonly known as: PLAVIX   75 mg, Oral, Daily      docusate sodium 100 MG capsule  Commonly known as: COLACE   100 mg, 2 Times Daily PRN      donepezil 10 MG tablet  Commonly known as: ARICEPT   10 mg, Oral, Every Night at Bedtime      gabapentin 600 MG tablet  Commonly known as: NEURONTIN   600 mg, Oral, 3 Times Daily      ipratropium-albuterol 0.5-2.5 mg/3 ml nebulizer  Commonly known as: DUO-NEB   3 mL, Nebulization, Every 6 Hours, DX J44.9 Medicare B      melatonin 5 MG tablet tablet   5 mg, Nightly PRN      nitroglycerin 0.4 MG SL tablet  Commonly known as: NITROSTAT    0.4 mg, Sublingual, Every 5 Minutes PRN, Take no more than 3 doses in 15 minutes.      ondansetron 4 MG tablet  Commonly known as: ZOFRAN   4 mg, Every 4 Hours PRN      oxyCODONE-acetaminophen 7.5-325 MG per tablet  Commonly known as: PERCOCET   1 tablet, Oral, Every 6 Hours PRN      pantoprazole 40 MG EC tablet  Commonly known as: PROTONIX   40 mg, Oral, Daily      polyethylene glycol 17 GM/SCOOP powder  Commonly known as: MIRALAX   17 g, Oral, Daily PRN      tamsulosin 0.4 MG capsule 24 hr capsule  Commonly known as: FLOMAX   0.4 mg, Oral, Daily      venlafaxine XR 75 MG 24 hr capsule  Commonly known as: EFFEXOR-XR   75 mg, Oral, Daily      Vitamin B-12 1000 MCG sublingual tablet   1,000 mcg, Oral, Daily             Stop These Medications      Testosterone Cypionate 200 MG/ML kit              No Known Allergies      Discharge Disposition:   Home or Self Care    Diet:  Hospital:  Diet Order   Procedures    Diet: Cardiac, Diabetic; Healthy Heart (2-3 Na+); Consistent Carbohydrate; Texture: Regular (IDDSI 7); Fluid Consistency: Thin (IDDSI 0)         Discharge Activity:   Activity Instructions    As tolerated             CODE STATUS:  Code Status and Medical Interventions: CPR (Attempt to Resuscitate); Full Support   Ordered at: 03/18/25 1559     Code Status (Patient has no pulse and is not breathing):    CPR (Attempt to Resuscitate)     Medical Interventions (Patient has pulse or is breathing):    Full Support         Future Appointments   Date Time Provider Department Center   3/26/2025 11:45 AM Nomi Cunningham MD [facility]   6/24/2025 11:20 AM Guero Islas MD [facility] None   9/26/2025 11:00 AM [facility] 2  [facility]           Time spent on Discharge including face to face service:  56 minutes    Signature: Electronically signed by Brien Naidu MD, 03/20/25, 13:33 EDT.  [facility] Hospitalist Team

## 2025-03-21 ENCOUNTER — TRANSITIONAL CARE MANAGEMENT TELEPHONE ENCOUNTER (OUTPATIENT)
Dept: CALL CENTER | Facility: HOSPITAL | Age: 65
End: 2025-03-21
Payer: MEDICARE

## 2025-03-21 LAB — C AURIS DNA SPEC QL NAA+NON-PROBE: NOT DETECTED

## 2025-03-21 RX ORDER — OXYCODONE AND ACETAMINOPHEN 7.5; 325 MG/1; MG/1
1 TABLET ORAL EVERY 6 HOURS PRN
Qty: 60 TABLET | Refills: 0 | Status: SHIPPED | OUTPATIENT
Start: 2025-03-21

## 2025-03-21 NOTE — OUTREACH NOTE
Call Center TCM Note      Flowsheet Row Responses   [facility] patient discharged from? [facility]   Does the patient have one of the following disease processes/diagnoses(primary or secondary)? Other   TCM attempt successful? Yes   Call start time 1044   Call end time 1059   Discharge diagnosis Covid 19   Is patient permission given to speak with other caregiver? Yes   List who call center can speak with Desirae- dtr   Person spoke with today (if not patient) and relationship dtr   Meds reviewed with patient/caregiver? Yes   Is the patient having any side effects they believe may be caused by any medication additions or changes? No   Does the patient have all medications ordered at discharge? Yes   Is the patient taking all medications as directed (includes completed medication regime)? Yes   Comments Pt's dtr prefers to call and make appt on her own.   Does the patient have an appointment with their PCP within 7-14 days of discharge? No   Nursing Interventions Patient declined scheduling/rescheduling appointment at this time   What is the Home health agency?  [facility]   Has home health visited the patient within 72 hours of discharge? N/A   Home health comments Dtr plans to postpone HH for now, her reports.   DME comments Home nebulizer and inhalers are in place, pt is utilizing more frequently than he typically does, per dtr. At baseline the pt uses a walker for ambulation r/t Parkinson's   Psychosocial comments The pt has Parkinsons and lives with his dtr and her family.   Comments The patient woke up with fever of, 101.1 at 3am, his dtr reports. Pt was given OTC Tylenol and has been having Tylenol q6hr to keep fever down, per POA. Pt's SOA remains, which his daughter reports has been present for the past 3wks. The pt is supposed to follow dysphagia diet but he is non-compliant per dtr's report and frequently has pneumonia. RN educated POA on importance for pt to ambulate and perform deep breathing exercises q2hr  while awake, dtr v/u.   Did the patient receive a copy of their discharge instructions? Yes   Nursing interventions Reviewed instructions with patient   What is the patient's perception of their health status since discharge? Improving   Is the patient/caregiver able to teach back signs and symptoms related to disease process for when to call PCP? Yes   Is the patient/caregiver able to teach back signs and symptoms related to disease process for when to call 911? Yes   Is the patient/caregiver able to teach back the hierarchy of who to call/visit for symptoms/problems? PCP, Specialist, Home health nurse, Urgent Care, ED, 911 Yes   TCM call completed? Yes   Call end time 1387            Nati Petersen RN    3/21/2025, 11:00 EDT

## 2025-03-23 LAB
BACTERIA SPEC AEROBE CULT: NORMAL
BACTERIA SPEC AEROBE CULT: NORMAL

## 2025-03-24 ENCOUNTER — TELEPHONE (OUTPATIENT)
Dept: CASE MANAGEMENT | Facility: CLINIC | Age: 65
End: 2025-03-24
Payer: MEDICARE

## 2025-03-25 ENCOUNTER — TELEPHONE (OUTPATIENT)
Dept: CASE MANAGEMENT | Facility: CLINIC | Age: 65
End: 2025-03-25
Payer: MEDICARE

## 2025-03-26 ENCOUNTER — OFFICE VISIT (OUTPATIENT)
Dept: CARDIOLOGY | Facility: CLINIC | Age: 65
End: 2025-03-26
Payer: MEDICARE

## 2025-03-26 ENCOUNTER — LAB (OUTPATIENT)
Dept: LAB | Facility: HOSPITAL | Age: 65
End: 2025-03-26
Payer: MEDICARE

## 2025-03-26 VITALS
SYSTOLIC BLOOD PRESSURE: 105 MMHG | HEIGHT: 69 IN | BODY MASS INDEX: 30.51 KG/M2 | DIASTOLIC BLOOD PRESSURE: 69 MMHG | OXYGEN SATURATION: 96 % | HEART RATE: 63 BPM | WEIGHT: 206 LBS

## 2025-03-26 DIAGNOSIS — I73.9 PVD (PERIPHERAL VASCULAR DISEASE) WITH CLAUDICATION: ICD-10-CM

## 2025-03-26 DIAGNOSIS — Z72.0 TOBACCO ABUSE: ICD-10-CM

## 2025-03-26 DIAGNOSIS — E78.2 MIXED HYPERLIPIDEMIA: ICD-10-CM

## 2025-03-26 DIAGNOSIS — G20.A1 PARKINSON'S DISEASE, UNSPECIFIED WHETHER DYSKINESIA PRESENT, UNSPECIFIED WHETHER MANIFESTATIONS FLUCTUATE: ICD-10-CM

## 2025-03-26 DIAGNOSIS — I35.9 AORTIC VALVE CALCIFICATION: ICD-10-CM

## 2025-03-26 DIAGNOSIS — Z13.1 DIABETES MELLITUS SCREENING: ICD-10-CM

## 2025-03-26 DIAGNOSIS — R06.02 SHORTNESS OF BREATH: ICD-10-CM

## 2025-03-26 DIAGNOSIS — Z91.89 CHRONIC CHEST PAIN WITH LOW TO MODERATE RISK FOR CAD: ICD-10-CM

## 2025-03-26 DIAGNOSIS — I63.9 CEREBROVASCULAR ACCIDENT (CVA), UNSPECIFIED MECHANISM: ICD-10-CM

## 2025-03-26 DIAGNOSIS — I25.10 CORONARY ARTERY DISEASE INVOLVING NATIVE CORONARY ARTERY OF NATIVE HEART WITHOUT ANGINA PECTORIS: ICD-10-CM

## 2025-03-26 DIAGNOSIS — R39.11 BENIGN PROSTATIC HYPERPLASIA WITH URINARY HESITANCY: ICD-10-CM

## 2025-03-26 DIAGNOSIS — I25.10 CORONARY ARTERY CALCIFICATION SEEN ON CAT SCAN: ICD-10-CM

## 2025-03-26 DIAGNOSIS — R07.9 CHRONIC CHEST PAIN WITH LOW TO MODERATE RISK FOR CAD: ICD-10-CM

## 2025-03-26 DIAGNOSIS — N40.1 BENIGN PROSTATIC HYPERPLASIA WITH URINARY HESITANCY: ICD-10-CM

## 2025-03-26 DIAGNOSIS — Z86.73 HISTORY OF CVA (CEREBROVASCULAR ACCIDENT): ICD-10-CM

## 2025-03-26 DIAGNOSIS — I10 PRIMARY HYPERTENSION: ICD-10-CM

## 2025-03-26 DIAGNOSIS — Z95.5 STATUS POST CORONARY ARTERY STENT PLACEMENT: ICD-10-CM

## 2025-03-26 DIAGNOSIS — G89.29 CHRONIC CHEST PAIN WITH LOW TO MODERATE RISK FOR CAD: ICD-10-CM

## 2025-03-26 DIAGNOSIS — E78.5 HYPERLIPIDEMIA LDL GOAL <70: ICD-10-CM

## 2025-03-26 DIAGNOSIS — I25.110 CORONARY ARTERY DISEASE INVOLVING NATIVE CORONARY ARTERY OF NATIVE HEART WITH UNSTABLE ANGINA PECTORIS: Primary | ICD-10-CM

## 2025-03-26 LAB — HBA1C MFR BLD: 5.94 % (ref 4.8–5.6)

## 2025-03-26 PROCEDURE — 36415 COLL VENOUS BLD VENIPUNCTURE: CPT

## 2025-03-26 PROCEDURE — 83036 HEMOGLOBIN GLYCOSYLATED A1C: CPT

## 2025-03-26 RX ORDER — MELOXICAM 15 MG/1
1 TABLET ORAL DAILY
COMMUNITY

## 2025-03-26 RX ORDER — MEMANTINE HYDROCHLORIDE 5 MG/1
1 TABLET ORAL DAILY
COMMUNITY

## 2025-03-26 RX ORDER — BACLOFEN 10 MG/1
10 TABLET ORAL 3 TIMES DAILY
COMMUNITY

## 2025-03-26 RX ORDER — AMLODIPINE BESYLATE 5 MG/1
5 TABLET ORAL DAILY PRN
Qty: 90 TABLET | Refills: 3 | Status: SHIPPED | OUTPATIENT
Start: 2025-03-26

## 2025-03-26 RX ORDER — TESTOSTERONE CYPIONATE 200 MG/ML
200 INJECTION, SOLUTION INTRAMUSCULAR
COMMUNITY
Start: 2025-03-17

## 2025-03-31 ENCOUNTER — READMISSION MANAGEMENT (OUTPATIENT)
Dept: CALL CENTER | Facility: HOSPITAL | Age: 65
End: 2025-03-31
Payer: MEDICARE

## 2025-03-31 NOTE — OUTREACH NOTE
Medical Week 2 Survey      Flowsheet Row Responses   [facility] patient discharged from? [facility]   Does the patient have one of the following disease processes/diagnoses(primary or secondary)? Other   Week 2 attempt successful? No   Unsuccessful attempts Attempt 1   Revoke Other transitional program   Revoke Other transitional program            Miley Benoit Registered Nurse

## 2025-04-02 ENCOUNTER — APPOINTMENT (OUTPATIENT)
Dept: GENERAL RADIOLOGY | Facility: HOSPITAL | Age: 65
End: 2025-04-02
Payer: MEDICARE

## 2025-04-02 ENCOUNTER — HOSPITAL ENCOUNTER (OUTPATIENT)
Facility: HOSPITAL | Age: 65
Setting detail: OBSERVATION
Discharge: HOME | End: 2025-04-04
Attending: EMERGENCY MEDICINE | Admitting: EMERGENCY MEDICINE
Payer: MEDICARE

## 2025-04-02 DIAGNOSIS — R07.89 CHEST TIGHTNESS: ICD-10-CM

## 2025-04-02 DIAGNOSIS — R06.00 DYSPNEA, UNSPECIFIED TYPE: ICD-10-CM

## 2025-04-02 DIAGNOSIS — U07.1 DYSPNEA DUE TO COVID-19: ICD-10-CM

## 2025-04-02 DIAGNOSIS — R06.00 DYSPNEA DUE TO COVID-19: ICD-10-CM

## 2025-04-02 DIAGNOSIS — I25.110 CORONARY ARTERY DISEASE WITH UNSTABLE ANGINA PECTORIS, UNSPECIFIED VESSEL OR LESION TYPE, UNSPECIFIED WHETHER NATIVE OR TRANSPLANTED HEART: Primary | ICD-10-CM

## 2025-04-02 PROBLEM — R06.02 SHORTNESS OF BREATH: Status: ACTIVE | Noted: 2025-04-02

## 2025-04-02 PROBLEM — T18.128A FOOD IMPACTION OF ESOPHAGUS: Status: RESOLVED | Noted: 2022-01-27 | Resolved: 2025-04-02

## 2025-04-02 PROBLEM — K22.10 ULCER OF ESOPHAGUS WITHOUT BLEEDING: Status: RESOLVED | Noted: 2022-01-27 | Resolved: 2025-04-02

## 2025-04-02 PROBLEM — E34.9 TESTOSTERONE DEFICIENCY: Chronic | Status: ACTIVE | Noted: 2019-07-25

## 2025-04-02 PROBLEM — K29.60 EROSIVE GASTRITIS: Status: RESOLVED | Noted: 2022-01-27 | Resolved: 2025-04-02

## 2025-04-02 PROBLEM — E66.9 OBESITY (BMI 30-39.9): Chronic | Status: ACTIVE | Noted: 2025-04-02

## 2025-04-02 PROBLEM — R60.9 EDEMA: Status: RESOLVED | Noted: 2021-11-14 | Resolved: 2025-04-02

## 2025-04-02 PROBLEM — T42.8X5A LEVODOPA-INDUCED DYSKINESIA: Status: ACTIVE | Noted: 2024-03-20

## 2025-04-02 PROBLEM — K22.2 ESOPHAGEAL STRICTURE: Status: RESOLVED | Noted: 2022-01-27 | Resolved: 2025-04-02
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
## 2025-04-02 PROBLEM — I25.118 CORONARY ARTERY DISEASE OF NATIVE ARTERY OF NATIVE HEART WITH STABLE ANGINA PECTORIS: Chronic | Status: ACTIVE | Noted: 2024-02-26

## 2025-04-02 PROBLEM — G24.01 LEVODOPA-INDUCED DYSKINESIA: Status: ACTIVE | Noted: 2024-03-20

## 2025-04-02 PROBLEM — Z87.891 FORMER SMOKER: Status: ACTIVE | Noted: 2021-10-13

## 2025-04-02 PROBLEM — K29.80 DUODENITIS: Status: RESOLVED | Noted: 2022-01-27 | Resolved: 2025-04-02

## 2025-04-02 PROBLEM — J44.9 COPD (CHRONIC OBSTRUCTIVE PULMONARY DISEASE): Chronic | Status: ACTIVE | Noted: 2023-07-13

## 2025-04-02 PROBLEM — R53.1 WEAKNESS: Status: RESOLVED | Noted: 2021-04-25 | Resolved: 2025-04-02
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
## 2025-04-02 PROBLEM — N40.1 BENIGN PROSTATIC HYPERPLASIA WITH LOWER URINARY TRACT SYMPTOMS: Chronic | Status: ACTIVE | Noted: 2024-02-27

## 2025-04-02 PROBLEM — G20.A1 PARKINSON DISEASE: Chronic | Status: ACTIVE | Noted: 2018-08-17

## 2025-04-02 PROBLEM — D89.831 CYTOKINE RELEASE SYNDROME, GRADE 1: Status: RESOLVED | Noted: 2025-03-20 | Resolved: 2025-04-02

## 2025-04-02 PROBLEM — R47.1 DYSARTHRIA: Status: ACTIVE | Noted: 2024-03-20

## 2025-04-02 LAB
ALBUMIN SERPL-MCNC: 4.1 G/DL (ref 3.5–5.2)
ALBUMIN/GLOB SERPL: 1.6 G/DL
ALP SERPL-CCNC: 117 U/L (ref 39–117)
ALT SERPL W P-5'-P-CCNC: 9 U/L (ref 1–41)
ANION GAP SERPL CALCULATED.3IONS-SCNC: 12.8 MMOL/L (ref 5–15)
AST SERPL-CCNC: 20 U/L (ref 1–40)
BASOPHILS # BLD AUTO: 0.02 10*3/MM3 (ref 0–0.2)
BASOPHILS NFR BLD AUTO: 0.3 % (ref 0–1.5)
BILIRUB SERPL-MCNC: 0.3 MG/DL (ref 0–1.2)
BILIRUB UR QL STRIP: NEGATIVE
BUN SERPL-MCNC: 12 MG/DL (ref 8–23)
BUN/CREAT SERPL: 16.4 (ref 7–25)
CALCIUM SPEC-SCNC: 9.3 MG/DL (ref 8.6–10.5)
CHLORIDE SERPL-SCNC: 106 MMOL/L (ref 98–107)
CHOLEST SERPL-MCNC: 106 MG/DL (ref 0–200)
CLARITY UR: CLEAR
CO2 SERPL-SCNC: 24.2 MMOL/L (ref 22–29)
COLOR UR: YELLOW
CREAT SERPL-MCNC: 0.73 MG/DL (ref 0.76–1.27)
D DIMER PPP FEU-MCNC: 0.35 MCGFEU/ML (ref 0–0.66)
DEPRECATED RDW RBC AUTO: 42.7 FL (ref 37–54)
EGFRCR SERPLBLD CKD-EPI 2021: 100.3 ML/MIN/1.73
EOSINOPHIL # BLD AUTO: 0 10*3/MM3 (ref 0–0.4)
EOSINOPHIL NFR BLD AUTO: 0 % (ref 0.3–6.2)
ERYTHROCYTE [DISTWIDTH] IN BLOOD BY AUTOMATED COUNT: 12.2 % (ref 12.3–15.4)
GEN 5 1HR TROPONIN T REFLEX: 13 NG/L
GLOBULIN UR ELPH-MCNC: 2.6 GM/DL
GLUCOSE SERPL-MCNC: 111 MG/DL (ref 65–99)
GLUCOSE UR STRIP-MCNC: NEGATIVE MG/DL
HCT VFR BLD AUTO: 38.3 % (ref 37.5–51)
HDLC SERPL-MCNC: 33 MG/DL (ref 40–60)
HGB BLD-MCNC: 12.7 G/DL (ref 13–17.7)
HGB UR QL STRIP.AUTO: NEGATIVE
HOLD SPECIMEN: NORMAL
HOLD SPECIMEN: NORMAL
IMM GRANULOCYTES # BLD AUTO: 0.02 10*3/MM3 (ref 0–0.05)
IMM GRANULOCYTES NFR BLD AUTO: 0.3 % (ref 0–0.5)
KETONES UR QL STRIP: NEGATIVE
LDLC SERPL CALC-MCNC: 47 MG/DL (ref 0–100)
LDLC/HDLC SERPL: 1.31 {RATIO}
LEUKOCYTE ESTERASE UR QL STRIP.AUTO: NEGATIVE
LYMPHOCYTES # BLD AUTO: 1.98 10*3/MM3 (ref 0.7–3.1)
LYMPHOCYTES NFR BLD AUTO: 27.7 % (ref 19.6–45.3)
MCH RBC QN AUTO: 31.6 PG (ref 26.6–33)
MCHC RBC AUTO-ENTMCNC: 33.2 G/DL (ref 31.5–35.7)
MCV RBC AUTO: 95.3 FL (ref 79–97)
MONOCYTES # BLD AUTO: 0.47 10*3/MM3 (ref 0.1–0.9)
MONOCYTES NFR BLD AUTO: 6.6 % (ref 5–12)
NEUTROPHILS NFR BLD AUTO: 4.66 10*3/MM3 (ref 1.7–7)
NEUTROPHILS NFR BLD AUTO: 65.1 % (ref 42.7–76)
NITRITE UR QL STRIP: NEGATIVE
NRBC BLD AUTO-RTO: 0 /100 WBC (ref 0–0.2)
NT-PROBNP SERPL-MCNC: 73.8 PG/ML (ref 0–900)
PH UR STRIP.AUTO: 6 [PH] (ref 5–8)
PLATELET # BLD AUTO: 143 10*3/MM3 (ref 140–450)
PMV BLD AUTO: 10.2 FL (ref 6–12)
POTASSIUM SERPL-SCNC: 3.7 MMOL/L (ref 3.5–5.2)
PROT SERPL-MCNC: 6.7 G/DL (ref 6–8.5)
PROT UR QL STRIP: NEGATIVE
RBC # BLD AUTO: 4.02 10*6/MM3 (ref 4.14–5.8)
SODIUM SERPL-SCNC: 143 MMOL/L (ref 136–145)
SP GR UR STRIP: 1.01 (ref 1–1.03)
TRIGL SERPL-MCNC: 149 MG/DL (ref 0–150)
TROPONIN T NUMERIC DELTA: -3 NG/L
TROPONIN T SERPL HS-MCNC: 16 NG/L
UROBILINOGEN UR QL STRIP: NORMAL
VLDLC SERPL-MCNC: 26 MG/DL (ref 5–40)
WBC NRBC COR # BLD AUTO: 7.15 10*3/MM3 (ref 3.4–10.8)
WHOLE BLOOD HOLD COAG: NORMAL
WHOLE BLOOD HOLD SPECIMEN: NORMAL

## 2025-04-02 PROCEDURE — 36415 COLL VENOUS BLD VENIPUNCTURE: CPT

## 2025-04-02 PROCEDURE — 94640 AIRWAY INHALATION TREATMENT: CPT

## 2025-04-02 PROCEDURE — 25010000002 ENOXAPARIN PER 10 MG: Performed by: PHYSICIAN ASSISTANT

## 2025-04-02 PROCEDURE — 99214 OFFICE O/P EST MOD 30 MIN: CPT | Performed by: INTERNAL MEDICINE

## 2025-04-02 PROCEDURE — 93005 ELECTROCARDIOGRAM TRACING: CPT

## 2025-04-02 PROCEDURE — 96374 THER/PROPH/DIAG INJ IV PUSH: CPT

## 2025-04-02 PROCEDURE — 94761 N-INVAS EAR/PLS OXIMETRY MLT: CPT

## 2025-04-02 PROCEDURE — 81003 URINALYSIS AUTO W/O SCOPE: CPT | Performed by: PHYSICIAN ASSISTANT

## 2025-04-02 PROCEDURE — 96372 THER/PROPH/DIAG INJ SC/IM: CPT

## 2025-04-02 PROCEDURE — 71045 X-RAY EXAM CHEST 1 VIEW: CPT

## 2025-04-02 PROCEDURE — 94762 N-INVAS EAR/PLS OXIMTRY CONT: CPT

## 2025-04-02 PROCEDURE — 99285 EMERGENCY DEPT VISIT HI MDM: CPT

## 2025-04-02 PROCEDURE — 83880 ASSAY OF NATRIURETIC PEPTIDE: CPT | Performed by: EMERGENCY MEDICINE

## 2025-04-02 PROCEDURE — 94664 DEMO&/EVAL PT USE INHALER: CPT

## 2025-04-02 PROCEDURE — 80061 LIPID PANEL: CPT | Performed by: PHYSICIAN ASSISTANT

## 2025-04-02 PROCEDURE — 85379 FIBRIN DEGRADATION QUANT: CPT | Performed by: EMERGENCY MEDICINE

## 2025-04-02 PROCEDURE — G0378 HOSPITAL OBSERVATION PER HR: HCPCS

## 2025-04-02 PROCEDURE — 94799 UNLISTED PULMONARY SVC/PX: CPT

## 2025-04-02 PROCEDURE — 80053 COMPREHEN METABOLIC PANEL: CPT | Performed by: EMERGENCY MEDICINE

## 2025-04-02 PROCEDURE — 25010000002 METHYLPREDNISOLONE PER 125 MG: Performed by: EMERGENCY MEDICINE

## 2025-04-02 PROCEDURE — 84484 ASSAY OF TROPONIN QUANT: CPT | Performed by: EMERGENCY MEDICINE

## 2025-04-02 PROCEDURE — 93005 ELECTROCARDIOGRAM TRACING: CPT | Performed by: EMERGENCY MEDICINE

## 2025-04-02 PROCEDURE — 85025 COMPLETE CBC W/AUTO DIFF WBC: CPT | Performed by: EMERGENCY MEDICINE

## 2025-04-02 RX ORDER — IPRATROPIUM BROMIDE AND ALBUTEROL SULFATE 2.5; .5 MG/3ML; MG/3ML
3 SOLUTION RESPIRATORY (INHALATION) ONCE
Status: COMPLETED | OUTPATIENT
Start: 2025-04-02 | End: 2025-04-02

## 2025-04-02 RX ORDER — SODIUM CHLORIDE 0.9 % (FLUSH) 0.9 %
10 SYRINGE (ML) INJECTION AS NEEDED
Status: DISCONTINUED | OUTPATIENT
Start: 2025-04-02 | End: 2025-04-04 | Stop reason: HOSPADM

## 2025-04-02 RX ORDER — BISACODYL 10 MG
10 SUPPOSITORY, RECTAL RECTAL DAILY PRN
Status: DISCONTINUED | OUTPATIENT
Start: 2025-04-02 | End: 2025-04-04 | Stop reason: HOSPADM

## 2025-04-02 RX ORDER — CARVEDILOL 6.25 MG/1
6.25 TABLET ORAL 2 TIMES DAILY
Status: DISCONTINUED | OUTPATIENT
Start: 2025-04-02 | End: 2025-04-04 | Stop reason: HOSPADM

## 2025-04-02 RX ORDER — BISACODYL 5 MG/1
5 TABLET, DELAYED RELEASE ORAL DAILY PRN
Status: DISCONTINUED | OUTPATIENT
Start: 2025-04-02 | End: 2025-04-04 | Stop reason: HOSPADM

## 2025-04-02 RX ORDER — IPRATROPIUM BROMIDE AND ALBUTEROL SULFATE 2.5; .5 MG/3ML; MG/3ML
3 SOLUTION RESPIRATORY (INHALATION) EVERY 6 HOURS
Status: DISCONTINUED | OUTPATIENT
Start: 2025-04-02 | End: 2025-04-04 | Stop reason: HOSPADM

## 2025-04-02 RX ORDER — ENOXAPARIN SODIUM 100 MG/ML
40 INJECTION SUBCUTANEOUS EVERY 24 HOURS
Status: DISCONTINUED | OUTPATIENT
Start: 2025-04-02 | End: 2025-04-04 | Stop reason: HOSPADM

## 2025-04-02 RX ORDER — BACLOFEN 10 MG/1
10 TABLET ORAL 3 TIMES DAILY
Status: DISCONTINUED | OUTPATIENT
Start: 2025-04-02 | End: 2025-04-04 | Stop reason: HOSPADM

## 2025-04-02 RX ORDER — AMOXICILLIN 250 MG
2 CAPSULE ORAL 2 TIMES DAILY PRN
Status: DISCONTINUED | OUTPATIENT
Start: 2025-04-02 | End: 2025-04-04 | Stop reason: HOSPADM

## 2025-04-02 RX ORDER — CARBIDOPA AND LEVODOPA 25; 100 MG/1; MG/1
2 TABLET, EXTENDED RELEASE ORAL 4 TIMES DAILY
Status: DISCONTINUED | OUTPATIENT
Start: 2025-04-02 | End: 2025-04-04 | Stop reason: HOSPADM

## 2025-04-02 RX ORDER — OXYCODONE HYDROCHLORIDE 5 MG/1
7.5 TABLET ORAL EVERY 4 HOURS PRN
Refills: 0 | Status: DISCONTINUED | OUTPATIENT
Start: 2025-04-02 | End: 2025-04-04 | Stop reason: HOSPADM

## 2025-04-02 RX ORDER — POLYETHYLENE GLYCOL 3350 17 G/17G
17 POWDER, FOR SOLUTION ORAL DAILY PRN
Status: DISCONTINUED | OUTPATIENT
Start: 2025-04-02 | End: 2025-04-04 | Stop reason: HOSPADM

## 2025-04-02 RX ORDER — AMLODIPINE BESYLATE 5 MG/1
5 TABLET ORAL DAILY PRN
Status: DISCONTINUED | OUTPATIENT
Start: 2025-04-02 | End: 2025-04-04 | Stop reason: HOSPADM

## 2025-04-02 RX ORDER — NITROGLYCERIN 0.4 MG/1
0.4 TABLET SUBLINGUAL
Status: DISCONTINUED | OUTPATIENT
Start: 2025-04-02 | End: 2025-04-04 | Stop reason: HOSPADM

## 2025-04-02 RX ORDER — ALUMINA, MAGNESIA, AND SIMETHICONE 2400; 2400; 240 MG/30ML; MG/30ML; MG/30ML
15 SUSPENSION ORAL EVERY 6 HOURS PRN
Status: DISCONTINUED | OUTPATIENT
Start: 2025-04-02 | End: 2025-04-04 | Stop reason: HOSPADM

## 2025-04-02 RX ORDER — CLOPIDOGREL BISULFATE 75 MG/1
75 TABLET ORAL DAILY
Status: DISCONTINUED | OUTPATIENT
Start: 2025-04-02 | End: 2025-04-04 | Stop reason: HOSPADM

## 2025-04-02 RX ORDER — ONDANSETRON 2 MG/ML
4 INJECTION INTRAMUSCULAR; INTRAVENOUS EVERY 6 HOURS PRN
Status: DISCONTINUED | OUTPATIENT
Start: 2025-04-02 | End: 2025-04-04 | Stop reason: HOSPADM

## 2025-04-02 RX ORDER — ONDANSETRON 4 MG/1
4 TABLET, ORALLY DISINTEGRATING ORAL EVERY 6 HOURS PRN
Status: DISCONTINUED | OUTPATIENT
Start: 2025-04-02 | End: 2025-04-04 | Stop reason: HOSPADM

## 2025-04-02 RX ORDER — CARBIDOPA AND LEVODOPA 25; 100 MG/1; MG/1
2 TABLET, EXTENDED RELEASE ORAL 4 TIMES DAILY
COMMUNITY

## 2025-04-02 RX ORDER — GABAPENTIN 300 MG/1
600 CAPSULE ORAL EVERY 8 HOURS SCHEDULED
Status: DISCONTINUED | OUTPATIENT
Start: 2025-04-02 | End: 2025-04-04 | Stop reason: HOSPADM

## 2025-04-02 RX ORDER — PANTOPRAZOLE SODIUM 40 MG/1
40 TABLET, DELAYED RELEASE ORAL DAILY
Status: DISCONTINUED | OUTPATIENT
Start: 2025-04-02 | End: 2025-04-04 | Stop reason: HOSPADM

## 2025-04-02 RX ORDER — ATORVASTATIN CALCIUM 20 MG/1
20 TABLET, FILM COATED ORAL DAILY
Status: DISCONTINUED | OUTPATIENT
Start: 2025-04-02 | End: 2025-04-04 | Stop reason: HOSPADM

## 2025-04-02 RX ORDER — TAMSULOSIN HYDROCHLORIDE 0.4 MG/1
0.4 CAPSULE ORAL DAILY
Status: DISCONTINUED | OUTPATIENT
Start: 2025-04-02 | End: 2025-04-04 | Stop reason: HOSPADM

## 2025-04-02 RX ORDER — CLONAZEPAM 0.5 MG/1
0.5 TABLET ORAL 4 TIMES DAILY
Status: DISCONTINUED | OUTPATIENT
Start: 2025-04-02 | End: 2025-04-04 | Stop reason: HOSPADM

## 2025-04-02 RX ORDER — MEMANTINE HYDROCHLORIDE 5 MG/1
5 TABLET ORAL DAILY
Status: DISCONTINUED | OUTPATIENT
Start: 2025-04-02 | End: 2025-04-02

## 2025-04-02 RX ORDER — SODIUM CHLORIDE 0.9 % (FLUSH) 0.9 %
10 SYRINGE (ML) INJECTION EVERY 12 HOURS SCHEDULED
Status: DISCONTINUED | OUTPATIENT
Start: 2025-04-02 | End: 2025-04-04 | Stop reason: HOSPADM

## 2025-04-02 RX ORDER — ALBUTEROL SULFATE 0.83 MG/ML
2.5 SOLUTION RESPIRATORY (INHALATION) EVERY 6 HOURS PRN
Status: DISCONTINUED | OUTPATIENT
Start: 2025-04-02 | End: 2025-04-04 | Stop reason: HOSPADM

## 2025-04-02 RX ORDER — VENLAFAXINE HYDROCHLORIDE 75 MG/1
75 CAPSULE, EXTENDED RELEASE ORAL DAILY
Status: DISCONTINUED | OUTPATIENT
Start: 2025-04-02 | End: 2025-04-04 | Stop reason: HOSPADM

## 2025-04-02 RX ORDER — SODIUM CHLORIDE 9 MG/ML
40 INJECTION, SOLUTION INTRAVENOUS AS NEEDED
Status: DISCONTINUED | OUTPATIENT
Start: 2025-04-02 | End: 2025-04-04 | Stop reason: HOSPADM

## 2025-04-02 RX ORDER — DONEPEZIL HYDROCHLORIDE 5 MG/1
10 TABLET, FILM COATED ORAL NIGHTLY
Status: DISCONTINUED | OUTPATIENT
Start: 2025-04-02 | End: 2025-04-04 | Stop reason: HOSPADM

## 2025-04-02 RX ORDER — ASPIRIN 81 MG/1
81 TABLET ORAL 2 TIMES DAILY
Status: DISCONTINUED | OUTPATIENT
Start: 2025-04-02 | End: 2025-04-04 | Stop reason: HOSPADM

## 2025-04-02 RX ORDER — METHYLPREDNISOLONE SODIUM SUCCINATE 125 MG/2ML
125 INJECTION, POWDER, LYOPHILIZED, FOR SOLUTION INTRAMUSCULAR; INTRAVENOUS ONCE
Status: COMPLETED | OUTPATIENT
Start: 2025-04-02 | End: 2025-04-02

## 2025-04-02 RX ORDER — ASPIRIN 81 MG/1
324 TABLET, CHEWABLE ORAL ONCE
Status: COMPLETED | OUTPATIENT
Start: 2025-04-02 | End: 2025-04-02

## 2025-04-02 RX ADMIN — BACLOFEN 10 MG: 10 TABLET ORAL at 17:03

## 2025-04-02 RX ADMIN — ASPIRIN 81 MG: 81 TABLET, COATED ORAL at 21:22

## 2025-04-02 RX ADMIN — GABAPENTIN 600 MG: 300 CAPSULE ORAL at 21:22

## 2025-04-02 RX ADMIN — CLONAZEPAM 0.5 MG: 0.5 TABLET ORAL at 21:22

## 2025-04-02 RX ADMIN — METHYLPREDNISOLONE SODIUM SUCCINATE 125 MG: 125 INJECTION, POWDER, FOR SOLUTION INTRAMUSCULAR; INTRAVENOUS at 09:09

## 2025-04-02 RX ADMIN — TAMSULOSIN HYDROCHLORIDE 0.4 MG: 0.4 CAPSULE ORAL at 21:27

## 2025-04-02 RX ADMIN — IPRATROPIUM BROMIDE AND ALBUTEROL SULFATE 3 ML: .5; 3 SOLUTION RESPIRATORY (INHALATION) at 19:13

## 2025-04-02 RX ADMIN — IPRATROPIUM BROMIDE AND ALBUTEROL SULFATE 3 ML: .5; 3 SOLUTION RESPIRATORY (INHALATION) at 09:12

## 2025-04-02 RX ADMIN — GABAPENTIN 600 MG: 300 CAPSULE ORAL at 14:08

## 2025-04-02 RX ADMIN — CARBIDOPA AND LEVODOPA 2 TABLET: 25; 100 TABLET, EXTENDED RELEASE ORAL at 21:22

## 2025-04-02 RX ADMIN — ASPIRIN 81 MG CHEWABLE TABLET 324 MG: 81 TABLET CHEWABLE at 09:24

## 2025-04-02 RX ADMIN — BACLOFEN 10 MG: 10 TABLET ORAL at 21:22

## 2025-04-02 RX ADMIN — CARVEDILOL 6.25 MG: 6.25 TABLET, FILM COATED ORAL at 21:22

## 2025-04-02 RX ADMIN — CLONAZEPAM 0.5 MG: 0.5 TABLET ORAL at 17:03

## 2025-04-02 RX ADMIN — IPRATROPIUM BROMIDE AND ALBUTEROL SULFATE 3 ML: .5; 3 SOLUTION RESPIRATORY (INHALATION) at 16:16

## 2025-04-02 RX ADMIN — CARBIDOPA AND LEVODOPA 2 TABLET: 25; 100 TABLET, EXTENDED RELEASE ORAL at 17:03

## 2025-04-02 RX ADMIN — DONEPEZIL HYDROCHLORIDE 10 MG: 5 TABLET, FILM COATED ORAL at 21:22

## 2025-04-02 RX ADMIN — Medication 10 ML: at 21:21

## 2025-04-02 RX ADMIN — ENOXAPARIN SODIUM 40 MG: 100 INJECTION SUBCUTANEOUS at 17:03

## 2025-04-02 NOTE — CASE MANAGEMENT/SOCIAL WORK
Discharge Planning Assessment  PITER Romero     Patient Name: Andrew Flores  MRN: [id number]  Today's Date: 4/2/2025    Admit Date: 4/2/2025    Plan: Home w/ daughter. [facility] (current, no GUI for obs needed). Daughters for DC transport.   Discharge Needs Assessment       Row Name 04/02/25 1538       Living Environment    People in Home child(elvie), adult    Name(s) of People in Home erich Merrill    Current Living Arrangements home    Potentially Unsafe Housing Conditions none    In the past 12 months has the electric, gas, oil, or water company threatened to shut off services in your home? No    Primary Care Provided by self    Provides Primary Care For no one    Family Caregiver if Needed child(elvie), adult    Family Caregiver Names daughters Desirae and Eileen    Quality of Family Relationships helpful;involved;supportive    Able to Return to Prior Arrangements yes       Resource/Environmental Concerns    Resource/Environmental Concerns none    Transportation Concerns none       Transportation Needs    In the past 12 months, has lack of transportation kept you from medical appointments or from getting medications? no    In the past 12 months, has lack of transportation kept you from meetings, work, or from getting things needed for daily living? No       Food Insecurity    Within the past 12 months, you worried that your food would run out before you got the money to buy more. Never true    Within the past 12 months, the food you bought just didn't last and you didn't have money to get more. Never true       Transition Planning    Patient/Family Anticipates Transition to home with help/services    Patient/Family Anticipated Services at Transition none    Transportation Anticipated car, drives self;family or friend will provide       Discharge Needs Assessment    Readmission Within the Last 30 Days no previous admission in last 30 days    Current Outpatient/Agency/Support Group homecare agency  [facility]    Equipment  Currently Used at Home cane, straight;shower chair;walker, rolling    Concerns to be Addressed discharge planning    Anticipated Changes Related to Illness none    Equipment Needed After Discharge none    Discharge Facility/Level of Care Needs home with home health                   Discharge Plan       Row Name 04/02/25 1545       Plan    Plan Home w/ daughter. [facility] (current, no GUI for obs needed). Daughters for DC transport.    Plan Comments CM met with patient and daughter Eileen at the bedside. Confirmed PCP, insurance, and pharmacy. Patient agreeable in M2B. Patient denies any difficulty affording medications. Patient lives at home with his daughter Desirae, is mostly independent with ADLS/IADLS, and drives. Family able to provide DC transport. Daughter Eileen states patient required nocturnal oxygen last time he was in the hospital last week, patient to have overnight pulse ox study per RT. DC Barriers: Cardiology consulted, myoview planned for 4/3, need UA.                  Continued Care and Services - Admitted Since 4/2/2025       Home Medical Care       Service Provider Request Status Services Address Phone Fax Patient Preferred    [facility] Pending - Request Sent -- [address] [facility] [place] [place] [address] [phone] --                   Demographic Summary       Row Name 04/02/25 1538       General Information    Admission Type observation    Arrived From emergency department    Required Notices Provided Observation Status Notice    Referral Source admission list    Reason for Consult discharge planning    Preferred Language English       Contact Information    Permission Granted to Share Info With     Contact Information Obtained for                    Functional Status       Row Name 04/02/25 1538       Functional Status    Usual Activity Tolerance moderate    Current Activity Tolerance moderate       Functional Status, IADL    Medications  independent;assistive person    Meal Preparation independent;assistive person    Housekeeping independent;assistive person    Laundry independent;assistive person    Shopping independent;assistive person           Abdiaziz Reyes RN     Cell number [phone]  Office number [phone]

## 2025-04-02 NOTE — H&P
[facility] Observation Unit H&P    Patient Name: Andrew Flores  : 1960  MRN: [id number]  Primary Care Physician: Larry Mireles PA-C  Date of admission: 2025     Patient Care Team:  Larry Mireles PA-C as PCP - General (Physician Assistant)  Lelo Tello MD as Consulting Physician (Interventional Cardiology)  Floresita Rubi APRN as Nurse Practitioner (Cardiology)  Nomi Cunningham MD as Cardiologist (Cardiology)          Subjective   History Present Illness     Chief Complaint:   Chief Complaint   Patient presents with    Shortness of Breath     Soa, some chest pain, no energy, Pt states this started 4 days ago.           History of Present Illness  25 (postobservation admission):  Patient confirms the HPI noted above reporting approximately 3-day history of some chest pain and dyspnea specifically dyspnea with exertion which has become increasingly worse with each day.  He was recently diagnosed with COVID-19 infection though his cough and fever has subsequently resolved and he denies any palpitations, peripheral edema.  Significant fatigue/weakness has been noted and patient reports he has trouble holding himself up in bed.  He also notes some darkening of his urine and increased frequency.  Chest pain is described as a tightness also made worse with exertion.  He is compliant with all of his outpatient medical therapies and notes that he has recently completed a course of steroids secondary to COVID-19 infection but denies any other recent medication changes.        ROS  Review of Systems   Constitutional: Positive for malaise/fatigue. Negative for fever.   HENT: Negative.     Eyes: Negative.    Cardiovascular:  Positive for chest pain and dyspnea on exertion. Negative for palpitations.   Respiratory:  Negative for cough.    Skin: Negative.    Musculoskeletal: Negative.    Gastrointestinal: Negative.    Genitourinary: Negative.    Neurological:  Positive for weakness.    Psychiatric/Behavioral: Negative.        Personal History     Past Medical History:   Past Medical History:   Diagnosis Date    Anxiety 09/04/2012    Benign prostatic hyperplasia with lower urinary tract symptoms 02/27/2024    Borderline diabetes     COPD (chronic obstructive pulmonary disease)     Coronary artery disease 2015    To the best of my memory    COVID-19 03/18/2025    Cytokine release syndrome, grade 1 03/20/2025    Deep vein thrombosis 2015    Blood clots    Depression 07/07/2016    Diffuse non-Hodgkin's lymphoma of testis 03/06/2018    Duodenitis 01/27/2022    Dysphagia 08/2022    from parkinsons    Erosive gastritis 01/27/2022    Esophageal stricture 01/27/2022    Fall 07/25/2019    Food impaction of esophagus 01/27/2022    Gastroesophageal reflux disease 09/04/2012    History of chemotherapy 04/25/2021    Hx of radiation therapy 04/25/2021    Intertrochanteric fracture of left femur, closed, initial encounter 08/25/2024    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    Mixed hyperlipidemia 07/08/2019    Myocardial infarction 2017    Non-Hodgkin's lymphoma of testis     Parkinson disease     Pneumonia of right upper lobe due to infectious organism 12/04/2022    Poor historian     Primary hypertension 07/08/2019    PVD (peripheral vascular disease) with claudication 09/20/2024    Shortness of breath     Sleep apnea     does not have a cpap    Status post coronary artery stent placement 02/26/2024    Stroke 2017    Testosterone deficiency 07/25/2019    Tick bite 05/20/2021    Tobacco abuse 10/13/2021    Ulcer of esophagus without bleeding 01/27/2022    Uncomplicated alcohol dependence 04/25/2021       Surgical History:      Past Surgical History:   Procedure Laterality Date    CARDIAC CATHETERIZATION N/A 2/26/2024    Procedure: Left and Right Heart Cath with Coronary Angiography;  Surgeon: Lelo Tello MD;  Location: [facility] CATH INVASIVE LOCATION;  Service: Cardiology;  Laterality: N/A;    CARDIAC  CATHETERIZATION N/A 2/26/2024    Procedure: Percutaneous Coronary Intervention;  Surgeon: Lelo Tello MD;  Location: [facility] CATH INVASIVE LOCATION;  Service: Cardiology;  Laterality: N/A;    ENDOSCOPY N/A 01/27/2022    Procedure: ESOPHAGOGASTRODUODENOSCOPY with foreign body removal with gastric, duodenal biopsy and GE junction biopsy;  Surgeon: Pedro De La Rosa MD;  Location: [facility] ENDOSCOPY;  Service: Gastroenterology;  Laterality: N/A;  post: foreign body removal, erosive gastritis with biopsy, erosive eduodenitis with biopsy, esophagitis, GE junction biopsy to rule out malignancy,     ENDOSCOPY N/A 08/23/2022    Procedure: ESOPHAGOGASTRODUODENOSCOPY with GE junction biopsy R/O Barretts, esophageal dilation #48 bougie;  Surgeon: Alexa Subramanian MD;  Location: [facility] ENDOSCOPY;  Service: Gastroenterology;  Laterality: N/A;  duodenal ulcer, GERD,     HIP TROCHANTERIC NAILING WITH INTRAMEDULLARY HIP SCREW Left 8/26/2024    Procedure: Left hip/femur fracture fixation with intramedullary ajit and screws;  Surgeon: Kameron Kaminski MD;  Location: [facility] MAIN OR;  Service: Orthopedics;  Laterality: Left;    INTERVENTIONAL RADIOLOGY PROCEDURE N/A 2/26/2024    Procedure: Intravascular Ultrasound;  Surgeon: Lelo Tello MD;  Location: [facility] CATH INVASIVE LOCATION;  Service: Cardiology;  Laterality: N/A;    ORCHIECTOMY  2019           Family History: family history includes Heart disease in his brother, brother, and mother; Heart failure in his brother, mother, and sister; Hypertension in his mother and sister; No Known Problems in his father; Stroke in his mother. Otherwise pertinent FHx was reviewed and unremarkable.     Social History:  reports that he quit smoking about 11 months ago. His smoking use included cigarettes. He started smoking about 52 years ago. He has a 51.3 pack-year smoking history. He has been exposed to tobacco smoke. He has never used smokeless tobacco. He reports current alcohol use. He  reports that he does not use drugs.      Medications:  Prior to Admission medications    Medication Sig Start Date End Date Taking? Authorizing Provider   albuterol sulfate  (90 Base) MCG/ACT inhaler Inhale 2 puffs Every 4 (Four) Hours As Needed for Wheezing. 10/10/24   Larry Mireles PA-C   amLODIPine (NORVASC) 5 MG tablet Take 1 tablet by mouth Daily As Needed (SBP>140mmHg). 3/26/25   Nomi Cunningham MD   aspirin 81 MG EC tablet Take 1 tablet by mouth 2 (Two) Times a Day for 360 doses. 11/6/24 5/5/25  Larry Mireles PA-C   atorvastatin (LIPITOR) 20 MG tablet Take 1 tablet by mouth Daily. 4/29/24   Larry Mireles PA-C   baclofen (LIORESAL) 10 MG tablet Take 1 tablet by mouth 3 (Three) Times a Day.    ProviderMaci MD   budesonide (Pulmicort) 0.5 MG/2ML nebulizer solution Take 2 mL by nebulization Daily for 360 days. 10/10/24 10/5/25  Larry Mireles PA-C   carbidopa-levodopa (SINEMET)  MG per tablet Take 2 tablets by mouth 3 (Three) Times a Day. Indications: Parkinson's Disease, Parkinsonian-Like Syndrome    Maci Downs MD   carbidopa-levodopa ER (SINEMET CR)  MG per tablet Take 2 tablets by mouth 4 (Four) Times a Day.    Maci Downs MD   carvedilol (COREG) 6.25 MG tablet TAKE 1 TABLET BY MOUTH TWICE A DAY 3/17/25   Floresita Rubi APRN   clonazePAM (KlonoPIN) 0.5 MG tablet Take 1 tablet by mouth 4 (Four) Times a Day. 3/4/25   Larry Mireles PA-C   clopidogrel (PLAVIX) 75 MG tablet TAKE 1 TABLET BY MOUTH EVERY DAY 3/17/25   Floresita Rubi APRN   Cyanocobalamin (Vitamin B-12) 1000 MCG sublingual tablet Take 1 tablet by mouth Daily. 1/16/25   Larry Mireles PA-C   docusate sodium (COLACE) 100 MG capsule Take 1 capsule by mouth 2 (Two) Times a Day As Needed for Constipation.    Maci Downs MD   donepezil (ARICEPT) 10 MG tablet TAKE 1 TABLET BY MOUTH EVERYDAY AT BEDTIME 1/20/25   Larry Mireles PA-C   gabapentin (NEURONTIN) 600 MG tablet  Take 1 tablet by mouth 3 (Three) Times a Day. 2/3/25   Larry Mireles PA-C   ipratropium-albuterol (DUO-NEB) 0.5-2.5 mg/3 ml nebulizer Take 3 mL by nebulization Every 6 (Six) Hours. DX J44.9 Medicare B 2/17/25   Larry Mireles PA-C   melatonin 5 MG tablet tablet Take 1 tablet by mouth At Night As Needed.    Maci Downs MD   meloxicam (MOBIC) 15 MG tablet Take 1 tablet by mouth Daily.    Maci Downs MD   memantine (NAMENDA) 5 MG tablet Take 1 tablet by mouth Daily.    Maci Downs MD   nitroglycerin (NITROSTAT) 0.4 MG SL tablet Place 1 tablet under the tongue Every 5 (Five) Minutes As Needed for Chest Pain (Do not take if systolic BP less than 100 mmHg). Take no more than 3 doses in 15 minutes. 2/27/24   Floresita Rubi APRN   ondansetron (ZOFRAN) 4 MG tablet Take 1 tablet by mouth Every 4 (Four) Hours As Needed for Nausea or Vomiting.    Maci Downs MD   oxyCODONE-acetaminophen (PERCOCET) 7.5-325 MG per tablet Take 1 tablet by mouth Every 6 (Six) Hours As Needed for Moderate Pain. 3/21/25   Larry Mireles PA-C   pantoprazole (PROTONIX) 40 MG EC tablet TAKE 1 TABLET BY MOUTH EVERY DAY 3/17/25   Larry Mireles PA-C   polyethylene glycol (MIRALAX) 17 GM/SCOOP powder Take 17 g by mouth Daily As Needed (Use if senna-docusate is ineffective). 8/28/24   Elizabeth Anna MD   predniSONE (DELTASONE) 20 MG tablet Take 1 tablet by mouth Daily. 3/20/25   Brien Naidu MD   tamsulosin (FLOMAX) 0.4 MG capsule 24 hr capsule TAKE 1 CAPSULE BY MOUTH EVERY DAY 3/17/25   Larry Mireles PA-C   Testosterone Cypionate (DEPOTESTOTERONE CYPIONATE) 200 MG/ML injection Inject 1 mL into the appropriate muscle as directed by prescriber Every 14 (Fourteen) Days. 3/17/25   Maci Downs MD   venlafaxine XR (EFFEXOR-XR) 75 MG 24 hr capsule TAKE 1 CAPSULE BY MOUTH EVERY DAY 12/16/24   Larry Mireles PA-C   carbidopa-levodopa ER (SINEMET CR)  MG per tablet Take 2 tablets by  mouth 4 (Four) Times a Day.  Patient taking differently: Take 1 tablet by mouth Every Night. 2/17/25 4/2/25  Larry Mireles PA-C       Allergies:  No Known Allergies    Objective   Objective     Vital Signs  Temp:  [97.6 °F (36.4 °C)-97.9 °F (36.6 °C)] 97.6 °F (36.4 °C)  Heart Rate:  [56-71] 64  Resp:  [16-20] 16  BP: (125-164)/(76-89) 164/86  SpO2:  [93 %-100 %] 97 %  on   ;   Device (Oxygen Therapy): room air  Body mass index is 30.51 kg/m².    Physical Exam  Vitals reviewed.   Constitutional:       General: He is not in acute distress.     Appearance: Normal appearance. He is obese. He is not ill-appearing, toxic-appearing or diaphoretic.   HENT:      Head: Normocephalic.      Right Ear: External ear normal.      Left Ear: External ear normal.      Nose: Nose normal.      Mouth/Throat:      Mouth: Mucous membranes are moist.   Eyes:      Extraocular Movements: Extraocular movements intact.   Cardiovascular:      Rate and Rhythm: Normal rate and regular rhythm.      Pulses: Normal pulses.      Heart sounds: Normal heart sounds.   Pulmonary:      Effort: Pulmonary effort is normal.      Breath sounds: Normal breath sounds.   Abdominal:      General: Bowel sounds are normal.      Palpations: Abdomen is soft.      Tenderness: There is no abdominal tenderness.   Musculoskeletal:         General: Normal range of motion.      Cervical back: Normal range of motion.      Right lower leg: No edema.      Left lower leg: No edema.   Skin:     General: Skin is warm and dry.      Capillary Refill: Capillary refill takes less than 2 seconds.   Neurological:      General: No focal deficit present.      Mental Status: He is alert and oriented to person, place, and time.      Motor: Weakness present.   Psychiatric:         Mood and Affect: Mood normal.         Behavior: Behavior normal.         Thought Content: Thought content normal.         Judgment: Judgment normal.     Results Review:  I have personally reviewed most  recent cardiac tracings, lab results, and radiology images and interpretations and agree with findings, most notably: Troponin, proBNP, CBC, CMP, D-dimer, lipid panel, chest x-ray and EKG.    Results from last 7 days   Lab Units 04/02/25  0905   WBC 10*3/mm3 7.15   HEMOGLOBIN g/dL 12.7*   HEMATOCRIT % 38.3   PLATELETS 10*3/mm3 143     Results from last 7 days   Lab Units 04/02/25  1030 04/02/25  0905   SODIUM mmol/L  --  143   POTASSIUM mmol/L  --  3.7   CHLORIDE mmol/L  --  106   CO2 mmol/L  --  24.2   BUN mg/dL  --  12   CREATININE mg/dL  --  0.73*   GLUCOSE mg/dL  --  111*   CALCIUM mg/dL  --  9.3   ALK PHOS U/L  --  117   ALT (SGPT) U/L  --  9   AST (SGOT) U/L  --  20   HSTROP T ng/L 13 16   PROBNP pg/mL  --  73.8     Estimated Creatinine Clearance: 112.5 mL/min (A) (by C-G formula based on SCr of 0.73 mg/dL (L)).  Brief Urine Lab Results  (Last result in the past 365 days)        Color   Clarity   Blood   Leuk Est   Nitrite   Protein   CREAT   Urine HCG        03/18/25 1251 Dark Yellow   Hazy   Negative   Negative   Negative   Trace                   Microbiology Results (last 10 days)       ** No results found for the last 240 hours. **            ECG/EMG Results (most recent)       Procedure Component Value Units Date/Time    ECG 12 Lead ED Triage Standing Order; Chest Pain [[id number]] Collected: 04/02/25 0836     Updated: 04/02/25 0837     QT Interval 426 ms      QTC Interval 429 ms     Narrative:      HEART RATE=61  bpm  RR Mplweoqb=090  ms  DC Odnxxvpr=558  ms  P Horizontal Axis=35  deg  P Front Axis=-14  deg  QRSD Wmrifith=265  ms  QT Hykyqfrd=682  ms  RZzL=118  ms  QRS Axis=1  deg  T Wave Axis=51  deg  - NORMAL ECG -  Sinus rhythm  Date and Time of Study:2025-04-02 08:36:02    Telemetry Scan [[id number]] Resulted: 04/02/25     Updated: 04/02/25 1040            Results for orders placed during the hospital encounter of 09/23/24    Duplex Venous Lower Extremity - Bilateral CAR    Interpretation Summary     Chronic right lower extremity superficial thrombophlebitis noted in the small saphenous.    Chronic left lower extremity superficial thrombophlebitis noted in the small saphenous.    All other veins appeared normal bilaterally.      Results for orders placed during the hospital encounter of 09/23/24    Adult Transthoracic Echo Complete W/ Cont if Necessary Per Protocol    Interpretation Summary    Left ventricular systolic function is normal. Calculated left ventricular EF = 53% Left ventricular ejection fraction appears to be 51 - 55%.    Left ventricular diastolic function is consistent with (grade I) impaired relaxation. Average GLS -14.9%.    The left atrial cavity is dilated.    Estimated right ventricular systolic pressure from tricuspid regurgitation is normal (<35 mmHg).    No significant valvular abnormalities noted.      XR Chest 1 View  Result Date: 4/2/2025  Impression: No acute cardiopulmonary abnormality. Linear scarring within the lateral right midlung. Electronically Signed: Deepak Sandoval  4/2/2025 8:58 AM EDT  Workstation ID: [id number]        Estimated Creatinine Clearance: 112.5 mL/min (A) (by C-G formula based on SCr of 0.73 mg/dL (L)).    Assessment & Plan   Assessment/Plan       Active Hospital Problems    Diagnosis  POA    **Chest tightness [R07.89]  Yes    Shortness of breath [R06.02]  Yes    Obesity (BMI 30-39.9) [E66.9]  Yes    Obstructive sleep apnea syndrome [G47.33]  Yes    PVD (peripheral vascular disease) with claudication [I73.9]  Yes    Benign prostatic hyperplasia with lower urinary tract symptoms [N40.1]  Yes    Coronary artery disease of native artery of native heart with stable angina pectoris [I25.118]  Yes    COPD (chronic obstructive pulmonary disease) [J44.9]  Yes    Former smoker [Z87.891]  Not Applicable    Mixed hyperlipidemia [E78.2]  Yes    Primary hypertension [I10]  Yes    History of CVA (cerebrovascular accident) [Z86.73]  Not Applicable    Parkinson disease [G20.A1]   Yes    Depression [F32.A]  Yes    Gastroesophageal reflux disease [K21.9]  Yes    Anxiety [F41.9]  Yes      Resolved Hospital Problems   No resolved problems to display.         Chest pain with dyspnea and recent COVID-19 infection and history of CAD        Lab Results   Component Value Date     TROPONINT 16 04/02/2025     TROPONINT 11 03/18/2025     TROPONINT 13 03/18/2025   -proBNP: 73.8  -D-dimer: 0.35  -Lipid panel total cholesterol of 106 with an LDL of 47 and HDL of 33  -Chest X-ray: No acute cardiopulmonary process  -Patient tested positive for COVID-19 infection on 3/18/2025  -EKG: Sinus rhythm at 61 with baseline artifact and a QTc of 429 ms  -In the ED pt given 325 mg aspirin and DuoNeb  -Cardiac catheterization from February 2024 showed 80% disease in the proximal LAD as well as 70% in the mid LAD with heavy calcification of the entire LAD noted as well as moderate calcification with 30 to 40% stenosis in the mid segment of the circumflex and 20% stenosis in the proximal RCA with PCI to the proximal LAD at that time and recommendations for DAPT, beta-blocker, statin and ACE inhibitor  -Cardiology consulted  -Telemetry  -NPO  -Continue aspirin, statin, Coreg, Plavix     Hypertension  -Well controlled       BP Readings from Last 1 Encounters:   04/02/25 130/76   - Continue amlodipine and Coreg  - Monitor while admitted     COPD  -Pulmicort and DuoNeb     Parkinson's  -Sinemet     Anxiety/depression/cognitive impairment  -Klonopin, Aricept, Effexor or and Namenda        VTE Prophylaxis - Active VTE Prophylaxis:  Pharmacologic:        Start     Dose Route Frequency Stop    04/02/25 1600  enoxaparin sodium (LOVENOX) syringe 40 mg         40 mg SC Every 24 Hours --                  Select Mechanical VTE Prophylaxis if Desired & Appropriate      CODE STATUS:    Code Status and Medical Interventions: CPR (Attempt to Resuscitate); Full Support   Ordered at: 04/02/25 1039     Code Status (Patient has no pulse and  is not breathing):    CPR (Attempt to Resuscitate)     Medical Interventions (Patient has pulse or is breathing):    Full Support       This patient has been examined wearing personal protective equipment.     I discussed the patient's findings and my recommendations with patient and nursing staff.      Signature:Electronically signed by Haja Khoury PA-C, 04/02/25, 2:11 PM EDT.

## 2025-04-02 NOTE — CONSULTS
Referring Provider: No att. providers found    Reason for Consultation: Chest pain      Patient Care Team:  Larry Mireles PA-C as PCP - General (Physician Assistant)  Lelo Tello MD as Consulting Physician (Interventional Cardiology)  Floresita Rubi APRN as Nurse Practitioner (Cardiology)  Nomi Cunningham MD as Cardiologist (Cardiology)      SUBJECTIVE     Chief Complaint: Chest pain/tightness    History of present illness:  Andrew Flores is a 64 y.o. male with hypertension, hyperlipidemia, coronary artery disease status post PCI to LAD in February 2024, peripheral arterial disease with claudication, stroke, Parkinson's disease, COPD and tobacco abuse who presents to the hospital with complaints/tightness.     He also reports shortness of breath, dizziness, lightheadedness, malaise and fatigue.    ECG shows sinus rhythm with no acute ischemic changes.  High-sensitivity troponin is negative x 2.     Current cardiac medications include amlodipine, aspirin, atorvastatin, Plavix, Coreg.      Review of systems:    Constitutional: No weakness, fatigue, fever, rigors, chills   Eyes: No vision changes, eye pain   ENT/oropharynx: No difficulty swallowing, sore throat, epistaxis, changes in hearing   Cardiovascular: + chest pain, chest tightness, palpitations, paroxysmal nocturnal dyspnea, orthopnea, diaphoresis, dizziness / syncopal episode   Respiratory: No shortness of breath, dyspnea on exertion, cough, wheezing, hemoptysis   Gastrointestinal: No abdominal pain, nausea, vomiting, diarrhea, bloody stools   Genitourinary: No hematuria, dysuria   Neurological: No headache, tremors, numbness, one-sided weakness    Musculoskeletal: No cramps, myalgias, joint pain, joint swelling   Integument: No rash, edema        Personal History:      Past Medical History:   Diagnosis Date    Asthma     Borderline diabetes     Cerebrovascular accident (CVA) 07/08/2019    CHF (congestive heart failure) 2020    COPD (chronic  obstructive pulmonary disease)     Coronary artery disease 2015    To the best of my memory    Deep vein thrombosis 2015    Blood clots    Dysphagia 08/2022    from parkinsons    Hyperlipidemia     Hypertension     Intertrochanteric fracture of left femur, closed, initial encounter 08/25/2024    Mood disorder     Myocardial infarction 2017    Non-Hodgkin's lymphoma of testis     Parkinson disease     Pneumonia of right upper lobe due to infectious organism 12/04/2022    Poor historian     PVD (peripheral vascular disease) with claudication 09/20/2024    Shortness of breath     Sleep apnea     does not have a cpap    Status post coronary artery stent placement 02/26/2024    Stroke 2017       Past Surgical History:   Procedure Laterality Date    CARDIAC CATHETERIZATION N/A 2/26/2024    Procedure: Left and Right Heart Cath with Coronary Angiography;  Surgeon: Lelo Tello MD;  Location: [facility] CATH INVASIVE LOCATION;  Service: Cardiology;  Laterality: N/A;    CARDIAC CATHETERIZATION N/A 2/26/2024    Procedure: Percutaneous Coronary Intervention;  Surgeon: Lelo Tello MD;  Location: [facility] CATH INVASIVE LOCATION;  Service: Cardiology;  Laterality: N/A;    ENDOSCOPY N/A 01/27/2022    Procedure: ESOPHAGOGASTRODUODENOSCOPY with foreign body removal with gastric, duodenal biopsy and GE junction biopsy;  Surgeon: Pedro De La Rosa MD;  Location: [facility] ENDOSCOPY;  Service: Gastroenterology;  Laterality: N/A;  post: foreign body removal, erosive gastritis with biopsy, erosive eduodenitis with biopsy, esophagitis, GE junction biopsy to rule out malignancy,     ENDOSCOPY N/A 08/23/2022    Procedure: ESOPHAGOGASTRODUODENOSCOPY with GE junction biopsy R/O Barretts, esophageal dilation #48 bougie;  Surgeon: Alexa Subramanian MD;  Location: [facility] ENDOSCOPY;  Service: Gastroenterology;  Laterality: N/A;  duodenal ulcer, GERD,     HIP TROCHANTERIC NAILING WITH INTRAMEDULLARY HIP SCREW Left 8/26/2024    Procedure: Left  "hip/femur fracture fixation with intramedullary ajit and screws;  Surgeon: Kameron Kaminski MD;  Location: [facility] MAIN OR;  Service: Orthopedics;  Laterality: Left;    INTERVENTIONAL RADIOLOGY PROCEDURE N/A 2024    Procedure: Intravascular Ultrasound;  Surgeon: Lelo Tello MD;  Location: [facility] CATH INVASIVE LOCATION;  Service: Cardiology;  Laterality: N/A;    ORCHIECTOMY         Family History   Problem Relation Age of Onset    Heart disease Mother         My brother, and a sister has had a brain anurysm    Stroke Mother     Heart failure Mother     Hypertension Mother         My whole imidate family, mother and all siblings. My father passed when i was [age] from black lung disease.    No Known Problems Father     Heart disease Brother     Heart failure Brother     Heart disease Brother     Heart failure Sister     Hypertension Sister        Social History     Tobacco Use    Smoking status: Former     Current packs/day: 0.00     Average packs/day: 1 pack/day for 51.3 years (51.3 ttl pk-yrs)     Types: Cigarettes     Start date:      Quit date: 2024     Years since quittin.9     Passive exposure: Current    Smokeless tobacco: Never   Vaping Use    Vaping status: Never Used   Substance Use Topics    Alcohol use: Yes     Comment: \"once a month\"    Drug use: Never        Home meds:  Prior to Admission medications    Medication Sig Start Date End Date Taking? Authorizing Provider   albuterol sulfate  (90 Base) MCG/ACT inhaler Inhale 2 puffs Every 4 (Four) Hours As Needed for Wheezing. 10/10/24   Larry Mireles PA-C   amLODIPine (NORVASC) 5 MG tablet Take 1 tablet by mouth Daily As Needed (SBP>140mmHg). 3/26/25   Nomi Cunningham MD   aspirin 81 MG EC tablet Take 1 tablet by mouth 2 (Two) Times a Day for 360 doses. 24  Larry Mireles PA-C   atorvastatin (LIPITOR) 20 MG tablet Take 1 tablet by mouth Daily. 24   Larry Mireles PA-C   baclofen (LIORESAL) 10 MG tablet Take " 1 tablet by mouth 3 (Three) Times a Day.    Maci Downs MD   budesonide (Pulmicort) 0.5 MG/2ML nebulizer solution Take 2 mL by nebulization Daily for 360 days. 10/10/24 10/5/25  Larry Mireles PA-C   carbidopa-levodopa (SINEMET)  MG per tablet Take 2 tablets by mouth 3 (Three) Times a Day. Indications: Parkinson's Disease, Parkinsonian-Like Syndrome    Maci Downs MD   carbidopa-levodopa ER (SINEMET CR)  MG per tablet Take 2 tablets by mouth 4 (Four) Times a Day.    Maci Downs MD   carvedilol (COREG) 6.25 MG tablet TAKE 1 TABLET BY MOUTH TWICE A DAY 3/17/25   Floresita Rubi APRN   clonazePAM (KlonoPIN) 0.5 MG tablet Take 1 tablet by mouth 4 (Four) Times a Day. 3/4/25   Larry Mireles PA-C   clopidogrel (PLAVIX) 75 MG tablet TAKE 1 TABLET BY MOUTH EVERY DAY 3/17/25   Floresita Rubi APRN   Cyanocobalamin (Vitamin B-12) 1000 MCG sublingual tablet Take 1 tablet by mouth Daily. 1/16/25   Larry Mireles PA-C   docusate sodium (COLACE) 100 MG capsule Take 1 capsule by mouth 2 (Two) Times a Day As Needed for Constipation.    Maci Downs MD   donepezil (ARICEPT) 10 MG tablet TAKE 1 TABLET BY MOUTH EVERYDAY AT BEDTIME 1/20/25   Larry Mireles PA-C   gabapentin (NEURONTIN) 600 MG tablet Take 1 tablet by mouth 3 (Three) Times a Day. 2/3/25   Larry Mireles PA-C   ipratropium-albuterol (DUO-NEB) 0.5-2.5 mg/3 ml nebulizer Take 3 mL by nebulization Every 6 (Six) Hours. DX J44.9 Medicare B 2/17/25   Larry Mireles PA-C   melatonin 5 MG tablet tablet Take 1 tablet by mouth At Night As Needed.    Maci Downs MD   meloxicam (MOBIC) 15 MG tablet Take 1 tablet by mouth Daily.    Maci Downs MD   memantine (NAMENDA) 5 MG tablet Take 1 tablet by mouth Daily.    Provider, MD Maci   nitroglycerin (NITROSTAT) 0.4 MG SL tablet Place 1 tablet under the tongue Every 5 (Five) Minutes As Needed for Chest Pain (Do not take if systolic BP less  than 100 mmHg). Take no more than 3 doses in 15 minutes. 2/27/24   Floresita Rubi APRN   ondansetron (ZOFRAN) 4 MG tablet Take 1 tablet by mouth Every 4 (Four) Hours As Needed for Nausea or Vomiting.    Maci Downs MD   oxyCODONE-acetaminophen (PERCOCET) 7.5-325 MG per tablet Take 1 tablet by mouth Every 6 (Six) Hours As Needed for Moderate Pain. 3/21/25   Larry Mireles PA-C   pantoprazole (PROTONIX) 40 MG EC tablet TAKE 1 TABLET BY MOUTH EVERY DAY 3/17/25   Larry Mireles PA-C   polyethylene glycol (MIRALAX) 17 GM/SCOOP powder Take 17 g by mouth Daily As Needed (Use if senna-docusate is ineffective). 8/28/24   Elizabeth Anna MD   predniSONE (DELTASONE) 20 MG tablet Take 1 tablet by mouth Daily. 3/20/25   Brien Naidu MD   tamsulosin (FLOMAX) 0.4 MG capsule 24 hr capsule TAKE 1 CAPSULE BY MOUTH EVERY DAY 3/17/25   Larry Mireles PA-C   Testosterone Cypionate (DEPOTESTOTERONE CYPIONATE) 200 MG/ML injection Inject 1 mL into the appropriate muscle as directed by prescriber Every 14 (Fourteen) Days. 3/17/25   Maci Downs MD   venlafaxine XR (EFFEXOR-XR) 75 MG 24 hr capsule TAKE 1 CAPSULE BY MOUTH EVERY DAY 12/16/24   Larry Mireles PA-C   carbidopa-levodopa ER (SINEMET CR)  MG per tablet Take 2 tablets by mouth 4 (Four) Times a Day.  Patient taking differently: Take 1 tablet by mouth Every Night. 2/17/25 4/2/25  Larry Mireles PA-C       Allergies:     Patient has no known allergies.    Scheduled Meds:aspirin, 81 mg, Oral, BID  atorvastatin, 20 mg, Oral, Daily  carvedilol, 6.25 mg, Oral, BID  clopidogrel, 75 mg, Oral, Daily  enoxaparin sodium, 40 mg, Subcutaneous, Q24H  sodium chloride, 10 mL, Intravenous, Q12H      Continuous Infusions:   PRN Meds:  aluminum-magnesium hydroxide-simethicone    senna-docusate sodium **AND** polyethylene glycol **AND** bisacodyl **AND** bisacodyl    Calcium Replacement - Follow Nurse / BPA Driven Protocol    Magnesium Standard Dose  "Replacement - Follow Nurse / BPA Driven Protocol    melatonin    nitroglycerin    ondansetron ODT **OR** ondansetron    Phosphorus Replacement - Follow Nurse / BPA Driven Protocol    Potassium Replacement - Follow Nurse / BPA Driven Protocol    sodium chloride    sodium chloride    sodium chloride      OBJECTIVE    Vital Signs  Vitals:    04/02/25 0912 04/02/25 0915 04/02/25 0916 04/02/25 1032   BP:   130/76 128/87   Pulse: 58 58 58 56   Resp: 16 20     Temp:       TempSrc:       SpO2: 93% 100% 99% 94%   Weight:       Height:           Flowsheet Rows      Flowsheet Row First Filed Value   Admission Height 175.3 cm (69\") Documented at 04/02/2025 0828   Admission Weight 93.7 kg (206 lb 9.1 oz) Documented at 04/02/2025 0828            No intake or output data in the 24 hours ending 04/02/25 1203     Telemetry: Sinus rhythm/sinus bradycardia    Physical Exam:  The patient is alert, oriented and in no distress.  Obese  Vital signs as noted above.  Head and neck revealed no carotid bruits or jugular venous distention.  No thyromegaly or lymphadenopathy is present  Lungs clear.  No wheezing.  Breath sounds are normal bilaterally.  Heart: Normal first and second heart sounds. No murmur.  No precordial rub is present.  No gallop is present.  Abdomen: Soft and nontender.  No organomegaly is present.  Extremities with good peripheral pulses without any pedal edema.  Skin: Warm and dry.  Musculoskeletal system is grossly normal.  CNS grossly normal.       Results Review:  I have personally reviewed the results from the time of this admission to 4/2/2025 12:03 EDT and agree with these findings:  []  Laboratory  []  Microbiology  []  Radiology  []  EKG/Telemetry   []  Cardiology/Vascular   []  Pathology  []  Old records  []  Other:    Most notable findings include:     Lab Results (last 24 hours)       Procedure Component Value Units Date/Time    High Sensitivity Troponin T 1Hr [[id number]]  (Normal) Collected: 04/02/25 1030    " Specimen: Blood Updated: 04/02/25 1103     HS Troponin T 13 ng/L      Troponin T Numeric Delta -3 ng/L     Narrative:      High Sensitive Troponin T Reference Range:  <14.0 ng/L- Negative Female for AMI  <22.0 ng/L- Negative Male for AMI  >=14 - Abnormal Female indicating possible myocardial injury.  >=22 - Abnormal Male indicating possible myocardial injury.   Clinicians would have to utilize clinical acumen, EKG, Troponin, and serial changes to determine if it is an Acute Myocardial Infarction or myocardial injury due to an underlying chronic condition.         Lipid Panel [[id number]]  (Abnormal) Collected: 04/02/25 1030    Specimen: Blood Updated: 04/02/25 1103     Total Cholesterol 106 mg/dL      Triglycerides 149 mg/dL      HDL Cholesterol 33 mg/dL      LDL Cholesterol  47 mg/dL      VLDL Cholesterol 26 mg/dL      LDL/HDL Ratio 1.31    Narrative:      Cholesterol Reference Ranges  (U.S. Department of Health and Human Services ATP III Classifications)    Desirable          <200 mg/dL  Borderline High    200-239 mg/dL  High Risk          >240 mg/dL      Triglyceride Reference Ranges  (U.S. Department of Health and Human Services ATP III Classifications)    Normal           <150 mg/dL  Borderline High  150-199 mg/dL  High             200-499 mg/dL  Very High        >500 mg/dL    HDL Reference Ranges  (U.S. Department of Health and Human Services ATP III Classifications)    Low     <40 mg/dl (major risk factor for CHD)  High    >60 mg/dl ('negative' risk factor for CHD)        LDL Reference Ranges  (U.S. Department of Health and Human Services ATP III Classifications)    Optimal          <100 mg/dL  Near Optimal     100-129 mg/dL  Borderline High  130-159 mg/dL  High             160-189 mg/dL  Very High        >189 mg/dL    LDL is calculated using the NIH LDL-C calculation.      Comprehensive Metabolic Panel [[id number]]  (Abnormal) Collected: 04/02/25 0905    Specimen: Blood Updated: 04/02/25 0940     Glucose 111  mg/dL      BUN 12 mg/dL      Creatinine 0.73 mg/dL      Sodium 143 mmol/L      Potassium 3.7 mmol/L      Chloride 106 mmol/L      CO2 24.2 mmol/L      Calcium 9.3 mg/dL      Total Protein 6.7 g/dL      Albumin 4.1 g/dL      ALT (SGPT) 9 U/L      AST (SGOT) 20 U/L      Alkaline Phosphatase 117 U/L      Total Bilirubin 0.3 mg/dL      Globulin 2.6 gm/dL      A/G Ratio 1.6 g/dL      BUN/Creatinine Ratio 16.4     Anion Gap 12.8 mmol/L      eGFR 100.3 mL/min/1.73     Narrative:      GFR Categories in Chronic Kidney Disease (CKD)      GFR Category          GFR (mL/min/1.73)    Interpretation  G1                     90 or greater         Normal or high (1)  G2                      60-89                Mild decrease (1)  G3a                   45-59                Mild to moderate decrease  G3b                   30-44                Moderate to severe decrease  G4                    15-29                Severe decrease  G5                    14 or less           Kidney failure          (1)In the absence of evidence of kidney disease, neither GFR category G1 or G2 fulfill the criteria for CKD.    eGFR calculation 2021 CKD-EPI creatinine equation, which does not include race as a factor    High Sensitivity Troponin T [[id number]]  (Normal) Collected: 04/02/25 0905    Specimen: Blood Updated: 04/02/25 0940     HS Troponin T 16 ng/L     Narrative:      High Sensitive Troponin T Reference Range:  <14.0 ng/L- Negative Female for AMI  <22.0 ng/L- Negative Male for AMI  >=14 - Abnormal Female indicating possible myocardial injury.  >=22 - Abnormal Male indicating possible myocardial injury.   Clinicians would have to utilize clinical acumen, EKG, Troponin, and serial changes to determine if it is an Acute Myocardial Infarction or myocardial injury due to an underlying chronic condition.         BNP [[id number]]  (Normal) Collected: 04/02/25 0905    Specimen: Blood Updated: 04/02/25 0940     proBNP 73.8 pg/mL     Narrative:      This  "assay is used as an aid in the diagnosis of individuals suspected of having heart failure. It can be used as an aid in the diagnosis of acute decompensated heart failure (ADHF) in patients presenting with signs and symptoms of ADHF to the emergency department (ED). In addition, NT-proBNP of <300 pg/mL indicates ADHF is not likely.    Age Range Result Interpretation  NT-proBNP Concentration (pg/mL:      <50             Positive            >450                   Gray                 300-450                    Negative             <300    50-75           Positive            >900                  Gray                300-900                  Negative            <300      >75             Positive            >1800                  Gray                300-1800                  Negative            <300    D-dimer, Quantitative [[id number]]  (Normal) Collected: 04/02/25 0905    Specimen: Blood Updated: 04/02/25 0925     D-Dimer, Quantitative 0.35 MCGFEU/mL     Narrative:      According to the assay 's published package insert, a normal (<0.50 MCGFEU/mL) D-dimer result in conjunction with a non-high clinical probability assessment, excludes deep vein thrombosis (DVT) and pulmonary embolism (PE) with high sensitivity.    D-dimer values increase with age and this can make VTE exclusion of an older population difficult. To address this, the American College [employer], based on best available evidence and recent guidelines, recommends that clinicians use age-adjusted D-dimer thresholds in patients greater than 50 years of age with: a) a low probability of PE who do not meet all Pulmonary Embolism Rule Out Criteria, or b) in those with intermediate probability of PE.   The formula for an age-adjusted D-dimer cut-off is \"age/100\".  For example, a 60 year old patient would have an age-adjusted cut-off of 0.60 MCGFEU/mL and an 80 year old 0.80 MCGFEU/mL.    [place] Draw [[id number]] Collected: 04/02/25 0905    Specimen: " Blood Updated: 04/02/25 0915    Narrative:      The following orders were created for panel order [place] Draw.  Procedure                               Abnormality         Status                     ---------                               -----------         ------                     Green Top (Gel)[[id number]]                                  Final result               Lavender Top[[id number]]                                     Final result               Gold Top - SST[[id number]]                                   Final result               Light Blue Top[[id number]]                                   Final result                 Please view results for these tests on the individual orders.    Gold Top - SST [[id number]] Collected: 04/02/25 0905    Specimen: Blood Updated: 04/02/25 0915     Extra Tube Hold for add-ons.     Comment: Auto resulted.       Green Top (Gel) [[id number]] Collected: 04/02/25 0905    Specimen: Blood Updated: 04/02/25 0915     Extra Tube Hold for add-ons.     Comment: Auto resulted.       Lavender Top [[id number]] Collected: 04/02/25 0905    Specimen: Blood Updated: 04/02/25 0915     Extra Tube hold for add-on     Comment: Auto resulted       Light Blue Top [[id number]] Collected: 04/02/25 0905    Specimen: Blood Updated: 04/02/25 0915     Extra Tube Hold for add-ons.     Comment: Auto resulted       CBC & Differential [[id number]]  (Abnormal) Collected: 04/02/25 0905    Specimen: Blood Updated: 04/02/25 0914    Narrative:      The following orders were created for panel order CBC & Differential.  Procedure                               Abnormality         Status                     ---------                               -----------         ------                     CBC Auto Differential[[id number]]        Abnormal            Final result                 Please view results for these tests on the individual orders.    CBC Auto Differential [[id number]]  (Abnormal) Collected: 04/02/25 0905     Specimen: Blood Updated: 04/02/25 0914     WBC 7.15 10*3/mm3      RBC 4.02 10*6/mm3      Hemoglobin 12.7 g/dL      Hematocrit 38.3 %      MCV 95.3 fL      MCH 31.6 pg      MCHC 33.2 g/dL      RDW 12.2 %      RDW-SD 42.7 fl      MPV 10.2 fL      Platelets 143 10*3/mm3      Neutrophil % 65.1 %      Lymphocyte % 27.7 %      Monocyte % 6.6 %      Eosinophil % 0.0 %      Basophil % 0.3 %      Immature Grans % 0.3 %      Neutrophils, Absolute 4.66 10*3/mm3      Lymphocytes, Absolute 1.98 10*3/mm3      Monocytes, Absolute 0.47 10*3/mm3      Eosinophils, Absolute 0.00 10*3/mm3      Basophils, Absolute 0.02 10*3/mm3      Immature Grans, Absolute 0.02 10*3/mm3      nRBC 0.0 /100 WBC             Imaging Results (Last 24 Hours)       Procedure Component Value Units Date/Time    XR Chest 1 View [[id number]] Collected: 04/02/25 0857     Updated: 04/02/25 0900    Narrative:      XR CHEST 1 VW    Date of Exam: 4/2/2025 8:54 AM EDT    Indication: Chest Pain Triage Protocol    Comparison: Chest radiograph dated 3/18/2025    Findings:  The cardiomediastinal silhouette is within normal limits. There is aortic arch atherosclerotic calcification. There is linear scarring within the lateral right midlung. There is no pleural effusion or pneumothorax. There are degenerative changes of the   thoracic spine.      Impression:      Impression:  No acute cardiopulmonary abnormality. Linear scarring within the lateral right midlung.        Electronically Signed: Deepak Childselor    4/2/2025 8:58 AM EDT    Workstation ID: [id number]            LAB RESULTS (LAST 7 DAYS)    CBC  Results from last 7 days   Lab Units 04/02/25  0905   WBC 10*3/mm3 7.15   RBC 10*6/mm3 4.02*   HEMOGLOBIN g/dL 12.7*   HEMATOCRIT % 38.3   MCV fL 95.3   PLATELETS 10*3/mm3 143       BMP  Results from last 7 days   Lab Units 04/02/25  0905   SODIUM mmol/L 143   POTASSIUM mmol/L 3.7   CHLORIDE mmol/L 106   CO2 mmol/L 24.2   BUN mg/dL 12   CREATININE mg/dL 0.73*   GLUCOSE mg/dL  111*       CMP   Results from last 7 days   Lab Units 04/02/25  0905   SODIUM mmol/L 143   POTASSIUM mmol/L 3.7   CHLORIDE mmol/L 106   CO2 mmol/L 24.2   BUN mg/dL 12   CREATININE mg/dL 0.73*   GLUCOSE mg/dL 111*   ALBUMIN g/dL 4.1   BILIRUBIN mg/dL 0.3   ALK PHOS U/L 117   AST (SGOT) U/L 20   ALT (SGPT) U/L 9       BNP        TROPONIN  Results from last 7 days   Lab Units 04/02/25  1030   HSTROP T ng/L 13       CoAg        Creatinine Clearance  Estimated Creatinine Clearance: 112.5 mL/min (A) (by C-G formula based on SCr of 0.73 mg/dL (L)).    ABG          Radiology  XR Chest 1 View  Result Date: 4/2/2025  Impression: No acute cardiopulmonary abnormality. Linear scarring within the lateral right midlung. Electronically Signed: Deepak Sandoval  4/2/2025 8:58 AM EDT  Workstation ID: [id number]        EKG  I personally viewed and interpreted the patient's EKG/Telemetry data:  ECG 12 Lead ED Triage Standing Order; Chest Pain   Preliminary Result   HEART RATE=61  bpm   RR Rmlaxpkp=735  ms   AZ Jcooblhd=476  ms   P Horizontal Axis=35  deg   P Front Axis=-14  deg   QRSD Mewyzkwv=217  ms   QT Seyxslay=352  ms   MPgO=958  ms   QRS Axis=1  deg   T Wave Axis=51  deg   - NORMAL ECG -   Sinus rhythm   Date and Time of Study:2025-04-02 08:36:02      Telemetry Scan   Final Result            Echocardiogram:    Results for orders placed during the hospital encounter of 09/23/24    Adult Transthoracic Echo Complete W/ Cont if Necessary Per Protocol    Interpretation Summary    Left ventricular systolic function is normal. Calculated left ventricular EF = 53% Left ventricular ejection fraction appears to be 51 - 55%.    Left ventricular diastolic function is consistent with (grade I) impaired relaxation. Average GLS -14.9%.    The left atrial cavity is dilated.    Estimated right ventricular systolic pressure from tricuspid regurgitation is normal (<35 mmHg).    No significant valvular abnormalities noted.        Stress Test:  Results  for orders placed during the hospital encounter of 02/20/24    Stress Test With Myocardial Perfusion One Day    Interpretation Summary    Impressions are consistent with an intermediate risk study.    Left ventricular ejection fraction is normal (Calculated EF = 65%).    Myocardial perfusion imaging indicates a small-sized infarct with mild patrica-infarct ischemia in the anteroseptal region    Findings consistent with a normal ECG stress test.        Cardiac Catheterization:  Results for orders placed during the hospital encounter of 02/26/24    Cardiac Catheterization/Vascular Study    Conclusion  PROCEDURES PERFORMED  Ultrasound guided Vascular access  Right heart catheterization  Left Heart Catheterization  Coronary Angiogram  Percutaneous coronary intervention with drug-eluting stent placement  Intravascular ultrasound  Moderate sedation    INDICATIONS FOR PROCEDURE  Positive stress test, LEUNG    PROCEDURE IN DETAIL  Informed consent was obtained from the patient after explaining the risks, benefits, and alternative options of the procedure. After obtaining informed consent, the patient was brought to the cath lab and was prepped in a sterile fashion.Lidocaine 2% was used for local anesthesia into the right IJ access site. Right IJ vein was accessed using the micropuncture needle under ultrasound guidance and micropuncture wire advanced under flouroscopy. A 7 [place] vascular sheath was put into place percutaneously over guide-wire. Guide wires were removed. A 6Fr swan john catheter was advanced to wedge position. RA, RV and PA and wedge pressures were recorded.  PA sat and arterial sats recorded.    Lidocaine 2% was used for local anesthesia into the right  radial access site. The right  radial artery was accessed with a micropuncture needle via modified Seldinger technique under ultrasound guidance. A 6F introducer sheath was inserted successfully. Afterwards, 6F JR4 and JL3.5 diagnostic catheters were advanced  over a wire into the ascending aorta and were used to engage the ostia of the left main and RCA respectively. JR4 used to cross the AV and obtain LV pressures and gradient across the AV measured via pullback technique. Images of the right and left coronary systems were obtained.    HEMODYNAMICS  LV: 156/8/16  AO: 149/79/107  Gradient 7 mmHg    RHC HEMODYNAMICS:  RA 9  RV 29/10/12  PA 29/10/23  PCW 11    AO Sat 100%  PA Sat 77%    [facility] CO 6.17    [facility] CI 2.93    FINDINGS  Coronary Angiogram    Right dominant circulation    Left main: Left main is a large caliber vessel which gives rise to the Left Anterior Descending and the Left circumflex.  Left main coronary artery is angiographically free from any significant disease    Left Anterior Descending Artery: LAD is a medium caliber vessel which gives rise to several septal perforators and several diagonal branches.  There is an 80% calcified lesion in the proximal LAD at the takeoff of a large D1 branch which further bifurcates.  The ostium of D1 is free from disease.  This was followed by a 70% lesion in the mid LAD.  There is heavy calcification in the entire LAD.    Left Circumflex: Left circumflex artery runs along the AV groove and gives obtuse marginal branches.  It has moderate calcification with 30 to 40% stenosis in the midsegment.    Right Coronary Artery: The RCA is a large caliber vessel gives rise to PDA and PLV.  There is 20% stenosis in the proximal segment    Percutaneous coronary intervention:  100 units/kg of heparin was administered and ACT of more than 250 was documented.  6 Turkish XB LAD 3.5 guide was used to engage the left main.  Run-through wire was advanced to the distal LAD.  IVUS of the LAD showed a distal reference vessel diameter of 3.6 mm with heavy concentric calcification in the mid to proximal LAD.  Proximal reference vessel diameter was 4.6 mm.  We then predilated the LAD with an NC 2.5 x 12 mm balloon.  It was then stented with a  Xience 3.5 x 18 mm stent followed by a Xience 4.0 x 38 mm stent and postdilated with an NC 4.5 x 12 mm balloon.      Preprocedure stenosis: 80%  preprocedure ALEXI flow: 3  Lesion type: C  Postprocedure stenosis: 0%  Post procedure ALEXI flow: 3      All the catheters were exchanged over a wire and subsequently removed.    Radial sheath was removed and a TR band was applied with 15 cc of air to achieve hemostasis.        ESTIMATED BLOOD LOSS:  25 ml    COMPLICATIONS:  None    PROCEDURE DATA:  Contrast Used: 100 ml  Sedation Time:  57 minutes    IMPRESSIONS  One-vessel obstructive CAD of the proximal LAD status post successful IVUS guided PCI  No evidence of pulmonary hypertension  Normal left and right heart filling pressures    RECOMMENDATIONS  -Dual antiplatelet therapy  -Beta-blocker and high intensity statin  -ACE inhibitor if blood pressure tolerates  -Referral to cardiac rehab  -Continue aggressive risk factor stratification and modification  -Smoking cessation        Other:      ASSESSMENT & PLAN:    Principal Problem:    Chest tightness    Chest pain/tightness  Coronary artery disease involving native coronary artery of native heart without angina pectoris   PCI to LAD with jailing of diagonal vessel  Continue DAPT, high intensity statin and beta blocker   ECG with no acute ischemic changes  High-sensitivity troponin is negative   Obtain a nuclear stress test: Outpatient stress test is acceptable    Shortness of breath  Recently ruled out for PE  proBNP is normal  Echocardiogram shows preserved LV function, grade 1 diastolic dysfunction  Continue bronchodilators  Appears euvolemic    Primary hypertension, chronic  Continue amlodipine and Coreg  Blood pressure     Mixed hyperlipidemia  Continue atorvastatin  Goal LDL <70  LDL 46  A1c is 6     PVD (peripheral vascular disease) with claudication  Continue DAPT and statin     History of CVA (cerebrovascular accident)  Continue DAPT and statin     Parkinson's  disease, unspecified whether dyskinesia present, unspecified whether manifestations fluctuate  On baclofen, Sinemet CR, clonazepam, gabapentin, and Effexor XR  Could be contributing to generalized slowing and weakness     Tobacco abuse  Mucopurulent chronic bronchitis  He no longer smokes  COPD may be contributing to shortness of breath.  Continue bronchodilators   Requesting nocturnal oxygen prescription  Follow-up with pulmonology     Benign prostatic hyperplasia with urinary hesitancy  Continue Flomax      Obesity  BMI is 30.5.  He weighs 206 pounds.  Lifestyle modifications recommended to the patient.  Recommended dietary changes and exercise      Nomi Cunningham MD  04/02/25  12:03 EDT

## 2025-04-02 NOTE — PLAN OF CARE
Problem: Adult Inpatient Plan of Care  Goal: Plan of Care Review  Outcome: Progressing  Goal: Patient-Specific Goal (Individualized)  Outcome: Progressing  Goal: Absence of Hospital-Acquired Illness or Injury  Outcome: Progressing  Intervention: Identify and Manage Fall Risk  Recent Flowsheet Documentation  Taken 4/2/2025 1400 by Zoraida Underwood RN  Safety Promotion/Fall Prevention:   safety round/check completed   assistive device/personal items within reach   clutter free environment maintained   nonskid shoes/slippers when out of bed   room organization consistent  Taken 4/2/2025 1229 by Zoraida Underwood RN  Safety Promotion/Fall Prevention:   safety round/check completed   assistive device/personal items within reach   clutter free environment maintained   nonskid shoes/slippers when out of bed   room organization consistent  Intervention: Prevent Skin Injury  Recent Flowsheet Documentation  Taken 4/2/2025 1229 by Zoraida Underwood RN  Body Position:   position changed independently   sitting up in bed   neutral body alignment  Skin Protection: transparent dressing maintained  Intervention: Prevent and Manage VTE (Venous Thromboembolism) Risk  Recent Flowsheet Documentation  Taken 4/2/2025 1229 by Zoraida Underwood RN  VTE Prevention/Management: SCDs (sequential compression devices) off  Intervention: Prevent Infection  Recent Flowsheet Documentation  Taken 4/2/2025 1229 by Zoraida Underwood RN  Infection Prevention:   hand hygiene promoted   rest/sleep promoted   single patient room provided  Goal: Optimal Comfort and Wellbeing  Outcome: Progressing  Intervention: Provide Person-Centered Care  Recent Flowsheet Documentation  Taken 4/2/2025 1229 by Zoraida Underwood RN  Trust Relationship/Rapport:   care explained   questions answered  Goal: Readiness for Transition of Care  Outcome: Progressing  Intervention: Mutually Develop Transition Plan  Recent Flowsheet Documentation  Taken 4/2/2025 1224 by Zoraida Underwood  RN  Equipment Currently Used at Home:   wheelchair   cane, straight   shower chair   hospital bed  Taken 4/2/2025 1219 by Zoraida Underwood, RN  Transportation Anticipated: family or friend will provide  Patient/Family Anticipated Services at Transition:   Patient/Family Anticipates Transition to: home with family  Taken 4/2/2025 1215 by Zoraida Underwood, RN  Equipment Currently Used at Home:   cane, straight   hospital bed   shower chair   wheelchair   walker, rolling   Goal Outcome Evaluation:   [place], RA, NSR on tele. NPO at midnight for stress test in AM. Dr campa cardio.

## 2025-04-02 NOTE — DISCHARGE PLACEMENT REQUEST
"Andrew Flores \"Rex\" (64 y.o. Male)       Date of Birth   1960    Social Security Number       Address   [address] [place] [address]    Home Phone   [phone]    MRN   [id number]       [facility]    Marital Status   Legally                             Admission Date   4/2/2025    Admission Type   Emergency    Admitting Provider   Nathaniel Kennedy MD    Attending Provider   Nathaniel Kennedy MD    Department, Room/Bed   [facility] OBSERVATION, 108/1       Discharge Date       Discharge Disposition       Discharge Destination                                 Attending Provider: Nathaniel Kennedy MD    Allergies: No Known Allergies    Isolation: None   Infection: None   Code Status: CPR    Ht: 175.3 cm (69\")   Wt: 93.7 kg (206 lb 9.1 oz)    Admission Cmt: None   Principal Problem: Chest tightness [R07.89]                   Active Insurance as of 4/2/2025       Primary Coverage       Payor Plan Insurance Group Employer/Plan Group    MEDICARE MEDICARE A & B        Payor Plan Address Payor Plan Phone Number Payor Plan Fax Number Effective Dates    [address] [phone]  8/1/2020 - None Entered    [place] [address]         Subscriber Name Subscriber Birth Date Member ID       ANDREW FLORES 1960 [id number]                     Emergency Contacts        (Rel.) Home Phone Work Phone Mobile Phone    REN BATISTA(POA) (Daughter) -- -- [phone]    fransicodenisa (Daughter) -- -- [phone]    evangelina yang (Significant Other) -- -- [phone]                "

## 2025-04-02 NOTE — ED PROVIDER NOTES
Subjective   History of Present Illness  Chief complaint chest tightness shortness of breath    History of present illness 64-year-old gentleman history of COPD and multiple health problems presented hospital and just discharged on the 20th after COPD and COVID-19.  The patient states that last couple days he has had some increasing shortness of breath and tightness in the chest no neck arm jaw pain.  Worse with exertion better with rest no fever chills sweats cough congestion leg pain or swelling no recent long car ride plane ride he did have a recent hospitalization.  No other change in medications.  Patient reports that he has had a previous heart attack.      Review of Systems   Constitutional:  Negative for chills and fever.   Respiratory:  Positive for chest tightness and shortness of breath.    Cardiovascular:  Positive for chest pain. Negative for palpitations.   Gastrointestinal:  Negative for abdominal pain and vomiting.   Musculoskeletal:  Negative for back pain and neck pain.   Skin:  Negative for rash.   Neurological:  Negative for dizziness and light-headedness.   Psychiatric/Behavioral:  Negative for confusion.        Past Medical History:   Diagnosis Date    Anxiety 09/04/2012    Benign prostatic hyperplasia with lower urinary tract symptoms 02/27/2024    Borderline diabetes     COPD (chronic obstructive pulmonary disease)     Coronary artery disease 2015    To the best of my memory    COVID-19 03/18/2025    Cytokine release syndrome, grade 1 03/20/2025    Deep vein thrombosis 2015    Blood clots    Depression 07/07/2016    Diffuse non-Hodgkin's lymphoma of testis 03/06/2018    Duodenitis 01/27/2022    Dysphagia 08/2022    from parkinsons    Erosive gastritis 01/27/2022    Esophageal stricture 01/27/2022    Fall 07/25/2019    Food impaction of esophagus 01/27/2022    Gastroesophageal reflux disease 09/04/2012    History of chemotherapy 04/25/2021    Hx of radiation therapy 04/25/2021     Intertrochanteric fracture of left femur, closed, initial encounter 08/25/2024    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    Mixed hyperlipidemia 07/08/2019    Myocardial infarction 2017    Non-Hodgkin's lymphoma of testis     Parkinson disease     Pneumonia of right upper lobe due to infectious organism 12/04/2022    Poor historian     Primary hypertension 07/08/2019    PVD (peripheral vascular disease) with claudication 09/20/2024    Shortness of breath     Sleep apnea     does not have a cpap    Status post coronary artery stent placement 02/26/2024    Stroke 2017    Testosterone deficiency 07/25/2019    Tick bite 05/20/2021    Tobacco abuse 10/13/2021    Ulcer of esophagus without bleeding 01/27/2022    Uncomplicated alcohol dependence 04/25/2021       No Known Allergies    Past Surgical History:   Procedure Laterality Date    CARDIAC CATHETERIZATION N/A 2/26/2024    Procedure: Left and Right Heart Cath with Coronary Angiography;  Surgeon: Lelo Tello MD;  Location: [facility] CATH INVASIVE LOCATION;  Service: Cardiology;  Laterality: N/A;    CARDIAC CATHETERIZATION N/A 2/26/2024    Procedure: Percutaneous Coronary Intervention;  Surgeon: Lelo Tello MD;  Location: [facility] CATH INVASIVE LOCATION;  Service: Cardiology;  Laterality: N/A;    ENDOSCOPY N/A 01/27/2022    Procedure: ESOPHAGOGASTRODUODENOSCOPY with foreign body removal with gastric, duodenal biopsy and GE junction biopsy;  Surgeon: Pedro De La Rosa MD;  Location: [facility] ENDOSCOPY;  Service: Gastroenterology;  Laterality: N/A;  post: foreign body removal, erosive gastritis with biopsy, erosive eduodenitis with biopsy, esophagitis, GE junction biopsy to rule out malignancy,     ENDOSCOPY N/A 08/23/2022    Procedure: ESOPHAGOGASTRODUODENOSCOPY with GE junction biopsy R/O Barretts, esophageal dilation #48 bougie;  Surgeon: Alexa Subramanian MD;  Location: [facility] ENDOSCOPY;  Service: Gastroenterology;  Laterality: N/A;  duodenal ulcer, GERD,     HIP  "TROCHANTERIC NAILING WITH INTRAMEDULLARY HIP SCREW Left 2024    Procedure: Left hip/femur fracture fixation with intramedullary ajit and screws;  Surgeon: Kameron Kaminski MD;  Location: [facility] MAIN OR;  Service: Orthopedics;  Laterality: Left;    INTERVENTIONAL RADIOLOGY PROCEDURE N/A 2024    Procedure: Intravascular Ultrasound;  Surgeon: Lelo Tello MD;  Location: [facility] CATH INVASIVE LOCATION;  Service: Cardiology;  Laterality: N/A;    ORCHIECTOMY         Family History   Problem Relation Age of Onset    Heart disease Mother         My brother, and a sister has had a brain anurysm    Stroke Mother     Heart failure Mother     Hypertension Mother         My whole imidate family, mother and all siblings. My father passed when i was [age] from black lung disease.    No Known Problems Father     Heart disease Brother     Heart failure Brother     Heart disease Brother     Heart failure Sister     Hypertension Sister        Social History     Socioeconomic History    Marital status: Legally    Tobacco Use    Smoking status: Former     Current packs/day: 0.00     Average packs/day: 1 pack/day for 51.3 years (51.3 ttl pk-yrs)     Types: Cigarettes     Start date:      Quit date: 2024     Years since quittin.9     Passive exposure: Current    Smokeless tobacco: Never   Vaping Use    Vaping status: Never Used   Substance and Sexual Activity    Alcohol use: Yes     Comment: \"once a month\"    Drug use: Never    Sexual activity: Defer     Prior to Admission medications    Medication Sig Start Date End Date Taking? Authorizing Provider   albuterol sulfate  (90 Base) MCG/ACT inhaler Inhale 2 puffs Every 4 (Four) Hours As Needed for Wheezing. 10/10/24   Larry Mireles PA-C   amLODIPine (NORVASC) 5 MG tablet Take 1 tablet by mouth Daily As Needed (SBP>140mmHg). 3/26/25   Nomi Cunningham MD   aspirin 81 MG EC tablet Take 1 tablet by mouth 2 (Two) Times a Day for 360 doses. 24  " Larry Mireles PA-C   atorvastatin (LIPITOR) 20 MG tablet Take 1 tablet by mouth Daily. 4/29/24   Larry Mireles PA-C   baclofen (LIORESAL) 10 MG tablet Take 1 tablet by mouth 3 (Three) Times a Day.    ProviderMaci MD   budesonide (Pulmicort) 0.5 MG/2ML nebulizer solution Take 2 mL by nebulization Daily for 360 days. 10/10/24 10/5/25  Larry Mireles PA-C   carbidopa-levodopa (SINEMET)  MG per tablet Take 2 tablets by mouth 3 (Three) Times a Day. Indications: Parkinson's Disease, Parkinsonian-Like Syndrome    Maci Downs MD   carbidopa-levodopa ER (SINEMET CR)  MG per tablet Take 2 tablets by mouth 4 (Four) Times a Day.    ProviderMaci MD   carvedilol (COREG) 6.25 MG tablet TAKE 1 TABLET BY MOUTH TWICE A DAY 3/17/25   Floresita Rubi APRN   clonazePAM (KlonoPIN) 0.5 MG tablet Take 1 tablet by mouth 4 (Four) Times a Day. 3/4/25   Larry Mireles PA-C   clopidogrel (PLAVIX) 75 MG tablet TAKE 1 TABLET BY MOUTH EVERY DAY 3/17/25   Floresita Rubi APRN   Cyanocobalamin (Vitamin B-12) 1000 MCG sublingual tablet Take 1 tablet by mouth Daily. 1/16/25   Larry Mireles PA-C   docusate sodium (COLACE) 100 MG capsule Take 1 capsule by mouth 2 (Two) Times a Day As Needed for Constipation.    Maci Downs MD   donepezil (ARICEPT) 10 MG tablet TAKE 1 TABLET BY MOUTH EVERYDAY AT BEDTIME 1/20/25   Larry Mireles PA-C   gabapentin (NEURONTIN) 600 MG tablet Take 1 tablet by mouth 3 (Three) Times a Day. 2/3/25   Larry Mireles PA-C   ipratropium-albuterol (DUO-NEB) 0.5-2.5 mg/3 ml nebulizer Take 3 mL by nebulization Every 6 (Six) Hours. DX J44.9 Medicare B 2/17/25   Larry Mireles PA-C   melatonin 5 MG tablet tablet Take 1 tablet by mouth At Night As Needed.    Provider, Historical, MD   meloxicam (MOBIC) 15 MG tablet Take 1 tablet by mouth Daily.    Provider, Historical, MD   memantine (NAMENDA) 5 MG tablet Take 1 tablet by mouth Daily.    Provider,  MD Maci   nitroglycerin (NITROSTAT) 0.4 MG SL tablet Place 1 tablet under the tongue Every 5 (Five) Minutes As Needed for Chest Pain (Do not take if systolic BP less than 100 mmHg). Take no more than 3 doses in 15 minutes. 2/27/24   Floresita Rubi APRN   ondansetron (ZOFRAN) 4 MG tablet Take 1 tablet by mouth Every 4 (Four) Hours As Needed for Nausea or Vomiting.    Maci Downs MD   oxyCODONE-acetaminophen (PERCOCET) 7.5-325 MG per tablet Take 1 tablet by mouth Every 6 (Six) Hours As Needed for Moderate Pain. 3/21/25   Larry Mireles PA-C   pantoprazole (PROTONIX) 40 MG EC tablet TAKE 1 TABLET BY MOUTH EVERY DAY 3/17/25   Larry Mireles PA-C   polyethylene glycol (MIRALAX) 17 GM/SCOOP powder Take 17 g by mouth Daily As Needed (Use if senna-docusate is ineffective). 8/28/24   Elizabeth Anna MD   predniSONE (DELTASONE) 20 MG tablet Take 1 tablet by mouth Daily. 3/20/25   Brien Naidu MD   tamsulosin (FLOMAX) 0.4 MG capsule 24 hr capsule TAKE 1 CAPSULE BY MOUTH EVERY DAY 3/17/25   Larry Mireles PA-C   Testosterone Cypionate (DEPOTESTOTERONE CYPIONATE) 200 MG/ML injection Inject 1 mL into the appropriate muscle as directed by prescriber Every 14 (Fourteen) Days. 3/17/25   Maci Downs MD   venlafaxine XR (EFFEXOR-XR) 75 MG 24 hr capsule TAKE 1 CAPSULE BY MOUTH EVERY DAY 12/16/24   Larry Mireles PA-C   carbidopa-levodopa ER (SINEMET CR)  MG per tablet Take 2 tablets by mouth 4 (Four) Times a Day.  Patient taking differently: Take 1 tablet by mouth Every Night. 2/17/25 4/2/25  Larry Mireles PA-C            Objective   Physical Exam  Constitutional this is a 64-year-old awake alert no acute distress triage vital signs have been reviewed.  HEENT unremarkable neck supple no adenopathy no JV no bruits lungs scattered wheezes no retraction heart regular without murmur rub abdomen soft nontender and bowel sounds no peritoneal findings or pulsatile masses extremities  pulses equal upper and lower extremities no edema no cords no Homans' sign no evidence of DVT skin is warm and dry without rashes or cellulitic changes neurologic awake alert follows commands motor strength normal without focal weak  Procedures           ED Course      Results for orders placed or performed during the hospital encounter of 04/02/25   ECG 12 Lead ED Triage Standing Order; Chest Pain    Collection Time: 04/02/25  8:36 AM   Result Value Ref Range    QT Interval 426 ms    QTC Interval 429 ms   Comprehensive Metabolic Panel    Collection Time: 04/02/25  9:05 AM    Specimen: Blood   Result Value Ref Range    Glucose 111 (H) 65 - 99 mg/dL    BUN 12 8 - 23 mg/dL    Creatinine 0.73 (L) 0.76 - 1.27 mg/dL    Sodium 143 136 - 145 mmol/L    Potassium 3.7 3.5 - 5.2 mmol/L    Chloride 106 98 - 107 mmol/L    CO2 24.2 22.0 - 29.0 mmol/L    Calcium 9.3 8.6 - 10.5 mg/dL    Total Protein 6.7 6.0 - 8.5 g/dL    Albumin 4.1 3.5 - 5.2 g/dL    ALT (SGPT) 9 1 - 41 U/L    AST (SGOT) 20 1 - 40 U/L    Alkaline Phosphatase 117 39 - 117 U/L    Total Bilirubin 0.3 0.0 - 1.2 mg/dL    Globulin 2.6 gm/dL    A/G Ratio 1.6 g/dL    BUN/Creatinine Ratio 16.4 7.0 - 25.0    Anion Gap 12.8 5.0 - 15.0 mmol/L    eGFR 100.3 >60.0 mL/min/1.73   High Sensitivity Troponin T    Collection Time: 04/02/25  9:05 AM    Specimen: Blood   Result Value Ref Range    HS Troponin T 16 <22 ng/L   CBC Auto Differential    Collection Time: 04/02/25  9:05 AM    Specimen: Blood   Result Value Ref Range    WBC 7.15 3.40 - 10.80 10*3/mm3    RBC 4.02 (L) 4.14 - 5.80 10*6/mm3    Hemoglobin 12.7 (L) 13.0 - 17.7 g/dL    Hematocrit 38.3 37.5 - 51.0 %    MCV 95.3 79.0 - 97.0 fL    MCH 31.6 26.6 - 33.0 pg    MCHC 33.2 31.5 - 35.7 g/dL    RDW 12.2 (L) 12.3 - 15.4 %    RDW-SD 42.7 37.0 - 54.0 fl    MPV 10.2 6.0 - 12.0 fL    Platelets 143 140 - 450 10*3/mm3    Neutrophil % 65.1 42.7 - 76.0 %    Lymphocyte % 27.7 19.6 - 45.3 %    Monocyte % 6.6 5.0 - 12.0 %    Eosinophil %  0.0 (L) 0.3 - 6.2 %    Basophil % 0.3 0.0 - 1.5 %    Immature Grans % 0.3 0.0 - 0.5 %    Neutrophils, Absolute 4.66 1.70 - 7.00 10*3/mm3    Lymphocytes, Absolute 1.98 0.70 - 3.10 10*3/mm3    Monocytes, Absolute 0.47 0.10 - 0.90 10*3/mm3    Eosinophils, Absolute 0.00 0.00 - 0.40 10*3/mm3    Basophils, Absolute 0.02 0.00 - 0.20 10*3/mm3    Immature Grans, Absolute 0.02 0.00 - 0.05 10*3/mm3    nRBC 0.0 0.0 - 0.2 /100 WBC   BNP    Collection Time: 04/02/25  9:05 AM    Specimen: Blood   Result Value Ref Range    proBNP 73.8 0.0 - 900.0 pg/mL   D-dimer, Quantitative    Collection Time: 04/02/25  9:05 AM    Specimen: Blood   Result Value Ref Range    D-Dimer, Quantitative 0.35 0.00 - 0.66 MCGFEU/mL   Green Top (Gel)    Collection Time: 04/02/25  9:05 AM   Result Value Ref Range    Extra Tube Hold for add-ons.    Lavender Top    Collection Time: 04/02/25  9:05 AM   Result Value Ref Range    Extra Tube hold for add-on    Gold Top - SST    Collection Time: 04/02/25  9:05 AM   Result Value Ref Range    Extra Tube Hold for add-ons.    Light Blue Top    Collection Time: 04/02/25  9:05 AM   Result Value Ref Range    Extra Tube Hold for add-ons.    High Sensitivity Troponin T 1Hr    Collection Time: 04/02/25 10:30 AM    Specimen: Blood   Result Value Ref Range    HS Troponin T 13 <22 ng/L    Troponin T Numeric Delta -3 Abnormal if >/=3 ng/L   Lipid Panel    Collection Time: 04/02/25 10:30 AM    Specimen: Blood   Result Value Ref Range    Total Cholesterol 106 0 - 200 mg/dL    Triglycerides 149 0 - 150 mg/dL    HDL Cholesterol 33 (L) 40 - 60 mg/dL    LDL Cholesterol  47 0 - 100 mg/dL    VLDL Cholesterol 26 5 - 40 mg/dL    LDL/HDL Ratio 1.31    Urinalysis With Culture If Indicated - Urine, Clean Catch    Collection Time: 04/02/25  3:23 PM    Specimen: Urine, Clean Catch   Result Value Ref Range    Color, UA Yellow Yellow, Straw    Appearance, UA Clear Clear    pH, UA 6.0 5.0 - 8.0    Specific Gravity, UA 1.011 1.005 - 1.030     Glucose, UA Negative Negative    Ketones, UA Negative Negative    Bilirubin, UA Negative Negative    Blood, UA Negative Negative    Protein, UA Negative Negative    Leuk Esterase, UA Negative Negative    Nitrite, UA Negative Negative    Urobilinogen, UA 0.2 E.U./dL 0.2 - 1.0 E.U./dL     XR Chest 1 View  Result Date: 4/2/2025  Impression: No acute cardiopulmonary abnormality. Linear scarring within the lateral right midlung. Electronically Signed: Deepak Lori  4/2/2025 8:58 AM EDT  Workstation ID: [id number]    Medications   sodium chloride 0.9 % flush 10 mL (has no administration in time range)   aspirin EC tablet 81 mg (81 mg Oral Not Given 4/2/25 1455)   atorvastatin (LIPITOR) tablet 20 mg (20 mg Oral Not Given 4/2/25 1456)   carvedilol (COREG) tablet 6.25 mg (6.25 mg Oral Not Given 4/2/25 1456)   clopidogrel (PLAVIX) tablet 75 mg (75 mg Oral Not Given 4/2/25 1456)   nitroglycerin (NITROSTAT) SL tablet 0.4 mg (has no administration in time range)   sodium chloride 0.9 % flush 10 mL (has no administration in time range)   sodium chloride 0.9 % flush 10 mL (has no administration in time range)   sodium chloride 0.9 % infusion 40 mL (has no administration in time range)   aluminum-magnesium hydroxide-simethicone (MAALOX MAX) 400-400-40 MG/5ML suspension 15 mL (has no administration in time range)   ondansetron ODT (ZOFRAN-ODT) disintegrating tablet 4 mg (has no administration in time range)     Or   ondansetron (ZOFRAN) injection 4 mg (has no administration in time range)   melatonin tablet 5 mg (has no administration in time range)   enoxaparin sodium (LOVENOX) syringe 40 mg (has no administration in time range)   Potassium Replacement - Follow Nurse / BPA Driven Protocol (has no administration in time range)   Magnesium Standard Dose Replacement - Follow Nurse / BPA Driven Protocol (has no administration in time range)   Phosphorus Replacement - Follow Nurse / BPA Driven Protocol (has no administration in time  range)   Calcium Replacement - Follow Nurse / BPA Driven Protocol (has no administration in time range)   sennosides-docusate (PERICOLACE) 8.6-50 MG per tablet 2 tablet (has no administration in time range)     And   polyethylene glycol (MIRALAX) packet 17 g (has no administration in time range)     And   bisacodyl (DULCOLAX) EC tablet 5 mg (has no administration in time range)     And   bisacodyl (DULCOLAX) suppository 10 mg (has no administration in time range)   albuterol (PROVENTIL) nebulizer solution 0.083% 2.5 mg/3mL (has no administration in time range)   amLODIPine (NORVASC) tablet 5 mg (has no administration in time range)   clonazePAM (KlonoPIN) tablet 0.5 mg (has no administration in time range)   donepezil (ARICEPT) tablet 10 mg (has no administration in time range)   gabapentin (NEURONTIN) capsule 600 mg (600 mg Oral Given 4/2/25 1408)   ipratropium-albuterol (DUO-NEB) nebulizer solution 3 mL (3 mL Nebulization Given 4/2/25 1616)   oxyCODONE (ROXICODONE) immediate release tablet 7.5 mg (has no administration in time range)   pantoprazole (PROTONIX) EC tablet 40 mg (40 mg Oral Not Given 4/2/25 1456)   tamsulosin (FLOMAX) 24 hr capsule 0.4 mg (has no administration in time range)   venlafaxine XR (EFFEXOR-XR) 24 hr capsule 75 mg (75 mg Oral Not Given 4/2/25 1405)   baclofen (LIORESAL) tablet 10 mg (has no administration in time range)   carbidopa-levodopa ER (SINEMET CR)  MG per tablet 2 tablet (has no administration in time range)   aspirin chewable tablet 324 mg (324 mg Oral Given 4/2/25 0924)   ipratropium-albuterol (DUO-NEB) nebulizer solution 3 mL (3 mL Nebulization Given 4/2/25 0912)   methylPREDNISolone sodium succinate (SOLU-Medrol) injection 125 mg (125 mg Intravenous Given 4/2/25 0909)                                            EKG my interpretation normal sinus rhythm rate of 61 normal axis no hypertrophy QTc 429 normal EKG unchanged 3/18/2025            Medical Decision Making  Medical  Decision Making IV established monitor placement review of sinus rhythm.  EKG obtained my interpretation normal sinus rhythm rate of 61 normal axis no hypertrophy QTc of 429 unchanged from 3/18/2025 normal EKG.  Chest x-ray obtained my independent review no pneumonia pneumothorax or failure radiology without acute findings chronic scarring.  Patient given aspirin p.o.  Labs obtained my independent review comprehensive metabolic profile unremarkable CBC unremarkable D-dimer within normal limits proBNP normal troponin normal repeat 1 hour normal urine normal.  The patient repeat exam is resting comfortably.  He is in no distress.  We talked about the findings I do not see any evidence of acute myocardial infarction DVT pulmonary embolism or dissection pericarditis myocarditis sepsis bacteremia pneumonia based on my history and physical clinical findings although not a complete list of all possibilities.  Records reviewed his discharge on 20 March this year.  He had COVID-19 and COPD.  He had no other change in medications he has had a history of heart attack before.  He was placed in observation for cardiac monitoring further workup provider notified case discussed.    Problems Addressed:  Chest tightness: complicated acute illness or injury  Dyspnea, unspecified type: complicated acute illness or injury    Amount and/or Complexity of Data Reviewed  External Data Reviewed: ECG and notes.  Labs: ordered. Decision-making details documented in ED Course.  Radiology: ordered. Decision-making details documented in ED Course.  ECG/medicine tests: ordered and independent interpretation performed. Decision-making details documented in ED Course.    Risk  OTC drugs.  Prescription drug management.  Decision regarding hospitalization.        Final diagnoses:   Dyspnea, unspecified type   Chest tightness       ED Disposition  ED Disposition       ED Disposition   Decision to Admit    Condition   --    Comment   --                No follow-up provider specified.       Medication List        ASK your doctor about these medications      * carbidopa-levodopa  MG per tablet  Commonly known as: SINEMET  Ask about: Which instructions should I use?     * carbidopa-levodopa ER  MG per tablet  Commonly known as: SINEMET CR  Ask about: Which instructions should I use?           * This list has 2 medication(s) that are the same as other medications prescribed for you. Read the directions carefully, and ask your doctor or other care provider to review them with you.                     Nathaniel Kennedy MD  04/02/25 [id number]

## 2025-04-03 ENCOUNTER — APPOINTMENT (OUTPATIENT)
Dept: CT IMAGING | Facility: HOSPITAL | Age: 65
End: 2025-04-03
Payer: MEDICARE

## 2025-04-03 ENCOUNTER — APPOINTMENT (OUTPATIENT)
Dept: NUCLEAR MEDICINE | Facility: HOSPITAL | Age: 65
End: 2025-04-03
Payer: MEDICARE

## 2025-04-03 LAB
ANION GAP SERPL CALCULATED.3IONS-SCNC: 11.4 MMOL/L (ref 5–15)
BASOPHILS # BLD AUTO: 0.01 10*3/MM3 (ref 0–0.2)
BASOPHILS NFR BLD AUTO: 0.1 % (ref 0–1.5)
BH CV REST NUCLEAR ISOTOPE DOSE: 11 MCI
BH CV STRESS BP STAGE 1: NORMAL
BH CV STRESS COMMENTS STAGE 1: NORMAL
BH CV STRESS DOSE REGADENOSON STAGE 1: 0.4
BH CV STRESS DURATION MIN STAGE 1: 0
BH CV STRESS DURATION SEC STAGE 1: 10
BH CV STRESS HR STAGE 1: 66
BH CV STRESS NUCLEAR ISOTOPE DOSE: 32.2 MCI
BH CV STRESS PROTOCOL 1: NORMAL
BH CV STRESS RECOVERY BP: NORMAL MMHG
BH CV STRESS RECOVERY HR: 66 BPM
BH CV STRESS STAGE 1: 1
BUN SERPL-MCNC: 11 MG/DL (ref 8–23)
BUN/CREAT SERPL: 17.2 (ref 7–25)
CALCIUM SPEC-SCNC: 9.2 MG/DL (ref 8.6–10.5)
CHLORIDE SERPL-SCNC: 107 MMOL/L (ref 98–107)
CO2 SERPL-SCNC: 23.6 MMOL/L (ref 22–29)
CREAT SERPL-MCNC: 0.64 MG/DL (ref 0.76–1.27)
DEPRECATED RDW RBC AUTO: 42.4 FL (ref 37–54)
EGFRCR SERPLBLD CKD-EPI 2021: 104.4 ML/MIN/1.73
EOSINOPHIL # BLD AUTO: 0 10*3/MM3 (ref 0–0.4)
EOSINOPHIL NFR BLD AUTO: 0 % (ref 0.3–6.2)
ERYTHROCYTE [DISTWIDTH] IN BLOOD BY AUTOMATED COUNT: 12.2 % (ref 12.3–15.4)
GLUCOSE SERPL-MCNC: 167 MG/DL (ref 65–99)
HCT VFR BLD AUTO: 35.9 % (ref 37.5–51)
HGB BLD-MCNC: 12 G/DL (ref 13–17.7)
IMM GRANULOCYTES # BLD AUTO: 0.03 10*3/MM3 (ref 0–0.05)
IMM GRANULOCYTES NFR BLD AUTO: 0.3 % (ref 0–0.5)
LYMPHOCYTES # BLD AUTO: 2.21 10*3/MM3 (ref 0.7–3.1)
LYMPHOCYTES NFR BLD AUTO: 24.4 % (ref 19.6–45.3)
MAGNESIUM SERPL-MCNC: 1.9 MG/DL (ref 1.6–2.4)
MAXIMAL PREDICTED HEART RATE: 154 BPM
MCH RBC QN AUTO: 31.5 PG (ref 26.6–33)
MCHC RBC AUTO-ENTMCNC: 33.4 G/DL (ref 31.5–35.7)
MCV RBC AUTO: 94.2 FL (ref 79–97)
MONOCYTES # BLD AUTO: 0.54 10*3/MM3 (ref 0.1–0.9)
MONOCYTES NFR BLD AUTO: 6 % (ref 5–12)
NEUTROPHILS NFR BLD AUTO: 6.28 10*3/MM3 (ref 1.7–7)
NEUTROPHILS NFR BLD AUTO: 69.2 % (ref 42.7–76)
NRBC BLD AUTO-RTO: 0 /100 WBC (ref 0–0.2)
PERCENT MAX PREDICTED HR: 49.35 %
PLATELET # BLD AUTO: 152 10*3/MM3 (ref 140–450)
PMV BLD AUTO: 9.9 FL (ref 6–12)
POTASSIUM SERPL-SCNC: 3.4 MMOL/L (ref 3.5–5.2)
QT INTERVAL: 426 MS
QTC INTERVAL: 429 MS
RBC # BLD AUTO: 3.81 10*6/MM3 (ref 4.14–5.8)
SODIUM SERPL-SCNC: 142 MMOL/L (ref 136–145)
SPECT HRT GATED+EF W RNC IV: 62 %
STRESS BASELINE BP: NORMAL MMHG
STRESS BASELINE HR: 76 BPM
STRESS PERCENT HR: 58 %
STRESS POST PEAK BP: NORMAL MMHG
STRESS POST PEAK HR: 76 BPM
STRESS TARGET HR: 131 BPM
WBC NRBC COR # BLD AUTO: 9.07 10*3/MM3 (ref 3.4–10.8)

## 2025-04-03 PROCEDURE — G0378 HOSPITAL OBSERVATION PER HR: HCPCS

## 2025-04-03 PROCEDURE — 94618 PULMONARY STRESS TESTING: CPT

## 2025-04-03 PROCEDURE — 25010000002 ENOXAPARIN PER 10 MG: Performed by: PHYSICIAN ASSISTANT

## 2025-04-03 PROCEDURE — 94799 UNLISTED PULMONARY SVC/PX: CPT

## 2025-04-03 PROCEDURE — 85025 COMPLETE CBC W/AUTO DIFF WBC: CPT | Performed by: PHYSICIAN ASSISTANT

## 2025-04-03 PROCEDURE — 94761 N-INVAS EAR/PLS OXIMETRY MLT: CPT

## 2025-04-03 PROCEDURE — 78452 HT MUSCLE IMAGE SPECT MULT: CPT | Performed by: INTERNAL MEDICINE

## 2025-04-03 PROCEDURE — 25010000002 REGADENOSON 0.4 MG/5ML SOLUTION: Performed by: EMERGENCY MEDICINE

## 2025-04-03 PROCEDURE — 99214 OFFICE O/P EST MOD 30 MIN: CPT | Performed by: INTERNAL MEDICINE

## 2025-04-03 PROCEDURE — 93018 CV STRESS TEST I&R ONLY: CPT | Performed by: INTERNAL MEDICINE

## 2025-04-03 PROCEDURE — 34310000005 TECHNETIUM TETROFOSMIN KIT: Performed by: EMERGENCY MEDICINE

## 2025-04-03 PROCEDURE — 93017 CV STRESS TEST TRACING ONLY: CPT

## 2025-04-03 PROCEDURE — 71250 CT THORAX DX C-: CPT

## 2025-04-03 PROCEDURE — 96372 THER/PROPH/DIAG INJ SC/IM: CPT

## 2025-04-03 PROCEDURE — 87481 CANDIDA DNA AMP PROBE: CPT | Performed by: EMERGENCY MEDICINE

## 2025-04-03 PROCEDURE — 78452 HT MUSCLE IMAGE SPECT MULT: CPT

## 2025-04-03 PROCEDURE — 94664 DEMO&/EVAL PT USE INHALER: CPT

## 2025-04-03 PROCEDURE — A9502 TC99M TETROFOSMIN: HCPCS | Performed by: EMERGENCY MEDICINE

## 2025-04-03 PROCEDURE — 80048 BASIC METABOLIC PNL TOTAL CA: CPT | Performed by: PHYSICIAN ASSISTANT

## 2025-04-03 PROCEDURE — 83735 ASSAY OF MAGNESIUM: CPT | Performed by: PHYSICIAN ASSISTANT

## 2025-04-03 RX ORDER — POTASSIUM CHLORIDE 1500 MG/1
40 TABLET, EXTENDED RELEASE ORAL EVERY 4 HOURS
Status: DISPENSED | OUTPATIENT
Start: 2025-04-03 | End: 2025-04-03

## 2025-04-03 RX ORDER — REGADENOSON 0.08 MG/ML
0.4 INJECTION, SOLUTION INTRAVENOUS
Status: COMPLETED | OUTPATIENT
Start: 2025-04-03 | End: 2025-04-03

## 2025-04-03 RX ORDER — ISOSORBIDE MONONITRATE 30 MG/1
30 TABLET, EXTENDED RELEASE ORAL
Status: DISCONTINUED | OUTPATIENT
Start: 2025-04-03 | End: 2025-04-04 | Stop reason: HOSPADM

## 2025-04-03 RX ADMIN — TETROFOSMIN 1 DOSE: 1.38 INJECTION, POWDER, LYOPHILIZED, FOR SOLUTION INTRAVENOUS at 09:24

## 2025-04-03 RX ADMIN — CLONAZEPAM 0.5 MG: 0.5 TABLET ORAL at 07:37

## 2025-04-03 RX ADMIN — TETROFOSMIN 1 DOSE: 1.38 INJECTION, POWDER, LYOPHILIZED, FOR SOLUTION INTRAVENOUS at 07:29

## 2025-04-03 RX ADMIN — CLOPIDOGREL BISULFATE 75 MG: 75 TABLET, FILM COATED ORAL at 12:16

## 2025-04-03 RX ADMIN — GABAPENTIN 600 MG: 300 CAPSULE ORAL at 14:25

## 2025-04-03 RX ADMIN — PANTOPRAZOLE SODIUM 40 MG: 40 TABLET, DELAYED RELEASE ORAL at 10:26

## 2025-04-03 RX ADMIN — DONEPEZIL HYDROCHLORIDE 10 MG: 5 TABLET, FILM COATED ORAL at 21:12

## 2025-04-03 RX ADMIN — GABAPENTIN 600 MG: 300 CAPSULE ORAL at 21:13

## 2025-04-03 RX ADMIN — BACLOFEN 10 MG: 10 TABLET ORAL at 17:45

## 2025-04-03 RX ADMIN — BACLOFEN 10 MG: 10 TABLET ORAL at 10:26

## 2025-04-03 RX ADMIN — VENLAFAXINE HYDROCHLORIDE 75 MG: 75 CAPSULE, EXTENDED RELEASE ORAL at 10:26

## 2025-04-03 RX ADMIN — CLONAZEPAM 0.5 MG: 0.5 TABLET ORAL at 21:12

## 2025-04-03 RX ADMIN — CARBIDOPA AND LEVODOPA 2 TABLET: 25; 100 TABLET, EXTENDED RELEASE ORAL at 17:45

## 2025-04-03 RX ADMIN — ATORVASTATIN CALCIUM 20 MG: 20 TABLET, FILM COATED ORAL at 10:26

## 2025-04-03 RX ADMIN — CARBIDOPA AND LEVODOPA 2 TABLET: 25; 100 TABLET, EXTENDED RELEASE ORAL at 21:11

## 2025-04-03 RX ADMIN — ISOSORBIDE MONONITRATE 30 MG: 30 TABLET, EXTENDED RELEASE ORAL at 10:26

## 2025-04-03 RX ADMIN — POTASSIUM CHLORIDE 40 MEQ: 1500 TABLET, EXTENDED RELEASE ORAL at 10:26

## 2025-04-03 RX ADMIN — CARVEDILOL 6.25 MG: 6.25 TABLET, FILM COATED ORAL at 10:26

## 2025-04-03 RX ADMIN — IPRATROPIUM BROMIDE AND ALBUTEROL SULFATE 3 ML: .5; 3 SOLUTION RESPIRATORY (INHALATION) at 18:41

## 2025-04-03 RX ADMIN — REGADENOSON 0.4 MG: 0.08 INJECTION, SOLUTION INTRAVENOUS at 09:24

## 2025-04-03 RX ADMIN — BACLOFEN 10 MG: 10 TABLET ORAL at 21:13

## 2025-04-03 RX ADMIN — ASPIRIN 81 MG: 81 TABLET, COATED ORAL at 21:13

## 2025-04-03 RX ADMIN — CLONAZEPAM 0.5 MG: 0.5 TABLET ORAL at 17:45

## 2025-04-03 RX ADMIN — TAMSULOSIN HYDROCHLORIDE 0.4 MG: 0.4 CAPSULE ORAL at 21:13

## 2025-04-03 RX ADMIN — ENOXAPARIN SODIUM 40 MG: 100 INJECTION SUBCUTANEOUS at 17:45

## 2025-04-03 RX ADMIN — OXYCODONE 7.5 MG: 5 TABLET ORAL at 10:26

## 2025-04-03 RX ADMIN — IPRATROPIUM BROMIDE AND ALBUTEROL SULFATE 3 ML: .5; 3 SOLUTION RESPIRATORY (INHALATION) at 15:23

## 2025-04-03 RX ADMIN — OXYCODONE 7.5 MG: 5 TABLET ORAL at 15:08

## 2025-04-03 RX ADMIN — Medication 10 ML: at 21:14

## 2025-04-03 RX ADMIN — CARBIDOPA AND LEVODOPA 2 TABLET: 25; 100 TABLET, EXTENDED RELEASE ORAL at 07:37

## 2025-04-03 RX ADMIN — CLONAZEPAM 0.5 MG: 0.5 TABLET ORAL at 12:16

## 2025-04-03 RX ADMIN — ASPIRIN 81 MG: 81 TABLET, COATED ORAL at 12:16

## 2025-04-03 RX ADMIN — CARVEDILOL 6.25 MG: 6.25 TABLET, FILM COATED ORAL at 21:13

## 2025-04-03 RX ADMIN — CARBIDOPA AND LEVODOPA 2 TABLET: 25; 100 TABLET, EXTENDED RELEASE ORAL at 12:16

## 2025-04-03 RX ADMIN — IPRATROPIUM BROMIDE AND ALBUTEROL SULFATE 3 ML: .5; 3 SOLUTION RESPIRATORY (INHALATION) at 09:16

## 2025-04-03 NOTE — PLAN OF CARE
Problem: Adult Inpatient Plan of Care  Goal: Plan of Care Review  Outcome: Progressing  Goal: Patient-Specific Goal (Individualized)  Outcome: Progressing  Goal: Absence of Hospital-Acquired Illness or Injury  Outcome: Progressing  Intervention: Identify and Manage Fall Risk  Recent Flowsheet Documentation  Taken 4/3/2025 1455 by Zoraida Underwood RN  Safety Promotion/Fall Prevention:   safety round/check completed   assistive device/personal items within reach   clutter free environment maintained   fall prevention program maintained   gait belt   nonskid shoes/slippers when out of bed   room organization consistent  Taken 4/3/2025 1208 by Zoraida Underwood RN  Safety Promotion/Fall Prevention:   safety round/check completed   assistive device/personal items within reach   clutter free environment maintained   fall prevention program maintained   gait belt   nonskid shoes/slippers when out of bed   room organization consistent  Taken 4/3/2025 0800 by Zoraida Underwood RN  Safety Promotion/Fall Prevention: patient off unit  Intervention: Prevent Skin Injury  Recent Flowsheet Documentation  Taken 4/3/2025 1020 by Zoraida Underwood RN  Skin Protection: transparent dressing maintained  Intervention: Prevent and Manage VTE (Venous Thromboembolism) Risk  Recent Flowsheet Documentation  Taken 4/3/2025 1020 by Zoraida Underwood RN  VTE Prevention/Management: SCDs (sequential compression devices) off  Goal: Optimal Comfort and Wellbeing  Outcome: Progressing  Intervention: Monitor Pain and Promote Comfort  Recent Flowsheet Documentation  Taken 4/3/2025 1020 by Zoraida Underwood RN  Pain Management Interventions: pain medication given  Intervention: Provide Person-Centered Care  Recent Flowsheet Documentation  Taken 4/3/2025 1020 by Zoraida Underwood RN  Trust Relationship/Rapport:   care explained   questions answered  Goal: Readiness for Transition of Care  Outcome: Progressing   Goal Outcome Evaluation:   Aox4. RA and 2L NC w/ activity. NSR  on tele. CT chest.

## 2025-04-03 NOTE — PROGRESS NOTES
Referring Provider: Nathaniel Kennedy MD    Reason for follow-up: Chest pain/unstable angina     Patient Care Team:  Larry Mireles PA-C as PCP - General (Physician Assistant)  Lelo Tello MD as Consulting Physician (Interventional Cardiology)  Floresita Rubi APRN as Nurse Practitioner (Cardiology)  Nomi Cunningham MD as Cardiologist (Cardiology)      SUBJECTIVE  Resting comfortably in bed.  Blood pressure and heart rate are normal and stable     ROS  Review of all systems negative except as indicated.    Since I have last seen, the patient has been without any chest discomfort, shortness of breath, palpitations, dizziness or syncope.  Denies having any headache, abdominal pain, nausea, vomiting, diarrhea, constipation, loss of weight or loss of appetite.  Denies having any excessive bruising, hematuria or blood in the stool.        Personal History:    Past Medical History:   Diagnosis Date    Anxiety 09/04/2012    Benign prostatic hyperplasia with lower urinary tract symptoms 02/27/2024    Borderline diabetes     COPD (chronic obstructive pulmonary disease)     Coronary artery disease 2015    To the best of my memory    COVID-19 03/18/2025    Cytokine release syndrome, grade 1 03/20/2025    Deep vein thrombosis 2015    Blood clots    Depression 07/07/2016    Diffuse non-Hodgkin's lymphoma of testis 03/06/2018    Duodenitis 01/27/2022    Dysphagia 08/2022    from parkinsons    Erosive gastritis 01/27/2022    Esophageal stricture 01/27/2022    Fall 07/25/2019    Food impaction of esophagus 01/27/2022    Gastroesophageal reflux disease 09/04/2012    History of chemotherapy 04/25/2021    Hx of radiation therapy 04/25/2021    Intertrochanteric fracture of left femur, closed, initial encounter 08/25/2024    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    Mixed hyperlipidemia 07/08/2019    Myocardial infarction 2017    Non-Hodgkin's lymphoma of testis     Parkinson disease     Pneumonia of right upper lobe due to  infectious organism 12/04/2022    Poor historian     Primary hypertension 07/08/2019    PVD (peripheral vascular disease) with claudication 09/20/2024    Shortness of breath     Sleep apnea     does not have a cpap    Status post coronary artery stent placement 02/26/2024    Stroke 2017    Testosterone deficiency 07/25/2019    Tick bite 05/20/2021    Tobacco abuse 10/13/2021    Ulcer of esophagus without bleeding 01/27/2022    Uncomplicated alcohol dependence 04/25/2021       Past Surgical History:   Procedure Laterality Date    CARDIAC CATHETERIZATION N/A 2/26/2024    Procedure: Left and Right Heart Cath with Coronary Angiography;  Surgeon: Lelo Tello MD;  Location: [facility] CATH INVASIVE LOCATION;  Service: Cardiology;  Laterality: N/A;    CARDIAC CATHETERIZATION N/A 2/26/2024    Procedure: Percutaneous Coronary Intervention;  Surgeon: Lelo Tello MD;  Location: [facility] CATH INVASIVE LOCATION;  Service: Cardiology;  Laterality: N/A;    ENDOSCOPY N/A 01/27/2022    Procedure: ESOPHAGOGASTRODUODENOSCOPY with foreign body removal with gastric, duodenal biopsy and GE junction biopsy;  Surgeon: Pedro De La Rosa MD;  Location: [facility] ENDOSCOPY;  Service: Gastroenterology;  Laterality: N/A;  post: foreign body removal, erosive gastritis with biopsy, erosive eduodenitis with biopsy, esophagitis, GE junction biopsy to rule out malignancy,     ENDOSCOPY N/A 08/23/2022    Procedure: ESOPHAGOGASTRODUODENOSCOPY with GE junction biopsy R/O Barretts, esophageal dilation #48 bougie;  Surgeon: lAexa Subramanian MD;  Location: [facility] ENDOSCOPY;  Service: Gastroenterology;  Laterality: N/A;  duodenal ulcer, GERD,     HIP TROCHANTERIC NAILING WITH INTRAMEDULLARY HIP SCREW Left 8/26/2024    Procedure: Left hip/femur fracture fixation with intramedullary ajit and screws;  Surgeon: Kameron Kaminski MD;  Location: [facility] MAIN OR;  Service: Orthopedics;  Laterality: Left;    INTERVENTIONAL RADIOLOGY PROCEDURE N/A 2/26/2024     "Procedure: Intravascular Ultrasound;  Surgeon: Lelo Tello MD;  Location: [facility] INVASIVE LOCATION;  Service: Cardiology;  Laterality: N/A;    ORCHIECTOMY         Family History   Problem Relation Age of Onset    Heart disease Mother         My brother, and a sister has had a brain anurysm    Stroke Mother     Heart failure Mother     Hypertension Mother         My whole imidate family, mother and all siblings. My father passed when i was [age] from black lung disease.    No Known Problems Father     Heart disease Brother     Heart failure Brother     Heart disease Brother     Heart failure Sister     Hypertension Sister        Social History     Tobacco Use    Smoking status: Former     Current packs/day: 0.00     Average packs/day: 1 pack/day for 51.3 years (51.3 ttl pk-yrs)     Types: Cigarettes     Start date:      Quit date: 2024     Years since quittin.9     Passive exposure: Current    Smokeless tobacco: Never   Vaping Use    Vaping status: Never Used   Substance Use Topics    Alcohol use: Yes     Comment: \"once a month\"    Drug use: Never        Home meds:  Prior to Admission medications    Medication Sig Start Date End Date Taking? Authorizing Provider   albuterol sulfate  (90 Base) MCG/ACT inhaler Inhale 2 puffs Every 4 (Four) Hours As Needed for Wheezing. 10/10/24   Larry Mireles PA-C   amLODIPine (NORVASC) 5 MG tablet Take 1 tablet by mouth Daily As Needed (SBP>140mmHg). 3/26/25   Nomi Cunningham MD   aspirin 81 MG EC tablet Take 1 tablet by mouth 2 (Two) Times a Day for 360 doses. 24  Larry Mireles PA-C   atorvastatin (LIPITOR) 20 MG tablet Take 1 tablet by mouth Daily. 24   Larry Mireles PA-C   baclofen (LIORESAL) 10 MG tablet Take 1 tablet by mouth 3 (Three) Times a Day.    Provider, MD Maci   budesonide (Pulmicort) 0.5 MG/2ML nebulizer solution Take 2 mL by nebulization Daily for 360 days. 10/10/24 10/5/25  Larry Mireles PA-C "   carbidopa-levodopa (SINEMET)  MG per tablet Take 2 tablets by mouth 3 (Three) Times a Day. Indications: Parkinson's Disease, Parkinsonian-Like Syndrome    Maci Downs MD   carbidopa-levodopa ER (SINEMET CR)  MG per tablet Take 2 tablets by mouth 4 (Four) Times a Day.    Maci Downs MD   carvedilol (COREG) 6.25 MG tablet TAKE 1 TABLET BY MOUTH TWICE A DAY 3/17/25   Floresita Rubi APRN   clonazePAM (KlonoPIN) 0.5 MG tablet Take 1 tablet by mouth 4 (Four) Times a Day. 3/4/25   Larry Mireles PA-C   clopidogrel (PLAVIX) 75 MG tablet TAKE 1 TABLET BY MOUTH EVERY DAY 3/17/25   Floresita Rubi APRN   Cyanocobalamin (Vitamin B-12) 1000 MCG sublingual tablet Take 1 tablet by mouth Daily. 1/16/25   Larry Mireles PA-C   docusate sodium (COLACE) 100 MG capsule Take 1 capsule by mouth 2 (Two) Times a Day As Needed for Constipation.    Maci Downs MD   donepezil (ARICEPT) 10 MG tablet TAKE 1 TABLET BY MOUTH EVERYDAY AT BEDTIME 1/20/25   Larry Mireles PA-C   gabapentin (NEURONTIN) 600 MG tablet Take 1 tablet by mouth 3 (Three) Times a Day. 2/3/25   Larry Mireles PA-C   ipratropium-albuterol (DUO-NEB) 0.5-2.5 mg/3 ml nebulizer Take 3 mL by nebulization Every 6 (Six) Hours. DX J44.9 Medicare B 2/17/25   Larry Mireles PA-C   melatonin 5 MG tablet tablet Take 1 tablet by mouth At Night As Needed.    Maci Downs MD   meloxicam (MOBIC) 15 MG tablet Take 1 tablet by mouth Daily.    Maci Downs MD   memantine (NAMENDA) 5 MG tablet Take 1 tablet by mouth Daily.    Maci Downs MD   nitroglycerin (NITROSTAT) 0.4 MG SL tablet Place 1 tablet under the tongue Every 5 (Five) Minutes As Needed for Chest Pain (Do not take if systolic BP less than 100 mmHg). Take no more than 3 doses in 15 minutes. 2/27/24   Floresita Rubi APRN   ondansetron (ZOFRAN) 4 MG tablet Take 1 tablet by mouth Every 4 (Four) Hours As Needed for Nausea or Vomiting.    Provider,  MD Maci   oxyCODONE-acetaminophen (PERCOCET) 7.5-325 MG per tablet Take 1 tablet by mouth Every 6 (Six) Hours As Needed for Moderate Pain. 3/21/25   Larry Mireles PA-C   pantoprazole (PROTONIX) 40 MG EC tablet TAKE 1 TABLET BY MOUTH EVERY DAY 3/17/25   Larry Mireles PA-C   polyethylene glycol (MIRALAX) 17 GM/SCOOP powder Take 17 g by mouth Daily As Needed (Use if senna-docusate is ineffective). 8/28/24   Elizabeth Anna MD   predniSONE (DELTASONE) 20 MG tablet Take 1 tablet by mouth Daily. 3/20/25   Brien Naidu MD   tamsulosin (FLOMAX) 0.4 MG capsule 24 hr capsule TAKE 1 CAPSULE BY MOUTH EVERY DAY 3/17/25   Larry Mireles PA-C   Testosterone Cypionate (DEPOTESTOTERONE CYPIONATE) 200 MG/ML injection Inject 1 mL into the appropriate muscle as directed by prescriber Every 14 (Fourteen) Days. 3/17/25   Maci Downs MD   venlafaxine XR (EFFEXOR-XR) 75 MG 24 hr capsule TAKE 1 CAPSULE BY MOUTH EVERY DAY 12/16/24   Larry Mireles PA-C       Allergies:  Patient has no known allergies.    Scheduled Meds:aspirin, 81 mg, Oral, BID  atorvastatin, 20 mg, Oral, Daily  baclofen, 10 mg, Oral, TID  carbidopa-levodopa ER, 2 tablet, Oral, 4x Daily  carvedilol, 6.25 mg, Oral, BID  clonazePAM, 0.5 mg, Oral, 4x Daily  clopidogrel, 75 mg, Oral, Daily  donepezil, 10 mg, Oral, Nightly  enoxaparin sodium, 40 mg, Subcutaneous, Q24H  gabapentin, 600 mg, Oral, Q8H  ipratropium-albuterol, 3 mL, Nebulization, Q6H  pantoprazole, 40 mg, Oral, Daily  potassium chloride ER, 40 mEq, Oral, Q4H  sodium chloride, 10 mL, Intravenous, Q12H  tamsulosin, 0.4 mg, Oral, Daily  venlafaxine XR, 75 mg, Oral, Daily      Continuous Infusions:   PRN Meds:.  albuterol    aluminum-magnesium hydroxide-simethicone    amLODIPine    senna-docusate sodium **AND** polyethylene glycol **AND** bisacodyl **AND** bisacodyl    Calcium Replacement - Follow Nurse / BPA Driven Protocol    Magnesium Standard Dose Replacement - Follow Nurse / BPA  "Driven Protocol    melatonin    nitroglycerin    ondansetron ODT **OR** ondansetron    oxyCODONE    Phosphorus Replacement - Follow Nurse / BPA Driven Protocol    Potassium Replacement - Follow Nurse / BPA Driven Protocol    sodium chloride    sodium chloride    sodium chloride      OBJECTIVE    Vital Signs  Vitals:    04/02/25 2152 04/02/25 2348 04/03/25 0403 04/03/25 0500   BP:  125/68 151/84    BP Location:  Left arm Left arm    Patient Position:  Lying Lying    Pulse: 70 67 72    Resp:  16 15    Temp:  97.8 °F (36.6 °C) 97.9 °F (36.6 °C)    TempSrc:  Oral Oral    SpO2: 95% 95%     Weight:    91.3 kg (201 lb 4.5 oz)   Height:           Flowsheet Rows      Flowsheet Row First Filed Value   Admission Height 175.3 cm (69\") Documented at 04/02/2025 0828   Admission Weight 93.7 kg (206 lb 9.1 oz) Documented at 04/02/2025 0828              Intake/Output Summary (Last 24 hours) at 4/3/2025 0730  Last data filed at 4/2/2025 2000  Gross per 24 hour   Intake 480 ml   Output 600 ml   Net -120 ml          Telemetry: Sinus rhythm    Physical Exam:  The patient is alert, oriented and in no distress.  Vital signs as noted above.  Head and neck revealed no carotid bruits or jugular venous distention.  No thyromegaly or lymphadenopathy is present  Lungs clear.  No wheezing.  Breath sounds are normal bilaterally.  Heart normal first and second heart sounds.  No murmur. No precordial rub is present.  No gallop is present.  Abdomen soft and nontender.  No organomegaly is present.  Extremities with good peripheral pulses without any pedal edema.  Skin warm and dry.  Musculoskeletal system is grossly normal.  CNS grossly normal.       Results Review:  I have personally reviewed the results from the time of this admission to 4/3/2025 07:30 EDT and agree with these findings:  []  Laboratory  []  Microbiology  []  Radiology  []  EKG/Telemetry   []  Cardiology/Vascular   []  Pathology  []  Old records  []  Other:    Most notable findings " include:    Lab Results (last 24 hours)       Procedure Component Value Units Date/Time    Basic Metabolic Panel [[id number]]  (Abnormal) Collected: 04/03/25 0525    Specimen: Blood Updated: 04/03/25 0605     Glucose 167 mg/dL      BUN 11 mg/dL      Creatinine 0.64 mg/dL      Sodium 142 mmol/L      Potassium 3.4 mmol/L      Chloride 107 mmol/L      CO2 23.6 mmol/L      Calcium 9.2 mg/dL      BUN/Creatinine Ratio 17.2     Anion Gap 11.4 mmol/L      eGFR 104.4 mL/min/1.73     Narrative:      GFR Categories in Chronic Kidney Disease (CKD)      GFR Category          GFR (mL/min/1.73)    Interpretation  G1                     90 or greater         Normal or high (1)  G2                      60-89                Mild decrease (1)  G3a                   45-59                Mild to moderate decrease  G3b                   30-44                Moderate to severe decrease  G4                    15-29                Severe decrease  G5                    14 or less           Kidney failure          (1)In the absence of evidence of kidney disease, neither GFR category G1 or G2 fulfill the criteria for CKD.    eGFR calculation 2021 CKD-EPI creatinine equation, which does not include race as a factor    Magnesium [[id number]]  (Normal) Collected: 04/03/25 0525    Specimen: Blood Updated: 04/03/25 0605     Magnesium 1.9 mg/dL     CBC & Differential [[id number]]  (Abnormal) Collected: 04/03/25 0525    Specimen: Blood Updated: 04/03/25 0540    Narrative:      The following orders were created for panel order CBC & Differential.  Procedure                               Abnormality         Status                     ---------                               -----------         ------                     CBC Auto Differential[[id number]]        Abnormal            Final result                 Please view results for these tests on the individual orders.    CBC Auto Differential [[id number]]  (Abnormal) Collected: 04/03/25 0525    Specimen:  Blood Updated: 04/03/25 0540     WBC 9.07 10*3/mm3      RBC 3.81 10*6/mm3      Hemoglobin 12.0 g/dL      Hematocrit 35.9 %      MCV 94.2 fL      MCH 31.5 pg      MCHC 33.4 g/dL      RDW 12.2 %      RDW-SD 42.4 fl      MPV 9.9 fL      Platelets 152 10*3/mm3      Neutrophil % 69.2 %      Lymphocyte % 24.4 %      Monocyte % 6.0 %      Eosinophil % 0.0 %      Basophil % 0.1 %      Immature Grans % 0.3 %      Neutrophils, Absolute 6.28 10*3/mm3      Lymphocytes, Absolute 2.21 10*3/mm3      Monocytes, Absolute 0.54 10*3/mm3      Eosinophils, Absolute 0.00 10*3/mm3      Basophils, Absolute 0.01 10*3/mm3      Immature Grans, Absolute 0.03 10*3/mm3      nRBC 0.0 /100 WBC     Urinalysis With Culture If Indicated - Urine, Clean Catch [[id number]]  (Normal) Collected: 04/02/25 1523    Specimen: Urine, Clean Catch Updated: 04/02/25 1616     Color, UA Yellow     Appearance, UA Clear     pH, UA 6.0     Specific Gravity, UA 1.011     Glucose, UA Negative     Ketones, UA Negative     Bilirubin, UA Negative     Blood, UA Negative     Protein, UA Negative     Leuk Esterase, UA Negative     Nitrite, UA Negative     Urobilinogen, UA 0.2 E.U./dL    Narrative:      In absence of clinical symptoms, the presence of pyuria, bacteria, and/or nitrites on the urinalysis result does not correlate with infection.  Urine microscopic not indicated.    High Sensitivity Troponin T 1Hr [[id number]]  (Normal) Collected: 04/02/25 1030    Specimen: Blood Updated: 04/02/25 1103     HS Troponin T 13 ng/L      Troponin T Numeric Delta -3 ng/L     Narrative:      High Sensitive Troponin T Reference Range:  <14.0 ng/L- Negative Female for AMI  <22.0 ng/L- Negative Male for AMI  >=14 - Abnormal Female indicating possible myocardial injury.  >=22 - Abnormal Male indicating possible myocardial injury.   Clinicians would have to utilize clinical acumen, EKG, Troponin, and serial changes to determine if it is an Acute Myocardial Infarction or myocardial injury due  to an underlying chronic condition.         Lipid Panel [[id number]]  (Abnormal) Collected: 04/02/25 1030    Specimen: Blood Updated: 04/02/25 1103     Total Cholesterol 106 mg/dL      Triglycerides 149 mg/dL      HDL Cholesterol 33 mg/dL      LDL Cholesterol  47 mg/dL      VLDL Cholesterol 26 mg/dL      LDL/HDL Ratio 1.31    Narrative:      Cholesterol Reference Ranges  (U.S. Department of Health and Human Services ATP III Classifications)    Desirable          <200 mg/dL  Borderline High    200-239 mg/dL  High Risk          >240 mg/dL      Triglyceride Reference Ranges  (U.S. Department of Health and Human Services ATP III Classifications)    Normal           <150 mg/dL  Borderline High  150-199 mg/dL  High             200-499 mg/dL  Very High        >500 mg/dL    HDL Reference Ranges  (U.S. Department of Health and Human Services ATP III Classifications)    Low     <40 mg/dl (major risk factor for CHD)  High    >60 mg/dl ('negative' risk factor for CHD)        LDL Reference Ranges  (U.S. Department of Health and Human Services ATP III Classifications)    Optimal          <100 mg/dL  Near Optimal     100-129 mg/dL  Borderline High  130-159 mg/dL  High             160-189 mg/dL  Very High        >189 mg/dL    LDL is calculated using the NIH LDL-C calculation.      Comprehensive Metabolic Panel [[id number]]  (Abnormal) Collected: 04/02/25 0905    Specimen: Blood Updated: 04/02/25 0940     Glucose 111 mg/dL      BUN 12 mg/dL      Creatinine 0.73 mg/dL      Sodium 143 mmol/L      Potassium 3.7 mmol/L      Chloride 106 mmol/L      CO2 24.2 mmol/L      Calcium 9.3 mg/dL      Total Protein 6.7 g/dL      Albumin 4.1 g/dL      ALT (SGPT) 9 U/L      AST (SGOT) 20 U/L      Alkaline Phosphatase 117 U/L      Total Bilirubin 0.3 mg/dL      Globulin 2.6 gm/dL      A/G Ratio 1.6 g/dL      BUN/Creatinine Ratio 16.4     Anion Gap 12.8 mmol/L      eGFR 100.3 mL/min/1.73     Narrative:      GFR Categories in Chronic Kidney Disease  (CKD)      GFR Category          GFR (mL/min/1.73)    Interpretation  G1                     90 or greater         Normal or high (1)  G2                      60-89                Mild decrease (1)  G3a                   45-59                Mild to moderate decrease  G3b                   30-44                Moderate to severe decrease  G4                    15-29                Severe decrease  G5                    14 or less           Kidney failure          (1)In the absence of evidence of kidney disease, neither GFR category G1 or G2 fulfill the criteria for CKD.    eGFR calculation 2021 CKD-EPI creatinine equation, which does not include race as a factor    High Sensitivity Troponin T [[id number]]  (Normal) Collected: 04/02/25 0905    Specimen: Blood Updated: 04/02/25 0940     HS Troponin T 16 ng/L     Narrative:      High Sensitive Troponin T Reference Range:  <14.0 ng/L- Negative Female for AMI  <22.0 ng/L- Negative Male for AMI  >=14 - Abnormal Female indicating possible myocardial injury.  >=22 - Abnormal Male indicating possible myocardial injury.   Clinicians would have to utilize clinical acumen, EKG, Troponin, and serial changes to determine if it is an Acute Myocardial Infarction or myocardial injury due to an underlying chronic condition.         BNP [[id number]]  (Normal) Collected: 04/02/25 0905    Specimen: Blood Updated: 04/02/25 0940     proBNP 73.8 pg/mL     Narrative:      This assay is used as an aid in the diagnosis of individuals suspected of having heart failure. It can be used as an aid in the diagnosis of acute decompensated heart failure (ADHF) in patients presenting with signs and symptoms of ADHF to the emergency department (ED). In addition, NT-proBNP of <300 pg/mL indicates ADHF is not likely.    Age Range Result Interpretation  NT-proBNP Concentration (pg/mL:      <50             Positive            >450                   Gray                 300-450                    Negative      "        <300    50-75           Positive            >900                  Gray                300-900                  Negative            <300      >75             Positive            >1800                  Gray                300-1800                  Negative            <300    D-dimer, Quantitative [[id number]]  (Normal) Collected: 04/02/25 0905    Specimen: Blood Updated: 04/02/25 0925     D-Dimer, Quantitative 0.35 MCGFEU/mL     Narrative:      According to the assay 's published package insert, a normal (<0.50 MCGFEU/mL) D-dimer result in conjunction with a non-high clinical probability assessment, excludes deep vein thrombosis (DVT) and pulmonary embolism (PE) with high sensitivity.    D-dimer values increase with age and this can make VTE exclusion of an older population difficult. To address this, the [employer], based on best available evidence and recent guidelines, recommends that clinicians use age-adjusted D-dimer thresholds in patients greater than 50 years of age with: a) a low probability of PE who do not meet all Pulmonary Embolism Rule Out Criteria, or b) in those with intermediate probability of PE.   The formula for an age-adjusted D-dimer cut-off is \"age/100\".  For example, a 60 year old patient would have an age-adjusted cut-off of 0.60 MCGFEU/mL and an 80 year old 0.80 MCGFEU/mL.    [place] Draw [[id number]] Collected: 04/02/25 0905    Specimen: Blood Updated: 04/02/25 0915    Narrative:      The following orders were created for panel order [place] Draw.  Procedure                               Abnormality         Status                     ---------                               -----------         ------                     Green Top (Gel)[[id number]]                                  Final result               Lavender Top[[id number]]                                     Final result               Gold Top - SST[[id number]]                                   Final " result               Light Blue Top[[id number]]                                   Final result                 Please view results for these tests on the individual orders.    Gold Top - SST [[id number]] Collected: 04/02/25 0905    Specimen: Blood Updated: 04/02/25 0915     Extra Tube Hold for add-ons.     Comment: Auto resulted.       Green Top (Gel) [[id number]] Collected: 04/02/25 0905    Specimen: Blood Updated: 04/02/25 0915     Extra Tube Hold for add-ons.     Comment: Auto resulted.       Lavender Top [[id number]] Collected: 04/02/25 0905    Specimen: Blood Updated: 04/02/25 0915     Extra Tube hold for add-on     Comment: Auto resulted       Light Blue Top [[id number]] Collected: 04/02/25 0905    Specimen: Blood Updated: 04/02/25 0915     Extra Tube Hold for add-ons.     Comment: Auto resulted       CBC & Differential [[id number]]  (Abnormal) Collected: 04/02/25 0905    Specimen: Blood Updated: 04/02/25 0914    Narrative:      The following orders were created for panel order CBC & Differential.  Procedure                               Abnormality         Status                     ---------                               -----------         ------                     CBC Auto Differential[[id number]]        Abnormal            Final result                 Please view results for these tests on the individual orders.    CBC Auto Differential [[id number]]  (Abnormal) Collected: 04/02/25 0905    Specimen: Blood Updated: 04/02/25 0914     WBC 7.15 10*3/mm3      RBC 4.02 10*6/mm3      Hemoglobin 12.7 g/dL      Hematocrit 38.3 %      MCV 95.3 fL      MCH 31.6 pg      MCHC 33.2 g/dL      RDW 12.2 %      RDW-SD 42.7 fl      MPV 10.2 fL      Platelets 143 10*3/mm3      Neutrophil % 65.1 %      Lymphocyte % 27.7 %      Monocyte % 6.6 %      Eosinophil % 0.0 %      Basophil % 0.3 %      Immature Grans % 0.3 %      Neutrophils, Absolute 4.66 10*3/mm3      Lymphocytes, Absolute 1.98 10*3/mm3      Monocytes, Absolute 0.47  10*3/mm3      Eosinophils, Absolute 0.00 10*3/mm3      Basophils, Absolute 0.02 10*3/mm3      Immature Grans, Absolute 0.02 10*3/mm3      nRBC 0.0 /100 WBC             Imaging Results (Last 24 Hours)       Procedure Component Value Units Date/Time    XR Chest 1 View [[id number]] Collected: 04/02/25 0857     Updated: 04/02/25 0900    Narrative:      XR CHEST 1 VW    Date of Exam: 4/2/2025 8:54 AM EDT    Indication: Chest Pain Triage Protocol    Comparison: Chest radiograph dated 3/18/2025    Findings:  The cardiomediastinal silhouette is within normal limits. There is aortic arch atherosclerotic calcification. There is linear scarring within the lateral right midlung. There is no pleural effusion or pneumothorax. There are degenerative changes of the   thoracic spine.      Impression:      Impression:  No acute cardiopulmonary abnormality. Linear scarring within the lateral right midlung.        Electronically Signed: Deepak Lori    4/2/2025 8:58 AM EDT    Workstation ID: [id number]            LAB RESULTS (LAST 7 DAYS)    CBC  Results from last 7 days   Lab Units 04/03/25  0525 04/02/25  0905   WBC 10*3/mm3 9.07 7.15   RBC 10*6/mm3 3.81* 4.02*   HEMOGLOBIN g/dL 12.0* 12.7*   HEMATOCRIT % 35.9* 38.3   MCV fL 94.2 95.3   PLATELETS 10*3/mm3 152 143       BMP  Results from last 7 days   Lab Units 04/03/25  0525 04/02/25  0905   SODIUM mmol/L 142 143   POTASSIUM mmol/L 3.4* 3.7   CHLORIDE mmol/L 107 106   CO2 mmol/L 23.6 24.2   BUN mg/dL 11 12   CREATININE mg/dL 0.64* 0.73*   GLUCOSE mg/dL 167* 111*   MAGNESIUM mg/dL 1.9  --        CMP   Results from last 7 days   Lab Units 04/03/25  0525 04/02/25  0905   SODIUM mmol/L 142 143   POTASSIUM mmol/L 3.4* 3.7   CHLORIDE mmol/L 107 106   CO2 mmol/L 23.6 24.2   BUN mg/dL 11 12   CREATININE mg/dL 0.64* 0.73*   GLUCOSE mg/dL 167* 111*   ALBUMIN g/dL  --  4.1   BILIRUBIN mg/dL  --  0.3   ALK PHOS U/L  --  117   AST (SGOT) U/L  --  20   ALT (SGPT) U/L  --  9       BNP         TROPONIN  Results from last 7 days   Lab Units 04/02/25  1030   HSTROP T ng/L 13       CoAg        Creatinine Clearance  Estimated Creatinine Clearance: 126.7 mL/min (A) (by C-G formula based on SCr of 0.64 mg/dL (L)).    ABG        Radiology  XR Chest 1 View  Result Date: 4/2/2025  Impression: No acute cardiopulmonary abnormality. Linear scarring within the lateral right midlung. Electronically Signed: Deepak Sandoval  4/2/2025 8:58 AM EDT  Workstation ID: [id number]        EKG  I personally viewed and interpreted the patient's EKG/Telemetry data:  ECG 12 Lead ED Triage Standing Order; Chest Pain   Final Result   HEART RATE=61  bpm   RR Bafzcdrj=223  ms   SD Kaniuhhk=474  ms   P Horizontal Axis=35  deg   P Front Axis=-14  deg   QRSD Mefjhzui=220  ms   QT Kkxlyfjl=966  ms   XVnV=609  ms   QRS Axis=1  deg   T Wave Axis=51  deg   - NORMAL ECG -   Sinus rhythm   When compared with ECG of 18-Mar-2025 12:18:57,   No significant change   Electronically Signed By: Nathaniel Kennedy ([email or web address]) 2025-04-03 06:44:17   Date and Time of Study:2025-04-02 08:36:02      Telemetry Scan   Final Result      Telemetry Scan   Final Result      Telemetry Scan   Final Result      Telemetry Scan   Final Result      Telemetry Scan   Final Result            Echocardiogram:    Results for orders placed during the hospital encounter of 09/23/24    Adult Transthoracic Echo Complete W/ Cont if Necessary Per Protocol    Interpretation Summary    Left ventricular systolic function is normal. Calculated left ventricular EF = 53% Left ventricular ejection fraction appears to be 51 - 55%.    Left ventricular diastolic function is consistent with (grade I) impaired relaxation. Average GLS -14.9%.    The left atrial cavity is dilated.    Estimated right ventricular systolic pressure from tricuspid regurgitation is normal (<35 mmHg).    No significant valvular abnormalities noted.        Stress Test:  Results for orders placed during the hospital encounter of  02/20/24    Stress Test With Myocardial Perfusion One Day    Interpretation Summary    Impressions are consistent with an intermediate risk study.    Left ventricular ejection fraction is normal (Calculated EF = 65%).    Myocardial perfusion imaging indicates a small-sized infarct with mild patrica-infarct ischemia in the anteroseptal region    Findings consistent with a normal ECG stress test.         Cardiac Catheterization:  Results for orders placed during the hospital encounter of 02/26/24    Cardiac Catheterization/Vascular Study    Conclusion  PROCEDURES PERFORMED  Ultrasound guided Vascular access  Right heart catheterization  Left Heart Catheterization  Coronary Angiogram  Percutaneous coronary intervention with drug-eluting stent placement  Intravascular ultrasound  Moderate sedation    INDICATIONS FOR PROCEDURE  Positive stress test, LEUNG    PROCEDURE IN DETAIL  Informed consent was obtained from the patient after explaining the risks, benefits, and alternative options of the procedure. After obtaining informed consent, the patient was brought to the cath lab and was prepped in a sterile fashion.Lidocaine 2% was used for local anesthesia into the right IJ access site. Right IJ vein was accessed using the micropuncture needle under ultrasound guidance and micropuncture wire advanced under flouroscopy. A 7 Greek vascular sheath was put into place percutaneously over guide-wire. Guide wires were removed. A 6Fr swan john catheter was advanced to wedge position. RA, RV and PA and wedge pressures were recorded.  PA sat and arterial sats recorded.    Lidocaine 2% was used for local anesthesia into the right  radial access site. The right  radial artery was accessed with a micropuncture needle via modified Seldinger technique under ultrasound guidance. A 6F introducer sheath was inserted successfully. Afterwards, 6F JR4 and JL3.5 diagnostic catheters were advanced over a wire into the ascending aorta and were used  to engage the ostia of the left main and RCA respectively. JR4 used to cross the AV and obtain LV pressures and gradient across the AV measured via pullback technique. Images of the right and left coronary systems were obtained.    HEMODYNAMICS  LV: 156/8/16  AO: 149/79/107  Gradient 7 mmHg    RH HEMODYNAMICS:  RA 9  RV 29/10/12  PA 29/10/23  PCW 11    AO Sat 100%  PA Sat 77%    [facility] CO 6.17    [facility] CI 2.93    FINDINGS  Coronary Angiogram    Right dominant circulation    Left main: Left main is a large caliber vessel which gives rise to the Left Anterior Descending and the Left circumflex.  Left main coronary artery is angiographically free from any significant disease    Left Anterior Descending Artery: LAD is a medium caliber vessel which gives rise to several septal perforators and several diagonal branches.  There is an 80% calcified lesion in the proximal LAD at the takeoff of a large D1 branch which further bifurcates.  The ostium of D1 is free from disease.  This was followed by a 70% lesion in the mid LAD.  There is heavy calcification in the entire LAD.    Left Circumflex: Left circumflex artery runs along the AV groove and gives obtuse marginal branches.  It has moderate calcification with 30 to 40% stenosis in the midsegment.    Right Coronary Artery: The RCA is a large caliber vessel gives rise to PDA and PLV.  There is 20% stenosis in the proximal segment    Percutaneous coronary intervention:  100 units/kg of heparin was administered and ACT of more than 250 was documented.  6 Mont[place]grin XB LAD 3.5 guide was used to engage the left main.  Run-through wire was advanced to the distal LAD.  IVUS of the LAD showed a distal reference vessel diameter of 3.6 mm with heavy concentric calcification in the mid to proximal LAD.  Proximal reference vessel diameter was 4.6 mm.  We then predilated the LAD with an NC 2.5 x 12 mm balloon.  It was then stented with a Xience 3.5 x 18 mm stent followed by a Xience 4.0 x 38  mm stent and postdilated with an NC 4.5 x 12 mm balloon.      Preprocedure stenosis: 80%  preprocedure ALEXI flow: 3  Lesion type: C  Postprocedure stenosis: 0%  Post procedure ALEXI flow: 3      All the catheters were exchanged over a wire and subsequently removed.    Radial sheath was removed and a TR band was applied with 15 cc of air to achieve hemostasis.        ESTIMATED BLOOD LOSS:  25 ml    COMPLICATIONS:  None    PROCEDURE DATA:  Contrast Used: 100 ml  Sedation Time:  57 minutes    IMPRESSIONS  One-vessel obstructive CAD of the proximal LAD status post successful IVUS guided PCI  No evidence of pulmonary hypertension  Normal left and right heart filling pressures    RECOMMENDATIONS  -Dual antiplatelet therapy  -Beta-blocker and high intensity statin  -ACE inhibitor if blood pressure tolerates  -Referral to cardiac rehab  -Continue aggressive risk factor stratification and modification  -Smoking cessation         Other:         ASSESSMENT & PLAN:    Principal Problem:    Chest tightness  Active Problems:    History of CVA (cerebrovascular accident)    Primary hypertension    Mixed hyperlipidemia    Parkinson disease    Anxiety    Gastroesophageal reflux disease    Depression    Former smoker    Shortness of breath    COPD (chronic obstructive pulmonary disease)    Coronary artery disease of native artery of native heart with stable angina pectoris    Benign prostatic hyperplasia with lower urinary tract symptoms    PVD (peripheral vascular disease) with claudication    Obstructive sleep apnea syndrome    Obesity (BMI 30-39.9)      Chest pain/tightness  Coronary artery disease involving native coronary artery of native heart without angina pectoris   PCI to LAD with jailing of diagonal vessel  Continue DAPT, high intensity statin and beta blocker   ECG with no acute ischemic changes  High-sensitivity troponin is negative   Nuclear stress test is negative for ischemia with improved perfusion and stress  images  Add amlodipine and Imdur     Shortness of breath  Recently ruled out for PE  proBNP is normal  Echocardiogram shows preserved LV function, grade 1 diastolic dysfunction  Continue bronchodilators  Appears euvolemic     Primary hypertension, chronic  Continue  Coreg  Add Imdur  And amlodipine     Mixed hyperlipidemia  Continue atorvastatin  Goal LDL <70  LDL 46  A1c is 6     PVD (peripheral vascular disease) with claudication  Continue DAPT and statin     History of CVA (cerebrovascular accident)  Continue DAPT and statin     Parkinson's disease, unspecified whether dyskinesia present, unspecified whether manifestations fluctuate  On baclofen, Sinemet CR, clonazepam, gabapentin, and Effexor XR  Could be contributing to generalized slowing and weakness     Tobacco abuse  Mucopurulent chronic bronchitis  He no longer smokes  COPD may be contributing to shortness of breath.  Continue bronchodilators   Requesting nocturnal oxygen prescription  Follow-up with pulmonology     Benign prostatic hyperplasia with urinary hesitancy  Continue Flomax      Obesity  BMI is 30.  He weighs 201 pounds.  Lifestyle modifications recommended to the patient.  Recommended dietary changes and exercise    Patient does not feel comfortable getting discharged today.  Recommended incentive spirometry.  He complains of shortness of breath while saturating in the high 90s.  Recommend 6-minute walk test.  Physical therapy    Nomi Cunningham MD  04/03/25  07:30 EDT

## 2025-04-03 NOTE — SIGNIFICANT NOTE
04/03/25 1020   OTHER   Discipline physical therapist   Rehab Time/Intention   Session Not Performed patient unavailable for evaluation  (out of room for stress test)

## 2025-04-03 NOTE — PROGRESS NOTES
[facility] Progress Note    Patient Name: Andrew Flores  : 1960  MRN: [id number]  Primary Care Physician: Larry Mireles PA-C  Date of admission: 2025     Patient Care Team:  Larry Mireles PA-C as PCP - General (Physician Assistant)  Lelo Tello MD as Consulting Physician (Interventional Cardiology)  Floresita Rubi APRN as Nurse Practitioner (Cardiology)  Nomi Cunningham MD as Cardiologist (Cardiology)          Subjective   History Present Illness     Chief Complaint:   Chief Complaint   Patient presents with    Shortness of Breath     Soa, some chest pain, no energy, Pt states this started 4 days ago.           History of Present Illness  Obtained from admitting physician HPI on 2025  History of present illness 64-year-old gentleman history of COPD and multiple health problems presented hospital and just discharged on the  after COPD and COVID-19.  The patient states that last couple days he has had some increasing shortness of breath and tightness in the chest no neck arm jaw pain.  Worse with exertion better with rest no fever chills sweats cough congestion leg pain or swelling no recent long car ride plane ride he did have a recent hospitalization.  No other change in medications.  Patient reports that he has had a previous heart attack.     25 (postobservation admission):  Patient confirms the HPI noted above reporting approximately 3-day history of some chest pain and dyspnea specifically dyspnea with exertion which has become increasingly worse with each day.  He was recently diagnosed with COVID-19 infection though his cough and fever has subsequently resolved and he denies any palpitations, peripheral edema.  Significant fatigue/weakness has been noted and patient reports he has trouble holding himself up in bed.  He also notes some darkening of his urine and increased frequency.  Chest pain is described as a tightness also made worse with exertion.  He is  compliant with all of his outpatient medical therapies and notes that he has recently completed a course of steroids secondary to COVID-19 infection but denies any other recent medication changes.     4/3/2025:  Patient reports persistent dyspnea and notes that he was restless overnight.  He continues to feel concerned regarding his ability to perform ADLs and reports that he does not feel ready for discharge in spite of reassuring cardiac results.  He is pending further respiratory and PT evaluation    Patient reevaluated on the afternoon of 4/3 continuing to note worsening dyspnea especially with exertion, fatigue and wheezing.  Symptoms are noted to make performing ADLs including standing to use the urinal difficult      ROS  Review of Systems   Constitutional: Positive for malaise/fatigue. Negative for fever.   HENT: Negative.     Eyes: Negative.    Cardiovascular:  Positive for chest pain and dyspnea on exertion. Negative for palpitations.   Respiratory:  Negative for cough.    Skin: Negative.    Musculoskeletal: Negative.    Gastrointestinal: Negative.    Genitourinary: Negative.    Neurological:  Positive for weakness.   Psychiatric/Behavioral: Negative.           Personal History     Past Medical History:   Past Medical History:   Diagnosis Date    Anxiety 09/04/2012    Benign prostatic hyperplasia with lower urinary tract symptoms 02/27/2024    Borderline diabetes     COPD (chronic obstructive pulmonary disease)     Coronary artery disease 2015    To the best of my memory    COVID-19 03/18/2025    Cytokine release syndrome, grade 1 03/20/2025    Deep vein thrombosis 2015    Blood clots    Depression 07/07/2016    Diffuse non-Hodgkin's lymphoma of testis 03/06/2018    Duodenitis 01/27/2022    Dysphagia 08/2022    from parkinsons    Erosive gastritis 01/27/2022    Esophageal stricture 01/27/2022    Fall 07/25/2019    Food impaction of esophagus 01/27/2022    Gastroesophageal reflux disease 09/04/2012     History of chemotherapy 04/25/2021    Hx of radiation therapy 04/25/2021    Intertrochanteric fracture of left femur, closed, initial encounter 08/25/2024    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    Mixed hyperlipidemia 07/08/2019    Myocardial infarction 2017    Non-Hodgkin's lymphoma of testis     Parkinson disease     Pneumonia of right upper lobe due to infectious organism 12/04/2022    Poor historian     Primary hypertension 07/08/2019    PVD (peripheral vascular disease) with claudication 09/20/2024    Shortness of breath     Sleep apnea     does not have a cpap    Status post coronary artery stent placement 02/26/2024    Stroke 2017    Testosterone deficiency 07/25/2019    Tick bite 05/20/2021    Tobacco abuse 10/13/2021    Ulcer of esophagus without bleeding 01/27/2022    Uncomplicated alcohol dependence 04/25/2021       Surgical History:      Past Surgical History:   Procedure Laterality Date    CARDIAC CATHETERIZATION N/A 2/26/2024    Procedure: Left and Right Heart Cath with Coronary Angiography;  Surgeon: Lelo Tello MD;  Location: [facility] CATH INVASIVE LOCATION;  Service: Cardiology;  Laterality: N/A;    CARDIAC CATHETERIZATION N/A 2/26/2024    Procedure: Percutaneous Coronary Intervention;  Surgeon: Lelo Tello MD;  Location: [facility] CATH INVASIVE LOCATION;  Service: Cardiology;  Laterality: N/A;    ENDOSCOPY N/A 01/27/2022    Procedure: ESOPHAGOGASTRODUODENOSCOPY with foreign body removal with gastric, duodenal biopsy and GE junction biopsy;  Surgeon: Pedro De La Rosa MD;  Location: [facility] ENDOSCOPY;  Service: Gastroenterology;  Laterality: N/A;  post: foreign body removal, erosive gastritis with biopsy, erosive eduodenitis with biopsy, esophagitis, GE junction biopsy to rule out malignancy,     ENDOSCOPY N/A 08/23/2022    Procedure: ESOPHAGOGASTRODUODENOSCOPY with GE junction biopsy R/O Barretts, esophageal dilation #48 bougie;  Surgeon: Alexa Subramanian MD;  Location: [facility] ENDOSCOPY;   Service: Gastroenterology;  Laterality: N/A;  duodenal ulcer, GERD,     HIP TROCHANTERIC NAILING WITH INTRAMEDULLARY HIP SCREW Left 8/26/2024    Procedure: Left hip/femur fracture fixation with intramedullary ajit and screws;  Surgeon: Kameron Kaminski MD;  Location: [facility] MAIN OR;  Service: Orthopedics;  Laterality: Left;    INTERVENTIONAL RADIOLOGY PROCEDURE N/A 2/26/2024    Procedure: Intravascular Ultrasound;  Surgeon: Lelo Tello MD;  Location: [facility] CATH INVASIVE LOCATION;  Service: Cardiology;  Laterality: N/A;    ORCHIECTOMY  2019           Family History: family history includes Heart disease in his brother, brother, and mother; Heart failure in his brother, mother, and sister; Hypertension in his mother and sister; No Known Problems in his father; Stroke in his mother. Otherwise pertinent FHx was reviewed and unremarkable.     Social History:  reports that he quit smoking about 11 months ago. His smoking use included cigarettes. He started smoking about 52 years ago. He has a 51.3 pack-year smoking history. He has been exposed to tobacco smoke. He has never used smokeless tobacco. He reports current alcohol use. He reports that he does not use drugs.      Medications:  Prior to Admission medications    Medication Sig Start Date End Date Taking? Authorizing Provider   albuterol sulfate  (90 Base) MCG/ACT inhaler Inhale 2 puffs Every 4 (Four) Hours As Needed for Wheezing. 10/10/24   Larry Mireles PA-C   amLODIPine (NORVASC) 5 MG tablet Take 1 tablet by mouth Daily As Needed (SBP>140mmHg). 3/26/25   Nomi Cunningham MD   aspirin 81 MG EC tablet Take 1 tablet by mouth 2 (Two) Times a Day for 360 doses. 11/6/24 5/5/25  Larry Mireles PA-C   atorvastatin (LIPITOR) 20 MG tablet Take 1 tablet by mouth Daily. 4/29/24   Larry Mireles PA-C   baclofen (LIORESAL) 10 MG tablet Take 1 tablet by mouth 3 (Three) Times a Day.    Provider, MD Maci   budesonide (Pulmicort) 0.5 MG/2ML nebulizer  solution Take 2 mL by nebulization Daily for 360 days. 10/10/24 10/5/25  Larry Mireles PA-C   carbidopa-levodopa (SINEMET)  MG per tablet Take 2 tablets by mouth 3 (Three) Times a Day. Indications: Parkinson's Disease, Parkinsonian-Like Syndrome    Maci Downs MD   carbidopa-levodopa ER (SINEMET CR)  MG per tablet Take 2 tablets by mouth 4 (Four) Times a Day.    Maci Downs MD   carvedilol (COREG) 6.25 MG tablet TAKE 1 TABLET BY MOUTH TWICE A DAY 3/17/25   Floresita Rubi APRN   clonazePAM (KlonoPIN) 0.5 MG tablet Take 1 tablet by mouth 4 (Four) Times a Day. 3/4/25   Larry Mireles PA-C   clopidogrel (PLAVIX) 75 MG tablet TAKE 1 TABLET BY MOUTH EVERY DAY 3/17/25   Floresita Rubi APRN   Cyanocobalamin (Vitamin B-12) 1000 MCG sublingual tablet Take 1 tablet by mouth Daily. 1/16/25   Larry Mireles PA-C   docusate sodium (COLACE) 100 MG capsule Take 1 capsule by mouth 2 (Two) Times a Day As Needed for Constipation.    Maci Downs MD   donepezil (ARICEPT) 10 MG tablet TAKE 1 TABLET BY MOUTH EVERYDAY AT BEDTIME 1/20/25   Larry Mireles PA-C   gabapentin (NEURONTIN) 600 MG tablet Take 1 tablet by mouth 3 (Three) Times a Day. 2/3/25   Larry Mireles PA-C   ipratropium-albuterol (DUO-NEB) 0.5-2.5 mg/3 ml nebulizer Take 3 mL by nebulization Every 6 (Six) Hours. DX J44.9 Medicare B 2/17/25   Larry Mireles PA-C   melatonin 5 MG tablet tablet Take 1 tablet by mouth At Night As Needed.    Maci Downs MD   meloxicam (MOBIC) 15 MG tablet Take 1 tablet by mouth Daily.    Maci Downs MD   memantine (NAMENDA) 5 MG tablet Take 1 tablet by mouth Daily.    Maci Downs MD   nitroglycerin (NITROSTAT) 0.4 MG SL tablet Place 1 tablet under the tongue Every 5 (Five) Minutes As Needed for Chest Pain (Do not take if systolic BP less than 100 mmHg). Take no more than 3 doses in 15 minutes. 2/27/24   Floresita Rubi APRN   ondansetron (ZOFRAN) 4 MG  tablet Take 1 tablet by mouth Every 4 (Four) Hours As Needed for Nausea or Vomiting.    ProviderMaci MD   oxyCODONE-acetaminophen (PERCOCET) 7.5-325 MG per tablet Take 1 tablet by mouth Every 6 (Six) Hours As Needed for Moderate Pain. 3/21/25   Larry Mireles PA-C   pantoprazole (PROTONIX) 40 MG EC tablet TAKE 1 TABLET BY MOUTH EVERY DAY 3/17/25   Larry Mireles PA-C   polyethylene glycol (MIRALAX) 17 GM/SCOOP powder Take 17 g by mouth Daily As Needed (Use if senna-docusate is ineffective). 8/28/24   Elizabeth Anna MD   predniSONE (DELTASONE) 20 MG tablet Take 1 tablet by mouth Daily. 3/20/25   Brien Naidu MD   tamsulosin (FLOMAX) 0.4 MG capsule 24 hr capsule TAKE 1 CAPSULE BY MOUTH EVERY DAY 3/17/25   Larry Mireles PA-C   Testosterone Cypionate (DEPOTESTOTERONE CYPIONATE) 200 MG/ML injection Inject 1 mL into the appropriate muscle as directed by prescriber Every 14 (Fourteen) Days. 3/17/25   ProviderMaci MD   venlafaxine XR (EFFEXOR-XR) 75 MG 24 hr capsule TAKE 1 CAPSULE BY MOUTH EVERY DAY 12/16/24   Larry Mireles PA-C       Allergies:  No Known Allergies    Objective   Objective     Vital Signs  Temp:  [97.8 °F (36.6 °C)-98 °F (36.7 °C)] 98 °F (36.7 °C)  Heart Rate:  [] 100  Resp:  [15-20] 18  BP: (113-154)/(68-94) 130/74  SpO2:  [90 %-96 %] 96 %  on  Flow (L/min) (Oxygen Therapy):  [2] 2;   Device (Oxygen Therapy): nasal cannula  Body mass index is 29.72 kg/m².    Physical Exam  Vitals reviewed.   Constitutional:       General: He is not in acute distress.     Appearance: Normal appearance. He is obese. He is not ill-appearing, toxic-appearing or diaphoretic.   HENT:      Head: Normocephalic.      Right Ear: External ear normal.      Left Ear: External ear normal.      Nose: Nose normal.      Mouth/Throat:      Mouth: Mucous membranes are moist.   Eyes:      Extraocular Movements: Extraocular movements intact.   Cardiovascular:      Rate and Rhythm: Normal rate and  regular rhythm.      Pulses: Normal pulses.      Heart sounds: Normal heart sounds.   Pulmonary:      Effort: Pulmonary effort is normal.      Breath sounds: Normal breath sounds.   Abdominal:      General: Bowel sounds are normal.      Palpations: Abdomen is soft.      Tenderness: There is no abdominal tenderness.   Musculoskeletal:         General: Normal range of motion.      Cervical back: Normal range of motion.      Right lower leg: No edema.      Left lower leg: No edema.   Skin:     General: Skin is warm and dry.      Capillary Refill: Capillary refill takes less than 2 seconds.   Neurological:      General: No focal deficit present.      Mental Status: He is alert and oriented to person, place, and time.      Motor: Weakness present.   Psychiatric:         Mood and Affect: Mood normal.         Behavior: Behavior normal.         Thought Content: Thought content normal.         Judgment: Judgment normal.     Results Review:  I have personally reviewed most recent cardiac tracings, lab results, and radiology images and interpretations and agree with findings, most notably: Troponin, proBNP, CBC, CMP, D-dimer, lipid panel, chest x-ray, EKG and stress test.    Results from last 7 days   Lab Units 04/03/25  0525   WBC 10*3/mm3 9.07   HEMOGLOBIN g/dL 12.0*   HEMATOCRIT % 35.9*   PLATELETS 10*3/mm3 152     Results from last 7 days   Lab Units 04/03/25  0525 04/02/25  1030 04/02/25  0905   SODIUM mmol/L 142  --  143   POTASSIUM mmol/L 3.4*  --  3.7   CHLORIDE mmol/L 107  --  106   CO2 mmol/L 23.6  --  24.2   BUN mg/dL 11  --  12   CREATININE mg/dL 0.64*  --  0.73*   GLUCOSE mg/dL 167*  --  111*   CALCIUM mg/dL 9.2  --  9.3   ALK PHOS U/L  --   --  117   ALT (SGPT) U/L  --   --  9   AST (SGOT) U/L  --   --  20   HSTROP T ng/L  --  13 16   PROBNP pg/mL  --   --  73.8     Estimated Creatinine Clearance: 126.7 mL/min (A) (by C-G formula based on SCr of 0.64 mg/dL (L)).  Brief Urine Lab Results  (Last result in the past  365 days)        Color   Clarity   Blood   Leuk Est   Nitrite   Protein   CREAT   Urine HCG        04/02/25 1523 Yellow   Clear   Negative   Negative   Negative   Negative                   Microbiology Results (last 10 days)       ** No results found for the last 240 hours. **            ECG/EMG Results (most recent)       Procedure Component Value Units Date/Time    Telemetry Scan [[id number]] Resulted: 04/02/25     Updated: 04/02/25 1040    Telemetry Scan [[id number]] Resulted: 04/02/25     Updated: 04/02/25 2005    Telemetry Scan [[id number]] Resulted: 04/02/25     Updated: 04/02/25 2348    Telemetry Scan [[id number]] Resulted: 04/02/25     Updated: 04/03/25 0432    Telemetry Scan [[id number]] Resulted: 04/02/25     Updated: 04/03/25 0616    ECG 12 Lead ED Triage Standing Order; Chest Pain [[id number]] Collected: 04/02/25 0836     Updated: 04/03/25 0644     QT Interval 426 ms      QTC Interval 429 ms     Narrative:      HEART RATE=61  bpm  RR Iklrdxza=294  ms  RI Rdxnqtlr=626  ms  P Horizontal Axis=35  deg  P Front Axis=-14  deg  QRSD Bzmrrydh=275  ms  QT Tbztcije=006  ms  WHdJ=309  ms  QRS Axis=1  deg  T Wave Axis=51  deg  - NORMAL ECG -  Sinus rhythm  When compared with ECG of 18-Mar-2025 12:18:57,  No significant change  Electronically Signed By: Nathaniel Kennedy (TYRONE) 2025-04-03 06:44:17  Date and Time of Study:2025-04-02 08:36:02    Telemetry Scan [[id number]] Resulted: 04/02/25     Updated: 04/03/25 0814    Telemetry Scan [[id number]] Resulted: 04/02/25     Updated: 04/03/25 1222            Results for orders placed during the hospital encounter of 09/23/24    Duplex Venous Lower Extremity - Bilateral CAR    Interpretation Summary    Chronic right lower extremity superficial thrombophlebitis noted in the small saphenous.    Chronic left lower extremity superficial thrombophlebitis noted in the small saphenous.    All other veins appeared normal bilaterally.      Results for orders placed during the hospital encounter  of 09/23/24    Adult Transthoracic Echo Complete W/ Cont if Necessary Per Protocol    Interpretation Summary    Left ventricular systolic function is normal. Calculated left ventricular EF = 53% Left ventricular ejection fraction appears to be 51 - 55%.    Left ventricular diastolic function is consistent with (grade I) impaired relaxation. Average GLS -14.9%.    The left atrial cavity is dilated.    Estimated right ventricular systolic pressure from tricuspid regurgitation is normal (<35 mmHg).    No significant valvular abnormalities noted.      XR Chest 1 View  Result Date: 4/2/2025  Impression: No acute cardiopulmonary abnormality. Linear scarring within the lateral right midlung. Electronically Signed: Deepak Lori  4/2/2025 8:58 AM EDT  Workstation ID: [id number]        Estimated Creatinine Clearance: 126.7 mL/min (A) (by C-G formula based on SCr of 0.64 mg/dL (L)).    Assessment & Plan   Assessment/Plan       Active Hospital Problems    Diagnosis  POA    **Chest tightness [R07.89]  Yes    Shortness of breath [R06.02]  Yes    Obesity (BMI 30-39.9) [E66.9]  Yes    Obstructive sleep apnea syndrome [G47.33]  Yes    PVD (peripheral vascular disease) with claudication [I73.9]  Yes    Benign prostatic hyperplasia with lower urinary tract symptoms [N40.1]  Yes    Coronary artery disease of native artery of native heart with stable angina pectoris [I25.118]  Yes    COPD (chronic obstructive pulmonary disease) [J44.9]  Yes    Former smoker [Z87.891]  Not Applicable    Mixed hyperlipidemia [E78.2]  Yes    Primary hypertension [I10]  Yes    History of CVA (cerebrovascular accident) [Z86.73]  Not Applicable    Parkinson disease [G20.A1]  Yes    Depression [F32.A]  Yes    Gastroesophageal reflux disease [K21.9]  Yes    Anxiety [F41.9]  Yes      Resolved Hospital Problems   No resolved problems to display.     Chest pain with dyspnea and recent COVID-19 infection and history of CAD        Lab Results   Component Value Date      TROPONINT 13 04/02/2025     TROPONINT 16 04/02/2025     TROPONINT 11 03/18/2025   -proBNP: 73.8  -D-dimer: 0.35  -Lipid panel total cholesterol of 106 with an LDL of 47 and HDL of 33  -Chest X-ray: No acute cardiopulmonary process  -Patient tested positive for COVID-19 infection on 3/18/2025  -EKG: Sinus rhythm at 61 without obvious acute changes with baseline artifact noted and QTc of 421 ms  -In the ED pt given 325 mg aspirin and DuoNeb  -Cardiac catheterization from February 2024 showed 80% disease in the proximal LAD as well as 70% in the mid LAD with heavy calcification of the entire LAD noted as well as moderate calcification with 30 to 40% stenosis in the mid segment of the circumflex and 20% stenosis in the proximal RCA with PCI to the proximal LAD at that time and recommendations for DAPT, beta-blocker, statin and ACE inhibitor  -Cardiology consulted, stress test ordered which was reported with no signs of ischemia and improved perfusion and stress images and recommendations to add amlodipine and Imdur to home regimen  -Telemetry  -NPO  -Continue aspirin, statin, Coreg, Plavix  -Walking oximetry ordered with patient demonstrating need for 2 L supplemental oxygen  -CT of chest without contrast ordered     Hypertension  -Well controlled       BP Readings from Last 1 Encounters:   04/03/25 154/94   - Continue amlodipine and Coreg  - Monitor while admitted     COPD  -Pulmicort and DuoNeb     Parkinson's  -Sinemet     Anxiety/depression/cognitive impairment  -Klonopin, Aricept, Effexor or and Namenda            VTE Prophylaxis - Active VTE Prophylaxis:  Pharmacologic:        Start     Dose Route Frequency Stop    04/02/25 1600  enoxaparin sodium (LOVENOX) syringe 40 mg         40 mg SC Every 24 Hours --                  Select Mechanical VTE Prophylaxis if Desired & Appropriate      CODE STATUS:    Code Status and Medical Interventions: CPR (Attempt to Resuscitate); Full Support   Ordered at: 04/02/25 1036      Code Status (Patient has no pulse and is not breathing):    CPR (Attempt to Resuscitate)     Medical Interventions (Patient has pulse or is breathing):    Full Support       This patient has been examined wearing personal protective equipment.     I discussed the patient's findings and my recommendations with patient, family, and nursing staff.      Signature:Electronically signed by Haja Khoury PA-C, 04/03/25, 3:55 PM EDT.

## 2025-04-03 NOTE — CASE MANAGEMENT/SOCIAL WORK
Continued Stay Note  PITER [facility]ero     Patient Name: Andrew Flores  MRN: [id number]  Today's Date: 4/3/2025    Admit Date: 4/2/2025    Plan: Home w/ daughter. [facility] (current, no GUI for obs needed). Daughters for DC transport.   Discharge Plan       Row Name 04/03/25 1016       Plan    Plan Comments DC barriers: Cardiology following, myoview planned for today.           Abdiaziz Reyes RN     Cell number [phone]  Office number [phone]

## 2025-04-03 NOTE — PROCEDURES
90Exercise Oximetry    Patient Name:Andrew Flores   MRN: [id number]   Date: 04/03/25             ROOM AIR BASELINE   SpO2% 90   Heart Rate 100   Blood Pressure      EXERCISE ON ROOM AIR SpO2% EXERCISE ON O2 @ 2 LPM SpO2%   1 MINUTE 87 1 MINUTE 90   2 MINUTES  2 MINUTES 92   3 MINUTES  3 MINUTES 96   4 MINUTES  4 MINUTES 96   5 MINUTES  5 MINUTES 96   6 MINUTES  6 MINUTES 96              Distance Walked   Distance Walked    6 min   Dyspnea (Kashif Scale)   Dyspnea (Kashif Scale)   Fatigue (Kashif Scale)   Fatigue (Kashif Scale)   SpO2% Post Exercise   SpO2% Post Exercise   96   HR Post Exercise      HR Post Exercise     100   Time to Recovery   Time to Recovery     Comments: pt on ra saturation of 90%. Pt began ambulation on ra with saturation of 87%. Placed pt on 2 l o2 with saturation remaining between 90-96%. Pt tolerated ambulation fair.

## 2025-04-03 NOTE — PLAN OF CARE
Problem: Adult Inpatient Plan of Care  Goal: Plan of Care Review  Outcome: Progressing  Goal: Patient-Specific Goal (Individualized)  Outcome: Progressing  Goal: Absence of Hospital-Acquired Illness or Injury  Outcome: Progressing  Intervention: Identify and Manage Fall Risk  Recent Flowsheet Documentation  Taken 4/3/2025 0200 by Kim Flores LPN  Safety Promotion/Fall Prevention:   activity supervised   assistive device/personal items within reach   clutter free environment maintained   safety round/check completed   room organization consistent   nonskid shoes/slippers when out of bed  Taken 4/3/2025 0000 by Kim Flores LPN  Safety Promotion/Fall Prevention:   activity supervised   assistive device/personal items within reach   clutter free environment maintained   safety round/check completed   room organization consistent  Taken 4/2/2025 2200 by Kim Flores LPN  Safety Promotion/Fall Prevention:   assistive device/personal items within reach   activity supervised   clutter free environment maintained   safety round/check completed   room organization consistent   nonskid shoes/slippers when out of bed  Taken 4/2/2025 2000 by Kim Flores LPN  Safety Promotion/Fall Prevention:   activity supervised   assistive device/personal items within reach   clutter free environment maintained   safety round/check completed   room organization consistent   nonskid shoes/slippers when out of bed  Intervention: Prevent Skin Injury  Recent Flowsheet Documentation  Taken 4/3/2025 0000 by Kim Flores LPN  Body Position: position changed independently  Skin Protection: transparent dressing maintained  Taken 4/2/2025 2000 by Kim Flores LPN  Body Position: position changed independently  Skin Protection: transparent dressing maintained  Intervention: Prevent and Manage VTE (Venous Thromboembolism) Risk  Recent Flowsheet Documentation  Taken 4/3/2025 0000 by Kim Flores LPN  VTE Prevention/Management:   bilateral   SCDs  (sequential compression devices) off  Taken 4/2/2025 2000 by Kim Flores LPN  VTE Prevention/Management: SCDs (sequential compression devices) off  Intervention: Prevent Infection  Recent Flowsheet Documentation  Taken 4/3/2025 0200 by Kim Flores LPN  Infection Prevention:   environmental surveillance performed   equipment surfaces disinfected   hand hygiene promoted   personal protective equipment utilized   rest/sleep promoted   single patient room provided  Taken 4/3/2025 0000 by Kim Flores LPN  Infection Prevention:   environmental surveillance performed   equipment surfaces disinfected   hand hygiene promoted   personal protective equipment utilized   rest/sleep promoted   single patient room provided  Taken 4/2/2025 2200 by Kim Flores LPN  Infection Prevention:   environmental surveillance performed   equipment surfaces disinfected   hand hygiene promoted   personal protective equipment utilized   rest/sleep promoted   single patient room provided  Taken 4/2/2025 2000 by Kim Flores LPN  Infection Prevention:   environmental surveillance performed   equipment surfaces disinfected   hand hygiene promoted   personal protective equipment utilized   rest/sleep promoted   single patient room provided  Goal: Optimal Comfort and Wellbeing  Outcome: Progressing  Intervention: Monitor Pain and Promote Comfort  Recent Flowsheet Documentation  Taken 4/2/2025 2000 by Kim Flores LPN  Pain Management Interventions: care clustered  Intervention: Provide Person-Centered Care  Recent Flowsheet Documentation  Taken 4/2/2025 2000 by Kim Flores LPN  Trust Relationship/Rapport:   care explained   choices provided   thoughts/feelings acknowledged  Goal: Readiness for Transition of Care  Outcome: Progressing     Problem: Comorbidity Management  Goal: Maintenance of COPD Symptom Control  Outcome: Progressing  Intervention: Maintain COPD (Chronic Obstructive Pulmonary Disease) Symptom Control  Recent Flowsheet  Documentation  Taken 4/3/2025 0200 by Kim Flores LPN  Medication Review/Management: medications reviewed  Taken 4/3/2025 0000 by Kim Flores LPN  Medication Review/Management: medications reviewed  Taken 4/2/2025 2200 by Kim Flores LPN  Medication Review/Management: medications reviewed  Taken 4/2/2025 2000 by Kim Flores LPN  Medication Review/Management: medications reviewed  Goal: Blood Pressure in Desired Range  Outcome: Progressing  Intervention: Maintain Blood Pressure Management  Recent Flowsheet Documentation  Taken 4/3/2025 0200 by Kim Flores LPN  Medication Review/Management: medications reviewed  Taken 4/3/2025 0000 by Kim Flores LPN  Medication Review/Management: medications reviewed  Taken 4/2/2025 2200 by Kim Flores LPN  Medication Review/Management: medications reviewed  Taken 4/2/2025 2000 by Kim Flores LPN  Medication Review/Management: medications reviewed   Goal Outcome Evaluation:

## 2025-04-04 ENCOUNTER — READMISSION MANAGEMENT (OUTPATIENT)
Dept: CALL CENTER | Facility: HOSPITAL | Age: 65
End: 2025-04-04
Payer: MEDICARE

## 2025-04-04 VITALS
SYSTOLIC BLOOD PRESSURE: 112 MMHG | WEIGHT: 210.1 LBS | OXYGEN SATURATION: 100 % | TEMPERATURE: 98.1 F | BODY MASS INDEX: 31.12 KG/M2 | DIASTOLIC BLOOD PRESSURE: 71 MMHG | HEART RATE: 58 BPM | HEIGHT: 69 IN | RESPIRATION RATE: 18 BRPM

## 2025-04-04 DIAGNOSIS — F41.9 ANXIETY: ICD-10-CM

## 2025-04-04 DIAGNOSIS — G89.29 CHRONIC LOW BACK PAIN WITHOUT SCIATICA, UNSPECIFIED BACK PAIN LATERALITY: ICD-10-CM

## 2025-04-04 DIAGNOSIS — M54.50 CHRONIC LOW BACK PAIN WITHOUT SCIATICA, UNSPECIFIED BACK PAIN LATERALITY: ICD-10-CM

## 2025-04-04 LAB
ANION GAP SERPL CALCULATED.3IONS-SCNC: 10.5 MMOL/L (ref 5–15)
BASOPHILS # BLD AUTO: 0.03 10*3/MM3 (ref 0–0.2)
BASOPHILS NFR BLD AUTO: 0.4 % (ref 0–1.5)
BUN SERPL-MCNC: 11 MG/DL (ref 8–23)
BUN/CREAT SERPL: 14.1 (ref 7–25)
C AURIS DNA SPEC QL NAA+NON-PROBE: NOT DETECTED
CALCIUM SPEC-SCNC: 9 MG/DL (ref 8.6–10.5)
CHLORIDE SERPL-SCNC: 107 MMOL/L (ref 98–107)
CO2 SERPL-SCNC: 25.5 MMOL/L (ref 22–29)
CREAT SERPL-MCNC: 0.78 MG/DL (ref 0.76–1.27)
DEPRECATED RDW RBC AUTO: 45 FL (ref 37–54)
EGFRCR SERPLBLD CKD-EPI 2021: 98.4 ML/MIN/1.73
EOSINOPHIL # BLD AUTO: 0 10*3/MM3 (ref 0–0.4)
EOSINOPHIL NFR BLD AUTO: 0 % (ref 0.3–6.2)
ERYTHROCYTE [DISTWIDTH] IN BLOOD BY AUTOMATED COUNT: 12.5 % (ref 12.3–15.4)
GLUCOSE SERPL-MCNC: 100 MG/DL (ref 65–99)
HCT VFR BLD AUTO: 36.6 % (ref 37.5–51)
HGB BLD-MCNC: 11.7 G/DL (ref 13–17.7)
IMM GRANULOCYTES # BLD AUTO: 0.02 10*3/MM3 (ref 0–0.05)
IMM GRANULOCYTES NFR BLD AUTO: 0.2 % (ref 0–0.5)
LYMPHOCYTES # BLD AUTO: 3.65 10*3/MM3 (ref 0.7–3.1)
LYMPHOCYTES NFR BLD AUTO: 42.6 % (ref 19.6–45.3)
MAGNESIUM SERPL-MCNC: 2 MG/DL (ref 1.6–2.4)
MCH RBC QN AUTO: 31.7 PG (ref 26.6–33)
MCHC RBC AUTO-ENTMCNC: 32 G/DL (ref 31.5–35.7)
MCV RBC AUTO: 99.2 FL (ref 79–97)
MONOCYTES # BLD AUTO: 0.5 10*3/MM3 (ref 0.1–0.9)
MONOCYTES NFR BLD AUTO: 5.8 % (ref 5–12)
NEUTROPHILS NFR BLD AUTO: 4.36 10*3/MM3 (ref 1.7–7)
NEUTROPHILS NFR BLD AUTO: 51 % (ref 42.7–76)
NRBC BLD AUTO-RTO: 0 /100 WBC (ref 0–0.2)
PLATELET # BLD AUTO: 142 10*3/MM3 (ref 140–450)
PMV BLD AUTO: 10.5 FL (ref 6–12)
POTASSIUM SERPL-SCNC: 3.8 MMOL/L (ref 3.5–5.2)
RBC # BLD AUTO: 3.69 10*6/MM3 (ref 4.14–5.8)
SODIUM SERPL-SCNC: 143 MMOL/L (ref 136–145)
WBC NRBC COR # BLD AUTO: 8.56 10*3/MM3 (ref 3.4–10.8)

## 2025-04-04 PROCEDURE — 94761 N-INVAS EAR/PLS OXIMETRY MLT: CPT

## 2025-04-04 PROCEDURE — G0378 HOSPITAL OBSERVATION PER HR: HCPCS

## 2025-04-04 PROCEDURE — 94799 UNLISTED PULMONARY SVC/PX: CPT

## 2025-04-04 PROCEDURE — 80048 BASIC METABOLIC PNL TOTAL CA: CPT | Performed by: PHYSICIAN ASSISTANT

## 2025-04-04 PROCEDURE — 83735 ASSAY OF MAGNESIUM: CPT | Performed by: PHYSICIAN ASSISTANT

## 2025-04-04 PROCEDURE — 85025 COMPLETE CBC W/AUTO DIFF WBC: CPT | Performed by: PHYSICIAN ASSISTANT

## 2025-04-04 PROCEDURE — 99214 OFFICE O/P EST MOD 30 MIN: CPT | Performed by: INTERNAL MEDICINE

## 2025-04-04 PROCEDURE — 97162 PT EVAL MOD COMPLEX 30 MIN: CPT

## 2025-04-04 PROCEDURE — 97550 CAREGIVER TRAING 1ST 30 MIN: CPT

## 2025-04-04 RX ORDER — PREDNISONE 10 MG/1
TABLET ORAL
Qty: 20 TABLET | Refills: 0 | Status: SHIPPED | OUTPATIENT
Start: 2025-04-04 | End: 2025-04-04

## 2025-04-04 RX ORDER — ISOSORBIDE MONONITRATE 30 MG/1
30 TABLET, EXTENDED RELEASE ORAL
Qty: 30 TABLET | Refills: 0 | Status: SHIPPED | OUTPATIENT
Start: 2025-04-05 | End: 2025-05-05

## 2025-04-04 RX ORDER — BUDESONIDE 0.5 MG/2ML
0.5 INHALANT ORAL
Qty: 60 ML | Refills: 0 | Status: SHIPPED | OUTPATIENT
Start: 2025-04-04 | End: 2025-04-04

## 2025-04-04 RX ORDER — FLUTICASONE PROPIONATE 50 MCG
2 SPRAY, SUSPENSION (ML) NASAL DAILY
Qty: 16 G | Refills: 0 | Status: SHIPPED | OUTPATIENT
Start: 2025-04-04

## 2025-04-04 RX ORDER — ISOSORBIDE MONONITRATE 30 MG/1
30 TABLET, EXTENDED RELEASE ORAL
Qty: 30 TABLET | Refills: 0 | Status: SHIPPED | OUTPATIENT
Start: 2025-04-05 | End: 2025-04-04

## 2025-04-04 RX ORDER — BUDESONIDE 0.5 MG/2ML
0.5 INHALANT ORAL
Qty: 60 ML | Refills: 0 | Status: SHIPPED | OUTPATIENT
Start: 2025-04-04 | End: 2025-05-04

## 2025-04-04 RX ORDER — PREDNISONE 10 MG/1
TABLET ORAL
Qty: 20 TABLET | Refills: 0 | Status: SHIPPED | OUTPATIENT
Start: 2025-04-04 | End: 2025-04-12

## 2025-04-04 RX ORDER — MONTELUKAST SODIUM 10 MG/1
10 TABLET ORAL NIGHTLY
Qty: 30 TABLET | Refills: 0 | Status: SHIPPED | OUTPATIENT
Start: 2025-04-04 | End: 2025-05-04

## 2025-04-04 RX ORDER — FLUTICASONE PROPIONATE 50 MCG
2 SPRAY, SUSPENSION (ML) NASAL DAILY
Qty: 16 G | Refills: 0 | Status: SHIPPED | OUTPATIENT
Start: 2025-04-04 | End: 2025-04-04

## 2025-04-04 RX ORDER — MONTELUKAST SODIUM 10 MG/1
10 TABLET ORAL NIGHTLY
Qty: 30 TABLET | Refills: 0 | Status: SHIPPED | OUTPATIENT
Start: 2025-04-04 | End: 2025-04-04

## 2025-04-04 RX ADMIN — VENLAFAXINE HYDROCHLORIDE 75 MG: 75 CAPSULE, EXTENDED RELEASE ORAL at 10:04

## 2025-04-04 RX ADMIN — ATORVASTATIN CALCIUM 20 MG: 20 TABLET, FILM COATED ORAL at 10:04

## 2025-04-04 RX ADMIN — ALBUTEROL SULFATE 2.5 MG: 2.5 SOLUTION RESPIRATORY (INHALATION) at 10:07

## 2025-04-04 RX ADMIN — GABAPENTIN 600 MG: 300 CAPSULE ORAL at 13:38

## 2025-04-04 RX ADMIN — CARVEDILOL 6.25 MG: 6.25 TABLET, FILM COATED ORAL at 10:04

## 2025-04-04 RX ADMIN — BACLOFEN 10 MG: 10 TABLET ORAL at 10:04

## 2025-04-04 RX ADMIN — CLONAZEPAM 0.5 MG: 0.5 TABLET ORAL at 10:04

## 2025-04-04 RX ADMIN — GABAPENTIN 600 MG: 300 CAPSULE ORAL at 05:46

## 2025-04-04 RX ADMIN — CLOPIDOGREL BISULFATE 75 MG: 75 TABLET, FILM COATED ORAL at 10:05

## 2025-04-04 RX ADMIN — CLONAZEPAM 0.5 MG: 0.5 TABLET ORAL at 13:39

## 2025-04-04 RX ADMIN — PANTOPRAZOLE SODIUM 40 MG: 40 TABLET, DELAYED RELEASE ORAL at 10:04

## 2025-04-04 RX ADMIN — IPRATROPIUM BROMIDE AND ALBUTEROL SULFATE 3 ML: .5; 3 SOLUTION RESPIRATORY (INHALATION) at 14:56

## 2025-04-04 RX ADMIN — ISOSORBIDE MONONITRATE 30 MG: 30 TABLET, EXTENDED RELEASE ORAL at 10:05

## 2025-04-04 RX ADMIN — ASPIRIN 81 MG: 81 TABLET, COATED ORAL at 10:04

## 2025-04-04 RX ADMIN — CARBIDOPA AND LEVODOPA 2 TABLET: 25; 100 TABLET, EXTENDED RELEASE ORAL at 10:04

## 2025-04-04 RX ADMIN — CARBIDOPA AND LEVODOPA 2 TABLET: 25; 100 TABLET, EXTENDED RELEASE ORAL at 13:39

## 2025-04-04 NOTE — PROGRESS NOTES
Referring Provider: Nathaniel Kennedy MD    Reason for follow-up: Chest pain/unstable angina     Patient Care Team:  Larry Mireles PA-C as PCP - General (Physician Assistant)  Lelo Tello MD as Consulting Physician (Interventional Cardiology)  Floresita Rubi APRN as Nurse Practitioner (Cardiology)  Nomi Cunningham MD as Cardiologist (Cardiology)      SUBJECTIVE  Resting comfortably in bed.  Reports improvement in breathing.  Currently on 2 L of oxygen.  Blood pressure and heart rate are normal and stable.     ROS  Review of all systems negative except as indicated.    Since I have last seen, the patient has been without any chest discomfort, shortness of breath, palpitations, dizziness or syncope.  Denies having any headache, abdominal pain, nausea, vomiting, diarrhea, constipation, loss of weight or loss of appetite.  Denies having any excessive bruising, hematuria or blood in the stool.        Personal History:    Past Medical History:   Diagnosis Date    Anxiety 09/04/2012    Benign prostatic hyperplasia with lower urinary tract symptoms 02/27/2024    Borderline diabetes     COPD (chronic obstructive pulmonary disease)     Coronary artery disease 2015    To the best of my memory    COVID-19 03/18/2025    Cytokine release syndrome, grade 1 03/20/2025    Deep vein thrombosis 2015    Blood clots    Depression 07/07/2016    Diffuse non-Hodgkin's lymphoma of testis 03/06/2018    Duodenitis 01/27/2022    Dysphagia 08/2022    from parkinsons    Erosive gastritis 01/27/2022    Esophageal stricture 01/27/2022    Fall 07/25/2019    Food impaction of esophagus 01/27/2022    Gastroesophageal reflux disease 09/04/2012    History of chemotherapy 04/25/2021    Hx of radiation therapy 04/25/2021    Intertrochanteric fracture of left femur, closed, initial encounter 08/25/2024    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    Mixed hyperlipidemia 07/08/2019    Myocardial infarction 2017    Non-Hodgkin's lymphoma of testis      Parkinson disease     Pneumonia of right upper lobe due to infectious organism 12/04/2022    Poor historian     Primary hypertension 07/08/2019    PVD (peripheral vascular disease) with claudication 09/20/2024    Shortness of breath     Sleep apnea     does not have a cpap    Status post coronary artery stent placement 02/26/2024    Stroke 2017    Testosterone deficiency 07/25/2019    Tick bite 05/20/2021    Tobacco abuse 10/13/2021    Ulcer of esophagus without bleeding 01/27/2022    Uncomplicated alcohol dependence 04/25/2021       Past Surgical History:   Procedure Laterality Date    CARDIAC CATHETERIZATION N/A 2/26/2024    Procedure: Left and Right Heart Cath with Coronary Angiography;  Surgeon: Lelo Tello MD;  Location: [facility] CATH INVASIVE LOCATION;  Service: Cardiology;  Laterality: N/A;    CARDIAC CATHETERIZATION N/A 2/26/2024    Procedure: Percutaneous Coronary Intervention;  Surgeon: Lelo Tello MD;  Location: [facility] CATH INVASIVE LOCATION;  Service: Cardiology;  Laterality: N/A;    ENDOSCOPY N/A 01/27/2022    Procedure: ESOPHAGOGASTRODUODENOSCOPY with foreign body removal with gastric, duodenal biopsy and GE junction biopsy;  Surgeon: Pedro De La Rosa MD;  Location: [facility] ENDOSCOPY;  Service: Gastroenterology;  Laterality: N/A;  post: foreign body removal, erosive gastritis with biopsy, erosive eduodenitis with biopsy, esophagitis, GE junction biopsy to rule out malignancy,     ENDOSCOPY N/A 08/23/2022    Procedure: ESOPHAGOGASTRODUODENOSCOPY with GE junction biopsy R/O Barretts, esophageal dilation #48 bougie;  Surgeon: Alexa Subramanina MD;  Location: [facility] ENDOSCOPY;  Service: Gastroenterology;  Laterality: N/A;  duodenal ulcer, GERD,     HIP TROCHANTERIC NAILING WITH INTRAMEDULLARY HIP SCREW Left 8/26/2024    Procedure: Left hip/femur fracture fixation with intramedullary ajit and screws;  Surgeon: Kameron Kaminski MD;  Location: [facility] MAIN OR;  Service: Orthopedics;  Laterality: Left;     "INTERVENTIONAL RADIOLOGY PROCEDURE N/A 2024    Procedure: Intravascular Ultrasound;  Surgeon: Lelo Tello MD;  Location: [facility] INVASIVE LOCATION;  Service: Cardiology;  Laterality: N/A;    ORCHIECTOMY         Family History   Problem Relation Age of Onset    Heart disease Mother         My brother, and a sister has had a brain anurysm    Stroke Mother     Heart failure Mother     Hypertension Mother         My whole imidate family, mother and all siblings. My father passed when i was [age] from black lung disease.    No Known Problems Father     Heart disease Brother     Heart failure Brother     Heart disease Brother     Heart failure Sister     Hypertension Sister        Social History     Tobacco Use    Smoking status: Former     Current packs/day: 0.00     Average packs/day: 1 pack/day for 51.3 years (51.3 ttl pk-yrs)     Types: Cigarettes     Start date:      Quit date: 2024     Years since quittin.9     Passive exposure: Current    Smokeless tobacco: Never   Vaping Use    Vaping status: Never Used   Substance Use Topics    Alcohol use: Yes     Comment: \"once a month\"    Drug use: Never        Home meds:  Prior to Admission medications    Medication Sig Start Date End Date Taking? Authorizing Provider   albuterol sulfate  (90 Base) MCG/ACT inhaler Inhale 2 puffs Every 4 (Four) Hours As Needed for Wheezing. 10/10/24   Larry Mireles PA-C   amLODIPine (NORVASC) 5 MG tablet Take 1 tablet by mouth Daily As Needed (SBP>140mmHg). 3/26/25   Nomi Cunningham MD   aspirin 81 MG EC tablet Take 1 tablet by mouth 2 (Two) Times a Day for 360 doses. 24  Larry Mireles PA-C   atorvastatin (LIPITOR) 20 MG tablet Take 1 tablet by mouth Daily. 24   Larry Mireles PA-C   baclofen (LIORESAL) 10 MG tablet Take 1 tablet by mouth 3 (Three) Times a Day.    Provider, MD Maci   budesonide (Pulmicort) 0.5 MG/2ML nebulizer solution Take 2 mL by nebulization Daily for " 360 days. 10/10/24 10/5/25  Larry Mireles PA-C   carbidopa-levodopa (SINEMET)  MG per tablet Take 2 tablets by mouth 3 (Three) Times a Day. Indications: Parkinson's Disease, Parkinsonian-Like Syndrome    Maci Downs MD   carbidopa-levodopa ER (SINEMET CR)  MG per tablet Take 2 tablets by mouth 4 (Four) Times a Day.    Maci Downs MD   carvedilol (COREG) 6.25 MG tablet TAKE 1 TABLET BY MOUTH TWICE A DAY 3/17/25   Floresita Rubi APRN   clonazePAM (KlonoPIN) 0.5 MG tablet Take 1 tablet by mouth 4 (Four) Times a Day. 3/4/25   Larry Mireles PA-C   clopidogrel (PLAVIX) 75 MG tablet TAKE 1 TABLET BY MOUTH EVERY DAY 3/17/25   Floresita Rubi APRN   Cyanocobalamin (Vitamin B-12) 1000 MCG sublingual tablet Take 1 tablet by mouth Daily. 1/16/25   Larry Mireles PA-C   docusate sodium (COLACE) 100 MG capsule Take 1 capsule by mouth 2 (Two) Times a Day As Needed for Constipation.    Maci Downs MD   donepezil (ARICEPT) 10 MG tablet TAKE 1 TABLET BY MOUTH EVERYDAY AT BEDTIME 1/20/25   Larry Mireles PA-C   gabapentin (NEURONTIN) 600 MG tablet Take 1 tablet by mouth 3 (Three) Times a Day. 2/3/25   Larry Mireles PA-C   ipratropium-albuterol (DUO-NEB) 0.5-2.5 mg/3 ml nebulizer Take 3 mL by nebulization Every 6 (Six) Hours. DX J44.9 Medicare B 2/17/25   Larry Mireles PA-C   melatonin 5 MG tablet tablet Take 1 tablet by mouth At Night As Needed.    Maci Downs MD   meloxicam (MOBIC) 15 MG tablet Take 1 tablet by mouth Daily.    Maci Downs MD   memantine (NAMENDA) 5 MG tablet Take 1 tablet by mouth Daily.    Maci Downs MD   nitroglycerin (NITROSTAT) 0.4 MG SL tablet Place 1 tablet under the tongue Every 5 (Five) Minutes As Needed for Chest Pain (Do not take if systolic BP less than 100 mmHg). Take no more than 3 doses in 15 minutes. 2/27/24   Floresita Rubi APRN   ondansetron (ZOFRAN) 4 MG tablet Take 1 tablet by mouth Every 4 (Four)  Hours As Needed for Nausea or Vomiting.    Maci Downs MD   oxyCODONE-acetaminophen (PERCOCET) 7.5-325 MG per tablet Take 1 tablet by mouth Every 6 (Six) Hours As Needed for Moderate Pain. 3/21/25   Larry Mireles PA-C   pantoprazole (PROTONIX) 40 MG EC tablet TAKE 1 TABLET BY MOUTH EVERY DAY 3/17/25   Larry Mireles PA-C   polyethylene glycol (MIRALAX) 17 GM/SCOOP powder Take 17 g by mouth Daily As Needed (Use if senna-docusate is ineffective). 8/28/24   Elizabeth Anna MD   predniSONE (DELTASONE) 20 MG tablet Take 1 tablet by mouth Daily. 3/20/25   Brien Naidu MD   tamsulosin (FLOMAX) 0.4 MG capsule 24 hr capsule TAKE 1 CAPSULE BY MOUTH EVERY DAY 3/17/25   Larry Mireles PA-C   Testosterone Cypionate (DEPOTESTOTERONE CYPIONATE) 200 MG/ML injection Inject 1 mL into the appropriate muscle as directed by prescriber Every 14 (Fourteen) Days. 3/17/25   Maci Downs MD   venlafaxine XR (EFFEXOR-XR) 75 MG 24 hr capsule TAKE 1 CAPSULE BY MOUTH EVERY DAY 12/16/24   Larry Mireles PA-C       Allergies:  Patient has no known allergies.    Scheduled Meds:aspirin, 81 mg, Oral, BID  atorvastatin, 20 mg, Oral, Daily  baclofen, 10 mg, Oral, TID  carbidopa-levodopa ER, 2 tablet, Oral, 4x Daily  carvedilol, 6.25 mg, Oral, BID  clonazePAM, 0.5 mg, Oral, 4x Daily  clopidogrel, 75 mg, Oral, Daily  donepezil, 10 mg, Oral, Nightly  enoxaparin sodium, 40 mg, Subcutaneous, Q24H  gabapentin, 600 mg, Oral, Q8H  ipratropium-albuterol, 3 mL, Nebulization, Q6H  isosorbide mononitrate, 30 mg, Oral, Q24H  pantoprazole, 40 mg, Oral, Daily  sodium chloride, 10 mL, Intravenous, Q12H  tamsulosin, 0.4 mg, Oral, Daily  venlafaxine XR, 75 mg, Oral, Daily      Continuous Infusions:   PRN Meds:.  albuterol    aluminum-magnesium hydroxide-simethicone    amLODIPine    senna-docusate sodium **AND** polyethylene glycol **AND** bisacodyl **AND** bisacodyl    Calcium Replacement - Follow Nurse / BPA Driven Protocol     "Magnesium Standard Dose Replacement - Follow Nurse / BPA Driven Protocol    melatonin    nitroglycerin    ondansetron ODT **OR** ondansetron    oxyCODONE    Phosphorus Replacement - Follow Nurse / BPA Driven Protocol    Potassium Replacement - Follow Nurse / BPA Driven Protocol    sodium chloride    sodium chloride    sodium chloride      OBJECTIVE    Vital Signs  Vitals:    04/03/25 1938 04/04/25 0033 04/04/25 0220 04/04/25 0440   BP: 115/78 98/66 116/73    BP Location: Left arm Right arm     Patient Position: Lying Lying     Pulse: 59 53 57    Resp: 14 11 12    Temp: 97.9 °F (36.6 °C) 97.6 °F (36.4 °C) 97.7 °F (36.5 °C)    TempSrc: Oral Oral Oral    SpO2: 95% 95% 96%    Weight:    95.3 kg (210 lb 1.6 oz)   Height:           Flowsheet Rows      Flowsheet Row First Filed Value   Admission Height 175.3 cm (69\") Documented at 04/02/2025 0828   Admission Weight 93.7 kg (206 lb 9.1 oz) Documented at 04/02/2025 0828              Intake/Output Summary (Last 24 hours) at 4/4/2025 0509  Last data filed at 4/3/2025 2000  Gross per 24 hour   Intake 240 ml   Output --   Net 240 ml          Telemetry: Sinus rhythm    Physical Exam:  The patient is alert, oriented and in no distress.  Vital signs as noted above.  Head and neck revealed no carotid bruits or jugular venous distention.  No thyromegaly or lymphadenopathy is present  Lungs clear.  No wheezing.  Breath sounds are normal bilaterally.  Heart normal first and second heart sounds.  No murmur. No precordial rub is present.  No gallop is present.  Abdomen soft and nontender.  No organomegaly is present.  Extremities with good peripheral pulses without any pedal edema.  Skin warm and dry.  Musculoskeletal system is grossly normal.  CNS grossly normal.       Results Review:  I have personally reviewed the results from the time of this admission to 4/4/2025 05:09 EDT and agree with these findings:  []  Laboratory  []  Microbiology  []  Radiology  []  EKG/Telemetry   []  " Cardiology/Vascular   []  Pathology  []  Old records  []  Other:    Most notable findings include:    Lab Results (last 24 hours)       Procedure Component Value Units Date/Time    CBC & Differential [[id number]]  (Abnormal) Collected: 04/04/25 0314    Specimen: Blood Updated: 04/04/25 0404    Narrative:      The following orders were created for panel order CBC & Differential.  Procedure                               Abnormality         Status                     ---------                               -----------         ------                     CBC Auto Differential[[id number]]        Abnormal            Final result                 Please view results for these tests on the individual orders.    CBC Auto Differential [[id number]]  (Abnormal) Collected: 04/04/25 0314    Specimen: Blood Updated: 04/04/25 0404     WBC 8.56 10*3/mm3      RBC 3.69 10*6/mm3      Hemoglobin 11.7 g/dL      Hematocrit 36.6 %      MCV 99.2 fL      MCH 31.7 pg      MCHC 32.0 g/dL      RDW 12.5 %      RDW-SD 45.0 fl      MPV 10.5 fL      Platelets 142 10*3/mm3      Neutrophil % 51.0 %      Lymphocyte % 42.6 %      Monocyte % 5.8 %      Eosinophil % 0.0 %      Basophil % 0.4 %      Immature Grans % 0.2 %      Neutrophils, Absolute 4.36 10*3/mm3      Lymphocytes, Absolute 3.65 10*3/mm3      Monocytes, Absolute 0.50 10*3/mm3      Eosinophils, Absolute 0.00 10*3/mm3      Basophils, Absolute 0.03 10*3/mm3      Immature Grans, Absolute 0.02 10*3/mm3      nRBC 0.0 /100 WBC     Magnesium [[id number]]  (Normal) Collected: 04/04/25 0314    Specimen: Blood Updated: 04/04/25 0401     Magnesium 2.0 mg/dL     Basic Metabolic Panel [[id number]]  (Abnormal) Collected: 04/04/25 0314    Specimen: Blood Updated: 04/04/25 0401     Glucose 100 mg/dL      BUN 11 mg/dL      Creatinine 0.78 mg/dL      Sodium 143 mmol/L      Potassium 3.8 mmol/L      Chloride 107 mmol/L      CO2 25.5 mmol/L      Calcium 9.0 mg/dL      BUN/Creatinine Ratio 14.1     Anion Gap 10.5  mmol/L      eGFR 98.4 mL/min/1.73     Narrative:      GFR Categories in Chronic Kidney Disease (CKD)      GFR Category          GFR (mL/min/1.73)    Interpretation  G1                     90 or greater         Normal or high (1)  G2                      60-89                Mild decrease (1)  G3a                   45-59                Mild to moderate decrease  G3b                   30-44                Moderate to severe decrease  G4                    15-29                Severe decrease  G5                    14 or less           Kidney failure          (1)In the absence of evidence of kidney disease, neither GFR category G1 or G2 fulfill the criteria for CKD.    eGFR calculation 2021 CKD-EPI creatinine equation, which does not include race as a factor    [facility] PCR - Swab, Axilla Right, Axilla Left and Groin [[id number]] Collected: 04/03/25 0627    Specimen: Swab from Axilla Right, Axilla Left and Groin Updated: 04/03/25 1147    Basic Metabolic Panel [[id number]]  (Abnormal) Collected: 04/03/25 0525    Specimen: Blood Updated: 04/03/25 0605     Glucose 167 mg/dL      BUN 11 mg/dL      Creatinine 0.64 mg/dL      Sodium 142 mmol/L      Potassium 3.4 mmol/L      Chloride 107 mmol/L      CO2 23.6 mmol/L      Calcium 9.2 mg/dL      BUN/Creatinine Ratio 17.2     Anion Gap 11.4 mmol/L      eGFR 104.4 mL/min/1.73     Narrative:      GFR Categories in Chronic Kidney Disease (CKD)      GFR Category          GFR (mL/min/1.73)    Interpretation  G1                     90 or greater         Normal or high (1)  G2                      60-89                Mild decrease (1)  G3a                   45-59                Mild to moderate decrease  G3b                   30-44                Moderate to severe decrease  G4                    15-29                Severe decrease  G5                    14 or less           Kidney failure          (1)In the absence of evidence of kidney disease, neither GFR category G1 or G2  fulfill the criteria for CKD.    eGFR calculation 2021 CKD-EPI creatinine equation, which does not include race as a factor    Magnesium [[id number]]  (Normal) Collected: 04/03/25 0525    Specimen: Blood Updated: 04/03/25 0605     Magnesium 1.9 mg/dL     CBC & Differential [[id number]]  (Abnormal) Collected: 04/03/25 0525    Specimen: Blood Updated: 04/03/25 0540    Narrative:      The following orders were created for panel order CBC & Differential.  Procedure                               Abnormality         Status                     ---------                               -----------         ------                     CBC Auto Differential[[id number]]        Abnormal            Final result                 Please view results for these tests on the individual orders.    CBC Auto Differential [[id number]]  (Abnormal) Collected: 04/03/25 0525    Specimen: Blood Updated: 04/03/25 0540     WBC 9.07 10*3/mm3      RBC 3.81 10*6/mm3      Hemoglobin 12.0 g/dL      Hematocrit 35.9 %      MCV 94.2 fL      MCH 31.5 pg      MCHC 33.4 g/dL      RDW 12.2 %      RDW-SD 42.4 fl      MPV 9.9 fL      Platelets 152 10*3/mm3      Neutrophil % 69.2 %      Lymphocyte % 24.4 %      Monocyte % 6.0 %      Eosinophil % 0.0 %      Basophil % 0.1 %      Immature Grans % 0.3 %      Neutrophils, Absolute 6.28 10*3/mm3      Lymphocytes, Absolute 2.21 10*3/mm3      Monocytes, Absolute 0.54 10*3/mm3      Eosinophils, Absolute 0.00 10*3/mm3      Basophils, Absolute 0.01 10*3/mm3      Immature Grans, Absolute 0.03 10*3/mm3      nRBC 0.0 /100 WBC             Imaging Results (Last 24 Hours)       Procedure Component Value Units Date/Time    CT Chest Without Contrast Diagnostic [[id number]] Collected: 04/03/25 2248     Updated: 04/03/25 2259    Narrative:      CT CHEST WO CONTRAST DIAGNOSTIC    Date of Exam: 4/3/2025 5:07 PM EDT    Indication: Persistent dyspnea following COVID-19 infection.    Comparison: Chest radiograph 4/2/2025. CT pulm angiogram  9/23/2024    Technique: Axial CT images were obtained of the chest without contrast administration.  Sagittal and coronal reconstructions were performed.  Automated exposure control and iterative reconstruction methods were used.      Findings:  Heart size appears within normal limits. No pericardial effusion. There appears to be advanced calcific atherosclerosis with left coronary artery stents. There is calcific atherosclerosis of the aorta and aortic arch vessels. Ascending aorta appears to   measure up to 4.4 cm in diameter. No definite acute aortic abnormality. No acute esophageal abnormality. No definite thoracic lymphadenopathy by CT size criteria. Small calcified mediastinal and hilar lymph nodes, presumed related to remote granulomatous   disease. Small cyst at the medial right renal upper pole. Evaluation of the upper abdomen is limited without contrast. No acute abnormality is identified.    No pleural effusion. No pneumothorax. There appears to be mild emphysema. Linear opacity in the lateral-inferior right upper lobe, as before, likely scarring. There is some new dependent opacities with mild subpleural consolidation in the right lower   lobe and some linear opacities in the lung bases suggestive of atelectasis. No other definite infiltrate is identified.    Remote bilateral healed rib fractures. Mild chronic compression deformity at the L1 superior endplate.      Impression:      Impression:  1.Findings suggestive of mild atelectasis in the lower lungs without other definite acute pulmonary abnormality identified at this time  2.Mild emphysema.  3.Advanced calcific atherosclerosis with left coronary artery stents.  4.Ascending aorta appears to measure up to 4.4 cm in diameter.          Electronically Signed: Jesus Anders    4/3/2025 10:57 PM EDT    Workstation ID: [id number]            LAB RESULTS (LAST 7 DAYS)    CBC  Results from last 7 days   Lab Units 04/04/25  0314 04/03/25  0525 04/02/25  0905    WBC 10*3/mm3 8.56 9.07 7.15   RBC 10*6/mm3 3.69* 3.81* 4.02*   HEMOGLOBIN g/dL 11.7* 12.0* 12.7*   HEMATOCRIT % 36.6* 35.9* 38.3   MCV fL 99.2* 94.2 95.3   PLATELETS 10*3/mm3 142 152 143       BMP  Results from last 7 days   Lab Units 04/04/25 0314 04/03/25 0525 04/02/25  0905   SODIUM mmol/L 143 142 143   POTASSIUM mmol/L 3.8 3.4* 3.7   CHLORIDE mmol/L 107 107 106   CO2 mmol/L 25.5 23.6 24.2   BUN mg/dL 11 11 12   CREATININE mg/dL 0.78 0.64* 0.73*   GLUCOSE mg/dL 100* 167* 111*   MAGNESIUM mg/dL 2.0 1.9  --        CMP   Results from last 7 days   Lab Units 04/04/25 0314 04/03/25 0525 04/02/25  0905   SODIUM mmol/L 143 142 143   POTASSIUM mmol/L 3.8 3.4* 3.7   CHLORIDE mmol/L 107 107 106   CO2 mmol/L 25.5 23.6 24.2   BUN mg/dL 11 11 12   CREATININE mg/dL 0.78 0.64* 0.73*   GLUCOSE mg/dL 100* 167* 111*   ALBUMIN g/dL  --   --  4.1   BILIRUBIN mg/dL  --   --  0.3   ALK PHOS U/L  --   --  117   AST (SGOT) U/L  --   --  20   ALT (SGPT) U/L  --   --  9       BNP        TROPONIN  Results from last 7 days   Lab Units 04/02/25  1030   HSTROP T ng/L 13       CoAg        Creatinine Clearance  Estimated Creatinine Clearance: 106.1 mL/min (by C-G formula based on SCr of 0.78 mg/dL).    ABG        Radiology  CT Chest Without Contrast Diagnostic  Result Date: 4/3/2025  Impression: 1.Findings suggestive of mild atelectasis in the lower lungs without other definite acute pulmonary abnormality identified at this time 2.Mild emphysema. 3.Advanced calcific atherosclerosis with left coronary artery stents. 4.Ascending aorta appears to measure up to 4.4 cm in diameter. Electronically Signed: Jesus Anders  4/3/2025 10:57 PM EDT  Workstation ID: [id number]    XR Chest 1 View  Result Date: 4/2/2025  Impression: No acute cardiopulmonary abnormality. Linear scarring within the lateral right midlung. Electronically Signed: Deepak Sandoval  4/2/2025 8:58 AM EDT  Workstation ID: [id number]        EKG  I personally viewed and interpreted the  patient's EKG/Telemetry data:  ECG 12 Lead ED Triage Standing Order; Chest Pain   Final Result   HEART RATE=61  bpm   RR Rnwkqoen=039  ms   AR Erfijksv=738  ms   P Horizontal Axis=35  deg   P Front Axis=-14  deg   QRSD Uzkeddqr=671  ms   QT Znkklhsa=866  ms   GZvF=419  ms   QRS Axis=1  deg   T Wave Axis=51  deg   - NORMAL ECG -   Sinus rhythm   When compared with ECG of 18-Mar-2025 12:18:57,   No significant change   Electronically Signed By: Nathaniel Kennedy (TYRONE) 2025-04-03 06:44:17   Date and Time of Study:2025-04-02 08:36:02      Telemetry Scan   Final Result      Telemetry Scan   Final Result      Telemetry Scan   Final Result      Telemetry Scan   Final Result      Telemetry Scan   Final Result      Telemetry Scan   Final Result      Telemetry Scan   Final Result      Telemetry Scan   Final Result      Telemetry Scan   Final Result      Telemetry Scan   Final Result            Echocardiogram:    Results for orders placed during the hospital encounter of 09/23/24    Adult Transthoracic Echo Complete W/ Cont if Necessary Per Protocol    Interpretation Summary    Left ventricular systolic function is normal. Calculated left ventricular EF = 53% Left ventricular ejection fraction appears to be 51 - 55%.    Left ventricular diastolic function is consistent with (grade I) impaired relaxation. Average GLS -14.9%.    The left atrial cavity is dilated.    Estimated right ventricular systolic pressure from tricuspid regurgitation is normal (<35 mmHg).    No significant valvular abnormalities noted.        Stress Test:  Results for orders placed during the hospital encounter of 04/02/25    Stress Test With Myocardial Perfusion One Day    Interpretation Summary    Findings consistent with a normal ECG stress test.    Left ventricular ejection fraction is normal (Calculated EF = 62%).    Improved perfusion noted on stress images.    Myocardial perfusion imaging indicates a normal myocardial perfusion study with no evidence of  ischemia. Impressions are consistent with a low risk study.         Cardiac Catheterization:  Results for orders placed during the hospital encounter of 02/26/24    Cardiac Catheterization/Vascular Study    Conclusion  PROCEDURES PERFORMED  Ultrasound guided Vascular access  Right heart catheterization  Left Heart Catheterization  Coronary Angiogram  Percutaneous coronary intervention with drug-eluting stent placement  Intravascular ultrasound  Moderate sedation    INDICATIONS FOR PROCEDURE  Positive stress test, LEUNG    PROCEDURE IN DETAIL  Informed consent was obtained from the patient after explaining the risks, benefits, and alternative options of the procedure. After obtaining informed consent, the patient was brought to the cath lab and was prepped in a sterile fashion.Lidocaine 2% was used for local anesthesia into the right IJ access site. Right IJ vein was accessed using the micropuncture needle under ultrasound guidance and micropuncture wire advanced under flouroscopy. A 7 [place] vascular sheath was put into place percutaneously over guide-wire. Guide wires were removed. A 6Fr swan john catheter was advanced to wedge position. RA, RV and PA and wedge pressures were recorded.  PA sat and arterial sats recorded.    Lidocaine 2% was used for local anesthesia into the right  radial access site. The right  radial artery was accessed with a micropuncture needle via modified Seldinger technique under ultrasound guidance. A 6F introducer sheath was inserted successfully. Afterwards, 6F JR4 and JL3.5 diagnostic catheters were advanced over a wire into the ascending aorta and were used to engage the ostia of the left main and RCA respectively. JR4 used to cross the AV and obtain LV pressures and gradient across the AV measured via pullback technique. Images of the right and left coronary systems were obtained.    HEMODYNAMICS  LV: 156/8/16  AO: 149/79/107  Gradient 7 mmHg    RH HEMODYNAMICS:  RA 9  RV 29/10/12  PA  29/10/23  PCW 11    AO Sat 100%  PA Sat 77%    [facility] 6.17    [facility] CI 2.93    FINDINGS  Coronary Angiogram    Right dominant circulation    Left main: Left main is a large caliber vessel which gives rise to the Left Anterior Descending and the Left circumflex.  Left main coronary artery is angiographically free from any significant disease    Left Anterior Descending Artery: LAD is a medium caliber vessel which gives rise to several septal perforators and several diagonal branches.  There is an 80% calcified lesion in the proximal LAD at the takeoff of a large D1 branch which further bifurcates.  The ostium of D1 is free from disease.  This was followed by a 70% lesion in the mid LAD.  There is heavy calcification in the entire LAD.    Left Circumflex: Left circumflex artery runs along the AV groove and gives obtuse marginal branches.  It has moderate calcification with 30 to 40% stenosis in the midsegment.    Right Coronary Artery: The RCA is a large caliber vessel gives rise to PDA and PLV.  There is 20% stenosis in the proximal segment    Percutaneous coronary intervention:  100 units/kg of heparin was administered and ACT of more than 250 was documented.  6 [place] XB LAD 3.5 guide was used to engage the left main.  Run-through wire was advanced to the distal LAD.  IVUS of the LAD showed a distal reference vessel diameter of 3.6 mm with heavy concentric calcification in the mid to proximal LAD.  Proximal reference vessel diameter was 4.6 mm.  We then predilated the LAD with an NC 2.5 x 12 mm balloon.  It was then stented with a Xience 3.5 x 18 mm stent followed by a Xience 4.0 x 38 mm stent and postdilated with an NC 4.5 x 12 mm balloon.      Preprocedure stenosis: 80%  preprocedure ALEXI flow: 3  Lesion type: C  Postprocedure stenosis: 0%  Post procedure ALEXI flow: 3      All the catheters were exchanged over a wire and subsequently removed.    Radial sheath was removed and a TR band was applied with 15 cc of  air to achieve hemostasis.        ESTIMATED BLOOD LOSS:  25 ml    COMPLICATIONS:  None    PROCEDURE DATA:  Contrast Used: 100 ml  Sedation Time:  57 minutes    IMPRESSIONS  One-vessel obstructive CAD of the proximal LAD status post successful IVUS guided PCI  No evidence of pulmonary hypertension  Normal left and right heart filling pressures    RECOMMENDATIONS  -Dual antiplatelet therapy  -Beta-blocker and high intensity statin  -ACE inhibitor if blood pressure tolerates  -Referral to cardiac rehab  -Continue aggressive risk factor stratification and modification  -Smoking cessation         Other:         ASSESSMENT & PLAN:    Principal Problem:    Chest tightness  Active Problems:    History of CVA (cerebrovascular accident)    Primary hypertension    Mixed hyperlipidemia    Parkinson disease    Anxiety    Gastroesophageal reflux disease    Depression    Former smoker    Shortness of breath    COPD (chronic obstructive pulmonary disease)    Coronary artery disease of native artery of native heart with stable angina pectoris    Benign prostatic hyperplasia with lower urinary tract symptoms    PVD (peripheral vascular disease) with claudication    Obstructive sleep apnea syndrome    Obesity (BMI 30-39.9)      Chest pain/tightness  Coronary artery disease involving native coronary artery of native heart without angina pectoris   PCI to LAD with jailing of diagonal vessel  Continue DAPT, high intensity statin and beta blocker   ECG with no acute ischemic changes  High-sensitivity troponin is negative   Nuclear stress test is negative for ischemia with improved perfusion and stress images  Started Imdur  Will add amlodipine as outpatient as blood pressure allows     Shortness of breath  Recently ruled out for PE  proBNP is normal  Echocardiogram shows preserved LV function, grade 1 diastolic dysfunction  Continue bronchodilators  Appears euvolemic  Supplemental oxygen.  2 L as determined by 6-minute walk test      Primary hypertension, chronic  Continue Coreg and Imdur  Will consider adding amlodipine as outpatient if blood pressure allows     Mixed hyperlipidemia  Continue atorvastatin  Goal LDL <70  LDL 46  A1c is 6     PVD (peripheral vascular disease) with claudication  Continue DAPT and statin     History of CVA (cerebrovascular accident)  Continue DAPT and statin     Parkinson's disease, unspecified whether dyskinesia present, unspecified whether manifestations fluctuate  On baclofen, Sinemet CR, clonazepam, gabapentin, and Effexor XR  Could be contributing to generalized slowing and weakness     Tobacco abuse  Mucopurulent chronic bronchitis  He no longer smokes  COPD may be contributing to shortness of breath.  Continue bronchodilators   Requesting nocturnal oxygen prescription  Follow-up with pulmonology     Benign prostatic hyperplasia with urinary hesitancy  Continue Flomax      Obesity  BMI is 30.  He weighs 201 pounds.  Lifestyle modifications recommended to the patient.  Recommended dietary changes and exercise    Okay to discharge from cardiac standpoint    Nomi Cunningham MD  04/04/25  05:09 EDT

## 2025-04-04 NOTE — THERAPY EVALUATION
Patient Name: Andrew Flores  : 1960    MRN: [id number]                              Today's Date: 2025       Admit Date: 2025    Visit Dx:     ICD-10-CM ICD-9-CM   1. Dyspnea, unspecified type  R06.00 786.09   2. Chest tightness  R07.89 786.59     Patient Active Problem List   Diagnosis    Anxiety    Gastroesophageal reflux disease    Hypogonadism    Depression    Male erectile disorder    Testosterone deficiency    Bruised rib, left, initial encounter    History of CVA (cerebrovascular accident)    Primary hypertension    Mixed hyperlipidemia    Diffuse non-Hodgkin's lymphoma of testis    Neuropathy    Parkinson disease    RBD (REM behavioral disorder)    Balance problem    Screening PSA (prostate specific antigen)    Uncomplicated alcohol dependence    Apathy    Hx of radiation therapy    History of chemotherapy    Chronic low back pain without sciatica    Dextroscoliosis    Compression fracture of L1 lumbar vertebra    Former smoker    Shortness of breath    Constipation    Pain of right lower extremity    COPD (chronic obstructive pulmonary disease)    Memory loss    Coronary artery disease of native artery of native heart with stable angina pectoris    Status post coronary artery stent placement    Benign prostatic hyperplasia with lower urinary tract symptoms    PVD (peripheral vascular disease) with claudication    Chest tightness    Levodopa-induced dyskinesia    Dysarthria    Obstructive sleep apnea syndrome    Obesity (BMI 30-39.9)     Past Medical History:   Diagnosis Date    Anxiety 2012    Benign prostatic hyperplasia with lower urinary tract symptoms 2024    Borderline diabetes     COPD (chronic obstructive pulmonary disease)     Coronary artery disease     To the best of my memory    COVID-19 2025    Cytokine release syndrome, grade 1 2025    Deep vein thrombosis 2015    Blood clots    Depression 2016    Diffuse non-Hodgkin's lymphoma of testis  03/06/2018    Duodenitis 01/27/2022    Dysphagia 08/2022    from parkinsons    Erosive gastritis 01/27/2022    Esophageal stricture 01/27/2022    Fall 07/25/2019    Food impaction of esophagus 01/27/2022    Gastroesophageal reflux disease 09/04/2012    History of chemotherapy 04/25/2021    Hx of radiation therapy 04/25/2021    Intertrochanteric fracture of left femur, closed, initial encounter 08/25/2024    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    Mixed hyperlipidemia 07/08/2019    Myocardial infarction 2017    Non-Hodgkin's lymphoma of testis     Parkinson disease     Pneumonia of right upper lobe due to infectious organism 12/04/2022    Poor historian     Primary hypertension 07/08/2019    PVD (peripheral vascular disease) with claudication 09/20/2024    Shortness of breath     Sleep apnea     does not have a cpap    Status post coronary artery stent placement 02/26/2024    Stroke 2017    Testosterone deficiency 07/25/2019    Tick bite 05/20/2021    Tobacco abuse 10/13/2021    Ulcer of esophagus without bleeding 01/27/2022    Uncomplicated alcohol dependence 04/25/2021     Past Surgical History:   Procedure Laterality Date    CARDIAC CATHETERIZATION N/A 2/26/2024    Procedure: Left and Right Heart Cath with Coronary Angiography;  Surgeon: Lelo Tello MD;  Location: [facility] CATH INVASIVE LOCATION;  Service: Cardiology;  Laterality: N/A;    CARDIAC CATHETERIZATION N/A 2/26/2024    Procedure: Percutaneous Coronary Intervention;  Surgeon: Lelo Tello MD;  Location: [facility] CATH INVASIVE LOCATION;  Service: Cardiology;  Laterality: N/A;    ENDOSCOPY N/A 01/27/2022    Procedure: ESOPHAGOGASTRODUODENOSCOPY with foreign body removal with gastric, duodenal biopsy and GE junction biopsy;  Surgeon: Pedro De La Rosa MD;  Location: [facility] ENDOSCOPY;  Service: Gastroenterology;  Laterality: N/A;  post: foreign body removal, erosive gastritis with biopsy, erosive eduodenitis with biopsy, esophagitis, GE junction biopsy  to rule out malignancy,     ENDOSCOPY N/A 08/23/2022    Procedure: ESOPHAGOGASTRODUODENOSCOPY with GE junction biopsy R/O Barretts, esophageal dilation #48 bougie;  Surgeon: Alexa Subramanian MD;  Location: [facility] ENDOSCOPY;  Service: Gastroenterology;  Laterality: N/A;  duodenal ulcer, GERD,     HIP TROCHANTERIC NAILING WITH INTRAMEDULLARY HIP SCREW Left 8/26/2024    Procedure: Left hip/femur fracture fixation with intramedullary ajit and screws;  Surgeon: Kameron Kaminski MD;  Location: [facility] MAIN OR;  Service: Orthopedics;  Laterality: Left;    INTERVENTIONAL RADIOLOGY PROCEDURE N/A 2/26/2024    Procedure: Intravascular Ultrasound;  Surgeon: Lelo Tello MD;  Location: [facility] CATH INVASIVE LOCATION;  Service: Cardiology;  Laterality: N/A;    ORCHIECTOMY  2019      General Information       Row Name 04/04/25 1139          Physical Therapy Time and Intention    Document Type evaluation  -OD     Mode of Treatment physical therapy  -OD       Row Name 04/04/25 1139          General Information    Patient Profile Reviewed yes  -OD     Prior Level of Function independent:;min assist:;ADL's;all household mobility  pt sleeps in hospital bed and has supportive DME: RW, cane, shower bench.  -OD     Existing Precautions/Restrictions fall  -OD     Barriers to Rehab medically complex;previous functional deficit  -OD       Row Name 04/04/25 1139          Living Environment    People in Home child(elvie), adult;other (see comments)  daughter  -OD       Row Name 04/04/25 1139          Home Main Entrance    Number of Stairs, Main Entrance four  -OD       Row Name 04/04/25 1139          Stairs Within Home, Primary    Stairs, Within Home, Primary pt resides on main floor  -OD       Row Name 04/04/25 1139          Cognition    Orientation Status (Cognition) oriented x 4  -OD       Row Name 04/04/25 1139          Safety Issues/Impairments Affecting Functional Mobility    Impairments Affecting Function (Mobility)  balance;endurance/activity tolerance;grasp;motor control;muscle tone abnormal;shortness of breath;strength;postural/trunk control  -OD               User Key  (r) = Recorded By, (t) = Taken By, (c) = Cosigned By      Initials Name Provider Type    OD Estela Lo PT Physical Therapist                   Mobility       Row Name 04/04/25 1141          Bed Mobility    Bed Mobility bed mobility (all) activities  -OD     All Activities, Muddy (Bed Mobility) minimum assist (75% patient effort)  -OD     Assistive Device (Bed Mobility) head of bed elevated  -OD     Comment, (Bed Mobility) Pt demonstrates significant rigidity BUE/BLE limiting his ability to perform bed mobility independently  -OD       Row Name 04/04/25 1141          Bed-Chair Transfer    Bed-Chair Muddy (Transfers) minimum assist (75% patient effort)  -OD     Assistive Device (Bed-Chair Transfers) cane, straight  -OD       Row Name 04/04/25 1141          Sit-Stand Transfer    Sit-Stand Muddy (Transfers) minimum assist (75% patient effort)  -OD       Row Name 04/04/25 1141          Gait/Stairs (Locomotion)    Muddy Level (Gait) minimum assist (75% patient effort)  -OD     Assistive Device (Gait) cane, straight  -OD     Patient was able to Ambulate yes  -OD     Distance in Feet (Gait) 8  -OD     Deviations/Abnormal Patterns (Gait) festinating/shuffling;stride length decreased;base of support, narrow  -OD     Bilateral Gait Deviations forward flexed posture  -OD               User Key  (r) = Recorded By, (t) = Taken By, (c) = Cosigned By      Initials Name Provider Type    OD Estela Lo PT Physical Therapist                   Obj/Interventions       Row Name 04/04/25 1143          Range of Motion Comprehensive    General Range of Motion neck/trunk range of motion deficits identified;upper extremity range of motion deficits identified;lower extremity range of motion deficits identified  -OD     Comment, General Range of Motion  Limited mostly due to global rigidity  -OD       Row Name 04/04/25 1143          Strength Comprehensive (MMT)    General Manual Muscle Testing (MMT) Assessment lower extremity strength deficits identified  -OD     Comment, General Manual Muscle Testing (MMT) Assessment BLE grossly weak 3/5  -OD       Row Name 04/04/25 1143          Motor Skills    Motor Skills functional endurance;muscle tone  -OD     Functional Endurance poor; uncomfortable breathing with just sitting EOB  -OD     Muscle Tone bilateral;upper extremity(s);lower extremity(s);leadpipe rigidity;moderate impairment  -OD       Row Name 04/04/25 1143          Balance    Balance Assessment sitting static balance;sitting dynamic balance;standing static balance  -OD     Static Sitting Balance contact guard  -OD     Dynamic Sitting Balance minimal assist  -OD     Static Standing Balance minimal assist  -OD     Balance Interventions sitting;standing;moderate challenge  -OD       Row Name 04/04/25 1143          Sensory Assessment (Somatosensory)    Sensory Assessment (Somatosensory) sensation intact  -OD               User Key  (r) = Recorded By, (t) = Taken By, (c) = Cosigned By      Initials Name Provider Type    OD Estela Lo, PT Physical Therapist                   Goals/Plan       Row Name 04/04/25 1154          Bed Mobility Goal 1 (PT)    Activity/Assistive Device (Bed Mobility Goal 1, PT) bed mobility activities, all  -OD     [place] Level/Cues Needed (Bed Mobility Goal 1, PT) independent  -OD     Time Frame (Bed Mobility Goal 1, PT) long term goal (LTG);2 weeks  -OD       Row Name 04/04/25 1154          Transfer Goal 1 (PT)    Activity/Assistive Device (Transfer Goal 1, PT) transfers, all;walker, rolling  -OD     [place] Level/Cues Needed (Transfer Goal 1, PT) modified independence  -OD     Time Frame (Transfer Goal 1, PT) long term goal (LTG);2 weeks  -OD       Row Name 04/04/25 1154          Gait Training Goal 1 (PT)     Activity/Assistive Device (Gait Training Goal 1, PT) gait (walking locomotion);assistive device use;walker, rolling  -OD     [place] Level (Gait Training Goal 1, PT) modified independence  -OD     Distance (Gait Training Goal 1, PT) 40'  -OD     Time Frame (Gait Training Goal 1, PT) long term goal (LTG);2 weeks  -OD       Row Name 04/04/25 1157          Balance Goal 1 (PT)    Activity/Assistive Device (Balance Goal) standing static balance;standing dynamic balance;walker, rolling  -OD     [place] Level/Cues Needed (Balance Goal 1, PT) modified independence  -OD     Time Frame (Balance Goal 1, PT) long-term goal (LTG);2 weeks  -OD       Row Name 04/04/25 1156          Therapy Assessment/Plan (PT)    Planned Therapy Interventions (PT) balance training;bed mobility training;gait training;home exercise program;neuromuscular re-education;ROM (range of motion);stair training;strengthening;stretching;patient/family education;postural re-education;transfer training  -OD               User Key  (r) = Recorded By, (t) = Taken By, (c) = Cosigned By      Initials Name Provider Type    OD Estela Lo, PT Physical Therapist                   Clinical Impression       Row Name 04/04/25 1148          Pain    Pain Side/Orientation generalized  -OD     Pre/Posttreatment Pain Comment Pain r/t uncomfortable breathing  -OD     Additional Documentation Pain Scale: FACES Pre/Post-Treatment (Group)  -OD       Row Name 04/04/25 0722          Pain Scale: FACES Pre/Post-Treatment    Pain: FACES Scale, Pretreatment 2-->hurts little bit  -OD     Posttreatment Pain Rating 2-->hurts little bit  -OD       Row Name 04/04/25 1140          Plan of Care Review    Plan of Care Reviewed With patient;child  -OD     Progress no change  -OD     Outcome Evaluation Rex Flores is a 63 y/o M with PMH of Parkinson's Disease who was admitted to [facility] on 4/2/25 for SOA and chest tightness. Of note, pt with recent hospitalization for COPD exacerbation  and COVID-19. HS troponin 16. EKG normal. Stress test (-). At baseline, pt lives with his daughter in a 2SH with 4STE, where he resides on the main floor with adequate supportive DME. Pt this date demonstrates significant functional deficits presenting below baseline, putting him at increaesd risk for falls. Pt presents with lobal leadpipe rigidity, poor balance and postural awareness, poor fine motor control limiting ability to toilet independently, and enhanced Parkinsonian gait deficits. Pt required Deirdre throughout for balance support, transfers, and ambulation. Pt had accident while urinating due to poor fine motor control and was maxA for upper/lower body dressing. Due to patient's acute exacerbation of Parkinson's Disease in addition to worsening respiratory problems, patient would benefit from acute Inpatient Rehab to address aforementioned deficits related to PT/OT/SLP. Discussed with patient and family, who desire home with home health PT, with transition to Outpatient when able. Edu pt and daughter regarding exercise recommendations for people with PT to slow progression of disease, and provided with handout of community resources. Recommend U-step walker, as research shows improved safety outcomes for people with PD. Pt likely discharging today, but will keep on caseload in case patient remains admitted.  -OD       Row Name 04/04/25 1147          Therapy Assessment/Plan (PT)    Rehab Potential (PT) good  -OD     Criteria for Skilled Interventions Met (PT) yes;meets criteria  -OD     Therapy Frequency (PT) 3 times/wk  -OD     Predicted Duration of Therapy Intervention (PT) until d/c  -OD       Row Name 04/04/25 1147          Vital Signs    O2 Delivery Pre Treatment nasal cannula  2L  -OD     Intra SpO2 (%) 94  -OD     O2 Delivery Intra Treatment room air  -OD     Post SpO2 (%) 96  -OD     O2 Delivery Post Treatment nasal cannula  -OD     Pre Patient Position Supine  -OD     Intra Patient Position Standing   -OD     Post Patient Position Sitting  -OD       Row Name 04/04/25 1147          Positioning and Restraints    Pre-Treatment Position in bed  -OD     Post Treatment Position chair  -OD     In Chair notified nsg;reclined;call light within reach;encouraged to call for assist;exit alarm on;with family/caregiver;with other staff  -OD               User Key  (r) = Recorded By, (t) = Taken By, (c) = Cosigned By      Initials Name Provider Type    Estela Purcell, MOHINI Physical Therapist                   Outcome Measures       Row Name 04/04/25 1154          How much help from another person do you currently need...    Turning from your back to your side while in flat bed without using bedrails? 3  -OD     Moving from lying on back to sitting on the side of a flat bed without bedrails? 3  -OD     Moving to and from a bed to a chair (including a wheelchair)? 3  -OD     Standing up from a chair using your arms (e.g., wheelchair, bedside chair)? 3  -OD     Climbing 3-5 steps with a railing? 3  -OD     To walk in hospital room? 3  -OD     AM-PAC 6 Clicks Score (PT) 18  -OD     Highest Level of Mobility Goal 6 --> Walk 10 steps or more  -OD       Row Name 04/04/25 1154          Functional Assessment    Outcome Measure Options AM-PAC 6 Clicks Basic Mobility (PT)  -OD               User Key  (r) = Recorded By, (t) = Taken By, (c) = Cosigned By      Initials Name Provider Type    Estela Purcell PT Physical Therapist                                 Physical Therapy Education       Title: PT OT SLP Therapies (Done)       Topic: Physical Therapy (Done)       Point: Mobility training (Done)       Learning Progress Summary            Patient Acceptance, E, VU by JIM at 4/4/2025 1155    Acceptance, E, NR by ARNOLD at 4/3/2025 0207   Family Acceptance, E, VU by OD at 4/4/2025 1155                      Point: Home exercise program (Done)       Learning Progress Summary            Patient Acceptance, E, VU by JIM at 4/4/2025 1155   Family  Acceptance, E, VU by OD at 4/4/2025 1155                      Point: Body mechanics (Done)       Learning Progress Summary            Patient Acceptance, E, VU by OD at 4/4/2025 1155   Family Acceptance, E, VU by OD at 4/4/2025 1155                      Point: Precautions (Done)       Learning Progress Summary            Patient Acceptance, E, VU by OD at 4/4/2025 1155   Family Acceptance, E, VU by OD at 4/4/2025 1155                                      User Key       Initials Effective Dates Name Provider Type Discipline    OD 05/11/23 -  Estela Lo, PT Physical Therapist PT    ARNOLD 07/17/24 -  Kim Flores LPN Licensed Nurse Nurse                  PT Recommendation and Plan  Planned Therapy Interventions (PT): balance training, bed mobility training, gait training, home exercise program, neuromuscular re-education, ROM (range of motion), stair training, strengthening, stretching, patient/family education, postural re-education, transfer training  Progress: no change  Outcome Evaluation: Rex Flores is a 65 y/o M with PMH of Parkinson's Disease who was admitted to [facility] on 4/2/25 for SOA and chest tightness. Of note, pt with recent hospitalization for COPD exacerbation and COVID-19. HS troponin 16. EKG normal. Stress test (-). At baseline, pt lives with his daughter in a 2SH with 4STE, where he resides on the main floor with adequate supportive DME. Pt this date demonstrates significant functional deficits presenting below baseline, putting him at increaesd risk for falls. Pt presents with lobal leadpipe rigidity, poor balance and postural awareness, poor fine motor control limiting ability to toilet independently, and enhanced Parkinsonian gait deficits. Pt required Deirdre throughout for balance support, transfers, and ambulation. Pt had accident while urinating due to poor fine motor control and was maxA for upper/lower body dressing. Due to patient's acute exacerbation of Parkinson's Disease in addition to  worsening respiratory problems, patient would benefit from acute Inpatient Rehab to address aforementioned deficits related to PT/OT/SLP. Discussed with patient and family, who desire home with home health PT, with transition to Outpatient when able. Edu pt and daughter regarding exercise recommendations for people with PT to slow progression of disease, and provided with handout of community resources. Recommend U-step walker, as research shows improved safety outcomes for people with PD. Pt likely discharging today, but will keep on caseload in case patient remains admitted.     Time Calculation:         PT Charges       Row Name 04/04/25 1155             Time Calculation    Start Time 0925  -OD      Stop Time 1029  including time preparing handouts ~ 8 minutes from [id number]  -OD      Time Calculation (min) 64 min  -OD      PT Received On 04/04/25  -OD      PT - Next Appointment 04/05/25  -OD      PT Goal Re-Cert Due Date 04/18/25  -OD         Time Calculation- PT    Total Timed Code Minutes- PT 0 minute(s)  -OD                User Key  (r) = Recorded By, (t) = Taken By, (c) = Cosigned By      Initials Name Provider Type    OD Estela Lo, PT Physical Therapist                  Therapy Charges for Today       Code Description Service Date Service Provider Modifiers Qty    23735180821 HC PT EVAL MOD COMPLEXITY 4 4/4/2025 Estela Lo, PT GP 1    93096322201 HC CAREGIVER TRAINING STRATEGIES & TQ 1ST 30 MINUTES 4/4/2025 Estela Lo, PT  1            PT G-Codes  Outcome Measure Options: AM-PAC 6 Clicks Basic Mobility (PT)  AM-PAC 6 Clicks Score (PT): 18  PT Discharge Summary  Anticipated Discharge Disposition (PT): inpatient rehabilitation facility    Estela Lo PT  4/4/2025

## 2025-04-04 NOTE — DISCHARGE SUMMARY
[facility]    Larry Mireles PA-C    CHIEF COMPLAINT:     Chest pain and dyspnea    HISTORY OF PRESENT ILLNESS:    Obtained from admitting physician HPI on 4/2/2025  History of present illness 64-year-old gentleman history of COPD and multiple health problems presented hospital and just discharged on the 20th after COPD and COVID-19.  The patient states that last couple days he has had some increasing shortness of breath and tightness in the chest no neck arm jaw pain.  Worse with exertion better with rest no fever chills sweats cough congestion leg pain or swelling no recent long car ride plane ride he did have a recent hospitalization.  No other change in medications.  Patient reports that he has had a previous heart attack.    04/02/25 (postobservation admission):  Patient confirms the HPI noted above reporting approximately 3-day history of some chest pain and dyspnea specifically dyspnea with exertion which has become increasingly worse with each day.  He was recently diagnosed with COVID-19 infection though his cough and fever has subsequently resolved and he denies any palpitations, peripheral edema.  Significant fatigue/weakness has been noted and patient reports he has trouble holding himself up in bed.  He also notes some darkening of his urine and increased frequency.  Chest pain is described as a tightness also made worse with exertion.  He is compliant with all of his outpatient medical therapies and notes that he has recently completed a course of steroids secondary to COVID-19 infection but denies any other recent medication changes.    4/3/2025:  Patient reports persistent dyspnea and notes that he was restless overnight.  He continues to feel concerned regarding his ability to perform ADLs and reports that he does not feel ready for discharge in spite of reassuring cardiac results.  He is pending further respiratory and PT evaluation    4/4/2025:  Patient continues to  report being significantly fatigued and having episodes of wheezing that do improve with breathing treatments.  No other new or acute symptoms are noted          Past Medical History:   Diagnosis Date    Anxiety 09/04/2012    Benign prostatic hyperplasia with lower urinary tract symptoms 02/27/2024    Borderline diabetes     COPD (chronic obstructive pulmonary disease)     Coronary artery disease 2015    To the best of my memory    COVID-19 03/18/2025    Cytokine release syndrome, grade 1 03/20/2025    Deep vein thrombosis 2015    Blood clots    Depression 07/07/2016    Diffuse non-Hodgkin's lymphoma of testis 03/06/2018    Duodenitis 01/27/2022    Dysphagia 08/2022    from parkinsons    Erosive gastritis 01/27/2022    Esophageal stricture 01/27/2022    Fall 07/25/2019    Food impaction of esophagus 01/27/2022    Gastroesophageal reflux disease 09/04/2012    History of chemotherapy 04/25/2021    Hx of radiation therapy 04/25/2021    Intertrochanteric fracture of left femur, closed, initial encounter 08/25/2024    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    Mixed hyperlipidemia 07/08/2019    Myocardial infarction 2017    Non-Hodgkin's lymphoma of testis     Parkinson disease     Pneumonia of right upper lobe due to infectious organism 12/04/2022    Poor historian     Primary hypertension 07/08/2019    PVD (peripheral vascular disease) with claudication 09/20/2024    Shortness of breath     Sleep apnea     does not have a cpap    Status post coronary artery stent placement 02/26/2024    Stroke 2017    Testosterone deficiency 07/25/2019    Tick bite 05/20/2021    Tobacco abuse 10/13/2021    Ulcer of esophagus without bleeding 01/27/2022    Uncomplicated alcohol dependence 04/25/2021     Past Surgical History:   Procedure Laterality Date    CARDIAC CATHETERIZATION N/A 2/26/2024    Procedure: Left and Right Heart Cath with Coronary Angiography;  Surgeon: Lelo Tello MD;  Location: [facility] INVASIVE LOCATION;   Service: Cardiology;  Laterality: N/A;    CARDIAC CATHETERIZATION N/A 2/26/2024    Procedure: Percutaneous Coronary Intervention;  Surgeon: Lelo Tello MD;  Location: [facility] CATH INVASIVE LOCATION;  Service: Cardiology;  Laterality: N/A;    ENDOSCOPY N/A 01/27/2022    Procedure: ESOPHAGOGASTRODUODENOSCOPY with foreign body removal with gastric, duodenal biopsy and GE junction biopsy;  Surgeon: Pedro De La Rosa MD;  Location: [facility] ENDOSCOPY;  Service: Gastroenterology;  Laterality: N/A;  post: foreign body removal, erosive gastritis with biopsy, erosive eduodenitis with biopsy, esophagitis, GE junction biopsy to rule out malignancy,     ENDOSCOPY N/A 08/23/2022    Procedure: ESOPHAGOGASTRODUODENOSCOPY with GE junction biopsy R/O Barretts, esophageal dilation #48 bougie;  Surgeon: Alexa Subramanian MD;  Location: [facility] ENDOSCOPY;  Service: Gastroenterology;  Laterality: N/A;  duodenal ulcer, GERD,     HIP TROCHANTERIC NAILING WITH INTRAMEDULLARY HIP SCREW Left 8/26/2024    Procedure: Left hip/femur fracture fixation with intramedullary ajit and screws;  Surgeon: Kameron Kaminski MD;  Location: [facility] MAIN OR;  Service: Orthopedics;  Laterality: Left;    INTERVENTIONAL RADIOLOGY PROCEDURE N/A 2/26/2024    Procedure: Intravascular Ultrasound;  Surgeon: Lelo Tello MD;  Location: [facility] CATH INVASIVE LOCATION;  Service: Cardiology;  Laterality: N/A;    ORCHIECTOMY  2019     Family History   Problem Relation Age of Onset    Heart disease Mother         My brother, and a sister has had a brain anurysm    Stroke Mother     Heart failure Mother     Hypertension Mother         My whole imidate family, mother and all siblings. My father passed when i was [age] from black lung disease.    No Known Problems Father     Heart disease Brother     Heart failure Brother     Heart disease Brother     Heart failure Sister     Hypertension Sister      Social History     Tobacco Use    Smoking status: Former     Current  "packs/day: 0.00     Average packs/day: 1 pack/day for 51.3 years (51.3 ttl pk-yrs)     Types: Cigarettes     Start date:      Quit date: 2024     Years since quittin.9     Passive exposure: Current    Smokeless tobacco: Never   Vaping Use    Vaping status: Never Used   Substance Use Topics    Alcohol use: Yes     Comment: \"once a month\"    Drug use: Never     Medications Prior to Admission   Medication Sig Dispense Refill Last Dose/Taking    albuterol sulfate  (90 Base) MCG/ACT inhaler Inhale 2 puffs Every 4 (Four) Hours As Needed for Wheezing. 1 g 0     amLODIPine (NORVASC) 5 MG tablet Take 1 tablet by mouth Daily As Needed (SBP>140mmHg). 90 tablet 3     aspirin 81 MG EC tablet Take 1 tablet by mouth 2 (Two) Times a Day for 360 doses. 180 tablet 1     atorvastatin (LIPITOR) 20 MG tablet Take 1 tablet by mouth Daily. 90 tablet 1     baclofen (LIORESAL) 10 MG tablet Take 1 tablet by mouth 3 (Three) Times a Day.       carbidopa-levodopa (SINEMET)  MG per tablet Take 2 tablets by mouth 3 (Three) Times a Day. Indications: Parkinson's Disease, Parkinsonian-Like Syndrome       carbidopa-levodopa ER (SINEMET CR)  MG per tablet Take 2 tablets by mouth 4 (Four) Times a Day.       carvedilol (COREG) 6.25 MG tablet TAKE 1 TABLET BY MOUTH TWICE A  tablet 1     clonazePAM (KlonoPIN) 0.5 MG tablet Take 1 tablet by mouth 4 (Four) Times a Day. 120 tablet 5     clopidogrel (PLAVIX) 75 MG tablet TAKE 1 TABLET BY MOUTH EVERY DAY 90 tablet 1     Cyanocobalamin (Vitamin B-12) 1000 MCG sublingual tablet Take 1 tablet by mouth Daily. 90 tablet 3     docusate sodium (COLACE) 100 MG capsule Take 1 capsule by mouth 2 (Two) Times a Day As Needed for Constipation.       donepezil (ARICEPT) 10 MG tablet TAKE 1 TABLET BY MOUTH EVERYDAY AT BEDTIME 90 tablet 1     gabapentin (NEURONTIN) 600 MG tablet Take 1 tablet by mouth 3 (Three) Times a Day. 90 tablet 1     ipratropium-albuterol (DUO-NEB) 0.5-2.5 mg/3 ml " nebulizer Take 3 mL by nebulization Every 6 (Six) Hours. DX J44.9 Medicare B 360 mL 3     melatonin 5 MG tablet tablet Take 1 tablet by mouth At Night As Needed.       meloxicam (MOBIC) 15 MG tablet Take 1 tablet by mouth Daily.       memantine (NAMENDA) 5 MG tablet Take 1 tablet by mouth Daily.       nitroglycerin (NITROSTAT) 0.4 MG SL tablet Place 1 tablet under the tongue Every 5 (Five) Minutes As Needed for Chest Pain (Do not take if systolic BP less than 100 mmHg). Take no more than 3 doses in 15 minutes. 25 tablet 1     ondansetron (ZOFRAN) 4 MG tablet Take 1 tablet by mouth Every 4 (Four) Hours As Needed for Nausea or Vomiting.       oxyCODONE-acetaminophen (PERCOCET) 7.5-325 MG per tablet Take 1 tablet by mouth Every 6 (Six) Hours As Needed for Moderate Pain. 60 tablet 0     pantoprazole (PROTONIX) 40 MG EC tablet TAKE 1 TABLET BY MOUTH EVERY DAY 90 tablet 1     polyethylene glycol (MIRALAX) 17 GM/SCOOP powder Take 17 g by mouth Daily As Needed (Use if senna-docusate is ineffective). 238 g 0     predniSONE (DELTASONE) 20 MG tablet Take 1 tablet by mouth Daily. 5 tablet 0     tamsulosin (FLOMAX) 0.4 MG capsule 24 hr capsule TAKE 1 CAPSULE BY MOUTH EVERY DAY 90 capsule 1     Testosterone Cypionate (DEPOTESTOTERONE CYPIONATE) 200 MG/ML injection Inject 1 mL into the appropriate muscle as directed by prescriber Every 14 (Fourteen) Days.       venlafaxine XR (EFFEXOR-XR) 75 MG 24 hr capsule TAKE 1 CAPSULE BY MOUTH EVERY DAY 90 capsule 1     [DISCONTINUED] budesonide (Pulmicort) 0.5 MG/2ML nebulizer solution Take 2 mL by nebulization Daily for 360 days. 180 mL 3      Allergies:  Patient has no known allergies.    Immunization History   Administered Date(s) Administered    COVID-19 (MODERNA) 1st,2nd,3rd Dose Monovalent 03/03/2021, 03/31/2021    COVID-19 (MODERNA) Monovalent Original Booster 12/01/2021    Flu Vaccine Quad PF >36MO 12/17/2018    Fluzone (or Fluarix & Flulaval for VFC) >6mos 12/17/2018, 12/01/2021     Influenza, Unspecified 11/21/2019, 10/05/2024    flucelvax quad pfs =>4 YRS 10/26/2019           REVIEW OF SYSTEMS:    Review of Systems   Constitutional: Positive for malaise/fatigue. Negative for fever.   HENT: Negative.     Eyes: Negative.    Cardiovascular:  Positive for chest pain and dyspnea on exertion. Negative for palpitations.   Respiratory:  Negative for cough.    Skin: Negative.    Musculoskeletal: Negative.    Gastrointestinal: Negative.    Genitourinary: Negative.    Neurological:  Positive for weakness.   Psychiatric/Behavioral: Negative.         Vital Signs  Temp:  [97.5 °F (36.4 °C)-98.1 °F (36.7 °C)] 98.1 °F (36.7 °C)  Heart Rate:  [] 52  Resp:  [11-20] 14  BP: ()/(66-79) 112/71          Physical Exam:  Physical Exam  Vitals reviewed.   Constitutional:       General: He is not in acute distress.     Appearance: Normal appearance. He is obese. He is not ill-appearing, toxic-appearing or diaphoretic.   HENT:      Head: Normocephalic.      Right Ear: External ear normal.      Left Ear: External ear normal.      Nose: Nose normal.      Mouth/Throat:      Mouth: Mucous membranes are moist.   Eyes:      Extraocular Movements: Extraocular movements intact.   Cardiovascular:      Rate and Rhythm: Normal rate and regular rhythm.      Pulses: Normal pulses.      Heart sounds: Normal heart sounds.   Pulmonary:      Effort: Pulmonary effort is normal.      Breath sounds: Normal breath sounds.   Abdominal:      General: Bowel sounds are normal.      Palpations: Abdomen is soft.      Tenderness: There is no abdominal tenderness.   Musculoskeletal:         General: Normal range of motion.      Cervical back: Normal range of motion.      Right lower leg: No edema.      Left lower leg: No edema.   Skin:     General: Skin is warm and dry.      Capillary Refill: Capillary refill takes less than 2 seconds.   Neurological:      General: No focal deficit present.      Mental Status: He is alert and oriented  to person, place, and time.      Motor: Weakness present.   Psychiatric:         Mood and Affect: Mood normal.         Behavior: Behavior normal.         Thought Content: Thought content normal.         Judgment: Judgment normal.         Emotional Behavior:   Normal   Debilities:  None  Results Review:    I reviewed the patient's new clinical results.  Lab Results (most recent)       Procedure Component Value Units Date/Time    Comprehensive Metabolic Panel [[id number]]  (Abnormal) Collected: 04/02/25 0905    Specimen: Blood Updated: 04/02/25 0940     Glucose 111 mg/dL      BUN 12 mg/dL      Creatinine 0.73 mg/dL      Sodium 143 mmol/L      Potassium 3.7 mmol/L      Chloride 106 mmol/L      CO2 24.2 mmol/L      Calcium 9.3 mg/dL      Total Protein 6.7 g/dL      Albumin 4.1 g/dL      ALT (SGPT) 9 U/L      AST (SGOT) 20 U/L      Alkaline Phosphatase 117 U/L      Total Bilirubin 0.3 mg/dL      Globulin 2.6 gm/dL      A/G Ratio 1.6 g/dL      BUN/Creatinine Ratio 16.4     Anion Gap 12.8 mmol/L      eGFR 100.3 mL/min/1.73     Narrative:      GFR Categories in Chronic Kidney Disease (CKD)      GFR Category          GFR (mL/min/1.73)    Interpretation  G1                     90 or greater         Normal or high (1)  G2                      60-89                Mild decrease (1)  G3a                   45-59                Mild to moderate decrease  G3b                   30-44                Moderate to severe decrease  G4                    15-29                Severe decrease  G5                    14 or less           Kidney failure          (1)In the absence of evidence of kidney disease, neither GFR category G1 or G2 fulfill the criteria for CKD.    eGFR calculation 2021 CKD-EPI creatinine equation, which does not include race as a factor    High Sensitivity Troponin T [[id number]]  (Normal) Collected: 04/02/25 0905    Specimen: Blood Updated: 04/02/25 0940     HS Troponin T 16 ng/L     Narrative:      High Sensitive  Troponin T Reference Range:  <14.0 ng/L- Negative Female for AMI  <22.0 ng/L- Negative Male for AMI  >=14 - Abnormal Female indicating possible myocardial injury.  >=22 - Abnormal Male indicating possible myocardial injury.   Clinicians would have to utilize clinical acumen, EKG, Troponin, and serial changes to determine if it is an Acute Myocardial Infarction or myocardial injury due to an underlying chronic condition.         BNP [[id number]]  (Normal) Collected: 04/02/25 0905    Specimen: Blood Updated: 04/02/25 0940     proBNP 73.8 pg/mL     Narrative:      This assay is used as an aid in the diagnosis of individuals suspected of having heart failure. It can be used as an aid in the diagnosis of acute decompensated heart failure (ADHF) in patients presenting with signs and symptoms of ADHF to the emergency department (ED). In addition, NT-proBNP of <300 pg/mL indicates ADHF is not likely.    Age Range Result Interpretation  NT-proBNP Concentration (pg/mL:      <50             Positive            >450                   Gray                 300-450                    Negative             <300    50-75           Positive            >900                  Gray                300-900                  Negative            <300      >75             Positive            >1800                  Gray                300-1800                  Negative            <300    D-dimer, Quantitative [[id number]]  (Normal) Collected: 04/02/25 0905    Specimen: Blood Updated: 04/02/25 0925     D-Dimer, Quantitative 0.35 MCGFEU/mL     Narrative:      According to the assay 's published package insert, a normal (<0.50 MCGFEU/mL) D-dimer result in conjunction with a non-high clinical probability assessment, excludes deep vein thrombosis (DVT) and pulmonary embolism (PE) with high sensitivity.    D-dimer values increase with age and this can make VTE exclusion of an older population difficult. To address this, the American College  "of Physicians, based on best available evidence and recent guidelines, recommends that clinicians use age-adjusted D-dimer thresholds in patients greater than 50 years of age with: a) a low probability of PE who do not meet all Pulmonary Embolism Rule Out Criteria, or b) in those with intermediate probability of PE.   The formula for an age-adjusted D-dimer cut-off is \"age/100\".  For example, a 60 year old patient would have an age-adjusted cut-off of 0.60 MCGFEU/mL and an 80 year old 0.80 MCGFEU/mL.    [facility] Draw [[id number]] Collected: 04/02/25 0905    Specimen: Blood Updated: 04/02/25 0915    Narrative:      The following orders were created for panel order [facility] Draw.  Procedure                               Abnormality         Status                     ---------                               -----------         ------                     Green Top (Gel)[[id number]]                                  Final result               Lavender Top[[id number]]                                     Final result               Gold Top - SST[[id number]]                                   Final result               Light Blue Top[[id number]]                                   Final result                 Please view results for these tests on the individual orders.    Gold Top - SST [[id number]] Collected: 04/02/25 0905    Specimen: Blood Updated: 04/02/25 0915     Extra Tube Hold for add-ons.     Comment: Auto resulted.       Green Top (Gel) [[id number]] Collected: 04/02/25 0905    Specimen: Blood Updated: 04/02/25 0915     Extra Tube Hold for add-ons.     Comment: Auto resulted.       Lavender Top [[id number]] Collected: 04/02/25 0905    Specimen: Blood Updated: 04/02/25 0915     Extra Tube hold for add-on     Comment: Auto resulted       Light Blue Top [[id number]] Collected: 04/02/25 0905    Specimen: Blood Updated: 04/02/25 0915     Extra Tube Hold for add-ons.     Comment: Auto resulted       CBC & Differential [[id number]]  " (Abnormal) Collected: 04/02/25 0905    Specimen: Blood Updated: 04/02/25 0914    Narrative:      The following orders were created for panel order CBC & Differential.  Procedure                               Abnormality         Status                     ---------                               -----------         ------                     CBC Auto Differential[[id number]]        Abnormal            Final result                 Please view results for these tests on the individual orders.    CBC Auto Differential [[id number]]  (Abnormal) Collected: 04/02/25 0905    Specimen: Blood Updated: 04/02/25 0914     WBC 7.15 10*3/mm3      RBC 4.02 10*6/mm3      Hemoglobin 12.7 g/dL      Hematocrit 38.3 %      MCV 95.3 fL      MCH 31.6 pg      MCHC 33.2 g/dL      RDW 12.2 %      RDW-SD 42.7 fl      MPV 10.2 fL      Platelets 143 10*3/mm3      Neutrophil % 65.1 %      Lymphocyte % 27.7 %      Monocyte % 6.6 %      Eosinophil % 0.0 %      Basophil % 0.3 %      Immature Grans % 0.3 %      Neutrophils, Absolute 4.66 10*3/mm3      Lymphocytes, Absolute 1.98 10*3/mm3      Monocytes, Absolute 0.47 10*3/mm3      Eosinophils, Absolute 0.00 10*3/mm3      Basophils, Absolute 0.02 10*3/mm3      Immature Grans, Absolute 0.02 10*3/mm3      nRBC 0.0 /100 WBC             Imaging Results (Most Recent)       Procedure Component Value Units Date/Time    CT Chest Without Contrast Diagnostic [[id number]] Collected: 04/03/25 2248     Updated: 04/03/25 2259    Narrative:      CT CHEST WO CONTRAST DIAGNOSTIC    Date of Exam: 4/3/2025 5:07 PM EDT    Indication: Persistent dyspnea following COVID-19 infection.    Comparison: Chest radiograph 4/2/2025. CT pulm angiogram 9/23/2024    Technique: Axial CT images were obtained of the chest without contrast administration.  Sagittal and coronal reconstructions were performed.  Automated exposure control and iterative reconstruction methods were used.      Findings:  Heart size appears within normal limits. No  pericardial effusion. There appears to be advanced calcific atherosclerosis with left coronary artery stents. There is calcific atherosclerosis of the aorta and aortic arch vessels. Ascending aorta appears to   measure up to 4.4 cm in diameter. No definite acute aortic abnormality. No acute esophageal abnormality. No definite thoracic lymphadenopathy by CT size criteria. Small calcified mediastinal and hilar lymph nodes, presumed related to remote granulomatous   disease. Small cyst at the medial right renal upper pole. Evaluation of the upper abdomen is limited without contrast. No acute abnormality is identified.    No pleural effusion. No pneumothorax. There appears to be mild emphysema. Linear opacity in the lateral-inferior right upper lobe, as before, likely scarring. There is some new dependent opacities with mild subpleural consolidation in the right lower   lobe and some linear opacities in the lung bases suggestive of atelectasis. No other definite infiltrate is identified.    Remote bilateral healed rib fractures. Mild chronic compression deformity at the L1 superior endplate.      Impression:      Impression:  1.Findings suggestive of mild atelectasis in the lower lungs without other definite acute pulmonary abnormality identified at this time  2.Mild emphysema.  3.Advanced calcific atherosclerosis with left coronary artery stents.  4.Ascending aorta appears to measure up to 4.4 cm in diameter.          Electronically Signed: Jesus Anders    4/3/2025 10:57 PM EDT    Workstation ID: [id number]    XR Chest 1 View [[id number]] Collected: 04/02/25 0857     Updated: 04/02/25 0900    Narrative:      XR CHEST 1 VW    Date of Exam: 4/2/2025 8:54 AM EDT    Indication: Chest Pain Triage Protocol    Comparison: Chest radiograph dated 3/18/2025    Findings:  The cardiomediastinal silhouette is within normal limits. There is aortic arch atherosclerotic calcification. There is linear scarring within the lateral right  midlung. There is no pleural effusion or pneumothorax. There are degenerative changes of the   thoracic spine.      Impression:      Impression:  No acute cardiopulmonary abnormality. Linear scarring within the lateral right midlung.        Electronically Signed: Deepak Sandoval    4/2/2025 8:58 AM EDT    Workstation ID: [id number]          reviewed    ECG/EMG Results (most recent)       Procedure Component Value Units Date/Time    Telemetry Scan [[id number]] Resulted: 04/02/25     Updated: 04/02/25 1040    Telemetry Scan [[id number]] Resulted: 04/02/25     Updated: 04/02/25 2005    Telemetry Scan [[id number]] Resulted: 04/02/25     Updated: 04/02/25 2348    Telemetry Scan [[id number]] Resulted: 04/02/25     Updated: 04/03/25 0432    Telemetry Scan [[id number]] Resulted: 04/02/25     Updated: 04/03/25 0616    ECG 12 Lead ED Triage Standing Order; Chest Pain [[id number]] Collected: 04/02/25 0836     Updated: 04/03/25 0644     QT Interval 426 ms      QTC Interval 429 ms     Narrative:      HEART RATE=61  bpm  RR Xzkslfth=806  ms  MS Qcxshqey=487  ms  P Horizontal Axis=35  deg  P Front Axis=-14  deg  QRSD Dwabkqvr=590  ms  QT Jspbfuvv=777  ms  RYgE=968  ms  QRS Axis=1  deg  T Wave Axis=51  deg  - NORMAL ECG -  Sinus rhythm  When compared with ECG of 18-Mar-2025 12:18:57,  No significant change  Electronically Signed By: Nathaniel Kennedy ([facility]) 2025-04-03 06:44:17  Date and Time of Study:2025-04-02 08:36:02    Telemetry Scan [[id number]] Resulted: 04/02/25     Updated: 04/03/25 0814    Telemetry Scan [[id number]] Resulted: 04/02/25     Updated: 04/03/25 1222    Telemetry Scan [[id number]] Resulted: 04/02/25     Updated: 04/03/25 1724    Telemetry Scan [[id number]] Resulted: 04/02/25     Updated: 04/03/25 2252    Telemetry Scan [[id number]] Resulted: 04/02/25     Updated: 04/03/25 2356    Telemetry Scan [[id number]] Resulted: 04/02/25     Updated: 04/04/25 0519    Telemetry Scan [[id number]] Resulted: 04/02/25     Updated: 04/04/25 0757           reviewed    Results for orders placed during the hospital encounter of 09/23/24    Duplex Venous Lower Extremity - Bilateral CAR    Interpretation Summary    Chronic right lower extremity superficial thrombophlebitis noted in the small saphenous.    Chronic left lower extremity superficial thrombophlebitis noted in the small saphenous.    All other veins appeared normal bilaterally.      Results for orders placed during the hospital encounter of 09/23/24    Adult Transthoracic Echo Complete W/ Cont if Necessary Per Protocol    Interpretation Summary    Left ventricular systolic function is normal. Calculated left ventricular EF = 53% Left ventricular ejection fraction appears to be 51 - 55%.    Left ventricular diastolic function is consistent with (grade I) impaired relaxation. Average GLS -14.9%.    The left atrial cavity is dilated.    Estimated right ventricular systolic pressure from tricuspid regurgitation is normal (<35 mmHg).    No significant valvular abnormalities noted.      Microbiology Results (last 10 days)       Procedure Component Value - Date/Time    CANDIDA AURIS PCR - Swab, Axilla Right, Axilla Left and Groin [[id number]]  (Normal) Collected: 04/03/25 0627    Lab Status: Final result Specimen: Swab from Axilla Right, Axilla Left and Groin Updated: 04/04/25 0925     CANDIDA AURIS PCR Not Detected            Assessment & Plan     Chest tightness    History of CVA (cerebrovascular accident)    Primary hypertension    Mixed hyperlipidemia    Parkinson disease    Anxiety    Gastroesophageal reflux disease    Depression    Former smoker    Shortness of breath    COPD (chronic obstructive pulmonary disease)    Coronary artery disease of native artery of native heart with stable angina pectoris    Benign prostatic hyperplasia with lower urinary tract symptoms    PVD (peripheral vascular disease) with claudication    Obstructive sleep apnea syndrome    Obesity (BMI 30-39.9)       Chest pain with  dyspnea and recent COVID-19 infection and history of CAD  Lab Results   Component Value Date    TROPONINT 13 04/02/2025    TROPONINT 16 04/02/2025    TROPONINT 11 03/18/2025   -proBNP: 73.8  -D-dimer: 0.35  -Lipid panel total cholesterol of 106 with an LDL of 47 and HDL of 33  -Chest X-ray: No acute cardiopulmonary process  -Patient tested positive for COVID-19 infection on 3/18/2025  -EKG: Sinus rhythm at 61 without obvious acute changes with baseline artifact noted and QTc of 421 ms  -In the ED pt given 325 mg aspirin and DuoNeb  -Cardiac catheterization from February 2024 showed 80% disease in the proximal LAD as well as 70% in the mid LAD with heavy calcification of the entire LAD noted as well as moderate calcification with 30 to 40% stenosis in the mid segment of the circumflex and 20% stenosis in the proximal RCA with PCI to the proximal LAD at that time and recommendations for DAPT, beta-blocker, statin and ACE inhibitor  -Cardiology consulted, stress test ordered which was reported with no signs of ischemia and improved perfusion and stress images and recommendations to add amlodipine and Imdur to home regimen  -Telemetry  -NPO  -Continue aspirin, statin, Coreg, Plavix  - Walking oximetry showed need for 2 L supplemental oxygen which will be ordered at discharge  -CT of chest showed findings suggestive of mild atelectasis in the lower lungs without other pulmonary abnormality.  Mild emphysema is noted as well as advanced calcification in the coronary arteries    Hypertension  -Well controlled   BP Readings from Last 1 Encounters:   04/04/25 112/71   - Continue amlodipine and Coreg  - Monitor while admitted    COPD  -Pulmicort and DuoNeb    Parkinson's  -Sinemet  - PT recommending continued home health at discharge    Anxiety/depression/cognitive impairment  -Klonopin, Aricept, Effexor or and Namenda    I discussed the patients findings and my recommendations with patient and nursing staff.     Discharge  Diagnosis:      Chest tightness    History of CVA (cerebrovascular accident)    Primary hypertension    Mixed hyperlipidemia    Parkinson disease    Anxiety    Gastroesophageal reflux disease    Depression    Former smoker    Shortness of breath    COPD (chronic obstructive pulmonary disease)    Coronary artery disease of native artery of native heart with stable angina pectoris    Benign prostatic hyperplasia with lower urinary tract symptoms    PVD (peripheral vascular disease) with claudication    Obstructive sleep apnea syndrome    Obesity (BMI 30-39.9)      Hospital Course  Patient is a 64 y.o. male presented with chest pain with a history of CAD and recent COVID-19 infection with an HPI noted above.  Serial troponins were assessed and found to be 16, 13 with a proBNP of 73.8 and D-dimer of 0.35.  Lipid panel was ordered which showed total cholesterol of 106 with an LDL of 47 and HDL of 33.  Chest x-ray showed no acute cardiopulmonary process and EKG showed sinus rhythm at 61 with some baseline artifact noted but no obvious acute changes and QTc of 429 ms.  Review of records showed patient tested positive for COVID-19 infection on March 18 of this year.  Cardiac catheterization from February 2024 was also reviewed with results noted above.  His aspirin, Plavix, statin and Coreg were continued and cardiology was consulted who ordered stress testing on 4/3/2025 which was negative for signs of ischemia.  Cardiology recommended adding amlodipine and Imdur to home regimen.  Patient and family expressed some concern regarding the need for nighttime oxygen and nocturnal oximetry was ordered.  Patient continues to report significant dyspnea and fatigue following his testing.  CT of the chest was obtained which showed findings suggestive of mild atelectasis in the lower lungs without other acute abnormalities and mild emphysema and coronary artery calcifications.  Walking oximetry was ordered and demonstrated need for 2  L supplemental oxygen which will be ordered at discharge.  PT was consulted who recommended continuing with home health.  At this time patient is felt to be in good condition for discharge with close follow-up with his PCP as well as cardiology and pulmonology on an outpatient basis.  His full testing/results and plan were discussed with patient along with concerning/alarm symptoms for which to call 911/return to the ED. in addition and/or patient will be prescribed a course of prednisone as well as refill of his previously used Pulmicort, Singulair and Flonase with instructions to follow-up with primary pulmonologist on an outpatient basis.  All questions were answered and he verbalizes his understanding and agreement.    Past Medical History:     Past Medical History:   Diagnosis Date    Anxiety 09/04/2012    Benign prostatic hyperplasia with lower urinary tract symptoms 02/27/2024    Borderline diabetes     COPD (chronic obstructive pulmonary disease)     Coronary artery disease 2015    To the best of my memory    COVID-19 03/18/2025    Cytokine release syndrome, grade 1 03/20/2025    Deep vein thrombosis 2015    Blood clots    Depression 07/07/2016    Diffuse non-Hodgkin's lymphoma of testis 03/06/2018    Duodenitis 01/27/2022    Dysphagia 08/2022    from parkinsons    Erosive gastritis 01/27/2022    Esophageal stricture 01/27/2022    Fall 07/25/2019    Food impaction of esophagus 01/27/2022    Gastroesophageal reflux disease 09/04/2012    History of chemotherapy 04/25/2021    Hx of radiation therapy 04/25/2021    Intertrochanteric fracture of left femur, closed, initial encounter 08/25/2024    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    Mixed hyperlipidemia 07/08/2019    Myocardial infarction 2017    Non-Hodgkin's lymphoma of testis     Parkinson disease     Pneumonia of right upper lobe due to infectious organism 12/04/2022    Poor historian     Primary hypertension 07/08/2019    PVD (peripheral vascular  disease) with claudication 09/20/2024    Shortness of breath     Sleep apnea     does not have a cpap    Status post coronary artery stent placement 02/26/2024    Stroke 2017    Testosterone deficiency 07/25/2019    Tick bite 05/20/2021    Tobacco abuse 10/13/2021    Ulcer of esophagus without bleeding 01/27/2022    Uncomplicated alcohol dependence 04/25/2021       Past Surgical History:     Past Surgical History:   Procedure Laterality Date    CARDIAC CATHETERIZATION N/A 2/26/2024    Procedure: Left and Right Heart Cath with Coronary Angiography;  Surgeon: Lelo Tello MD;  Location: [facility] CATH INVASIVE LOCATION;  Service: Cardiology;  Laterality: N/A;    CARDIAC CATHETERIZATION N/A 2/26/2024    Procedure: Percutaneous Coronary Intervention;  Surgeon: Lelo Tello MD;  Location: [facility] CATH INVASIVE LOCATION;  Service: Cardiology;  Laterality: N/A;    ENDOSCOPY N/A 01/27/2022    Procedure: ESOPHAGOGASTRODUODENOSCOPY with foreign body removal with gastric, duodenal biopsy and GE junction biopsy;  Surgeon: Pedro De La Rosa MD;  Location: [facility] ENDOSCOPY;  Service: Gastroenterology;  Laterality: N/A;  post: foreign body removal, erosive gastritis with biopsy, erosive eduodenitis with biopsy, esophagitis, GE junction biopsy to rule out malignancy,     ENDOSCOPY N/A 08/23/2022    Procedure: ESOPHAGOGASTRODUODENOSCOPY with GE junction biopsy R/O Barretts, esophageal dilation #48 bougie;  Surgeon: Alexa Subramanian MD;  Location: [facility] ENDOSCOPY;  Service: Gastroenterology;  Laterality: N/A;  duodenal ulcer, GERD,     HIP TROCHANTERIC NAILING WITH INTRAMEDULLARY HIP SCREW Left 8/26/2024    Procedure: Left hip/femur fracture fixation with intramedullary ajit and screws;  Surgeon: Kameron Kaminski MD;  Location: [facility] MAIN OR;  Service: Orthopedics;  Laterality: Left;    INTERVENTIONAL RADIOLOGY PROCEDURE N/A 2/26/2024    Procedure: Intravascular Ultrasound;  Surgeon: Lelo Tello MD;  Location: [facility] CATH  "INVASIVE LOCATION;  Service: Cardiology;  Laterality: N/A;    ORCHIECTOMY         Social History:   Social History     Socioeconomic History    Marital status: Legally    Tobacco Use    Smoking status: Former     Current packs/day: 0.00     Average packs/day: 1 pack/day for 51.3 years (51.3 ttl pk-yrs)     Types: Cigarettes     Start date:      Quit date: 2024     Years since quittin.9     Passive exposure: Current    Smokeless tobacco: Never   Vaping Use    Vaping status: Never Used   Substance and Sexual Activity    Alcohol use: Yes     Comment: \"once a month\"    Drug use: Never    Sexual activity: Defer       Procedures Performed     1523 Note By: Jenn Leal CRT    Consults:   Consults       Date and Time Order Name Status Description    2025 11:58 AM Inpatient Cardiology Consult Completed             Condition on Discharge:     Stable    Discharge Disposition  Home or Self Care    Discharge Medications     Discharge Medications        New Medications        Instructions Start Date   fluticasone 50 MCG/ACT nasal spray  Commonly known as: FLONASE   2 sprays, Nasal, Daily      isosorbide mononitrate 30 MG 24 hr tablet  Commonly known as: IMDUR   30 mg, Oral, Every 24 Hours Scheduled   Start Date: 2025     montelukast 10 MG tablet  Commonly known as: Singulair   10 mg, Oral, Nightly             Changes to Medications        Instructions Start Date   predniSONE 10 MG tablet  Commonly known as: DELTASONE  What changed:   medication strength  See the new instructions.   Take 4 tablets by mouth Daily for 2 days, THEN 3 tablets Daily for 2 days, THEN 2 tablets Daily for 2 days, THEN 1 tablet Daily for 2 days.   Start Date: 2025            Continue These Medications        Instructions Start Date   albuterol sulfate  (90 Base) MCG/ACT inhaler  Commonly known as: PROVENTIL HFA;VENTOLIN HFA;PROAIR HFA   2 puffs, Inhalation, Every 4 Hours PRN      amLODIPine 5 " MG tablet  Commonly known as: NORVASC   5 mg, Oral, Daily PRN      aspirin 81 MG EC tablet   81 mg, Oral, 2 Times Daily      atorvastatin 20 MG tablet  Commonly known as: LIPITOR   20 mg, Oral, Daily      baclofen 10 MG tablet  Commonly known as: LIORESAL   10 mg, 3 Times Daily      budesonide 0.5 MG/2ML nebulizer solution  Commonly known as: Pulmicort   0.5 mg, Nebulization, Daily - RT      carbidopa-levodopa  MG per tablet  Commonly known as: SINEMET   2 tablets, 3 Times Daily      carbidopa-levodopa ER  MG per tablet  Commonly known as: SINEMET CR   2 tablets, Oral, 4 Times Daily      carvedilol 6.25 MG tablet  Commonly known as: COREG   6.25 mg, Oral, 2 Times Daily      clonazePAM 0.5 MG tablet  Commonly known as: KlonoPIN   0.5 mg, Oral, 4 Times Daily      clopidogrel 75 MG tablet  Commonly known as: PLAVIX   75 mg, Oral, Daily      docusate sodium 100 MG capsule  Commonly known as: COLACE   100 mg, 2 Times Daily PRN      donepezil 10 MG tablet  Commonly known as: ARICEPT   10 mg, Oral, Every Night at Bedtime      gabapentin 600 MG tablet  Commonly known as: NEURONTIN   600 mg, Oral, 3 Times Daily      ipratropium-albuterol 0.5-2.5 mg/3 ml nebulizer  Commonly known as: DUO-NEB   3 mL, Nebulization, Every 6 Hours, DX J44.9 Medicare B      melatonin 5 MG tablet tablet   5 mg, Nightly PRN      meloxicam 15 MG tablet  Commonly known as: MOBIC   1 tablet, Daily      memantine 5 MG tablet  Commonly known as: NAMENDA   1 tablet, Daily      nitroglycerin 0.4 MG SL tablet  Commonly known as: NITROSTAT   0.4 mg, Sublingual, Every 5 Minutes PRN, Take no more than 3 doses in 15 minutes.      ondansetron 4 MG tablet  Commonly known as: ZOFRAN   4 mg, Every 4 Hours PRN      oxyCODONE-acetaminophen 7.5-325 MG per tablet  Commonly known as: PERCOCET   1 tablet, Oral, Every 6 Hours PRN      pantoprazole 40 MG EC tablet  Commonly known as: PROTONIX   40 mg, Oral, Daily      polyethylene glycol 17 GM/SCOOP  powder  Commonly known as: MIRALAX   17 g, Oral, Daily PRN      tamsulosin 0.4 MG capsule 24 hr capsule  Commonly known as: FLOMAX   0.4 mg, Oral, Daily      Testosterone Cypionate 200 MG/ML injection  Commonly known as: DEPOTESTOTERONE CYPIONATE   200 mg, Every 14 Days      venlafaxine XR 75 MG 24 hr capsule  Commonly known as: EFFEXOR-XR   75 mg, Oral, Daily      Vitamin B-12 1000 MCG sublingual tablet   1,000 mcg, Oral, Daily               Discharge Diet:     Activity at Discharge:     Follow-up Appointments  Future Appointments   Date Time Provider Department Center   6/24/2025 11:20 AM Guero Islas MD [facility] None   6/26/2025  2:30 PM Floresita Rubi APRN [facility]   9/10/2025  4:30 PM Nomi Cunningham MD [facility]   9/26/2025 11:15 AM [facility]     Additional Instructions for the Follow-ups that You Need to Schedule       Discharge Follow-up with PCP   As directed       Currently Documented PCP:    Larry Mireles PA-C    PCP Phone Number:    [phone]     Follow Up Details: 5 to 7 days        Discharge Follow-up with Specified Provider: Cardiology   As directed      To: Cardiology        Discharge Follow-up with Specified Provider: Pulmonology; 2 Weeks   As directed      To: Pulmonology   Follow Up: 2 Weeks                Test Results Pending at Discharge  Pending Results       Procedure [Order ID] Specimen - Date/Time    Potassium [[id number]]     Specimen: Blood              Risk for Readmission (LACE) Score: 8 (4/4/2025  6:00 AM)      Greater than 30 minutes spent in discharge activities for this patient    Signature:Electronically signed by Haja Khoury PA-C, 04/04/25, 2:34 PM EDT.

## 2025-04-04 NOTE — OUTREACH NOTE
Prep Survey      Flowsheet Row Responses   [facility]   Is LACE score < 7 ? No   Eligibility [facility]   Date of Admission 04/02/25   Date of Discharge 04/04/25   Discharge Disposition Home or Self Care   Discharge diagnosis Chest tightness   Does the patient have one of the following disease processes/diagnoses(primary or secondary)? Other   Does the patient have Home health ordered? Yes   What is the Home health agency?  [facility]   Is there a DME ordered? Yes   What DME was ordered? [employer] for home oxygen   Prep survey completed? Yes            CONNIE LEUNG - Registered Nurse

## 2025-04-04 NOTE — PLAN OF CARE
Goal Outcome Evaluation:  Plan of Care Reviewed With: patient, child        Progress: no change  Outcome Evaluation: Rex Flores is a 65 y/o M with PMH of Parkinson's Disease who was admitted to [facility] on 4/2/25 for SOA and chest tightness. Of note, pt with recent hospitalization for COPD exacerbation and COVID-19. HS troponin 16. EKG normal. Stress test (-). At baseline, pt lives with his daughter in a 2SH with 4STE, where he resides on the main floor with adequate supportive DME. Pt this date demonstrates significant functional deficits presenting below baseline, putting him at increaesd risk for falls. Pt presents with lobal leadpipe rigidity, poor balance and postural awareness, poor fine motor control limiting ability to toilet independently, and enhanced Parkinsonian gait deficits. Pt required Deirdre throughout for balance support, transfers, and ambulation. Pt had accident while urinating due to poor fine motor control and was maxA for upper/lower body dressing. Due to patient's acute exacerbation of Parkinson's Disease in addition to worsening respiratory problems, patient would benefit from acute Inpatient Rehab to address aforementioned deficits related to PT/OT/SLP. Discussed with patient and family, who desire home with home health PT, with transition to Outpatient when able. Edu pt and daughter regarding exercise recommendations for people with PT to slow progression of disease, and provided with handout of community resources. Recommend U-step walker, as research shows improved safety outcomes for people with PD. Pt likely discharging today, but will keep on caseload in case patient remains admitted.    Anticipated Discharge Disposition (PT): inpatient rehabilitation facility

## 2025-04-04 NOTE — PLAN OF CARE
Problem: Adult Inpatient Plan of Care  Goal: Absence of Hospital-Acquired Illness or Injury  Intervention: Identify and Manage Fall Risk  Recent Flowsheet Documentation  Taken 4/4/2025 0600 by Marjorie Rodriguez RN  Safety Promotion/Fall Prevention:   safety round/check completed   assistive device/personal items within reach   elopement precautions   clutter free environment maintained   fall prevention program maintained  Taken 4/4/2025 0400 by Marjorie Rodriguez RN  Safety Promotion/Fall Prevention:   safety round/check completed   assistive device/personal items within reach   clutter free environment maintained   elopement precautions   fall prevention program maintained  Taken 4/4/2025 0200 by Marjorie Rodriguez RN  Safety Promotion/Fall Prevention:   safety round/check completed   assistive device/personal items within reach   clutter free environment maintained   elopement precautions  Taken 4/4/2025 0000 by Marjorie Rodriguez RN  Safety Promotion/Fall Prevention:   safety round/check completed   assistive device/personal items within reach   clutter free environment maintained   elopement precautions  Taken 4/3/2025 2200 by Marjorie Rodriguez RN  Safety Promotion/Fall Prevention: safety round/check completed  Taken 4/3/2025 2000 by Marjorie Rodriguez RN  Safety Promotion/Fall Prevention:   safety round/check completed   assistive device/personal items within reach   clutter free environment maintained   elopement precautions   fall prevention program maintained  Intervention: Prevent Skin Injury  Recent Flowsheet Documentation  Taken 4/3/2025 2000 by Marjorie Rodriguez RN  Body Position: position changed independently  Intervention: Prevent and Manage VTE (Venous Thromboembolism) Risk  Recent Flowsheet Documentation  Taken 4/3/2025 2000 by Marjorie Rodriguez RN  VTE Prevention/Management: SCDs (sequential compression devices) off  Intervention: Prevent Infection  Recent Flowsheet Documentation  Taken 4/4/2025  0600 by Marjorie Rodriguez RN  Infection Prevention: rest/sleep promoted  Taken 4/4/2025 0400 by Marjorie Rodriguez RN  Infection Prevention: rest/sleep promoted  Taken 4/4/2025 0000 by Marjorie Rodriguez RN  Infection Prevention: rest/sleep promoted  Taken 4/3/2025 2200 by Marjorie Rodriguez RN  Infection Prevention: rest/sleep promoted  Taken 4/3/2025 2000 by Marjorie Rodriguez RN  Infection Prevention:   environmental surveillance performed   hand hygiene promoted  Goal: Optimal Comfort and Wellbeing  Intervention: Provide Person-Centered Care  Recent Flowsheet Documentation  Taken 4/3/2025 2000 by Marjorie Rodriguez RN  Trust Relationship/Rapport:   care explained   questions answered   questions encouraged     Problem: Comorbidity Management  Goal: Maintenance of COPD Symptom Control  Intervention: Maintain COPD (Chronic Obstructive Pulmonary Disease) Symptom Control  Recent Flowsheet Documentation  Taken 4/4/2025 0600 by Marjorie Rodriguez RN  Medication Review/Management: medications reviewed  Taken 4/4/2025 0400 by Marjorie Rodriguez RN  Medication Review/Management: medications reviewed  Taken 4/4/2025 0000 by Marjorie Rodriguez RN  Medication Review/Management: medications reviewed  Taken 4/3/2025 2200 by Marjorie Rodriguez RN  Medication Review/Management: medications reviewed  Taken 4/3/2025 2000 by Marjorie Rodriguez RN  Medication Review/Management: medications reviewed  Goal: Blood Pressure in Desired Range  Intervention: Maintain Blood Pressure Management  Recent Flowsheet Documentation  Taken 4/4/2025 0600 by Marjorie Rodriguez RN  Medication Review/Management: medications reviewed  Taken 4/4/2025 0400 by Marjorie Rodriguez RN  Medication Review/Management: medications reviewed  Taken 4/4/2025 0000 by Marjorie Rodriguez RN  Medication Review/Management: medications reviewed  Taken 4/3/2025 2200 by Marjorie Rodriguez RN  Medication Review/Management: medications reviewed  Taken 4/3/2025 2000 by Marjorie Rodriguez  RN  Medication Review/Management: medications reviewed     Problem: Fall Injury Risk  Goal: Absence of Fall and Fall-Related Injury  Intervention: Identify and Manage Contributors  Recent Flowsheet Documentation  Taken 4/4/2025 0600 by Marjorie Rodriguez RN  Medication Review/Management: medications reviewed  Taken 4/4/2025 0400 by Marjorie Rodriguez RN  Medication Review/Management: medications reviewed  Taken 4/4/2025 0000 by Marjorie Rodriguez RN  Medication Review/Management: medications reviewed  Taken 4/3/2025 2200 by Marjorie Rodriguez RN  Medication Review/Management: medications reviewed  Taken 4/3/2025 2000 by Marjorie Rodriguez RN  Medication Review/Management: medications reviewed  Intervention: Promote Injury-Free Environment  Recent Flowsheet Documentation  Taken 4/4/2025 0600 by Marjorie Rodriguez RN  Safety Promotion/Fall Prevention:   safety round/check completed   assistive device/personal items within reach   elopement precautions   clutter free environment maintained   fall prevention program maintained  Taken 4/4/2025 0400 by Marjorie Rodriguez RN  Safety Promotion/Fall Prevention:   safety round/check completed   assistive device/personal items within reach   clutter free environment maintained   elopement precautions   fall prevention program maintained  Taken 4/4/2025 0200 by Marjorie Rodriguez RN  Safety Promotion/Fall Prevention:   safety round/check completed   assistive device/personal items within reach   clutter free environment maintained   elopement precautions  Taken 4/4/2025 0000 by Marjorie Rodriguez RN  Safety Promotion/Fall Prevention:   safety round/check completed   assistive device/personal items within reach   clutter free environment maintained   elopement precautions  Taken 4/3/2025 2200 by Marjorie Rodriguez RN  Safety Promotion/Fall Prevention: safety round/check completed  Taken 4/3/2025 2000 by Marjorie Rodriguez RN  Safety Promotion/Fall Prevention:   safety round/check  completed   assistive device/personal items within reach   clutter free environment maintained   elopement precautions   fall prevention program maintained   Goal Outcome Evaluation:         No issues overnight. Daughter at bedside

## 2025-04-04 NOTE — CASE MANAGEMENT/SOCIAL WORK
Continued Stay Note  [facility]     Patient Name: Andrew Flores  MRN: [id number]  Today's Date: 4/4/2025    Admit Date: 4/2/2025    Plan: Home w/ daughter. [facility] (current, no GUI for obs needed). Daughters for DC transport.   Discharge Plan       Row Name 04/04/25 1302       Plan    Plan Comments PT states patient will need  a u-walker. CM messaged local DME liaisons who state they do not carry u-walkers. CM messaged Dwight Maurice with [employer] who states he has a u-walker and could bring it to patient's room prior to DC. Patient needing 2L of home O2 for DC. Referral placed to [employer] in Epic basket and messaged jazmyn Cano-delivered to patients room.           Abdiaziz Reyes RN     Cell number [phone]  Office number [phone]

## 2025-04-04 NOTE — PLAN OF CARE
Problem: Adult Inpatient Plan of Care  Goal: Plan of Care Review  Outcome: Met  Goal: Patient-Specific Goal (Individualized)  Outcome: Met  Goal: Absence of Hospital-Acquired Illness or Injury  Outcome: Met  Intervention: Identify and Manage Fall Risk  Recent Flowsheet Documentation  Taken 4/4/2025 1200 by Zoraida Underwood RN  Safety Promotion/Fall Prevention:   safety round/check completed   assistive device/personal items within reach   clutter free environment maintained   fall prevention program maintained   gait belt   nonskid shoes/slippers when out of bed   room organization consistent  Taken 4/4/2025 1000 by Zoraida Underwood RN  Safety Promotion/Fall Prevention:   safety round/check completed   clutter free environment maintained   assistive device/personal items within reach   fall prevention program maintained   gait belt   nonskid shoes/slippers when out of bed   room organization consistent  Taken 4/4/2025 0800 by Zoraida Underwood RN  Safety Promotion/Fall Prevention:   safety round/check completed   assistive device/personal items within reach   clutter free environment maintained   fall prevention program maintained   gait belt   nonskid shoes/slippers when out of bed   room organization consistent  Intervention: Prevent Skin Injury  Recent Flowsheet Documentation  Taken 4/4/2025 1000 by Zoraida Underwood RN  Body Position: (Up in chair)   position changed independently   other (see comments)  Skin Protection: transparent dressing maintained  Intervention: Prevent and Manage VTE (Venous Thromboembolism) Risk  Recent Flowsheet Documentation  Taken 4/4/2025 1000 by Zoraida Underwood RN  VTE Prevention/Management: SCDs (sequential compression devices) off  Intervention: Prevent Infection  Recent Flowsheet Documentation  Taken 4/4/2025 1000 by Zoraida Underwood RN  Infection Prevention:   hand hygiene promoted   personal protective equipment utilized   rest/sleep promoted   single patient room provided  Goal: Optimal  Comfort and Wellbeing  Outcome: Met  Goal: Readiness for Transition of Care  Outcome: Met   Goal Outcome Evaluation:      D/c to home

## 2025-04-04 NOTE — CASE MANAGEMENT/SOCIAL WORK
"Start PACC Note    Home Health Referral    Evaluated patient and daughter agnieszka on Home Care and services available. Patient offered choice of available HHC and agreeable to RN/PT services with [facility].    Care Types:   Isolation Precautions: Contact    Social Determinants of Health:  Tobacco Use: Medium Risk (4/2/2025)    Patient History     Smoking Tobacco Use: Former     Smokeless Tobacco Use: Never     Passive Exposure: Current     Social History     Substance and Sexual Activity   Alcohol Use Yes    Comment: \"once a month\"     Social History     Substance and Sexual Activity   Drug Use Never       Does the patient have any financial resource strain?   Does the patient have any food insecurities?   Does the patient have any housing instabilities?     If any of the above is noted as yes - consider a MSW evaluation once the patient returns home.    START PATIENT REGISTRATION INFORMATION  Order Information  Order Signing Physician: GALO James  Service Ordered RN?: Yes  Service Ordered PT?: Yes  Service Ordered OT?: No  Service Ordered ST?: No  Service Ordered MSW?: No  Service Ordered HHA?: No  Following Physician: Larry Mireles PA-C  Following Physician Phone: [phone]  Overseeing Physician: Larry Mireles PA-C  (Required for Residents Only)  Agreeable to Follow? Yes  Date/Time of Call 04/04/25 15:19 EDT, Spoke with: via secure chat    Care Coordination  Same Day SOC?: No  Primary Care Physician: Larry Mireles PA-C  Primary Care Physician Phone: [phone]  Primary Care Physician Address: [address] [place] [address]  Visit Instructions: N/A  Service Discharge Location Type: Home  Service Facility Name: N/A  Service Floor Facility: N/A  Service Room No: N/A    Demographics  Patient Last Name: Mark  Patient First Name: Andrew  Language/Communication Barrier: none  Service Address: [address] [place] [address]  Service City: [facility]" State: [place]  Service Zip: [address]  Service Home Phone: [phone]  Other Phone Numbers:   Telephone Information:   Mobile [phone]     Emergency Contact:   Extended Emergency Contact Information  Primary Emergency Contact: REN BATISTA(POA)  Mobile Phone: [phone]  Relation: Daughter  Secondary Emergency Contact: denisa bateman  Mobile Phone: [phone]  Relation: Daughter    Admission Information  Admit Date: 4/2/2025  Patient Status at Discharge: Observation  Admitting Diagnosis: Chest tightness [R07.89]  Dyspnea, unspecified type [R06.00]    Caregiver Information  Caregiver First Name:   Caregiver Last Name:   Caregiver Relationship to Patient:   Caregiver Phone Number:   Caregiver Notes:     [employer]  [employer]  No      END PATIENT REGISTRATION INFORMATION    Start PACC Summary    Additional Comments: none    END PACC Summary    Discharge Date: Pending    Referral Source: [facility]    Signed By: Alpa Armstrong RN, 4/4/2025, 15:19 EDT     Date/Time: 04/04/25 15:19 EDT    End PACC Note

## 2025-04-07 ENCOUNTER — TRANSITIONAL CARE MANAGEMENT TELEPHONE ENCOUNTER (OUTPATIENT)
Dept: CALL CENTER | Facility: HOSPITAL | Age: 65
End: 2025-04-07
Payer: MEDICARE

## 2025-04-07 ENCOUNTER — TELEPHONE (OUTPATIENT)
Dept: CARDIOLOGY | Facility: CLINIC | Age: 65
End: 2025-04-07
Payer: MEDICARE

## 2025-04-07 ENCOUNTER — DOCUMENTATION (OUTPATIENT)
Dept: CARDIOLOGY | Facility: CLINIC | Age: 65
End: 2025-04-07
Payer: MEDICARE

## 2025-04-07 RX ORDER — OXYCODONE AND ACETAMINOPHEN 7.5; 325 MG/1; MG/1
1 TABLET ORAL EVERY 6 HOURS PRN
Qty: 60 TABLET | Refills: 0 | Status: SHIPPED | OUTPATIENT
Start: 2025-04-07

## 2025-04-07 RX ORDER — CLONAZEPAM 0.5 MG/1
0.5 TABLET ORAL 4 TIMES DAILY
Qty: 120 TABLET | Refills: 5 | Status: SHIPPED | OUTPATIENT
Start: 2025-04-07

## 2025-04-07 NOTE — TELEPHONE ENCOUNTER
Made appointment with tiffanie this Thursday they didn't want to wait a month said he is having trouble breathing

## 2025-04-07 NOTE — CASE MANAGEMENT/SOCIAL WORK
Case Management Discharge Note      Final Note: Home w/ [facility]         Selected Continued Care - Discharged on 4/4/2025 Admission date: 4/2/2025 - Discharge disposition: Home or Self Care        Durable Medical Equipment Coordination complete.      Service Provider Services Address Phone Fax Patient Preferred    [facility] [address], [place] [id number] [phone] --                Home Medical Care Coordination complete.      Service Provider Services Address Phone Fax Patient Preferred    [facility] [address] [facility] [id number] [phone] --               Transportation Services  Private: Car    Final Discharge Disposition Code: 06 - home with home health care

## 2025-04-07 NOTE — OUTREACH NOTE
Call Center TCM Note      Flowsheet Row Responses   [facility] patient discharged from? [facility]   Does the patient have one of the following disease processes/diagnoses(primary or secondary)? Other   TCM attempt successful? Yes   Call start time 1512   Call end time 1518   Discharge diagnosis Chest tightness   Person spoke with today (if not patient) and relationship Eileen (daughter)   Does the patient have all medications ordered at discharge? Yes   Is the patient taking all medications as directed (includes completed medication regime)? Yes   Comments Daughter declines making hospital follow up with PCP at this time.   Does the patient have an appointment with their PCP within 7-14 days of discharge? No   Nursing Interventions Routed TCM call to PCP office, Patient declined scheduling/rescheduling appointment at this time   What is the Home health agency?  [facility]   Has home health visited the patient within 72 hours of discharge? Call prior to 72 hours   What DME was ordered? [employer] for home oxygen   DME comments Daughter states patient checks O2 sat and it is usually around 95%   Psychosocial issues? No   What is the patient's perception of their health status since discharge? Improving   Is the patient/caregiver able to teach back signs and symptoms related to disease process for when to call PCP? Yes   Is the patient/caregiver able to teach back signs and symptoms related to disease process for when to call 911? Yes   Is the patient/caregiver able to teach back the hierarchy of who to call/visit for symptoms/problems? PCP, Specialist, Home health nurse, Urgent Care, ED, 911 Yes   TCM call completed? Yes   Wrap up additional comments Daughter states patient seems to be improving. No questions or concerns at this time.   Call end time 1518   Would this patient benefit from a Referral to Amb Social Work? No   Is the patient interested in additional calls from an ambulatory ? Kori BRAGG  M - Registered Nurse    4/7/2025, 15:19 EDT

## 2025-04-07 NOTE — TELEPHONE ENCOUNTER
Caller: REN BATISTA(POA)    Relationship: Emergency Contact    Best call back karle [phone]    What is the best time to reach you: ANYTIME    Who are you requesting to speak with (clinical staff, provider,  specific staff member): CLINICAL        What was the call regarding: PT WAS RELEASED FROM THE HOSPITAL 04/04/2025. WHEN DOES HE NEED TO F/U WITH DR LIU?  PLEASE CALL DAUGHTER TO ADVISE.  HE IS STILL HAVING THE SAME PROBLEMS AS BEFORE.  EVERY SINCE HE GOT COVID, HE HAS NOT GOTTEN BETTER.

## 2025-04-09 NOTE — PROGRESS NOTES
Subjective:     Encounter Date:04/10/2025        Patient ID: Andrew Flores [id number]     Chief Complaint:  hospital discharge follow-up     History of Present Illness:  Andrew Flores is a 64 y.o. male with a history of hypertension, hyperlipidemia, coronary artery disease status post PCI to LAD in February 2024, peripheral arterial disease with claudication, stroke, Parkinson's disease, COPD and tobacco abuse who was recently hospitalized at [facility] with complaint of chest tightness and shortness of breath. He had a nuclear stress test, which showed improved perfusion on stress images and no evidence of ischemia. A CT scan of his chest was suggestive of mild atelectasis in the lower lungs, mild emphysema, and advanced calcification in the coronary arteries. He then had a walking oximetry that showed the need for supplemental oxygen at 2 liters per nasal cannula. He was discharged home with home health care on 4/4/2025.    The patient presents to the office today for hospital discharge follow-up. He is accompanied by his daughter. He is complaining of shortness of breath. He states the oxygen he has at home doesn't seem to alleviate his shortness of breath even though his oxygen saturation is normal. He reports occasional palpitations and dizziness/lightheadedness. He reports some chest tightness. He states his symptoms started approximately 6 weeks before he got Covid. He states his breathing has just gotten progressively worse.     The patient is also complaining of right-sided sciatica with numbness and weakness. He states he has been using a cane or walker because he is afraid he is going to fall. His daughter states he has scoliosis. She states he saw pain management approximately 1 year ago and had some epidural injections at that time. He has not any recent imaging of his spine.       Current cardiac medications include:  aspirin, Plavix, atorvastatin, amlodipine, carvedilol and Imdur.        Review of systems:  Review of Systems   Constitutional: Negative for malaise/fatigue.   Cardiovascular:  Positive for palpitations. Negative for chest pain, dyspnea on exertion and leg swelling.   Respiratory:  Positive for shortness of breath. Negative for cough.    Gastrointestinal:  Negative for abdominal pain, nausea and vomiting.   Neurological:  Positive for dizziness and light-headedness. Negative for focal weakness, headaches and numbness.   All other systems reviewed and are negative.        The following portions of the patient's history were reviewed and updated as appropriate: allergies, current medications, past family history, past medical history, past social history, past surgical history and problem list.    Past Medical History:  Past Medical History:   Diagnosis Date    Anxiety 09/04/2012    Benign prostatic hyperplasia with lower urinary tract symptoms 02/27/2024    Borderline diabetes     COPD (chronic obstructive pulmonary disease)     Coronary artery disease 2015    To the best of my memory    COVID-19 03/18/2025    Cytokine release syndrome, grade 1 03/20/2025    Deep vein thrombosis 2015    Blood clots    Depression 07/07/2016    Diffuse non-Hodgkin's lymphoma of testis 03/06/2018    Duodenitis 01/27/2022    Dysphagia 08/2022    from parkinsons    Erosive gastritis 01/27/2022    Esophageal stricture 01/27/2022    Fall 07/25/2019    Food impaction of esophagus 01/27/2022    Gastroesophageal reflux disease 09/04/2012    History of chemotherapy 04/25/2021    Hx of radiation therapy 04/25/2021    Intertrochanteric fracture of left femur, closed, initial encounter 08/25/2024    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    Mixed hyperlipidemia 07/08/2019    Myocardial infarction 2017    Non-Hodgkin's lymphoma of testis     Parkinson disease     Pneumonia of right upper lobe due to infectious organism 12/04/2022    Poor historian     Primary hypertension 07/08/2019    PVD (peripheral vascular  disease) with claudication 09/20/2024    Shortness of breath     Sleep apnea     does not have a cpap    Status post coronary artery stent placement 02/26/2024    Stroke 2017    Testosterone deficiency 07/25/2019    Tick bite 05/20/2021    Tobacco abuse 10/13/2021    Ulcer of esophagus without bleeding 01/27/2022    Uncomplicated alcohol dependence 04/25/2021       Past Surgical History:  Past Surgical History:   Procedure Laterality Date    CARDIAC CATHETERIZATION N/A 2/26/2024    Procedure: Left and Right Heart Cath with Coronary Angiography;  Surgeon: Lelo Tello MD;  Location: [facility] CATH INVASIVE LOCATION;  Service: Cardiology;  Laterality: N/A;    CARDIAC CATHETERIZATION N/A 2/26/2024    Procedure: Percutaneous Coronary Intervention;  Surgeon: Lelo Tello MD;  Location: [facility] CATH INVASIVE LOCATION;  Service: Cardiology;  Laterality: N/A;    ENDOSCOPY N/A 01/27/2022    Procedure: ESOPHAGOGASTRODUODENOSCOPY with foreign body removal with gastric, duodenal biopsy and GE junction biopsy;  Surgeon: Pedro De La Rosa MD;  Location: [facility] ENDOSCOPY;  Service: Gastroenterology;  Laterality: N/A;  post: foreign body removal, erosive gastritis with biopsy, erosive eduodenitis with biopsy, esophagitis, GE junction biopsy to rule out malignancy,     ENDOSCOPY N/A 08/23/2022    Procedure: ESOPHAGOGASTRODUODENOSCOPY with GE junction biopsy R/O Barretts, esophageal dilation #48 bougie;  Surgeon: Alexa Subramanian MD;  Location: [facility] ENDOSCOPY;  Service: Gastroenterology;  Laterality: N/A;  duodenal ulcer, GERD,     HIP TROCHANTERIC NAILING WITH INTRAMEDULLARY HIP SCREW Left 8/26/2024    Procedure: Left hip/femur fracture fixation with intramedullary ajit and screws;  Surgeon: Kameron Kaminski MD;  Location: [facility] MAIN OR;  Service: Orthopedics;  Laterality: Left;    INTERVENTIONAL RADIOLOGY PROCEDURE N/A 2/26/2024    Procedure: Intravascular Ultrasound;  Surgeon: Lelo Tello MD;  Location: [facility] CATH  INVASIVE LOCATION;  Service: Cardiology;  Laterality: N/A;    ORCHIECTOMY  2019        Allergies:  No Known Allergies    Current Meds:     Current Outpatient Medications:     albuterol sulfate  (90 Base) MCG/ACT inhaler, Inhale 2 puffs Every 4 (Four) Hours As Needed for Wheezing., Disp: 1 g, Rfl: 0    amLODIPine (NORVASC) 5 MG tablet, Take 1 tablet by mouth Daily As Needed (SBP>140mmHg)., Disp: 90 tablet, Rfl: 3    aspirin 81 MG EC tablet, Take 1 tablet by mouth 2 (Two) Times a Day for 360 doses., Disp: 180 tablet, Rfl: 1    atorvastatin (LIPITOR) 20 MG tablet, Take 1 tablet by mouth Daily., Disp: 90 tablet, Rfl: 1    baclofen (LIORESAL) 10 MG tablet, Take 1 tablet by mouth 3 (Three) Times a Day., Disp: , Rfl:     budesonide (Pulmicort) 0.5 MG/2ML nebulizer solution, Take 2 mL by nebulization Daily for 30 days., Disp: 60 mL, Rfl: 0    carbidopa-levodopa (SINEMET)  MG per tablet, Take 2 tablets by mouth 3 (Three) Times a Day. Indications: Parkinson's Disease, Parkinsonian-Like Syndrome, Disp: , Rfl:     carbidopa-levodopa ER (SINEMET CR)  MG per tablet, Take 2 tablets by mouth 4 (Four) Times a Day., Disp: , Rfl:     carvedilol (COREG) 6.25 MG tablet, TAKE 1 TABLET BY MOUTH TWICE A DAY, Disp: 180 tablet, Rfl: 1    clonazePAM (KlonoPIN) 0.5 MG tablet, Take 1 tablet by mouth 4 (Four) Times a Day., Disp: 120 tablet, Rfl: 5    clopidogrel (PLAVIX) 75 MG tablet, TAKE 1 TABLET BY MOUTH EVERY DAY, Disp: 90 tablet, Rfl: 1    Cyanocobalamin (Vitamin B-12) 1000 MCG sublingual tablet, Take 1 tablet by mouth Daily., Disp: 90 tablet, Rfl: 3    docusate sodium (COLACE) 100 MG capsule, Take 1 capsule by mouth 2 (Two) Times a Day As Needed for Constipation., Disp: , Rfl:     donepezil (ARICEPT) 10 MG tablet, TAKE 1 TABLET BY MOUTH EVERYDAY AT BEDTIME, Disp: 90 tablet, Rfl: 1    fluticasone (FLONASE) 50 MCG/ACT nasal spray, Administer 2 sprays into the nostril(s) as directed by provider Daily., Disp: 16 g, Rfl: 0     gabapentin (NEURONTIN) 600 MG tablet, Take 1 tablet by mouth 3 (Three) Times a Day., Disp: 90 tablet, Rfl: 1    ipratropium-albuterol (DUO-NEB) 0.5-2.5 mg/3 ml nebulizer, Take 3 mL by nebulization Every 6 (Six) Hours. DX J44.9 Medicare B, Disp: 360 mL, Rfl: 3    isosorbide mononitrate (IMDUR) 30 MG 24 hr tablet, Take 1 tablet by mouth Daily for 30 days., Disp: 30 tablet, Rfl: 0    melatonin 5 MG tablet tablet, Take 1 tablet by mouth At Night As Needed., Disp: , Rfl:     meloxicam (MOBIC) 15 MG tablet, Take 1 tablet by mouth Daily., Disp: , Rfl:     memantine (NAMENDA) 5 MG tablet, Take 1 tablet by mouth Daily., Disp: , Rfl:     montelukast (Singulair) 10 MG tablet, Take 1 tablet by mouth Every Night for 30 days., Disp: 30 tablet, Rfl: 0    nitroglycerin (NITROSTAT) 0.4 MG SL tablet, Place 1 tablet under the tongue Every 5 (Five) Minutes As Needed for Chest Pain (Do not take if systolic BP less than 100 mmHg). Take no more than 3 doses in 15 minutes., Disp: 25 tablet, Rfl: 1    ondansetron (ZOFRAN) 4 MG tablet, Take 1 tablet by mouth Every 4 (Four) Hours As Needed for Nausea or Vomiting., Disp: , Rfl:     oxyCODONE-acetaminophen (PERCOCET) 7.5-325 MG per tablet, Take 1 tablet by mouth Every 6 (Six) Hours As Needed for Moderate Pain., Disp: 60 tablet, Rfl: 0    pantoprazole (PROTONIX) 40 MG EC tablet, TAKE 1 TABLET BY MOUTH EVERY DAY, Disp: 90 tablet, Rfl: 1    polyethylene glycol (MIRALAX) 17 GM/SCOOP powder, Take 17 g by mouth Daily As Needed (Use if senna-docusate is ineffective)., Disp: 238 g, Rfl: 0    predniSONE (DELTASONE) 10 MG tablet, Take 4 tablets by mouth Daily for 2 days, THEN 3 tablets Daily for 2 days, THEN 2 tablets Daily for 2 days, THEN 1 tablet Daily for 2 days., Disp: 20 tablet, Rfl: 0    tamsulosin (FLOMAX) 0.4 MG capsule 24 hr capsule, TAKE 1 CAPSULE BY MOUTH EVERY DAY, Disp: 90 capsule, Rfl: 1    Testosterone Cypionate (DEPOTESTOTERONE CYPIONATE) 200 MG/ML injection, Inject 1 mL into the  "appropriate muscle as directed by prescriber Every 14 (Fourteen) Days., Disp: , Rfl:     venlafaxine XR (EFFEXOR-XR) 75 MG 24 hr capsule, TAKE 1 CAPSULE BY MOUTH EVERY DAY, Disp: 90 capsule, Rfl: 1    Social History:   Social History     Tobacco Use    Smoking status: Former     Current packs/day: 0.00     Average packs/day: 1 pack/day for 51.3 years (51.3 ttl pk-yrs)     Types: Cigarettes     Start date:      Quit date: 2024     Years since quittin.9     Passive exposure: Current    Smokeless tobacco: Never   Substance Use Topics    Alcohol use: Yes     Comment: \"once a month\"        Family History:  Family History   Problem Relation Age of Onset    Heart disease Mother         My brother, and a sister has had a brain anurysm    Stroke Mother     Heart failure Mother     Hypertension Mother         My whole imidate family, mother and all siblings. My father passed when i was [age] from black lung disease.    No Known Problems Father     Heart disease Brother     Heart failure Brother     Heart disease Brother     Heart failure Sister     Hypertension Sister                  Objective:       Physical Exam:  Vitals reviewed.   Constitutional:       Appearance: Well-developed and well-groomed. Obese. Chronically ill-appearing.   Eyes:      Conjunctiva/sclera: Conjunctivae normal.      Pupils: Pupils are equal, round, and reactive to light.   HENT:      Head: Normocephalic and atraumatic.    Mouth/Throat:      Mouth: Mucous membranes are moist.      Pharynx: Oropharynx is clear.   Pulmonary:      Effort: Pulmonary effort is normal.      Breath sounds: Normal breath sounds.   Cardiovascular:      PMI at left midclavicular line. Normal rate. Regular rhythm.   Pulses:     Intact distal pulses.   Edema:     Peripheral edema absent.   Abdominal:      General: Bowel sounds are normal.      Palpations: Abdomen is soft.   Musculoskeletal: Normal range of motion.      Cervical back: Normal range of motion. Skin:     " "General: Skin is warm and dry.   Neurological:      Mental Status: Alert.      Motor: Tremor present.   Psychiatric:         Mood and Affect: Mood normal.         Behavior: Behavior normal.         Vital signs:  /83 (BP Location: Right arm, Patient Position: Sitting, Cuff Size: Adult)   Pulse 69   Ht 175.3 cm (69\")   Wt 93.9 kg (207 lb)   SpO2 95%   BMI 30.57 kg/m²       Recent labs reviewed:    CBC  Results from last 7 days   Lab Units 04/04/25  0314   WBC 10*3/mm3 8.56   RBC 10*6/mm3 3.69*   HEMOGLOBIN g/dL 11.7*   HEMATOCRIT % 36.6*   MCV fL 99.2*   PLATELETS 10*3/mm3 142       BMP  Results from last 7 days   Lab Units 04/04/25  0314   SODIUM mmol/L 143   POTASSIUM mmol/L 3.8   CHLORIDE mmol/L 107   CO2 mmol/L 25.5   BUN mg/dL 11   CREATININE mg/dL 0.78   GLUCOSE mg/dL 100*   MAGNESIUM mg/dL 2.0       CMP   Results from last 7 days   Lab Units 04/04/25  0314   SODIUM mmol/L 143   POTASSIUM mmol/L 3.8   CHLORIDE mmol/L 107   CO2 mmol/L 25.5   BUN mg/dL 11   CREATININE mg/dL 0.78   GLUCOSE mg/dL 100*       Creatinine Clearance  Estimated Creatinine Clearance: 105.4 mL/min (by C-G formula based on SCr of 0.78 mg/dL).    BNP        TROPONIN        CoAg          Cardiology results reviewed:    Stress test  Results for orders placed during the hospital encounter of 04/02/25    Stress Test With Myocardial Perfusion One Day    Interpretation Summary    Findings consistent with a normal ECG stress test.    Left ventricular ejection fraction is normal (Calculated EF = 62%).    Improved perfusion noted on stress images.    Myocardial perfusion imaging indicates a normal myocardial perfusion study with no evidence of ischemia. Impressions are consistent with a low risk study.      Echocardiogram  Results for orders placed during the hospital encounter of 09/23/24    Adult Transthoracic Echo Complete W/ Cont if Necessary Per Protocol    Interpretation Summary    Left ventricular systolic function is normal. " Calculated left ventricular EF = 53% Left ventricular ejection fraction appears to be 51 - 55%.    Left ventricular diastolic function is consistent with (grade I) impaired relaxation. Average GLS -14.9%.    The left atrial cavity is dilated.    Estimated right ventricular systolic pressure from tricuspid regurgitation is normal (<35 mmHg).    No significant valvular abnormalities noted.      Cardiac catheterization  Results for orders placed during the hospital encounter of 02/26/24    Cardiac Catheterization/Vascular Study    Conclusion  PROCEDURES PERFORMED  Ultrasound guided Vascular access  Right heart catheterization  Left Heart Catheterization  Coronary Angiogram  Percutaneous coronary intervention with drug-eluting stent placement  Intravascular ultrasound  Moderate sedation    INDICATIONS FOR PROCEDURE  Positive stress test, LEUNG    PROCEDURE IN DETAIL  Informed consent was obtained from the patient after explaining the risks, benefits, and alternative options of the procedure. After obtaining informed consent, the patient was brought to the cath lab and was prepped in a sterile fashion.Lidocaine 2% was used for local anesthesia into the right IJ access site. Right IJ vein was accessed using the micropuncture needle under ultrasound guidance and micropuncture wire advanced under flouroscopy. A 7 Luxembourgish vascular sheath was put into place percutaneously over guide-wire. Guide wires were removed. A 6Fr swan john catheter was advanced to wedge position. RA, RV and PA and wedge pressures were recorded.  PA sat and arterial sats recorded.    Lidocaine 2% was used for local anesthesia into the right  radial access site. The right  radial artery was accessed with a micropuncture needle via modified Seldinger technique under ultrasound guidance. A 6F introducer sheath was inserted successfully. Afterwards, 6F JR4 and JL3.5 diagnostic catheters were advanced over a wire into the ascending aorta and were used to engage  the ostia of the left main and RCA respectively. JR4 used to cross the AV and obtain LV pressures and gradient across the AV measured via pullback technique. Images of the right and left coronary systems were obtained.    HEMODYNAMICS  LV: 156/8/16  AO: 149/79/107  Gradient 7 mmHg    [facility] HEMODYNAMICS:  RA 9  RV 29/10/12  PA 29/10/23  PCW 11    AO Sat 100%  PA Sat 77%    [facility] CO 6.17    [facility] CI 2.93    FINDINGS  Coronary Angiogram    Right dominant circulation    Left main: Left main is a large caliber vessel which gives rise to the Left Anterior Descending and the Left circumflex.  Left main coronary artery is angiographically free from any significant disease    Left Anterior Descending Artery: LAD is a medium caliber vessel which gives rise to several septal perforators and several diagonal branches.  There is an 80% calcified lesion in the proximal LAD at the takeoff of a large D1 branch which further bifurcates.  The ostium of D1 is free from disease.  This was followed by a 70% lesion in the mid LAD.  There is heavy calcification in the entire LAD.    Left Circumflex: Left circumflex artery runs along the AV groove and gives obtuse marginal branches.  It has moderate calcification with 30 to 40% stenosis in the midsegment.    Right Coronary Artery: The RCA is a large caliber vessel gives rise to PDA and PLV.  There is 20% stenosis in the proximal segment    Percutaneous coronary intervention:  100 units/kg of heparin was administered and ACT of more than 250 was documented.  6 German XB LAD 3.5 guide was used to engage the left main.  Run-through wire was advanced to the distal LAD.  IVUS of the LAD showed a distal reference vessel diameter of 3.6 mm with heavy concentric calcification in the mid to proximal LAD.  Proximal reference vessel diameter was 4.6 mm.  We then predilated the LAD with an NC 2.5 x 12 mm balloon.  It was then stented with a Xience 3.5 x 18 mm stent followed by a Xience 4.0 x 38 mm stent  and postdilated with an NC 4.5 x 12 mm balloon.      Preprocedure stenosis: 80%  preprocedure ALEXI flow: 3  Lesion type: C  Postprocedure stenosis: 0%  Post procedure ALEXI flow: 3      All the catheters were exchanged over a wire and subsequently removed.    Radial sheath was removed and a TR band was applied with 15 cc of air to achieve hemostasis.        ESTIMATED BLOOD LOSS:  25 ml    COMPLICATIONS:  None    PROCEDURE DATA:  Contrast Used: 100 ml  Sedation Time:  57 minutes    IMPRESSIONS  One-vessel obstructive CAD of the proximal LAD status post successful IVUS guided PCI  No evidence of pulmonary hypertension  Normal left and right heart filling pressures    RECOMMENDATIONS  -Dual antiplatelet therapy  -Beta-blocker and high intensity statin  -ACE inhibitor if blood pressure tolerates  -Referral to cardiac rehab  -Continue aggressive risk factor stratification and modification  -Smoking cessation               Assessment:         Diagnoses and all orders for this visit:    1. Shortness of breath (Primary)  -     Case Request Cath Lab: Right and Left Heart Cath with coronary angiogram  -     CBC (No Diff); Future  -     Basic Metabolic Panel; Future    2. Chest tightness  -     Case Request Cath Lab: Right and Left Heart Cath with coronary angiogram  -     CBC (No Diff); Future  -     Basic Metabolic Panel; Future    3. Coronary artery disease of native artery of native heart with stable angina pectoris  -     Case Request Cath Lab: Right and Left Heart Cath with coronary angiogram  -     CBC (No Diff); Future  -     Basic Metabolic Panel; Future    4. Mixed hyperlipidemia    5. Primary hypertension    6. PVD (peripheral vascular disease) with claudication    7. Mucopurulent chronic bronchitis    8. Parkinson's disease, unspecified whether dyskinesia present, unspecified whether manifestations fluctuate    9. Anxiety    10. Depression, unspecified depression type    11. Gastroesophageal reflux disease,  unspecified whether esophagitis present    12. Benign prostatic hyperplasia with urinary hesitancy    13. History of CVA (cerebrovascular accident)    14. Obstructive sleep apnea syndrome  Overview:  does not have a cpap      15. Obesity (BMI 30-39.9)    16. Acute low back pain with right-sided sciatica, unspecified back pain laterality    17. Hospital discharge follow-up                Plan:       Shortness of breath  COPD  Recently ruled out for PE  Echocardiogram showed preserved LV function with grade 1 diastolic dysfunction.  The patient appears euvolemic  Continue bronchodilators and Singulair for COPD  On supplemental oxygen at 2 liters per nasal cannula as needed   Recommend follow-up with pulmonology     Chest tightness  Coronary artery disease of native artery of native heart with stable angina pectoris  History of PCI to LAD with jailing of diagonal vessel  Recent ECG with no acute ischemic changes, and high-sensitivity troponin negative   Nuclear stress test showed no ischemia; improved perfusion with stress images  Continue DAPT, high intensity statin, and beta blocker   He was recently started on amlodipine and Imdur   Despite providing reassurance, the patient and his daughter are concerned about the patient's symptoms worsening.   He will be scheduled for a definitive cardiac catheterization.     Mixed hyperlipidemia  Continue high intensity statin    Primary hypertension, chronic  Blood pressure is elevated in the office, but he states it has been controlled at home.   Continue amlodipine, carvedilol, and Imdur    PVD (peripheral vascular disease) with claudication  Continue DAPT and high intensity statin     Parkinson's disease, unspecified whether dyskinesia present, unspecified whether manifestations fluctuate  On carbidopa-levodopa  Also on donepezil and memantine for dementia     Anxiety / Depression, unspecified depression type  On clonazepam and Effexor XR    Gastroesophageal reflux disease,  unspecified whether esophagitis present  On PPI     Benign prostatic hyperplasia with urinary hesitancy  On Flomax     History of CVA (cerebrovascular accident)  Continue DAPT and high intensity statin     Obstructive sleep apnea syndrome  He does not use a CPAP    Obesity (BMI 30-39.9)  BMI is 30.57. He weighs 207 lbs.   Lifestyle modifications recommended     Acute on chronic low back pain with right-sided sciatica  The patient reports a history scoliosis.  He had epidural injections with pain management approximately 1 year ago.  He states his back pain has gotten worse recently. He reports right-sided weakness and numbness.   He was advised to follow-up with his PCP for possible spine imaging.     Hospital discharge follow-up  The patient will be scheduled for a cardiac catheterization with Dr. Cunningham.   He was advised to follow-up with his pulmonologist as well.       Electronically signed by CELINA Green, 04/10/25, 1:32 PM EDT.

## 2025-04-10 ENCOUNTER — OFFICE VISIT (OUTPATIENT)
Dept: CARDIOLOGY | Facility: CLINIC | Age: 65
End: 2025-04-10
Payer: MEDICARE

## 2025-04-10 VITALS
SYSTOLIC BLOOD PRESSURE: 151 MMHG | OXYGEN SATURATION: 95 % | HEIGHT: 69 IN | BODY MASS INDEX: 30.66 KG/M2 | WEIGHT: 207 LBS | HEART RATE: 69 BPM | DIASTOLIC BLOOD PRESSURE: 83 MMHG

## 2025-04-10 DIAGNOSIS — R07.89 CHEST TIGHTNESS: ICD-10-CM

## 2025-04-10 DIAGNOSIS — E66.9 OBESITY (BMI 30-39.9): Chronic | ICD-10-CM

## 2025-04-10 DIAGNOSIS — K21.9 GASTROESOPHAGEAL REFLUX DISEASE, UNSPECIFIED WHETHER ESOPHAGITIS PRESENT: Chronic | ICD-10-CM

## 2025-04-10 DIAGNOSIS — N40.1 BENIGN PROSTATIC HYPERPLASIA WITH URINARY HESITANCY: Chronic | ICD-10-CM

## 2025-04-10 DIAGNOSIS — Z09 HOSPITAL DISCHARGE FOLLOW-UP: ICD-10-CM

## 2025-04-10 DIAGNOSIS — E78.2 MIXED HYPERLIPIDEMIA: Chronic | ICD-10-CM

## 2025-04-10 DIAGNOSIS — F41.9 ANXIETY: Chronic | ICD-10-CM

## 2025-04-10 DIAGNOSIS — F32.A DEPRESSION, UNSPECIFIED DEPRESSION TYPE: Chronic | ICD-10-CM

## 2025-04-10 DIAGNOSIS — R39.11 BENIGN PROSTATIC HYPERPLASIA WITH URINARY HESITANCY: Chronic | ICD-10-CM

## 2025-04-10 DIAGNOSIS — J41.1 MUCOPURULENT CHRONIC BRONCHITIS: Chronic | ICD-10-CM

## 2025-04-10 DIAGNOSIS — G47.33 OBSTRUCTIVE SLEEP APNEA SYNDROME: Chronic | ICD-10-CM

## 2025-04-10 DIAGNOSIS — G20.A1 PARKINSON'S DISEASE, UNSPECIFIED WHETHER DYSKINESIA PRESENT, UNSPECIFIED WHETHER MANIFESTATIONS FLUCTUATE: Chronic | ICD-10-CM

## 2025-04-10 DIAGNOSIS — I10 PRIMARY HYPERTENSION: Chronic | ICD-10-CM

## 2025-04-10 DIAGNOSIS — R06.02 SHORTNESS OF BREATH: Primary | ICD-10-CM

## 2025-04-10 DIAGNOSIS — I25.118 CORONARY ARTERY DISEASE OF NATIVE ARTERY OF NATIVE HEART WITH STABLE ANGINA PECTORIS: Chronic | ICD-10-CM

## 2025-04-10 DIAGNOSIS — I73.9 PVD (PERIPHERAL VASCULAR DISEASE) WITH CLAUDICATION: Chronic | ICD-10-CM

## 2025-04-10 DIAGNOSIS — Z86.73 HISTORY OF CVA (CEREBROVASCULAR ACCIDENT): ICD-10-CM

## 2025-04-10 DIAGNOSIS — M54.41 ACUTE LOW BACK PAIN WITH RIGHT-SIDED SCIATICA, UNSPECIFIED BACK PAIN LATERALITY: ICD-10-CM

## 2025-04-14 ENCOUNTER — READMISSION MANAGEMENT (OUTPATIENT)
Dept: CALL CENTER | Facility: HOSPITAL | Age: 65
End: 2025-04-14
Payer: MEDICARE

## 2025-04-14 NOTE — OUTREACH NOTE
Medical Week 2 Survey      Flowsheet Row Responses   [facility] patient discharged from? [facility]   Does the patient have one of the following disease processes/diagnoses(primary or secondary)? Other   Week 2 attempt successful? Yes   Call start time 1536   Discharge diagnosis Chest tightness   Call end time 1539   Is patient permission given to speak with other caregiver? Yes   List who call center can speak with Griffin winters   Person spoke with today (if not patient) and relationship Desirae- dtr   Meds reviewed with patient/caregiver? Yes   Is the patient having any side effects they believe may be caused by any medication additions or changes? No   Does the patient have all medications ordered at discharge? Yes   Is the patient taking all medications as directed (includes completed medication regime)? Yes   Comments regarding appointments Pulmonology appt 4/16/25   Does the patient have a primary care provider?  Yes   Does the patient have an appointment with their PCP within 7 days of discharge? No   Comments regarding PCP Patient wishes to see pulmonary before scheduling PCP follow up.   What is preventing the patient from scheduling follow up appointments within 7 days of discharge? Haven't had time   Nursing Interventions Advised patient to make appointment, Educated patient on importance of making appointment   Has the patient kept scheduled appointments due by today? Yes   What is the Home health agency?  [facility]   Has home health visited the patient within 72 hours of discharge? Yes   Has all DME been delivered? No   DME comments O2 sats good on 2L   Psychosocial issues? No   Did the patient receive a copy of their discharge instructions? Yes   Nursing interventions Reviewed instructions with patient   What is the patient's perception of their health status since discharge? Same  [Dtr reports patient experiencing dyspnea]   Is the patient/caregiver able to teach back signs and symptoms related to  disease process for when to call PCP? Yes   Is the patient/caregiver able to teach back signs and symptoms related to disease process for when to call 911? Yes   Is the patient/caregiver able to teach back the hierarchy of who to call/visit for symptoms/problems? PCP, Specialist, Home health nurse, Urgent Care, ED, 911 Yes   Week 2 Call Completed? Yes   Is the patient interested in additional calls from an ambulatory ? No   Would this patient benefit from a Referral to [facility] Social Work? No   Call end time [id number]            Debbie HEREDIA - Registered Nurse

## 2025-04-16 ENCOUNTER — OFFICE VISIT (OUTPATIENT)
Dept: PULMONOLOGY | Facility: HOSPITAL | Age: 65
End: 2025-04-16
Payer: MEDICARE

## 2025-04-16 ENCOUNTER — TELEPHONE (OUTPATIENT)
Dept: CARDIOLOGY | Facility: CLINIC | Age: 65
End: 2025-04-16
Payer: MEDICARE

## 2025-04-16 ENCOUNTER — HOSPITAL ENCOUNTER (OUTPATIENT)
Facility: HOSPITAL | Age: 65
Discharge: HOME | End: 2025-04-17
Attending: EMERGENCY MEDICINE | Admitting: EMERGENCY MEDICINE
Payer: MEDICARE

## 2025-04-16 ENCOUNTER — APPOINTMENT (OUTPATIENT)
Dept: GENERAL RADIOLOGY | Facility: HOSPITAL | Age: 65
End: 2025-04-16
Payer: MEDICARE

## 2025-04-16 ENCOUNTER — READMISSION MANAGEMENT (OUTPATIENT)
Dept: CALL CENTER | Facility: HOSPITAL | Age: 65
End: 2025-04-16
Payer: MEDICARE

## 2025-04-16 VITALS
WEIGHT: 207 LBS | RESPIRATION RATE: 16 BRPM | OXYGEN SATURATION: 96 % | BODY MASS INDEX: 30.66 KG/M2 | HEART RATE: 63 BPM | HEIGHT: 69 IN | DIASTOLIC BLOOD PRESSURE: 88 MMHG | SYSTOLIC BLOOD PRESSURE: 124 MMHG

## 2025-04-16 DIAGNOSIS — I25.118 CORONARY ARTERY DISEASE OF NATIVE ARTERY OF NATIVE HEART WITH STABLE ANGINA PECTORIS: ICD-10-CM

## 2025-04-16 DIAGNOSIS — J44.9 CHRONIC OBSTRUCTIVE PULMONARY DISEASE, UNSPECIFIED COPD TYPE: Primary | ICD-10-CM

## 2025-04-16 DIAGNOSIS — I25.118 CORONARY ARTERY DISEASE OF NATIVE ARTERY OF NATIVE HEART WITH STABLE ANGINA PECTORIS: Chronic | ICD-10-CM

## 2025-04-16 DIAGNOSIS — R07.89 CHEST TIGHTNESS: ICD-10-CM

## 2025-04-16 DIAGNOSIS — R07.9 CHEST PAIN, UNSPECIFIED TYPE: ICD-10-CM

## 2025-04-16 DIAGNOSIS — R06.00 DYSPNEA, UNSPECIFIED TYPE: Primary | ICD-10-CM

## 2025-04-16 DIAGNOSIS — R06.02 SHORTNESS OF BREATH: ICD-10-CM

## 2025-04-16 DIAGNOSIS — Z87.891 FORMER SMOKER: ICD-10-CM

## 2025-04-16 DIAGNOSIS — J96.11 CHRONIC RESPIRATORY FAILURE WITH HYPOXIA: ICD-10-CM

## 2025-04-16 LAB
ALBUMIN SERPL-MCNC: 4.3 G/DL (ref 3.5–5.2)
ALBUMIN/GLOB SERPL: 1.7 G/DL
ALP SERPL-CCNC: 111 U/L (ref 39–117)
ALT SERPL W P-5'-P-CCNC: 6 U/L (ref 1–41)
ANION GAP SERPL CALCULATED.3IONS-SCNC: 10.4 MMOL/L (ref 5–15)
APTT PPP: 27.5 SECONDS (ref 22.7–35.4)
AST SERPL-CCNC: 15 U/L (ref 1–40)
BASOPHILS # BLD AUTO: 0.02 10*3/MM3 (ref 0–0.2)
BASOPHILS NFR BLD AUTO: 0.2 % (ref 0–1.5)
BILIRUB SERPL-MCNC: 0.4 MG/DL (ref 0–1.2)
BUN SERPL-MCNC: 11 MG/DL (ref 8–23)
BUN/CREAT SERPL: 15.1 (ref 7–25)
CALCIUM SPEC-SCNC: 9.3 MG/DL (ref 8.6–10.5)
CHLORIDE SERPL-SCNC: 104 MMOL/L (ref 98–107)
CO2 SERPL-SCNC: 27.6 MMOL/L (ref 22–29)
CREAT SERPL-MCNC: 0.73 MG/DL (ref 0.76–1.27)
DEPRECATED RDW RBC AUTO: 44.8 FL (ref 37–54)
EGFRCR SERPLBLD CKD-EPI 2021: 100.3 ML/MIN/1.73
EOSINOPHIL # BLD AUTO: 0 10*3/MM3 (ref 0–0.4)
EOSINOPHIL NFR BLD AUTO: 0 % (ref 0.3–6.2)
ERYTHROCYTE [DISTWIDTH] IN BLOOD BY AUTOMATED COUNT: 12.3 % (ref 12.3–15.4)
GEN 5 1HR TROPONIN T REFLEX: 15 NG/L
GLOBULIN UR ELPH-MCNC: 2.5 GM/DL
GLUCOSE SERPL-MCNC: 128 MG/DL (ref 65–99)
HCT VFR BLD AUTO: 41.5 % (ref 37.5–51)
HGB BLD-MCNC: 13.5 G/DL (ref 13–17.7)
HOLD SPECIMEN: NORMAL
IMM GRANULOCYTES # BLD AUTO: 0.03 10*3/MM3 (ref 0–0.05)
IMM GRANULOCYTES NFR BLD AUTO: 0.4 % (ref 0–0.5)
INR PPP: 1 (ref 0.9–1.1)
LYMPHOCYTES # BLD AUTO: 2.86 10*3/MM3 (ref 0.7–3.1)
LYMPHOCYTES NFR BLD AUTO: 34 % (ref 19.6–45.3)
MCH RBC QN AUTO: 32.1 PG (ref 26.6–33)
MCHC RBC AUTO-ENTMCNC: 32.5 G/DL (ref 31.5–35.7)
MCV RBC AUTO: 98.8 FL (ref 79–97)
MONOCYTES # BLD AUTO: 0.67 10*3/MM3 (ref 0.1–0.9)
MONOCYTES NFR BLD AUTO: 8 % (ref 5–12)
NEUTROPHILS NFR BLD AUTO: 4.83 10*3/MM3 (ref 1.7–7)
NEUTROPHILS NFR BLD AUTO: 57.4 % (ref 42.7–76)
NRBC BLD AUTO-RTO: 0 /100 WBC (ref 0–0.2)
PLATELET # BLD AUTO: 160 10*3/MM3 (ref 140–450)
PMV BLD AUTO: 10.3 FL (ref 6–12)
POTASSIUM SERPL-SCNC: 4.1 MMOL/L (ref 3.5–5.2)
PROT SERPL-MCNC: 6.8 G/DL (ref 6–8.5)
PROTHROMBIN TIME: 13.1 SECONDS (ref 11.7–14.2)
RBC # BLD AUTO: 4.2 10*6/MM3 (ref 4.14–5.8)
SODIUM SERPL-SCNC: 142 MMOL/L (ref 136–145)
TROPONIN T NUMERIC DELTA: 0 NG/L
TROPONIN T SERPL HS-MCNC: 15 NG/L
WBC NRBC COR # BLD AUTO: 8.41 10*3/MM3 (ref 3.4–10.8)

## 2025-04-16 PROCEDURE — 99285 EMERGENCY DEPT VISIT HI MDM: CPT

## 2025-04-16 PROCEDURE — 94664 DEMO&/EVAL PT USE INHALER: CPT

## 2025-04-16 PROCEDURE — G0378 HOSPITAL OBSERVATION PER HR: HCPCS

## 2025-04-16 PROCEDURE — 93005 ELECTROCARDIOGRAM TRACING: CPT

## 2025-04-16 PROCEDURE — 25010000002 ENOXAPARIN PER 10 MG: Performed by: PHYSICIAN ASSISTANT

## 2025-04-16 PROCEDURE — 94799 UNLISTED PULMONARY SVC/PX: CPT

## 2025-04-16 PROCEDURE — 85025 COMPLETE CBC W/AUTO DIFF WBC: CPT | Performed by: EMERGENCY MEDICINE

## 2025-04-16 PROCEDURE — G0463 HOSPITAL OUTPT CLINIC VISIT: HCPCS

## 2025-04-16 PROCEDURE — 96372 THER/PROPH/DIAG INJ SC/IM: CPT

## 2025-04-16 PROCEDURE — 85730 THROMBOPLASTIN TIME PARTIAL: CPT | Performed by: EMERGENCY MEDICINE

## 2025-04-16 PROCEDURE — 94761 N-INVAS EAR/PLS OXIMETRY MLT: CPT

## 2025-04-16 PROCEDURE — 80053 COMPREHEN METABOLIC PANEL: CPT | Performed by: EMERGENCY MEDICINE

## 2025-04-16 PROCEDURE — 93005 ELECTROCARDIOGRAM TRACING: CPT | Performed by: EMERGENCY MEDICINE

## 2025-04-16 PROCEDURE — 84484 ASSAY OF TROPONIN QUANT: CPT | Performed by: EMERGENCY MEDICINE

## 2025-04-16 PROCEDURE — 85610 PROTHROMBIN TIME: CPT | Performed by: EMERGENCY MEDICINE

## 2025-04-16 PROCEDURE — 99214 OFFICE O/P EST MOD 30 MIN: CPT | Performed by: INTERNAL MEDICINE

## 2025-04-16 PROCEDURE — 71045 X-RAY EXAM CHEST 1 VIEW: CPT

## 2025-04-16 PROCEDURE — 94640 AIRWAY INHALATION TREATMENT: CPT

## 2025-04-16 RX ORDER — VENLAFAXINE HYDROCHLORIDE 75 MG/1
75 CAPSULE, EXTENDED RELEASE ORAL DAILY
Status: DISCONTINUED | OUTPATIENT
Start: 2025-04-16 | End: 2025-04-18 | Stop reason: HOSPADM

## 2025-04-16 RX ORDER — BUDESONIDE 0.5 MG/2ML
0.5 INHALANT ORAL 2 TIMES DAILY
Status: DISCONTINUED | OUTPATIENT
Start: 2025-04-16 | End: 2025-04-18 | Stop reason: HOSPADM

## 2025-04-16 RX ORDER — NITROGLYCERIN 0.4 MG/1
0.4 TABLET SUBLINGUAL
Status: DISCONTINUED | OUTPATIENT
Start: 2025-04-16 | End: 2025-04-17

## 2025-04-16 RX ORDER — OXYCODONE HYDROCHLORIDE 5 MG/1
7.5 TABLET ORAL EVERY 4 HOURS PRN
Status: DISCONTINUED | OUTPATIENT
Start: 2025-04-16 | End: 2025-04-18 | Stop reason: HOSPADM

## 2025-04-16 RX ORDER — ONDANSETRON 4 MG/1
4 TABLET, ORALLY DISINTEGRATING ORAL EVERY 6 HOURS PRN
Status: DISCONTINUED | OUTPATIENT
Start: 2025-04-16 | End: 2025-04-17

## 2025-04-16 RX ORDER — ATORVASTATIN CALCIUM 20 MG/1
20 TABLET, FILM COATED ORAL NIGHTLY
Status: DISCONTINUED | OUTPATIENT
Start: 2025-04-16 | End: 2025-04-18 | Stop reason: HOSPADM

## 2025-04-16 RX ORDER — SODIUM CHLORIDE 9 MG/ML
40 INJECTION, SOLUTION INTRAVENOUS AS NEEDED
Status: DISCONTINUED | OUTPATIENT
Start: 2025-04-16 | End: 2025-04-18 | Stop reason: HOSPADM

## 2025-04-16 RX ORDER — AMOXICILLIN 250 MG
2 CAPSULE ORAL 2 TIMES DAILY PRN
Status: DISCONTINUED | OUTPATIENT
Start: 2025-04-16 | End: 2025-04-18 | Stop reason: HOSPADM

## 2025-04-16 RX ORDER — BUDESONIDE 0.5 MG/2ML
0.5 INHALANT ORAL 2 TIMES DAILY
Qty: 120 ML | Refills: 5 | Status: SHIPPED | OUTPATIENT
Start: 2025-04-16 | End: 2025-10-13

## 2025-04-16 RX ORDER — ISOSORBIDE MONONITRATE 30 MG/1
30 TABLET, EXTENDED RELEASE ORAL
Status: DISCONTINUED | OUTPATIENT
Start: 2025-04-16 | End: 2025-04-18 | Stop reason: HOSPADM

## 2025-04-16 RX ORDER — ENOXAPARIN SODIUM 100 MG/ML
40 INJECTION SUBCUTANEOUS DAILY
Status: DISCONTINUED | OUTPATIENT
Start: 2025-04-16 | End: 2025-04-18 | Stop reason: HOSPADM

## 2025-04-16 RX ORDER — BISACODYL 10 MG
10 SUPPOSITORY, RECTAL RECTAL DAILY PRN
Status: DISCONTINUED | OUTPATIENT
Start: 2025-04-16 | End: 2025-04-18 | Stop reason: HOSPADM

## 2025-04-16 RX ORDER — ASPIRIN 81 MG/1
81 TABLET ORAL 2 TIMES DAILY
Status: DISCONTINUED | OUTPATIENT
Start: 2025-04-16 | End: 2025-04-18 | Stop reason: HOSPADM

## 2025-04-16 RX ORDER — DONEPEZIL HYDROCHLORIDE 5 MG/1
10 TABLET, FILM COATED ORAL NIGHTLY
Status: DISCONTINUED | OUTPATIENT
Start: 2025-04-16 | End: 2025-04-18 | Stop reason: HOSPADM

## 2025-04-16 RX ORDER — CLONAZEPAM 0.5 MG/1
0.5 TABLET ORAL 4 TIMES DAILY
Status: DISCONTINUED | OUTPATIENT
Start: 2025-04-16 | End: 2025-04-18 | Stop reason: HOSPADM

## 2025-04-16 RX ORDER — SODIUM CHLORIDE 0.9 % (FLUSH) 0.9 %
10 SYRINGE (ML) INJECTION AS NEEDED
Status: DISCONTINUED | OUTPATIENT
Start: 2025-04-16 | End: 2025-04-18 | Stop reason: HOSPADM

## 2025-04-16 RX ORDER — BISACODYL 5 MG/1
5 TABLET, DELAYED RELEASE ORAL DAILY PRN
Status: DISCONTINUED | OUTPATIENT
Start: 2025-04-16 | End: 2025-04-18 | Stop reason: HOSPADM

## 2025-04-16 RX ORDER — CARBIDOPA AND LEVODOPA 25; 100 MG/1; MG/1
2 TABLET, EXTENDED RELEASE ORAL 4 TIMES DAILY
Status: DISCONTINUED | OUTPATIENT
Start: 2025-04-16 | End: 2025-04-17

## 2025-04-16 RX ORDER — SODIUM CHLORIDE 0.9 % (FLUSH) 0.9 %
10 SYRINGE (ML) INJECTION EVERY 12 HOURS SCHEDULED
Status: DISCONTINUED | OUTPATIENT
Start: 2025-04-16 | End: 2025-04-18 | Stop reason: HOSPADM

## 2025-04-16 RX ORDER — CARVEDILOL 6.25 MG/1
6.25 TABLET ORAL 2 TIMES DAILY
Status: DISCONTINUED | OUTPATIENT
Start: 2025-04-16 | End: 2025-04-18 | Stop reason: HOSPADM

## 2025-04-16 RX ORDER — ALUMINA, MAGNESIA, AND SIMETHICONE 2400; 2400; 240 MG/30ML; MG/30ML; MG/30ML
15 SUSPENSION ORAL EVERY 6 HOURS PRN
Status: DISCONTINUED | OUTPATIENT
Start: 2025-04-16 | End: 2025-04-18 | Stop reason: HOSPADM

## 2025-04-16 RX ORDER — ALBUTEROL SULFATE 90 UG/1
2 INHALANT RESPIRATORY (INHALATION) EVERY 4 HOURS PRN
Qty: 1 G | Refills: 5 | Status: SHIPPED | OUTPATIENT
Start: 2025-04-16

## 2025-04-16 RX ORDER — ONDANSETRON 2 MG/ML
4 INJECTION INTRAMUSCULAR; INTRAVENOUS EVERY 6 HOURS PRN
Status: DISCONTINUED | OUTPATIENT
Start: 2025-04-16 | End: 2025-04-17

## 2025-04-16 RX ORDER — CLOPIDOGREL BISULFATE 75 MG/1
75 TABLET ORAL DAILY
Status: DISCONTINUED | OUTPATIENT
Start: 2025-04-16 | End: 2025-04-18 | Stop reason: HOSPADM

## 2025-04-16 RX ORDER — GABAPENTIN 300 MG/1
600 CAPSULE ORAL EVERY 8 HOURS SCHEDULED
Status: DISCONTINUED | OUTPATIENT
Start: 2025-04-16 | End: 2025-04-18 | Stop reason: HOSPADM

## 2025-04-16 RX ORDER — IPRATROPIUM BROMIDE AND ALBUTEROL SULFATE 2.5; .5 MG/3ML; MG/3ML
3 SOLUTION RESPIRATORY (INHALATION) EVERY 6 HOURS
Status: DISCONTINUED | OUTPATIENT
Start: 2025-04-16 | End: 2025-04-18 | Stop reason: HOSPADM

## 2025-04-16 RX ORDER — MONTELUKAST SODIUM 10 MG/1
10 TABLET ORAL NIGHTLY
Status: DISCONTINUED | OUTPATIENT
Start: 2025-04-16 | End: 2025-04-18 | Stop reason: HOSPADM

## 2025-04-16 RX ORDER — AMLODIPINE BESYLATE 5 MG/1
5 TABLET ORAL DAILY PRN
Status: DISCONTINUED | OUTPATIENT
Start: 2025-04-16 | End: 2025-04-18 | Stop reason: HOSPADM

## 2025-04-16 RX ORDER — POLYETHYLENE GLYCOL 3350 17 G/17G
17 POWDER, FOR SOLUTION ORAL DAILY PRN
Status: DISCONTINUED | OUTPATIENT
Start: 2025-04-16 | End: 2025-04-18 | Stop reason: HOSPADM

## 2025-04-16 RX ORDER — ALBUTEROL SULFATE 90 UG/1
2 INHALANT RESPIRATORY (INHALATION) EVERY 4 HOURS PRN
Status: DISCONTINUED | OUTPATIENT
Start: 2025-04-16 | End: 2025-04-18 | Stop reason: HOSPADM

## 2025-04-16 RX ORDER — TAMSULOSIN HYDROCHLORIDE 0.4 MG/1
0.4 CAPSULE ORAL DAILY
Status: DISCONTINUED | OUTPATIENT
Start: 2025-04-16 | End: 2025-04-18 | Stop reason: HOSPADM

## 2025-04-16 RX ORDER — PANTOPRAZOLE SODIUM 40 MG/1
40 TABLET, DELAYED RELEASE ORAL DAILY
Status: DISCONTINUED | OUTPATIENT
Start: 2025-04-16 | End: 2025-04-18 | Stop reason: HOSPADM

## 2025-04-16 RX ORDER — CARBIDOPA AND LEVODOPA 25; 100 MG/1; MG/1
2 TABLET ORAL 3 TIMES DAILY
Status: DISCONTINUED | OUTPATIENT
Start: 2025-04-16 | End: 2025-04-18 | Stop reason: HOSPADM

## 2025-04-16 RX ADMIN — MONTELUKAST 10 MG: 10 TABLET, FILM COATED ORAL at 21:11

## 2025-04-16 RX ADMIN — ASPIRIN 81 MG: 81 TABLET, COATED ORAL at 21:11

## 2025-04-16 RX ADMIN — CLONAZEPAM 0.5 MG: 0.5 TABLET ORAL at 21:10

## 2025-04-16 RX ADMIN — Medication 10 ML: at 21:19

## 2025-04-16 RX ADMIN — GABAPENTIN 600 MG: 300 CAPSULE ORAL at 21:19

## 2025-04-16 RX ADMIN — CLONAZEPAM 0.5 MG: 0.5 TABLET ORAL at 17:32

## 2025-04-16 RX ADMIN — ATORVASTATIN CALCIUM 20 MG: 20 TABLET, FILM COATED ORAL at 21:11

## 2025-04-16 RX ADMIN — TAMSULOSIN HYDROCHLORIDE 0.4 MG: 0.4 CAPSULE ORAL at 21:19

## 2025-04-16 RX ADMIN — IPRATROPIUM BROMIDE AND ALBUTEROL SULFATE 3 ML: .5; 3 SOLUTION RESPIRATORY (INHALATION) at 19:50

## 2025-04-16 RX ADMIN — CARBIDOPA AND LEVODOPA 2 TABLET: 25; 100 TABLET, EXTENDED RELEASE ORAL at 21:20

## 2025-04-16 RX ADMIN — DONEPEZIL HYDROCHLORIDE 10 MG: 5 TABLET, FILM COATED ORAL at 21:10

## 2025-04-16 RX ADMIN — BUDESONIDE 0.5 MG: 0.5 INHALANT RESPIRATORY (INHALATION) at 19:49

## 2025-04-16 RX ADMIN — GABAPENTIN 600 MG: 300 CAPSULE ORAL at 15:26

## 2025-04-16 RX ADMIN — ENOXAPARIN SODIUM 40 MG: 100 INJECTION SUBCUTANEOUS at 15:26

## 2025-04-16 RX ADMIN — CARBIDOPA AND LEVODOPA 2 TABLET: 25; 100 TABLET ORAL at 17:32

## 2025-04-16 RX ADMIN — IPRATROPIUM BROMIDE AND ALBUTEROL SULFATE 3 ML: .5; 3 SOLUTION RESPIRATORY (INHALATION) at 15:51

## 2025-04-16 NOTE — Clinical Note
Manual pressure applied to vessel. Manual pressure was held by EMILY Landry. Manual pressure was held for 5 min. Hemostasis achieved successfully.

## 2025-04-16 NOTE — PLAN OF CARE
Problem: Adult Inpatient Plan of Care  Goal: Plan of Care Review  Outcome: Not Progressing  Flowsheets (Taken 4/16/2025 1548)  Progress: no change  Plan of Care Reviewed With:   patient   child  Goal: Patient-Specific Goal (Individualized)  Outcome: Not Progressing  Goal: Absence of Hospital-Acquired Illness or Injury  Outcome: Not Progressing  Goal: Optimal Comfort and Wellbeing  Outcome: Not Progressing  Intervention: Provide Person-Centered Care  Recent Flowsheet Documentation  Taken 4/16/2025 1451 by Tamiko Lassiter RN  Trust Relationship/Rapport:   care explained   questions answered   questions encouraged  Goal: Readiness for Transition of Care  Outcome: Not Progressing  Intervention: Mutually Develop Transition Plan  Recent Flowsheet Documentation  Taken 4/16/2025 1448 by Tamiko Lassiter RN  Transportation Anticipated: family or friend will provide  Patient/Family Anticipated Services at Transition: none  Patient/Family Anticipates Transition to: home with family  Taken 4/16/2025 1445 by Tamiko Lassiter, RN  Equipment Currently Used at Home:   cane, straight   walker, standard   shower chair   oxygen     Problem: Comorbidity Management  Goal: Maintenance of COPD Symptom Control  Outcome: Not Progressing  Goal: Blood Pressure in Desired Range  Outcome: Not Progressing     Problem: Skin Injury Risk Increased  Goal: Skin Health and Integrity  Outcome: Not Progressing   Goal Outcome Evaluation:  Plan of Care Reviewed With: patient, child        Progress: no change

## 2025-04-16 NOTE — ED PROVIDER NOTES
Subjective   History of Present Illness  Chief complaint sent here to be admitted for heart cath tomorrow    History of present illness 64-year-old gentleman who scatted history of COPD and heart disease and stents who has been having problems over the last several months of increasing chest tightness and shortness of breath.  He has been in the hospital recently for COPD he saw his pulmonologist today and was told his lungs seem to be fine and his symptoms are not related to his lungs.  Patient initially had a heart cath scheduled for April 28 but was able to be bumped up till tomorrow.  Patient was referred here by his pulmonology office because of this increasing problem for chest pain and shortness of breath.  He states he has a lot of fatigue and is worse with exertion and better with rest has been ongoing for the last few months.      Review of Systems   Constitutional:  Positive for fatigue. Negative for chills and fever.   Respiratory:  Positive for chest tightness and shortness of breath.    Cardiovascular:  Positive for chest pain. Negative for leg swelling.   Gastrointestinal:  Negative for abdominal pain and vomiting.   Skin:  Negative for rash.   Neurological:  Positive for weakness.       Past Medical History:   Diagnosis Date    Anxiety 09/04/2012    Benign prostatic hyperplasia with lower urinary tract symptoms 02/27/2024    Borderline diabetes     COPD (chronic obstructive pulmonary disease)     Coronary artery disease 2015    To the best of my memory    COVID-19 03/18/2025    Cystic fibrosis 2021    Cytokine release syndrome, grade 1 03/20/2025    Deep vein thrombosis 2015    Blood clots    Depression 07/07/2016    Diffuse non-Hodgkin's lymphoma of testis 03/06/2018    Duodenitis 01/27/2022    Dysphagia 08/2022    from parkinsons    Emphysema of lung 2021    Erosive gastritis 01/27/2022    Esophageal stricture 01/27/2022    Fall 07/25/2019    Food impaction of esophagus 01/27/2022     Gastroesophageal reflux disease 09/04/2012    History of chemotherapy 04/25/2021    Hx of radiation therapy 04/25/2021    Intertrochanteric fracture of left femur, closed, initial encounter 08/25/2024    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    Lung nodule 2021    Mixed hyperlipidemia 07/08/2019    Myocardial infarction 2017    Non-Hodgkin's lymphoma of testis     Parkinson disease     Pneumonia of right upper lobe due to infectious organism 12/04/2022    Poor historian     Primary hypertension 07/08/2019    Pulmonary arterial hypertension 2007    PVD (peripheral vascular disease) with claudication 09/20/2024    Shortness of breath     Sleep apnea     does not have a cpap    Sleep apnea, obstructive 2021    Status post coronary artery stent placement 02/26/2024    Stroke 2017    Testosterone deficiency 07/25/2019    Tick bite 05/20/2021    Tobacco abuse 10/13/2021    Ulcer of esophagus without bleeding 01/27/2022    Uncomplicated alcohol dependence 04/25/2021       No Known Allergies    Past Surgical History:   Procedure Laterality Date    CARDIAC CATHETERIZATION N/A 02/26/2024    Procedure: Left and Right Heart Cath with Coronary Angiography;  Surgeon: Lelo Tello MD;  Location: [facility] CATH INVASIVE LOCATION;  Service: Cardiology;  Laterality: N/A;    CARDIAC CATHETERIZATION N/A 02/26/2024    Procedure: Percutaneous Coronary Intervention;  Surgeon: Lelo Tello MD;  Location: [facility] CATH INVASIVE LOCATION;  Service: Cardiology;  Laterality: N/A;    CARDIAC VALVE REPLACEMENT  2024    Stent    ENDOSCOPY N/A 01/27/2022    Procedure: ESOPHAGOGASTRODUODENOSCOPY with foreign body removal with gastric, duodenal biopsy and GE junction biopsy;  Surgeon: Pedro De La Rosa MD;  Location: [facility] ENDOSCOPY;  Service: Gastroenterology;  Laterality: N/A;  post: foreign body removal, erosive gastritis with biopsy, erosive eduodenitis with biopsy, esophagitis, GE junction biopsy to rule out malignancy,     ENDOSCOPY N/A  "2022    Procedure: ESOPHAGOGASTRODUODENOSCOPY with GE junction biopsy R/O Barretts, esophageal dilation #48 bougie;  Surgeon: Alexa Subramanian MD;  Location: [facility] ENDOSCOPY;  Service: Gastroenterology;  Laterality: N/A;  duodenal ulcer, GERD,     HIP TROCHANTERIC NAILING WITH INTRAMEDULLARY HIP SCREW Left 2024    Procedure: Left hip/femur fracture fixation with intramedullary ajit and screws;  Surgeon: Kameron Kaminski MD;  Location: [facility] MAIN OR;  Service: Orthopedics;  Laterality: Left;    INTERVENTIONAL RADIOLOGY PROCEDURE N/A 2024    Procedure: Intravascular Ultrasound;  Surgeon: Lelo Tello MD;  Location: [facility] CATH INVASIVE LOCATION;  Service: Cardiology;  Laterality: N/A;    ORCHIECTOMY         Family History   Problem Relation Age of Onset    Heart disease Mother         My brother, and a sister has had a brain anurysm    Stroke Mother     Heart failure Mother     Hypertension Mother         My whole imidate family, mother and all siblings. My father passed when i was [age] from black lung disease.    No Known Problems Father     Heart disease Brother     Heart failure Brother     Heart disease Brother     Heart failure Sister     Hypertension Sister        Social History     Socioeconomic History    Marital status: Legally    Tobacco Use    Smoking status: Former     Current packs/day: 0.00     Average packs/day: 1.1 packs/day for 67.1 years (75.0 ttl pk-yrs)     Types: Cigarettes     Start date: 1973     Quit date: 2024     Years since quittin.9     Passive exposure: Current    Smokeless tobacco: Never   Vaping Use    Vaping status: Never Used   Substance and Sexual Activity    Alcohol use: Not Currently     Comment: \"once a month\"    Drug use: Never    Sexual activity: Not Currently     Partners: Male     Prior to Admission medications    Medication Sig Start Date End Date Taking? Authorizing Provider   amLODIPine (NORVASC) 5 MG tablet Take 1 tablet by " mouth Daily As Needed (SBP>140mmHg). 3/26/25  Yes Nomi Cunningham MD   aspirin 81 MG EC tablet Take 1 tablet by mouth 2 (Two) Times a Day for 360 doses. 11/6/24 5/5/25 Yes Larry Mireles PA-C   atorvastatin (LIPITOR) 20 MG tablet Take 1 tablet by mouth Daily. 4/29/24  Yes Larry Mireles PA-C   carbidopa-levodopa (SINEMET)  MG per tablet Take 2 tablets by mouth 3 (Three) Times a Day. Indications: Parkinson's Disease, Parkinsonian-Like Syndrome   Yes ProviderMaci MD   carbidopa-levodopa ER (SINEMET CR)  MG per tablet Take 2 tablets by mouth Every Night.   Yes ProviderMaci MD   carvedilol (COREG) 6.25 MG tablet TAKE 1 TABLET BY MOUTH TWICE A DAY 3/17/25  Yes Floresita Rubi APRN   clonazePAM (KlonoPIN) 0.5 MG tablet Take 1 tablet by mouth 4 (Four) Times a Day. 4/7/25  Yes Larry Mireles PA-C   clopidogrel (PLAVIX) 75 MG tablet TAKE 1 TABLET BY MOUTH EVERY DAY 3/17/25  Yes Floresita Rubi APRN   donepezil (ARICEPT) 10 MG tablet TAKE 1 TABLET BY MOUTH EVERYDAY AT BEDTIME 1/20/25  Yes Larry Mireles PA-C   gabapentin (NEURONTIN) 600 MG tablet Take 1 tablet by mouth 3 (Three) Times a Day. 2/3/25  Yes Larry Mireles PA-C   isosorbide mononitrate (IMDUR) 30 MG 24 hr tablet Take 1 tablet by mouth Daily for 30 days. 4/5/25 5/5/25 Yes Haja Khoury PA-C   montelukast (Singulair) 10 MG tablet Take 1 tablet by mouth Every Night for 30 days. 4/4/25 5/4/25 Yes Haja Khoury PA-C   oxyCODONE-acetaminophen (PERCOCET) 7.5-325 MG per tablet Take 1 tablet by mouth Every 6 (Six) Hours As Needed for Moderate Pain. 4/7/25  Yes Larry Mireles PA-C   pantoprazole (PROTONIX) 40 MG EC tablet TAKE 1 TABLET BY MOUTH EVERY DAY 3/17/25  Yes Larry Mireles PA-C   tamsulosin (FLOMAX) 0.4 MG capsule 24 hr capsule TAKE 1 CAPSULE BY MOUTH EVERY DAY 3/17/25  Yes Larry Mireles PA-C   Testosterone Cypionate (DEPOTESTOTERONE CYPIONATE) 200 MG/ML injection Inject 1 mL into the  appropriate muscle as directed by prescriber Every 14 (Fourteen) Days. 3/17/25  Yes Maci Downs MD   venlafaxine XR (EFFEXOR-XR) 75 MG 24 hr capsule TAKE 1 CAPSULE BY MOUTH EVERY DAY 12/16/24  Yes Larry Mireles PA-C   albuterol sulfate  (90 Base) MCG/ACT inhaler Inhale 2 puffs Every 4 (Four) Hours As Needed for Wheezing. 4/16/25   Tatum Birmingham MD   budesonide (Pulmicort) 0.5 MG/2ML nebulizer solution Take 2 mL by nebulization 2 (Two) Times a Day for 180 days. 4/16/25 10/13/25  Tatum Birmingham MD   Cyanocobalamin (Vitamin B-12) 1000 MCG sublingual tablet Take 1 tablet by mouth Daily. 1/16/25   Larry Mireles PA-C   docusate sodium (COLACE) 100 MG capsule Take 1 capsule by mouth 2 (Two) Times a Day As Needed for Constipation.    ProviderMaci MD   fluticasone (FLONASE) 50 MCG/ACT nasal spray Administer 2 sprays into the nostril(s) as directed by provider Daily. 4/4/25   Haja Khoury PA-C   ipratropium-albuterol (DUO-NEB) 0.5-2.5 mg/3 ml nebulizer Take 3 mL by nebulization Every 6 (Six) Hours. DX J44.9 Medicare B 2/17/25   Larry Mireles PA-C   melatonin 5 MG tablet tablet Take 1 tablet by mouth At Night As Needed.    ProviderMaci MD   nitroglycerin (NITROSTAT) 0.4 MG SL tablet Place 1 tablet under the tongue Every 5 (Five) Minutes As Needed for Chest Pain (Do not take if systolic BP less than 100 mmHg). Take no more than 3 doses in 15 minutes. 2/27/24   Floresita Rubi APRN   ondansetron (ZOFRAN) 4 MG tablet Take 1 tablet by mouth Every 4 (Four) Hours As Needed for Nausea or Vomiting.    ProviderMaci MD   polyethylene glycol (MIRALAX) 17 GM/SCOOP powder Take 17 g by mouth Daily As Needed (Use if senna-docusate is ineffective). 8/28/24   Elizabeth Anna MD   albuterol sulfate  (90 Base) MCG/ACT inhaler Inhale 2 puffs Every 4 (Four) Hours As Needed for Wheezing. 10/10/24 4/16/25  Larry Mireles PA-C   baclofen (LIORESAL) 10 MG tablet Take 1  tablet by mouth 3 (Three) Times a Day.  4/16/25  Provider, MD Maci   budesonide (Pulmicort) 0.5 MG/2ML nebulizer solution Take 2 mL by nebulization Daily for 30 days. 4/4/25 4/16/25  Haja Khoury PA-C   meloxicam (MOBIC) 15 MG tablet Take 1 tablet by mouth Daily.  4/16/25  ProviderMaci MD   memantine (NAMENDA) 5 MG tablet Take 1 tablet by mouth Daily.  4/16/25  Provider, MD Maci          Objective   Physical Exam  Constitutional 64-year-old gentleman awake alert no acute distress triage vital signs reviewed.  HEENT unremarkable neck supple no adenopathy no JV no bruits lungs were mostly clear no retraction heart regular without murmur rub abdomen soft nontender good bowel sounds no peritoneal findings or pulsatile masses extremities pulses equal upper lower extremities no edema no cords no Homans' sign no evidence of DVT skin warm dry without rashes neurologic awake alert and follows commands motor strength normal without focal  Procedures           ED Course      Results for orders placed or performed during the hospital encounter of 04/16/25   ECG 12 Lead Dyspnea    Collection Time: 04/16/25 12:57 PM   Result Value Ref Range    QT Interval 427 ms    QTC Interval 415 ms     No radiology results for the last day  Medications   sodium chloride 0.9 % flush 10 mL (has no administration in time range)     Results for orders placed or performed during the hospital encounter of 04/16/25   ECG 12 Lead Dyspnea    Collection Time: 04/16/25 12:57 PM   Result Value Ref Range    QT Interval 427 ms    QTC Interval 415 ms   Comprehensive Metabolic Panel    Collection Time: 04/16/25  1:49 PM    Specimen: Blood   Result Value Ref Range    Glucose 128 (H) 65 - 99 mg/dL    BUN 11 8 - 23 mg/dL    Creatinine 0.73 (L) 0.76 - 1.27 mg/dL    Sodium 142 136 - 145 mmol/L    Potassium 4.1 3.5 - 5.2 mmol/L    Chloride 104 98 - 107 mmol/L    CO2 27.6 22.0 - 29.0 mmol/L    Calcium 9.3 8.6 - 10.5 mg/dL    Total  Protein 6.8 6.0 - 8.5 g/dL    Albumin 4.3 3.5 - 5.2 g/dL    ALT (SGPT) 6 1 - 41 U/L    AST (SGOT) 15 1 - 40 U/L    Alkaline Phosphatase 111 39 - 117 U/L    Total Bilirubin 0.4 0.0 - 1.2 mg/dL    Globulin 2.5 gm/dL    A/G Ratio 1.7 g/dL    BUN/Creatinine Ratio 15.1 7.0 - 25.0    Anion Gap 10.4 5.0 - 15.0 mmol/L    eGFR 100.3 >60.0 mL/min/1.73   High Sensitivity Troponin T    Collection Time: 04/16/25  1:49 PM    Specimen: Blood   Result Value Ref Range    HS Troponin T 15 <22 ng/L   Protime-INR    Collection Time: 04/16/25  1:49 PM    Specimen: Blood   Result Value Ref Range    Protime 13.1 11.7 - 14.2 Seconds    INR 1.00 0.90 - 1.10   aPTT    Collection Time: 04/16/25  1:49 PM    Specimen: Blood   Result Value Ref Range    PTT 27.5 22.7 - 35.4 seconds   CBC Auto Differential    Collection Time: 04/16/25  1:49 PM    Specimen: Blood   Result Value Ref Range    WBC 8.41 3.40 - 10.80 10*3/mm3    RBC 4.20 4.14 - 5.80 10*6/mm3    Hemoglobin 13.5 13.0 - 17.7 g/dL    Hematocrit 41.5 37.5 - 51.0 %    MCV 98.8 (H) 79.0 - 97.0 fL    MCH 32.1 26.6 - 33.0 pg    MCHC 32.5 31.5 - 35.7 g/dL    RDW 12.3 12.3 - 15.4 %    RDW-SD 44.8 37.0 - 54.0 fl    MPV 10.3 6.0 - 12.0 fL    Platelets 160 140 - 450 10*3/mm3    Neutrophil % 57.4 42.7 - 76.0 %    Lymphocyte % 34.0 19.6 - 45.3 %    Monocyte % 8.0 5.0 - 12.0 %    Eosinophil % 0.0 (L) 0.3 - 6.2 %    Basophil % 0.2 0.0 - 1.5 %    Immature Grans % 0.4 0.0 - 0.5 %    Neutrophils, Absolute 4.83 1.70 - 7.00 10*3/mm3    Lymphocytes, Absolute 2.86 0.70 - 3.10 10*3/mm3    Monocytes, Absolute 0.67 0.10 - 0.90 10*3/mm3    Eosinophils, Absolute 0.00 0.00 - 0.40 10*3/mm3    Basophils, Absolute 0.02 0.00 - 0.20 10*3/mm3    Immature Grans, Absolute 0.03 0.00 - 0.05 10*3/mm3    nRBC 0.0 0.0 - 0.2 /100 WBC   Gold Top - SST    Collection Time: 04/16/25  1:49 PM   Result Value Ref Range    Extra Tube Hold for add-ons.    High Sensitivity Troponin T 1Hr    Collection Time: 04/16/25  2:59 PM    Specimen:  Blood   Result Value Ref Range    HS Troponin T 15 <22 ng/L    Troponin T Numeric Delta 0 Abnormal if >/=3 ng/L     XR Chest 1 View  Result Date: 4/16/2025  No acute cardiopulmonary abnormality. Electronically Signed: Claire Ramos MD  4/16/2025 2:45 PM EDT  Workstation ID: [id number]    Medications   sodium chloride 0.9 % flush 10 mL (has no administration in time range)   albuterol sulfate HFA (PROVENTIL HFA;VENTOLIN HFA;PROAIR HFA) inhaler 2 puff (has no administration in time range)   amLODIPine (NORVASC) tablet 5 mg (has no administration in time range)   aspirin EC tablet 81 mg (81 mg Oral Given 4/16/25 2111)   atorvastatin (LIPITOR) tablet 20 mg (20 mg Oral Given 4/16/25 2111)   budesonide (PULMICORT) nebulizer solution 0.5 mg (0.5 mg Nebulization Given 4/16/25 1949)   carvedilol (COREG) tablet 6.25 mg (6.25 mg Oral Not Given 4/16/25 2120)   clonazePAM (KlonoPIN) tablet 0.5 mg (0.5 mg Oral Given 4/16/25 2110)   clopidogrel (PLAVIX) tablet 75 mg (75 mg Oral Not Given 4/16/25 1513)   donepezil (ARICEPT) tablet 10 mg (10 mg Oral Given 4/16/25 2110)   gabapentin (NEURONTIN) capsule 600 mg (600 mg Oral Given 4/16/25 2119)   ipratropium-albuterol (DUO-NEB) nebulizer solution 3 mL (3 mL Nebulization Given 4/16/25 1950)   isosorbide mononitrate (IMDUR) 24 hr tablet 30 mg (30 mg Oral Not Given 4/16/25 1516)   montelukast (SINGULAIR) tablet 10 mg (10 mg Oral Given 4/16/25 2111)   oxyCODONE (ROXICODONE) immediate release tablet 7.5 mg (has no administration in time range)   pantoprazole (PROTONIX) EC tablet 40 mg (40 mg Oral Not Given 4/16/25 1518)   tamsulosin (FLOMAX) 24 hr capsule 0.4 mg (0.4 mg Oral Given 4/16/25 2119)   venlafaxine XR (EFFEXOR-XR) 24 hr capsule 75 mg (75 mg Oral Not Given 4/16/25 1519)   carbidopa-levodopa (SINEMET)  MG per tablet 2 tablet (2 tablets Oral Not Given 4/16/25 2117)   carbidopa-levodopa ER (SINEMET CR)  MG per tablet 2 tablet (2 tablets Oral Given 4/16/25 2120)   nitroglycerin  (NITROSTAT) SL tablet 0.4 mg (has no administration in time range)   sodium chloride 0.9 % flush 10 mL (10 mL Intravenous Given 4/16/25 2119)   sodium chloride 0.9 % flush 10 mL (has no administration in time range)   sodium chloride 0.9 % infusion 40 mL (has no administration in time range)   aluminum-magnesium hydroxide-simethicone (MAALOX MAX) 400-400-40 MG/5ML suspension 15 mL (has no administration in time range)   ondansetron ODT (ZOFRAN-ODT) disintegrating tablet 4 mg (has no administration in time range)     Or   ondansetron (ZOFRAN) injection 4 mg (has no administration in time range)   melatonin tablet 5 mg (has no administration in time range)   enoxaparin sodium (LOVENOX) syringe 40 mg (40 mg Subcutaneous Given 4/16/25 1526)   Potassium Replacement - Follow Nurse / BPA Driven Protocol (has no administration in time range)   Magnesium Standard Dose Replacement - Follow Nurse / BPA Driven Protocol (has no administration in time range)   Phosphorus Replacement - Follow Nurse / BPA Driven Protocol (has no administration in time range)   Calcium Replacement - Follow Nurse / BPA Driven Protocol (has no administration in time range)   sennosides-docusate (PERICOLACE) 8.6-50 MG per tablet 2 tablet (has no administration in time range)     And   polyethylene glycol (MIRALAX) packet 17 g (has no administration in time range)     And   bisacodyl (DULCOLAX) EC tablet 5 mg (has no administration in time range)     And   bisacodyl (DULCOLAX) suppository 10 mg (has no administration in time range)                                            EKG my interpretation normal sinus rhythm rate 60 normal axis hypertrophy QTc of 458 normal no change from 4/2/2025            Medical Decision Making  Reviewed April 3, 2025 negative stress test September 2024 ultrasound echo with EF of 51 to 55%  Medical decision making.  Patient IV established monitor placement review of sinus rhythm EKG obtained normal sinus rhythm rate of 60  normal axis hypertrophy QTc of 458 no change from 4/2/2025 and otherwise unremarkable normal EKG.  Chest x-ray my independent review no pneumonia pneumothorax failure acute findings radiology review unremarkable.  Labs obtained by independent review comprehensive metabolic profile unremarkable CBC unremarkable troponins 1 hour apart were negative.  Patient repeat exam resting comfortably LCN evidence of acute myocardial infarction DVT pulmonary embolism or dissection pericarditis pericardial effusion sepsis.  This is based on my history and physical clinical findings although not a complete list of possibilities.  I talked to the patient's cardiologist Dr. Cunningham.  He was made aware of the findings patiently placed observation provider notified case discussed stable unremarkable course    Problems Addressed:  Chest pain, unspecified type: complicated acute illness or injury  Dyspnea, unspecified type: complicated acute illness or injury    Amount and/or Complexity of Data Reviewed  External Data Reviewed: ECG.     Details: Stress test and echo  Labs: ordered. Decision-making details documented in ED Course.  Radiology: ordered and independent interpretation performed. Decision-making details documented in ED Course.  ECG/medicine tests: ordered and independent interpretation performed. Decision-making details documented in ED Course.    Risk  Decision regarding hospitalization.        Final diagnoses:   Dyspnea, unspecified type   Chest pain, unspecified type       ED Disposition  ED Disposition       ED Disposition   Decision to Admit    Condition   --    Comment   --               No follow-up provider specified.       Medication List      No changes were made to your prescriptions during this visit.            Nathaniel Kennedy MD  04/16/25 [id number]

## 2025-04-16 NOTE — PROGRESS NOTES
HPI:  Patient is here for pulmonary follow-up.  Hospital discharge 4/6/2025  He reports shortness of breath on exertion, no chest pain    Past Medical History:   Diagnosis Date    Anxiety 09/04/2012    Benign prostatic hyperplasia with lower urinary tract symptoms 02/27/2024    Borderline diabetes     COPD (chronic obstructive pulmonary disease)     Coronary artery disease 2015    To the best of my memory    COVID-19 03/18/2025    Cystic fibrosis 2021    Cytokine release syndrome, grade 1 03/20/2025    Deep vein thrombosis 2015    Blood clots    Depression 07/07/2016    Diffuse non-Hodgkin's lymphoma of testis 03/06/2018    Duodenitis 01/27/2022    Dysphagia 08/2022    from parkinsons    Emphysema of lung 2021    Erosive gastritis 01/27/2022    Esophageal stricture 01/27/2022    Fall 07/25/2019    Food impaction of esophagus 01/27/2022    Gastroesophageal reflux disease 09/04/2012    History of chemotherapy 04/25/2021    Hx of radiation therapy 04/25/2021    Intertrochanteric fracture of left femur, closed, initial encounter 08/25/2024    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    Lung nodule 2021    Mixed hyperlipidemia 07/08/2019    Myocardial infarction 2017    Non-Hodgkin's lymphoma of testis     Parkinson disease     Pneumonia of right upper lobe due to infectious organism 12/04/2022    Poor historian     Primary hypertension 07/08/2019    Pulmonary arterial hypertension 2007    PVD (peripheral vascular disease) with claudication 09/20/2024    Shortness of breath     Sleep apnea     does not have a cpap    Sleep apnea, obstructive 2021    Status post coronary artery stent placement 02/26/2024    Stroke 2017    Testosterone deficiency 07/25/2019    Tick bite 05/20/2021    Tobacco abuse 10/13/2021    Ulcer of esophagus without bleeding 01/27/2022    Uncomplicated alcohol dependence 04/25/2021        Current Outpatient Medications on File Prior to Visit   Medication Sig Dispense Refill    albuterol sulfate HFA  108 (90 Base) MCG/ACT inhaler Inhale 2 puffs Every 4 (Four) Hours As Needed for Wheezing. 1 g 0    amLODIPine (NORVASC) 5 MG tablet Take 1 tablet by mouth Daily As Needed (SBP>140mmHg). 90 tablet 3    aspirin 81 MG EC tablet Take 1 tablet by mouth 2 (Two) Times a Day for 360 doses. 180 tablet 1    atorvastatin (LIPITOR) 20 MG tablet Take 1 tablet by mouth Daily. 90 tablet 1    budesonide (Pulmicort) 0.5 MG/2ML nebulizer solution Take 2 mL by nebulization Daily for 30 days. 60 mL 0    carbidopa-levodopa (SINEMET)  MG per tablet Take 2 tablets by mouth 3 (Three) Times a Day. Indications: Parkinson's Disease, Parkinsonian-Like Syndrome      carbidopa-levodopa ER (SINEMET CR)  MG per tablet Take 2 tablets by mouth 4 (Four) Times a Day.      carvedilol (COREG) 6.25 MG tablet TAKE 1 TABLET BY MOUTH TWICE A  tablet 1    clonazePAM (KlonoPIN) 0.5 MG tablet Take 1 tablet by mouth 4 (Four) Times a Day. 120 tablet 5    clopidogrel (PLAVIX) 75 MG tablet TAKE 1 TABLET BY MOUTH EVERY DAY 90 tablet 1    Cyanocobalamin (Vitamin B-12) 1000 MCG sublingual tablet Take 1 tablet by mouth Daily. 90 tablet 3    docusate sodium (COLACE) 100 MG capsule Take 1 capsule by mouth 2 (Two) Times a Day As Needed for Constipation.      donepezil (ARICEPT) 10 MG tablet TAKE 1 TABLET BY MOUTH EVERYDAY AT BEDTIME 90 tablet 1    fluticasone (FLONASE) 50 MCG/ACT nasal spray Administer 2 sprays into the nostril(s) as directed by provider Daily. 16 g 0    gabapentin (NEURONTIN) 600 MG tablet Take 1 tablet by mouth 3 (Three) Times a Day. 90 tablet 1    ipratropium-albuterol (DUO-NEB) 0.5-2.5 mg/3 ml nebulizer Take 3 mL by nebulization Every 6 (Six) Hours. DX J44.9 Medicare B 360 mL 3    isosorbide mononitrate (IMDUR) 30 MG 24 hr tablet Take 1 tablet by mouth Daily for 30 days. 30 tablet 0    melatonin 5 MG tablet tablet Take 1 tablet by mouth At Night As Needed.      montelukast (Singulair) 10 MG tablet Take 1 tablet by mouth Every Night  "for 30 days. 30 tablet 0    nitroglycerin (NITROSTAT) 0.4 MG SL tablet Place 1 tablet under the tongue Every 5 (Five) Minutes As Needed for Chest Pain (Do not take if systolic BP less than 100 mmHg). Take no more than 3 doses in 15 minutes. 25 tablet 1    ondansetron (ZOFRAN) 4 MG tablet Take 1 tablet by mouth Every 4 (Four) Hours As Needed for Nausea or Vomiting.      oxyCODONE-acetaminophen (PERCOCET) 7.5-325 MG per tablet Take 1 tablet by mouth Every 6 (Six) Hours As Needed for Moderate Pain. 60 tablet 0    pantoprazole (PROTONIX) 40 MG EC tablet TAKE 1 TABLET BY MOUTH EVERY DAY 90 tablet 1    polyethylene glycol (MIRALAX) 17 GM/SCOOP powder Take 17 g by mouth Daily As Needed (Use if senna-docusate is ineffective). 238 g 0    tamsulosin (FLOMAX) 0.4 MG capsule 24 hr capsule TAKE 1 CAPSULE BY MOUTH EVERY DAY 90 capsule 1    Testosterone Cypionate (DEPOTESTOTERONE CYPIONATE) 200 MG/ML injection Inject 1 mL into the appropriate muscle as directed by prescriber Every 14 (Fourteen) Days.      venlafaxine XR (EFFEXOR-XR) 75 MG 24 hr capsule TAKE 1 CAPSULE BY MOUTH EVERY DAY 90 capsule 1    [DISCONTINUED] baclofen (LIORESAL) 10 MG tablet Take 1 tablet by mouth 3 (Three) Times a Day.      [DISCONTINUED] meloxicam (MOBIC) 15 MG tablet Take 1 tablet by mouth Daily.      [DISCONTINUED] memantine (NAMENDA) 5 MG tablet Take 1 tablet by mouth Daily.       No current facility-administered medications on file prior to visit.        Social History     Tobacco Use    Smoking status: Former     Current packs/day: 0.00     Average packs/day: 1.1 packs/day for 67.1 years (75.0 ttl pk-yrs)     Types: Cigarettes     Start date: 1973     Quit date: 2024     Years since quittin.9     Passive exposure: Current    Smokeless tobacco: Never   Vaping Use    Vaping status: Never Used   Substance Use Topics    Alcohol use: Not Currently     Comment: \"once a month\"    Drug use: Never        Family History   Problem Relation Age of " "Onset    Heart disease Mother         My brother, and a sister has had a brain anurysm    Stroke Mother     Heart failure Mother     Hypertension Mother         My whole imidate family, mother and all siblings. My father passed when i was [age] from black lung disease.    No Known Problems Father     Heart disease Brother     Heart failure Brother     Heart disease Brother     Heart failure Sister     Hypertension Sister         Review of system:  Constitutional: Negative for chills, fever and malaise/fatigue.   HENT: Negative.    Eyes: Negative.    Cardiovascular: Negative.    Respiratory: negative for cough and shortness of breath.    Skin: Negative.    Musculoskeletal: Negative.    Gastrointestinal: Negative.    Genitourinary: Negative.    Neurological: Negative.    Psychiatric/Behavioral: Negative.    Physical exam:  Blood pressure 124/88, pulse 63, resp. rate 16, height 175.3 cm (69\"), weight 93.9 kg (207 lb), SpO2 96%.    General Appearance:  Alert   HEENT:  Normocephalic, without obvious abnormality, Conjunctiva/corneas clear,.   Nares normal, no drainage     Neck:  Supple, symmetrical, trachea midline. No JVD.  Lungs /Chest wall:   good air entry Bilaterlly, respirations unlabored, symmetrical wall movement.     Heart:  Regular rate and rhythm, S1 S2 normal  Abdomen: Soft, non-tender, no masses, no organomegaly.    Extremities: No edema, no clubbing or cyanosis    CT Chest Without Contrast Diagnostic  Result Date: 4/3/2025  Impression: 1.Findings suggestive of mild atelectasis in the lower lungs without other definite acute pulmonary abnormality identified at this time 2.Mild emphysema. 3.Advanced calcific atherosclerosis with left coronary artery stents. 4.Ascending aorta appears to measure up to 4.4 cm in diameter. Electronically Signed: Jesus Anders  4/3/2025 10:57 PM EDT  Workstation ID: [id number]    XR Chest 1 View  Result Date: 4/2/2025  Impression: No acute cardiopulmonary abnormality. Linear scarring " within the lateral right midlung. Electronically Signed: Deepak Lori  4/2/2025 8:58 AM EDT  Workstation ID: [id number]    XR Chest 1 View  Result Date: 3/18/2025  Impression: No active disease. Electronically Signed: Nathaniel Guerrero MD  3/18/2025 12:52 PM EDT  Workstation ID: [id number]     Results for orders placed during the hospital encounter of 09/23/24    Adult Transthoracic Echo Complete W/ Cont if Necessary Per Protocol    Interpretation Summary    Left ventricular systolic function is normal. Calculated left ventricular EF = 53% Left ventricular ejection fraction appears to be 51 - 55%.    Left ventricular diastolic function is consistent with (grade I) impaired relaxation. Average GLS -14.9%.    The left atrial cavity is dilated.    Estimated right ventricular systolic pressure from tricuspid regurgitation is normal (<35 mmHg).    No significant valvular abnormalities noted.        Assessment and plan:  Chronic shortness of breath on exertion  Former smoker quit April 2024 after 50 pack years  Mild emphysema    history of parkinsonism, hypertension, hyperlipidemia, CVA, COPD, tobacco abuse , coronary artery calcification     CAD status post PCI 2/2024     Pulm function test 9/19/2023.  FVC 4.49 L which is 100%  FEV1 3.0 L which is 90%  FEV1/FVC ratio 67.  Residual volume 110%  Total lung capacity 93%.  Diffusion capacity 89%     Last CT scan October 2021 no suspicious nodules  LDCT 9/19/2023  Impression:  1.No evidence of lung cancer.  2.Minimal bibasilar atelectasis.  3.Mild emphysema.     Sleep study 9/6/2023 no evidence of obstructive sleep apnea     [facility]HC 2/2024 [facility] HEMODYNAMICS:  RA 9  RV 29/10/12  PA 29/10/23  PCW 11     AO Sat 100%  PA Sat 77%     [facility] CO 6.17     [facility] CI 2.93      Plan        Dyspnea is likely related to recurrent CAD, currently the lungs sound good without significant wheezing: Keep follow-up with cardiology, noted plans for cardiac catheter  Hypoxia: Patient started on oxygen during  his admission in April 2025, currently is compliant and benefiting from therapy    Repeat PFTs in 2 months  Continue budesonide nebulized twice a day, DuoNeb 3 times daily and albuterol inhaler as needed     Also he was tried with a very strong inhaler Breztri which is triple therapy without significant improvement in his respiratory symptoms which explain that his symptoms is not pulmonary related     I personally reviewed the latest radiological study  Patient is advised to stay up-to-date on immunizations for flu, pneumococcal and COVID-19

## 2025-04-16 NOTE — TELEPHONE ENCOUNTER
Called and moved the procedure to tomorrow. Daughter was going to take him to the hospital now he is having some chest pains

## 2025-04-16 NOTE — CONSULTS
Referring Provider: Nathaniel Kennedy MD    Reason for Consultation: Chest pain, shortness of breath      Patient Care Team:  Larry Mireles PA-C as PCP - General (Physician Assistant)  Lelo Tello MD as Consulting Physician (Interventional Cardiology)  Floresita Rubi APRN as Nurse Practitioner (Cardiology)  Nomi Cunningham MD as Cardiologist (Cardiology)      SUBJECTIVE     Chief Complaint: Chest pain, shortness of breath    History of present illness:  Andrew Flores is a 64 y.o. male  with a history of hypertension, hyperlipidemia, coronary artery disease status post PCI to LAD in February 2024, peripheral arterial disease with claudication, stroke, Parkinson's disease, COPD and tobacco abuse who was recently hospitalized at [facility] with complaint of chest tightness and shortness of breath. He had a nuclear stress test, which showed improved perfusion on stress images and no evidence of ischemia. A CT scan of his chest was suggestive of mild atelectasis in the lower lungs, mild emphysema, and advanced calcification in the coronary arteries.    He was recommended to follow-up with pulmonology and cardiology.     He now presents again to the hospital with shortness of breath. He states the oxygen he has at home doesn't seem to alleviate his shortness of breath even though his oxygen saturation is normal. He reports occasional palpitations and dizziness/lightheadedness. He reports some chest tightness. He states his symptoms started approximately 6 weeks before he got Covid.     Current cardiac medications include:  aspirin, Plavix, atorvastatin, amlodipine, carvedilol and Imdur.     Review of systems:    Constitutional: + weakness, fatigue, fever, rigors, chills   Eyes: No vision changes, eye pain   ENT/oropharynx: No difficulty swallowing, sore throat, epistaxis, changes in hearing   Cardiovascular: + chest pain, chest tightness, palpitations, paroxysmal nocturnal dyspnea, orthopnea,  diaphoresis, dizziness / syncopal episode   Respiratory: + shortness of breath, dyspnea on exertion, cough, wheezing, hemoptysis   Gastrointestinal: No abdominal pain, nausea, vomiting, diarrhea, bloody stools   Genitourinary: No hematuria, dysuria   Neurological: No headache, tremors, numbness, one-sided weakness    Musculoskeletal: No cramps, myalgias, joint pain, joint swelling   Integument: No rash, edema        Personal History:      Past Medical History:   Diagnosis Date    Anxiety 09/04/2012    Benign prostatic hyperplasia with lower urinary tract symptoms 02/27/2024    Borderline diabetes     COPD (chronic obstructive pulmonary disease)     Coronary artery disease 2015    To the best of my memory    COVID-19 03/18/2025    Cystic fibrosis 2021    Cytokine release syndrome, grade 1 03/20/2025    Deep vein thrombosis 2015    Blood clots    Depression 07/07/2016    Diffuse non-Hodgkin's lymphoma of testis 03/06/2018    Duodenitis 01/27/2022    Dysphagia 08/2022    from parkinsons    Emphysema of lung 2021    Erosive gastritis 01/27/2022    Esophageal stricture 01/27/2022    Fall 07/25/2019    Food impaction of esophagus 01/27/2022    Gastroesophageal reflux disease 09/04/2012    History of chemotherapy 04/25/2021    Hx of radiation therapy 04/25/2021    Intertrochanteric fracture of left femur, closed, initial encounter 08/25/2024    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    Lung nodule 2021    Mixed hyperlipidemia 07/08/2019    Myocardial infarction 2017    Non-Hodgkin's lymphoma of testis     Parkinson disease     Pneumonia of right upper lobe due to infectious organism 12/04/2022    Poor historian     Primary hypertension 07/08/2019    Pulmonary arterial hypertension 2007    PVD (peripheral vascular disease) with claudication 09/20/2024    Shortness of breath     Sleep apnea     does not have a cpap    Sleep apnea, obstructive 2021    Status post coronary artery stent placement 02/26/2024    Stroke 2017     Testosterone deficiency 07/25/2019    Tick bite 05/20/2021    Tobacco abuse 10/13/2021    Ulcer of esophagus without bleeding 01/27/2022    Uncomplicated alcohol dependence 04/25/2021       Past Surgical History:   Procedure Laterality Date    CARDIAC CATHETERIZATION N/A 02/26/2024    Procedure: Left and Right Heart Cath with Coronary Angiography;  Surgeon: Lelo Tello MD;  Location: [facility] CATH INVASIVE LOCATION;  Service: Cardiology;  Laterality: N/A;    CARDIAC CATHETERIZATION N/A 02/26/2024    Procedure: Percutaneous Coronary Intervention;  Surgeon: Lelo Tello MD;  Location: [facility] CATH INVASIVE LOCATION;  Service: Cardiology;  Laterality: N/A;    CARDIAC VALVE REPLACEMENT  2024    Stent    ENDOSCOPY N/A 01/27/2022    Procedure: ESOPHAGOGASTRODUODENOSCOPY with foreign body removal with gastric, duodenal biopsy and GE junction biopsy;  Surgeon: Pedro De La Rosa MD;  Location: [facility] ENDOSCOPY;  Service: Gastroenterology;  Laterality: N/A;  post: foreign body removal, erosive gastritis with biopsy, erosive eduodenitis with biopsy, esophagitis, GE junction biopsy to rule out malignancy,     ENDOSCOPY N/A 08/23/2022    Procedure: ESOPHAGOGASTRODUODENOSCOPY with GE junction biopsy R/O Barretts, esophageal dilation #48 bougie;  Surgeon: Alexa Subramanian MD;  Location: [facility] ENDOSCOPY;  Service: Gastroenterology;  Laterality: N/A;  duodenal ulcer, GERD,     HIP TROCHANTERIC NAILING WITH INTRAMEDULLARY HIP SCREW Left 08/26/2024    Procedure: Left hip/femur fracture fixation with intramedullary ajit and screws;  Surgeon: Kameron Kaminski MD;  Location: [facility] MAIN OR;  Service: Orthopedics;  Laterality: Left;    INTERVENTIONAL RADIOLOGY PROCEDURE N/A 02/26/2024    Procedure: Intravascular Ultrasound;  Surgeon: Lelo Tello MD;  Location: [facility] CATH INVASIVE LOCATION;  Service: Cardiology;  Laterality: N/A;    ORCHIECTOMY  2019       Family History   Problem Relation Age of Onset    Heart disease Mother   "       My brother, and a sister has had a brain anurysm    Stroke Mother     Heart failure Mother     Hypertension Mother         My whole imidate family, mother and all siblings. My father passed when i was [age] from black lung disease.    No Known Problems Father     Heart disease Brother     Heart failure Brother     Heart disease Brother     Heart failure Sister     Hypertension Sister        Social History     Tobacco Use    Smoking status: Former     Current packs/day: 0.00     Average packs/day: 1.1 packs/day for 67.1 years (75.0 ttl pk-yrs)     Types: Cigarettes     Start date: 1973     Quit date: 2024     Years since quittin.9     Passive exposure: Current    Smokeless tobacco: Never   Vaping Use    Vaping status: Never Used   Substance Use Topics    Alcohol use: Not Currently     Comment: \"once a month\"    Drug use: Never        Home meds:  Prior to Admission medications    Medication Sig Start Date End Date Taking? Authorizing Provider   albuterol sulfate  (90 Base) MCG/ACT inhaler Inhale 2 puffs Every 4 (Four) Hours As Needed for Wheezing. 25   Tatum Birmingham MD   amLODIPine (NORVASC) 5 MG tablet Take 1 tablet by mouth Daily As Needed (SBP>140mmHg). 3/26/25   Nomi Cunningham MD   aspirin 81 MG EC tablet Take 1 tablet by mouth 2 (Two) Times a Day for 360 doses. 24  Larry Mireles PA-C   atorvastatin (LIPITOR) 20 MG tablet Take 1 tablet by mouth Daily. 24   Larry Mireles PA-C   budesonide (Pulmicort) 0.5 MG/2ML nebulizer solution Take 2 mL by nebulization 2 (Two) Times a Day for 180 days. 4/16/25 10/13/25  Tatum Birmingham MD   carbidopa-levodopa (SINEMET)  MG per tablet Take 2 tablets by mouth 3 (Three) Times a Day. Indications: Parkinson's Disease, Parkinsonian-Like Syndrome    ProviderMaci MD   carbidopa-levodopa ER (SINEMET CR)  MG per tablet Take 2 tablets by mouth 4 (Four) Times a Day.    ProviderMaci MD   carvedilol " (COREG) 6.25 MG tablet TAKE 1 TABLET BY MOUTH TWICE A DAY 3/17/25   Floresita Rubi APRN   clonazePAM (KlonoPIN) 0.5 MG tablet Take 1 tablet by mouth 4 (Four) Times a Day. 4/7/25   Larry Mireles PA-C   clopidogrel (PLAVIX) 75 MG tablet TAKE 1 TABLET BY MOUTH EVERY DAY 3/17/25   Floresita Rubi APRN   Cyanocobalamin (Vitamin B-12) 1000 MCG sublingual tablet Take 1 tablet by mouth Daily. 1/16/25   Larry Mireles PA-C   docusate sodium (COLACE) 100 MG capsule Take 1 capsule by mouth 2 (Two) Times a Day As Needed for Constipation.    Provider, MD Maci   donepezil (ARICEPT) 10 MG tablet TAKE 1 TABLET BY MOUTH EVERYDAY AT BEDTIME 1/20/25   Larry Mireles PA-C   fluticasone (FLONASE) 50 MCG/ACT nasal spray Administer 2 sprays into the nostril(s) as directed by provider Daily. 4/4/25   Haja Khoury PA-C   gabapentin (NEURONTIN) 600 MG tablet Take 1 tablet by mouth 3 (Three) Times a Day. 2/3/25   Larry Mireles PA-C   ipratropium-albuterol (DUO-NEB) 0.5-2.5 mg/3 ml nebulizer Take 3 mL by nebulization Every 6 (Six) Hours. DX J44.9 Medicare B 2/17/25   Larry Mireles PA-C   isosorbide mononitrate (IMDUR) 30 MG 24 hr tablet Take 1 tablet by mouth Daily for 30 days. 4/5/25 5/5/25  Haja Khoury PA-C   melatonin 5 MG tablet tablet Take 1 tablet by mouth At Night As Needed.    Provider, MD Maci   montelukast (Singulair) 10 MG tablet Take 1 tablet by mouth Every Night for 30 days. 4/4/25 5/4/25  Haja Khoury PA-C   nitroglycerin (NITROSTAT) 0.4 MG SL tablet Place 1 tablet under the tongue Every 5 (Five) Minutes As Needed for Chest Pain (Do not take if systolic BP less than 100 mmHg). Take no more than 3 doses in 15 minutes. 2/27/24   Floresita Rubi APRN   ondansetron (ZOFRAN) 4 MG tablet Take 1 tablet by mouth Every 4 (Four) Hours As Needed for Nausea or Vomiting.    Provider, MD Maci   oxyCODONE-acetaminophen (PERCOCET) 7.5-325 MG per tablet Take 1 tablet by mouth  "Every 6 (Six) Hours As Needed for Moderate Pain. 4/7/25   Larry Mireles PA-C   pantoprazole (PROTONIX) 40 MG EC tablet TAKE 1 TABLET BY MOUTH EVERY DAY 3/17/25   Larry Mireles PA-C   polyethylene glycol (MIRALAX) 17 GM/SCOOP powder Take 17 g by mouth Daily As Needed (Use if senna-docusate is ineffective). 8/28/24   Elizabeth Anna MD   tamsulosin (FLOMAX) 0.4 MG capsule 24 hr capsule TAKE 1 CAPSULE BY MOUTH EVERY DAY 3/17/25   Larry Mireles PA-C   Testosterone Cypionate (DEPOTESTOTERONE CYPIONATE) 200 MG/ML injection Inject 1 mL into the appropriate muscle as directed by prescriber Every 14 (Fourteen) Days. 3/17/25   Maci Downs MD   venlafaxine XR (EFFEXOR-XR) 75 MG 24 hr capsule TAKE 1 CAPSULE BY MOUTH EVERY DAY 12/16/24   Larry Mireles PA-C   albuterol sulfate  (90 Base) MCG/ACT inhaler Inhale 2 puffs Every 4 (Four) Hours As Needed for Wheezing. 10/10/24 4/16/25  Larry Mireles PA-C   baclofen (LIORESAL) 10 MG tablet Take 1 tablet by mouth 3 (Three) Times a Day.  4/16/25  Maci Downs MD   budesonide (Pulmicort) 0.5 MG/2ML nebulizer solution Take 2 mL by nebulization Daily for 30 days. 4/4/25 4/16/25  Haja Khoury PA-C   meloxicam (MOBIC) 15 MG tablet Take 1 tablet by mouth Daily.  4/16/25  Maci Downs MD   memantine (NAMENDA) 5 MG tablet Take 1 tablet by mouth Daily.  4/16/25  Maci Downs MD       Allergies:     Patient has no known allergies.    Scheduled Meds:   Continuous Infusions:   PRN Meds:  Insert Peripheral IV **AND** sodium chloride      OBJECTIVE    Vital Signs  Vitals:    04/16/25 1251 04/16/25 1252 04/16/25 1253   BP:   112/79   Pulse:  60    Resp:  20    Temp:   97.5 °F (36.4 °C)   TempSrc:  Oral    SpO2:  95%    Weight: 93.4 kg (206 lb)     Height: 175.3 cm (69\")         Flowsheet Rows      Flowsheet Row First Filed Value   Admission Height 175.3 cm (69\") Documented at 04/16/2025 1251   Admission Weight 93.4 kg (206 lb) " Documented at 04/16/2025 1251            No intake or output data in the 24 hours ending 04/16/25 1331     Telemetry: Normal sinus rhythm    Physical Exam:  The patient is alert, oriented and in no distress.  Vital signs as noted above.  Head and neck revealed no carotid bruits or jugular venous distention.  No thyromegaly or lymphadenopathy is present  Lungs clear.  No wheezing.  Breath sounds are normal bilaterally.  Heart: Normal first and second heart sounds. No murmur.  No precordial rub is present.  No gallop is present.  Abdomen: Soft and nontender.  No organomegaly is present.  Extremities with good peripheral pulses without any pedal edema.  Skin: Warm and dry.  Musculoskeletal system is grossly normal.  CNS grossly normal.       Results Review:  I have personally reviewed the results from the time of this admission to 4/16/2025 13:31 EDT and agree with these findings:  []  Laboratory  []  Microbiology  []  Radiology  []  EKG/Telemetry   []  Cardiology/Vascular   []  Pathology  []  Old records  []  Other:    Most notable findings include:     Lab Results (last 24 hours)       ** No results found for the last 24 hours. **            Imaging Results (Last 24 Hours)       ** No results found for the last 24 hours. **            LAB RESULTS (LAST 7 DAYS)    CBC        BMP        CMP         BNP        TROPONIN        CoAg        Creatinine Clearance  Estimated Creatinine Clearance: 105.1 mL/min (by C-G formula based on SCr of 0.78 mg/dL).    ABG          Radiology  No radiology results for the last day      EKG  I personally viewed and interpreted the patient's EKG/Telemetry data:  ECG 12 Lead Dyspnea   Preliminary Result   HEART RATE=57  bpm   RR Jypwmzql=1042  ms   ME Iudwtiwc=393  ms   P Horizontal Axis=29  deg   P Front Axis=28  deg   QRSD Hplpdhuf=541  ms   QT Njxylwpx=673  ms   WQrN=923  ms   QRS Axis=1  deg   T Wave Axis=50  deg   - OTHERWISE NORMAL ECG -   Sinus bradycardia   Date and Time of  Study:2025-04-16 12:57:52            Echocardiogram:    Results for orders placed during the hospital encounter of 09/23/24    Adult Transthoracic Echo Complete W/ Cont if Necessary Per Protocol    Interpretation Summary    Left ventricular systolic function is normal. Calculated left ventricular EF = 53% Left ventricular ejection fraction appears to be 51 - 55%.    Left ventricular diastolic function is consistent with (grade I) impaired relaxation. Average GLS -14.9%.    The left atrial cavity is dilated.    Estimated right ventricular systolic pressure from tricuspid regurgitation is normal (<35 mmHg).    No significant valvular abnormalities noted.        Stress Test:  Results for orders placed during the hospital encounter of 04/02/25    Stress Test With Myocardial Perfusion One Day    Interpretation Summary    Findings consistent with a normal ECG stress test.    Left ventricular ejection fraction is normal (Calculated EF = 62%).    Improved perfusion noted on stress images.    Myocardial perfusion imaging indicates a normal myocardial perfusion study with no evidence of ischemia. Impressions are consistent with a low risk study.        Cardiac Catheterization:  Results for orders placed during the hospital encounter of 02/26/24    Cardiac Catheterization/Vascular Study    Conclusion  PROCEDURES PERFORMED  Ultrasound guided Vascular access  Right heart catheterization  Left Heart Catheterization  Coronary Angiogram  Percutaneous coronary intervention with drug-eluting stent placement  Intravascular ultrasound  Moderate sedation    INDICATIONS FOR PROCEDURE  Positive stress test, LEUNG    PROCEDURE IN DETAIL  Informed consent was obtained from the patient after explaining the risks, benefits, and alternative options of the procedure. After obtaining informed consent, the patient was brought to the cath lab and was prepped in a sterile fashion.Lidocaine 2% was used for local anesthesia into the right IJ access  site. Right IJ vein was accessed using the micropuncture needle under ultrasound guidance and micropuncture wire advanced under flouroscopy. A 7 [place] vascular sheath was put into place percutaneously over guide-wire. Guide wires were removed. A 6Fr swan john catheter was advanced to wedge position. RA, RV and PA and wedge pressures were recorded.  PA sat and arterial sats recorded.    Lidocaine 2% was used for local anesthesia into the right  radial access site. The right  radial artery was accessed with a micropuncture needle via modified Seldinger technique under ultrasound guidance. A 6F introducer sheath was inserted successfully. Afterwards, 6F JR4 and JL3.5 diagnostic catheters were advanced over a wire into the ascending aorta and were used to engage the ostia of the left main and RCA respectively. JR4 used to cross the AV and obtain LV pressures and gradient across the AV measured via pullback technique. Images of the right and left coronary systems were obtained.    HEMODYNAMICS  LV: 156/8/16  AO: 149/79/107  Gradient 7 mmHg    RHC HEMODYNAMICS:  RA 9  RV 29/10/12  PA 29/10/23  PCW 11    AO Sat 100%  PA Sat 77%    [facility] CO 6.17    [facility] CI 2.93    FINDINGS  Coronary Angiogram    Right dominant circulation    Left main: Left main is a large caliber vessel which gives rise to the Left Anterior Descending and the Left circumflex.  Left main coronary artery is angiographically free from any significant disease    Left Anterior Descending Artery: LAD is a medium caliber vessel which gives rise to several septal perforators and several diagonal branches.  There is an 80% calcified lesion in the proximal LAD at the takeoff of a large D1 branch which further bifurcates.  The ostium of D1 is free from disease.  This was followed by a 70% lesion in the mid LAD.  There is heavy calcification in the entire LAD.    Left Circumflex: Left circumflex artery runs along the AV groove and gives obtuse marginal branches.  It  has moderate calcification with 30 to 40% stenosis in the midsegment.    Right Coronary Artery: The RCA is a large caliber vessel gives rise to PDA and PLV.  There is 20% stenosis in the proximal segment    Percutaneous coronary intervention:  100 units/kg of heparin was administered and ACT of more than 250 was documented.  6 Citizen of the [place] XB LAD 3.5 guide was used to engage the left main.  Run-through wire was advanced to the distal LAD.  IVUS of the LAD showed a distal reference vessel diameter of 3.6 mm with heavy concentric calcification in the mid to proximal LAD.  Proximal reference vessel diameter was 4.6 mm.  We then predilated the LAD with an NC 2.5 x 12 mm balloon.  It was then stented with a Xience 3.5 x 18 mm stent followed by a Xience 4.0 x 38 mm stent and postdilated with an NC 4.5 x 12 mm balloon.      Preprocedure stenosis: 80%  preprocedure ALEXI flow: 3  Lesion type: C  Postprocedure stenosis: 0%  Post procedure ALEXI flow: 3      All the catheters were exchanged over a wire and subsequently removed.    Radial sheath was removed and a TR band was applied with 15 cc of air to achieve hemostasis.        ESTIMATED BLOOD LOSS:  25 ml    COMPLICATIONS:  None    PROCEDURE DATA:  Contrast Used: 100 ml  Sedation Time:  57 minutes    IMPRESSIONS  One-vessel obstructive CAD of the proximal LAD status post successful IVUS guided PCI  No evidence of pulmonary hypertension  Normal left and right heart filling pressures    RECOMMENDATIONS  -Dual antiplatelet therapy  -Beta-blocker and high intensity statin  -ACE inhibitor if blood pressure tolerates  -Referral to cardiac rehab  -Continue aggressive risk factor stratification and modification  -Smoking cessation        Other:      ASSESSMENT & PLAN:    Active Problems:    * No active hospital problems. *    Chest tightness  Coronary artery disease of native artery of native heart with stable angina pectoris  History of PCI to LAD with jailing of diagonal vessel  ECG with  no ischemic changes  High-sensitivity troponin has been negative  Nuclear stress test showed no ischemia; improved perfusion with stress images  Continue DAPT, high intensity statin, and beta blocker   Continue amlodipine and Imdur   Despite providing reassurance, the patient and his daughter are concerned about the patient's symptoms worsening.   Repeated hospital presentations  Proceed with definitive cardiac catheterization     Mixed hyperlipidemia  Continue high intensity statin     Primary hypertension, chronic   Continue amlodipine, carvedilol, and Imdur     PVD (peripheral vascular disease) with claudication  Continue DAPT and high intensity statin      Parkinson's disease, unspecified whether dyskinesia present, unspecified whether manifestations fluctuate  On carbidopa-levodopa  Also on donepezil and memantine for dementia      Anxiety / Depression, unspecified depression type  On clonazepam and Effexor XR     Gastroesophageal reflux disease, unspecified whether esophagitis present  On PPI      Benign prostatic hyperplasia with urinary hesitancy  On Flomax      History of CVA (cerebrovascular accident)  Continue DAPT and high intensity statin      COPD  Obstructive sleep apnea syndrome  Encouraged compliance with CPAP  Repeat PFTs in 2 months.  Follow-up with pulmonology     Obesity (BMI 30-39.9)  BMI is 30.42. He weighs 206 lbs.   Lifestyle modifications recommended      Chronic low back pain with right-sided sciatica  The patient reports a history scoliosis.  He states his back pain has gotten worse recently. He reports right-sided weakness and numbness.   He was advised to follow-up with his PCP for possible spine imaging.     Nomi Cunningham MD  04/16/25  13:31 EDT

## 2025-04-16 NOTE — TELEPHONE ENCOUNTER
Hub staff attempted to follow warm transfer process and was unsuccessful     Caller: REN BATISTA(POA)    Relationship to patient: Emergency Contact    Best call back number: [phone]    Patient is needing: PT'S DAUGHTER IS CALLING TO SEE IF WE CAN GET THE PT IN FOR THE HEART CATH SOONER THAN 4/28/25. SHE WAS WORRIED ABOUT HIM GOING BACK TO THE HOSPITAL AND HAVING SOB AND FATIGUE. SHE ALSO SAID PULMONOLOGY SAID HIS SYMPTOMS ARE NOT RELATED TO HIS LUNGS    TRIED TO WT BUT RECEIVED VM. PLEASE CALL DAUGHTER

## 2025-04-17 VITALS
HEART RATE: 57 BPM | DIASTOLIC BLOOD PRESSURE: 91 MMHG | TEMPERATURE: 98.1 F | BODY MASS INDEX: 30.01 KG/M2 | OXYGEN SATURATION: 90 % | RESPIRATION RATE: 17 BRPM | HEIGHT: 69 IN | SYSTOLIC BLOOD PRESSURE: 158 MMHG | WEIGHT: 202.6 LBS

## 2025-04-17 PROBLEM — R53.1 GENERALIZED WEAKNESS: Status: ACTIVE | Noted: 2025-04-17

## 2025-04-17 LAB
ANION GAP SERPL CALCULATED.3IONS-SCNC: 12.2 MMOL/L (ref 5–15)
BASOPHILS # BLD AUTO: 0.03 10*3/MM3 (ref 0–0.2)
BASOPHILS NFR BLD AUTO: 0.4 % (ref 0–1.5)
BUN SERPL-MCNC: 12 MG/DL (ref 8–23)
BUN/CREAT SERPL: 15.6 (ref 7–25)
CALCIUM SPEC-SCNC: 9.1 MG/DL (ref 8.6–10.5)
CHLORIDE SERPL-SCNC: 107 MMOL/L (ref 98–107)
CO2 SERPL-SCNC: 24.8 MMOL/L (ref 22–29)
CREAT SERPL-MCNC: 0.77 MG/DL (ref 0.76–1.27)
DEPRECATED RDW RBC AUTO: 44.4 FL (ref 37–54)
EGFRCR SERPLBLD CKD-EPI 2021: 98.7 ML/MIN/1.73
EOSINOPHIL # BLD AUTO: 0.13 10*3/MM3 (ref 0–0.4)
EOSINOPHIL NFR BLD AUTO: 1.9 % (ref 0.3–6.2)
ERYTHROCYTE [DISTWIDTH] IN BLOOD BY AUTOMATED COUNT: 12.5 % (ref 12.3–15.4)
GLUCOSE SERPL-MCNC: 97 MG/DL (ref 65–99)
HCT VFR BLD AUTO: 39.8 % (ref 37.5–51)
HGB BLD-MCNC: 13 G/DL (ref 13–17.7)
IMM GRANULOCYTES # BLD AUTO: 0.06 10*3/MM3 (ref 0–0.05)
IMM GRANULOCYTES NFR BLD AUTO: 0.9 % (ref 0–0.5)
LYMPHOCYTES # BLD AUTO: 2.57 10*3/MM3 (ref 0.7–3.1)
LYMPHOCYTES NFR BLD AUTO: 38.4 % (ref 19.6–45.3)
MAGNESIUM SERPL-MCNC: 2 MG/DL (ref 1.6–2.4)
MCH RBC QN AUTO: 31.9 PG (ref 26.6–33)
MCHC RBC AUTO-ENTMCNC: 32.7 G/DL (ref 31.5–35.7)
MCV RBC AUTO: 97.8 FL (ref 79–97)
MONOCYTES # BLD AUTO: 0.69 10*3/MM3 (ref 0.1–0.9)
MONOCYTES NFR BLD AUTO: 10.3 % (ref 5–12)
NEUTROPHILS NFR BLD AUTO: 3.22 10*3/MM3 (ref 1.7–7)
NEUTROPHILS NFR BLD AUTO: 48.1 % (ref 42.7–76)
NRBC BLD AUTO-RTO: 0 /100 WBC (ref 0–0.2)
PLATELET # BLD AUTO: 155 10*3/MM3 (ref 140–450)
PMV BLD AUTO: 10.3 FL (ref 6–12)
POTASSIUM SERPL-SCNC: 3.6 MMOL/L (ref 3.5–5.2)
QT INTERVAL: 427 MS
QTC INTERVAL: 415 MS
RBC # BLD AUTO: 4.07 10*6/MM3 (ref 4.14–5.8)
SODIUM SERPL-SCNC: 144 MMOL/L (ref 136–145)
WBC NRBC COR # BLD AUTO: 6.7 10*3/MM3 (ref 3.4–10.8)

## 2025-04-17 PROCEDURE — 94761 N-INVAS EAR/PLS OXIMETRY MLT: CPT

## 2025-04-17 PROCEDURE — 84132 ASSAY OF SERUM POTASSIUM: CPT

## 2025-04-17 PROCEDURE — C1894 INTRO/SHEATH, NON-LASER: HCPCS | Performed by: INTERNAL MEDICINE

## 2025-04-17 PROCEDURE — 25510000001 IOPAMIDOL PER 1 ML: Performed by: INTERNAL MEDICINE

## 2025-04-17 PROCEDURE — 25010000002 HEPARIN (PORCINE) PER 1000 UNITS: Performed by: INTERNAL MEDICINE

## 2025-04-17 PROCEDURE — G0378 HOSPITAL OBSERVATION PER HR: HCPCS

## 2025-04-17 PROCEDURE — 25010000002 LIDOCAINE 2% SOLUTION: Performed by: INTERNAL MEDICINE

## 2025-04-17 PROCEDURE — 93460 R&L HRT ART/VENTRICLE ANGIO: CPT | Performed by: INTERNAL MEDICINE

## 2025-04-17 PROCEDURE — 25010000002 FENTANYL CITRATE (PF) 100 MCG/2ML SOLUTION: Performed by: INTERNAL MEDICINE

## 2025-04-17 PROCEDURE — 99152 MOD SED SAME PHYS/QHP 5/>YRS: CPT | Performed by: INTERNAL MEDICINE

## 2025-04-17 PROCEDURE — C1751 CATH, INF, PER/CENT/MIDLINE: HCPCS | Performed by: INTERNAL MEDICINE

## 2025-04-17 PROCEDURE — 82330 ASSAY OF CALCIUM: CPT

## 2025-04-17 PROCEDURE — 25010000002 NITROGLYCERIN 5 MG/ML SOLUTION: Performed by: INTERNAL MEDICINE

## 2025-04-17 PROCEDURE — 94799 UNLISTED PULMONARY SVC/PX: CPT

## 2025-04-17 PROCEDURE — 82803 BLOOD GASES ANY COMBINATION: CPT

## 2025-04-17 PROCEDURE — 84295 ASSAY OF SERUM SODIUM: CPT

## 2025-04-17 PROCEDURE — 85014 HEMATOCRIT: CPT

## 2025-04-17 PROCEDURE — 83735 ASSAY OF MAGNESIUM: CPT | Performed by: PHYSICIAN ASSISTANT

## 2025-04-17 PROCEDURE — 25010000002 MIDAZOLAM PER 1 MG: Performed by: INTERNAL MEDICINE

## 2025-04-17 PROCEDURE — 80048 BASIC METABOLIC PNL TOTAL CA: CPT | Performed by: PHYSICIAN ASSISTANT

## 2025-04-17 PROCEDURE — 82947 ASSAY GLUCOSE BLOOD QUANT: CPT

## 2025-04-17 PROCEDURE — 25010000002 NICARDIPINE 2.5 MG/ML SOLUTION: Performed by: INTERNAL MEDICINE

## 2025-04-17 PROCEDURE — 85025 COMPLETE CBC W/AUTO DIFF WBC: CPT | Performed by: PHYSICIAN ASSISTANT

## 2025-04-17 PROCEDURE — C1769 GUIDE WIRE: HCPCS | Performed by: INTERNAL MEDICINE

## 2025-04-17 PROCEDURE — 99214 OFFICE O/P EST MOD 30 MIN: CPT | Performed by: INTERNAL MEDICINE

## 2025-04-17 RX ORDER — ONDANSETRON 2 MG/ML
4 INJECTION INTRAMUSCULAR; INTRAVENOUS EVERY 6 HOURS PRN
Status: DISCONTINUED | OUTPATIENT
Start: 2025-04-17 | End: 2025-04-18 | Stop reason: HOSPADM

## 2025-04-17 RX ORDER — IOPAMIDOL 755 MG/ML
INJECTION, SOLUTION INTRAVASCULAR
Status: DISCONTINUED | OUTPATIENT
Start: 2025-04-17 | End: 2025-04-17 | Stop reason: HOSPADM

## 2025-04-17 RX ORDER — LIDOCAINE HYDROCHLORIDE 20 MG/ML
INJECTION, SOLUTION INFILTRATION; PERINEURAL
Status: DISCONTINUED | OUTPATIENT
Start: 2025-04-17 | End: 2025-04-17 | Stop reason: HOSPADM

## 2025-04-17 RX ORDER — ACETAMINOPHEN 325 MG/1
650 TABLET ORAL EVERY 4 HOURS PRN
Status: DISCONTINUED | OUTPATIENT
Start: 2025-04-17 | End: 2025-04-18 | Stop reason: HOSPADM

## 2025-04-17 RX ORDER — MIDAZOLAM HYDROCHLORIDE 1 MG/ML
INJECTION, SOLUTION INTRAMUSCULAR; INTRAVENOUS
Status: DISCONTINUED | OUTPATIENT
Start: 2025-04-17 | End: 2025-04-17 | Stop reason: HOSPADM

## 2025-04-17 RX ORDER — FENTANYL CITRATE 50 UG/ML
INJECTION, SOLUTION INTRAMUSCULAR; INTRAVENOUS
Status: DISCONTINUED | OUTPATIENT
Start: 2025-04-17 | End: 2025-04-17 | Stop reason: HOSPADM

## 2025-04-17 RX ORDER — NICARDIPINE HYDROCHLORIDE 2.5 MG/ML
INJECTION INTRAVENOUS
Status: DISCONTINUED | OUTPATIENT
Start: 2025-04-17 | End: 2025-04-17 | Stop reason: HOSPADM

## 2025-04-17 RX ORDER — CARBIDOPA AND LEVODOPA 25; 100 MG/1; MG/1
2 TABLET, EXTENDED RELEASE ORAL NIGHTLY
Status: DISCONTINUED | OUTPATIENT
Start: 2025-04-17 | End: 2025-04-18 | Stop reason: HOSPADM

## 2025-04-17 RX ORDER — NITROGLYCERIN 5 MG/ML
INJECTION, SOLUTION INTRAVENOUS
Status: DISCONTINUED | OUTPATIENT
Start: 2025-04-17 | End: 2025-04-17 | Stop reason: HOSPADM

## 2025-04-17 RX ORDER — HEPARIN SODIUM 1000 [USP'U]/ML
INJECTION, SOLUTION INTRAVENOUS; SUBCUTANEOUS
Status: DISCONTINUED | OUTPATIENT
Start: 2025-04-17 | End: 2025-04-17 | Stop reason: HOSPADM

## 2025-04-17 RX ORDER — DIPHENHYDRAMINE HCL 25 MG
25 CAPSULE ORAL EVERY 6 HOURS PRN
Status: DISCONTINUED | OUTPATIENT
Start: 2025-04-17 | End: 2025-04-18 | Stop reason: HOSPADM

## 2025-04-17 RX ORDER — NITROGLYCERIN 0.4 MG/1
0.4 TABLET SUBLINGUAL
Status: DISCONTINUED | OUTPATIENT
Start: 2025-04-17 | End: 2025-04-18 | Stop reason: HOSPADM

## 2025-04-17 RX ORDER — POTASSIUM CHLORIDE 1500 MG/1
40 TABLET, EXTENDED RELEASE ORAL EVERY 4 HOURS
Status: COMPLETED | OUTPATIENT
Start: 2025-04-17 | End: 2025-04-17

## 2025-04-17 RX ORDER — ONDANSETRON 4 MG/1
4 TABLET, ORALLY DISINTEGRATING ORAL EVERY 6 HOURS PRN
Status: DISCONTINUED | OUTPATIENT
Start: 2025-04-17 | End: 2025-04-18 | Stop reason: HOSPADM

## 2025-04-17 RX ADMIN — CARVEDILOL 6.25 MG: 6.25 TABLET, FILM COATED ORAL at 08:16

## 2025-04-17 RX ADMIN — VENLAFAXINE HYDROCHLORIDE 75 MG: 75 CAPSULE, EXTENDED RELEASE ORAL at 08:16

## 2025-04-17 RX ADMIN — Medication 10 ML: at 08:21

## 2025-04-17 RX ADMIN — CARVEDILOL 6.25 MG: 6.25 TABLET, FILM COATED ORAL at 21:16

## 2025-04-17 RX ADMIN — ISOSORBIDE MONONITRATE 30 MG: 30 TABLET, EXTENDED RELEASE ORAL at 08:16

## 2025-04-17 RX ADMIN — ATORVASTATIN CALCIUM 20 MG: 20 TABLET, FILM COATED ORAL at 21:16

## 2025-04-17 RX ADMIN — CLONAZEPAM 0.5 MG: 0.5 TABLET ORAL at 08:16

## 2025-04-17 RX ADMIN — POTASSIUM CHLORIDE 40 MEQ: 1500 TABLET, EXTENDED RELEASE ORAL at 06:04

## 2025-04-17 RX ADMIN — GABAPENTIN 600 MG: 300 CAPSULE ORAL at 21:15

## 2025-04-17 RX ADMIN — CARBIDOPA AND LEVODOPA 2 TABLET: 25; 100 TABLET ORAL at 08:16

## 2025-04-17 RX ADMIN — IPRATROPIUM BROMIDE AND ALBUTEROL SULFATE 3 ML: .5; 3 SOLUTION RESPIRATORY (INHALATION) at 19:05

## 2025-04-17 RX ADMIN — IPRATROPIUM BROMIDE AND ALBUTEROL SULFATE 3 ML: .5; 3 SOLUTION RESPIRATORY (INHALATION) at 03:44

## 2025-04-17 RX ADMIN — CARBIDOPA AND LEVODOPA 2 TABLET: 25; 100 TABLET, EXTENDED RELEASE ORAL at 21:15

## 2025-04-17 RX ADMIN — ASPIRIN 81 MG: 81 TABLET, COATED ORAL at 08:16

## 2025-04-17 RX ADMIN — CLOPIDOGREL BISULFATE 75 MG: 75 TABLET, FILM COATED ORAL at 08:16

## 2025-04-17 RX ADMIN — MONTELUKAST 10 MG: 10 TABLET, FILM COATED ORAL at 21:16

## 2025-04-17 RX ADMIN — PANTOPRAZOLE SODIUM 40 MG: 40 TABLET, DELAYED RELEASE ORAL at 08:16

## 2025-04-17 RX ADMIN — DONEPEZIL HYDROCHLORIDE 10 MG: 5 TABLET, FILM COATED ORAL at 21:15

## 2025-04-17 RX ADMIN — BUDESONIDE 0.5 MG: 0.5 INHALANT RESPIRATORY (INHALATION) at 07:20

## 2025-04-17 RX ADMIN — CARBIDOPA AND LEVODOPA 2 TABLET: 25; 100 TABLET ORAL at 21:16

## 2025-04-17 RX ADMIN — IPRATROPIUM BROMIDE AND ALBUTEROL SULFATE 3 ML: .5; 3 SOLUTION RESPIRATORY (INHALATION) at 14:20

## 2025-04-17 RX ADMIN — Medication 10 ML: at 21:16

## 2025-04-17 RX ADMIN — CLONAZEPAM 0.5 MG: 0.5 TABLET ORAL at 11:58

## 2025-04-17 RX ADMIN — TAMSULOSIN HYDROCHLORIDE 0.4 MG: 0.4 CAPSULE ORAL at 08:16

## 2025-04-17 RX ADMIN — IPRATROPIUM BROMIDE AND ALBUTEROL SULFATE 3 ML: .5; 3 SOLUTION RESPIRATORY (INHALATION) at 07:14

## 2025-04-17 RX ADMIN — POTASSIUM CHLORIDE 40 MEQ: 1500 TABLET, EXTENDED RELEASE ORAL at 09:56

## 2025-04-17 RX ADMIN — ASPIRIN 81 MG: 81 TABLET, COATED ORAL at 21:16

## 2025-04-17 RX ADMIN — GABAPENTIN 600 MG: 300 CAPSULE ORAL at 06:03

## 2025-04-17 RX ADMIN — CLONAZEPAM 0.5 MG: 0.5 TABLET ORAL at 18:02

## 2025-04-17 RX ADMIN — BUDESONIDE 0.5 MG: 0.5 INHALANT RESPIRATORY (INHALATION) at 19:09

## 2025-04-17 RX ADMIN — GABAPENTIN 600 MG: 300 CAPSULE ORAL at 12:04

## 2025-04-17 NOTE — PLAN OF CARE
Problem: Adult Inpatient Plan of Care  Goal: Plan of Care Review  Outcome: Progressing  Flowsheets (Taken 4/17/2025 0502)  Progress: improving  Plan of Care Reviewed With: patient  Goal: Patient-Specific Goal (Individualized)  Outcome: Progressing  Goal: Absence of Hospital-Acquired Illness or Injury  Outcome: Progressing  Intervention: Identify and Manage Fall Risk  Recent Flowsheet Documentation  Taken 4/17/2025 0435 by Orin Dilalo RN  Safety Promotion/Fall Prevention: safety round/check completed  Taken 4/17/2025 0210 by Orin Diallo RN  Safety Promotion/Fall Prevention: safety round/check completed  Taken 4/17/2025 0010 by Orin Diallo RN  Safety Promotion/Fall Prevention: safety round/check completed  Taken 4/16/2025 2223 by Orin Diallo RN  Safety Promotion/Fall Prevention: safety round/check completed  Taken 4/16/2025 2020 by Orin Diallo RN  Safety Promotion/Fall Prevention: safety round/check completed  Intervention: Prevent and Manage VTE (Venous Thromboembolism) Risk  Recent Flowsheet Documentation  Taken 4/16/2025 2020 by Orin Diallo RN  VTE Prevention/Management: SCDs (sequential compression devices) off  Goal: Optimal Comfort and Wellbeing  Outcome: Progressing  Intervention: Provide Person-Centered Care  Recent Flowsheet Documentation  Taken 4/16/2025 2020 by Orin Diallo RN  Trust Relationship/Rapport:   care explained   questions answered  Goal: Readiness for Transition of Care  Outcome: Progressing     Problem: Comorbidity Management  Goal: Maintenance of COPD Symptom Control  Outcome: Progressing  Goal: Blood Pressure in Desired Range  Outcome: Progressing     Problem: Skin Injury Risk Increased  Goal: Skin Health and Integrity  Outcome: Progressing   Goal Outcome Evaluation:  Plan of Care Reviewed With: patient        Progress: improving

## 2025-04-17 NOTE — SIGNIFICANT NOTE
04/17/25 1553   Rehab Time/Intention   Session Not Performed patient unavailable for evaluation   Recommendation   OT - Next Appointment 04/18/25

## 2025-04-17 NOTE — CASE MANAGEMENT/SOCIAL WORK
Discharge Planning Assessment  [facility]     Patient Name: Andrew Flores  MRN: [id number]  Today's Date: 4/17/2025    Admit Date: 4/16/2025    Plan: D/C Plan: Return home with daughter. Home O2 2L at night and PRN. Current with [facility]. Family transport.   Discharge Needs Assessment       Row Name 04/17/25 1603       Living Environment    People in Home child(elvie), adult    Name(s) of People in Home Desirae    Current Living Arrangements home    Potentially Unsafe Housing Conditions none    In the past 12 months has the electric, gas, oil, or water company threatened to shut off services in your home? No    Primary Care Provided by self    Provides Primary Care For no one    Family Caregiver if Needed child(elvie), adult    Family Caregiver Names Desirae    Quality of Family Relationships helpful;involved;supportive    Able to Return to Prior Arrangements yes       Resource/Environmental Concerns    Resource/Environmental Concerns none    Transportation Concerns none       Transportation Needs    In the past 12 months, has lack of transportation kept you from medical appointments or from getting medications? no    In the past 12 months, has lack of transportation kept you from meetings, work, or from getting things needed for daily living? No       Food Insecurity    Within the past 12 months, you worried that your food would run out before you got the money to buy more. Never true    Within the past 12 months, the food you bought just didn't last and you didn't have money to get more. Never true       Transition Planning    Patient/Family Anticipates Transition to home with family    Patient/Family Anticipated Services at Transition none    Transportation Anticipated family or friend will provide       Discharge Needs Assessment    Readmission Within the Last 30 Days no previous admission in last 30 days    Equipment Currently Used at Home cane, straight;walker, standard;shower chair;oxygen    Concerns to be Addressed  denies needs/concerns at this time    Do you want help finding or keeping work or a job? I do not need or want help    Do you want help with school or training? For example, starting or completing job training or getting a high school diploma, GED or equivalent No    Anticipated Changes Related to Illness none    Equipment Needed After Discharge none    Provided Post Acute Provider List? N/A    Provided Post Acute Provider Quality & Resource List? N/A    Current Discharge Risk dependent with mobility/activities of daily living                   Discharge Plan       Row Name 04/17/25 1604       Plan    Plan D/C Plan: Return home with daughter. Home O2 2L at night and PRN. Current with [facility]. Family transport.    Provided Post Acute Provider List? N/A    Provided Post Acute Provider Quality & Resource List? N/A    Plan Comments CM spoke with patient at bedside to discuss admission assessment and discharge planning. Patient is oriented x3. Patient confirms PCP and pharmacy. Patient has declined meds to bed program at this time. Patient denies any difficulty affording medications at this time. Patient denies any additional needs for services or DME at this time. D/C barriers: 4/17/25 Cath lab.                 Expected Discharge Date and Time       Expected Discharge Date Expected Discharge Time    Apr 17, 2025            Demographic Summary       Row Name 04/17/25 1602       General Information    Admission Type observation    Arrived From emergency department    Referral Source admission list    Reason for Consult discharge planning    Preferred Language English       Contact Information    Permission Granted to Share Info With                    Functional Status       Row Name 04/17/25 1603       Functional Status    Usual Activity Tolerance moderate    Current Activity Tolerance moderate       Functional Status, IADL    Medications assistive person    Meal Preparation assistive person    Housekeeping  assistive person    Laundry assistive person    Shopping assistive person    If for any reason you need help with day-to-day activities such as bathing, preparing meals, shopping, managing finances, etc., do you get the help you need? I get all the help I need       Mental Status    General Appearance WDL WDL       Mental Status Summary    Recent Changes in Mental Status/Cognitive Functioning no changes       Employment/    Employment Status disabled;, previous service           Current or Previous  Service none             Nadja Turner RN     [address] [address]  Phone: [phone]  Fax: [phone]

## 2025-04-17 NOTE — OUTREACH NOTE
Medical Week 3 Survey      Flowsheet Row Responses   [facility] facility patient discharged from? [facility]   Does the patient have one of the following disease processes/diagnoses(primary or secondary)? Other   Week 3 attempt successful? No   Unsuccessful attempts Attempt 1   Revoke Readmitted            CONNIE LEUNG - Registered Nurse   Private Residence

## 2025-04-17 NOTE — DISCHARGE SUMMARY
"[facility]    Larry Mireles PA-C    CHIEF COMPLAINT:     Recurrent chest pain    HISTORY OF PRESENT ILLNESS:    Obtained from ED provider HPI on 4/16/2025:  History of present illness 64-year-old gentleman who scatted history of COPD and heart disease and stents who has been having problems over the last several months of increasing chest tightness and shortness of breath.  He has been in the hospital recently for COPD he saw his pulmonologist today and was told his lungs seem to be fine and his symptoms are not related to his lungs.  Patient initially had a heart cath scheduled for April 28 but was able to be bumped up till tomorrow.  Patient was referred here by his pulmonology office because of this increasing problem for chest pain and shortness of breath.  He states he has a lot of fatigue and is worse with exertion and better with rest has been ongoing for the last few months.     04/17/25:  Patient confirms the HPI noted above including a several month history of some intermittent episodes of chest pain which have become increasingly worse over the past 1.5 weeks.  He has had some associated dyspnea with symptoms noted as worse with exertion.  Patient does note that he has recently had \"coronavirus\" but that his symptoms do predate that and he is compliant with all of his outpatient medical therapies.  Following his admission he is in generally well without significant acute symptoms and he appears to be resting comfortably at the time of my exam            Past Medical History:   Diagnosis Date    Anxiety 09/04/2012    Benign prostatic hyperplasia with lower urinary tract symptoms 02/27/2024    Borderline diabetes     COPD (chronic obstructive pulmonary disease)     Coronary artery disease 2015    To the best of my memory    COVID-19 03/18/2025    Cystic fibrosis 2021    Cytokine release syndrome, grade 1 03/20/2025    Deep vein thrombosis 2015    Blood clots    Depression " 07/07/2016    Diffuse non-Hodgkin's lymphoma of testis 03/06/2018    Duodenitis 01/27/2022    Dysphagia 08/2022    from parkinsons    Emphysema of lung 2021    Erosive gastritis 01/27/2022    Esophageal stricture 01/27/2022    Fall 07/25/2019    Food impaction of esophagus 01/27/2022    Gastroesophageal reflux disease 09/04/2012    History of chemotherapy 04/25/2021    Hx of radiation therapy 04/25/2021    Intertrochanteric fracture of left femur, closed, initial encounter 08/25/2024    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    Lung nodule 2021    Mixed hyperlipidemia 07/08/2019    Myocardial infarction 2017    Non-Hodgkin's lymphoma of testis     Parkinson disease     Pneumonia of right upper lobe due to infectious organism 12/04/2022    Poor historian     Primary hypertension 07/08/2019    Pulmonary arterial hypertension 2007    PVD (peripheral vascular disease) with claudication 09/20/2024    Shortness of breath     Sleep apnea     does not have a cpap    Sleep apnea, obstructive 2021    Status post coronary artery stent placement 02/26/2024    Stroke 2017    Testosterone deficiency 07/25/2019    Tick bite 05/20/2021    Tobacco abuse 10/13/2021    Ulcer of esophagus without bleeding 01/27/2022    Uncomplicated alcohol dependence 04/25/2021     Past Surgical History:   Procedure Laterality Date    CARDIAC CATHETERIZATION N/A 02/26/2024    Procedure: Left and Right Heart Cath with Coronary Angiography;  Surgeon: Lelo Tello MD;  Location: [facility] CATH INVASIVE LOCATION;  Service: Cardiology;  Laterality: N/A;    CARDIAC CATHETERIZATION N/A 02/26/2024    Procedure: Percutaneous Coronary Intervention;  Surgeon: Lelo Tello MD;  Location: [facility] CATH INVASIVE LOCATION;  Service: Cardiology;  Laterality: N/A;    CARDIAC VALVE REPLACEMENT  2024    Stent    ENDOSCOPY N/A 01/27/2022    Procedure: ESOPHAGOGASTRODUODENOSCOPY with foreign body removal with gastric, duodenal biopsy and GE junction biopsy;  Surgeon:  "Pedro De La Rosa MD;  Location: [facility] ENDOSCOPY;  Service: Gastroenterology;  Laterality: N/A;  post: foreign body removal, erosive gastritis with biopsy, erosive eduodenitis with biopsy, esophagitis, GE junction biopsy to rule out malignancy,     ENDOSCOPY N/A 2022    Procedure: ESOPHAGOGASTRODUODENOSCOPY with GE junction biopsy R/O Barretts, esophageal dilation #48 bougie;  Surgeon: Alexa Subramanian MD;  Location: [facility] ENDOSCOPY;  Service: Gastroenterology;  Laterality: N/A;  duodenal ulcer, GERD,     HIP TROCHANTERIC NAILING WITH INTRAMEDULLARY HIP SCREW Left 2024    Procedure: Left hip/femur fracture fixation with intramedullary ajit and screws;  Surgeon: Kameron Kaminski MD;  Location: [facility] MAIN OR;  Service: Orthopedics;  Laterality: Left;    INTERVENTIONAL RADIOLOGY PROCEDURE N/A 2024    Procedure: Intravascular Ultrasound;  Surgeon: Lelo Telol MD;  Location: [facility] CATH INVASIVE LOCATION;  Service: Cardiology;  Laterality: N/A;    ORCHIECTOMY       Family History   Problem Relation Age of Onset    Heart disease Mother         My brother, and a sister has had a brain anurysm    Stroke Mother     Heart failure Mother     Hypertension Mother         My whole imidate family, mother and all siblings. My father passed when i was [age] from black lung disease.    No Known Problems Father     Heart disease Brother     Heart failure Brother     Heart disease Brother     Heart failure Sister     Hypertension Sister      Social History     Tobacco Use    Smoking status: Former     Current packs/day: 0.00     Average packs/day: 1.1 packs/day for 67.1 years (75.0 ttl pk-yrs)     Types: Cigarettes     Start date: 1973     Quit date: 2024     Years since quittin.9     Passive exposure: Current    Smokeless tobacco: Never   Vaping Use    Vaping status: Never Used   Substance Use Topics    Alcohol use: Not Currently     Comment: \"once a month\"    Drug use: Never     Medications " Prior to Admission   Medication Sig Dispense Refill Last Dose/Taking    amLODIPine (NORVASC) 5 MG tablet Take 1 tablet by mouth Daily As Needed (SBP>140mmHg). 90 tablet 3 Past Week    aspirin 81 MG EC tablet Take 1 tablet by mouth 2 (Two) Times a Day for 360 doses. 180 tablet 1 Past Week    atorvastatin (LIPITOR) 20 MG tablet Take 1 tablet by mouth Daily. 90 tablet 1 Past Week    carbidopa-levodopa (SINEMET)  MG per tablet Take 2 tablets by mouth 3 (Three) Times a Day. Indications: Parkinson's Disease, Parkinsonian-Like Syndrome   Past Week    carbidopa-levodopa ER (SINEMET CR)  MG per tablet Take 2 tablets by mouth Every Night.   Past Week    carvedilol (COREG) 6.25 MG tablet TAKE 1 TABLET BY MOUTH TWICE A  tablet 1 Past Week    clonazePAM (KlonoPIN) 0.5 MG tablet Take 1 tablet by mouth 4 (Four) Times a Day. 120 tablet 5 Past Week    clopidogrel (PLAVIX) 75 MG tablet TAKE 1 TABLET BY MOUTH EVERY DAY 90 tablet 1 Past Week    donepezil (ARICEPT) 10 MG tablet TAKE 1 TABLET BY MOUTH EVERYDAY AT BEDTIME 90 tablet 1 Past Week    gabapentin (NEURONTIN) 600 MG tablet Take 1 tablet by mouth 3 (Three) Times a Day. 90 tablet 1 Past Week    isosorbide mononitrate (IMDUR) 30 MG 24 hr tablet Take 1 tablet by mouth Daily for 30 days. 30 tablet 0 Past Week    montelukast (Singulair) 10 MG tablet Take 1 tablet by mouth Every Night for 30 days. 30 tablet 0 Past Week    oxyCODONE-acetaminophen (PERCOCET) 7.5-325 MG per tablet Take 1 tablet by mouth Every 6 (Six) Hours As Needed for Moderate Pain. 60 tablet 0 Past Week    pantoprazole (PROTONIX) 40 MG EC tablet TAKE 1 TABLET BY MOUTH EVERY DAY 90 tablet 1 Past Week    tamsulosin (FLOMAX) 0.4 MG capsule 24 hr capsule TAKE 1 CAPSULE BY MOUTH EVERY DAY 90 capsule 1 Past Week    Testosterone Cypionate (DEPOTESTOTERONE CYPIONATE) 200 MG/ML injection Inject 1 mL into the appropriate muscle as directed by prescriber Every 14 (Fourteen) Days.   Past Week    venlafaxine XR  (EFFEXOR-XR) 75 MG 24 hr capsule TAKE 1 CAPSULE BY MOUTH EVERY DAY 90 capsule 1 Past Week    albuterol sulfate  (90 Base) MCG/ACT inhaler Inhale 2 puffs Every 4 (Four) Hours As Needed for Wheezing. 1 g 5     budesonide (Pulmicort) 0.5 MG/2ML nebulizer solution Take 2 mL by nebulization 2 (Two) Times a Day for 180 days. 120 mL 5     Cyanocobalamin (Vitamin B-12) 1000 MCG sublingual tablet Take 1 tablet by mouth Daily. 90 tablet 3     docusate sodium (COLACE) 100 MG capsule Take 1 capsule by mouth 2 (Two) Times a Day As Needed for Constipation.       fluticasone (FLONASE) 50 MCG/ACT nasal spray Administer 2 sprays into the nostril(s) as directed by provider Daily. 16 g 0     ipratropium-albuterol (DUO-NEB) 0.5-2.5 mg/3 ml nebulizer Take 3 mL by nebulization Every 6 (Six) Hours. DX J44.9 Medicare B 360 mL 3     melatonin 5 MG tablet tablet Take 1 tablet by mouth At Night As Needed.       nitroglycerin (NITROSTAT) 0.4 MG SL tablet Place 1 tablet under the tongue Every 5 (Five) Minutes As Needed for Chest Pain (Do not take if systolic BP less than 100 mmHg). Take no more than 3 doses in 15 minutes. 25 tablet 1     ondansetron (ZOFRAN) 4 MG tablet Take 1 tablet by mouth Every 4 (Four) Hours As Needed for Nausea or Vomiting.       polyethylene glycol (MIRALAX) 17 GM/SCOOP powder Take 17 g by mouth Daily As Needed (Use if senna-docusate is ineffective). 238 g 0      Allergies:  Patient has no known allergies.    Immunization History   Administered Date(s) Administered    COVID-19 (MODERNA) 1st,2nd,3rd Dose Monovalent 03/03/2021, 03/31/2021    COVID-19 (MODERNA) Monovalent Original Booster 12/01/2021    Flu Vaccine Quad PF >36MO 12/17/2018    Fluzone (or Fluarix & Flulaval for VFC) >6mos 12/17/2018, 12/01/2021    Influenza, Unspecified 11/21/2019, 10/05/2024    flucelvax quad pfs =>4 YRS 10/26/2019           REVIEW OF SYSTEMS:    Review of Systems   Constitutional: Negative.   HENT: Negative.     Eyes: Negative.     Cardiovascular:  Positive for chest pain and dyspnea on exertion.   Respiratory: Negative.     Skin: Negative.    Musculoskeletal: Negative.    Gastrointestinal: Negative.    Genitourinary: Negative.    Neurological:  Positive for dizziness and light-headedness.   Psychiatric/Behavioral: Negative.         Vital Signs  Temp:  [97.5 °F (36.4 °C)-97.9 °F (36.6 °C)] 97.9 °F (36.6 °C)  Heart Rate:  [48-61] 53  Resp:  [13-18] 15  BP: (109-158)/(60-91) 158/91          Physical Exam:  Physical Exam  Vitals reviewed.   Constitutional:       General: He is not in acute distress.     Appearance: Normal appearance. He is normal weight. He is not ill-appearing, toxic-appearing or diaphoretic.   HENT:      Head: Normocephalic.      Right Ear: External ear normal.      Left Ear: External ear normal.      Nose: Nose normal.      Mouth/Throat:      Mouth: Mucous membranes are moist.   Eyes:      Extraocular Movements: Extraocular movements intact.   Cardiovascular:      Rate and Rhythm: Normal rate and regular rhythm.      Pulses: Normal pulses.      Heart sounds: Murmur heard.   Pulmonary:      Effort: Pulmonary effort is normal.      Breath sounds: Normal breath sounds.   Abdominal:      General: Bowel sounds are normal.      Palpations: Abdomen is soft.      Tenderness: There is no abdominal tenderness.   Musculoskeletal:      Cervical back: Normal range of motion.   Skin:     General: Skin is warm and dry.      Capillary Refill: Capillary refill takes less than 2 seconds.   Neurological:      General: No focal deficit present.      Mental Status: He is alert and oriented to person, place, and time.   Psychiatric:         Mood and Affect: Mood normal.         Behavior: Behavior normal.         Thought Content: Thought content normal.         Judgment: Judgment normal.         Emotional Behavior:   Normal   Debilities:  None  Results Review:    I reviewed the patient's new clinical results.  Lab Results (most recent)        Procedure Component Value Units Date/Time    Basic Metabolic Panel [[id number]]  (Normal) Collected: 04/17/25 0316    Specimen: Blood from Arm, Left Updated: 04/17/25 0410     Glucose 97 mg/dL      BUN 12 mg/dL      Creatinine 0.77 mg/dL      Sodium 144 mmol/L      Potassium 3.6 mmol/L      Chloride 107 mmol/L      CO2 24.8 mmol/L      Calcium 9.1 mg/dL      BUN/Creatinine Ratio 15.6     Anion Gap 12.2 mmol/L      eGFR 98.7 mL/min/1.73     Narrative:      GFR Categories in Chronic Kidney Disease (CKD)      GFR Category          GFR (mL/min/1.73)    Interpretation  G1                     90 or greater         Normal or high (1)  G2                      60-89                Mild decrease (1)  G3a                   45-59                Mild to moderate decrease  G3b                   30-44                Moderate to severe decrease  G4                    15-29                Severe decrease  G5                    14 or less           Kidney failure          (1)In the absence of evidence of kidney disease, neither GFR category G1 or G2 fulfill the criteria for CKD.    eGFR calculation 2021 CKD-EPI creatinine equation, which does not include race as a factor    Magnesium [[id number]]  (Normal) Collected: 04/17/25 0316    Specimen: Blood from Arm, Left Updated: 04/17/25 0410     Magnesium 2.0 mg/dL     CBC & Differential [[id number]]  (Abnormal) Collected: 04/17/25 0316    Specimen: Blood from Arm, Left Updated: 04/17/25 0352    Narrative:      The following orders were created for panel order CBC & Differential.  Procedure                               Abnormality         Status                     ---------                               -----------         ------                     CBC Auto Differential[[id number]]        Abnormal            Final result                 Please view results for these tests on the individual orders.    CBC Auto Differential [[id number]]  (Abnormal) Collected: 04/17/25 0316    Specimen: Blood  from Arm, Left Updated: 04/17/25 0352     WBC 6.70 10*3/mm3      RBC 4.07 10*6/mm3      Hemoglobin 13.0 g/dL      Hematocrit 39.8 %      MCV 97.8 fL      MCH 31.9 pg      MCHC 32.7 g/dL      RDW 12.5 %      RDW-SD 44.4 fl      MPV 10.3 fL      Platelets 155 10*3/mm3      Neutrophil % 48.1 %      Lymphocyte % 38.4 %      Monocyte % 10.3 %      Eosinophil % 1.9 %      Basophil % 0.4 %      Immature Grans % 0.9 %      Neutrophils, Absolute 3.22 10*3/mm3      Lymphocytes, Absolute 2.57 10*3/mm3      Monocytes, Absolute 0.69 10*3/mm3      Eosinophils, Absolute 0.13 10*3/mm3      Basophils, Absolute 0.03 10*3/mm3      Immature Grans, Absolute 0.06 10*3/mm3      nRBC 0.0 /100 WBC     High Sensitivity Troponin T 1Hr [[id number]]  (Normal) Collected: 04/16/25 1459    Specimen: Blood Updated: 04/16/25 1533     HS Troponin T 15 ng/L      Troponin T Numeric Delta 0 ng/L     Narrative:      High Sensitive Troponin T Reference Range:  <14.0 ng/L- Negative Female for AMI  <22.0 ng/L- Negative Male for AMI  >=14 - Abnormal Female indicating possible myocardial injury.  >=22 - Abnormal Male indicating possible myocardial injury.   Clinicians would have to utilize clinical acumen, EKG, Troponin, and serial changes to determine if it is an Acute Myocardial Infarction or myocardial injury due to an underlying chronic condition.         Comprehensive Metabolic Panel [[id number]]  (Abnormal) Collected: 04/16/25 1349    Specimen: Blood Updated: 04/16/25 1415     Glucose 128 mg/dL      BUN 11 mg/dL      Creatinine 0.73 mg/dL      Sodium 142 mmol/L      Potassium 4.1 mmol/L      Chloride 104 mmol/L      CO2 27.6 mmol/L      Calcium 9.3 mg/dL      Total Protein 6.8 g/dL      Albumin 4.3 g/dL      ALT (SGPT) 6 U/L      AST (SGOT) 15 U/L      Alkaline Phosphatase 111 U/L      Total Bilirubin 0.4 mg/dL      Globulin 2.5 gm/dL      A/G Ratio 1.7 g/dL      BUN/Creatinine Ratio 15.1     Anion Gap 10.4 mmol/L      eGFR 100.3 mL/min/1.73      Narrative:      GFR Categories in Chronic Kidney Disease (CKD)      GFR Category          GFR (mL/min/1.73)    Interpretation  G1                     90 or greater         Normal or high (1)  G2                      60-89                Mild decrease (1)  G3a                   45-59                Mild to moderate decrease  G3b                   30-44                Moderate to severe decrease  G4                    15-29                Severe decrease  G5                    14 or less           Kidney failure          (1)In the absence of evidence of kidney disease, neither GFR category G1 or G2 fulfill the criteria for CKD.    eGFR calculation 2021 CKD-EPI creatinine equation, which does not include race as a factor    High Sensitivity Troponin T [[id number]]  (Normal) Collected: 04/16/25 1349    Specimen: Blood Updated: 04/16/25 1415     HS Troponin T 15 ng/L     Narrative:      High Sensitive Troponin T Reference Range:  <14.0 ng/L- Negative Female for AMI  <22.0 ng/L- Negative Male for AMI  >=14 - Abnormal Female indicating possible myocardial injury.  >=22 - Abnormal Male indicating possible myocardial injury.   Clinicians would have to utilize clinical acumen, EKG, Troponin, and serial changes to determine if it is an Acute Myocardial Infarction or myocardial injury due to an underlying chronic condition.         Protime-INR [[id number]]  (Normal) Collected: 04/16/25 1349    Specimen: Blood Updated: 04/16/25 1406     Protime 13.1 Seconds      INR 1.00    aPTT [[id number]]  (Normal) Collected: 04/16/25 1349    Specimen: Blood Updated: 04/16/25 1406     PTT 27.5 seconds     Extra Tubes [[id number]] Collected: 04/16/25 1349    Specimen: Blood, Venous Line Updated: 04/16/25 1400    Narrative:      The following orders were created for panel order Extra Tubes.  Procedure                               Abnormality         Status                     ---------                               -----------         ------                      Gold Top - SST[[id number]]                                   Final result                 Please view results for these tests on the individual orders.    [facility] - SST [[id number]] Collected: 04/16/25 1349    Specimen: Blood Updated: 04/16/25 1400     Extra Tube Hold for add-ons.     Comment: Auto resulted.       CBC & Differential [[id number]]  (Abnormal) Collected: 04/16/25 1349    Specimen: Blood Updated: 04/16/25 1354    Narrative:      The following orders were created for panel order CBC & Differential.  Procedure                               Abnormality         Status                     ---------                               -----------         ------                     CBC Auto Differential[[id number]]        Abnormal            Final result                 Please view results for these tests on the individual orders.    CBC Auto Differential [[id number]]  (Abnormal) Collected: 04/16/25 1349    Specimen: Blood Updated: 04/16/25 1354     WBC 8.41 10*3/mm3      RBC 4.20 10*6/mm3      Hemoglobin 13.5 g/dL      Hematocrit 41.5 %      MCV 98.8 fL      MCH 32.1 pg      MCHC 32.5 g/dL      RDW 12.3 %      RDW-SD 44.8 fl      MPV 10.3 fL      Platelets 160 10*3/mm3      Neutrophil % 57.4 %      Lymphocyte % 34.0 %      Monocyte % 8.0 %      Eosinophil % 0.0 %      Basophil % 0.2 %      Immature Grans % 0.4 %      Neutrophils, Absolute 4.83 10*3/mm3      Lymphocytes, Absolute 2.86 10*3/mm3      Monocytes, Absolute 0.67 10*3/mm3      Eosinophils, Absolute 0.00 10*3/mm3      Basophils, Absolute 0.02 10*3/mm3      Immature Grans, Absolute 0.03 10*3/mm3      nRBC 0.0 /100 WBC             Imaging Results (Most Recent)       Procedure Component Value Units Date/Time    XR Chest 1 View [[id number]] Collected: 04/16/25 1444     Updated: 04/16/25 1447    Narrative:      XR CHEST 1 VW    Date of Exam: 4/16/2025 2:07 PM EDT    Indication: Short of breath chest pain    Comparison: CT chest 4/3/2025, chest x-ray  4/3/2025    Findings:  Lungs are normally expanded. Heart size is within normal limits. No significant pneumothorax, pleural effusion or focal pulmonary parenchymal opacity. Bones and soft tissues are within normal limits.    Impression:      No acute cardiopulmonary abnormality.        Electronically Signed: Claire Ramos MD    4/16/2025 2:45 PM EDT    Workstation ID: [id number]          reviewed    ECG/EMG Results (most recent)       Procedure Component Value Units Date/Time    Telemetry Scan [[id number]] Resulted: 04/16/25     Updated: 04/16/25 1617    Telemetry Scan [[id number]] Resulted: 04/16/25     Updated: 04/16/25 2323    Telemetry Scan [[id number]] Resulted: 04/16/25     Updated: 04/16/25 2353    Telemetry Scan [[id number]] Resulted: 04/16/25     Updated: 04/17/25 0436    ECG 12 Lead Dyspnea [[id number]] Collected: 04/16/25 1257     Updated: 04/17/25 0659     QT Interval 427 ms      QTC Interval 415 ms     Narrative:      HEART RATE=57  bpm  RR Mdhddmbz=8415  ms  NH Ttumvxdy=925  ms  P Horizontal Axis=29  deg  P Front Axis=28  deg  QRSD Hrkdnzmy=354  ms  QT Ewzvjked=167  ms  QDiF=481  ms  QRS Axis=1  deg  T Wave Axis=50  deg  - OTHERWISE NORMAL ECG -  Sinus bradycardia  When compared with ECG of 02-Apr-2025 08:36:02,  No significant change  Electronically Signed By: Nathaniel Kennedy (TYRONE) 2025-04-17 06:58:42  Date and Time of Study:2025-04-16 12:57:52    Telemetry Scan [[id number]] Resulted: 04/16/25     Updated: 04/17/25 0817    Telemetry Scan [[id number]] Resulted: 04/16/25     Updated: 04/17/25 1151          reviewed    Results for orders placed during the hospital encounter of 09/23/24    Duplex Venous Lower Extremity - Bilateral CAR    Interpretation Summary    Chronic right lower extremity superficial thrombophlebitis noted in the small saphenous.    Chronic left lower extremity superficial thrombophlebitis noted in the small saphenous.    All other veins appeared normal bilaterally.      Results for orders placed  during the hospital encounter of 09/23/24    Adult Transthoracic Echo Complete W/ Cont if Necessary Per Protocol    Interpretation Summary    Left ventricular systolic function is normal. Calculated left ventricular EF = 53% Left ventricular ejection fraction appears to be 51 - 55%.    Left ventricular diastolic function is consistent with (grade I) impaired relaxation. Average GLS -14.9%.    The left atrial cavity is dilated.    Estimated right ventricular systolic pressure from tricuspid regurgitation is normal (<35 mmHg).    No significant valvular abnormalities noted.      Microbiology Results (last 10 days)       ** No results found for the last 240 hours. **            Assessment & Plan     Chest pain    History of CVA (cerebrovascular accident)    Primary hypertension    Mixed hyperlipidemia    Parkinson disease    Gastroesophageal reflux disease    Generalized weakness    Shortness of breath    COPD (chronic obstructive pulmonary disease)    Coronary artery disease of native artery of native heart with stable angina pectoris    PVD (peripheral vascular disease) with claudication    Chest tightness    Obstructive sleep apnea syndrome    Obesity (BMI 30-39.9)       Chest pain  Lab Results   Component Value Date    TROPONINT 15 04/16/2025    TROPONINT 15 04/16/2025    TROPONINT 13 04/02/2025   -Chest X-ray: No acute cardiopulmonary process  -EKG: Sinus bradycardia 57 without obvious acute changes with a QTc of 415 ms  - Cardiology consulted, heart cath planned  -Telemetry  -NPO  -Continue statin, Coreg, Plavix, aspirin, Imdur    COPD  - DuoNeb and Pulmicort    Parkinson's disease  - Sinemet  - Fall precautions    Anxiety/depression/cognitive impairment  -Klonopin, venlafaxine and Aricept    GERD  - PPI    BPH  - Flomax    Hyperlipidemia  - Statin    History of TIA and PVD  - Continue statin, Plavix and aspirin    I discussed the patients findings and my recommendations with patient and nursing staff.      Discharge Diagnosis:      Chest pain    History of CVA (cerebrovascular accident)    Primary hypertension    Mixed hyperlipidemia    Parkinson disease    Gastroesophageal reflux disease    Generalized weakness    Shortness of breath    COPD (chronic obstructive pulmonary disease)    Coronary artery disease of native artery of native heart with stable angina pectoris    PVD (peripheral vascular disease) with claudication    Chest tightness    Obstructive sleep apnea syndrome    Obesity (BMI 30-39.9)      Hospital Course  Patient is a 64 y.o. male presented with chest pain with an HPI noted above.  Serial troponins were assessed and found to be 15, 15 with chest x-ray showing no acute process and EKG showing sinus bradycardia at 57 without obvious acute changes.  ED provider noted patient was scheduled for outpatient cardiac catheterization and cardiology was consulted who recommended observation admission with plans for cardiac catheterization which was performed on 4/17/2025 with nonobstructive CAD noted and recommendation to continue current medical therapies including treatment for KASIA.  At this time patient is felt to be in good condition for discharge with close follow-up with his PCP as well as cardiology on an outpatient basis.  His full testing/results and plan were discussed with patient along with concerning/alarm symptoms for which to call 911/return to the ED.  All questions were answered he verbalizes his understanding and agreement.    Past Medical History:     Past Medical History:   Diagnosis Date    Anxiety 09/04/2012    Benign prostatic hyperplasia with lower urinary tract symptoms 02/27/2024    Borderline diabetes     COPD (chronic obstructive pulmonary disease)     Coronary artery disease 2015    To the best of my memory    COVID-19 03/18/2025    Cystic fibrosis 2021    Cytokine release syndrome, grade 1 03/20/2025    Deep vein thrombosis 2015    Blood clots    Depression 07/07/2016    Diffuse  non-Hodgkin's lymphoma of testis 03/06/2018    Duodenitis 01/27/2022    Dysphagia 08/2022    from parkinsons    Emphysema of lung 2021    Erosive gastritis 01/27/2022    Esophageal stricture 01/27/2022    Fall 07/25/2019    Food impaction of esophagus 01/27/2022    Gastroesophageal reflux disease 09/04/2012    History of chemotherapy 04/25/2021    Hx of radiation therapy 04/25/2021    Intertrochanteric fracture of left femur, closed, initial encounter 08/25/2024    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    Lung nodule 2021    Mixed hyperlipidemia 07/08/2019    Myocardial infarction 2017    Non-Hodgkin's lymphoma of testis     Parkinson disease     Pneumonia of right upper lobe due to infectious organism 12/04/2022    Poor historian     Primary hypertension 07/08/2019    Pulmonary arterial hypertension 2007    PVD (peripheral vascular disease) with claudication 09/20/2024    Shortness of breath     Sleep apnea     does not have a cpap    Sleep apnea, obstructive 2021    Status post coronary artery stent placement 02/26/2024    Stroke 2017    Testosterone deficiency 07/25/2019    Tick bite 05/20/2021    Tobacco abuse 10/13/2021    Ulcer of esophagus without bleeding 01/27/2022    Uncomplicated alcohol dependence 04/25/2021       Past Surgical History:     Past Surgical History:   Procedure Laterality Date    CARDIAC CATHETERIZATION N/A 02/26/2024    Procedure: Left and Right Heart Cath with Coronary Angiography;  Surgeon: Lelo Tello MD;  Location: [facility] CATH INVASIVE LOCATION;  Service: Cardiology;  Laterality: N/A;    CARDIAC CATHETERIZATION N/A 02/26/2024    Procedure: Percutaneous Coronary Intervention;  Surgeon: Lelo Tello MD;  Location: [facility] CATH INVASIVE LOCATION;  Service: Cardiology;  Laterality: N/A;    CARDIAC VALVE REPLACEMENT  2024    Stent    ENDOSCOPY N/A 01/27/2022    Procedure: ESOPHAGOGASTRODUODENOSCOPY with foreign body removal with gastric, duodenal biopsy and GE junction biopsy;   "Surgeon: Pedro De La Rosa MD;  Location: [facility] ENDOSCOPY;  Service: Gastroenterology;  Laterality: N/A;  post: foreign body removal, erosive gastritis with biopsy, erosive eduodenitis with biopsy, esophagitis, GE junction biopsy to rule out malignancy,     ENDOSCOPY N/A 2022    Procedure: ESOPHAGOGASTRODUODENOSCOPY with GE junction biopsy R/O Barretts, esophageal dilation #48 bougie;  Surgeon: Alexa Subramanian MD;  Location: [facility] ENDOSCOPY;  Service: Gastroenterology;  Laterality: N/A;  duodenal ulcer, GERD,     HIP TROCHANTERIC NAILING WITH INTRAMEDULLARY HIP SCREW Left 2024    Procedure: Left hip/femur fracture fixation with intramedullary ajit and screws;  Surgeon: Kameron Kaminski MD;  Location: [facility] MAIN OR;  Service: Orthopedics;  Laterality: Left;    INTERVENTIONAL RADIOLOGY PROCEDURE N/A 2024    Procedure: Intravascular Ultrasound;  Surgeon: Lelo Tello MD;  Location: [facility] CATH INVASIVE LOCATION;  Service: Cardiology;  Laterality: N/A;    ORCHIECTOMY         Social History:   Social History     Socioeconomic History    Marital status: Legally    Tobacco Use    Smoking status: Former     Current packs/day: 0.00     Average packs/day: 1.1 packs/day for 67.1 years (75.0 ttl pk-yrs)     Types: Cigarettes     Start date: 1973     Quit date: 2024     Years since quittin.9     Passive exposure: Current    Smokeless tobacco: Never   Vaping Use    Vaping status: Never Used   Substance and Sexual Activity    Alcohol use: Not Currently     Comment: \"once a month\"    Drug use: Never    Sexual activity: Not Currently     Partners: Male       Procedures Performed         Consults:   Consults       Date and Time Order Name Status Description    2025  1:24 PM Cardiology (on-call MD unless specified) Completed     2025 11:58 AM Inpatient Cardiology Consult Completed             Condition on Discharge:     Stable    Discharge Disposition  Home or Self " Care    Discharge Medications     Discharge Medications        Continue These Medications        Instructions Start Date   albuterol sulfate  (90 Base) MCG/ACT inhaler  Commonly known as: PROVENTIL HFA;VENTOLIN HFA;PROAIR HFA   2 puffs, Inhalation, Every 4 Hours PRN      amLODIPine 5 MG tablet  Commonly known as: NORVASC   5 mg, Oral, Daily PRN      aspirin 81 MG EC tablet   81 mg, Oral, 2 Times Daily      atorvastatin 20 MG tablet  Commonly known as: LIPITOR   20 mg, Oral, Daily      budesonide 0.5 MG/2ML nebulizer solution  Commonly known as: Pulmicort   0.5 mg, Nebulization, 2 Times Daily      carbidopa-levodopa  MG per tablet  Commonly known as: SINEMET   2 tablets, 3 Times Daily      carbidopa-levodopa ER  MG per tablet  Commonly known as: SINEMET CR   2 tablets, Oral, Nightly      carvedilol 6.25 MG tablet  Commonly known as: COREG   6.25 mg, Oral, 2 Times Daily      clonazePAM 0.5 MG tablet  Commonly known as: KlonoPIN   0.5 mg, Oral, 4 Times Daily      clopidogrel 75 MG tablet  Commonly known as: PLAVIX   75 mg, Oral, Daily      docusate sodium 100 MG capsule  Commonly known as: COLACE   100 mg, 2 Times Daily PRN      donepezil 10 MG tablet  Commonly known as: ARICEPT   10 mg, Oral, Every Night at Bedtime      fluticasone 50 MCG/ACT nasal spray  Commonly known as: FLONASE   2 sprays, Nasal, Daily      gabapentin 600 MG tablet  Commonly known as: NEURONTIN   600 mg, Oral, 3 Times Daily      ipratropium-albuterol 0.5-2.5 mg/3 ml nebulizer  Commonly known as: DUO-NEB   3 mL, Nebulization, Every 6 Hours, DX J44.9 Medicare B      isosorbide mononitrate 30 MG 24 hr tablet  Commonly known as: IMDUR   30 mg, Oral, Every 24 Hours Scheduled      melatonin 5 MG tablet tablet   5 mg, Nightly PRN      montelukast 10 MG tablet  Commonly known as: Singulair   10 mg, Oral, Nightly      nitroglycerin 0.4 MG SL tablet  Commonly known as: NITROSTAT   0.4 mg, Sublingual, Every 5 Minutes PRN, Take no more than  3 doses in 15 minutes.      ondansetron 4 MG tablet  Commonly known as: ZOFRAN   4 mg, Every 4 Hours PRN      oxyCODONE-acetaminophen 7.5-325 MG per tablet  Commonly known as: PERCOCET   1 tablet, Oral, Every 6 Hours PRN      pantoprazole 40 MG EC tablet  Commonly known as: PROTONIX   40 mg, Oral, Daily      polyethylene glycol 17 GM/SCOOP powder  Commonly known as: MIRALAX   17 g, Oral, Daily PRN      tamsulosin 0.4 MG capsule 24 hr capsule  Commonly known as: FLOMAX   0.4 mg, Oral, Daily      Testosterone Cypionate 200 MG/ML injection  Commonly known as: DEPOTESTOTERONE CYPIONATE   200 mg, Every 14 Days      venlafaxine XR 75 MG 24 hr capsule  Commonly known as: EFFEXOR-XR   75 mg, Oral, Daily      Vitamin B-12 1000 MCG sublingual tablet   1,000 mcg, Oral, Daily               Discharge Diet:     Activity at Discharge:     Follow-up Appointments  Future Appointments   Date Time Provider Department Center   6/26/2025  2:30 PM Floresita Rubi APRN [facility]   7/30/2025 10:15 AM Tatum Birmingham MD [facility] None   9/10/2025  4:30 PM Nomi Cunningham MD [facility]   9/26/2025 11:15 AM [facility]     Additional Instructions for the Follow-ups that You Need to Schedule       Discharge Follow-up with PCP   As directed       Currently Documented PCP:    Larry Mireles PA-C    PCP Phone Number:    [phone]     Follow Up Details: 5 to 7-day        Discharge Follow-up with Specified Provider: Cardiology   As directed      To: Cardiology                Test Results Pending at Discharge  Pending Results       Procedure [Order ID] Specimen - Date/Time    Potassium [[id number]]     Specimen: Blood              Risk for Readmission (LACE) Score: 8 (4/17/2025  6:00 AM)      Greater than 30 minutes spent in discharge activities for this patient    Signature:Electronically signed by Haja Khoury PA-C, 04/17/25, 4:51 PM EDT.

## 2025-04-17 NOTE — PLAN OF CARE
Problem: Adult Inpatient Plan of Care  Goal: Plan of Care Review  Outcome: Progressing  Flowsheets (Taken 4/17/2025 1557)  Progress: improving  Outcome Evaluation: No complaints of chest pain or SOA this shift. Patient going to cath lab today with cardiology. Patient has remained on room air all shift, no wheezing noted upon auscultation. Patient will likely discharge home if heart cath is normal and cardiology gives discharge clearance.  Plan of Care Reviewed With:   patient   child  Goal: Patient-Specific Goal (Individualized)  Outcome: Progressing  Goal: Absence of Hospital-Acquired Illness or Injury  Outcome: Progressing  Intervention: Identify and Manage Fall Risk  Recent Flowsheet Documentation  Taken 4/17/2025 1210 by Tamiko Lassiter RN  Safety Promotion/Fall Prevention:   assistive device/personal items within reach   clutter free environment maintained   safety round/check completed  Taken 4/17/2025 1000 by Tamiko Lassiter RN  Safety Promotion/Fall Prevention:   assistive device/personal items within reach   clutter free environment maintained   safety round/check completed  Taken 4/17/2025 0830 by Tamiko Lassiter RN  Safety Promotion/Fall Prevention:   assistive device/personal items within reach   clutter free environment maintained   safety round/check completed   nonskid shoes/slippers when out of bed  Intervention: Prevent Skin Injury  Recent Flowsheet Documentation  Taken 4/17/2025 1210 by Tamiko Lassiter RN  Body Position: position changed independently  Taken 4/17/2025 1000 by Tamiko Lassiter RN  Body Position: position changed independently  Taken 4/17/2025 0830 by Tamiko Lassiter RN  Body Position: position changed independently  Goal: Optimal Comfort and Wellbeing  Outcome: Progressing  Intervention: Provide Person-Centered Care  Recent Flowsheet Documentation  Taken 4/17/2025 1210 by Tamiko Lassiter RN  Trust Relationship/Rapport:   care explained   questions  answered   questions encouraged  Taken 4/17/2025 0830 by Tamiko Lassiter RN  Trust Relationship/Rapport:   care explained   questions answered   questions encouraged  Goal: Readiness for Transition of Care  Outcome: Progressing     Problem: Comorbidity Management  Goal: Maintenance of COPD Symptom Control  Outcome: Progressing  Goal: Blood Pressure in Desired Range  Outcome: Progressing     Problem: Skin Injury Risk Increased  Goal: Skin Health and Integrity  Outcome: Progressing  Intervention: Optimize Skin Protection  Recent Flowsheet Documentation  Taken 4/17/2025 1210 by Tamiko Lassiter RN  Pressure Reduction Devices: pressure-redistributing mattress utilized  Taken 4/17/2025 0830 by Tamiko Lassiter RN  Activity Management: back to bed  Pressure Reduction Techniques: frequent weight shift encouraged  Head of Bed (HOB) Positioning: HOB elevated  Pressure Reduction Devices: pressure-redistributing mattress utilized   Goal Outcome Evaluation:  Plan of Care Reviewed With: patient, child        Progress: improving  Outcome Evaluation: No complaints of chest pain or SOA this shift. Patient going to cath lab today with cardiology. Patient has remained on room air all shift, no wheezing noted upon auscultation. Patient will likely discharge home if heart cath is normal and cardiology gives discharge clearance.

## 2025-04-17 NOTE — SIGNIFICANT NOTE
04/17/25 1357   OTHER   Discipline physical therapist   Rehab Time/Intention   Session Not Performed patient unavailable for evaluation;other (see comments)  (Pt scheduled for cardiac cath at 14:00. PT will eval pt tomorrow.)   Therapy Assessment/Plan (PT)   Criteria for Skilled Interventions Met (PT) yes;meets criteria   Recommendation   PT - Next Appointment 04/18/25

## 2025-04-17 NOTE — PROGRESS NOTES
Referring Provider: Nathaniel Kennedy MD    Reason for follow-up: Chest pain, shortness of breath     Patient Care Team:  Larry Mireles PA-C as PCP - General (Physician Assistant)  Lelo Tello MD as Consulting Physician (Interventional Cardiology)  Floresita Rubi APRN as Nurse Practitioner (Cardiology)  Nomi Cunningham MD as Cardiologist (Cardiology)      SUBJECTIVE  Complains of nocturnal shortness of breath and bradycardia.  Also complains of chest tightness     ROS  Review of all systems negative except as indicated.    Since I have last seen, the patient has been without any chest discomfort, shortness of breath, palpitations, dizziness or syncope.  Denies having any headache, abdominal pain, nausea, vomiting, diarrhea, constipation, loss of weight or loss of appetite.  Denies having any excessive bruising, hematuria or blood in the stool.        Personal History:    Past Medical History:   Diagnosis Date    Anxiety 09/04/2012    Benign prostatic hyperplasia with lower urinary tract symptoms 02/27/2024    Borderline diabetes     COPD (chronic obstructive pulmonary disease)     Coronary artery disease 2015    To the best of my memory    COVID-19 03/18/2025    Cystic fibrosis 2021    Cytokine release syndrome, grade 1 03/20/2025    Deep vein thrombosis 2015    Blood clots    Depression 07/07/2016    Diffuse non-Hodgkin's lymphoma of testis 03/06/2018    Duodenitis 01/27/2022    Dysphagia 08/2022    from parkinsons    Emphysema of lung 2021    Erosive gastritis 01/27/2022    Esophageal stricture 01/27/2022    Fall 07/25/2019    Food impaction of esophagus 01/27/2022    Gastroesophageal reflux disease 09/04/2012    History of chemotherapy 04/25/2021    Hx of radiation therapy 04/25/2021    Intertrochanteric fracture of left femur, closed, initial encounter 08/25/2024    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    Lung nodule 2021    Mixed hyperlipidemia 07/08/2019    Myocardial infarction 2017    Non-Hodgkin's  lymphoma of testis     Parkinson disease     Pneumonia of right upper lobe due to infectious organism 12/04/2022    Poor historian     Primary hypertension 07/08/2019    Pulmonary arterial hypertension 2007    PVD (peripheral vascular disease) with claudication 09/20/2024    Shortness of breath     Sleep apnea     does not have a cpap    Sleep apnea, obstructive 2021    Status post coronary artery stent placement 02/26/2024    Stroke 2017    Testosterone deficiency 07/25/2019    Tick bite 05/20/2021    Tobacco abuse 10/13/2021    Ulcer of esophagus without bleeding 01/27/2022    Uncomplicated alcohol dependence 04/25/2021       Past Surgical History:   Procedure Laterality Date    CARDIAC CATHETERIZATION N/A 02/26/2024    Procedure: Left and Right Heart Cath with Coronary Angiography;  Surgeon: Lelo Tello MD;  Location: [facility] CATH INVASIVE LOCATION;  Service: Cardiology;  Laterality: N/A;    CARDIAC CATHETERIZATION N/A 02/26/2024    Procedure: Percutaneous Coronary Intervention;  Surgeon: Lelo Tello MD;  Location: [facility] CATH INVASIVE LOCATION;  Service: Cardiology;  Laterality: N/A;    CARDIAC VALVE REPLACEMENT  2024    Stent    ENDOSCOPY N/A 01/27/2022    Procedure: ESOPHAGOGASTRODUODENOSCOPY with foreign body removal with gastric, duodenal biopsy and GE junction biopsy;  Surgeon: Pedro De La Rosa MD;  Location: [facility] ENDOSCOPY;  Service: Gastroenterology;  Laterality: N/A;  post: foreign body removal, erosive gastritis with biopsy, erosive eduodenitis with biopsy, esophagitis, GE junction biopsy to rule out malignancy,     ENDOSCOPY N/A 08/23/2022    Procedure: ESOPHAGOGASTRODUODENOSCOPY with GE junction biopsy R/O Barretts, esophageal dilation #48 bougie;  Surgeon: Aelxa Subramanian MD;  Location: [facility] ENDOSCOPY;  Service: Gastroenterology;  Laterality: N/A;  duodenal ulcer, GERD,     HIP TROCHANTERIC NAILING WITH INTRAMEDULLARY HIP SCREW Left 08/26/2024    Procedure: Left hip/femur fracture  "fixation with intramedullary ajit and screws;  Surgeon: Kameron Kaminski MD;  Location: [facility] MAIN OR;  Service: Orthopedics;  Laterality: Left;    INTERVENTIONAL RADIOLOGY PROCEDURE N/A 2024    Procedure: Intravascular Ultrasound;  Surgeon: Lelo Tello MD;  Location: [facility] CATH INVASIVE LOCATION;  Service: Cardiology;  Laterality: N/A;    ORCHIECTOMY         Family History   Problem Relation Age of Onset    Heart disease Mother         My brother, and a sister has had a brain anurysm    Stroke Mother     Heart failure Mother     Hypertension Mother         My whole imidate family, mother and all siblings. My father passed when i was [age] from black lung disease.    No Known Problems Father     Heart disease Brother     Heart failure Brother     Heart disease Brother     Heart failure Sister     Hypertension Sister        Social History     Tobacco Use    Smoking status: Former     Current packs/day: 0.00     Average packs/day: 1.1 packs/day for 67.1 years (75.0 ttl pk-yrs)     Types: Cigarettes     Start date: 1973     Quit date: 2024     Years since quittin.9     Passive exposure: Current    Smokeless tobacco: Never   Vaping Use    Vaping status: Never Used   Substance Use Topics    Alcohol use: Not Currently     Comment: \"once a month\"    Drug use: Never        Home meds:  Prior to Admission medications    Medication Sig Start Date End Date Taking? Authorizing Provider   amLODIPine (NORVASC) 5 MG tablet Take 1 tablet by mouth Daily As Needed (SBP>140mmHg). 3/26/25  Yes Nomi Cunningham MD   aspirin 81 MG EC tablet Take 1 tablet by mouth 2 (Two) Times a Day for 360 doses. 24 Yes Larry Mireles PA-C   atorvastatin (LIPITOR) 20 MG tablet Take 1 tablet by mouth Daily. 24  Yes Larry Mireles PA-C   carbidopa-levodopa (SINEMET)  MG per tablet Take 2 tablets by mouth 3 (Three) Times a Day. Indications: Parkinson's Disease, Parkinsonian-Like Syndrome   Yes Provider, " MD Maci   carbidopa-levodopa ER (SINEMET CR)  MG per tablet Take 2 tablets by mouth Every Night.   Yes ProviderMaci MD   carvedilol (COREG) 6.25 MG tablet TAKE 1 TABLET BY MOUTH TWICE A DAY 3/17/25  Yes Floresita Rubi APRN   clonazePAM (KlonoPIN) 0.5 MG tablet Take 1 tablet by mouth 4 (Four) Times a Day. 4/7/25  Yes Larry Mireles PA-C   clopidogrel (PLAVIX) 75 MG tablet TAKE 1 TABLET BY MOUTH EVERY DAY 3/17/25  Yes Floresita Rubi APRN   donepezil (ARICEPT) 10 MG tablet TAKE 1 TABLET BY MOUTH EVERYDAY AT BEDTIME 1/20/25  Yes Larry Mireles PA-C   gabapentin (NEURONTIN) 600 MG tablet Take 1 tablet by mouth 3 (Three) Times a Day. 2/3/25  Yes Larry Mireles PA-C   isosorbide mononitrate (IMDUR) 30 MG 24 hr tablet Take 1 tablet by mouth Daily for 30 days. 4/5/25 5/5/25 Yes Haja Khoury PA-C   montelukast (Singulair) 10 MG tablet Take 1 tablet by mouth Every Night for 30 days. 4/4/25 5/4/25 Yes Haja Khoury PA-C   oxyCODONE-acetaminophen (PERCOCET) 7.5-325 MG per tablet Take 1 tablet by mouth Every 6 (Six) Hours As Needed for Moderate Pain. 4/7/25  Yes Larry Mireles PA-C   pantoprazole (PROTONIX) 40 MG EC tablet TAKE 1 TABLET BY MOUTH EVERY DAY 3/17/25  Yes Larry Mireles PA-C   tamsulosin (FLOMAX) 0.4 MG capsule 24 hr capsule TAKE 1 CAPSULE BY MOUTH EVERY DAY 3/17/25  Yes Larry Mireles PA-C   Testosterone Cypionate (DEPOTESTOTERONE CYPIONATE) 200 MG/ML injection Inject 1 mL into the appropriate muscle as directed by prescriber Every 14 (Fourteen) Days. 3/17/25  Yes Maci Downs MD   venlafaxine XR (EFFEXOR-XR) 75 MG 24 hr capsule TAKE 1 CAPSULE BY MOUTH EVERY DAY 12/16/24  Yes Larry Mireles PA-C   albuterol sulfate  (90 Base) MCG/ACT inhaler Inhale 2 puffs Every 4 (Four) Hours As Needed for Wheezing. 4/16/25   Tatum Birmingham MD   budesonide (Pulmicort) 0.5 MG/2ML nebulizer solution Take 2 mL by nebulization 2 (Two) Times a Day for 180  days. 4/16/25 10/13/25  Tatum Birmingham MD   Cyanocobalamin (Vitamin B-12) 1000 MCG sublingual tablet Take 1 tablet by mouth Daily. 1/16/25   Larry Mireles PA-C   docusate sodium (COLACE) 100 MG capsule Take 1 capsule by mouth 2 (Two) Times a Day As Needed for Constipation.    ProviderMaci MD   fluticasone (FLONASE) 50 MCG/ACT nasal spray Administer 2 sprays into the nostril(s) as directed by provider Daily. 4/4/25   Haja Khoury PA-C   ipratropium-albuterol (DUO-NEB) 0.5-2.5 mg/3 ml nebulizer Take 3 mL by nebulization Every 6 (Six) Hours. DX J44.9 Medicare B 2/17/25   Larry Mireles PA-C   melatonin 5 MG tablet tablet Take 1 tablet by mouth At Night As Needed.    ProviderMaci MD   nitroglycerin (NITROSTAT) 0.4 MG SL tablet Place 1 tablet under the tongue Every 5 (Five) Minutes As Needed for Chest Pain (Do not take if systolic BP less than 100 mmHg). Take no more than 3 doses in 15 minutes. 2/27/24   Floresita Rubi APRN   ondansetron (ZOFRAN) 4 MG tablet Take 1 tablet by mouth Every 4 (Four) Hours As Needed for Nausea or Vomiting.    ProviderMaci MD   polyethylene glycol (MIRALAX) 17 GM/SCOOP powder Take 17 g by mouth Daily As Needed (Use if senna-docusate is ineffective). 8/28/24   Elizabeth Anna MD       Allergies:  Patient has no known allergies.    Scheduled Meds:aspirin, 81 mg, Oral, BID  atorvastatin, 20 mg, Oral, Nightly  budesonide, 0.5 mg, Nebulization, BID  carbidopa-levodopa, 2 tablet, Oral, TID  carbidopa-levodopa ER, 2 tablet, Oral, 4x Daily  carvedilol, 6.25 mg, Oral, BID  clonazePAM, 0.5 mg, Oral, 4x Daily  clopidogrel, 75 mg, Oral, Daily  donepezil, 10 mg, Oral, Nightly  enoxaparin sodium, 40 mg, Subcutaneous, Daily  gabapentin, 600 mg, Oral, Q8H  ipratropium-albuterol, 3 mL, Nebulization, Q6H  isosorbide mononitrate, 30 mg, Oral, Q24H  montelukast, 10 mg, Oral, Nightly  pantoprazole, 40 mg, Oral, Daily  potassium chloride ER, 40 mEq, Oral, Q4H  sodium  "chloride, 10 mL, Intravenous, Q12H  tamsulosin, 0.4 mg, Oral, Daily  venlafaxine XR, 75 mg, Oral, Daily      Continuous Infusions:   PRN Meds:.  albuterol sulfate HFA    aluminum-magnesium hydroxide-simethicone    amLODIPine    senna-docusate sodium **AND** polyethylene glycol **AND** bisacodyl **AND** bisacodyl    Calcium Replacement - Follow Nurse / BPA Driven Protocol    Magnesium Standard Dose Replacement - Follow Nurse / BPA Driven Protocol    melatonin    nitroglycerin    ondansetron ODT **OR** ondansetron    oxyCODONE    Phosphorus Replacement - Follow Nurse / BPA Driven Protocol    Potassium Replacement - Follow Nurse / BPA Driven Protocol    [COMPLETED] Insert Peripheral IV **AND** sodium chloride    sodium chloride    sodium chloride      OBJECTIVE    Vital Signs  Vitals:    04/17/25 0305 04/17/25 0344 04/17/25 0347 04/17/25 0600   BP: 121/75      BP Location: Left arm      Patient Position: Lying      Pulse:  50 (!) 49    Resp: 16 17 18    Temp: 97.8 °F (36.6 °C)      TempSrc: Oral      SpO2:  92% 96%    Weight: 93.4 kg (205 lb 14.6 oz)   91.9 kg (202 lb 9.6 oz)   Height:           Flowsheet Rows      Flowsheet Row First Filed Value   Admission Height 175.3 cm (69\") Documented at 04/16/2025 1251   Admission Weight 93.4 kg (206 lb) Documented at 04/16/2025 1251              Intake/Output Summary (Last 24 hours) at 4/17/2025 0627  Last data filed at 4/16/2025 1916  Gross per 24 hour   Intake --   Output 300 ml   Net -300 ml          Telemetry: Sinus rhythm/sinus bradycardia    Physical Exam:  The patient is alert, oriented and in no distress.  Vital signs as noted above.  Head and neck revealed no carotid bruits or jugular venous distention.  No thyromegaly or lymphadenopathy is present  Lungs clear.  No wheezing.  Breath sounds are normal bilaterally.  Heart normal first and second heart sounds.  No murmur. No precordial rub is present.  No gallop is present.  Abdomen soft and nontender.  No organomegaly " is present.  Extremities with good peripheral pulses without any pedal edema.  Skin warm and dry.  Musculoskeletal system is grossly normal.  CNS grossly normal.       Results Review:  I have personally reviewed the results from the time of this admission to 4/17/2025 06:27 EDT and agree with these findings:  []  Laboratory  []  Microbiology  []  Radiology  []  EKG/Telemetry   []  Cardiology/Vascular   []  Pathology  []  Old records  []  Other:    Most notable findings include:    Lab Results (last 24 hours)       Procedure Component Value Units Date/Time    Basic Metabolic Panel [[id number]]  (Normal) Collected: 04/17/25 0316    Specimen: Blood from Arm, Left Updated: 04/17/25 0410     Glucose 97 mg/dL      BUN 12 mg/dL      Creatinine 0.77 mg/dL      Sodium 144 mmol/L      Potassium 3.6 mmol/L      Chloride 107 mmol/L      CO2 24.8 mmol/L      Calcium 9.1 mg/dL      BUN/Creatinine Ratio 15.6     Anion Gap 12.2 mmol/L      eGFR 98.7 mL/min/1.73     Narrative:      GFR Categories in Chronic Kidney Disease (CKD)      GFR Category          GFR (mL/min/1.73)    Interpretation  G1                     90 or greater         Normal or high (1)  G2                      60-89                Mild decrease (1)  G3a                   45-59                Mild to moderate decrease  G3b                   30-44                Moderate to severe decrease  G4                    15-29                Severe decrease  G5                    14 or less           Kidney failure          (1)In the absence of evidence of kidney disease, neither GFR category G1 or G2 fulfill the criteria for CKD.    eGFR calculation 2021 CKD-EPI creatinine equation, which does not include race as a factor    Magnesium [[id number]]  (Normal) Collected: 04/17/25 0316    Specimen: Blood from Arm, Left Updated: 04/17/25 0410     Magnesium 2.0 mg/dL     CBC & Differential [[id number]]  (Abnormal) Collected: 04/17/25 0316    Specimen: Blood from Arm, Left Updated:  04/17/25 0352    Narrative:      The following orders were created for panel order CBC & Differential.  Procedure                               Abnormality         Status                     ---------                               -----------         ------                     CBC Auto Differential[[id number]]        Abnormal            Final result                 Please view results for these tests on the individual orders.    CBC Auto Differential [[id number]]  (Abnormal) Collected: 04/17/25 0316    Specimen: Blood from Arm, Left Updated: 04/17/25 0352     WBC 6.70 10*3/mm3      RBC 4.07 10*6/mm3      Hemoglobin 13.0 g/dL      Hematocrit 39.8 %      MCV 97.8 fL      MCH 31.9 pg      MCHC 32.7 g/dL      RDW 12.5 %      RDW-SD 44.4 fl      MPV 10.3 fL      Platelets 155 10*3/mm3      Neutrophil % 48.1 %      Lymphocyte % 38.4 %      Monocyte % 10.3 %      Eosinophil % 1.9 %      Basophil % 0.4 %      Immature Grans % 0.9 %      Neutrophils, Absolute 3.22 10*3/mm3      Lymphocytes, Absolute 2.57 10*3/mm3      Monocytes, Absolute 0.69 10*3/mm3      Eosinophils, Absolute 0.13 10*3/mm3      Basophils, Absolute 0.03 10*3/mm3      Immature Grans, Absolute 0.06 10*3/mm3      nRBC 0.0 /100 WBC     High Sensitivity Troponin T 1Hr [[id number]]  (Normal) Collected: 04/16/25 1459    Specimen: Blood Updated: 04/16/25 1533     HS Troponin T 15 ng/L      Troponin T Numeric Delta 0 ng/L     Narrative:      High Sensitive Troponin T Reference Range:  <14.0 ng/L- Negative Female for AMI  <22.0 ng/L- Negative Male for AMI  >=14 - Abnormal Female indicating possible myocardial injury.  >=22 - Abnormal Male indicating possible myocardial injury.   Clinicians would have to utilize clinical acumen, EKG, Troponin, and serial changes to determine if it is an Acute Myocardial Infarction or myocardial injury due to an underlying chronic condition.         Comprehensive Metabolic Panel [[id number]]  (Abnormal) Collected: 04/16/25 4235     Specimen: Blood Updated: 04/16/25 1415     Glucose 128 mg/dL      BUN 11 mg/dL      Creatinine 0.73 mg/dL      Sodium 142 mmol/L      Potassium 4.1 mmol/L      Chloride 104 mmol/L      CO2 27.6 mmol/L      Calcium 9.3 mg/dL      Total Protein 6.8 g/dL      Albumin 4.3 g/dL      ALT (SGPT) 6 U/L      AST (SGOT) 15 U/L      Alkaline Phosphatase 111 U/L      Total Bilirubin 0.4 mg/dL      Globulin 2.5 gm/dL      A/G Ratio 1.7 g/dL      BUN/Creatinine Ratio 15.1     Anion Gap 10.4 mmol/L      eGFR 100.3 mL/min/1.73     Narrative:      GFR Categories in Chronic Kidney Disease (CKD)      GFR Category          GFR (mL/min/1.73)    Interpretation  G1                     90 or greater         Normal or high (1)  G2                      60-89                Mild decrease (1)  G3a                   45-59                Mild to moderate decrease  G3b                   30-44                Moderate to severe decrease  G4                    15-29                Severe decrease  G5                    14 or less           Kidney failure          (1)In the absence of evidence of kidney disease, neither GFR category G1 or G2 fulfill the criteria for CKD.    eGFR calculation 2021 CKD-EPI creatinine equation, which does not include race as a factor    High Sensitivity Troponin T [[id number]]  (Normal) Collected: 04/16/25 1349    Specimen: Blood Updated: 04/16/25 1415     HS Troponin T 15 ng/L     Narrative:      High Sensitive Troponin T Reference Range:  <14.0 ng/L- Negative Female for AMI  <22.0 ng/L- Negative Male for AMI  >=14 - Abnormal Female indicating possible myocardial injury.  >=22 - Abnormal Male indicating possible myocardial injury.   Clinicians would have to utilize clinical acumen, EKG, Troponin, and serial changes to determine if it is an Acute Myocardial Infarction or myocardial injury due to an underlying chronic condition.         Protime-INR [[id number]]  (Normal) Collected: 04/16/25 1349    Specimen: Blood Updated:  04/16/25 1406     Protime 13.1 Seconds      INR 1.00    aPTT [[id number]]  (Normal) Collected: 04/16/25 1349    Specimen: Blood Updated: 04/16/25 1406     PTT 27.5 seconds     Extra Tubes [[id number]] Collected: 04/16/25 1349    Specimen: Blood, Venous Line Updated: 04/16/25 1400    Narrative:      The following orders were created for panel order Extra Tubes.  Procedure                               Abnormality         Status                     ---------                               -----------         ------                     Gold Top - SST[[id number]]                                   Final result                 Please view results for these tests on the individual orders.    Gold Top - SST [[id number]] Collected: 04/16/25 1349    Specimen: Blood Updated: 04/16/25 1400     Extra Tube Hold for add-ons.     Comment: Auto resulted.       CBC & Differential [[id number]]  (Abnormal) Collected: 04/16/25 1349    Specimen: Blood Updated: 04/16/25 1354    Narrative:      The following orders were created for panel order CBC & Differential.  Procedure                               Abnormality         Status                     ---------                               -----------         ------                     CBC Auto Differential[[id number]]        Abnormal            Final result                 Please view results for these tests on the individual orders.    CBC Auto Differential [[id number]]  (Abnormal) Collected: 04/16/25 1349    Specimen: Blood Updated: 04/16/25 1354     WBC 8.41 10*3/mm3      RBC 4.20 10*6/mm3      Hemoglobin 13.5 g/dL      Hematocrit 41.5 %      MCV 98.8 fL      MCH 32.1 pg      MCHC 32.5 g/dL      RDW 12.3 %      RDW-SD 44.8 fl      MPV 10.3 fL      Platelets 160 10*3/mm3      Neutrophil % 57.4 %      Lymphocyte % 34.0 %      Monocyte % 8.0 %      Eosinophil % 0.0 %      Basophil % 0.2 %      Immature Grans % 0.4 %      Neutrophils, Absolute 4.83 10*3/mm3      Lymphocytes, Absolute 2.86 10*3/mm3       Monocytes, Absolute 0.67 10*3/mm3      Eosinophils, Absolute 0.00 10*3/mm3      Basophils, Absolute 0.02 10*3/mm3      Immature Grans, Absolute 0.03 10*3/mm3      nRBC 0.0 /100 WBC             Imaging Results (Last 24 Hours)       Procedure Component Value Units Date/Time    XR Chest 1 View [[id number]] Collected: 04/16/25 1444     Updated: 04/16/25 1447    Narrative:      XR CHEST 1 VW    Date of Exam: 4/16/2025 2:07 PM EDT    Indication: Short of breath chest pain    Comparison: CT chest 4/3/2025, chest x-ray 4/3/2025    Findings:  Lungs are normally expanded. Heart size is within normal limits. No significant pneumothorax, pleural effusion or focal pulmonary parenchymal opacity. Bones and soft tissues are within normal limits.    Impression:      No acute cardiopulmonary abnormality.        Electronically Signed: Claire Ramos MD    4/16/2025 2:45 PM EDT    Workstation ID: [id number]            LAB RESULTS (LAST 7 DAYS)    CBC  Results from last 7 days   Lab Units 04/17/25  0316 04/16/25  1349   WBC 10*3/mm3 6.70 8.41   RBC 10*6/mm3 4.07* 4.20   HEMOGLOBIN g/dL 13.0 13.5   HEMATOCRIT % 39.8 41.5   MCV fL 97.8* 98.8*   PLATELETS 10*3/mm3 155 160       BMP  Results from last 7 days   Lab Units 04/17/25  0316 04/16/25  1349   SODIUM mmol/L 144 142   POTASSIUM mmol/L 3.6 4.1   CHLORIDE mmol/L 107 104   CO2 mmol/L 24.8 27.6   BUN mg/dL 12 11   CREATININE mg/dL 0.77 0.73*   GLUCOSE mg/dL 97 128*   MAGNESIUM mg/dL 2.0  --        CMP   Results from last 7 days   Lab Units 04/17/25  0316 04/16/25  1349   SODIUM mmol/L 144 142   POTASSIUM mmol/L 3.6 4.1   CHLORIDE mmol/L 107 104   CO2 mmol/L 24.8 27.6   BUN mg/dL 12 11   CREATININE mg/dL 0.77 0.73*   GLUCOSE mg/dL 97 128*   ALBUMIN g/dL  --  4.3   BILIRUBIN mg/dL  --  0.4   ALK PHOS U/L  --  111   AST (SGOT) U/L  --  15   ALT (SGPT) U/L  --  6       BNP        TROPONIN  Results from last 7 days   Lab Units 04/16/25  1459   HSTROP T ng/L 15       CoAg  Results from last 7  days   Lab Units 04/16/25  1349   INR  1.00   APTT seconds 27.5       Creatinine Clearance  Estimated Creatinine Clearance: 105.7 mL/min (by C-G formula based on SCr of 0.77 mg/dL).    ABG        Radiology  XR Chest 1 View  Result Date: 4/16/2025  No acute cardiopulmonary abnormality. Electronically Signed: Claire Ramos MD  4/16/2025 2:45 PM EDT  Workstation ID: [id number]        EKG  I personally viewed and interpreted the patient's EKG/Telemetry data:  ECG 12 Lead Dyspnea   Preliminary Result   HEART RATE=57  bpm   RR Fgmuzfsn=8867  ms   VA Yfgjnpld=726  ms   P Horizontal Axis=29  deg   P Front Axis=28  deg   QRSD Lirekrey=446  ms   QT Azvuloyj=599  ms   KNsO=062  ms   QRS Axis=1  deg   T Wave Axis=50  deg   - OTHERWISE NORMAL ECG -   Sinus bradycardia   Date and Time of Study:2025-04-16 12:57:52      Telemetry Scan   Final Result      Telemetry Scan   Final Result      Telemetry Scan   Final Result      Telemetry Scan   Final Result            Echocardiogram:    Results for orders placed during the hospital encounter of 09/23/24    Adult Transthoracic Echo Complete W/ Cont if Necessary Per Protocol    Interpretation Summary    Left ventricular systolic function is normal. Calculated left ventricular EF = 53% Left ventricular ejection fraction appears to be 51 - 55%.    Left ventricular diastolic function is consistent with (grade I) impaired relaxation. Average GLS -14.9%.    The left atrial cavity is dilated.    Estimated right ventricular systolic pressure from tricuspid regurgitation is normal (<35 mmHg).    No significant valvular abnormalities noted.        Stress Test:  Results for orders placed during the hospital encounter of 04/02/25    Stress Test With Myocardial Perfusion One Day    Interpretation Summary    Findings consistent with a normal ECG stress test.    Left ventricular ejection fraction is normal (Calculated EF = 62%).    Improved perfusion noted on stress images.    Myocardial perfusion imaging  indicates a normal myocardial perfusion study with no evidence of ischemia. Impressions are consistent with a low risk study.         Cardiac Catheterization:  Results for orders placed during the hospital encounter of 02/26/24    Cardiac Catheterization/Vascular Study    Conclusion  PROCEDURES PERFORMED  Ultrasound guided Vascular access  Right heart catheterization  Left Heart Catheterization  Coronary Angiogram  Percutaneous coronary intervention with drug-eluting stent placement  Intravascular ultrasound  Moderate sedation    INDICATIONS FOR PROCEDURE  Positive stress test, LEUNG    PROCEDURE IN DETAIL  Informed consent was obtained from the patient after explaining the risks, benefits, and alternative options of the procedure. After obtaining informed consent, the patient was brought to the cath lab and was prepped in a sterile fashion.Lidocaine 2% was used for local anesthesia into the right IJ access site. Right IJ vein was accessed using the micropuncture needle under ultrasound guidance and micropuncture wire advanced under flouroscopy. A 7 Hebrew vascular sheath was put into place percutaneously over guide-wire. Guide wires were removed. A 6Fr swan john catheter was advanced to wedge position. RA, RV and PA and wedge pressures were recorded.  PA sat and arterial sats recorded.    Lidocaine 2% was used for local anesthesia into the right  radial access site. The right  radial artery was accessed with a micropuncture needle via modified Seldinger technique under ultrasound guidance. A 6F introducer sheath was inserted successfully. Afterwards, 6F JR4 and JL3.5 diagnostic catheters were advanced over a wire into the ascending aorta and were used to engage the ostia of the left main and RCA respectively. JR4 used to cross the AV and obtain LV pressures and gradient across the AV measured via pullback technique. Images of the right and left coronary systems were obtained.    HEMODYNAMICS  LV: 156/8/16  AO:  149/79/107  Gradient 7 mmHg    [facility] HEMODYNAMICS:  RA 9  RV 29/10/12  PA 29/10/23  PCW 11    AO Sat 100%  PA Sat 77%    [facility] CO 6.17    [facility] CI 2.93    FINDINGS  Coronary Angiogram    Right dominant circulation    Left main: Left main is a large caliber vessel which gives rise to the Left Anterior Descending and the Left circumflex.  Left main coronary artery is angiographically free from any significant disease    Left Anterior Descending Artery: LAD is a medium caliber vessel which gives rise to several septal perforators and several diagonal branches.  There is an 80% calcified lesion in the proximal LAD at the takeoff of a large D1 branch which further bifurcates.  The ostium of D1 is free from disease.  This was followed by a 70% lesion in the mid LAD.  There is heavy calcification in the entire LAD.    Left Circumflex: Left circumflex artery runs along the AV groove and gives obtuse marginal branches.  It has moderate calcification with 30 to 40% stenosis in the midsegment.    Right Coronary Artery: The RCA is a large caliber vessel gives rise to PDA and PLV.  There is 20% stenosis in the proximal segment    Percutaneous coronary intervention:  100 units/kg of heparin was administered and ACT of more than 250 was documented.  6 Belarusian XB LAD 3.5 guide was used to engage the left main.  Run-through wire was advanced to the distal LAD.  IVUS of the LAD showed a distal reference vessel diameter of 3.6 mm with heavy concentric calcification in the mid to proximal LAD.  Proximal reference vessel diameter was 4.6 mm.  We then predilated the LAD with an NC 2.5 x 12 mm balloon.  It was then stented with a Xience 3.5 x 18 mm stent followed by a Xience 4.0 x 38 mm stent and postdilated with an NC 4.5 x 12 mm balloon.      Preprocedure stenosis: 80%  preprocedure ALEXI flow: 3  Lesion type: C  Postprocedure stenosis: 0%  Post procedure ALEXI flow: 3      All the catheters were exchanged over a wire and subsequently  removed.    Radial sheath was removed and a TR band was applied with 15 cc of air to achieve hemostasis.        ESTIMATED BLOOD LOSS:  25 ml    COMPLICATIONS:  None    PROCEDURE DATA:  Contrast Used: 100 ml  Sedation Time:  57 minutes    IMPRESSIONS  One-vessel obstructive CAD of the proximal LAD status post successful IVUS guided PCI  No evidence of pulmonary hypertension  Normal left and right heart filling pressures    RECOMMENDATIONS  -Dual antiplatelet therapy  -Beta-blocker and high intensity statin  -ACE inhibitor if blood pressure tolerates  -Referral to cardiac rehab  -Continue aggressive risk factor stratification and modification  -Smoking cessation         Other:         ASSESSMENT & PLAN:    Principal Problem:    Chest pain    Chest tightness  Coronary artery disease of native artery of native heart with stable angina pectoris  History of PCI to proximal LAD with jailed diagonal vessel  ECG with no ischemic changes  High-sensitivity troponin has been negative  Nuclear stress test showed no ischemia; improved perfusion with stress images  Continue DAPT, high intensity statin, and beta blocker   Continue amlodipine and Imdur   Despite providing reassurance, the patient and his daughter are concerned about the patient's symptoms worsening.   Repeated hospital presentations  Repeat cardiac catheterization on 4/17/2025 shows patent stent and nonobstructive/unchanged coronary disease in other vessels  Reassurance provided to the patient and his wife  Continue medical management     Mixed hyperlipidemia  Continue high intensity statin     Primary hypertension, chronic   Continue amlodipine, carvedilol, and Imdur     PVD (peripheral vascular disease) with claudication  Continue DAPT and high intensity statin      Parkinson's disease, unspecified whether dyskinesia present, unspecified whether manifestations fluctuate  On carbidopa-levodopa  Also on donepezil and memantine for dementia      Anxiety / Depression,  unspecified depression type  On clonazepam and Effexor XR     Gastroesophageal reflux disease, unspecified whether esophagitis present  On PPI      Benign prostatic hyperplasia with urinary hesitancy  On Flomax      History of CVA (cerebrovascular accident)  Continue DAPT and high intensity statin      COPD  Obstructive sleep apnea syndrome  Encouraged compliance with CPAP  Repeat PFTs in 2 months.  Recommend sleep study for nocturnal hypoxia and bradycardia     Obesity (BMI 30-39.9)  BMI is 30.42. He weighs 206 lbs.   Lifestyle modifications recommended      Chronic low back pain with right-sided sciatica  The patient reports a history scoliosis.  He states his back pain has gotten worse recently. He reports right-sided weakness and numbness.   He was advised to follow-up with his PCP for possible spine imaging.       Nomi Cunningham MD  04/17/25  06:27 EDT

## 2025-04-18 ENCOUNTER — TRANSITIONAL CARE MANAGEMENT TELEPHONE ENCOUNTER (OUTPATIENT)
Dept: CALL CENTER | Facility: HOSPITAL | Age: 65
End: 2025-04-18
Payer: MEDICARE

## 2025-04-18 ENCOUNTER — READMISSION MANAGEMENT (OUTPATIENT)
Dept: CALL CENTER | Facility: HOSPITAL | Age: 65
End: 2025-04-18
Payer: MEDICARE

## 2025-04-18 DIAGNOSIS — E34.9 ENDOCRINE DISORDER, UNSPECIFIED: ICD-10-CM

## 2025-04-18 LAB
BASE DEFICIT: ABNORMAL
BASE DEFICIT: ABNORMAL
BASE EXCESS BLDA CALC-SCNC: <0 MMOL/L (ref 0–3)
BASE EXCESS BLDV CALC-SCNC: 1 MMOL/L (ref 0–3)
CA-I BLDA-SCNC: 1.25 MMOL/L (ref 1.12–1.32)
CA-I BLDA-SCNC: 1.27 MMOL/L (ref 1.12–1.32)
CO2 BLDA-SCNC: 26 MMOL/L (ref 23–27)
CO2 CONTENT VENOUS: 28 MMOL/L (ref 24–29)
GLUCOSE BLDC GLUCOMTR-MCNC: 94 MG/DL (ref 70–105)
GLUCOSE BLDC GLUCOMTR-MCNC: 95 MG/DL (ref 70–105)
HCO3 BLDA-SCNC: 24.3 MMOL/L (ref 22–26)
HCO3 BLDV-SCNC: 26.6 MMOL/L (ref 23–28)
HCT VFR BLDA CALC: 35 % (ref 38–51)
HCT VFR BLDA CALC: 37 % (ref 38–51)
HGB BLDA-MCNC: 11.9 G/DL (ref 12–17)
HGB BLDA-MCNC: 12.6 G/DL (ref 12–17)
PCO2 BLDA: 42.5 MM HG (ref 35–45)
PCO2 BLDV: 47.7 MM HG (ref 41–51)
PH BLDA: 7.36 PH UNITS (ref 7.35–7.45)
PH BLDV: 7.36 PH UNITS (ref 7.31–7.41)
PO2 BLDA: 85 MM HG (ref 80–105)
PO2 BLDV: 36 MM HG (ref 35–42)
POTASSIUM BLDA-SCNC: 4.4 MMOL/L (ref 3.5–4.9)
POTASSIUM BLDA-SCNC: 4.6 MMOL/L (ref 3.5–4.9)
SAO2 % BLDCOA: 96 % (ref 95–98)
SAO2 % BLDCOV: ABNORMAL %
SODIUM BLD-SCNC: 141 MMOL/L (ref 138–146)
SODIUM BLD-SCNC: 143 MMOL/L (ref 138–146)

## 2025-04-18 RX ORDER — TESTOSTERONE CYPIONATE 200 MG/ML
200 INJECTION, SOLUTION INTRAMUSCULAR
Qty: 2 ML | Refills: 5 | Status: SHIPPED | OUTPATIENT
Start: 2025-04-18

## 2025-04-18 NOTE — OUTREACH NOTE
Prep Survey      Flowsheet Row Responses   [facility] [place] operated [facility] patient discharged from? [facility]   Is LACE score < 7 ? No   Eligibility [facility]   Date of Admission 04/16/25   Date of Discharge 04/17/25   Discharge Disposition Home or Self Care   Discharge diagnosis Chest pain   Does the patient have one of the following disease processes/diagnoses(primary or secondary)? Other   Does the patient have Home health ordered? No   Is there a DME ordered? No   Medication alerts for this patient see avs   Prep survey completed? Yes            Nani WASHINGTON - Registered Nurse

## 2025-04-18 NOTE — CASE MANAGEMENT/SOCIAL WORK
Case Management Discharge Note      Final Note: Home w/ [facility] (current)    Provided Post Acute Provider List?: N/A  Provided Post Acute Provider Quality & Resource List?: N/A    Selected Continued Care - Discharged on 4/17/2025 Admission date: 4/16/2025 - Discharge disposition: Home or Self Care          Home Medical Care Coordination complete.      Service Provider Services Address Phone Fax Patient Preferred    [facility] [address] [facility] [phone] --                    Transportation Services  Private: Car    Final Discharge Disposition Code: 06 - home with home health care

## 2025-04-18 NOTE — OUTREACH NOTE
Call Center TCM Note      Flowsheet Row Responses   [facility] facility patient discharged from? [facility]   Does the patient have one of the following disease processes/diagnoses(primary or secondary)? Other   TCM attempt successful? No   Unsuccessful attempts Attempt 2  [attempted pt and dtrs on verbal release]            VINH CLEMENS - Registered Nurse    4/18/2025, 16:08 EDT

## 2025-04-18 NOTE — OUTREACH NOTE
Call Center TCM Note      Flowsheet Row Responses   [facility] facility patient discharged from? [facility]   Does the patient have one of the following disease processes/diagnoses(primary or secondary)? Other   TCM attempt successful? No  [vr for children Eileen Merrill and Viktor]   Unsuccessful attempts Attempt 1            VINH Benoit Registered Nurse    4/18/2025, 15:24 EDT

## 2025-04-19 ENCOUNTER — TRANSITIONAL CARE MANAGEMENT TELEPHONE ENCOUNTER (OUTPATIENT)
Dept: CALL CENTER | Facility: HOSPITAL | Age: 65
End: 2025-04-19
Payer: MEDICARE

## 2025-04-19 NOTE — OUTREACH NOTE
Call Center TCM Note      Flowsheet Row Responses   [facility] discharged from? [facility]   Does the patient have one of the following disease processes/diagnoses(primary or secondary)? Other   TCM attempt successful? No   Unsuccessful attempts Attempt 3            Alpa PUGH - Registered Nurse    4/19/2025, 09:13 EDT

## 2025-04-21 DIAGNOSIS — M54.50 CHRONIC LOW BACK PAIN WITHOUT SCIATICA, UNSPECIFIED BACK PAIN LATERALITY: ICD-10-CM

## 2025-04-21 DIAGNOSIS — G89.29 CHRONIC LOW BACK PAIN WITHOUT SCIATICA, UNSPECIFIED BACK PAIN LATERALITY: ICD-10-CM

## 2025-04-21 RX ORDER — OXYCODONE AND ACETAMINOPHEN 7.5; 325 MG/1; MG/1
1 TABLET ORAL EVERY 6 HOURS PRN
Qty: 60 TABLET | Refills: 0 | Status: SHIPPED | OUTPATIENT
Start: 2025-04-21

## 2025-04-25 RX ORDER — BUDESONIDE 0.5 MG/2ML
0.5 INHALANT ORAL 2 TIMES DAILY
Qty: 120 ML | Refills: 5 | Status: SHIPPED | OUTPATIENT
Start: 2025-04-25 | End: 2025-10-22

## 2025-04-25 NOTE — TELEPHONE ENCOUNTER
Rcv'd fax from [facility] that a Dx code is needed for the Budesonide 0.5mg/2mL neb soln.  Updated Rx and resent to pharmacy.

## 2025-04-29 DIAGNOSIS — G62.9 NEUROPATHY: ICD-10-CM

## 2025-04-29 RX ORDER — MAGNESIUM 200 MG
1 TABLET ORAL DAILY
Qty: 90 TABLET | Refills: 3 | Status: SHIPPED | OUTPATIENT
Start: 2025-04-29

## 2025-04-29 RX ORDER — ASPIRIN 81 MG/1
81 TABLET ORAL 2 TIMES DAILY
Qty: 180 TABLET | Refills: 1 | Status: SHIPPED | OUTPATIENT
Start: 2025-04-29 | End: 2025-10-26

## 2025-04-29 RX ORDER — GABAPENTIN 600 MG/1
600 TABLET ORAL 3 TIMES DAILY
Qty: 90 TABLET | Refills: 1 | Status: SHIPPED | OUTPATIENT
Start: 2025-04-29

## 2025-05-08 DIAGNOSIS — M54.50 CHRONIC LOW BACK PAIN WITHOUT SCIATICA, UNSPECIFIED BACK PAIN LATERALITY: ICD-10-CM

## 2025-05-08 DIAGNOSIS — G89.29 CHRONIC LOW BACK PAIN WITHOUT SCIATICA, UNSPECIFIED BACK PAIN LATERALITY: ICD-10-CM

## 2025-05-08 RX ORDER — OXYCODONE AND ACETAMINOPHEN 7.5; 325 MG/1; MG/1
1 TABLET ORAL EVERY 6 HOURS PRN
Qty: 60 TABLET | Refills: 0 | Status: SHIPPED | OUTPATIENT
Start: 2025-05-08

## 2025-05-09 RX ORDER — AMLODIPINE AND BENAZEPRIL HYDROCHLORIDE 10; 20 MG/1; MG/1
1 CAPSULE ORAL NIGHTLY
Qty: 90 CAPSULE | Refills: 1 | Status: SHIPPED | OUTPATIENT
Start: 2025-05-09

## 2025-05-22 DIAGNOSIS — G89.29 CHRONIC LOW BACK PAIN WITHOUT SCIATICA, UNSPECIFIED BACK PAIN LATERALITY: ICD-10-CM

## 2025-05-22 DIAGNOSIS — M54.50 CHRONIC LOW BACK PAIN WITHOUT SCIATICA, UNSPECIFIED BACK PAIN LATERALITY: ICD-10-CM

## 2025-05-23 RX ORDER — OXYCODONE AND ACETAMINOPHEN 7.5; 325 MG/1; MG/1
1 TABLET ORAL EVERY 6 HOURS PRN
Qty: 60 TABLET | Refills: 0 | OUTPATIENT
Start: 2025-05-23

## 2025-05-28 ENCOUNTER — TELEPHONE (OUTPATIENT)
Dept: FAMILY MEDICINE CLINIC | Facility: CLINIC | Age: 65
End: 2025-05-28

## 2025-05-28 DIAGNOSIS — J41.1 MUCOPURULENT CHRONIC BRONCHITIS: Primary | Chronic | ICD-10-CM

## 2025-05-28 NOTE — TELEPHONE ENCOUNTER
Caller: [facility]    Relationship to patient: Home Health    Best call back number: [phone]     Patient is needing: CALLING TO ADVISE THAT PATIENT HAS CRACKLING IN BOTTOM OF LUNGS TODAY. SHE WOULD LIKE AN ORDER FOR CHEST XRAY FOR PATIENT. PLEASE CALL BACK TO ADVISE

## 2025-05-30 ENCOUNTER — APPOINTMENT (OUTPATIENT)
Dept: GENERAL RADIOLOGY | Facility: HOSPITAL | Age: 65
End: 2025-05-30
Payer: MEDICARE

## 2025-05-30 ENCOUNTER — HOSPITAL ENCOUNTER (INPATIENT)
Facility: HOSPITAL | Age: 65
LOS: 1 days | Discharge: HOME | End: 2025-06-02
Attending: EMERGENCY MEDICINE | Admitting: INTERNAL MEDICINE
Payer: MEDICARE

## 2025-05-30 DIAGNOSIS — J44.1 COPD EXACERBATION: ICD-10-CM

## 2025-05-30 DIAGNOSIS — R00.1 BRADYCARDIA, UNSPECIFIED: ICD-10-CM

## 2025-05-30 DIAGNOSIS — R06.02 SHORTNESS OF BREATH: ICD-10-CM

## 2025-05-30 DIAGNOSIS — I73.9 PVD (PERIPHERAL VASCULAR DISEASE) WITH CLAUDICATION: Chronic | ICD-10-CM

## 2025-05-30 DIAGNOSIS — J44.1 ACUTE EXACERBATION OF CHRONIC OBSTRUCTIVE PULMONARY DISEASE (COPD): Primary | ICD-10-CM

## 2025-05-30 LAB
ALBUMIN SERPL-MCNC: 4.3 G/DL (ref 3.5–5.2)
ALBUMIN/GLOB SERPL: 1.8 G/DL
ALP SERPL-CCNC: 129 U/L (ref 39–117)
ALT SERPL W P-5'-P-CCNC: 6 U/L (ref 1–41)
ANION GAP SERPL CALCULATED.3IONS-SCNC: 10.8 MMOL/L (ref 5–15)
ANISOCYTOSIS BLD QL: NORMAL
AST SERPL-CCNC: 14 U/L (ref 1–40)
B PARAPERT DNA SPEC QL NAA+PROBE: NOT DETECTED
B PERT DNA SPEC QL NAA+PROBE: NOT DETECTED
BASOPHILS # BLD AUTO: 0.01 10*3/MM3 (ref 0–0.2)
BASOPHILS NFR BLD AUTO: 0.2 % (ref 0–1.5)
BILIRUB SERPL-MCNC: 0.4 MG/DL (ref 0–1.2)
BUN SERPL-MCNC: 14.8 MG/DL (ref 8–23)
BUN/CREAT SERPL: 21.4 (ref 7–25)
C PNEUM DNA NPH QL NAA+NON-PROBE: NOT DETECTED
CALCIUM SPEC-SCNC: 9.1 MG/DL (ref 8.6–10.5)
CHLORIDE SERPL-SCNC: 105 MMOL/L (ref 98–107)
CO2 SERPL-SCNC: 24.2 MMOL/L (ref 22–29)
CREAT SERPL-MCNC: 0.69 MG/DL (ref 0.76–1.27)
D DIMER PPP FEU-MCNC: 0.32 MCGFEU/ML (ref 0–0.66)
D-LACTATE SERPL-SCNC: 1.1 MMOL/L (ref 0.3–2)
DEPRECATED RDW RBC AUTO: 44 FL (ref 37–54)
EGFRCR SERPLBLD CKD-EPI 2021: 102.1 ML/MIN/1.73
EOSINOPHIL # BLD AUTO: 0 10*3/MM3 (ref 0–0.4)
EOSINOPHIL NFR BLD AUTO: 0 % (ref 0.3–6.2)
ERYTHROCYTE [DISTWIDTH] IN BLOOD BY AUTOMATED COUNT: 12.6 % (ref 12.3–15.4)
FLUAV SUBTYP SPEC NAA+PROBE: NOT DETECTED
FLUBV RNA ISLT QL NAA+PROBE: NOT DETECTED
GEN 5 1HR TROPONIN T REFLEX: 15 NG/L
GLOBULIN UR ELPH-MCNC: 2.4 GM/DL
GLUCOSE SERPL-MCNC: 122 MG/DL (ref 65–99)
HADV DNA SPEC NAA+PROBE: NOT DETECTED
HCOV 229E RNA SPEC QL NAA+PROBE: NOT DETECTED
HCOV HKU1 RNA SPEC QL NAA+PROBE: NOT DETECTED
HCOV NL63 RNA SPEC QL NAA+PROBE: NOT DETECTED
HCOV OC43 RNA SPEC QL NAA+PROBE: NOT DETECTED
HCT VFR BLD AUTO: 38.8 % (ref 37.5–51)
HGB BLD-MCNC: 13 G/DL (ref 13–17.7)
HMPV RNA NPH QL NAA+NON-PROBE: NOT DETECTED
HPIV1 RNA ISLT QL NAA+PROBE: NOT DETECTED
HPIV2 RNA SPEC QL NAA+PROBE: NOT DETECTED
HPIV3 RNA NPH QL NAA+PROBE: NOT DETECTED
HPIV4 P GENE NPH QL NAA+PROBE: NOT DETECTED
IMM GRANULOCYTES # BLD AUTO: 0.01 10*3/MM3 (ref 0–0.05)
IMM GRANULOCYTES NFR BLD AUTO: 0.2 % (ref 0–0.5)
LARGE PLATELETS: NORMAL
LYMPHOCYTES # BLD AUTO: 2.19 10*3/MM3 (ref 0.7–3.1)
LYMPHOCYTES NFR BLD AUTO: 35.4 % (ref 19.6–45.3)
M PNEUMO IGG SER IA-ACNC: NOT DETECTED
MAGNESIUM SERPL-MCNC: 2 MG/DL (ref 1.6–2.4)
MCH RBC QN AUTO: 31.9 PG (ref 26.6–33)
MCHC RBC AUTO-ENTMCNC: 33.5 G/DL (ref 31.5–35.7)
MCV RBC AUTO: 95.1 FL (ref 79–97)
MONOCYTES # BLD AUTO: 0.37 10*3/MM3 (ref 0.1–0.9)
MONOCYTES NFR BLD AUTO: 6 % (ref 5–12)
NEUTROPHILS NFR BLD AUTO: 3.61 10*3/MM3 (ref 1.7–7)
NEUTROPHILS NFR BLD AUTO: 58.2 % (ref 42.7–76)
NRBC BLD AUTO-RTO: 0 /100 WBC (ref 0–0.2)
NT-PROBNP SERPL-MCNC: 152 PG/ML (ref 0–900)
PLATELET # BLD AUTO: 158 10*3/MM3 (ref 140–450)
PMV BLD AUTO: 10.4 FL (ref 6–12)
POTASSIUM SERPL-SCNC: 3.8 MMOL/L (ref 3.5–5.2)
PROT SERPL-MCNC: 6.7 G/DL (ref 6–8.5)
RBC # BLD AUTO: 4.08 10*6/MM3 (ref 4.14–5.8)
RHINOVIRUS RNA SPEC NAA+PROBE: NOT DETECTED
RSV RNA NPH QL NAA+NON-PROBE: NOT DETECTED
SARS-COV-2 RNA RESP QL NAA+PROBE: NOT DETECTED
SODIUM SERPL-SCNC: 140 MMOL/L (ref 136–145)
TROPONIN T NUMERIC DELTA: -1 NG/L
TROPONIN T SERPL HS-MCNC: 16 NG/L
WBC MORPH BLD: NORMAL
WBC NRBC COR # BLD AUTO: 6.19 10*3/MM3 (ref 3.4–10.8)

## 2025-05-30 PROCEDURE — 85379 FIBRIN DEGRADATION QUANT: CPT | Performed by: EMERGENCY MEDICINE

## 2025-05-30 PROCEDURE — 84484 ASSAY OF TROPONIN QUANT: CPT | Performed by: EMERGENCY MEDICINE

## 2025-05-30 PROCEDURE — 85025 COMPLETE CBC W/AUTO DIFF WBC: CPT | Performed by: EMERGENCY MEDICINE

## 2025-05-30 PROCEDURE — 0202U NFCT DS 22 TRGT SARS-COV-2: CPT

## 2025-05-30 PROCEDURE — G0378 HOSPITAL OBSERVATION PER HR: HCPCS

## 2025-05-30 PROCEDURE — 83880 ASSAY OF NATRIURETIC PEPTIDE: CPT | Performed by: EMERGENCY MEDICINE

## 2025-05-30 PROCEDURE — 36415 COLL VENOUS BLD VENIPUNCTURE: CPT

## 2025-05-30 PROCEDURE — 85007 BL SMEAR W/DIFF WBC COUNT: CPT | Performed by: EMERGENCY MEDICINE

## 2025-05-30 PROCEDURE — 71045 X-RAY EXAM CHEST 1 VIEW: CPT

## 2025-05-30 PROCEDURE — 83735 ASSAY OF MAGNESIUM: CPT

## 2025-05-30 PROCEDURE — 94664 DEMO&/EVAL PT USE INHALER: CPT

## 2025-05-30 PROCEDURE — 94799 UNLISTED PULMONARY SVC/PX: CPT

## 2025-05-30 PROCEDURE — 80053 COMPREHEN METABOLIC PANEL: CPT | Performed by: EMERGENCY MEDICINE

## 2025-05-30 PROCEDURE — 94640 AIRWAY INHALATION TREATMENT: CPT

## 2025-05-30 PROCEDURE — 25010000002 METHYLPREDNISOLONE PER 125 MG: Performed by: EMERGENCY MEDICINE

## 2025-05-30 PROCEDURE — 94660 CPAP INITIATION&MGMT: CPT

## 2025-05-30 PROCEDURE — 94761 N-INVAS EAR/PLS OXIMETRY MLT: CPT

## 2025-05-30 PROCEDURE — 83605 ASSAY OF LACTIC ACID: CPT

## 2025-05-30 PROCEDURE — 93005 ELECTROCARDIOGRAM TRACING: CPT | Performed by: EMERGENCY MEDICINE

## 2025-05-30 PROCEDURE — 99285 EMERGENCY DEPT VISIT HI MDM: CPT

## 2025-05-30 PROCEDURE — 87040 BLOOD CULTURE FOR BACTERIA: CPT | Performed by: EMERGENCY MEDICINE

## 2025-05-30 RX ORDER — METHYLPREDNISOLONE SODIUM SUCCINATE 125 MG/2ML
125 INJECTION, POWDER, LYOPHILIZED, FOR SOLUTION INTRAMUSCULAR; INTRAVENOUS ONCE
Status: COMPLETED | OUTPATIENT
Start: 2025-05-30 | End: 2025-05-30

## 2025-05-30 RX ORDER — CLONAZEPAM 0.5 MG/1
0.5 TABLET ORAL 3 TIMES DAILY PRN
Status: DISCONTINUED | OUTPATIENT
Start: 2025-05-30 | End: 2025-06-02 | Stop reason: HOSPADM

## 2025-05-30 RX ORDER — ISOSORBIDE MONONITRATE 30 MG/1
30 TABLET, EXTENDED RELEASE ORAL
Status: DISCONTINUED | OUTPATIENT
Start: 2025-05-31 | End: 2025-06-02 | Stop reason: HOSPADM

## 2025-05-30 RX ORDER — GUAIFENESIN 600 MG/1
1200 TABLET, EXTENDED RELEASE ORAL EVERY 12 HOURS SCHEDULED
Status: DISCONTINUED | OUTPATIENT
Start: 2025-05-30 | End: 2025-06-02 | Stop reason: HOSPADM

## 2025-05-30 RX ORDER — PANTOPRAZOLE SODIUM 40 MG/1
40 TABLET, DELAYED RELEASE ORAL
Status: DISCONTINUED | OUTPATIENT
Start: 2025-05-31 | End: 2025-06-02 | Stop reason: HOSPADM

## 2025-05-30 RX ORDER — AMLODIPINE BESYLATE 5 MG/1
10 TABLET ORAL
Status: DISCONTINUED | OUTPATIENT
Start: 2025-05-31 | End: 2025-06-01

## 2025-05-30 RX ORDER — ACETAMINOPHEN 325 MG/1
650 TABLET ORAL EVERY 4 HOURS PRN
Status: DISCONTINUED | OUTPATIENT
Start: 2025-05-30 | End: 2025-06-02 | Stop reason: HOSPADM

## 2025-05-30 RX ORDER — SODIUM CHLORIDE 9 MG/ML
40 INJECTION, SOLUTION INTRAVENOUS AS NEEDED
Status: DISCONTINUED | OUTPATIENT
Start: 2025-05-30 | End: 2025-06-02 | Stop reason: HOSPADM

## 2025-05-30 RX ORDER — SODIUM CHLORIDE 0.9 % (FLUSH) 0.9 %
10 SYRINGE (ML) INJECTION AS NEEDED
Status: DISCONTINUED | OUTPATIENT
Start: 2025-05-30 | End: 2025-06-02 | Stop reason: HOSPADM

## 2025-05-30 RX ORDER — TAMSULOSIN HYDROCHLORIDE 0.4 MG/1
0.4 CAPSULE ORAL DAILY
Status: DISCONTINUED | OUTPATIENT
Start: 2025-05-31 | End: 2025-06-02 | Stop reason: HOSPADM

## 2025-05-30 RX ORDER — IPRATROPIUM BROMIDE AND ALBUTEROL SULFATE 2.5; .5 MG/3ML; MG/3ML
3 SOLUTION RESPIRATORY (INHALATION)
Status: DISCONTINUED | OUTPATIENT
Start: 2025-05-30 | End: 2025-06-02 | Stop reason: HOSPADM

## 2025-05-30 RX ORDER — ACETAMINOPHEN 650 MG/1
650 SUPPOSITORY RECTAL EVERY 4 HOURS PRN
Status: DISCONTINUED | OUTPATIENT
Start: 2025-05-30 | End: 2025-06-02 | Stop reason: HOSPADM

## 2025-05-30 RX ORDER — VENLAFAXINE HYDROCHLORIDE 75 MG/1
75 CAPSULE, EXTENDED RELEASE ORAL DAILY
Status: DISCONTINUED | OUTPATIENT
Start: 2025-05-31 | End: 2025-06-02 | Stop reason: HOSPADM

## 2025-05-30 RX ORDER — ATORVASTATIN CALCIUM 20 MG/1
20 TABLET, FILM COATED ORAL DAILY
Status: DISCONTINUED | OUTPATIENT
Start: 2025-05-31 | End: 2025-06-02 | Stop reason: HOSPADM

## 2025-05-30 RX ORDER — LISINOPRIL 20 MG/1
40 TABLET ORAL
Status: DISCONTINUED | OUTPATIENT
Start: 2025-05-31 | End: 2025-05-31

## 2025-05-30 RX ORDER — ALBUTEROL SULFATE 0.83 MG/ML
2.5 SOLUTION RESPIRATORY (INHALATION) ONCE
Status: COMPLETED | OUTPATIENT
Start: 2025-05-30 | End: 2025-05-30

## 2025-05-30 RX ORDER — ACETAMINOPHEN 160 MG/5ML
650 SOLUTION ORAL EVERY 4 HOURS PRN
Status: DISCONTINUED | OUTPATIENT
Start: 2025-05-30 | End: 2025-06-02 | Stop reason: HOSPADM

## 2025-05-30 RX ORDER — CLOPIDOGREL BISULFATE 75 MG/1
75 TABLET ORAL DAILY
Status: DISCONTINUED | OUTPATIENT
Start: 2025-05-31 | End: 2025-06-02 | Stop reason: HOSPADM

## 2025-05-30 RX ORDER — AZITHROMYCIN 250 MG/1
500 TABLET, FILM COATED ORAL
Status: COMPLETED | OUTPATIENT
Start: 2025-05-30 | End: 2025-06-01

## 2025-05-30 RX ORDER — GABAPENTIN 300 MG/1
600 CAPSULE ORAL EVERY 8 HOURS SCHEDULED
Status: DISCONTINUED | OUTPATIENT
Start: 2025-05-31 | End: 2025-06-02 | Stop reason: HOSPADM

## 2025-05-30 RX ORDER — HYDRALAZINE HYDROCHLORIDE 25 MG/1
25 TABLET, FILM COATED ORAL ONCE
Status: COMPLETED | OUTPATIENT
Start: 2025-05-31 | End: 2025-05-30

## 2025-05-30 RX ORDER — NITROGLYCERIN 0.4 MG/1
0.4 TABLET SUBLINGUAL
Status: DISCONTINUED | OUTPATIENT
Start: 2025-05-30 | End: 2025-06-02 | Stop reason: HOSPADM

## 2025-05-30 RX ORDER — SODIUM CHLORIDE 0.9 % (FLUSH) 0.9 %
10 SYRINGE (ML) INJECTION EVERY 12 HOURS SCHEDULED
Status: DISCONTINUED | OUTPATIENT
Start: 2025-05-30 | End: 2025-06-02 | Stop reason: HOSPADM

## 2025-05-30 RX ORDER — IPRATROPIUM BROMIDE AND ALBUTEROL SULFATE 2.5; .5 MG/3ML; MG/3ML
3 SOLUTION RESPIRATORY (INHALATION) ONCE
Status: COMPLETED | OUTPATIENT
Start: 2025-05-30 | End: 2025-05-30

## 2025-05-30 RX ORDER — ALBUTEROL SULFATE 0.83 MG/ML
2.5 SOLUTION RESPIRATORY (INHALATION) EVERY 4 HOURS PRN
Status: DISCONTINUED | OUTPATIENT
Start: 2025-05-30 | End: 2025-06-02 | Stop reason: HOSPADM

## 2025-05-30 RX ORDER — DOCUSATE SODIUM 100 MG/1
100 CAPSULE, LIQUID FILLED ORAL 2 TIMES DAILY PRN
Status: DISCONTINUED | OUTPATIENT
Start: 2025-05-30 | End: 2025-06-02 | Stop reason: HOSPADM

## 2025-05-30 RX ORDER — MONTELUKAST SODIUM 10 MG/1
10 TABLET ORAL NIGHTLY
Status: DISCONTINUED | OUTPATIENT
Start: 2025-05-31 | End: 2025-06-02 | Stop reason: HOSPADM

## 2025-05-30 RX ORDER — ASPIRIN 81 MG/1
81 TABLET ORAL 2 TIMES DAILY
Status: DISCONTINUED | OUTPATIENT
Start: 2025-05-31 | End: 2025-06-02 | Stop reason: HOSPADM

## 2025-05-30 RX ORDER — DONEPEZIL HYDROCHLORIDE 5 MG/1
10 TABLET, FILM COATED ORAL NIGHTLY
Status: DISCONTINUED | OUTPATIENT
Start: 2025-05-31 | End: 2025-06-02 | Stop reason: HOSPADM

## 2025-05-30 RX ORDER — CARBIDOPA AND LEVODOPA 25; 100 MG/1; MG/1
2 TABLET ORAL 3 TIMES DAILY
Status: DISCONTINUED | OUTPATIENT
Start: 2025-05-31 | End: 2025-06-02 | Stop reason: HOSPADM

## 2025-05-30 RX ORDER — OXYCODONE HYDROCHLORIDE 5 MG/1
7.5 TABLET ORAL EVERY 6 HOURS PRN
Refills: 0 | Status: DISCONTINUED | OUTPATIENT
Start: 2025-05-30 | End: 2025-06-02 | Stop reason: HOSPADM

## 2025-05-30 RX ORDER — CARBIDOPA AND LEVODOPA 25; 100 MG/1; MG/1
2 TABLET, EXTENDED RELEASE ORAL NIGHTLY
Status: DISCONTINUED | OUTPATIENT
Start: 2025-05-31 | End: 2025-06-02 | Stop reason: HOSPADM

## 2025-05-30 RX ADMIN — IPRATROPIUM BROMIDE AND ALBUTEROL SULFATE 3 ML: .5; 3 SOLUTION RESPIRATORY (INHALATION) at 14:43

## 2025-05-30 RX ADMIN — Medication 10 ML: at 20:11

## 2025-05-30 RX ADMIN — HYDRALAZINE HYDROCHLORIDE 25 MG: 25 TABLET ORAL at 23:49

## 2025-05-30 RX ADMIN — AZITHROMYCIN 500 MG: 250 TABLET, FILM COATED ORAL at 20:11

## 2025-05-30 RX ADMIN — GUAIFENESIN 1200 MG: 600 TABLET, EXTENDED RELEASE ORAL at 20:11

## 2025-05-30 RX ADMIN — ALBUTEROL SULFATE 2.5 MG: 2.5 SOLUTION RESPIRATORY (INHALATION) at 16:19

## 2025-05-30 RX ADMIN — METHYLPREDNISOLONE SODIUM SUCCINATE 125 MG: 125 INJECTION, POWDER, FOR SOLUTION INTRAMUSCULAR; INTRAVENOUS at 15:36

## 2025-05-30 NOTE — ED PROVIDER NOTES
Subjective   History of Present Illness  Patient is a 64-year-old male with a history of COPD who presents with several days of chest pain and progressive shortness of breath, which worsens with exertion and when lying flat. He reports a cough but no mention of sputum production or fever. Visiting nurses noted increased fluid in the lower lungs two days ago. He was scheduled to see his primary physician today, but due to worsening chest pain, he was brought to the emergency department. He uses an inhaler with steroids at home.     Heart catheterization done by Doctor Octavio last month demonstrated a patent LAD stent and non-obstructive coronary artery disease.  Review of Systems  See HPI.  Past Medical History:   Diagnosis Date    Anxiety 09/04/2012    Benign prostatic hyperplasia with lower urinary tract symptoms 02/27/2024    Borderline diabetes     COPD (chronic obstructive pulmonary disease)     Coronary artery disease 2015    To the best of my memory    COVID-19 03/18/2025    Cystic fibrosis 2021    Cytokine release syndrome, grade 1 03/20/2025    Deep vein thrombosis 2015    Blood clots    Depression 07/07/2016    Diffuse non-Hodgkin's lymphoma of testis 03/06/2018    Duodenitis 01/27/2022    Dysphagia 08/2022    from parkinsons    Emphysema of lung 2021    Erosive gastritis 01/27/2022    Esophageal stricture 01/27/2022    Fall 07/25/2019    Food impaction of esophagus 01/27/2022    Gastroesophageal reflux disease 09/04/2012    History of chemotherapy 04/25/2021    Hx of radiation therapy 04/25/2021    Intertrochanteric fracture of left femur, closed, initial encounter 08/25/2024    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    Lung nodule 2021    Mixed hyperlipidemia 07/08/2019    Myocardial infarction 2017    Non-Hodgkin's lymphoma of testis     Parkinson disease     Pneumonia of right upper lobe due to infectious organism 12/04/2022    Poor historian     Primary hypertension 07/08/2019    Pulmonary arterial  hypertension 2007    PVD (peripheral vascular disease) with claudication 09/20/2024    Shortness of breath     Sleep apnea     does not have a cpap    Sleep apnea, obstructive 2021    Status post coronary artery stent placement 02/26/2024    Stroke 2017    Testosterone deficiency 07/25/2019    Tick bite 05/20/2021    Tobacco abuse 10/13/2021    Ulcer of esophagus without bleeding 01/27/2022    Uncomplicated alcohol dependence 04/25/2021       No Known Allergies    Past Surgical History:   Procedure Laterality Date    CARDIAC CATHETERIZATION N/A 02/26/2024    Procedure: Left and Right Heart Cath with Coronary Angiography;  Surgeon: Lelo Tello MD;  Location: [facility] CATH INVASIVE LOCATION;  Service: Cardiology;  Laterality: N/A;    CARDIAC CATHETERIZATION N/A 02/26/2024    Procedure: Percutaneous Coronary Intervention;  Surgeon: Lelo Tello MD;  Location: [facility] CATH INVASIVE LOCATION;  Service: Cardiology;  Laterality: N/A;    CARDIAC CATHETERIZATION Right 4/17/2025    Procedure: Right and Left Heart Cath with coronary angiogram;  Surgeon: Nomi Cunningham MD;  Location: [facility] CATH INVASIVE LOCATION;  Service: Cardiovascular;  Laterality: Right;  right IJ and right radial    CARDIAC VALVE REPLACEMENT  2024    Stent    ENDOSCOPY N/A 01/27/2022    Procedure: ESOPHAGOGASTRODUODENOSCOPY with foreign body removal with gastric, duodenal biopsy and GE junction biopsy;  Surgeon: Pedro De La Rosa MD;  Location: [facility] ENDOSCOPY;  Service: Gastroenterology;  Laterality: N/A;  post: foreign body removal, erosive gastritis with biopsy, erosive eduodenitis with biopsy, esophagitis, GE junction biopsy to rule out malignancy,     ENDOSCOPY N/A 08/23/2022    Procedure: ESOPHAGOGASTRODUODENOSCOPY with GE junction biopsy R/O Barretts, esophageal dilation #48 bougie;  Surgeon: Alexa Subramanian MD;  Location: [facility] ENDOSCOPY;  Service: Gastroenterology;  Laterality: N/A;  duodenal ulcer, GERD,     HIP TROCHANTERIC NAILING  "WITH INTRAMEDULLARY HIP SCREW Left 2024    Procedure: Left hip/femur fracture fixation with intramedullary ajit and screws;  Surgeon: Kameron Kaminski MD;  Location: [facility] MAIN OR;  Service: Orthopedics;  Laterality: Left;    INTERVENTIONAL RADIOLOGY PROCEDURE N/A 2024    Procedure: Intravascular Ultrasound;  Surgeon: Lelo Tello MD;  Location: [facility] CATH INVASIVE LOCATION;  Service: Cardiology;  Laterality: N/A;    ORCHIECTOMY         Family History   Problem Relation Age of Onset    Heart disease Mother         My brother, and a sister has had a brain anurysm    Stroke Mother     Heart failure Mother     Hypertension Mother         My whole imidate family, mother and all siblings. My father passed when i was [age] from black lung disease.    No Known Problems Father     Heart disease Brother     Heart failure Brother     Heart disease Brother     Heart failure Sister     Hypertension Sister        Social History     Socioeconomic History    Marital status: Legally    Tobacco Use    Smoking status: Former     Current packs/day: 0.00     Average packs/day: 1.1 packs/day for 67.1 years (75.0 ttl pk-yrs)     Types: Cigarettes     Start date: 1973     Quit date: 2024     Years since quittin.0     Passive exposure: Current    Smokeless tobacco: Never   Vaping Use    Vaping status: Never Used   Substance and Sexual Activity    Alcohol use: Not Currently     Comment: \"once a month\"    Drug use: Never    Sexual activity: Not Currently     Partners: Male           Objective   Physical Exam  Normocephalic. No scleral icterus. Moist oral mucosa. No observable neck masses on external visualization. Speaking in shortened sentences. Diminished breath sounds with bilateral expiratory wheezes and diminished air movement, no stridor. No jugular venous distension. Normal heart rate. Intact distal pulses. Abdomen soft and nontender without peritoneal signs. Chest wall non-tender. Alert. Normal " "speech.  Procedures           ED Course      /88   Pulse 63   Temp 98.2 °F (36.8 °C) (Oral)   Resp 18   Ht 175.3 cm (69\")   Wt 94.3 kg (208 lb)   SpO2 95%   BMI 30.72 kg/m²   Labs Reviewed   COMPREHENSIVE METABOLIC PANEL - Abnormal; Notable for the following components:       Result Value    Glucose 122 (*)     Creatinine 0.69 (*)     Alkaline Phosphatase 129 (*)     All other components within normal limits    Narrative:     GFR Categories in Chronic Kidney Disease (CKD)              GFR Category          GFR (mL/min/1.73)    Interpretation  G1                    90 or greater        Normal or high (1)  G2                    60-89                Mild decrease (1)  G3a                   45-59                Mild to moderate decrease  G3b                   30-44                Moderate to severe decrease  G4                    15-29                Severe decrease  G5                    14 or less           Kidney failure    (1)In the absence of evidence of kidney disease, neither GFR category G1 or G2 fulfill the criteria for CKD.    eGFR calculation 2021 CKD-EPI creatinine equation, which does not include race as a factor   CBC WITH AUTO DIFFERENTIAL - Abnormal; Notable for the following components:    RBC 4.08 (*)     Eosinophil % 0.0 (*)     All other components within normal limits   RESPIRATORY PANEL PCR W/ COVID-19 (SARS-COV-2), NP SWAB IN UTM/VTP, 2 HR TAT - Normal    Narrative:     In the setting of a positive respiratory panel with a viral infection PLUS a negative procalcitonin without other underlying concern for bacterial infection, consider observing off antibiotics or discontinuation of antibiotics and continue supportive care. If the respiratory panel is positive for atypical bacterial infection (Bordetella pertussis, Chlamydophila pneumoniae, or Mycoplasma pneumoniae), consider antibiotic de-escalation to target atypical bacterial infection.   BNP (IN-HOUSE) - Normal    Narrative:     " This assay is used as an aid in the diagnosis of individuals suspected of having heart failure. It can be used as an aid in the diagnosis of acute decompensated heart failure (ADHF) in patients presenting with signs and symptoms of ADHF to the emergency department (ED). In addition, NT-proBNP of <300 pg/mL indicates ADHF is not likely.    Age Range Result Interpretation  NT-proBNP Concentration (pg/mL:      <50             Positive            >450                   Gray                 300-450                    Negative             <300    50-75           Positive            >900                  Gray                300-900                  Negative            <300      >75             Positive            >1800                  Gray                300-1800                  Negative            <300   TROPONIN - Normal    Narrative:     High Sensitive Troponin T Reference Range:  <14.0 ng/L- Negative Female for AMI  <22.0 ng/L- Negative Male for AMI  >=14 - Abnormal Female indicating possible myocardial injury.  >=22 - Abnormal Male indicating possible myocardial injury.   Clinicians would have to utilize clinical acumen, EKG, Troponin, and serial changes to determine if it is an Acute Myocardial Infarction or myocardial injury due to an underlying chronic condition.        D-DIMER, QUANTITATIVE - Normal    Narrative:     According to the assay 's published package insert, a normal (<0.50 MCGFEU/mL) D-dimer result in conjunction with a non-high clinical probability assessment, excludes deep vein thrombosis (DVT) and pulmonary embolism (PE) with high sensitivity.    D-dimer values increase with age and this can make VTE exclusion of an older population difficult. To address this, the American College of Physicians, based on best available evidence and recent guidelines, recommends that clinicians use age-adjusted D-dimer thresholds in patients greater than 50 years of age with: a) a low probability of PE  "who do not meet all Pulmonary Embolism Rule Out Criteria, or b) in those with intermediate probability of PE.   The formula for an age-adjusted D-dimer cut-off is \"age/100\".  For example, a 60 year old patient would have an age-adjusted cut-off of 0.60 MCGFEU/mL and an 80 year old 0.80 MCGFEU/mL.   HIGH SENSITIVITIY TROPONIN T 1HR - Normal    Narrative:     High Sensitive Troponin T Reference Range:  <14.0 ng/L- Negative Female for AMI  <22.0 ng/L- Negative Male for AMI  >=14 - Abnormal Female indicating possible myocardial injury.  >=22 - Abnormal Male indicating possible myocardial injury.   Clinicians would have to utilize clinical acumen, EKG, Troponin, and serial changes to determine if it is an Acute Myocardial Infarction or myocardial injury due to an underlying chronic condition.        MAGNESIUM - Normal   POC LACTATE - Normal   BLOOD CULTURE   BLOOD CULTURE   SCAN SLIDE   BASIC METABOLIC PANEL   CBC WITH AUTO DIFFERENTIAL   MAGNESIUM   PHOSPHORUS   POC LACTATE   CBC AND DIFFERENTIAL    Narrative:     The following orders were created for panel order CBC & Differential.  Procedure                               Abnormality         Status                     ---------                               -----------         ------                     CBC Auto Differential[[id number]]        Abnormal            Final result               Scan Slide[[id number]]                                       Final result                 Please view results for these tests on the individual orders.     Medications   sodium chloride 0.9 % flush 10 mL (has no administration in time range)   sodium chloride 0.9 % flush 10 mL (10 mL Intravenous Given 5/30/25 2011)   sodium chloride 0.9 % infusion 40 mL (has no administration in time range)   Potassium Replacement - Follow Nurse / BPA Driven Protocol (has no administration in time range)   Magnesium Standard Dose Replacement - Follow Nurse / BPA Driven Protocol (has no administration " in time range)   Phosphorus Replacement - Follow Nurse / BPA Driven Protocol (has no administration in time range)   Calcium Replacement - Follow Nurse / BPA Driven Protocol (has no administration in time range)   acetaminophen (TYLENOL) tablet 650 mg (has no administration in time range)     Or   acetaminophen (TYLENOL) 160 MG/5ML oral solution 650 mg (has no administration in time range)     Or   acetaminophen (TYLENOL) suppository 650 mg (has no administration in time range)   albuterol (PROVENTIL) nebulizer solution 0.083% 2.5 mg/3mL (has no administration in time range)   ipratropium-albuterol (DUO-NEB) nebulizer solution 3 mL (3 mL Nebulization Not Given 5/30/25 1801)   guaiFENesin (MUCINEX) 12 hr tablet 1,200 mg (1,200 mg Oral Given 5/30/25 2011)   azithromycin (ZITHROMAX) tablet 500 mg (500 mg Oral Given 5/30/25 2011)   ipratropium-albuterol (DUO-NEB) nebulizer solution 3 mL (3 mL Nebulization Given 5/30/25 1443)   methylPREDNISolone sodium succinate (SOLU-Medrol) injection 125 mg (125 mg Intravenous Given 5/30/25 1536)   albuterol (PROVENTIL) nebulizer solution 0.083% 2.5 mg/3mL (2.5 mg Nebulization Given 5/30/25 1619)     XR Chest 1 View   Final Result   Impression:   No active disease.            Electronically Signed: Tad Angela MD     5/30/2025 3:20 PM EDT     Workstation ID: [id number]                                                         Medical Decision Making  Problems Addressed:  Acute exacerbation of chronic obstructive pulmonary disease (COPD): complicated acute illness or injury    Amount and/or Complexity of Data Reviewed  Labs: ordered.  Radiology: ordered.  ECG/medicine tests: ordered.    Risk  Prescription drug management.  Decision regarding hospitalization.    EKG interpretation: 2:25 PM, rate 53, sinus bradycardia, normal axis, normal QTc, no acute ischemic changes, no significant change from April 16, 2025 EKG    My interpretation of chest x-ray negative for pneumothorax.  See system  for radiology interpretation.    Patient remains quite tachypneic after breathing treatment steroids.  No sputum change so holding antibiotics currently.  Troponin flat.  D-dimer negative so low suspicion for PE.  Did have some improvement he states after DuoNeb treatment.  Will admit to hospitalist service for COPD exacerbation    Final diagnoses:   Acute exacerbation of chronic obstructive pulmonary disease (COPD)       ED Disposition  ED Disposition       ED Disposition   Decision to Admit    Condition   --    Comment   Level of Care: Telemetry [5]   Diagnosis: Shortness of breath [786.05.ICD-9-CM]   Admitting Physician: RYAN RETANA [[id number]]                 No follow-up provider specified.       Medication List      No changes were made to your prescriptions during this visit.            Eben Cohen MD  05/30/25 2222

## 2025-05-30 NOTE — Clinical Note
Level of Care: Telemetry [5]   Admitting Physician: RYAN RETANA [[id number]]   Attending Physician: RYAN RETANA [[id number]]

## 2025-05-30 NOTE — H&P
[facility] Medicine Services  History & Physical    Patient Name: Andrew Flores  : 1960  MRN: [id number]  Primary Care Physician:  Larry Mireles PA-C  Date of admission: 2025  Date and Time of Service: 2025 at 1640    Subjective      Chief Complaint: Shortness of breath, chest pain    History of Present Illness: Andrew Flores is a 64 y.o. male with a CMH of CAD of native artery of native heart with stable angina pectoris status post stenting, chest tightness, PVD with claudication, bradycardia, hypertension, hyperlipidemia, history of CVA, COPD dependent on home oxygen of 2 L via nasal cannula, emphysema, KASIA on CPAP, Parkinson's disease, BPH, GERD, anxiety, depression who presented to [facility] on 2025 with shortness of breath and chest pain.  Patient reported progressive symptoms over the past few days, that are worse while lying flat and with exertion.  Patient reports a nonproductive cough.  Family member reported patient's home nurse noted crackles in the lower lungs 2 days ago.  Patient was supposed to see his PCP today, but due to worsening chest pain he was brought to the emergency department.  At time of exam, patient reported he is doing much better, denied shortness of breath or chest pain, or any other acute issues.  Patient reported he has been compliant with his home medications.      Per chart review, chronic shortness of breath and chest pain started around the beginning of this year.  Patient was admitted to [facility] in March of this year with COVID-19, then again last month with similar presentation.  Patient underwent a cardiac cath with Dr. Cunningham on 2025.  Patient has followed up with cardiology and pulmonology since his last hospitalization.     In the ED: patient hemodynamically stable, afebrile. Patient not requiring supplemental oxygen. Pertinent labs include HS trop 16->15, proBNP 152.0, lactate 1.1, D-dimer 0.32, WBC 6.19.  Blood cultures  drawn.  EKG showed sinus bradycardia , rate 53 , QTC of 422 ms. CXR showed no active disease.  Patient received albuterol nebulizer, DuoNeb, Solu-Medrol 125 mg IV. Hospitalist team to admit for further management.      Review of Systems   Constitutional:  Negative for chills and fever.   Respiratory:  Positive for cough and shortness of breath.    Cardiovascular:  Negative for chest pain, palpitations and leg swelling.   Gastrointestinal:  Negative for nausea and vomiting.   Genitourinary:  Negative for dysuria and hematuria.   Neurological:  Positive for weakness.   Psychiatric/Behavioral:  The patient is nervous/anxious.        Personal History     Past Medical History:   Diagnosis Date    Anxiety 09/04/2012    Benign prostatic hyperplasia with lower urinary tract symptoms 02/27/2024    Borderline diabetes     COPD (chronic obstructive pulmonary disease)     Coronary artery disease 2015    To the best of my memory    COVID-19 03/18/2025    Cystic fibrosis 2021    Cytokine release syndrome, grade 1 03/20/2025    Deep vein thrombosis 2015    Blood clots    Depression 07/07/2016    Diffuse non-Hodgkin's lymphoma of testis 03/06/2018    Duodenitis 01/27/2022    Dysphagia 08/2022    from parkinsons    Emphysema of lung 2021    Erosive gastritis 01/27/2022    Esophageal stricture 01/27/2022    Fall 07/25/2019    Food impaction of esophagus 01/27/2022    Gastroesophageal reflux disease 09/04/2012    History of chemotherapy 04/25/2021    Hx of radiation therapy 04/25/2021    Intertrochanteric fracture of left femur, closed, initial encounter 08/25/2024    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    Lung nodule 2021    Mixed hyperlipidemia 07/08/2019    Myocardial infarction 2017    Non-Hodgkin's lymphoma of testis     Parkinson disease     Pneumonia of right upper lobe due to infectious organism 12/04/2022    Poor historian     Primary hypertension 07/08/2019    Pulmonary arterial hypertension 2007    PVD (peripheral  vascular disease) with claudication 09/20/2024    Shortness of breath     Sleep apnea     does not have a cpap    Sleep apnea, obstructive 2021    Status post coronary artery stent placement 02/26/2024    Stroke 2017    Testosterone deficiency 07/25/2019    Tick bite 05/20/2021    Tobacco abuse 10/13/2021    Ulcer of esophagus without bleeding 01/27/2022    Uncomplicated alcohol dependence 04/25/2021       Past Surgical History:   Procedure Laterality Date    CARDIAC CATHETERIZATION N/A 02/26/2024    Procedure: Left and Right Heart Cath with Coronary Angiography;  Surgeon: Lelo Tello MD;  Location: [facility] CATH INVASIVE LOCATION;  Service: Cardiology;  Laterality: N/A;    CARDIAC CATHETERIZATION N/A 02/26/2024    Procedure: Percutaneous Coronary Intervention;  Surgeon: Lelo Tello MD;  Location: [facility] CATH INVASIVE LOCATION;  Service: Cardiology;  Laterality: N/A;    CARDIAC CATHETERIZATION Right 4/17/2025    Procedure: Right and Left Heart Cath with coronary angiogram;  Surgeon: Nomi Cunningham MD;  Location: [facility] CATH INVASIVE LOCATION;  Service: Cardiovascular;  Laterality: Right;  right IJ and right radial    CARDIAC VALVE REPLACEMENT  2024    Stent    ENDOSCOPY N/A 01/27/2022    Procedure: ESOPHAGOGASTRODUODENOSCOPY with foreign body removal with gastric, duodenal biopsy and GE junction biopsy;  Surgeon: Pedro De La Rosa MD;  Location: [facility] ENDOSCOPY;  Service: Gastroenterology;  Laterality: N/A;  post: foreign body removal, erosive gastritis with biopsy, erosive eduodenitis with biopsy, esophagitis, GE junction biopsy to rule out malignancy,     ENDOSCOPY N/A 08/23/2022    Procedure: ESOPHAGOGASTRODUODENOSCOPY with GE junction biopsy R/O Barretts, esophageal dilation #48 bougie;  Surgeon: Alexa Subramanian MD;  Location: [facility] ENDOSCOPY;  Service: Gastroenterology;  Laterality: N/A;  duodenal ulcer, GERD,     HIP TROCHANTERIC NAILING WITH INTRAMEDULLARY HIP SCREW Left 08/26/2024    Procedure:  Left hip/femur fracture fixation with intramedullary ajit and screws;  Surgeon: Kameron Kaminski MD;  Location: [facility] MAIN OR;  Service: Orthopedics;  Laterality: Left;    INTERVENTIONAL RADIOLOGY PROCEDURE N/A 02/26/2024    Procedure: Intravascular Ultrasound;  Surgeon: Lelo Tello MD;  Location: [facility] CATH INVASIVE LOCATION;  Service: Cardiology;  Laterality: N/A;    ORCHIECTOMY  2019       Family History: family history includes Heart disease in his brother, brother, and mother; Heart failure in his brother, mother, and sister; Hypertension in his mother and sister; No Known Problems in his father; Stroke in his mother. Otherwise pertinent FHx was reviewed and not pertinent to current issue.    Social History:  reports that he quit smoking about 13 months ago. His smoking use included cigarettes. He started smoking about 52 years ago. He has a 75 pack-year smoking history. He has been exposed to tobacco smoke. He has never used smokeless tobacco. He reports that he does not currently use alcohol. He reports that he does not use drugs.    Home Medications:  Prior to Admission Medications       Prescriptions Last Dose Informant Patient Reported? Taking?    albuterol sulfate  (90 Base) MCG/ACT inhaler   No No    Inhale 2 puffs Every 4 (Four) Hours As Needed for Wheezing.    amLODIPine (NORVASC) 5 MG tablet   No No    Take 1 tablet by mouth Daily As Needed (SBP>140mmHg).    amLODIPine-benazepril (LOTREL) 10-20 MG per capsule   No No    TAKE 1 CAPSULE BY MOUTH EVERY DAY AT NIGHT    aspirin 81 MG EC tablet   No No    Take 1 tablet by mouth 2 (Two) Times a Day for 360 doses.    atorvastatin (LIPITOR) 20 MG tablet   No No    Take 1 tablet by mouth Daily.    budesonide (Pulmicort) 0.5 MG/2ML nebulizer solution   No No    Take 2 mL by nebulization 2 (Two) Times a Day for 180 days. DX: J44.9    carbidopa-levodopa (SINEMET)  MG per tablet   Yes No    Take 2 tablets by mouth 3 (Three) Times a Day. Indications:  Parkinson's Disease, Parkinsonian-Like Syndrome    carbidopa-levodopa ER (SINEMET CR)  MG per tablet   Yes No    Take 2 tablets by mouth Every Night.    carvedilol (COREG) 6.25 MG tablet   No No    TAKE 1 TABLET BY MOUTH TWICE A DAY    clonazePAM (KlonoPIN) 0.5 MG tablet   No No    Take 1 tablet by mouth 4 (Four) Times a Day.    clopidogrel (PLAVIX) 75 MG tablet   No No    TAKE 1 TABLET BY MOUTH EVERY DAY    Cyanocobalamin (Vitamin B-12) 1000 MCG sublingual tablet   No No    Take 1 tablet by mouth Daily.    docusate sodium (COLACE) 100 MG capsule   Yes No    Take 1 capsule by mouth 2 (Two) Times a Day As Needed for Constipation.    donepezil (ARICEPT) 10 MG tablet   No No    TAKE 1 TABLET BY MOUTH EVERYDAY AT BEDTIME    fluticasone (FLONASE) 50 MCG/ACT nasal spray   No No    Administer 2 sprays into the nostril(s) as directed by provider Daily.    gabapentin (NEURONTIN) 600 MG tablet   No No    Take 1 tablet by mouth 3 (Three) Times a Day.    ipratropium-albuterol (DUO-NEB) 0.5-2.5 mg/3 ml nebulizer   No No    Take 3 mL by nebulization Every 6 (Six) Hours. DX J44.9 Medicare B    isosorbide mononitrate (IMDUR) 30 MG 24 hr tablet   No No    Take 1 tablet by mouth Daily for 30 days.    melatonin 5 MG tablet tablet   Yes No    Take 1 tablet by mouth At Night As Needed.    montelukast (Singulair) 10 MG tablet   No No    Take 1 tablet by mouth Every Night for 30 days.    nitroglycerin (NITROSTAT) 0.4 MG SL tablet   No No    Place 1 tablet under the tongue Every 5 (Five) Minutes As Needed for Chest Pain (Do not take if systolic BP less than 100 mmHg). Take no more than 3 doses in 15 minutes.    ondansetron (ZOFRAN) 4 MG tablet   Yes No    Take 1 tablet by mouth Every 4 (Four) Hours As Needed for Nausea or Vomiting.    oxyCODONE-acetaminophen (PERCOCET) 7.5-325 MG per tablet   No No    Take 1 tablet by mouth Every 6 (Six) Hours As Needed for Moderate Pain.    pantoprazole (PROTONIX) 40 MG EC tablet   No No    TAKE 1  TABLET BY MOUTH EVERY DAY    polyethylene glycol (MIRALAX) 17 GM/SCOOP powder   No No    Take 17 g by mouth Daily As Needed (Use if senna-docusate is ineffective).    tamsulosin (FLOMAX) 0.4 MG capsule 24 hr capsule   No No    TAKE 1 CAPSULE BY MOUTH EVERY DAY    Testosterone Cypionate (DEPOTESTOTERONE CYPIONATE) 200 MG/ML injection   No No    INJECT 1 ML INTO THE APPROPRIATE MUSCLE AS DIRECTED BY PRESCRIBER EVERY 14 (FOURTEEN) DAYS.    venlafaxine XR (EFFEXOR-XR) 75 MG 24 hr capsule   No No    TAKE 1 CAPSULE BY MOUTH EVERY DAY              Allergies:  No Known Allergies    Objective      Vitals:   Temp:  [98.2 °F (36.8 °C)] 98.2 °F (36.8 °C)  Heart Rate:  [46-51] 46  Resp:  [14-19] 15  BP: (116-133)/(68-71) 133/71  Body mass index is 30.72 kg/m².  Physical Exam  Constitutional:       Appearance: He is obese.   HENT:      Head: Normocephalic and atraumatic.      Nose: Nose normal.      Mouth/Throat:      Mouth: Mucous membranes are moist.      Pharynx: Oropharynx is clear.   Eyes:      Extraocular Movements: Extraocular movements intact.      Conjunctiva/sclera: Conjunctivae normal.      Pupils: Pupils are equal, round, and reactive to light.   Cardiovascular:      Rate and Rhythm: Regular rhythm. Bradycardia present.      Pulses: Normal pulses.      Heart sounds: Normal heart sounds.   Pulmonary:      Effort: Pulmonary effort is normal.      Breath sounds: Examination of the right-lower field reveals decreased breath sounds. Examination of the left-lower field reveals decreased breath sounds. Decreased breath sounds present. No wheezing, rhonchi or rales.   Abdominal:      General: Bowel sounds are normal.      Palpations: Abdomen is soft.   Musculoskeletal:      Cervical back: Neck supple.   Skin:     General: Skin is warm and dry.   Neurological:      Mental Status: He is alert and oriented to person, place, and time.      Motor: Weakness present.   Psychiatric:         Mood and Affect: Mood normal.          Behavior: Behavior normal.         Diagnostic Data:  Lab Results (last 24 hours)       Procedure Component Value Units Date/Time    High Sensitivity Troponin T 1Hr [[id number]]  (Normal) Collected: 05/30/25 1600    Specimen: Blood from Arm, Left Updated: 05/30/25 1627     HS Troponin T 15 ng/L      Troponin T Numeric Delta -1 ng/L     Narrative:      High Sensitive Troponin T Reference Range:  <14.0 ng/L- Negative Female for AMI  <22.0 ng/L- Negative Male for AMI  >=14 - Abnormal Female indicating possible myocardial injury.  >=22 - Abnormal Male indicating possible myocardial injury.   Clinicians would have to utilize clinical acumen, EKG, Troponin, and serial changes to determine if it is an Acute Myocardial Infarction or myocardial injury due to an underlying chronic condition.         Blood Culture - Blood, Arm, Right [[id number]] Collected: 05/30/25 1519    Specimen: Blood from Arm, Right Updated: 05/30/25 1522    Comprehensive Metabolic Panel [[id number]]  (Abnormal) Collected: 05/30/25 1448    Specimen: Blood from Arm, Left Updated: 05/30/25 1517     Glucose 122 mg/dL      BUN 14.8 mg/dL      Creatinine 0.69 mg/dL      Sodium 140 mmol/L      Potassium 3.8 mmol/L      Chloride 105 mmol/L      CO2 24.2 mmol/L      Calcium 9.1 mg/dL      Total Protein 6.7 g/dL      Albumin 4.3 g/dL      ALT (SGPT) 6 U/L      AST (SGOT) 14 U/L      Alkaline Phosphatase 129 U/L      Total Bilirubin 0.4 mg/dL      Globulin 2.4 gm/dL      A/G Ratio 1.8 g/dL      BUN/Creatinine Ratio 21.4     Anion Gap 10.8 mmol/L      eGFR 102.1 mL/min/1.73     Narrative:      GFR Categories in Chronic Kidney Disease (CKD)              GFR Category          GFR (mL/min/1.73)    Interpretation  G1                    90 or greater        Normal or high (1)  G2                    60-89                Mild decrease (1)  G3a                   45-59                Mild to moderate decrease  G3b                   30-44                Moderate to severe  decrease  G4                    15-29                Severe decrease  G5                    14 or less           Kidney failure    (1)In the absence of evidence of kidney disease, neither GFR category G1 or G2 fulfill the criteria for CKD.    eGFR calculation 2021 CKD-EPI creatinine equation, which does not include race as a factor    BNP [[id number]]  (Normal) Collected: 05/30/25 1448    Specimen: Blood from Arm, Left Updated: 05/30/25 1517     proBNP 152.0 pg/mL     Narrative:      This assay is used as an aid in the diagnosis of individuals suspected of having heart failure. It can be used as an aid in the diagnosis of acute decompensated heart failure (ADHF) in patients presenting with signs and symptoms of ADHF to the emergency department (ED). In addition, NT-proBNP of <300 pg/mL indicates ADHF is not likely.    Age Range Result Interpretation  NT-proBNP Concentration (pg/mL:      <50             Positive            >450                   Gray                 300-450                    Negative             <300    50-75           Positive            >900                  Gray                300-900                  Negative            <300      >75             Positive            >1800                  Gray                300-1800                  Negative            <300    High Sensitivity Troponin T [[id number]]  (Normal) Collected: 05/30/25 1448    Specimen: Blood from Arm, Left Updated: 05/30/25 1517     HS Troponin T 16 ng/L     Narrative:      High Sensitive Troponin T Reference Range:  <14.0 ng/L- Negative Female for AMI  <22.0 ng/L- Negative Male for AMI  >=14 - Abnormal Female indicating possible myocardial injury.  >=22 - Abnormal Male indicating possible myocardial injury.   Clinicians would have to utilize clinical acumen, EKG, Troponin, and serial changes to determine if it is an Acute Myocardial Infarction or myocardial injury due to an underlying chronic condition.         CBC & Differential  [[id number]]  (Abnormal) Collected: 05/30/25 1448    Specimen: Blood from Arm, Left Updated: 05/30/25 1517    Narrative:      The following orders were created for panel order CBC & Differential.  Procedure                               Abnormality         Status                     ---------                               -----------         ------                     CBC Auto Differential[[id number]]        Abnormal            Final result               Scan Slide[[id number]]                                       Final result                 Please view results for these tests on the individual orders.    CBC Auto Differential [[id number]]  (Abnormal) Collected: 05/30/25 1448    Specimen: Blood from Arm, Left Updated: 05/30/25 1517     WBC 6.19 10*3/mm3      RBC 4.08 10*6/mm3      Hemoglobin 13.0 g/dL      Hematocrit 38.8 %      MCV 95.1 fL      MCH 31.9 pg      MCHC 33.5 g/dL      RDW 12.6 %      RDW-SD 44.0 fl      MPV 10.4 fL      Platelets 158 10*3/mm3      Neutrophil % 58.2 %      Lymphocyte % 35.4 %      Monocyte % 6.0 %      Eosinophil % 0.0 %      Basophil % 0.2 %      Immature Grans % 0.2 %      Neutrophils, Absolute 3.61 10*3/mm3      Lymphocytes, Absolute 2.19 10*3/mm3      Monocytes, Absolute 0.37 10*3/mm3      Eosinophils, Absolute 0.00 10*3/mm3      Basophils, Absolute 0.01 10*3/mm3      Immature Grans, Absolute 0.01 10*3/mm3      nRBC 0.0 /100 WBC     Scan Slide [[id number]] Collected: 05/30/25 1448    Specimen: Blood from Arm, Left Updated: 05/30/25 1517     Anisocytosis Slight/1+     WBC Morphology Normal     Large Platelets Slight/1+    D-dimer, Quantitative [[id number]]  (Normal) Collected: 05/30/25 1448    Specimen: Blood from Arm, Left Updated: 05/30/25 1508     D-Dimer, Quantitative 0.32 MCGFEU/mL     Narrative:      According to the assay 's published package insert, a normal (<0.50 MCGFEU/mL) D-dimer result in conjunction with a non-high clinical probability assessment, excludes deep  "vein thrombosis (DVT) and pulmonary embolism (PE) with high sensitivity.    D-dimer values increase with age and this can make VTE exclusion of an older population difficult. To address this, the [employer] College [employer], based on best available evidence and recent guidelines, recommends that clinicians use age-adjusted D-dimer thresholds in patients greater than 50 years of age with: a) a low probability of PE who do not meet all Pulmonary Embolism Rule Out Criteria, or b) in those with intermediate probability of PE.   The formula for an age-adjusted D-dimer cut-off is \"age/100\".  For example, a 60 year old patient would have an age-adjusted cut-off of 0.60 MCGFEU/mL and an 80 year old 0.80 MCGFEU/mL.    POC Lactate [[id number]]  (Normal) Collected: 05/30/25 1455    Specimen: Blood Updated: 05/30/25 1456     Lactate 1.1 mmol/L      Comment: Serial Number: [id number]:  [id number]       Blood Culture - Blood, Arm, Left [[id number]] Collected: 05/30/25 1448    Specimen: Blood from Arm, Left Updated: 05/30/25 1455             Imaging Results (Last 24 Hours)       Procedure Component Value Units Date/Time    XR Chest 1 View [[id number]] Collected: 05/30/25 1514     Updated: 05/30/25 1522    Narrative:      XR CHEST 1 VW    Date of Exam: 5/30/2025 3:00 PM EDT    Indication: Chest pain and shortness of breath.    Comparison: 4/16/2025.    Findings:  The heart size is normal. The pulmonary vascular markings are normal. The lungs and pleural spaces are clear of active disease. There are chronic age-related changes involving the bony thorax and thoracic aorta.      Impression:      Impression:  No active disease.        Electronically Signed: Tad Angela MD    5/30/2025 3:20 PM EDT    Workstation ID: [id number]              Assessment & Plan        This is a 64 y.o. male with:    Active and Resolved Problems  Active Hospital Problems    Diagnosis  POA    **Shortness of breath [R06.02]  Yes    Obstructive sleep apnea " syndrome [G47.33]  Yes    Coronary artery disease of native artery of native heart with stable angina pectoris [I25.118]  Yes    Status post coronary artery stent placement [Z95.5]  Not Applicable    COPD (chronic obstructive pulmonary disease) [J44.9]  Yes    Mixed hyperlipidemia [E78.2]  Yes    Primary hypertension [I10]  Yes    History of CVA (cerebrovascular accident) [Z86.73]  Not Applicable    Parkinson disease [G20.A1]  Yes    Depression [F32.A]  Yes    Anxiety [F41.9]  Yes    Gastroesophageal reflux disease [K21.9]  Yes      Resolved Hospital Problems   No resolved problems to display.       Acute respiratory distress   COPD, emphysema  Dependent on home oxygen, 2 L  KASIA on CPAP  - Possibly secondary to COPD exacerbation  - Follows outpatient with MD [place]  - CXR negative for active process; Resp PCR:pending; D-dimer 0.32; proBNP 152.0; no leukocytosis  - Blood cultures: pending  - Bronchodilators: duoneb Q6H, albuterol neb Q4H PRN  - Steroid: methylprednisolone 40mg IV Q12H   - Azithromycin for COPD exacerbation suppression  - Guaifenesin 1200 mg BID  - CPAP ordered for HS, PRN   - Titrate O2 to keep O2 sat 90-95%  - Continuous pulse ox, cardiac monitor  - Consider pulmonology consult if condition warrants    Chest tightness  Coronary artery disease of native artery of native heart with stable angina pectoris  PVD with claudication  Hyperlipidemia  History of CVA  - Follows outpatient with MD Cunningham  - EKG with no ischemic changes  - Cardiac cath on 4/17/25: No angiographic evidence of obstructive CAD with patent LAD stent  - Stress test on 4/3/25: Findings consistent with a normal ECG stress test  - HS trops negative x 2  - Last lipid panel on 4/2/25  - Continue home cardiac medications  - Consider cardiology consult if condition warrants    Bradycardia  Hypertension  - Continue home blood pressure medications, BB per parameters  - Monitor BP per order and as needed    Parkinson's disease  - Continue home  medications  - Continue supportive care     GERD  - Continue PPI     BPH  - Continue home flomax     Anxiety  Depression  - Continue home medications  - Continue supportive care     Home medications for chronic conditions will be restarted as appropriate when verified by pharmacy.     VTE Prophylaxis:  Mechanical VTE prophylaxis orders are present.        The patient desires to be as follows:    CODE STATUS:    Code Status (Patient has no pulse and is not breathing): CPR (Attempt to Resuscitate)  Medical Interventions (Patient has pulse or is breathing): Full Support        Desirae Orr, daughter, who can be contacted at [facility] is the designated person to make medical decisions on the patient's behalf if He is incapable of doing so. This was clarified with patient and/or next of kin on 5/30/2025 during the course of this H&P.    Admission Status:  I believe this patient meets observation status.    Expected Length of Stay: Less than 2 midnights    PDMP and Medication Dispenses via Sidebar reviewed and consistent with patient reported medications.    I discussed the patient's findings and my recommendations with patient, family.      Signature:     This document has been electronically signed by CELINA Gardner on May 30, 2025 16:50 EDT   [facility] Hospitalist Team

## 2025-05-31 LAB
ANION GAP SERPL CALCULATED.3IONS-SCNC: 11.3 MMOL/L (ref 5–15)
BASOPHILS # BLD AUTO: 0 10*3/MM3 (ref 0–0.2)
BASOPHILS NFR BLD AUTO: 0 % (ref 0–1.5)
BUN SERPL-MCNC: 15.9 MG/DL (ref 8–23)
BUN/CREAT SERPL: 23 (ref 7–25)
CALCIUM SPEC-SCNC: 9.3 MG/DL (ref 8.6–10.5)
CHLORIDE SERPL-SCNC: 105 MMOL/L (ref 98–107)
CO2 SERPL-SCNC: 23.7 MMOL/L (ref 22–29)
CREAT SERPL-MCNC: 0.69 MG/DL (ref 0.76–1.27)
DEPRECATED RDW RBC AUTO: 42.8 FL (ref 37–54)
EGFRCR SERPLBLD CKD-EPI 2021: 102.1 ML/MIN/1.73
EOSINOPHIL # BLD AUTO: 0 10*3/MM3 (ref 0–0.4)
EOSINOPHIL NFR BLD AUTO: 0 % (ref 0.3–6.2)
ERYTHROCYTE [DISTWIDTH] IN BLOOD BY AUTOMATED COUNT: 12.3 % (ref 12.3–15.4)
GLUCOSE SERPL-MCNC: 155 MG/DL (ref 65–99)
HCT VFR BLD AUTO: 38 % (ref 37.5–51)
HGB BLD-MCNC: 12.7 G/DL (ref 13–17.7)
IMM GRANULOCYTES # BLD AUTO: 0.02 10*3/MM3 (ref 0–0.05)
IMM GRANULOCYTES NFR BLD AUTO: 0.4 % (ref 0–0.5)
LYMPHOCYTES # BLD AUTO: 1.11 10*3/MM3 (ref 0.7–3.1)
LYMPHOCYTES NFR BLD AUTO: 21.8 % (ref 19.6–45.3)
MAGNESIUM SERPL-MCNC: 2 MG/DL (ref 1.6–2.4)
MCH RBC QN AUTO: 31.5 PG (ref 26.6–33)
MCHC RBC AUTO-ENTMCNC: 33.4 G/DL (ref 31.5–35.7)
MCV RBC AUTO: 94.3 FL (ref 79–97)
MONOCYTES # BLD AUTO: 0.06 10*3/MM3 (ref 0.1–0.9)
MONOCYTES NFR BLD AUTO: 1.2 % (ref 5–12)
NEUTROPHILS NFR BLD AUTO: 3.91 10*3/MM3 (ref 1.7–7)
NEUTROPHILS NFR BLD AUTO: 76.6 % (ref 42.7–76)
NRBC BLD AUTO-RTO: 0 /100 WBC (ref 0–0.2)
PHOSPHATE SERPL-MCNC: 2.9 MG/DL (ref 2.5–4.5)
PLATELET # BLD AUTO: 153 10*3/MM3 (ref 140–450)
PMV BLD AUTO: 10.3 FL (ref 6–12)
POTASSIUM SERPL-SCNC: 3.8 MMOL/L (ref 3.5–5.2)
QT INTERVAL: 447 MS
QTC INTERVAL: 422 MS
RBC # BLD AUTO: 4.03 10*6/MM3 (ref 4.14–5.8)
SODIUM SERPL-SCNC: 140 MMOL/L (ref 136–145)
WBC NRBC COR # BLD AUTO: 5.1 10*3/MM3 (ref 3.4–10.8)

## 2025-05-31 PROCEDURE — 97162 PT EVAL MOD COMPLEX 30 MIN: CPT

## 2025-05-31 PROCEDURE — 94799 UNLISTED PULMONARY SVC/PX: CPT

## 2025-05-31 PROCEDURE — 84100 ASSAY OF PHOSPHORUS: CPT

## 2025-05-31 PROCEDURE — 80048 BASIC METABOLIC PNL TOTAL CA: CPT

## 2025-05-31 PROCEDURE — 94664 DEMO&/EVAL PT USE INHALER: CPT

## 2025-05-31 PROCEDURE — 83735 ASSAY OF MAGNESIUM: CPT

## 2025-05-31 PROCEDURE — 94660 CPAP INITIATION&MGMT: CPT

## 2025-05-31 PROCEDURE — 94761 N-INVAS EAR/PLS OXIMETRY MLT: CPT

## 2025-05-31 PROCEDURE — 85025 COMPLETE CBC W/AUTO DIFF WBC: CPT

## 2025-05-31 PROCEDURE — 92610 EVALUATE SWALLOWING FUNCTION: CPT

## 2025-05-31 PROCEDURE — G0378 HOSPITAL OBSERVATION PER HR: HCPCS

## 2025-05-31 RX ORDER — LOSARTAN POTASSIUM 50 MG/1
50 TABLET ORAL
Status: DISCONTINUED | OUTPATIENT
Start: 2025-05-31 | End: 2025-06-01

## 2025-05-31 RX ADMIN — CARBIDOPA AND LEVODOPA 2 TABLET: 25; 100 TABLET ORAL at 11:14

## 2025-05-31 RX ADMIN — GUAIFENESIN 1200 MG: 600 TABLET, EXTENDED RELEASE ORAL at 20:45

## 2025-05-31 RX ADMIN — CLOPIDOGREL BISULFATE 75 MG: 75 TABLET, FILM COATED ORAL at 08:24

## 2025-05-31 RX ADMIN — IPRATROPIUM BROMIDE AND ALBUTEROL SULFATE 3 ML: .5; 3 SOLUTION RESPIRATORY (INHALATION) at 15:15

## 2025-05-31 RX ADMIN — LISINOPRIL 40 MG: 20 TABLET ORAL at 08:24

## 2025-05-31 RX ADMIN — TAMSULOSIN HYDROCHLORIDE 0.4 MG: 0.4 CAPSULE ORAL at 08:27

## 2025-05-31 RX ADMIN — CARBIDOPA AND LEVODOPA 2 TABLET: 25; 100 TABLET ORAL at 17:06

## 2025-05-31 RX ADMIN — GUAIFENESIN 1200 MG: 600 TABLET, EXTENDED RELEASE ORAL at 08:24

## 2025-05-31 RX ADMIN — ATORVASTATIN CALCIUM 20 MG: 20 TABLET, FILM COATED ORAL at 08:24

## 2025-05-31 RX ADMIN — MONTELUKAST 10 MG: 10 TABLET, FILM COATED ORAL at 20:48

## 2025-05-31 RX ADMIN — IPRATROPIUM BROMIDE AND ALBUTEROL SULFATE 3 ML: .5; 3 SOLUTION RESPIRATORY (INHALATION) at 19:39

## 2025-05-31 RX ADMIN — CARBIDOPA AND LEVODOPA 2 TABLET: 25; 100 TABLET, EXTENDED RELEASE ORAL at 20:44

## 2025-05-31 RX ADMIN — PANTOPRAZOLE SODIUM 40 MG: 40 TABLET, DELAYED RELEASE ORAL at 08:24

## 2025-05-31 RX ADMIN — AZITHROMYCIN 500 MG: 250 TABLET, FILM COATED ORAL at 08:23

## 2025-05-31 RX ADMIN — ASPIRIN 81 MG: 81 TABLET, COATED ORAL at 20:45

## 2025-05-31 RX ADMIN — LOSARTAN POTASSIUM 50 MG: 50 TABLET, FILM COATED ORAL at 11:13

## 2025-05-31 RX ADMIN — Medication 10 ML: at 23:01

## 2025-05-31 RX ADMIN — VENLAFAXINE HYDROCHLORIDE 75 MG: 75 CAPSULE, EXTENDED RELEASE ORAL at 08:23

## 2025-05-31 RX ADMIN — GABAPENTIN 600 MG: 300 CAPSULE ORAL at 20:45

## 2025-05-31 RX ADMIN — AMLODIPINE BESYLATE 10 MG: 5 TABLET ORAL at 08:24

## 2025-05-31 RX ADMIN — ISOSORBIDE MONONITRATE 30 MG: 30 TABLET, EXTENDED RELEASE ORAL at 08:24

## 2025-05-31 RX ADMIN — IPRATROPIUM BROMIDE AND ALBUTEROL SULFATE 3 ML: .5; 3 SOLUTION RESPIRATORY (INHALATION) at 03:25

## 2025-05-31 RX ADMIN — ASPIRIN 81 MG: 81 TABLET, COATED ORAL at 08:27

## 2025-05-31 RX ADMIN — Medication 10 ML: at 09:03

## 2025-05-31 RX ADMIN — DONEPEZIL HYDROCHLORIDE 10 MG: 5 TABLET, FILM COATED ORAL at 20:48

## 2025-05-31 RX ADMIN — GABAPENTIN 600 MG: 300 CAPSULE ORAL at 14:18

## 2025-05-31 RX ADMIN — IPRATROPIUM BROMIDE AND ALBUTEROL SULFATE 3 ML: .5; 3 SOLUTION RESPIRATORY (INHALATION) at 09:38

## 2025-05-31 RX ADMIN — CARBIDOPA AND LEVODOPA 2 TABLET: 25; 100 TABLET ORAL at 08:24

## 2025-05-31 RX ADMIN — GABAPENTIN 600 MG: 300 CAPSULE ORAL at 06:23

## 2025-05-31 NOTE — PROGRESS NOTES
[facility] MEDICINE SERVICE  DAILY PROGRESS NOTE    NAME: Andrew Flores  : 1960  MRN: [id number]      LOS: 0 days     PROVIDER OF SERVICE: Tobin Proctor MD    Chief Complaint: Shortness of breath    Subjective:     Interval History:  History taken from: patient      History of Present Illness: Andrew Flores is a 64 y.o. male with a CMH of CAD of native artery of native heart with stable angina pectoris status post stenting, chest tightness, PVD with claudication, bradycardia, hypertension, hyperlipidemia, history of CVA, COPD dependent on home oxygen of 2 L via nasal cannula, emphysema, KASIA on CPAP, Parkinson's disease, BPH, GERD, anxiety, depression who presented to [facility] on 2025 with shortness of breath and chest pain.  Patient reported progressive symptoms over the past few days, that are worse while lying flat and with exertion.  Patient reports a nonproductive cough.  Family member reported patient's home nurse noted crackles in the lower lungs 2 days ago.  Patient was supposed to see his PCP today, but due to worsening chest pain he was brought to the emergency department.  At time of exam, patient reported he is doing much better, denied shortness of breath or chest pain, or any other acute issues.  Patient reported he has been compliant with his home medications.       Per chart review, chronic shortness of breath and chest pain started around the beginning of this year.  Patient was admitted to [facility] in March of this year with COVID-19, then again last month with similar presentation.  Patient underwent a cardiac cath with Dr. Cunningham on 2025.  Patient has followed up with cardiology and pulmonology since his last hospitalization.      In the ED: patient hemodynamically stable, afebrile. Patient not requiring supplemental oxygen. Pertinent labs include HS trop 16->15, proBNP 152.0, lactate 1.1, D-dimer 0.32, WBC 6.19.  Blood cultures drawn.  EKG showed sinus  bradycardia , rate 53 , QTC of 422 ms. CXR showed no active disease.  Patient received albuterol nebulizer, DuoNeb, Solu-Medrol 125 mg IV. Hospitalist team to admit for further management.         5/31 seen in bed Nad, c/o cough, dyspnea, fatigue and weakness. vss    Review of Systems  Constitutional:  Negative for chills and fever.   Respiratory:  Positive for cough and shortness of breath.    Cardiovascular:  Negative for chest pain, palpitations and leg swelling.   Gastrointestinal:  Negative for nausea and vomiting.   Genitourinary:  Negative for dysuria and hematuria.   Neurological:  Positive for weakness.   Psychiatric/Behavioral:  The patient is nervous/anxious.      Objective:     Vital Signs  Temp:  [97.4 °F (36.3 °C)-98.2 °F (36.8 °C)] 97.9 °F (36.6 °C)  Heart Rate:  [46-66] 63  Resp:  [14-19] 18  BP: (116-177)/() 155/94   Body mass index is 30.72 kg/m².    Physical Exam     Constitutional:       Appearance: He is obese.   HENT:      Head: Normocephalic and atraumatic.      Nose: Nose normal.      Mouth/Throat:      Mouth: Mucous membranes are moist.      Pharynx: Oropharynx is clear.   Eyes:      Extraocular Movements: Extraocular movements intact.      Conjunctiva/sclera: Conjunctivae normal.      Pupils: Pupils are equal, round, and reactive to light.   Cardiovascular:      Rate and Rhythm: Regular rhythm. Bradycardia present.      Pulses: Normal pulses.      Heart sounds: Normal heart sounds.   Pulmonary:      Effort: Pulmonary effort is normal.      Breath sounds: Examination of the right-lower field reveals decreased breath sounds. Examination of the left-lower field reveals decreased breath sounds. Decreased breath sounds present. No wheezing, rhonchi or rales.   Abdominal:      General: Bowel sounds are normal.      Palpations: Abdomen is soft.   Musculoskeletal:      Cervical back: Neck supple.   Skin:     General: Skin is warm and dry.   Neurological:      Mental Status: He is alert and  oriented to person, place, and time.      Motor: Weakness present.   Psychiatric:         Mood and Affect: Mood normal.         Behavior: Behavior normal.        Diagnostic Data    Results from last 7 days   Lab Units 05/31/25  0401 05/30/25  1448   WBC 10*3/mm3 5.10 6.19   HEMOGLOBIN g/dL 12.7* 13.0   HEMATOCRIT % 38.0 38.8   PLATELETS 10*3/mm3 153 158   GLUCOSE mg/dL 155* 122*   CREATININE mg/dL 0.69* 0.69*   BUN mg/dL 15.9 14.8   SODIUM mmol/L 140 140   POTASSIUM mmol/L 3.8 3.8   AST (SGOT) U/L  --  14   ALT (SGPT) U/L  --  6   ALK PHOS U/L  --  129*   BILIRUBIN mg/dL  --  0.4   ANION GAP mmol/L 11.3 10.8       XR Chest 1 View  Result Date: 5/30/2025  Impression: No active disease. Electronically Signed: Tad Angela MD  5/30/2025 3:20 PM EDT  Workstation ID: [id number]    Scheduled Meds:amLODIPine, 10 mg, Oral, Q24H   And  lisinopril, 40 mg, Oral, Q24H  aspirin, 81 mg, Oral, BID  atorvastatin, 20 mg, Oral, Daily  azithromycin, 500 mg, Oral, Q24H  carbidopa-levodopa, 2 tablet, Oral, TID  carbidopa-levodopa ER, 2 tablet, Oral, Nightly  clopidogrel, 75 mg, Oral, Daily  donepezil, 10 mg, Oral, Nightly  gabapentin, 600 mg, Oral, Q8H  guaiFENesin, 1,200 mg, Oral, Q12H  ipratropium-albuterol, 3 mL, Nebulization, Q6H - RT  isosorbide mononitrate, 30 mg, Oral, Q24H  montelukast, 10 mg, Oral, Nightly  pantoprazole, 40 mg, Oral, QAM AC  sodium chloride, 10 mL, Intravenous, Q12H  tamsulosin, 0.4 mg, Oral, Daily  venlafaxine XR, 75 mg, Oral, Daily      Continuous Infusions:   PRN Meds:.  acetaminophen **OR** acetaminophen **OR** acetaminophen    albuterol    Calcium Replacement - Follow Nurse / BPA Driven Protocol    clonazePAM    docusate sodium    Magnesium Standard Dose Replacement - Follow Nurse / BPA Driven Protocol    melatonin    nitroglycerin    oxyCODONE    Phosphorus Replacement - Follow Nurse / BPA Driven Protocol    Potassium Replacement - Follow Nurse / BPA Driven Protocol    [COMPLETED] Insert Peripheral IV  **AND** sodium chloride    sodium chloride     I reviewed the patient's new clinical results.    Assessment/Plan:     Active and Resolved Problems  Active Hospital Problems    Diagnosis  POA    **Shortness of breath [R06.02]  Yes    Obstructive sleep apnea syndrome [G47.33]  Yes    Coronary artery disease of native artery of native heart with stable angina pectoris [I25.118]  Yes    Status post coronary artery stent placement [Z95.5]  Not Applicable    COPD (chronic obstructive pulmonary disease) [J44.9]  Yes    Mixed hyperlipidemia [E78.2]  Yes    Primary hypertension [I10]  Yes    History of CVA (cerebrovascular accident) [Z86.73]  Not Applicable    Parkinson disease [G20.A1]  Yes    Depression [F32.A]  Yes    Anxiety [F41.9]  Yes    Gastroesophageal reflux disease [K21.9]  Yes      Resolved Hospital Problems   No resolved problems to display.       Acute respiratory distress   COPD, emphysema  Dependent on home oxygen, 2 L  KASIA on CPAP  - Possibly secondary to COPD exacerbation  - Follows outpatient with MD [place]  - CXR negative for active process; Resp PCR:pending; D-dimer 0.32; proBNP 152.0; no leukocytosis  - Blood cultures: pending  - Bronchodilators: duoneb Q6H, albuterol neb Q4H PRN  - Steroid: methylprednisolone 40mg IV Q12H   - Azithromycin for COPD exacerbation suppression  - Guaifenesin 1200 mg BID  - CPAP ordered for HS, PRN   - Titrate O2 to keep O2 sat 90-95%  - Continuous pulse ox, cardiac monitor  - Consider pulmonology consult if condition warrants     Chest tightness  Coronary artery disease of native artery of native heart with stable angina pectoris  PVD with claudication  Hyperlipidemia  History of CVA  - Follows outpatient with MD Cunningham  - EKG with no ischemic changes  - Cardiac cath on 4/17/25: No angiographic evidence of obstructive CAD with patent LAD stent  - Stress test on 4/3/25: Findings consistent with a normal ECG stress test  - HS trops negative x 2  - Last lipid panel on 4/2/25  -  Continue home cardiac medications  - Consider cardiology consult if condition warrants     Bradycardia  Hypertension  - Continue home blood pressure medications, BB per parameters  - Monitor BP per order and as needed     Parkinson's disease  - Continue home medications  - Continue supportive care      GERD  - Continue PPI     BPH  - Continue home flomax      Anxiety  Depression  - Continue home medications  - Continue supportive care      VTE Prophylaxis:  Mechanical VTE prophylaxis orders are present.    Disposition Planning:   Barriers to Discharge:copd  Anticipated Date of Discharge: 6/3  Place of Discharge: home  Time: 34 minutes     Code Status and Medical Interventions: CPR (Attempt to Resuscitate); Full Support   Ordered at: 05/30/25 1649     Code Status (Patient has no pulse and is not breathing):    CPR (Attempt to Resuscitate)     Medical Interventions (Patient has pulse or is breathing):    Full Support       Signature: Electronically signed by Tobin Proctor MD, 05/31/25, 10:53 EDT.  [facility] Hospitalist Team

## 2025-05-31 NOTE — PLAN OF CARE
Goal Outcome Evaluation:      Pt status and vital signs stable entire shift. Pt alert, oriented, and on room air; CPAP without supplemental oxygen while sleeping. Pt has had no chest pain this shift, just some chronic back pain. Urine output, no BM. Significant other at bedside for duration of the shift and has assisted pt with needs. Pt has had no complaints, morning labs stable.

## 2025-05-31 NOTE — CASE MANAGEMENT/SOCIAL WORK
Continued Stay Note  PITER Romero     Patient Name: Andrew Flores  MRN: [id number]  Today's Date: 5/31/2025    Admit Date: 5/30/2025        Discharge Plan       Row Name 05/31/25 1903       Plan    Plan Comments D/C barriers: blood cultures pending, clinical improvement.             Expected Discharge Date and Time       Expected Discharge Date Expected Discharge Time    Jun 1, 2025           Shila Joya RN     Office Phone: [phone]  Office Cell: [phone]

## 2025-05-31 NOTE — THERAPY EVALUATION
Acute Care - Speech Language Pathology   Swallow Initial Evaluation  Nick     Patient Name: Andrew Flores  : 1960  MRN: [id number]  Today's Date: 2025               Admit Date: 2025    Visit Dx:     ICD-10-CM ICD-9-CM   1. Acute exacerbation of chronic obstructive pulmonary disease (COPD)  J44.1 491.21     Patient Active Problem List   Diagnosis    Anxiety    Gastroesophageal reflux disease    Hypogonadism    Depression    Male erectile disorder    Testosterone deficiency    Bruised rib, left, initial encounter    History of CVA (cerebrovascular accident)    Primary hypertension    Mixed hyperlipidemia    Diffuse non-Hodgkin's lymphoma of testis    Neuropathy    Parkinson disease    RBD (REM behavioral disorder)    Balance problem    Screening PSA (prostate specific antigen)    Uncomplicated alcohol dependence    Apathy    Hx of radiation therapy    History of chemotherapy    Generalized weakness    Acute low back pain with right-sided sciatica    Dextroscoliosis    Compression fracture of L1 lumbar vertebra    Former smoker    Shortness of breath    Constipation    Pain of right lower extremity    COPD (chronic obstructive pulmonary disease)    Memory loss    Coronary artery disease of native artery of native heart with stable angina pectoris    Status post coronary artery stent placement    Benign prostatic hyperplasia with lower urinary tract symptoms    PVD (peripheral vascular disease) with claudication    Chest tightness    Levodopa-induced dyskinesia    Dysarthria    Obstructive sleep apnea syndrome    Obesity (BMI 30-39.9)    Chest pain     Past Medical History:   Diagnosis Date    Anxiety 2012    Benign prostatic hyperplasia with lower urinary tract symptoms 2024    Borderline diabetes     COPD (chronic obstructive pulmonary disease)     Coronary artery disease     To the best of my memory    COVID-19 2025    Cystic fibrosis     Cytokine release syndrome,  grade 1 03/20/2025    Deep vein thrombosis 2015    Blood clots    Depression 07/07/2016    Diffuse non-Hodgkin's lymphoma of testis 03/06/2018    Duodenitis 01/27/2022    Dysphagia 08/2022    from parkinsons    Emphysema of lung 2021    Erosive gastritis 01/27/2022    Esophageal stricture 01/27/2022    Fall 07/25/2019    Food impaction of esophagus 01/27/2022    Gastroesophageal reflux disease 09/04/2012    History of chemotherapy 04/25/2021    Hx of radiation therapy 04/25/2021    Intertrochanteric fracture of left femur, closed, initial encounter 08/25/2024    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    Lung nodule 2021    Mixed hyperlipidemia 07/08/2019    Myocardial infarction 2017    Non-Hodgkin's lymphoma of testis     Parkinson disease     Pneumonia of right upper lobe due to infectious organism 12/04/2022    Poor historian     Primary hypertension 07/08/2019    Pulmonary arterial hypertension 2007    PVD (peripheral vascular disease) with claudication 09/20/2024    Shortness of breath     Sleep apnea     does not have a cpap    Sleep apnea, obstructive 2021    Status post coronary artery stent placement 02/26/2024    Stroke 2017    Testosterone deficiency 07/25/2019    Tick bite 05/20/2021    Tobacco abuse 10/13/2021    Ulcer of esophagus without bleeding 01/27/2022    Uncomplicated alcohol dependence 04/25/2021     Past Surgical History:   Procedure Laterality Date    CARDIAC CATHETERIZATION N/A 02/26/2024    Procedure: Left and Right Heart Cath with Coronary Angiography;  Surgeon: Lelo Tello MD;  Location: [facility] CATH INVASIVE LOCATION;  Service: Cardiology;  Laterality: N/A;    CARDIAC CATHETERIZATION N/A 02/26/2024    Procedure: Percutaneous Coronary Intervention;  Surgeon: Lelo Tello MD;  Location: [facility] CATH INVASIVE LOCATION;  Service: Cardiology;  Laterality: N/A;    CARDIAC CATHETERIZATION Right 4/17/2025    Procedure: Right and Left Heart Cath with coronary angiogram;  Surgeon: Octavio  MD Nomi;  Location: [facility] CATH INVASIVE LOCATION;  Service: Cardiovascular;  Laterality: Right;  right IJ and right radial    CARDIAC VALVE REPLACEMENT  2024    Stent    ENDOSCOPY N/A 01/27/2022    Procedure: ESOPHAGOGASTRODUODENOSCOPY with foreign body removal with gastric, duodenal biopsy and GE junction biopsy;  Surgeon: Pedro De La Rosa MD;  Location: [facility] ENDOSCOPY;  Service: Gastroenterology;  Laterality: N/A;  post: foreign body removal, erosive gastritis with biopsy, erosive eduodenitis with biopsy, esophagitis, GE junction biopsy to rule out malignancy,     ENDOSCOPY N/A 08/23/2022    Procedure: ESOPHAGOGASTRODUODENOSCOPY with GE junction biopsy R/O Barretts, esophageal dilation #48 bougie;  Surgeon: Alexa Subramanian MD;  Location: [facility] ENDOSCOPY;  Service: Gastroenterology;  Laterality: N/A;  duodenal ulcer, GERD,     HIP TROCHANTERIC NAILING WITH INTRAMEDULLARY HIP SCREW Left 08/26/2024    Procedure: Left hip/femur fracture fixation with intramedullary ajit and screws;  Surgeon: Kameron Kaminski MD;  Location: [facility] MAIN OR;  Service: Orthopedics;  Laterality: Left;    INTERVENTIONAL RADIOLOGY PROCEDURE N/A 02/26/2024    Procedure: Intravascular Ultrasound;  Surgeon: Lelo Tello MD;  Location: [facility] CATH INVASIVE LOCATION;  Service: Cardiology;  Laterality: N/A;    ORCHIECTOMY  2019       SLP Recommendation and Plan  SLP Swallowing Diagnosis: functional oral phase, functional pharyngeal phase (05/31/25 1300)  SLP Diet Recommendation: mechanical ground textures, thin liquids (05/31/25 1300)  Recommended Precautions and Strategies: upright posture during/after eating, small bites of food and sips of liquid, multiple swallows per bite of food, multiple swallows per sip of liquid, alternate between small bites of food and sips of liquid, general aspiration precautions, reflux precautions (05/31/25 1300)  SLP Rec. for Method of Medication Administration: meds whole, meds crushed, as tolerated  (05/31/25 1300)     Monitor for Signs of Aspiration: yes, notify SLP if any concerns (05/31/25 1300)     Swallow Criteria for Skilled Therapeutic Interventions Met: demonstrates skilled criteria (05/31/25 1300)     Rehab Potential/Prognosis, Swallowing: good, to achieve stated therapy goals (05/31/25 1300)  Therapy Frequency (Swallow): PRN (05/31/25 1300)  Predicted Duration Therapy Intervention (Days): until discharge (05/31/25 1300)  Oral Care Recommendations: Oral Care BID/PRN, Swab (05/31/25 1300)        SWALLOW EVALUATION (Last 72 Hours)       SLP Adult Swallow Evaluation       Row Name 05/31/25 1300       Rehab Evaluation    Document Type evaluation  -CB    Subjective Information no complaints  -CB    Patient Observations alert;cooperative  -CB    Patient/Family/Caregiver Comments/Observations Patient's daughter was present during assessment. Patient was able to follow simple directives. Noted patient very impulsive during meal assessment.  -CB    Patient Effort good  -CB       General Information    Patient Profile Reviewed yes  -CB    Pertinent History Of Current Problem Andrew Flores is a 64 y.o. male with a CMH of CAD of native artery of native heart with stable angina pectoris status post stenting, chest tightness, PVD with claudication, bradycardia, hypertension, hyperlipidemia, history of CVA, COPD dependent on home oxygen of 2 L via nasal cannula, emphysema, KASIA on CPAP, Parkinson's disease, BPH, GERD, anxiety, depression who [facility] to [facility]/30/[facility] with shortness of breath and chest pain.  Patient reported progressive symptoms over the past few days, that are worse while lying flat and with exertion.  Patient reports a nonproductive cough.  Family member reported patient's home nurse noted crackles in the lower lungs 2 days ago.  Patient was supposed to see his PCP today, but due to worsening chest pain he was brought to the emergency department.  At time of exam, patient  reported he is doing much better, denied shortness of breath or chest pain, or any other acute issues.  Patient reported he has been compliant with his home medications.       Per chart review, chronic shortness of breath and chest pain started around the beginning of this year.  Patient was admitted to [facility] in March of this year with COVID-19, then again last month with similar presentation.  Patient underwent a cardiac cath with Dr. Cunningham on 4/17/2025.  Patient has followed up with cardiology and pulmonology since his last hospitalization.      In the ED: patient hemodynamically stable, afebrile. Patient not requiring supplemental oxygen. Pertinent labs include HS trop 16->15, proBNP 152.0, lactate 1.1, D-dimer 0.32, WBC 6.19.  Blood cultures drawn.  EKG showed sinus bradycardia , rate 53 , QTC of 422 ms. CXR showed no active disease.  Patient received albuterol nebulizer, DuoNeb, Solu-Medrol 125 mg IV. Hospitalist team to admit for further management.  -CB    Current Method of Nutrition regular textures;thin liquids  -CB       Pain    Pretreatment Pain Rating 0/10 - no pain  -CB    Posttreatment Pain Rating 0/10 - no pain  -CB       Oral Motor Structure and Function    Dentition Assessment edentulous  -CB    Secretion Management WNL/WFL  -CB    Mucosal Quality moist, healthy  -CB       Oral Musculature and Cranial Nerve Assessment    Oral Motor General Assessment WFL  -CB    Oral Motor, Comment Informal assessment of lingual and labial structures indicated patient displays oral skills WFL.  -CB       General Eating/Swallowing Observations    Respiratory Support Currently in Use room air  -CB    Eating/Swallowing Skills self-fed  -CB    Positioning During Eating upright in bed;needs frequent re-positioning  -CB    Utensils Used spoon;cup  -CB    Consistencies Trialed regular textures;soft to chew textures;thin liquids  -CB       Clinical Swallow Eval    Clinical Swallow Evaluation Summary Patient was seen for  dysphagia evaluation per MD order. Patient has history of swallowing difficulties as he has had esophageal stricture x 1 approximately a year ago. Patient reported having a choking episode last year and resulted in dilation. Patient has history of CVA, Parkinson, GERD and pneumonia x 1. Most recent chest x-ray revealed no active disease. Patient is currently on a regular diet as he passed swallow screen. Dysphagia order was d/t history with swallowing difficulties. Patient was seen at meal assessment.  Upon entering the room, patient was sitting low in the bed but upright. Instructed the patient to allow for repositioning as he was not in a very good position to eat currently. Patient is edentulous. Patient fed self. Noted patient was impulsive with eating as he ate rather fast and took large bites. Encouraged patient to eat slowly. smaller bites and always position himself in an upright position before eating. Patient displayed adequate munching patterns. Patient completely clears oral cavity effectively between bites without evidence of oral residue and/or pocketing. No clearing of throat, cough and/or vocal changes were identified. No anterior loss was observed as patient maintained adequate labial seal. Patient took sips of thins via cup x 2 without any overt s/s of aspiration. No wet vocal quality was detected. Given patient's impulsivity, edentulous, history of esophageal stricture, patient would benefit from being downgraded to a mechanical ground diet at this time. ST will follow to ensure safety and adequacy of recommended diet and independent use of safe swallow strategies as patient currently requires moderate cuing.  -CB       SLP Evaluation Clinical Impression    SLP Swallowing Diagnosis functional oral phase;functional pharyngeal phase  -CB    Functional Impact risk of aspiration/pneumonia  d/t impulsive eating habits.  -CB    Rehab Potential/Prognosis, Swallowing good, to achieve stated therapy goals   -CB    Swallow Criteria for Skilled Therapeutic Interventions Met demonstrates skilled criteria  -CB       Recommendations    Therapy Frequency (Swallow) PRN  -CB    Predicted Duration Therapy Intervention (Days) until discharge  -CB    SLP Diet Recommendation mechanical ground textures;thin liquids  -CB    Recommended Precautions and Strategies upright posture during/after eating;small bites of food and sips of liquid;multiple swallows per bite of food;multiple swallows per sip of liquid;alternate between small bites of food and sips of liquid;general aspiration precautions;reflux precautions  -CB    Oral Care Recommendations Oral Care BID/PRN;Swab  -CB    SLP Rec. for Method of Medication Administration meds whole;meds crushed;as tolerated  -CB    Monitor for Signs of Aspiration yes;notify SLP if any concerns  -CB       Swallow Goals (SLP)    Swallow LTGs Swallow Long Term Goal (free text)  -CB    Swallow STGs diet tolerance goal selection (SLP)  -CB    Diet Tolerance Goal Selection (SLP) Swallow Short Term Goal 1  -CB       (LTG) Swallow    (LTG) Swallow Patient will tolerate safest and least restrictive diet without complications from aspiration.  -CB    Time Frame (Swallow Long Term Goal) by discharge  -CB    Barriers (Swallow Long Term Goal) impulsive; eats fast and large bites.  -CB    Progress/Outcomes (Swallow Long Term Goal) new goal  -CB       (STG) Swallow 1    (STG) Swallow 1 Patient will participate in full meal assessment to ensure safety and adequacy of recommended diet and independent use of safe swallow strategies.  -CB    Time Frame (Swallow Short Term Goal 1) 1 week  -CB    Progress/Outcomes (Swallow Short Term Goal 1) new goal  -CB              User Key  (r) = Recorded By, (t) = Taken By, (c) = Cosigned By      Initials Name Effective Dates    Bertha Leigh, SLP 09/21/21 -                     EDUCATION  The patient has been educated in the following areas:   Dysphagia (Swallowing  Impairment) Oral Care/Hydration.        SLP GOALS       Row Name 05/31/25 1300       (LTG) Swallow    (LTG) Swallow Patient will tolerate safest and least restrictive diet without complications from aspiration.  -CB    Time Frame (Swallow Long Term Goal) by discharge  -CB    Barriers (Swallow Long Term Goal) impulsive; eats fast and large bites.  -CB    Progress/Outcomes (Swallow Long Term Goal) new goal  -CB       (STG) Swallow 1    (STG) Swallow 1 Patient will participate in full meal assessment to ensure safety and adequacy of recommended diet and independent use of safe swallow strategies.  -CB    Time Frame (Swallow Short Term Goal 1) 1 week  -CB    Progress/Outcomes (Swallow Short Term Goal 1) new goal  -CB              User Key  (r) = Recorded By, (t) = Taken By, (c) = Cosigned By      Initials Name Provider Type    Bertha Leigh, SLP Speech and Language Pathologist                         Time Calculation:                JIM Woods  5/31/2025

## 2025-05-31 NOTE — THERAPY EVALUATION
Patient Name: Andrew Flores  : 1960    MRN: [id number]                              Today's Date: 2025       Admit Date: 2025    Visit Dx:     ICD-10-CM ICD-9-CM   1. Acute exacerbation of chronic obstructive pulmonary disease (COPD)  J44.1 491.21     Patient Active Problem List   Diagnosis    Anxiety    Gastroesophageal reflux disease    Hypogonadism    Depression    Male erectile disorder    Testosterone deficiency    Bruised rib, left, initial encounter    History of CVA (cerebrovascular accident)    Primary hypertension    Mixed hyperlipidemia    Diffuse non-Hodgkin's lymphoma of testis    Neuropathy    Parkinson disease    RBD (REM behavioral disorder)    Balance problem    Screening PSA (prostate specific antigen)    Uncomplicated alcohol dependence    Apathy    Hx of radiation therapy    History of chemotherapy    Generalized weakness    Acute low back pain with right-sided sciatica    Dextroscoliosis    Compression fracture of L1 lumbar vertebra    Former smoker    Shortness of breath    Constipation    Pain of right lower extremity    COPD (chronic obstructive pulmonary disease)    Memory loss    Coronary artery disease of native artery of native heart with stable angina pectoris    Status post coronary artery stent placement    Benign prostatic hyperplasia with lower urinary tract symptoms    PVD (peripheral vascular disease) with claudication    Chest tightness    Levodopa-induced dyskinesia    Dysarthria    Obstructive sleep apnea syndrome    Obesity (BMI 30-39.9)    Chest pain     Past Medical History:   Diagnosis Date    Anxiety 2012    Benign prostatic hyperplasia with lower urinary tract symptoms 2024    Borderline diabetes     COPD (chronic obstructive pulmonary disease)     Coronary artery disease     To the best of my memory    COVID-19 2025    Cystic fibrosis     Cytokine release syndrome, grade 1 2025    Deep vein thrombosis 2015    Blood  clots    Depression 07/07/2016    Diffuse non-Hodgkin's lymphoma of testis 03/06/2018    Duodenitis 01/27/2022    Dysphagia 08/2022    from parkinsons    Emphysema of lung 2021    Erosive gastritis 01/27/2022    Esophageal stricture 01/27/2022    Fall 07/25/2019    Food impaction of esophagus 01/27/2022    Gastroesophageal reflux disease 09/04/2012    History of chemotherapy 04/25/2021    Hx of radiation therapy 04/25/2021    Intertrochanteric fracture of left femur, closed, initial encounter 08/25/2024    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    Lung nodule 2021    Mixed hyperlipidemia 07/08/2019    Myocardial infarction 2017    Non-Hodgkin's lymphoma of testis     Parkinson disease     Pneumonia of right upper lobe due to infectious organism 12/04/2022    Poor historian     Primary hypertension 07/08/2019    Pulmonary arterial hypertension 2007    PVD (peripheral vascular disease) with claudication 09/20/2024    Shortness of breath     Sleep apnea     does not have a cpap    Sleep apnea, obstructive 2021    Status post coronary artery stent placement 02/26/2024    Stroke 2017    Testosterone deficiency 07/25/2019    Tick bite 05/20/2021    Tobacco abuse 10/13/2021    Ulcer of esophagus without bleeding 01/27/2022    Uncomplicated alcohol dependence 04/25/2021     Past Surgical History:   Procedure Laterality Date    CARDIAC CATHETERIZATION N/A 02/26/2024    Procedure: Left and Right Heart Cath with Coronary Angiography;  Surgeon: Lelo Tello MD;  Location: [facility] CATH INVASIVE LOCATION;  Service: Cardiology;  Laterality: N/A;    CARDIAC CATHETERIZATION N/A 02/26/2024    Procedure: Percutaneous Coronary Intervention;  Surgeon: Lelo Tello MD;  Location: [facility] CATH INVASIVE LOCATION;  Service: Cardiology;  Laterality: N/A;    CARDIAC CATHETERIZATION Right 4/17/2025    Procedure: Right and Left Heart Cath with coronary angiogram;  Surgeon: Nomi Cunningham MD;  Location: [facility] CATH INVASIVE LOCATION;   Service: Cardiovascular;  Laterality: Right;  right IJ and right radial    CARDIAC VALVE REPLACEMENT  2024    Stent    ENDOSCOPY N/A 01/27/2022    Procedure: ESOPHAGOGASTRODUODENOSCOPY with foreign body removal with gastric, duodenal biopsy and GE junction biopsy;  Surgeon: Pedro De La Rosa MD;  Location: [facility] ENDOSCOPY;  Service: Gastroenterology;  Laterality: N/A;  post: foreign body removal, erosive gastritis with biopsy, erosive eduodenitis with biopsy, esophagitis, GE junction biopsy to rule out malignancy,     ENDOSCOPY N/A 08/23/2022    Procedure: ESOPHAGOGASTRODUODENOSCOPY with GE junction biopsy R/O Barretts, esophageal dilation #48 bougie;  Surgeon: Alexa Subramanian MD;  Location: [facility] ENDOSCOPY;  Service: Gastroenterology;  Laterality: N/A;  duodenal ulcer, GERD,     HIP TROCHANTERIC NAILING WITH INTRAMEDULLARY HIP SCREW Left 08/26/2024    Procedure: Left hip/femur fracture fixation with intramedullary ajit and screws;  Surgeon: Kameron Kaminski MD;  Location: [facility] MAIN OR;  Service: Orthopedics;  Laterality: Left;    INTERVENTIONAL RADIOLOGY PROCEDURE N/A 02/26/2024    Procedure: Intravascular Ultrasound;  Surgeon: Lelo Tello MD;  Location: [facility] CATH INVASIVE LOCATION;  Service: Cardiology;  Laterality: N/A;    ORCHIECTOMY  2019      General Information       Row Name 05/31/25 1712          Physical Therapy Time and Intention    Document Type evaluation  -EL     Mode of Treatment individual therapy;physical therapy  -EL       Row Name 05/31/25 1712          General Information    Prior Level of Function independent:;all household mobility;mod assist:;ADL's  -EL       Row Name 05/31/25 1712          Living Environment    Current Living Arrangements home  -EL     People in Home child(elvie), adult;significant other  -EL       Row Name 05/31/25 1712          Home Main Entrance    Number of Stairs, Main Entrance two  -EL       Row Name 05/31/25 1712          Stairs Within Home, Primary    Number  of Stairs, Within Home, Primary none  -EL       Row Name 05/31/25 1712          Cognition    Orientation Status (Cognition) oriented x 4  -EL       Row Name 05/31/25 1712          Safety Issues/Impairments Affecting Functional Mobility    Impairments Affecting Function (Mobility) balance;endurance/activity tolerance;strength  -EL               User Key  (r) = Recorded By, (t) = Taken By, (c) = Cosigned By      Initials Name Provider Type    Rafael Jiménez, MOHINI Physical Therapist                   Mobility       Row Name 05/31/25 1714          Bed Mobility    Bed Mobility supine-sit  -EL     Supine-Sit [place] (Bed Mobility) modified independence  -EL     Assistive Device (Bed Mobility) bed rails  -EL       Row Name 05/31/25 1714          Bed-Chair Transfer    Bed-Chair [place] (Transfers) contact guard  -EL       Row Name 05/31/25 1714          Sit-Stand Transfer    Sit-Stand [place] (Transfers) contact guard  -EL     Assistive Device (Sit-Stand Transfers) walker, front-wheeled  -EL       Row Name 05/31/25 1714          Gait/Stairs (Locomotion)    [place] Level (Gait) contact guard  -EL     Assistive Device (Gait) walker, front-wheeled  -EL     Patient was able to Ambulate yes  -EL     Distance in Feet (Gait) 110  -EL     Deviations/Abnormal Patterns (Gait) gait speed decreased  -EL     Left Sided Gait Deviations forward flexed posture  -EL     Comment, (Gait/Stairs) requiring cueing for AD management  -EL               User Key  (r) = Recorded By, (t) = Taken By, (c) = Cosigned By      Initials Name Provider Type    Rafael Jiménez, PT Physical Therapist                   Obj/Interventions       Row Name 05/31/25 1718          Range of Motion Comprehensive    General Range of Motion bilateral lower extremity ROM WFL  -EL       Row Name 05/31/25 1718          Strength Comprehensive (MMT)    General Manual Muscle Testing (MMT) Assessment lower extremity strength deficits identified  -EL     Comment,  General Manual Muscle Testing (MMT) Assessment BLE 4-/5 gross  -EL       Row Name 05/31/25 1718          Sensory Assessment (Somatosensory)    Sensory Assessment (Somatosensory) sensation intact  -EL               User Key  (r) = Recorded By, (t) = Taken By, (c) = Cosigned By      Initials Name Provider Type    Rafael Jiménez, PT Physical Therapist                   Goals/Plan       Row Name 05/31/25 1726          Bed Mobility Goal 1 (PT)    Activity/Assistive Device (Bed Mobility Goal 1, PT) bed mobility activities, all  -EL     [place] Level/Cues Needed (Bed Mobility Goal 1, PT) modified independence  -EL     Time Frame (Bed Mobility Goal 1, PT) long term goal (LTG);2 weeks  -EL       Row Name 05/31/25 1726          Transfer Goal 1 (PT)    Activity/Assistive Device (Transfer Goal 1, PT) transfers, all;walker, rolling  -EL     [place] Level/Cues Needed (Transfer Goal 1, PT) modified independence  -EL     Time Frame (Transfer Goal 1, PT) long term goal (LTG);2 weeks  -EL       Row Name 05/31/25 1726          Gait Training Goal 1 (PT)    Activity/Assistive Device (Gait Training Goal 1, PT) gait (walking locomotion);walker, rolling  -EL     [place] Level (Gait Training Goal 1, PT) modified independence  -EL     Distance (Gait Training Goal 1, PT) 250  -EL     Time Frame (Gait Training Goal 1, PT) long term goal (LTG);2 weeks  -EL       Row Name 05/31/25 1726          Therapy Assessment/Plan (PT)    Planned Therapy Interventions (PT) neuromuscular re-education;balance training;bed mobility training;transfer training;gait training;patient/family education;strengthening  -EL               User Key  (r) = Recorded By, (t) = Taken By, (c) = Cosigned By      Initials Name Provider Type    Rafael Jiménez, PT Physical Therapist                   Clinical Impression       Row Name 05/31/25 1719          Pain    Pretreatment Pain Rating 0/10 - no pain  -EL     Posttreatment Pain Rating 0/10 - no pain  -EL       Row  Name 05/31/25 1719          Plan of Care Review    Plan of Care Reviewed With patient  -EL     Outcome Evaluation Pt is a 65 YO M admitted with chest pain and SOA. Pt with parkinsons diagnosis, and states he lives with daughter and ex spouse, who provide 24/7 supervision and assistance as needed. Pt states he is ambulatory with rollator, but would perfer a RWx. Pt also has a w/c for community distances as needed. This date pt demonstrates good mobitliy, requiring CGA for transfers and ambulation with RWx without balance deficit. Pt requiring cueing for AD managment, especially regarding distance from patient. Pt appears safe to return home with 24/7 supervision and HHPT. Pt would benefit from RWx at d/c as well.  -EL       Row Name 05/31/25 1719          Therapy Assessment/Plan (PT)    Rehab Potential (PT) good  -EL     Criteria for Skilled Interventions Met (PT) yes  -EL     Therapy Frequency (PT) 3 times/wk  -EL     Predicted Duration of Therapy Intervention (PT) until d/c  -EL       Row Name 05/31/25 1719          Vital Signs    O2 Delivery Pre Treatment room air  -EL     O2 Delivery Intra Treatment room air  -EL     O2 Delivery Post Treatment room air  -EL     Pre Patient Position Supine  -EL     Intra Patient Position Standing  -EL     Post Patient Position Supine  -EL       Row Name 05/31/25 1719          Positioning and Restraints    Pre-Treatment Position in bed  -EL     Post Treatment Position bed  -EL     In Bed notified nsg;supine;call light within reach;encouraged to call for assist;exit alarm on  -EL               User Key  (r) = Recorded By, (t) = Taken By, (c) = Cosigned By      Initials Name Provider Type    Rafael Jiménez, PT Physical Therapist                   Outcome Measures       Row Name 05/31/25 1727 05/31/25 0743       How much help from another person do you currently need...    Turning from your back to your side while in flat bed without using bedrails? 3  -EL 3  -KH    Moving from lying  on back to sitting on the side of a flat bed without bedrails? 3  -EL 3  -KH    Moving to and from a bed to a chair (including a wheelchair)? 3  -EL 3  -KH    Standing up from a chair using your arms (e.g., wheelchair, bedside chair)? 3  -EL 3  -KH    Climbing 3-5 steps with a railing? 3  -EL 3  -KH    To walk in hospital room? 3  -EL 3  -KH    AM-PAC 6 Clicks Score (PT) 18  -EL 18  -KH    Highest Level of Mobility Goal Walk 10 Steps or More-6  -EL Walk 10 Steps or More-6  -KH      Row Name 05/31/25 1727          Functional Assessment    Outcome Measure Options AM-PAC 6 Clicks Basic Mobility (PT)  -               User Key  (r) = Recorded By, (t) = Taken By, (c) = Cosigned By      Initials Name Provider Type    Rafael Jiménez, PT Physical Therapist    Alpa Feliz, RN Registered Nurse                                 Physical Therapy Education       Title: PT OT SLP Therapies (Done)       Topic: Physical Therapy (Done)       Point: Mobility training (Done)       Learning Progress Summary            Patient Acceptance, E,TB, VU by  at 5/31/2025 1728                      Point: Precautions (Done)       Learning Progress Summary            Patient Acceptance, E,TB, VU by  at 5/31/2025 1728                                      User Key       Initials Effective Dates Name Provider Type Discipline     06/23/20 -  Rafael Alfred, PT Physical Therapist PT                  PT Recommendation and Plan  Planned Therapy Interventions (PT): neuromuscular re-education, balance training, bed mobility training, transfer training, gait training, patient/family education, strengthening  Outcome Evaluation: Pt is a 65 YO M admitted with chest pain and SOA. Pt with parkinsons diagnosis, and states he lives with daughter and ex spouse, who provide 24/7 supervision and assistance as needed. Pt states he is ambulatory with rollator, but would perfer a RWx. Pt also has a w/c for community distances as needed. This date pt demonstrates  good mobitliy, requiring CGA for transfers and ambulation with RWx without balance deficit. Pt requiring cueing for AD managment, especially regarding distance from patient. Pt appears safe to return home with 24/7 supervision and HHPT. Pt would benefit from RWx at d/c as well.     Time Calculation:   PT Evaluation Complexity  History, PT Evaluation Complexity: 1-2 personal factors and/or comorbidities  Examination of Body Systems (PT Eval Complexity): total of 3 or more elements  Clinical Presentation (PT Evaluation Complexity): evolving  Clinical Decision Making (PT Evaluation Complexity): moderate complexity  Overall Complexity (PT Evaluation Complexity): moderate complexity     PT Charges       Row Name 05/31/25 1729             Time Calculation    Start Time 1358  -EL      Stop Time 1415  -EL      Time Calculation (min) 17 min  -EL      PT Received On 05/31/25  -EL      PT - Next Appointment 06/02/25  -EL      PT Goal Re-Cert Due Date 06/24/25  -EL                User Key  (r) = Recorded By, (t) = Taken By, (c) = Cosigned By      Initials Name Provider Type    Rafael Jiménez, PT Physical Therapist                  Therapy Charges for Today       Code Description Service Date Service Provider Modifiers Qty    [id number]  PT EVAL MOD COMPLEXITY 4 5/31/2025 Rafael Alfred, PT GP 1            PT G-Codes  Outcome Measure Options: AM-PAC 6 Clicks Basic Mobility (PT)  AM-PAC 6 Clicks Score (PT): 18  PT Discharge Summary  Anticipated Discharge Disposition (PT): home with home health, home with 24/7 care    Rafael Alfred PT  5/31/2025

## 2025-05-31 NOTE — PLAN OF CARE
Goal Outcome Evaluation:         Patient was seen for dysphagia evaluation per MD order. Patient has history of swallowing difficulties as he has had esophageal stricture x 1 approximately a year ago. Patient reported having a choking episode last year and resulted in dilation. Patient has history of CVA, Parkinson, GERD and pneumonia x 1. Most recent chest x-ray revealed no active disease. Patient is currently on a regular diet as he passed swallow screen. Dysphagia order was d/t history with swallowing difficulties. Patient was seen at meal assessment.  Upon entering the room, patient was sitting low in the bed but upright. Instructed the patient to allow for repositioning as he was not in a very good position to eat currently. Patient is edentulous. Patient fed self. Noted patient was impulsive with eating as he ate rather fast and took large bites. Encouraged patient to eat slowly. smaller bites and always position himself in an upright position before eating. Patient displayed adequate munching patterns. Patient completely clears oral cavity effectively between bites without evidence of oral residue and/or pocketing. No clearing of throat, cough and/or vocal changes were identified. No anterior loss was observed as patient maintained adequate labial seal. Patient took sips of thins via cup x 2 without any overt s/s of aspiration. No wet vocal quality was detected. Given patient's impulsivity, edentulous, history of esophageal stricture, patient would benefit from being downgraded to a mechanical ground diet at this time. ST will follow to ensure safety and adequacy of recommended diet and independent use of safe swallow strategies as patient currently requires moderate cuing.                      SLP Swallowing Diagnosis: functional oral phase, functional pharyngeal phase (05/31/25 1300)

## 2025-05-31 NOTE — PLAN OF CARE
Goal Outcome Evaluation:  Plan of Care Reviewed With: patient           Outcome Evaluation: Pt is a 65 YO M admitted with chest pain and SOA. Pt with parkinsons diagnosis, and states he lives with daughter and ex spouse, who provide 24/7 supervision and assistance as needed. Pt states he is ambulatory with rollator, but would perfer a RWx. Pt also has a w/c for community distances as needed. This date pt demonstrates good mobitliy, requiring CGA for transfers and ambulation with RWx without balance deficit. Pt requiring cueing for AD managment, especially regarding distance from patient. Pt appears safe to return home with 24/7 supervision and HHPT. Pt would benefit from RWx at d/c as well.    Anticipated Discharge Disposition (PT): home with home health, home with 24/7 care

## 2025-06-01 ENCOUNTER — APPOINTMENT (OUTPATIENT)
Dept: CT IMAGING | Facility: HOSPITAL | Age: 65
End: 2025-06-01
Payer: MEDICARE

## 2025-06-01 PROBLEM — J44.1 COPD EXACERBATION: Status: ACTIVE | Noted: 2025-06-01

## 2025-06-01 PROCEDURE — 94799 UNLISTED PULMONARY SVC/PX: CPT

## 2025-06-01 PROCEDURE — 94660 CPAP INITIATION&MGMT: CPT

## 2025-06-01 PROCEDURE — 70450 CT HEAD/BRAIN W/O DYE: CPT

## 2025-06-01 PROCEDURE — 94664 DEMO&/EVAL PT USE INHALER: CPT

## 2025-06-01 PROCEDURE — 99222 1ST HOSP IP/OBS MODERATE 55: CPT | Performed by: INTERNAL MEDICINE

## 2025-06-01 RX ORDER — LOSARTAN POTASSIUM 25 MG/1
25 TABLET ORAL
Status: DISCONTINUED | OUTPATIENT
Start: 2025-06-02 | End: 2025-06-02 | Stop reason: HOSPADM

## 2025-06-01 RX ADMIN — IPRATROPIUM BROMIDE AND ALBUTEROL SULFATE 3 ML: .5; 3 SOLUTION RESPIRATORY (INHALATION) at 08:25

## 2025-06-01 RX ADMIN — ISOSORBIDE MONONITRATE 30 MG: 30 TABLET, EXTENDED RELEASE ORAL at 09:12

## 2025-06-01 RX ADMIN — CARBIDOPA AND LEVODOPA 2 TABLET: 25; 100 TABLET, EXTENDED RELEASE ORAL at 21:30

## 2025-06-01 RX ADMIN — IPRATROPIUM BROMIDE AND ALBUTEROL SULFATE 3 ML: .5; 3 SOLUTION RESPIRATORY (INHALATION) at 23:29

## 2025-06-01 RX ADMIN — IPRATROPIUM BROMIDE AND ALBUTEROL SULFATE 3 ML: .5; 3 SOLUTION RESPIRATORY (INHALATION) at 14:41

## 2025-06-01 RX ADMIN — IPRATROPIUM BROMIDE AND ALBUTEROL SULFATE 3 ML: .5; 3 SOLUTION RESPIRATORY (INHALATION) at 01:43

## 2025-06-01 RX ADMIN — ASPIRIN 81 MG: 81 TABLET, COATED ORAL at 21:30

## 2025-06-01 RX ADMIN — CARBIDOPA AND LEVODOPA 2 TABLET: 25; 100 TABLET ORAL at 09:03

## 2025-06-01 RX ADMIN — GUAIFENESIN 1200 MG: 600 TABLET, EXTENDED RELEASE ORAL at 21:30

## 2025-06-01 RX ADMIN — VENLAFAXINE HYDROCHLORIDE 75 MG: 75 CAPSULE, EXTENDED RELEASE ORAL at 09:03

## 2025-06-01 RX ADMIN — GABAPENTIN 600 MG: 300 CAPSULE ORAL at 12:42

## 2025-06-01 RX ADMIN — Medication 10 ML: at 21:30

## 2025-06-01 RX ADMIN — GABAPENTIN 600 MG: 300 CAPSULE ORAL at 05:56

## 2025-06-01 RX ADMIN — IPRATROPIUM BROMIDE AND ALBUTEROL SULFATE 3 ML: .5; 3 SOLUTION RESPIRATORY (INHALATION) at 20:10

## 2025-06-01 RX ADMIN — LOSARTAN POTASSIUM 50 MG: 50 TABLET, FILM COATED ORAL at 09:12

## 2025-06-01 RX ADMIN — TAMSULOSIN HYDROCHLORIDE 0.4 MG: 0.4 CAPSULE ORAL at 09:12

## 2025-06-01 RX ADMIN — CLOPIDOGREL BISULFATE 75 MG: 75 TABLET, FILM COATED ORAL at 09:03

## 2025-06-01 RX ADMIN — AMLODIPINE BESYLATE 10 MG: 5 TABLET ORAL at 09:02

## 2025-06-01 RX ADMIN — GUAIFENESIN 1200 MG: 600 TABLET, EXTENDED RELEASE ORAL at 09:03

## 2025-06-01 RX ADMIN — ATORVASTATIN CALCIUM 20 MG: 20 TABLET, FILM COATED ORAL at 09:02

## 2025-06-01 RX ADMIN — MONTELUKAST 10 MG: 10 TABLET, FILM COATED ORAL at 21:29

## 2025-06-01 RX ADMIN — GABAPENTIN 600 MG: 300 CAPSULE ORAL at 21:29

## 2025-06-01 RX ADMIN — CARBIDOPA AND LEVODOPA 2 TABLET: 25; 100 TABLET ORAL at 17:21

## 2025-06-01 RX ADMIN — Medication 10 ML: at 09:08

## 2025-06-01 RX ADMIN — AZITHROMYCIN 500 MG: 250 TABLET, FILM COATED ORAL at 09:03

## 2025-06-01 RX ADMIN — DONEPEZIL HYDROCHLORIDE 10 MG: 5 TABLET, FILM COATED ORAL at 21:29

## 2025-06-01 RX ADMIN — CARBIDOPA AND LEVODOPA 2 TABLET: 25; 100 TABLET ORAL at 12:42

## 2025-06-01 RX ADMIN — ASPIRIN 81 MG: 81 TABLET, COATED ORAL at 09:03

## 2025-06-01 RX ADMIN — PANTOPRAZOLE SODIUM 40 MG: 40 TABLET, DELAYED RELEASE ORAL at 09:12

## 2025-06-01 NOTE — PLAN OF CARE
Goal Outcome Evaluation:   Remains on room air, up as needed, CT head w/o contrast, blood pressure and heart rate running low, cardio consulted, new orders to hold amlodipine and losartan and restart losartan tomorrow with lower dose.        Progress: no change

## 2025-06-01 NOTE — CASE MANAGEMENT/SOCIAL WORK
Discharge Planning Assessment  [facility]     Patient Name: Andrew Flores  MRN: [id number]  Today's Date: 6/1/2025    Admit Date: 5/30/2025    Plan: Return home with family and [facility] services (current, will need GUI order). Family to transport.   Discharge Needs Assessment       Row Name 06/01/25 1353       Living Environment    People in Home child(elvie), adult;grandchild(elvie);significant other    Name(s) of People in Home Shelley, wife. Desirae, daughter.    Unique Family Situation daughter (Desirae) with fiance and 4 children (ages 18,15, 15, 11) live in basement. Patient and his s/o, Shelley, live upstairs. Desirae and Shelley assist patient with daily care.    Current Living Arrangements home    Potentially Unsafe Housing Conditions none    In the past 12 months has the electric, gas, oil, or water company threatened to shut off services in your home? No    Primary Care Provided by spouse/significant other;child(elvie)    Provides Primary Care For no one    Family Caregiver if Needed child(elvie), adult;significant other    Family Caregiver Names Desirae, daughter. Shelley, s/o.    Quality of Family Relationships helpful;involved;supportive    Able to Return to Prior Arrangements yes       Resource/Environmental Concerns    Resource/Environmental Concerns none    Transportation Concerns none       Transportation Needs    In the past 12 months, has lack of transportation kept you from medical appointments or from getting medications? no    In the past 12 months, has lack of transportation kept you from meetings, work, or from getting things needed for daily living? No       Food Insecurity    Within the past 12 months, you worried that your food would run out before you got the money to buy more. Never true    Within the past 12 months, the food you bought just didn't last and you didn't have money to get more. Never true       Transition Planning    Patient/Family Anticipates Transition to home with family;home with  help/services    Patient/Family Anticipated Services at Transition home health care    Transportation Anticipated family or friend will provide       Discharge Needs Assessment    Readmission Within the Last 30 Days no previous admission in last 30 days    Current Outpatient/Agency/Support Group homecare agency    Equipment Currently Used at Home wheelchair;rollator;commode;shower chair;nebulizer;oxygen    Concerns to be Addressed no discharge needs identified;denies needs/concerns at this time    Do you want help finding or keeping work or a job? I do not need or want help    Do you want help with school or training? For example, starting or completing job training or getting a high school diploma, GED or equivalent No    Anticipated Changes Related to Illness none    Equipment Needed After Discharge walker, rolling    Outpatient/Agency/Support Group Needs homecare agency    Discharge Facility/Level of Care Needs home with home health    Provided Post Acute Provider List? N/A    Provided Post Acute Provider Quality & Resource List? N/A    Offered/Gave Vendor List no    Patient's Choice of Community Agency(s) Current with [facility]                   Discharge Plan       Row Name 06/01/25 0725       Plan    Plan Return home with family and [facility] services (current, will need GUI order). Family to transport.    Plan Comments CM met with patient and his significant other, Shelley, at the bedside. Patient lives with Shelley in the upstairs portion of their home while his daughter, Desirae, lives in the basement portion with her fiance and 4 children. Shelley and Desirae assist patient with all ADL's. Current DME use includes a wheelchair, rollator, bedside commode, shower chair, nebulizer, and home o2 at 2L PRN supplied through [employer]. Patient reports having portable tank and concentrator in home. He reports his nebulizer is no longer working properly and would like to see about getting a new one as well as a rolling  walker at [place] per PT recommendations. CM notified Dr. Proctor. Patient is current with [facility] , verified by Meghann with [facility]. He is not current with any other outpatient services. Insurance, PCP, and pharmacy verified. Denies difficulty affording food/medication/utilities. Family will provide transport at dc.             Expected Discharge Date and Time       Expected Discharge Date Expected Discharge Time    Jun 1, 2025            Demographic Summary       Row Name 06/01/25 1258       General Information    Admission Type observation    Arrived From emergency department    Required Notices Provided Observation Status Notice    Referral Source admission list    Reason for Consult discharge planning    Preferred Language English                   Functional Status       Row Name 06/01/25 1352       Functional Status    Usual Activity Tolerance poor    Current Activity Tolerance poor       Physical Activity    On average, how many days per week do you engage in moderate to strenuous exercise (like a brisk walk)? 0 days    On average, how many minutes do you engage in exercise at this level? 0 min    Number of minutes of exercise per week 0       Assessment of Health Literacy    How often do you have someone help you read hospital materials? Sometimes    How often do you have problems learning about your medical condition because of difficulty understanding written information? Sometimes    How often do you have a problem understanding what is told to you about your medical condition? Sometimes    How confident are you filling out medical forms by yourself? A little bit    Health Literacy Good       Functional Status, IADL    Medications assistive person    Meal Preparation assistive person    Housekeeping assistive person    Laundry assistive person    Shopping assistive person    If for any reason you need help with day-to-day activities such as bathing, preparing meals, shopping, managing finances, etc., do you  get the help you need? I get all the help I need    IADL Comments Desirae and Shelley assist patient with ADL's.             Shila Joya RN     Office Phone: [phone]  Office Cell: [phone]

## 2025-06-01 NOTE — PROGRESS NOTES
[facility] MEDICINE SERVICE  DAILY PROGRESS NOTE    NAME: Andrew Flores  : 1960  MRN: [id number]      LOS: 0 days     PROVIDER OF SERVICE: Tobin Proctor MD    Chief Complaint: Shortness of breath    Subjective:     Interval History:  History taken from: patient      History of Present Illness: Andrew Flores is a 64 y.o. male with a CMH of CAD of native artery of native heart with stable angina pectoris status post stenting, chest tightness, PVD with claudication, bradycardia, hypertension, hyperlipidemia, history of CVA, COPD dependent on home oxygen of 2 L via nasal cannula, emphysema, KASIA on CPAP, Parkinson's disease, BPH, GERD, anxiety, depression who presented to [facility] on 2025 with shortness of breath and chest pain.  Patient reported progressive symptoms over the past few days, that are worse while lying flat and with exertion.  Patient reports a nonproductive cough.  Family member reported patient's home nurse noted crackles in the lower lungs 2 days ago.  Patient was supposed to see his PCP today, but due to worsening chest pain he was brought to the emergency department.  At time of exam, patient reported he is doing much better, denied shortness of breath or chest pain, or any other acute issues.  Patient reported he has been compliant with his home medications.       Per chart review, chronic shortness of breath and chest pain started around the beginning of this year.  Patient was admitted to [facility] in March of this year with COVID-19, then again last month with similar presentation.  Patient underwent a cardiac cath with Dr. Cunningham on 2025.  Patient has followed up with cardiology and pulmonology since his last hospitalization.      In the ED: patient hemodynamically stable, afebrile. Patient not requiring supplemental oxygen. Pertinent labs include HS trop 16->15, proBNP 152.0, lactate 1.1, D-dimer 0.32, WBC 6.19.  Blood cultures drawn.  EKG showed sinus  bradycardia , rate 53 , QTC of 422 ms. CXR showed no active disease.  Patient received albuterol nebulizer, DuoNeb, Solu-Medrol 125 mg IV. Hospitalist team to admit for further management.         5/31 seen in bed Nad, c/o cough, dyspnea, fatigue and weakness. Vss  6/1 slept most of the night. He had CPAP on for a large portion of the night. He does have help at home   6/2 seen in bed NAD, per wife he has been somewhat confused,     Review of Systems  Constitutional:  Negative for chills and fever.   Respiratory:  Positive for cough and shortness of breath.    Cardiovascular:  Negative for chest pain, palpitations and leg swelling.   Gastrointestinal:  Negative for nausea and vomiting.   Genitourinary:  Negative for dysuria and hematuria.   Neurological:  Positive for weakness.   Psychiatric/Behavioral:  The patient is nervous/anxious.      Objective:     Vital Signs  Temp:  [97.6 °F (36.4 °C)-98.1 °F (36.7 °C)] 97.7 °F (36.5 °C)  Heart Rate:  [53-63] 56  Resp:  [15-20] 18  BP: ()/(61-79) 131/79   Body mass index is 30.72 kg/m².    Physical Exam     Constitutional:       Appearance: He is obese.   HENT:      Head: Normocephalic and atraumatic.      Nose: Nose normal.      Mouth/Throat:      Mouth: Mucous membranes are moist.      Pharynx: Oropharynx is clear.   Eyes:      Extraocular Movements: Extraocular movements intact.      Conjunctiva/sclera: Conjunctivae normal.      Pupils: Pupils are equal, round, and reactive to light.   Cardiovascular:      Rate and Rhythm: Regular rhythm. Bradycardia present.      Pulses: Normal pulses.      Heart sounds: Normal heart sounds.   Pulmonary:      Effort: Pulmonary effort is normal.      Breath sounds: Examination of the right-lower field reveals decreased breath sounds. Examination of the left-lower field reveals decreased breath sounds. Decreased breath sounds present. No wheezing, rhonchi or rales.   Abdominal:      General: Bowel sounds are normal.      Palpations:  Abdomen is soft.   Musculoskeletal:      Cervical back: Neck supple.   Skin:     General: Skin is warm and dry.   Neurological:      Mental Status: He is alert and oriented to person, place, and time.      Motor: Weakness present.   Psychiatric:         Mood and Affect: Mood normal.         Behavior: Behavior normal.        Diagnostic Data    Results from last 7 days   Lab Units 05/31/25  0401 05/30/25  1448   WBC 10*3/mm3 5.10 6.19   HEMOGLOBIN g/dL 12.7* 13.0   HEMATOCRIT % 38.0 38.8   PLATELETS 10*3/mm3 153 158   GLUCOSE mg/dL 155* 122*   CREATININE mg/dL 0.69* 0.69*   BUN mg/dL 15.9 14.8   SODIUM mmol/L 140 140   POTASSIUM mmol/L 3.8 3.8   AST (SGOT) U/L  --  14   ALT (SGPT) U/L  --  6   ALK PHOS U/L  --  129*   BILIRUBIN mg/dL  --  0.4   ANION GAP mmol/L 11.3 10.8       XR Chest 1 View  Result Date: 5/30/2025  Impression: No active disease. Electronically Signed: Tad Angela MD  5/30/2025 3:20 PM EDT  Workstation ID: [id number]    Scheduled Meds:amLODIPine, 10 mg, Oral, Q24H  aspirin, 81 mg, Oral, BID  atorvastatin, 20 mg, Oral, Daily  carbidopa-levodopa, 2 tablet, Oral, TID  carbidopa-levodopa ER, 2 tablet, Oral, Nightly  clopidogrel, 75 mg, Oral, Daily  donepezil, 10 mg, Oral, Nightly  gabapentin, 600 mg, Oral, Q8H  guaiFENesin, 1,200 mg, Oral, Q12H  ipratropium-albuterol, 3 mL, Nebulization, Q6H - RT  isosorbide mononitrate, 30 mg, Oral, Q24H  losartan, 50 mg, Oral, Q24H  montelukast, 10 mg, Oral, Nightly  pantoprazole, 40 mg, Oral, QAM AC  sodium chloride, 10 mL, Intravenous, Q12H  tamsulosin, 0.4 mg, Oral, Daily  venlafaxine XR, 75 mg, Oral, Daily      Continuous Infusions:   PRN Meds:.  acetaminophen **OR** acetaminophen **OR** acetaminophen    albuterol    Calcium Replacement - Follow Nurse / BPA Driven Protocol    clonazePAM    docusate sodium    Magnesium Standard Dose Replacement - Follow Nurse / BPA Driven Protocol    melatonin    nitroglycerin    oxyCODONE    Phosphorus Replacement - Follow Nurse /  BPA Driven Protocol    Potassium Replacement - Follow Nurse / BPA Driven Protocol    [COMPLETED] Insert Peripheral IV **AND** sodium chloride    sodium chloride     I reviewed the patient's new clinical results.    Assessment/Plan:     Active and Resolved Problems  Active Hospital Problems    Diagnosis  POA    **Shortness of breath [R06.02]  Yes    Obstructive sleep apnea syndrome [G47.33]  Yes    Coronary artery disease of native artery of native heart with stable angina pectoris [I25.118]  Yes    Status post coronary artery stent placement [Z95.5]  Not Applicable    COPD (chronic obstructive pulmonary disease) [J44.9]  Yes    Mixed hyperlipidemia [E78.2]  Yes    Primary hypertension [I10]  Yes    History of CVA (cerebrovascular accident) [Z86.73]  Not Applicable    Parkinson disease [G20.A1]  Yes    Depression [F32.A]  Yes    Anxiety [F41.9]  Yes    Gastroesophageal reflux disease [K21.9]  Yes      Resolved Hospital Problems   No resolved problems to display.       Acute respiratory distress   COPD, emphysema  Dependent on home oxygen, 2 L  KASIA on CPAP  - Possibly secondary to COPD exacerbation  - Follows outpatient with [facility] [place]  - CXR negative for active process; Resp PCR:pending; D-dimer 0.32; proBNP 152.0; no leukocytosis  - Blood cultures: pending  - Bronchodilators: duoneb Q6H, albuterol neb Q4H PRN  - Steroid: methylprednisolone 40mg IV Q12H   - Azithromycin for COPD exacerbation suppression  - Guaifenesin 1200 mg BID  - CPAP ordered for HS, PRN   - Titrate O2 to keep O2 sat 90-95%  - Continuous pulse ox, cardiac monitor  - Consider pulmonology consult if condition warrants     Chest tightness  Coronary artery disease of native artery of native heart with stable angina pectoris  PVD with claudication  Hyperlipidemia  History of CVA  - Follows outpatient with MD [place]  - EKG with no ischemic changes  - Cardiac cath on 4/17/25: No angiographic evidence of obstructive CAD with patent LAD stent  - Stress test  on 4/3/25: Findings consistent with a normal ECG stress test  - HS trops negative x 2  - Last lipid panel on 4/2/25  - Continue home cardiac medications  - Consider cardiology consult if condition warrants     Bradycardia  Hypertension  - Continue home blood pressure medications, BB per parameters  - Monitor BP per order and as needed  Cardiology consult     Parkinson's disease  - Continue home medications  - Continue supportive care      GERD  - Continue PPI     BPH  - Continue home flomax      Anxiety  Depression  - Continue home medications  - Continue supportive care      VTE Prophylaxis:  Mechanical VTE prophylaxis orders are present.    Disposition Planning:   Barriers to Discharge:copd  Anticipated Date of Discharge: 6/3  Place of Discharge: home  Time: 34 minutes     Code Status and Medical Interventions: CPR (Attempt to Resuscitate); Full Support   Ordered at: 05/30/25 1649     Code Status (Patient has no pulse and is not breathing):    CPR (Attempt to Resuscitate)     Medical Interventions (Patient has pulse or is breathing):    Full Support       Signature: Electronically signed by Tobin Proctor MD, 06/01/25, 10:29 EDT.  [facility] Hospitalist Team

## 2025-06-01 NOTE — CONSULTS
Cardiology [place]        Subjective:           Patient ID: Andrew Flores is a 64 y.o. male.    Chief Complaint: shortness of breath, chest pain  Cardiology consult: bradycardia    Referring Physician: Dr. Proctor    HPI:  Andrew Flores is a 64 y.o. male who presents with chest pain and shortness of breath. Mr. Flores is a patient of Dr. Cunningham. Pmh includes hypertension, hyperlipidemia, coronary artery disease status post PCI to LAD in February 2024- most recent LHC showing patent stent and remaining non obstructive CAD 4/25, peripheral arterial disease with claudication, stroke, Parkinson's disease, COPD and tobacco abuse who was recently hospitalized at [facility] with complaint of chest tightness and shortness of breath.   Patient also has cough and shortness of breath. He has underlying COPD.  In the ED: patient hemodynamically stable, afebrile. Patient not requiring supplemental oxygen. Pertinent labs include HS trop 16->15, proBNP 152.0, lactate 1.1, D-dimer 0.32, WBC 6.19.  Blood cultures drawn.  EKG showed sinus bradycardia , rate 53 , QTC of 422 ms. CXR showed no active disease.  Patient received albuterol nebulizer, DuoNeb, Solu-Medrol 125 mg IV.   He was noted to be bradycardic prompting cardiology consult. He is on coreg at home which medication is currently being held.      Past Medical History:   Diagnosis Date    Anxiety 09/04/2012    Benign prostatic hyperplasia with lower urinary tract symptoms 02/27/2024    Borderline diabetes     COPD (chronic obstructive pulmonary disease)     Coronary artery disease 2015    To the best of my memory    COVID-19 03/18/2025    Cystic fibrosis 2021    Cytokine release syndrome, grade 1 03/20/2025    Deep vein thrombosis 2015    Blood clots    Depression 07/07/2016    Diffuse non-Hodgkin's lymphoma of testis 03/06/2018    Duodenitis 01/27/2022    Dysphagia 08/2022    from parkinsons    Emphysema of lung 2021    Erosive gastritis 01/27/2022     Esophageal stricture 01/27/2022    Fall 07/25/2019    Food impaction of esophagus 01/27/2022    Gastroesophageal reflux disease 09/04/2012    History of chemotherapy 04/25/2021    Hx of radiation therapy 04/25/2021    Intertrochanteric fracture of left femur, closed, initial encounter 08/25/2024    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    Lung nodule 2021    Mixed hyperlipidemia 07/08/2019    Myocardial infarction 2017    Non-Hodgkin's lymphoma of testis     Parkinson disease     Pneumonia of right upper lobe due to infectious organism 12/04/2022    Poor historian     Primary hypertension 07/08/2019    Pulmonary arterial hypertension 2007    PVD (peripheral vascular disease) with claudication 09/20/2024    Shortness of breath     Sleep apnea     does not have a cpap    Sleep apnea, obstructive 2021    Status post coronary artery stent placement 02/26/2024    Stroke 2017    Testosterone deficiency 07/25/2019    Tick bite 05/20/2021    Tobacco abuse 10/13/2021    Ulcer of esophagus without bleeding 01/27/2022    Uncomplicated alcohol dependence 04/25/2021       Past Surgical History:   Procedure Laterality Date    CARDIAC CATHETERIZATION N/A 02/26/2024    Procedure: Left and Right Heart Cath with Coronary Angiography;  Surgeon: Lelo Tello MD;  Location: [facility] CATH INVASIVE LOCATION;  Service: Cardiology;  Laterality: N/A;    CARDIAC CATHETERIZATION N/A 02/26/2024    Procedure: Percutaneous Coronary Intervention;  Surgeon: Lelo Tello MD;  Location: [facility] CATH INVASIVE LOCATION;  Service: Cardiology;  Laterality: N/A;    CARDIAC CATHETERIZATION Right 4/17/2025    Procedure: Right and Left Heart Cath with coronary angiogram;  Surgeon: Nomi Cunningham MD;  Location: [facility] CATH INVASIVE LOCATION;  Service: Cardiovascular;  Laterality: Right;  right IJ and right radial    CARDIAC VALVE REPLACEMENT  2024    Stent    ENDOSCOPY N/A 01/27/2022    Procedure: ESOPHAGOGASTRODUODENOSCOPY with foreign body removal with  gastric, duodenal biopsy and GE junction biopsy;  Surgeon: Pedro De La Rosa MD;  Location: [facility] ENDOSCOPY;  Service: Gastroenterology;  Laterality: N/A;  post: foreign body removal, erosive gastritis with biopsy, erosive eduodenitis with biopsy, esophagitis, GE junction biopsy to rule out malignancy,     ENDOSCOPY N/A 2022    Procedure: ESOPHAGOGASTRODUODENOSCOPY with GE junction biopsy R/O Barretts, esophageal dilation #48 bougie;  Surgeon: Alexa Subramanian MD;  Location: [facility] ENDOSCOPY;  Service: Gastroenterology;  Laterality: N/A;  duodenal ulcer, GERD,     HIP TROCHANTERIC NAILING WITH INTRAMEDULLARY HIP SCREW Left 2024    Procedure: Left hip/femur fracture fixation with intramedullary ajit and screws;  Surgeon: Kameron Kaminski MD;  Location: [facility] MAIN OR;  Service: Orthopedics;  Laterality: Left;    INTERVENTIONAL RADIOLOGY PROCEDURE N/A 2024    Procedure: Intravascular Ultrasound;  Surgeon: Lelo Tello MD;  Location: [facility] CATH INVASIVE LOCATION;  Service: Cardiology;  Laterality: N/A;    ORCHIECTOMY         Family History   Problem Relation Age of Onset    Heart disease Mother         My brother, and a sister has had a brain anurysm    Stroke Mother     Heart failure Mother     Hypertension Mother         My whole imidate family, mother and all siblings. My father passed when i was [age] from black lung disease.    No Known Problems Father     Heart disease Brother     Heart failure Brother     Heart disease Brother     Heart failure Sister     Hypertension Sister        Social History     Socioeconomic History    Marital status: Legally    Tobacco Use    Smoking status: Former     Current packs/day: 0.00     Average packs/day: 1.1 packs/day for 67.1 years (75.0 ttl pk-yrs)     Types: Cigarettes     Start date: 1973     Quit date: 2024     Years since quittin.0     Passive exposure: Current    Smokeless tobacco: Never   Vaping Use    Vaping status: Never  "Used   Substance and Sexual Activity    Alcohol use: Not Currently     Comment: \"once a month\"    Drug use: Never    Sexual activity: Not Currently     Partners: Male         No Known Allergies    Current Medications:   Scheduled Meds:aspirin, 81 mg, Oral, BID  atorvastatin, 20 mg, Oral, Daily  carbidopa-levodopa, 2 tablet, Oral, TID  carbidopa-levodopa ER, 2 tablet, Oral, Nightly  clopidogrel, 75 mg, Oral, Daily  donepezil, 10 mg, Oral, Nightly  gabapentin, 600 mg, Oral, Q8H  guaiFENesin, 1,200 mg, Oral, Q12H  ipratropium-albuterol, 3 mL, Nebulization, Q6H - RT  isosorbide mononitrate, 30 mg, Oral, Q24H  [START ON 6/2/2025] losartan, 25 mg, Oral, Q24H  montelukast, 10 mg, Oral, Nightly  pantoprazole, 40 mg, Oral, QAM AC  sodium chloride, 10 mL, Intravenous, Q12H  tamsulosin, 0.4 mg, Oral, Daily  venlafaxine XR, 75 mg, Oral, Daily      Continuous Infusions:     Review of Systems   Constitutional: Negative for chills, diaphoresis and malaise/fatigue.   Cardiovascular:  Positive for chest pain and dyspnea on exertion. Negative for irregular heartbeat, leg swelling, near-syncope, orthopnea, palpitations, paroxysmal nocturnal dyspnea and syncope.   Respiratory:  Positive for cough and shortness of breath. Negative for sleep disturbances due to breathing and sputum production.    Gastrointestinal:  Negative for change in bowel habit.   Genitourinary:  Negative for urgency.   Neurological:  Negative for dizziness and headaches.   Psychiatric/Behavioral:  Negative for altered mental status.           Objective:         BP 96/59 (BP Location: Right arm, Patient Position: Lying)   Pulse 56   Temp 97.8 °F (36.6 °C) (Oral)   Resp 18   Ht 175.3 cm (69\")   Wt 94.3 kg (208 lb)   SpO2 95%   BMI 30.72 kg/m²     Physical Exam:  General Appearance:    Alert, cooperative, in no acute distress                                Head: Atraumatic, normocephalic, PERRLA               Neck:   supple, trachea midline, no thyromegaly, no " "carotid bruit, no JVD   Lungs:     Clear to auscultation,respirations regular, even and               unlabored    Heart:    Regular rhythm and normal rate, normal S1 and S2   Abdomen:     Normal bowel sounds, no masses, no organomegaly, soft  nontender, nondistended, no guarding, no rebound  tenderness   Extremities:   Moves all extremities well, no edema, no cyanosis, no  redness   Pulses:   Pulses palpable and equal bilaterally   Skin:   No bleeding, bruising or rash   Neurologic:   Awake, alert, oriented x3                 ASCVD Risk Score::  The ASCVD Risk score (Laura PARK, et al., 2019) failed to calculate for the following reasons:    Risk score cannot be calculated because patient has a medical history suggesting prior/existing ASCVD      Lab Review:     Results from last 7 days   Lab Units 05/31/25  0401 05/30/25  1448   SODIUM mmol/L 140 140   POTASSIUM mmol/L 3.8 3.8   CHLORIDE mmol/L 105 105   CO2 mmol/L 23.7 24.2   BUN mg/dL 15.9 14.8   CREATININE mg/dL 0.69* 0.69*   GLUCOSE mg/dL 155* 122*   CALCIUM mg/dL 9.3 9.1   AST (SGOT) U/L  --  14   ALT (SGPT) U/L  --  6     Results from last 7 days   Lab Units 05/30/25  1600 05/30/25  1448   HSTROP T ng/L 15 16     Results from last 7 days   Lab Units 05/31/25  0401 05/30/25  1448   WBC 10*3/mm3 5.10 6.19   HEMOGLOBIN g/dL 12.7* 13.0   HEMATOCRIT % 38.0 38.8   PLATELETS 10*3/mm3 153 158         Results from last 7 days   Lab Units 05/31/25  0401 05/30/25  1600   MAGNESIUM mg/dL 2.0 2.0           Invalid input(s): \"LDLCALC\"  Results from last 7 days   Lab Units 05/30/25  1448   PROBNP pg/mL 152.0           Recent Radiology:  Imaging Results (Most Recent)       Procedure Component Value Units Date/Time    CT Head Without Contrast - In process [[id number]] Resulted: 06/01/25 1138     Updated: 06/01/25 1139    This result has not been signed. Information might be incomplete.      XR Chest 1 View [[id number]] Collected: 05/30/25 1514     Updated: 05/30/25 1522    " Narrative:      XR CHEST 1 VW    Date of Exam: 5/30/2025 3:00 PM EDT    Indication: Chest pain and shortness of breath.    Comparison: 4/16/2025.    Findings:  The heart size is normal. The pulmonary vascular markings are normal. The lungs and pleural spaces are clear of active disease. There are chronic age-related changes involving the bony thorax and thoracic aorta.      Impression:      Impression:  No active disease.        Electronically Signed: Tad Angela MD    5/30/2025 3:20 PM EDT    Workstation ID: [id number]              ECHOCARDIOGRAM:    Results for orders placed during the hospital encounter of 09/23/24    Adult Transthoracic Echo Complete W/ Cont if Necessary Per Protocol    Interpretation Summary    Left ventricular systolic function is normal. Calculated left ventricular EF = 53% Left ventricular ejection fraction appears to be 51 - 55%.    Left ventricular diastolic function is consistent with (grade I) impaired relaxation. Average GLS -14.9%.    The left atrial cavity is dilated.    Estimated right ventricular systolic pressure from tricuspid regurgitation is normal (<35 mmHg).    No significant valvular abnormalities noted.            Assessment:         Active Hospital Problems    Diagnosis  POA    **Shortness of breath [R06.02]  Yes    COPD exacerbation [J44.1]  Yes    Obstructive sleep apnea syndrome [G47.33]  Yes     does not have a cpap      Coronary artery disease of native artery of native heart with stable angina pectoris [I25.118]  Yes    Status post coronary artery stent placement [Z95.5]  Not Applicable    COPD (chronic obstructive pulmonary disease) [J44.9]  Yes    Mixed hyperlipidemia [E78.2]  Yes    Primary hypertension [I10]  Yes    History of CVA (cerebrovascular accident) [Z86.73]  Not Applicable    Parkinson disease [G20.A1]  Yes    Depression [F32.A]  Yes    Anxiety [F41.9]  Yes    Gastroesophageal reflux disease [K21.9]  Yes     Shortness of breath / acute on chronic hypoxic  respiratory insufficiency / chronic home O2 / COPD  Chest Pain- recent [facility] 4/2025 showing patent LAD stent and non obstructive CAD  Bradycardia- monitor on telemetry, hold coreg  Hypertension  Hyperlipidemia  peripheral arterial disease with claudication  Stroke  Parkinson's disease     Plan:   Patient admitted with chest pain shortness of breath. Will start ranexa. Continue ASA / Plavix / statin / imdur  Hold coreg due to bradycardia on admission  Primary cardiologist to see in am    Patient is seen and examined and findings are verified.  All data is reviewed by me personally.  Assessment and plan formulated by APC was done after discussion with attending.  I spent more than 50% of time in taking care of the patient.    Patient is admitted with symptom of shortness of breath.  Patient has sharp chest pain.  Patient complain of fatigue.    Normal S1 and S2.  No pericardial rub or murmur abdominal exam is benign.    Cardiology consultation requested because of bradycardia.  Patient has been on carvedilol.  I will discontinue that.  Patient may have underlying sick sinus syndrome.  Patient recently had cardiac catheterization which showed patent stent in the LAD and nonobstructive disease otherwise.  Chest pain is noncardiac.  Discussed with the patient as well as his wife.    Recommend [facility]OT on discharge    Electronically signed by Sukumar Chavez MD, 06/01/25, 5:21 PM EDT.             CELINA Allen  06/01/25  14:52 EDT

## 2025-06-01 NOTE — PLAN OF CARE
Problem: Adult Inpatient Plan of Care  Goal: Plan of Care Review  Outcome: Progressing  Flowsheets  Taken 6/1/2025 0414 by Valeri Khoury RN  Outcome Evaluation: Patient slept most of the night.   He had CPAP on for a large portion of the night.   He does have help at home and is okay to be discharged home.   High risk for aspiration continues.  Taken 5/31/2025 1719 by Rafael Alfred PT  Plan of Care Reviewed With: patient  Goal: Patient-Specific Goal (Individualized)  Outcome: Progressing  Goal: Absence of Hospital-Acquired Illness or Injury  Outcome: Progressing  Intervention: Identify and Manage Fall Risk  Recent Flowsheet Documentation  Taken 6/1/2025 0000 by Valeri Khoury RN  Safety Promotion/Fall Prevention: (Wife at bedside) safety round/check completed  Taken 5/31/2025 2200 by Valeri Khoury RN  Safety Promotion/Fall Prevention: safety round/check completed  Taken 5/31/2025 2000 by Valeri Khoury RN  Safety Promotion/Fall Prevention:   room organization consistent   safety round/check completed  Intervention: Prevent Skin Injury  Recent Flowsheet Documentation  Taken 6/1/2025 0000 by Valeri Khoury RN  Body Position: position changed independently  Taken 5/31/2025 2000 by Valeri Khoury RN  Body Position: (With assist of wife) position changed independently  Intervention: Prevent and Manage VTE (Venous Thromboembolism) Risk  Recent Flowsheet Documentation  Taken 6/1/2025 0000 by Valeri Khoury RN  VTE Prevention/Management: SCDs (sequential compression devices) off  Goal: Optimal Comfort and Wellbeing  Outcome: Progressing  Goal: Readiness for Transition of Care  Outcome: Progressing   Goal Outcome Evaluation:              Outcome Evaluation: Patient slept most of the night.   He had CPAP on for a large portion of the night.   He does have help at home and is okay to be discharged home.   High risk for aspiration continues.

## 2025-06-02 ENCOUNTER — READMISSION MANAGEMENT (OUTPATIENT)
Dept: CALL CENTER | Facility: HOSPITAL | Age: 65
End: 2025-06-02
Payer: MEDICARE

## 2025-06-02 ENCOUNTER — APPOINTMENT (OUTPATIENT)
Dept: RESPIRATORY THERAPY | Facility: HOSPITAL | Age: 65
End: 2025-06-02
Payer: MEDICARE

## 2025-06-02 VITALS
WEIGHT: 208 LBS | HEART RATE: 58 BPM | DIASTOLIC BLOOD PRESSURE: 83 MMHG | OXYGEN SATURATION: 92 % | HEIGHT: 69 IN | RESPIRATION RATE: 16 BRPM | SYSTOLIC BLOOD PRESSURE: 136 MMHG | BODY MASS INDEX: 30.81 KG/M2 | TEMPERATURE: 98.1 F

## 2025-06-02 PROCEDURE — 94664 DEMO&/EVAL PT USE INHALER: CPT

## 2025-06-02 PROCEDURE — 94660 CPAP INITIATION&MGMT: CPT

## 2025-06-02 PROCEDURE — 94799 UNLISTED PULMONARY SVC/PX: CPT

## 2025-06-02 PROCEDURE — 97166 OT EVAL MOD COMPLEX 45 MIN: CPT

## 2025-06-02 PROCEDURE — 92526 ORAL FUNCTION THERAPY: CPT

## 2025-06-02 PROCEDURE — 97116 GAIT TRAINING THERAPY: CPT

## 2025-06-02 PROCEDURE — 99232 SBSQ HOSP IP/OBS MODERATE 35: CPT | Performed by: INTERNAL MEDICINE

## 2025-06-02 PROCEDURE — 97530 THERAPEUTIC ACTIVITIES: CPT

## 2025-06-02 PROCEDURE — 94761 N-INVAS EAR/PLS OXIMETRY MLT: CPT

## 2025-06-02 RX ORDER — LOSARTAN POTASSIUM 25 MG/1
25 TABLET ORAL
Qty: 30 TABLET | Refills: 0 | Status: SHIPPED | OUTPATIENT
Start: 2025-06-03

## 2025-06-02 RX ADMIN — CARBIDOPA AND LEVODOPA 2 TABLET: 25; 100 TABLET ORAL at 11:54

## 2025-06-02 RX ADMIN — IPRATROPIUM BROMIDE AND ALBUTEROL SULFATE 3 ML: .5; 3 SOLUTION RESPIRATORY (INHALATION) at 07:40

## 2025-06-02 RX ADMIN — PANTOPRAZOLE SODIUM 40 MG: 40 TABLET, DELAYED RELEASE ORAL at 08:35

## 2025-06-02 RX ADMIN — LOSARTAN POTASSIUM 25 MG: 25 TABLET, FILM COATED ORAL at 08:35

## 2025-06-02 RX ADMIN — GABAPENTIN 600 MG: 300 CAPSULE ORAL at 05:13

## 2025-06-02 RX ADMIN — CARBIDOPA AND LEVODOPA 2 TABLET: 25; 100 TABLET ORAL at 08:35

## 2025-06-02 RX ADMIN — VENLAFAXINE HYDROCHLORIDE 75 MG: 75 CAPSULE, EXTENDED RELEASE ORAL at 08:35

## 2025-06-02 RX ADMIN — ASPIRIN 81 MG: 81 TABLET, COATED ORAL at 08:35

## 2025-06-02 RX ADMIN — GUAIFENESIN 1200 MG: 600 TABLET, EXTENDED RELEASE ORAL at 08:35

## 2025-06-02 RX ADMIN — ATORVASTATIN CALCIUM 20 MG: 20 TABLET, FILM COATED ORAL at 08:35

## 2025-06-02 RX ADMIN — Medication 10 ML: at 08:39

## 2025-06-02 RX ADMIN — CLOPIDOGREL BISULFATE 75 MG: 75 TABLET, FILM COATED ORAL at 08:35

## 2025-06-02 RX ADMIN — TAMSULOSIN HYDROCHLORIDE 0.4 MG: 0.4 CAPSULE ORAL at 08:35

## 2025-06-02 RX ADMIN — IPRATROPIUM BROMIDE AND ALBUTEROL SULFATE 3 ML: .5; 3 SOLUTION RESPIRATORY (INHALATION) at 11:37

## 2025-06-02 RX ADMIN — GABAPENTIN 600 MG: 300 CAPSULE ORAL at 15:35

## 2025-06-02 RX ADMIN — ISOSORBIDE MONONITRATE 30 MG: 30 TABLET, EXTENDED RELEASE ORAL at 08:36

## 2025-06-02 NOTE — THERAPY EVALUATION
Patient Name: Andrew Flores  : 1960    MRN: [id number]                              Today's Date: 2025       Admit Date: 2025    Visit Dx:     ICD-10-CM ICD-9-CM   1. Acute exacerbation of chronic obstructive pulmonary disease (COPD)  J44.1 491.21   2. COPD exacerbation  J44.1 491.21   3. PVD (peripheral vascular disease) with claudication  I73.9 443.9   4. Shortness of breath  R06.02 786.05     Patient Active Problem List   Diagnosis    Anxiety    Gastroesophageal reflux disease    Hypogonadism    Depression    Male erectile disorder    Testosterone deficiency    Bruised rib, left, initial encounter    History of CVA (cerebrovascular accident)    Primary hypertension    Mixed hyperlipidemia    Diffuse non-Hodgkin's lymphoma of testis    Neuropathy    Parkinson disease    RBD (REM behavioral disorder)    Balance problem    Screening PSA (prostate specific antigen)    Uncomplicated alcohol dependence    Apathy    Hx of radiation therapy    History of chemotherapy    Generalized weakness    Acute low back pain with right-sided sciatica    Dextroscoliosis    Compression fracture of L1 lumbar vertebra    Former smoker    Shortness of breath    Constipation    Pain of right lower extremity    COPD (chronic obstructive pulmonary disease)    Memory loss    Coronary artery disease of native artery of native heart with stable angina pectoris    Status post coronary artery stent placement    Benign prostatic hyperplasia with lower urinary tract symptoms    PVD (peripheral vascular disease) with claudication    Chest tightness    Levodopa-induced dyskinesia    Dysarthria    Obstructive sleep apnea syndrome    Obesity (BMI 30-39.9)    Chest pain    COPD exacerbation     Past Medical History:   Diagnosis Date    Anxiety 2012    Benign prostatic hyperplasia with lower urinary tract symptoms 2024    Borderline diabetes     COPD (chronic obstructive pulmonary disease)     Coronary artery disease      To the best of my memory    COVID-19 03/18/2025    Cystic fibrosis 2021    Cytokine release syndrome, grade 1 03/20/2025    Deep vein thrombosis 2015    Blood clots    Depression 07/07/2016    Diffuse non-Hodgkin's lymphoma of testis 03/06/2018    Duodenitis 01/27/2022    Dysphagia 08/2022    from parkinsons    Emphysema of lung 2021    Erosive gastritis 01/27/2022    Esophageal stricture 01/27/2022    Fall 07/25/2019    Food impaction of esophagus 01/27/2022    Gastroesophageal reflux disease 09/04/2012    History of chemotherapy 04/25/2021    Hx of radiation therapy 04/25/2021    Intertrochanteric fracture of left femur, closed, initial encounter 08/25/2024    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    Lung nodule 2021    Mixed hyperlipidemia 07/08/2019    Myocardial infarction 2017    Non-Hodgkin's lymphoma of testis     Parkinson disease     Pneumonia of right upper lobe due to infectious organism 12/04/2022    Poor historian     Primary hypertension 07/08/2019    Pulmonary arterial hypertension 2007    PVD (peripheral vascular disease) with claudication 09/20/2024    Shortness of breath     Sleep apnea     does not have a cpap    Sleep apnea, obstructive 2021    Status post coronary artery stent placement 02/26/2024    Stroke 2017    Testosterone deficiency 07/25/2019    Tick bite 05/20/2021    Tobacco abuse 10/13/2021    Ulcer of esophagus without bleeding 01/27/2022    Uncomplicated alcohol dependence 04/25/2021     Past Surgical History:   Procedure Laterality Date    CARDIAC CATHETERIZATION N/A 02/26/2024    Procedure: Left and Right Heart Cath with Coronary Angiography;  Surgeon: Lelo Tello MD;  Location:  [facility]AS[facility];  Service: Cardiology;  Laterality: N/A;    CARDIAC CATHETERIZATION N/A 02/26/2024    Procedure: Percutaneous Coronary Intervention;  Surgeon: Lelo Tello MD;  Location:  [facility];  Service: Cardiology;  Laterality: N/A;    CARDIAC  CATHETERIZATION Right 4/17/2025    Procedure: Right and Left Heart Cath with coronary angiogram;  Surgeon: Nomi Cunningham MD;  Location: [facility] CATH INVASIVE LOCATION;  Service: Cardiovascular;  Laterality: Right;  right IJ and right radial    CARDIAC VALVE REPLACEMENT  2024    Stent    ENDOSCOPY N/A 01/27/2022    Procedure: ESOPHAGOGASTRODUODENOSCOPY with foreign body removal with gastric, duodenal biopsy and GE junction biopsy;  Surgeon: Pedro De La Rosa MD;  Location: [facility] ENDOSCOPY;  Service: Gastroenterology;  Laterality: N/A;  post: foreign body removal, erosive gastritis with biopsy, erosive eduodenitis with biopsy, esophagitis, GE junction biopsy to rule out malignancy,     ENDOSCOPY N/A 08/23/2022    Procedure: ESOPHAGOGASTRODUODENOSCOPY with GE junction biopsy R/O Barretts, esophageal dilation #48 bougie;  Surgeon: Alexa Subramanian MD;  Location: [facility] ENDOSCOPY;  Service: Gastroenterology;  Laterality: N/A;  duodenal ulcer, GERD,     HIP TROCHANTERIC NAILING WITH INTRAMEDULLARY HIP SCREW Left 08/26/2024    Procedure: Left hip/femur fracture fixation with intramedullary ajit and screws;  Surgeon: Kameron Kaminski MD;  Location: [facility] MAIN OR;  Service: Orthopedics;  Laterality: Left;    INTERVENTIONAL RADIOLOGY PROCEDURE N/A 02/26/2024    Procedure: Intravascular Ultrasound;  Surgeon: Lelo Tello MD;  Location: [facility] CATH INVASIVE LOCATION;  Service: Cardiology;  Laterality: N/A;    ORCHIECTOMY  2019      General Information       Row Name 06/02/25 1443          OT Time and Intention    Subjective Information no complaints  -MS     Document Type evaluation  -MS     Mode of Treatment occupational therapy  -MS     Patient Effort good  -MS       Row Name 06/02/25 1443          General Information    Patient Profile Reviewed yes  -MS     Prior Level of Function min assist:;mod assist:;ADL's;all household mobility  -MS     Existing Precautions/Restrictions fall  -MS     Barriers to Rehab medically  complex;previous functional deficit  -MS       Row Name 06/02/25 1443          Occupational Profile    Reason for Services/Referral (Occupational Profile) Pt is a 63 y/o M [facility] with dyspnea and angina. CXR (-) acute. CT head (-) acute. PMHx significant for hx CVA, IM nailing LLE Aug 2024, HTN, Parkinson's disease with dysphagia, hx DVT, decreased balance, depression, hx falls, ETOH abuse, hx lymphoma, cystic fibrosis, hx testicular cancer, and cognitive impairment. At baseline pt resides with ex-spouse, daughter and grandchildren. Pt typically requires assist with LB dressing and bathing (I) with mobility with rollator within home.  -MS     Environmental Supports and Barriers (Occupational Profile) supportive family  -MS       Row Name 06/02/25 1443          Living Environment    Current Living Arrangements home  -MS     People in Home child(elvie), adult;significant other  -MS       Row Name 06/02/25 1443          Home Main Entrance    Number of Stairs, Main Entrance two  -MS       Row Name 06/02/25 1443          Stairs Within Home, Primary    Stairs, Within Home, Primary resides on main level  -MS     Number of Stairs, Within Home, Primary other (see comments)  -MS       Row Name 06/02/25 1443          Cognition    Orientation Status (Cognition) oriented x 4  -MS       Row Name 06/02/25 1443          Safety Issues/Impairments Affecting Functional Mobility    Impairments Affecting Function (Mobility) balance;endurance/activity tolerance;postural/trunk control;strength  -MS               User Key  (r) = Recorded By, (t) = Taken By, (c) = Cosigned By      Initials Name Provider Type    Lindy De Souza OT Occupational Therapist                     Mobility/ADL's       Row Name 06/02/25 1447          Bed Mobility    Bed Mobility supine-sit  -MS     Supine-Sit Hardin (Bed Mobility) standby assist  -MS     Assistive Device (Bed Mobility) bed rails;head of bed elevated  -MS       Row Name  06/02/25 1447          Transfers    Transfers sit-stand transfer;bed-chair transfer  -MS       Row Name 06/02/25 1447          Bed-Chair Transfer    Bed-Chair Stone (Transfers) contact guard  -MS     Assistive Device (Bed-Chair Transfers) walker, front-wheeled  -MS       Row Name 06/02/25 1447          Sit-Stand Transfer    Sit-Stand Stone (Transfers) contact guard  -MS     Assistive Device (Sit-Stand Transfers) walker, front-wheeled  -MS       Row Name 06/02/25 1447          Functional Mobility    Patient was able to Ambulate yes  -MS       Row Name 06/02/25 1447          Activities of Daily Living    BADL Assessment/Intervention lower body dressing;toileting  -MS       Row Name 06/02/25 1447          Lower Body Dressing Assessment/Training    Stone Level (Lower Body Dressing) don;socks;maximum assist (25% patient effort)  -MS     Position (Lower Body Dressing) edge of bed sitting  -MS       Row Name 06/02/25 1447          Toileting Assessment/Training    Stone Level (Toileting) adjust/manage clothing;perform perineal hygiene;maximum assist (25% patient effort)  -MS     Position (Toileting) supported standing  -MS     Comment, (Toileting) soiled upon arrival  -MS               User Key  (r) = Recorded By, (t) = Taken By, (c) = Cosigned By      Initials Name Provider Type    Lindy De Souza OT Occupational Therapist                   Obj/Interventions       Row Name 06/02/25 1448          Sensory Assessment (Somatosensory)    Sensory Assessment (Somatosensory) UE sensation intact  -MS       Row Name 06/02/25 1448          Vision Assessment/Intervention    Visual Impairment/Limitations WFL  -MS       Row Name 06/02/25 1448          Range of Motion Comprehensive    General Range of Motion bilateral upper extremity ROM WFL  -MS       Row Name 06/02/25 1448          Strength Comprehensive (MMT)    Comment, General Manual Muscle Testing (MMT) Assessment BUE grossly 4/5  -MS       Row Name  06/02/25 1448          Balance    Balance Assessment sitting static balance;sitting dynamic balance;standing static balance;standing dynamic balance  -MS     Static Sitting Balance minimal assist;contact guard  -MS     Dynamic Sitting Balance contact guard;minimal assist  -MS     Position, Sitting Balance unsupported;sitting edge of bed  -MS     Static Standing Balance contact guard;standby assist  -MS     Dynamic Standing Balance standby assist;contact guard  -MS     Position/Device Used, Standing Balance supported;walker, front-wheeled  -MS               User Key  (r) = Recorded By, (t) = Taken By, (c) = Cosigned By      Initials Name Provider Type    Lindy De Souza OT Occupational Therapist                   Goals/Plan       Row Name 06/02/25 1456          Bed Mobility Goal 1 (OT)    Activity/Assistive Device (Bed Mobility Goal 1, OT) bed mobility activities, all  -MS     Irene Level/Cues Needed (Bed Mobility Goal 1, OT) modified independence  -MS     Time Frame (Bed Mobility Goal 1, OT) long term goal (LTG);2 weeks  -MS     Progress/Outcomes (Bed Mobility Goal 1, OT) new goal  -MS       Row Name 06/02/25 1456          Transfer Goal 1 (OT)    Activity/Assistive Device (Transfer Goal 1, OT) transfers, all  -MS     Irene Level/Cues Needed (Transfer Goal 1, OT) modified independence  -MS     Time Frame (Transfer Goal 1, OT) long term goal (LTG);2 weeks  -MS     Progress/Outcome (Transfer Goal 1, OT) new goal  -MS       Row Name 06/02/25 1456          Dressing Goal 1 (OT)    Activity/Device (Dressing Goal 1, OT) dressing skills, all  -MS     Irene/Cues Needed (Dressing Goal 1, OT) modified independence  -MS     Time Frame (Dressing Goal 1, OT) long term goal (LTG);2 weeks  -MS     Progress/Outcome (Dressing Goal 1, OT) new goal  -MS       Row Name 06/02/25 1456          Toileting Goal 1 (OT)    Activity/Device (Toileting Goal 1, OT) toileting skills, all  -MS     Irene Level/Cues  Needed (Toileting Goal 1, OT) modified independence  -MS     Time Frame (Toileting Goal 1, OT) long term goal (LTG);2 weeks  -MS     Progress/Outcome (Toileting Goal 1, OT) new goal  -MS       Row Name 06/02/25 1456          Problem Specific Goal 1 (OT)    Problem Specific Goal 1 (OT) increase activity tolerance needed for ADL routine >5 mintues without rest break  -MS     Time Frame (Problem Specific Goal 1, OT) long term goal (LTG);2 weeks  -MS     Progress/Outcome (Problem Specific Goal 1, OT) new goal  -MS       Row Name 06/02/25 1456          Therapy Assessment/Plan (OT)    Planned Therapy Interventions (OT) activity tolerance training;adaptive equipment training;BADL retraining;IADL retraining;functional balance retraining;occupation/activity based interventions;passive ROM/stretching;patient/caregiver education/training;transfer/mobility retraining;strengthening exercise;ROM/therapeutic exercise  -MS               User Key  (r) = Recorded By, (t) = Taken By, (c) = Cosigned By      Initials Name Provider Type    Lindy De Souza, OT Occupational Therapist                   Clinical Impression       Row Name 06/02/25 1452          Pain Assessment    Pretreatment Pain Rating 0/10 - no pain  -MS     Posttreatment Pain Rating 0/10 - no pain  -MS       Row Name 06/02/25 1452          Plan of Care Review    Progress no change  -MS     Outcome Evaluation Pt is a 65 y/o M admitted to [facility] 5/30/25 with dyspnea and angina. At baseline pt resides with ex-spouse, daughter and grandchildren. Pt typically requires assist with LB dressing and bathing (I) with mobility with rollator within home. Pt cleared for OT by nursing, oriented x4 on RA sitting up in bed upon arrival, ex-spouse at bedside. Pt requires SBA bed mobility, comes to standing with CGA and ambulates household distance with CGA/SBA with RW. Pt requiring additional assist to change briefs and complete perineal hygiene which appears to be baseline. Pt has strong  support, anticipate safe dc home with family assist and HHOT. Pt would benefit from continued use of RW at dc vs rollator. OT will follow within acute setting and progress as appropriate.  -MS       Row Name 06/02/25 1452          Therapy Assessment/Plan (OT)    Rehab Potential (OT) good  -MS     Criteria for Skilled Therapeutic Interventions Met (OT) yes;meets criteria;skilled treatment is necessary  -MS     Therapy Frequency (OT) 5 times/wk  -MS     Predicted Duration of Therapy Intervention (OT) until d/c  -MS       Row Name 06/02/25 1452          Therapy Plan Review/Discharge Plan (OT)    Anticipated Discharge Disposition (OT) home with assist;home with home health  -MS       Row Name 06/02/25 1452          Vital Signs    O2 Delivery Pre Treatment room air  -MS     O2 Delivery Intra Treatment room air  -MS     O2 Delivery Post Treatment room air  -MS     Pre Patient Position Supine  -MS     Intra Patient Position Standing  -MS     Post Patient Position Sitting  -MS       Row Name 06/02/25 1452          Positioning and Restraints    Pre-Treatment Position in bed  -MS     Post Treatment Position chair  -MS     In Chair notified nsg;reclined;call light within reach;encouraged to call for assist;exit alarm on;with family/caregiver;legs elevated  -MS               User Key  (r) = Recorded By, (t) = Taken By, (c) = Cosigned By      Initials Name Provider Type    Lindy De Souza, OT Occupational Therapist                   Outcome Measures       Row Name 06/02/25 0952          How much help from another is currently needed...    Putting on and taking off regular lower body clothing? 3  -MS     Bathing (including washing, rinsing, and drying) 3  -MS     Toileting (which includes using toilet bed pan or urinal) 3  -MS     Putting on and taking off regular upper body clothing 3  -MS     Taking care of personal grooming (such as brushing teeth) 3  -MS     Eating meals 4  -MS     AM-PAC 6 Clicks Score (OT) 19  -MS        Row Name 06/02/25 0800          How much help from another person do you currently need...    Turning from your back to your side while in flat bed without using bedrails? 3  -CB     Moving from lying on back to sitting on the side of a flat bed without bedrails? 3  -CB     Moving to and from a bed to a chair (including a wheelchair)? 3  -CB     Standing up from a chair using your arms (e.g., wheelchair, bedside chair)? 3  -CB     Climbing 3-5 steps with a railing? 3  -CB     To walk in hospital room? 3  -CB     AM-PAC 6 Clicks Score (PT) 18  -CB     Highest Level of Mobility Goal Walk 10 Steps or More-6  -CB       Row Name 06/02/25 1456          Functional Assessment    Outcome Measure Options AM-PAC 6 Clicks Daily Activity (OT)  -MS               User Key  (r) = Recorded By, (t) = Taken By, (c) = Cosigned By      Initials Name Provider Type    MS Lindy Lindsey OT Occupational Therapist    Rosa Liu LPN Licensed Nurse                    Occupational Therapy Education       Title: PT OT SLP Therapies (Done)       Topic: Occupational Therapy (Done)       Point: ADL training (Done)       Learning Progress Summary            Patient Acceptance, E,TB, VU by MS at 6/2/2025 1457                      Point: Precautions (Done)       Learning Progress Summary            Patient Acceptance, E,TB, VU by MS at 6/2/2025 1457                      Point: Body mechanics (Done)       Learning Progress Summary            Patient Acceptance, E,TB, VU by MS at 6/2/2025 1457                                      User Key       Initials Effective Dates Name Provider Type Discipline    MS 07/13/22 -  Lindy Lindsey OT Occupational Therapist OT                  OT Recommendation and Plan  Planned Therapy Interventions (OT): activity tolerance training, adaptive equipment training, BADL retraining, IADL retraining, functional balance retraining, occupation/activity based interventions, passive ROM/stretching, patient/caregiver  education/training, transfer/mobility retraining, strengthening exercise, ROM/therapeutic exercise  Therapy Frequency (OT): 5 times/wk  Plan of Care Review  Progress: no change  Outcome Evaluation: Pt is a 63 y/o M admitted to [facility] 5/30/25 with dyspnea and angina. At baseline pt resides with ex-spouse, daughter and grandchildren. Pt typically requires assist with LB dressing and bathing (I) with mobility with rollator within home. Pt cleared for OT by nursing, oriented x4 on RA sitting up in bed upon arrival, ex-spouse at bedside. Pt requires SBA bed mobility, comes to standing with CGA and ambulates household distance with CGA/SBA with RW. Pt requiring additional assist to change briefs and complete perineal hygiene which appears to be baseline. Pt has strong support, anticipate safe dc home with family assist and HHOT. Pt would benefit from continued use of RW at dc vs rollator. OT will follow within acute setting and progress as appropriate.     Time Calculation:         Time Calculation- OT       Row Name 06/02/25 1457             Time Calculation- OT    OT Start Time 1027  -MS      OT Stop Time 1045  -MS      OT Time Calculation (min) 18 min  -MS      Total Timed Code Minutes- OT 0 minute(s)  -MS      OT Received On 06/02/25  -MS      OT - Next Appointment 06/03/25  -MS      OT Goal Re-Cert Due Date 06/16/25  -MS                User Key  (r) = Recorded By, (t) = Taken By, (c) = Cosigned By      Initials Name Provider Type    Lindy De Souza OT Occupational Therapist                  Therapy Charges for Today       Code Description Service Date Service Provider Modifiers Qty    [id number] HC OT EVAL MOD COMPLEXITY 4 6/2/2025 Lindy Lindsey OT GO 1                 Lindy Lindsey OT  6/2/2025

## 2025-06-02 NOTE — PLAN OF CARE
Goal Outcome Evaluation:   Assessment: Andrew Flores  is 63 y/o M with Acute Respiratory Failure/COPD and history of PD. Pt presents with functional mobility impairments which indicate the need for skilled intervention. Pt presented with bradykinesia and rigidity that responded well to exercise. Pt showed improved activity tolerance during ambulation. Pt and spouse deny further concerns.   Tolerating session today without incident. Will continue to follow and progress as tolerated.

## 2025-06-02 NOTE — PROGRESS NOTES
Referring Provider: Tobin Proctor MD    Reason for follow-up: Chest pain, shortness of breath     Patient Care Team:  Larry Mireles PA-C as PCP - General (Physician Assistant)  Lelo Tello MD as Consulting Physician (Interventional Cardiology)  Floresita Rubi APRN as Nurse Practitioner (Cardiology)  Nomi Cunningham MD as Cardiologist (Cardiology)      SUBJECTIVE  Resting comfortably in bed.  Blood pressure and heart rate are normal and stable.     ROS  Review of all systems negative except as indicated.    Since I have last seen, the patient has been without any chest discomfort, shortness of breath, palpitations, dizziness or syncope.  Denies having any headache, abdominal pain, nausea, vomiting, diarrhea, constipation, loss of weight or loss of appetite.  Denies having any excessive bruising, hematuria or blood in the stool.        Personal History:    Past Medical History:   Diagnosis Date    Anxiety 09/04/2012    Benign prostatic hyperplasia with lower urinary tract symptoms 02/27/2024    Borderline diabetes     COPD (chronic obstructive pulmonary disease)     Coronary artery disease 2015    To the best of my memory    COVID-19 03/18/2025    Cystic fibrosis 2021    Cytokine release syndrome, grade 1 03/20/2025    Deep vein thrombosis 2015    Blood clots    Depression 07/07/2016    Diffuse non-Hodgkin's lymphoma of testis 03/06/2018    Duodenitis 01/27/2022    Dysphagia 08/2022    from parkinsons    Emphysema of lung 2021    Erosive gastritis 01/27/2022    Esophageal stricture 01/27/2022    Fall 07/25/2019    Food impaction of esophagus 01/27/2022    Gastroesophageal reflux disease 09/04/2012    History of chemotherapy 04/25/2021    Hx of radiation therapy 04/25/2021    Intertrochanteric fracture of left femur, closed, initial encounter 08/25/2024    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    Lung nodule 2021    Mixed hyperlipidemia 07/08/2019    Myocardial infarction 2017    Non-Hodgkin's lymphoma  of testis     Parkinson disease     Pneumonia of right upper lobe due to infectious organism 12/04/2022    Poor historian     Primary hypertension 07/08/2019    Pulmonary arterial hypertension 2007    PVD (peripheral vascular disease) with claudication 09/20/2024    Shortness of breath     Sleep apnea     does not have a cpap    Sleep apnea, obstructive 2021    Status post coronary artery stent placement 02/26/2024    Stroke 2017    Testosterone deficiency 07/25/2019    Tick bite 05/20/2021    Tobacco abuse 10/13/2021    Ulcer of esophagus without bleeding 01/27/2022    Uncomplicated alcohol dependence 04/25/2021       Past Surgical History:   Procedure Laterality Date    CARDIAC CATHETERIZATION N/A 02/26/2024    Procedure: Left and Right Heart Cath with Coronary Angiography;  Surgeon: Lelo Tello MD;  Location: [facility] CATH INVASIVE LOCATION;  Service: Cardiology;  Laterality: N/A;    CARDIAC CATHETERIZATION N/A 02/26/2024    Procedure: Percutaneous Coronary Intervention;  Surgeon: Lelo Tello MD;  Location: [facility] CATH INVASIVE LOCATION;  Service: Cardiology;  Laterality: N/A;    CARDIAC CATHETERIZATION Right 4/17/2025    Procedure: Right and Left Heart Cath with coronary angiogram;  Surgeon: Nomi Cunningham MD;  Location: [facility] CATH INVASIVE LOCATION;  Service: Cardiovascular;  Laterality: Right;  right IJ and right radial    CARDIAC VALVE REPLACEMENT  2024    Stent    ENDOSCOPY N/A 01/27/2022    Procedure: ESOPHAGOGASTRODUODENOSCOPY with foreign body removal with gastric, duodenal biopsy and GE junction biopsy;  Surgeon: Pedro De La Rosa MD;  Location: [facility] ENDOSCOPY;  Service: Gastroenterology;  Laterality: N/A;  post: foreign body removal, erosive gastritis with biopsy, erosive eduodenitis with biopsy, esophagitis, GE junction biopsy to rule out malignancy,     ENDOSCOPY N/A 08/23/2022    Procedure: ESOPHAGOGASTRODUODENOSCOPY with GE junction biopsy R/O Barretts, esophageal dilation #48 bougie;   "Surgeon: Alexa Subramanian MD;  Location: [facility] ENDOSCOPY;  Service: Gastroenterology;  Laterality: N/A;  duodenal ulcer, GERD,     HIP TROCHANTERIC NAILING WITH INTRAMEDULLARY HIP SCREW Left 2024    Procedure: Left hip/femur fracture fixation with intramedullary ajit and screws;  Surgeon: Kameron Kaminski MD;  Location: [facility] MAIN OR;  Service: Orthopedics;  Laterality: Left;    INTERVENTIONAL RADIOLOGY PROCEDURE N/A 2024    Procedure: Intravascular Ultrasound;  Surgeon: Lelo Tello MD;  Location: [facility] CATH INVASIVE LOCATION;  Service: Cardiology;  Laterality: N/A;    ORCHIECTOMY         Family History   Problem Relation Age of Onset    Heart disease Mother         My brother, and a sister has had a brain anurysm    Stroke Mother     Heart failure Mother     Hypertension Mother         My whole imidate family, mother and all siblings. My father passed when i was [age] from black lung disease.    No Known Problems Father     Heart disease Brother     Heart failure Brother     Heart disease Brother     Heart failure Sister     Hypertension Sister        Social History     Tobacco Use    Smoking status: Former     Current packs/day: 0.00     Average packs/day: 1.1 packs/day for 67.1 years (75.0 ttl pk-yrs)     Types: Cigarettes     Start date: 1973     Quit date: 2024     Years since quittin.0     Passive exposure: Current    Smokeless tobacco: Never   Vaping Use    Vaping status: Never Used   Substance Use Topics    Alcohol use: Not Currently     Comment: \"once a month\"    Drug use: Never        Home meds:  Prior to Admission medications    Medication Sig Start Date End Date Taking? Authorizing Provider   albuterol sulfate  (90 Base) MCG/ACT inhaler Inhale 2 puffs Every 4 (Four) Hours As Needed for Wheezing. 25  Yes Tatum Birmingham MD   amLODIPine (NORVASC) 5 MG tablet Take 1 tablet by mouth Daily As Needed (SBP>140mmHg). 3/26/25  Yes Nomi Cunningham MD "   amLODIPine-benazepril (LOTREL) 10-20 MG per capsule TAKE 1 CAPSULE BY MOUTH EVERY DAY AT NIGHT 5/9/25  Yes Larry Mireles PA-C   aspirin 81 MG EC tablet Take 1 tablet by mouth 2 (Two) Times a Day for 360 doses. 4/29/25 10/26/25 Yes Larry Mireles PA-C   atorvastatin (LIPITOR) 20 MG tablet Take 1 tablet by mouth Daily. 4/29/24  Yes Larry Mireles PA-C   budesonide (Pulmicort) 0.5 MG/2ML nebulizer solution Take 2 mL by nebulization 2 (Two) Times a Day for 180 days. DX: J44.9 4/25/25 10/22/25 Yes Tatum Birmingham MD   carbidopa-levodopa (SINEMET)  MG per tablet Take 2 tablets by mouth 3 (Three) Times a Day. Indications: Parkinson's Disease, Parkinsonian-Like Syndrome   Yes ProviderMaci MD   carbidopa-levodopa ER (SINEMET CR)  MG per tablet Take 2 tablets by mouth Every Night.   Yes ProviderMaci MD   carvedilol (COREG) 6.25 MG tablet TAKE 1 TABLET BY MOUTH TWICE A DAY 3/17/25  Yes Floresita Rubi APRN   clonazePAM (KlonoPIN) 0.5 MG tablet Take 1 tablet by mouth 4 (Four) Times a Day. 4/7/25  Yes Larry Mireles PA-C   clopidogrel (PLAVIX) 75 MG tablet TAKE 1 TABLET BY MOUTH EVERY DAY 3/17/25  Yes Floresita Rubi APRN   Cyanocobalamin (Vitamin B-12) 1000 MCG sublingual tablet Take 1 tablet by mouth Daily. 4/29/25  Yes Larry Mireles PA-C   docusate sodium (COLACE) 100 MG capsule Take 1 capsule by mouth 2 (Two) Times a Day As Needed for Constipation.   Yes ProviderMaci MD   donepezil (ARICEPT) 10 MG tablet TAKE 1 TABLET BY MOUTH EVERYDAY AT BEDTIME 1/20/25  Yes Larry Mireles PA-C   fluticasone (FLONASE) 50 MCG/ACT nasal spray Administer 2 sprays into the nostril(s) as directed by provider Daily. 4/4/25  Yes Haja Khoury PA-C   gabapentin (NEURONTIN) 600 MG tablet Take 1 tablet by mouth 3 (Three) Times a Day. 4/29/25  Yes Larry Mireles PA-C   ipratropium-albuterol (DUO-NEB) 0.5-2.5 mg/3 ml nebulizer Take 3 mL by nebulization Every 6 (Six) Hours. DX  J44.9 Medicare B 2/17/25  Yes Larry Mireles PA-C   melatonin 5 MG tablet tablet Take 1 tablet by mouth At Night As Needed.   Yes ProviderMaci MD   ondansetron (ZOFRAN) 4 MG tablet Take 1 tablet by mouth Every 4 (Four) Hours As Needed for Nausea or Vomiting.   Yes Provider, MD Maci   oxyCODONE-acetaminophen (PERCOCET) 7.5-325 MG per tablet Take 1 tablet by mouth Every 6 (Six) Hours As Needed for Moderate Pain. 5/8/25  Yes Larry Mireles PA-C   pantoprazole (PROTONIX) 40 MG EC tablet TAKE 1 TABLET BY MOUTH EVERY DAY 3/17/25  Yes Larry Mireles PA-C   polyethylene glycol (MIRALAX) 17 GM/SCOOP powder Take 17 g by mouth Daily As Needed (Use if senna-docusate is ineffective). 8/28/24  Yes Elizabeth Anna MD   tamsulosin (FLOMAX) 0.4 MG capsule 24 hr capsule TAKE 1 CAPSULE BY MOUTH EVERY DAY 3/17/25  Yes Larry Mireles PA-C   venlafaxine XR (EFFEXOR-XR) 75 MG 24 hr capsule TAKE 1 CAPSULE BY MOUTH EVERY DAY 12/16/24  Yes Larry Mireles PA-C   isosorbide mononitrate (IMDUR) 30 MG 24 hr tablet Take 1 tablet by mouth Daily for 30 days. 4/5/25 5/5/25  Haja Khoury PA-C   montelukast (Singulair) 10 MG tablet Take 1 tablet by mouth Every Night for 30 days. 4/4/25 5/4/25  Haja Khoury PA-C   nitroglycerin (NITROSTAT) 0.4 MG SL tablet Place 1 tablet under the tongue Every 5 (Five) Minutes As Needed for Chest Pain (Do not take if systolic BP less than 100 mmHg). Take no more than 3 doses in 15 minutes. 2/27/24   Floresita Rubi APRN   Testosterone Cypionate (DEPOTESTOTERONE CYPIONATE) 200 MG/ML injection INJECT 1 ML INTO THE APPROPRIATE MUSCLE AS DIRECTED BY PRESCRIBER EVERY 14 (FOURTEEN) DAYS. 4/18/25   Larry Mirelse PA-C       Allergies:  Patient has no known allergies.    Scheduled Meds:aspirin, 81 mg, Oral, BID  atorvastatin, 20 mg, Oral, Daily  carbidopa-levodopa, 2 tablet, Oral, TID  carbidopa-levodopa ER, 2 tablet, Oral, Nightly  clopidogrel, 75 mg, Oral,  "Daily  donepezil, 10 mg, Oral, Nightly  gabapentin, 600 mg, Oral, Q8H  guaiFENesin, 1,200 mg, Oral, Q12H  ipratropium-albuterol, 3 mL, Nebulization, Q6H - RT  isosorbide mononitrate, 30 mg, Oral, Q24H  losartan, 25 mg, Oral, Q24H  montelukast, 10 mg, Oral, Nightly  pantoprazole, 40 mg, Oral, QAM AC  sodium chloride, 10 mL, Intravenous, Q12H  tamsulosin, 0.4 mg, Oral, Daily  venlafaxine XR, 75 mg, Oral, Daily      Continuous Infusions:   PRN Meds:.  acetaminophen **OR** acetaminophen **OR** acetaminophen    albuterol    Calcium Replacement - Follow Nurse / BPA Driven Protocol    clonazePAM    docusate sodium    Magnesium Standard Dose Replacement - Follow Nurse / BPA Driven Protocol    melatonin    nitroglycerin    oxyCODONE    Phosphorus Replacement - Follow Nurse / BPA Driven Protocol    Potassium Replacement - Follow Nurse / BPA Driven Protocol    [COMPLETED] Insert Peripheral IV **AND** sodium chloride    sodium chloride      OBJECTIVE    Vital Signs  Vitals:    06/02/25 0010 06/02/25 0036 06/02/25 0405 06/02/25 0420   BP: 135/85   144/88   BP Location: Right arm   Right arm   Patient Position: Lying   Lying   Pulse: 52 55  55   Resp: 17   14   Temp: 97.9 °F (36.6 °C)  97.8 °F (36.6 °C) 97.8 °F (36.6 °C)   TempSrc: Axillary   Axillary   SpO2:  94%     Weight:       Height:           Flowsheet Rows      Flowsheet Row First Filed Value   Admission Height 175.3 cm (69\") Documented at 05/30/2025 1453   Admission Weight 94.3 kg (208 lb) Documented at 05/30/2025 1453              Intake/Output Summary (Last 24 hours) at 6/2/2025 0657  Last data filed at 6/1/2025 2016  Gross per 24 hour   Intake 960 ml   Output --   Net 960 ml          Telemetry: Sinus bradycardia with heart rate in the 50s    Physical Exam:  The patient is alert, oriented and in no distress.  Vital signs as noted above.  Head and neck revealed no carotid bruits or jugular venous distention.  No thyromegaly or lymphadenopathy is present  Lungs clear.  " No wheezing.  Breath sounds are normal bilaterally.  Heart normal first and second heart sounds.  No murmur. No precordial rub is present.  No gallop is present.  Abdomen soft and nontender.  No organomegaly is present.  Extremities with good peripheral pulses without any pedal edema.  Skin warm and dry.  Musculoskeletal system is grossly normal.  CNS grossly normal.       Results Review:  I have personally reviewed the results from the time of this admission to 6/2/2025 06:57 EDT and agree with these findings:  []  Laboratory  []  Microbiology  []  Radiology  []  EKG/Telemetry   []  Cardiology/Vascular   []  Pathology  []  Old records  []  Other:    Most notable findings include:    Lab Results (last 24 hours)       Procedure Component Value Units Date/Time    Blood Culture - Blood, Arm, Right [[id number]]  (Normal) Collected: 05/30/25 1519    Specimen: Blood from Arm, Right Updated: 06/01/25 1531     Blood Culture No growth at 2 days    Blood Culture - Blood, Arm, Left [[id number]]  (Normal) Collected: 05/30/25 1448    Specimen: Blood from Arm, Left Updated: 06/01/25 1500     Blood Culture No growth at 2 days            Imaging Results (Last 24 Hours)       Procedure Component Value Units Date/Time    CT Head Without Contrast [[id number]] Collected: 06/01/25 1845     Updated: 06/01/25 1851    Narrative:      CT HEAD WO CONTRAST    Date of Exam: 6/1/2025 11:35 AM EDT    Indication: confusion.    Comparison: 7/12/2023    Technique: Axial CT images were obtained of the head without contrast administration.  Coronal reconstructions were performed.  Automated exposure control and iterative reconstruction methods were used.      Findings:  No midline shift. Basal cisterns appear patent. Mild prominence of the ventricles and sulci, similar to the prior exam.    No extra-axial collection or acute hemorrhage is identified.  No definite mass lesion, edema, or significant mass effect is seen. Mild hypoattenuation in the  periventricular white matter, nonspecific, but most commonly related to chronic small vessel   ischemic changes. No definite CT findings of acute infarction.    Paranasal sinuses appear clear.  Mastoid air cells appear clear.  No acute calvarial abnormality is identified.      Impression:      Impression:  1.No CT findings of acute intracranial abnormality.  2.Mild volume loss and suspected chronic small vessel ischemic changes.      Electronically Signed: Jesus Chago    6/1/2025 6:49 PM EDT    Workstation ID: [id number]            LAB RESULTS (LAST 7 DAYS)    CBC  Results from last 7 days   Lab Units 05/31/25  0401 05/30/25  1448   WBC 10*3/mm3 5.10 6.19   RBC 10*6/mm3 4.03* 4.08*   HEMOGLOBIN g/dL 12.7* 13.0   HEMATOCRIT % 38.0 38.8   MCV fL 94.3 95.1   PLATELETS 10*3/mm3 153 158       BMP  Results from last 7 days   Lab Units 05/31/25  0401 05/30/25  1600 05/30/25  1448   SODIUM mmol/L 140  --  140   POTASSIUM mmol/L 3.8  --  3.8   CHLORIDE mmol/L 105  --  105   CO2 mmol/L 23.7  --  24.2   BUN mg/dL 15.9  --  14.8   CREATININE mg/dL 0.69*  --  0.69*   GLUCOSE mg/dL 155*  --  122*   MAGNESIUM mg/dL 2.0 2.0  --    PHOSPHORUS mg/dL 2.9  --   --        CMP   Results from last 7 days   Lab Units 05/31/25  0401 05/30/25  1448   SODIUM mmol/L 140 140   POTASSIUM mmol/L 3.8 3.8   CHLORIDE mmol/L 105 105   CO2 mmol/L 23.7 24.2   BUN mg/dL 15.9 14.8   CREATININE mg/dL 0.69* 0.69*   GLUCOSE mg/dL 155* 122*   ALBUMIN g/dL  --  4.3   BILIRUBIN mg/dL  --  0.4   ALK PHOS U/L  --  129*   AST (SGOT) U/L  --  14   ALT (SGPT) U/L  --  6       BNP        TROPONIN  Results from last 7 days   Lab Units 05/30/25  1600   HSTROP T ng/L 15       CoAg        Creatinine Clearance  Estimated Creatinine Clearance: 119.3 mL/min (A) (by C-G formula based on SCr of 0.69 mg/dL (L)).    ABG        Radiology  CT Head Without Contrast  Result Date: 6/1/2025  Impression: 1.No CT findings of acute intracranial abnormality. 2.Mild volume loss and  suspected chronic small vessel ischemic changes. Electronically Signed: Jesus Anders  6/1/2025 6:49 PM EDT  Workstation ID: [id number]        EKG  I personally viewed and interpreted the patient's EKG/Telemetry data:  ECG 12 Lead Chest Pain   Final Result   HEART RATE=53  bpm   RR Edorzcxa=9046  ms   HI Wlikzlvo=772  ms   P Horizontal Axis=26  deg   P Front Axis=39  deg   QRSD Dayjhgph=363  ms   QT Slidbcbd=361  ms   ASaQ=686  ms   QRS Axis=10  deg   T Wave Axis=52  deg   - OTHERWISE NORMAL ECG -   Sinus bradycardia   When compared with ECG of 16-Apr-2025 12:57:52,   No significant change   Electronically Signed By: Eben Cohen (TYRONE) 2025-05-31 19:39:41   Date and Time of Study:2025-05-30 14:25:36      Telemetry Scan   Final Result      Telemetry Scan   Final Result      Telemetry Scan   Final Result      Telemetry Scan   Final Result      Telemetry Scan   Final Result      Telemetry Scan   Final Result      Telemetry Scan   Final Result      Telemetry Scan   Final Result      Telemetry Scan   Final Result      Telemetry Scan   Final Result      Telemetry Scan   Final Result            Echocardiogram:    Results for orders placed during the hospital encounter of 09/23/24    Adult Transthoracic Echo Complete W/ Cont if Necessary Per Protocol    Interpretation Summary    Left ventricular systolic function is normal. Calculated left ventricular EF = 53% Left ventricular ejection fraction appears to be 51 - 55%.    Left ventricular diastolic function is consistent with (grade I) impaired relaxation. Average GLS -14.9%.    The left atrial cavity is dilated.    Estimated right ventricular systolic pressure from tricuspid regurgitation is normal (<35 mmHg).    No significant valvular abnormalities noted.        Stress Test:  Results for orders placed during the hospital encounter of 04/02/25    Stress Test With Myocardial Perfusion One Day    Interpretation Summary    Findings consistent with a normal ECG stress test.     Left ventricular ejection fraction is normal (Calculated EF = 62%).    Improved perfusion noted on stress images.    Myocardial perfusion imaging indicates a normal myocardial perfusion study with no evidence of ischemia. Impressions are consistent with a low risk study.         Cardiac Catheterization:  Results for orders placed during the hospital encounter of 04/16/25    Cardiac Catheterization/Vascular Study    Conclusion    Nomi Cunningham M.D. (Attending Cardiologist)      PROCEDURE PERFORMED  Ultrasound guided vascular access  Right heart Catheterization  Left Heart Catheterization  Coronary Angiogram  Moderate Sedation    INDICATIONS FOR PROCEDURE  64-year-old man with hypertension, hyperlipidemia, coronary artery disease with previous PCI, recent COVID continues to present with chest pain, shortness of breath.  Nuclear stress test was negative, troponins were negative.  He was also evaluated by pulmonology.  After discussing the risk and benefits of the procedure he was offered right and left heart cath.    PROCEDURE IN DETAIL  Informed consent was obtained from the patient after explaining the risks, benefits, and alternative options of the procedure. After obtaining informed consent, the patient was brought to the cath lab and was prepped in a sterile fashion. Lidocaine 1% was used for local anesthesia into the right IJ access site. Right IJ vein was accessed using the micropuncture needle under ultrasound guidance and micropuncture wire advanced under flouroscopy. A 7 [place] vascular sheath was put into place percutaneously over guide-wire. Guide wires were removed. A 7Fr swan john catheter was advanced to wedge position. RA, RV and PA and wedge pressures were recorded. PA sat and arterial sats recorded and the swan was subsequently removed in its entirety. Next, The right radial artery was accessed with an angiocath needle under ultrasound guidance. A 6F slendersheath was inserted successfully.   Afterwards, 6F JR4 diagnostic catheter was advanced over a wire into the ascending aorta and was used to cross the AV and obtain LV pressures.  Gradient across the AV measured via pullback technique.  Next, 6F JL3.5 and JR4 catheters were used to engage the ostia of the left main and RCA respectively. Images of the right and left coronary systems were obtained. The catheters were exchanged over a wire and subsequently removed. The patient tolerated the procedure well without any complications. The pictures were reviewed at the end of the procedure. A TR band was applied.    CORONARY ANGIOGRAM FINDINGS:    Slow flow noted in all coronaries.  Dominance: Right    #Left main: Left main is a large vessel Which gives rise to the LAD left circumflex artery.  Left main is angiographically free from any significant disease.    #Left Anterior Descending Artery: LAD is a large caliber vessel which gives rise to several septal perforators and several diagonal branches.  Proximal LAD has a widely patent stent with no stenosis.  There is a pinched diagonal with 50% ostial stenosis.  ALEXI-3 flow.  This is unchanged when compared to previous angiogram from 2024    #Left Circumflex: The LCx is a large, non-dominant vessel which gives rise to OM branches.  Mid left circumflex has 30 to 40% stenosis which is unchanged when compared to previous coronary angiography.    #Right Coronary Artery: The RCA is a large, dominant vessel which gives rise to several small caliber branches and the PDA and PLV.  Mild luminal irregularities noted in the entire length of RCA.  Unchanged when compared to previous coronary angiography from 2024      [facility] HEMODYNAMICS:  LVp 108/9, 18 mmHg  AOp 108/70, 85 mmHg  No significant gradient on pullback    [facility] HEMODYNAMICS:  RA 12/10, 9 mmHg  RV 30/7, 10 mmHg  PA 30/14, 21 mmHg  PCW 14/13, 12 mmHg  AO Sat 96%  PA Sat 66%    [facility] CO 5 liters per minute    [facility] CI 2.4 liters per minute per meter square    SVR:  1197 dynes-sec/cm5 (normal 700-1600)  PVR: 144 dynes-sec/cm5 (normal )    ESTIMATED BLOOD LOSS:  10 ml    COMPLICATIONS:  None    PROCEDURE DATA:  Contrast Used:30 cc  Sedation Time: 35 minutes    IMPRESSIONS  No angiographic evidence of obstructive CAD with patent LAD stent  Slow flow in the coronaries with mild ectasia.  Mildly elevated left heart filling pressures  Mild pulmonary hypertension with elevated PVR  Normal cardiac output and index    RECOMMENDATIONS  -- Continue medical management of coronary artery disease  --Risk factor and lifestyle modifications  -- Treatment of sleep apnea for nocturnal hypoxemia and bradycardia    Electronically signed by Nomi Cunningham MD, 04/17/25, 4:34 PM EDT.         Other:         ASSESSMENT & PLAN:    Principal Problem:    Shortness of breath  Active Problems:    History of CVA (cerebrovascular accident)    Primary hypertension    Mixed hyperlipidemia    Parkinson disease    Anxiety    Gastroesophageal reflux disease    Depression    COPD (chronic obstructive pulmonary disease)    Coronary artery disease of native artery of native heart with stable angina pectoris    Status post coronary artery stent placement    Obstructive sleep apnea syndrome    COPD exacerbation      Chest tightness/shortness of breath  Coronary artery disease of native artery of native heart with stable angina pectoris  History of PCI to proximal LAD with jailed diagonal vessel  ECG with no ischemic changes  High-sensitivity troponin has been negative  Nuclear stress test showed no ischemia; improved perfusion with stress images  Continue DAPT, high intensity statin  Beta-blocker held due to bradycardia  Continue amlodipine and Imdur.  Added Ranexa  Despite providing reassurance, the patient and his daughter are concerned about the patient's symptoms worsening.   Repeated hospital presentations  Repeat cardiac catheterization on 4/17/2025 shows patent stent and nonobstructive/unchanged coronary  disease in other vessels  Reassurance provided to the patient and his wife  Continue medical management    Bradycardia  Sinus bradycardia on Coreg  Beta-blocker has been held  MCOT at the time of discharge     Mixed hyperlipidemia  Continue high intensity statin     Primary hypertension, chronic   Continue losartan, amlodipine, and Imdur  Coreg held due to bradycardia     PVD (peripheral vascular disease) with claudication  Continue DAPT and high intensity statin      Parkinson's disease, unspecified whether dyskinesia present, unspecified whether manifestations fluctuate  On carbidopa-levodopa  Also on donepezil and memantine for dementia      Anxiety / Depression, unspecified depression type  On clonazepam and Effexor XR     Gastroesophageal reflux disease, unspecified whether esophagitis present  On PPI      Benign prostatic hyperplasia with urinary hesitancy  On Flomax      History of CVA (cerebrovascular accident)  Continue DAPT and high intensity statin      COPD  Obstructive sleep apnea syndrome  Encouraged compliance with CPAP  Repeat PFTs in 2 months.  Recommend sleep study for nocturnal hypoxia and bradycardia     Obesity (BMI 30-39.9)  BMI is 30.72. He weighs 208 lbs.   Lifestyle modifications recommended      Chronic low back pain with right-sided sciatica  The patient reports a history scoliosis.  He states his back pain has gotten worse recently. He reports right-sided weakness and numbness.   He was advised to follow-up with his PCP for possible spine imaging.     Nomi Cunningham MD  06/02/25  06:57 EDT

## 2025-06-02 NOTE — THERAPY TREATMENT NOTE
"Subjective: Pt agreeable to therapeutic plan of care. Pt stated feeling better since admission. Pt ready to get out of bed.     Objective:     Precautions - Falls risk.    Bed mobility - SBA  Transfers - CGA with RW  Ambulation - 75 feet CGA demonstrates shuffled gait pattern, reduced heel strike and reduced right stride length. Verbal cues to correct.     Therapeutic Exercise - 10 Reps Bilaterally AROM unsupported sitting / EOB: seated PWR up, shoulder shrugs, lateral neck flexion, ankle pumps.     Vitals: WNL    Pain: 0 Foster-Baker Location: N/A  Intervention for pain: N/A    Education: Provided education on the importance of mobility in the acute care setting, Verbal/Tactile Cues, ADL training, Transfer Training, Gait Training, and Energy conservation strategies    Assessment: Andrew Flores  is 65 y/o M with Acute Respiratory Failure/COPD and history of PD. Pt presents with functional mobility impairments which indicate the need for skilled intervention. Pt presented with bradykinesia and rigidity that responded well to exercise. Pt showed improved activity tolerance during ambulation. Pt and spouse deny further concerns.   Tolerating session today without incident. Will continue to follow and progress as tolerated.     Plan/Recommendations:   If medically appropriate, Low Intensity Therapy recommended post-acute care - This is recommended as therapy feels this patient would require 2-3 visits per week. OP or HH would be the best option depending on patient's home bound status. Consider, if the patient has other  \"skilled\" needs such as wounds, IV antibiotics, etc. Combined with \"low intensity\" could also equate to a SNF. If patient is medically complex, consider LTAC. Pt requires no DME at discharge.     Pt desires Home with Home Health and Home with family assist at discharge. Pt cooperative; agreeable to therapeutic recommendations and plan of care.         Basic Mobility 6-click:  Rollin = Total, " A lot = 2, A little = 3; 4 = None  Supine>Sit:   1 = Total, A lot = 2, A little = 3; 4 = None   Sit>Stand with arms:  1 = Total, A lot = 2, A little = 3; 4 = None  Bed>Chair:   1 = Total, A lot = 2, A little = 3; 4 = None  Ambulate in room:  1 = Total, A lot = 2, A little = 3; 4 = None  3-5 Steps with railin = Total, A lot = 2, A little = 3; 4 = None  Score: 20        Post-Tx Position: Up in Chair, Alarms activated, and Call light and personal items within reach  PPE: gloves       PT Charges       Row Name 25 1510             Time Calculation    Start Time 1027 (P)   -TS      Stop Time 1045 (P)   -TS      Time Calculation (min) 18 min (P)   -TS      PT Received On 25 (P)   -TS      PT - Next Appointment 25 (P)   -TS         Time Calculation- PT    Total Timed Code Minutes- PT 18 minute(s) (P)   -TS                User Key  (r) = Recorded By, (t) = Taken By, (c) = Cosigned By      Initials Name Provider Type    Moris Manzo, PT Student PT Student

## 2025-06-02 NOTE — DISCHARGE SUMMARY
[facility] Medicine Services  Discharge Summary    Date of Service: 2025  Patient Name: Andrew Flores  : 1960  MRN: [id number]    Date of Admission: 2025  Discharge Diagnosis: Shortness of breath  Date of Discharge: 2025  Primary Care Physician: Larry Mireles PA-C      Presenting Problem:   Shortness of breath [R06.02]  Acute exacerbation of chronic obstructive pulmonary disease (COPD) [J44.1]  COPD exacerbation [J44.1]    Active and Resolved Hospital Problems:  Active Hospital Problems    Diagnosis POA    **Shortness of breath [R06.02] Yes    COPD exacerbation [J44.1] Yes    Obstructive sleep apnea syndrome [G47.33] Yes    Coronary artery disease of native artery of native heart with stable angina pectoris [I25.118] Yes    Status post coronary artery stent placement [Z95.5] Not Applicable    COPD (chronic obstructive pulmonary disease) [J44.9] Yes    Mixed hyperlipidemia [E78.2] Yes    Primary hypertension [I10] Yes    History of CVA (cerebrovascular accident) [Z86.73] Not Applicable    Parkinson disease [G20.A1] Yes    Depression [F32.A] Yes    Anxiety [F41.9] Yes    Gastroesophageal reflux disease [K21.9] Yes      Resolved Hospital Problems   No resolved problems to display.       Acute respiratory distress   COPD, emphysema  Dependent on home oxygen, 2 L  KASIA on CPAP  - Possibly secondary to COPD exacerbation  - Follows outpatient with MD [place]  - CXR negative for active process; Resp PCR:pending; D-dimer 0.32; proBNP 152.0; no leukocytosis  - Blood cultures: pending  - Bronchodilators: duoneb Q6H, albuterol neb Q4H PRN  - Steroid: methylprednisolone 40mg IV Q12H   - Azithromycin for COPD exacerbation suppression  - Guaifenesin 1200 mg BID  - CPAP ordered for HS, PRN   - Titrate O2 to keep O2 sat 90-95%  - Continuous pulse ox, cardiac monitor  - Consider pulmonology consult if condition warrants     Chest tightness  Coronary artery disease of native artery of native  heart with stable angina pectoris  PVD with claudication  Hyperlipidemia  History of CVA  - Follows outpatient with MD Cunningham  - EKG with no ischemic changes  - Cardiac cath on 4/17/25: No angiographic evidence of obstructive CAD with patent LAD stent  - Stress test on 4/3/25: Findings consistent with a normal ECG stress test  - HS trops negative x 2  - Last lipid panel on 4/2/25  -  cardiology consult      Bradycardia  Hypertension  - Continue home blood pressure medications, BB per parameters  - Monitor BP per order and as needed  Cardiology consult     Parkinson's disease  - Continue home medications  - Continue supportive care      GERD  - Continue PPI     BPH  - Continue home flomax      Anxiety  Depression  - Continue home medications  - Continue supportive care      Hospital Course     History of Present Illness: Andrew Flores is a 64 y.o. male with a CMH of CAD of native artery of native heart with stable angina pectoris status post stenting, chest tightness, PVD with claudication, bradycardia, hypertension, hyperlipidemia, history of CVA, COPD dependent on home oxygen of 2 L via nasal cannula, emphysema, KASIA on CPAP, Parkinson's disease, BPH, GERD, anxiety, depression who presented to [facility] on 5/30/2025 with shortness of breath and chest pain.  Patient reported progressive symptoms over the past few days, that are worse while lying flat and with exertion.  Patient reports a nonproductive cough.  Family member reported patient's home nurse noted crackles in the lower lungs 2 days ago.  Patient was supposed to see his PCP today, but due to worsening chest pain he was brought to the emergency department.  At time of exam, patient reported he is doing much better, denied shortness of breath or chest pain, or any other acute issues.  Patient reported he has been compliant with his home medications.       Per chart review, chronic shortness of breath and chest pain started around the beginning of  this year.  Patient was admitted to [facility] in March of this year with COVID-19, then again last month with similar presentation.  Patient underwent a cardiac cath with Dr. Cunningham on 4/17/2025.  Patient has followed up with cardiology and pulmonology since his last hospitalization.      In the ED: patient hemodynamically stable, afebrile. Patient not requiring supplemental oxygen. Pertinent labs include HS trop 16->15, proBNP 152.0, lactate 1.1, D-dimer 0.32, WBC 6.19.  Blood cultures drawn.  EKG showed sinus bradycardia , rate 53 , QTC of 422 ms. CXR showed no active disease.  Patient received albuterol nebulizer, DuoNeb, Solu-Medrol 125 mg IV. Hospitalist team to admit for further management.          5/31 seen in bed Nad, c/o cough, dyspnea, fatigue and weakness. Vss  6/1 slept most of the night. He had CPAP on for a large portion of the night. He does have help at home   6/2 seen in bed NAD, per wife he has been somewhat confused,   6/3 seen in bed NAD, feeling better, wants to go home with outpatient PT. Will need holter  Will dc home, condition on dc stable.    DISCHARGE Follow Up Recommendations for labs and diagnostics: follow with pcp in one week  Day of Discharge     Vital Signs:  Temp:  [97.8 °F (36.6 °C)-98.1 °F (36.7 °C)] 98.1 °F (36.7 °C)  Heart Rate:  [52-60] 58  Resp:  [11-18] 16  BP: ()/(39-88) 136/83    Physical Exam   Constitutional:       Appearance: He is obese.   HENT:      Head: Normocephalic and atraumatic.      Nose: Nose normal.      Mouth/Throat:      Mouth: Mucous membranes are moist.      Pharynx: Oropharynx is clear.   Eyes:      Extraocular Movements: Extraocular movements intact.      Conjunctiva/sclera: Conjunctivae normal.      Pupils: Pupils are equal, round, and reactive to light.   Cardiovascular:      Rate and Rhythm: Regular rhythm. Bradycardia present.      Pulses: Normal pulses.      Heart sounds: Normal heart sounds.   Pulmonary:      Effort: Pulmonary effort is normal.       Breath sounds: Examination of the right-lower field reveals decreased breath sounds. Examination of the left-lower field reveals decreased breath sounds. Decreased breath sounds present. No wheezing, rhonchi or rales.   Abdominal:      General: Bowel sounds are normal.      Palpations: Abdomen is soft.   Musculoskeletal:      Cervical back: Neck supple.   Skin:     General: Skin is warm and dry.   Neurological:      Mental Status: He is alert and oriented to person, place, and time.      Motor: Weakness present.   Psychiatric:         Mood and Affect: Mood normal.         Behavior: Behavior normal.       Pertinent  and/or Most Recent Results     LAB RESULTS:      Lab 05/31/25  0401 05/30/25  1455 05/30/25  1448   WBC 5.10  --  6.19   HEMOGLOBIN 12.7*  --  13.0   HEMATOCRIT 38.0  --  38.8   PLATELETS 153  --  158   NEUTROS ABS 3.91  --  3.61   IMMATURE GRANS (ABS) 0.02  --  0.01   LYMPHS ABS 1.11  --  2.19   MONOS ABS 0.06*  --  0.37   EOS ABS 0.00  --  0.00   MCV 94.3  --  95.1   LACTATE  --  1.1  --    D DIMER QUANT  --   --  0.32         Lab 05/31/25  0401 05/30/25  1600 05/30/25  1448   SODIUM 140  --  140   POTASSIUM 3.8  --  3.8   CHLORIDE 105  --  105   CO2 23.7  --  24.2   ANION GAP 11.3  --  10.8   BUN 15.9  --  14.8   CREATININE 0.69*  --  0.69*   EGFR 102.1  --  102.1   GLUCOSE 155*  --  122*   CALCIUM 9.3  --  9.1   MAGNESIUM 2.0 2.0  --    PHOSPHORUS 2.9  --   --          Lab 05/30/25  1448   TOTAL PROTEIN 6.7   ALBUMIN 4.3   GLOBULIN 2.4   ALT (SGPT) 6   AST (SGOT) 14   BILIRUBIN 0.4   ALK PHOS 129*         Lab 05/30/25  1600 05/30/25  1448   PROBNP  --  152.0   HSTROP T 15 16                 Brief Urine Lab Results  (Last result in the past 365 days)        Color   Clarity   Blood   Leuk Est   Nitrite   Protein   CREAT   Urine HCG        04/02/25 1523 Yellow   Clear   Negative   Negative   Negative   Negative                 Microbiology Results (last 10 days)       Procedure Component Value -  Date/Time    Respiratory Panel PCR w/COVID-19(SARS-CoV-2) [facility] In-House, NP Swab in UTM/VTM, 2 HR TAT - Swab, Nasopharynx [[id number]]  (Normal) Collected: 05/30/25 2032    Lab Status: Final result Specimen: Swab from Nasopharynx Updated: 05/30/25 2135     ADENOVIRUS, PCR Not Detected     Coronavirus 229E Not Detected     Coronavirus HKU1 Not Detected     Coronavirus NL63 Not Detected     Coronavirus OC43 Not Detected     COVID19 Not Detected     Human Metapneumovirus Not Detected     Human Rhinovirus/Enterovirus Not Detected     Influenza A PCR Not Detected     Influenza B PCR Not Detected     Parainfluenza Virus 1 Not Detected     Parainfluenza Virus 2 Not Detected     Parainfluenza Virus 3 Not Detected     Parainfluenza Virus 4 Not Detected     RSV, PCR Not Detected     Bordetella pertussis pcr Not Detected     Bordetella parapertussis PCR Not Detected     Chlamydophila pneumoniae PCR Not Detected     Mycoplasma pneumo by PCR Not Detected    Narrative:      In the setting of a positive respiratory panel with a viral infection PLUS a negative procalcitonin without other underlying concern for bacterial infection, consider observing off antibiotics or discontinuation of antibiotics and continue supportive care. If the respiratory panel is positive for atypical bacterial infection (Bordetella pertussis, Chlamydophila pneumoniae, or Mycoplasma pneumoniae), consider antibiotic de-escalation to target atypical bacterial infection.    Blood Culture - Blood, Arm, Right [[id number]]  (Normal) Collected: 05/30/25 1519    Lab Status: Preliminary result Specimen: Blood from Arm, Right Updated: 06/01/25 1531     Blood Culture No growth at 2 days    Blood Culture - Blood, Arm, Left [[id number]]  (Normal) Collected: 05/30/25 1448    Lab Status: Preliminary result Specimen: Blood from Arm, Left Updated: 06/01/25 1500     Blood Culture No growth at 2 days            CT Head Without Contrast  Result Date:  6/1/2025  Impression: Impression: 1.No CT findings of acute intracranial abnormality. 2.Mild volume loss and suspected chronic small vessel ischemic changes. Electronically Signed: Jesus Shahidshonna  6/1/2025 6:49 PM EDT  Workstation ID: [id number]    XR Chest 1 View  Result Date: 5/30/2025  Impression: Impression: No active disease. Electronically Signed: Tad Angela MD  5/30/2025 3:20 PM EDT  Workstation ID: [id number]      Results for orders placed during the hospital encounter of 09/23/24    Duplex Venous Lower Extremity - Bilateral CAR    Interpretation Summary    Chronic right lower extremity superficial thrombophlebitis noted in the small saphenous.    Chronic left lower extremity superficial thrombophlebitis noted in the small saphenous.    All other veins appeared normal bilaterally.      Results for orders placed during the hospital encounter of 09/23/24    Duplex Venous Lower Extremity - Bilateral CAR    Interpretation Summary    Chronic right lower extremity superficial thrombophlebitis noted in the small saphenous.    Chronic left lower extremity superficial thrombophlebitis noted in the small saphenous.    All other veins appeared normal bilaterally.      Results for orders placed during the hospital encounter of 09/23/24    Adult Transthoracic Echo Complete W/ Cont if Necessary Per Protocol    Interpretation Summary    Left ventricular systolic function is normal. Calculated left ventricular EF = 53% Left ventricular ejection fraction appears to be 51 - 55%.    Left ventricular diastolic function is consistent with (grade I) impaired relaxation. Average GLS -14.9%.    The left atrial cavity is dilated.    Estimated right ventricular systolic pressure from tricuspid regurgitation is normal (<35 mmHg).    No significant valvular abnormalities noted.      Labs Pending at Discharge:  Pending Results       None            Procedures Performed           Consults:   Consults       Date and Time Order Name Status  Description    6/1/2025 10:35 AM Inpatient Cardiology Consult Completed               Discharge Details        Discharge Medications        New Medications        Instructions Start Date   losartan 25 MG tablet  Commonly known as: COZAAR   25 mg, Oral, Every 24 Hours Scheduled   Start Date: Birgit 3, 2025            Continue These Medications        Instructions Start Date   albuterol sulfate  (90 Base) MCG/ACT inhaler  Commonly known as: PROVENTIL HFA;VENTOLIN HFA;PROAIR HFA   2 puffs, Inhalation, Every 4 Hours PRN      aspirin 81 MG EC tablet   81 mg, Oral, 2 Times Daily      atorvastatin 20 MG tablet  Commonly known as: LIPITOR   20 mg, Oral, Daily      budesonide 0.5 MG/2ML nebulizer solution  Commonly known as: Pulmicort   0.5 mg, Nebulization, 2 Times Daily, DX: J44.9      carbidopa-levodopa  MG per tablet  Commonly known as: SINEMET   2 tablets, 3 Times Daily      carbidopa-levodopa ER  MG per tablet  Commonly known as: SINEMET CR   2 tablets, Nightly      clonazePAM 0.5 MG tablet  Commonly known as: KlonoPIN   0.5 mg, Oral, 4 Times Daily      clopidogrel 75 MG tablet  Commonly known as: PLAVIX   75 mg, Oral, Daily      docusate sodium 100 MG capsule  Commonly known as: COLACE   100 mg, 2 Times Daily PRN      donepezil 10 MG tablet  Commonly known as: ARICEPT   10 mg, Oral, Every Night at Bedtime      fluticasone 50 MCG/ACT nasal spray  Commonly known as: FLONASE   2 sprays, Nasal, Daily      gabapentin 600 MG tablet  Commonly known as: NEURONTIN   600 mg, Oral, 3 Times Daily      ipratropium-albuterol 0.5-2.5 mg/3 ml nebulizer  Commonly known as: DUO-NEB   3 mL, Nebulization, Every 6 Hours, DX J44.9 Medicare B      isosorbide mononitrate 30 MG 24 hr tablet  Commonly known as: IMDUR   30 mg, Oral, Every 24 Hours Scheduled      melatonin 5 MG tablet tablet   5 mg, Nightly PRN      montelukast 10 MG tablet  Commonly known as: Singulair   10 mg, Oral, Nightly      nitroglycerin 0.4 MG SL  tablet  Commonly known as: NITROSTAT   0.4 mg, Sublingual, Every 5 Minutes PRN, Take no more than 3 doses in 15 minutes.      ondansetron 4 MG tablet  Commonly known as: ZOFRAN   4 mg, Every 4 Hours PRN      oxyCODONE-acetaminophen 7.5-325 MG per tablet  Commonly known as: PERCOCET   1 tablet, Oral, Every 6 Hours PRN      pantoprazole 40 MG EC tablet  Commonly known as: PROTONIX   40 mg, Oral, Daily      polyethylene glycol 17 GM/SCOOP powder  Commonly known as: MIRALAX   17 g, Oral, Daily PRN      tamsulosin 0.4 MG capsule 24 hr capsule  Commonly known as: FLOMAX   0.4 mg, Oral, Daily      Testosterone Cypionate 200 MG/ML injection  Commonly known as: DEPOTESTOTERONE CYPIONATE   200 mg, Intramuscular, Every 14 Days      venlafaxine XR 75 MG 24 hr capsule  Commonly known as: EFFEXOR-XR   75 mg, Oral, Daily      Vitamin B-12 1000 MCG sublingual tablet   1,000 mcg, Oral, Daily             Stop These Medications      amLODIPine 5 MG tablet  Commonly known as: NORVASC     amLODIPine-benazepril 10-20 MG per capsule  Commonly known as: LOTREL     carvedilol 6.25 MG tablet  Commonly known as: COREG              No Known Allergies      Discharge Disposition:   Home or Self Care    Diet:  Hospital:  Diet Order   Procedures    Diet: Regular/House; Texture: Mechanical Ground (NDD 2); Fluid Consistency: Thin (IDDSI 0)         Discharge Activity:   Activity Instructions       Gradually Increase Activity Until at Pre-Hospitalization Level                CODE STATUS:  Code Status and Medical Interventions: CPR (Attempt to Resuscitate); Full Support   Ordered at: 05/30/25 1649     Code Status (Patient has no pulse and is not breathing):    CPR (Attempt to Resuscitate)     Medical Interventions (Patient has pulse or is breathing):    Full Support         Future Appointments   Date Time Provider Department Center   6/26/2025  2:30 PM Floresita Rubi APRN [facility]   7/30/2025 10:15 AM Tatum [place] MD [facility] None    9/10/2025  4:30 PM Nomi Cunningham MD [facility]   9/26/2025 11:15 AM [facility]       Additional Instructions for the Follow-ups that You Need to Schedule       Discharge Follow-up with PCP   As directed       Currently Documented PCP:    Larry Mireles PA-C    PCP Phone Number:    [phone]     Follow Up Details: 1 week        Discharge Follow-up with Specified Provider: cardiology; 2 Weeks   As directed      To: cardiology   Follow Up: 2 Weeks                Time spent on Discharge including face to face service:  34 minutes    Signature: Electronically signed by Tobin Proctor MD, 06/02/25, 12:49 EDT.  [facility] Hospitalist Team

## 2025-06-02 NOTE — PLAN OF CARE
Goal Outcome Evaluation:  Plan of Care Reviewed With: patient     Progress: improving     Patient voiced no complaints during the shift, vitals closely monitored, urinal provided and placed within reach.

## 2025-06-02 NOTE — PLAN OF CARE
Goal Outcome Evaluation:           Progress: no change  Outcome Evaluation: Pt is a 65 y/o M admitted to [facility] 5/30/25 with dyspnea and angina. At baseline pt resides with ex-spouse, daughter and grandchildren. Pt typically requires assist with LB dressing and bathing (I) with mobility with rollator within home. Pt cleared for OT by nursing, oriented x4 on RA sitting up in bed upon arrival, ex-spouse at bedside. Pt requires SBA bed mobility, comes to standing with CGA and ambulates household distance with CGA/SBA with RW. Pt requiring additional assist to change briefs and complete perineal hygiene which appears to be baseline. Pt has strong support, anticipate safe dc home with family assist and HHOT. Pt would benefit from continued use of RW at dc vs rollator. OT will follow within acute setting and progress as appropriate.    Anticipated Discharge Disposition (OT): home with assist, home with home health

## 2025-06-02 NOTE — THERAPY TREATMENT NOTE
Acute Care - Speech Language Pathology   Swallow Treatment Note [facility]     Patient Name: Andrew Flores  : 1960  MRN: [id number]  Today's Date: 2025               Admit Date: 2025    Visit Dx:     ICD-10-CM ICD-9-CM   1. Acute exacerbation of chronic obstructive pulmonary disease (COPD)  J44.1 491.21   2. COPD exacerbation  J44.1 491.21   3. PVD (peripheral vascular disease) with claudication  I73.9 443.9   4. Shortness of breath  R06.02 786.05     Patient Active Problem List   Diagnosis    Anxiety    Gastroesophageal reflux disease    Hypogonadism    Depression    Male erectile disorder    Testosterone deficiency    Bruised rib, left, initial encounter    History of CVA (cerebrovascular accident)    Primary hypertension    Mixed hyperlipidemia    Diffuse non-Hodgkin's lymphoma of testis    Neuropathy    Parkinson disease    RBD (REM behavioral disorder)    Balance problem    Screening PSA (prostate specific antigen)    Uncomplicated alcohol dependence    Apathy    Hx of radiation therapy    History of chemotherapy    Generalized weakness    Acute low back pain with right-sided sciatica    Dextroscoliosis    Compression fracture of L1 lumbar vertebra    Former smoker    Shortness of breath    Constipation    Pain of right lower extremity    COPD (chronic obstructive pulmonary disease)    Memory loss    Coronary artery disease of native artery of native heart with stable angina pectoris    Status post coronary artery stent placement    Benign prostatic hyperplasia with lower urinary tract symptoms    PVD (peripheral vascular disease) with claudication    Chest tightness    Levodopa-induced dyskinesia    Dysarthria    Obstructive sleep apnea syndrome    Obesity (BMI 30-39.9)    Chest pain    COPD exacerbation     Past Medical History:   Diagnosis Date    Anxiety 2012    Benign prostatic hyperplasia with lower urinary tract symptoms 2024    Borderline diabetes     COPD (chronic  obstructive pulmonary disease)     Coronary artery disease 2015    To the best of my memory    COVID-19 03/18/2025    Cystic fibrosis 2021    Cytokine release syndrome, grade 1 03/20/2025    Deep vein thrombosis 2015    Blood clots    Depression 07/07/2016    Diffuse non-Hodgkin's lymphoma of testis 03/06/2018    Duodenitis 01/27/2022    Dysphagia 08/2022    from parkinsons    Emphysema of lung 2021    Erosive gastritis 01/27/2022    Esophageal stricture 01/27/2022    Fall 07/25/2019    Food impaction of esophagus 01/27/2022    Gastroesophageal reflux disease 09/04/2012    History of chemotherapy 04/25/2021    Hx of radiation therapy 04/25/2021    Intertrochanteric fracture of left femur, closed, initial encounter 08/25/2024    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    Lung nodule 2021    Mixed hyperlipidemia 07/08/2019    Myocardial infarction 2017    Non-Hodgkin's lymphoma of testis     Parkinson disease     Pneumonia of right upper lobe due to infectious organism 12/04/2022    Poor historian     Primary hypertension 07/08/2019    Pulmonary arterial hypertension 2007    PVD (peripheral vascular disease) with claudication 09/20/2024    Shortness of breath     Sleep apnea     does not have a cpap    Sleep apnea, obstructive 2021    Status post coronary artery stent placement 02/26/2024    Stroke 2017    Testosterone deficiency 07/25/2019    Tick bite 05/20/2021    Tobacco abuse 10/13/2021    Ulcer of esophagus without bleeding 01/27/2022    Uncomplicated alcohol dependence 04/25/2021     Past Surgical History:   Procedure Laterality Date    CARDIAC CATHETERIZATION N/A 02/26/2024    Procedure: Left and Right Heart Cath with Coronary Angiography;  Surgeon: Lelo Tello MD;  Location: [facility] CATH INVASIVE LOCATION;  Service: Cardiology;  Laterality: N/A;    CARDIAC CATHETERIZATION N/A 02/26/2024    Procedure: Percutaneous Coronary Intervention;  Surgeon: Lelo Tello MD;  Location:  [facility] CATH INVASIVE  LOCATION;  Service: Cardiology;  Laterality: N/A;    CARDIAC CATHETERIZATION Right 4/17/2025    Procedure: Right and Left Heart Cath with coronary angiogram;  Surgeon: Nomi Cunningham MD;  Location: [facility] CATH INVASIVE LOCATION;  Service: Cardiovascular;  Laterality: Right;  right IJ and right radial    CARDIAC VALVE REPLACEMENT  2024    Stent    ENDOSCOPY N/A 01/27/2022    Procedure: ESOPHAGOGASTRODUODENOSCOPY with foreign body removal with gastric, duodenal biopsy and GE junction biopsy;  Surgeon: Pedro De La Rosa MD;  Location: [facility] ENDOSCOPY;  Service: Gastroenterology;  Laterality: N/A;  post: foreign body removal, erosive gastritis with biopsy, erosive eduodenitis with biopsy, esophagitis, GE junction biopsy to rule out malignancy,     ENDOSCOPY N/A 08/23/2022    Procedure: ESOPHAGOGASTRODUODENOSCOPY with GE junction biopsy R/O Barretts, esophageal dilation #48 bougie;  Surgeon: Alexa Subramanian MD;  Location: [facility] ENDOSCOPY;  Service: Gastroenterology;  Laterality: N/A;  duodenal ulcer, GERD,     HIP TROCHANTERIC NAILING WITH INTRAMEDULLARY HIP SCREW Left 08/26/2024    Procedure: Left hip/femur fracture fixation with intramedullary ajit and screws;  Surgeon: Kameron Kaminski MD;  Location: [facility] MAIN OR;  Service: Orthopedics;  Laterality: Left;    INTERVENTIONAL RADIOLOGY PROCEDURE N/A 02/26/2024    Procedure: Intravascular Ultrasound;  Surgeon: Lelo Tello MD;  Location: [facility] CATH INVASIVE LOCATION;  Service: Cardiology;  Laterality: N/A;    ORCHIECTOMY  2019       SLP Recommendation and Plan      EDUCATION  The patient has been educated in the following areas:   Dysphagia (Swallowing Impairment) Oral Care/Hydration Modified Diet Instruction.        SLP GOALS       Row Name 06/02/25 1200       (LTG) Swallow    (LTG) Swallow Patient will tolerate safest and least restrictive diet without complications from aspiration.  -PF    Time Frame (Swallow Long Term Goal) by discharge  -PF    Barriers  (Swallow Long Term Goal) impulsive; eats fast and large bites.  -PF    Progress/Outcomes (Swallow Long Term Goal) new goal  -PF       (STG) Swallow 1    (STG) Swallow 1 Patient will participate in full meal assessment to ensure safety and adequacy of recommended diet and independent use of safe swallow strategies.  -PF    Time Frame (Swallow Short Term Goal 1) 1 week  -PF    Progress/Outcomes (Swallow Short Term Goal 1) new goal  -PF    Comment (Swallow Short Term Goal 1) Pt was seen for skilled DT/meal assessment to ensure tolerance of PO diet. Pt is currently receivng mech soft w/ thin liquids. Pt was alert, responsive, and consuming lunch upon room entry. Tray consisted of pasta, green beans, and coke. Mastication on mech soft is mildly slow but functional w/ min residual, no pocketing, utilized thin liquid wash to clear. Pharyngeal swallow was marked by clear vocal quality w/ no clinical s/s of aspiration noted on any trial or consistency. Continue mech soft and thin liquids. To maximize safety and efficency of PO intake, pt should continue to follow safe swallow compensations: remain upright during PO/meds, slow rate of intake, small bites/sips, alternate liquids and solids. Pt is being discharged to home today. ST will complete orders. -PF              User Key  (r) = Recorded By, (t) = Taken By, (c) = Cosigned By      Initials Name Provider Type    Bertha Leigh, SLP Speech and Language Pathologist    Ang Lawrence SLP Speech and Language Pathologist             JIM Pacheco  6/2/2025

## 2025-06-02 NOTE — PROGRESS NOTES
[facility] MEDICINE SERVICE  DAILY PROGRESS NOTE    NAME: Andrew Flores  : 1960  MRN: [id number]      LOS: 1 day     PROVIDER OF SERVICE: Tobin Proctor MD    Chief Complaint: Shortness of breath    Subjective:     Interval History:  History taken from: patient      History of Present Illness: Andrew Flores is a 64 y.o. male with a CMH of CAD of native artery of native heart with stable angina pectoris status post stenting, chest tightness, PVD with claudication, bradycardia, hypertension, hyperlipidemia, history of CVA, COPD dependent on home oxygen of 2 L via nasal cannula, emphysema, KASIA on CPAP, Parkinson's disease, BPH, GERD, anxiety, depression who presented to [facility] on 2025 with shortness of breath and chest pain.  Patient reported progressive symptoms over the past few days, that are worse while lying flat and with exertion.  Patient reports a nonproductive cough.  Family member reported patient's home nurse noted crackles in the lower lungs 2 days ago.  Patient was supposed to see his PCP today, but due to worsening chest pain he was brought to the emergency department.  At time of exam, patient reported he is doing much better, denied shortness of breath or chest pain, or any other acute issues.  Patient reported he has been compliant with his home medications.       Per chart review, chronic shortness of breath and chest pain started around the beginning of this year.  Patient was admitted to [facility] in March of this year with COVID-19, then again last month with similar presentation.  Patient underwent a cardiac cath with Dr. Cunningham on 2025.  Patient has followed up with cardiology and pulmonology since his last hospitalization.      In the ED: patient hemodynamically stable, afebrile. Patient not requiring supplemental oxygen. Pertinent labs include HS trop 16->15, proBNP 152.0, lactate 1.1, D-dimer 0.32, WBC 6.19.  Blood cultures drawn.  EKG showed sinus  bradycardia , rate 53 , QTC of 422 ms. CXR showed no active disease.  Patient received albuterol nebulizer, DuoNeb, Solu-Medrol 125 mg IV. Hospitalist team to admit for further management.         5/31 seen in bed Nad, c/o cough, dyspnea, fatigue and weakness. Vss  6/1 slept most of the night. He had CPAP on for a large portion of the night. He does have help at home   6/2 seen in bed NAD, per wife he has been somewhat confused,   6/3 seen in bed NAD, feeling better, wants to go home with outpatient PT. Will need holter    Review of Systems  Constitutional:  Negative for chills and fever.   Respiratory:  Positive for cough and shortness of breath.    Cardiovascular:  Negative for chest pain, palpitations and leg swelling.   Gastrointestinal:  Negative for nausea and vomiting.   Genitourinary:  Negative for dysuria and hematuria.   Neurological:  Positive for weakness.   Psychiatric/Behavioral:  The patient is nervous/anxious.      Objective:     Vital Signs  Temp:  [97.3 °F (36.3 °C)-97.9 °F (36.6 °C)] 97.9 °F (36.6 °C)  Heart Rate:  [52-60] 60  Resp:  [11-18] 11  BP: ()/(39-88) 137/74   Body mass index is 30.72 kg/m².    Physical Exam     Constitutional:       Appearance: He is obese.   HENT:      Head: Normocephalic and atraumatic.      Nose: Nose normal.      Mouth/Throat:      Mouth: Mucous membranes are moist.      Pharynx: Oropharynx is clear.   Eyes:      Extraocular Movements: Extraocular movements intact.      Conjunctiva/sclera: Conjunctivae normal.      Pupils: Pupils are equal, round, and reactive to light.   Cardiovascular:      Rate and Rhythm: Regular rhythm. Bradycardia present.      Pulses: Normal pulses.      Heart sounds: Normal heart sounds.   Pulmonary:      Effort: Pulmonary effort is normal.      Breath sounds: Examination of the right-lower field reveals decreased breath sounds. Examination of the left-lower field reveals decreased breath sounds. Decreased breath sounds present. No  wheezing, rhonchi or rales.   Abdominal:      General: Bowel sounds are normal.      Palpations: Abdomen is soft.   Musculoskeletal:      Cervical back: Neck supple.   Skin:     General: Skin is warm and dry.   Neurological:      Mental Status: He is alert and oriented to person, place, and time.      Motor: Weakness present.   Psychiatric:         Mood and Affect: Mood normal.         Behavior: Behavior normal.        Diagnostic Data    Results from last 7 days   Lab Units 05/31/25  0401 05/30/25  1448   WBC 10*3/mm3 5.10 6.19   HEMOGLOBIN g/dL 12.7* 13.0   HEMATOCRIT % 38.0 38.8   PLATELETS 10*3/mm3 153 158   GLUCOSE mg/dL 155* 122*   CREATININE mg/dL 0.69* 0.69*   BUN mg/dL 15.9 14.8   SODIUM mmol/L 140 140   POTASSIUM mmol/L 3.8 3.8   AST (SGOT) U/L  --  14   ALT (SGPT) U/L  --  6   ALK PHOS U/L  --  129*   BILIRUBIN mg/dL  --  0.4   ANION GAP mmol/L 11.3 10.8       CT Head Without Contrast  Result Date: 6/1/2025  Impression: 1.No CT findings of acute intracranial abnormality. 2.Mild volume loss and suspected chronic small vessel ischemic changes. Electronically Signed: Jesus Anders  6/1/2025 6:49 PM EDT  Workstation ID: [id number]    Scheduled Meds:aspirin, 81 mg, Oral, BID  atorvastatin, 20 mg, Oral, Daily  carbidopa-levodopa, 2 tablet, Oral, TID  carbidopa-levodopa ER, 2 tablet, Oral, Nightly  clopidogrel, 75 mg, Oral, Daily  donepezil, 10 mg, Oral, Nightly  gabapentin, 600 mg, Oral, Q8H  guaiFENesin, 1,200 mg, Oral, Q12H  ipratropium-albuterol, 3 mL, Nebulization, Q6H - RT  isosorbide mononitrate, 30 mg, Oral, Q24H  losartan, 25 mg, Oral, Q24H  montelukast, 10 mg, Oral, Nightly  pantoprazole, 40 mg, Oral, QAM AC  sodium chloride, 10 mL, Intravenous, Q12H  tamsulosin, 0.4 mg, Oral, Daily  venlafaxine XR, 75 mg, Oral, Daily      Continuous Infusions:   PRN Meds:.  acetaminophen **OR** acetaminophen **OR** acetaminophen    albuterol    Calcium Replacement - Follow Nurse / BPA Driven Protocol    clonazePAM     docusate sodium    Magnesium Standard Dose Replacement - Follow Nurse / BPA Driven Protocol    melatonin    nitroglycerin    oxyCODONE    Phosphorus Replacement - Follow Nurse / BPA Driven Protocol    Potassium Replacement - Follow Nurse / BPA Driven Protocol    [COMPLETED] Insert Peripheral IV **AND** sodium chloride    sodium chloride     I reviewed the patient's new clinical results.    Assessment/Plan:     Active and Resolved Problems  Active Hospital Problems    Diagnosis  POA    **Shortness of breath [R06.02]  Yes    COPD exacerbation [J44.1]  Yes    Obstructive sleep apnea syndrome [G47.33]  Yes    Coronary artery disease of native artery of native heart with stable angina pectoris [I25.118]  Yes    Status post coronary artery stent placement [Z95.5]  Not Applicable    COPD (chronic obstructive pulmonary disease) [J44.9]  Yes    Mixed hyperlipidemia [E78.2]  Yes    Primary hypertension [I10]  Yes    History of CVA (cerebrovascular accident) [Z86.73]  Not Applicable    Parkinson disease [G20.A1]  Yes    Depression [F32.A]  Yes    Anxiety [F41.9]  Yes    Gastroesophageal reflux disease [K21.9]  Yes      Resolved Hospital Problems   No resolved problems to display.       Acute respiratory distress   COPD, emphysema  Dependent on home oxygen, 2 L  KASIA on CPAP  - Possibly secondary to COPD exacerbation  - Follows outpatient with MD [place]  - CXR negative for active process; Resp PCR:pending; D-dimer 0.32; proBNP 152.0; no leukocytosis  - Blood cultures: pending  - Bronchodilators: duoneb Q6H, albuterol neb Q4H PRN  - Steroid: methylprednisolone 40mg IV Q12H   - Azithromycin for COPD exacerbation suppression  - Guaifenesin 1200 mg BID  - CPAP ordered for HS, PRN   - Titrate O2 to keep O2 sat 90-95%  - Continuous pulse ox, cardiac monitor  - Consider pulmonology consult if condition warrants     Chest tightness  Coronary artery disease of native artery of native heart with stable angina pectoris  PVD with  claudication  Hyperlipidemia  History of CVA  - Follows outpatient with MD Cunningham  - EKG with no ischemic changes  - Cardiac cath on 4/17/25: No angiographic evidence of obstructive CAD with patent LAD stent  - Stress test on 4/3/25: Findings consistent with a normal ECG stress test  - HS trops negative x 2  - Last lipid panel on 4/2/25  - Continue home cardiac medications  - Consider cardiology consult if condition warrants     Bradycardia  Hypertension  - Continue home blood pressure medications, BB per parameters  - Monitor BP per order and as needed  Cardiology consult     Parkinson's disease  - Continue home medications  - Continue supportive care      GERD  - Continue PPI     BPH  - Continue home flomax      Anxiety  Depression  - Continue home medications  - Continue supportive care      VTE Prophylaxis:  Mechanical VTE prophylaxis orders are present.    Disposition Planning:   Barriers to Discharge:copd  Anticipated Date of Discharge: 6/3  Place of Discharge: home  Time: 34 minutes     Code Status and Medical Interventions: CPR (Attempt to Resuscitate); Full Support   Ordered at: 05/30/25 1649     Code Status (Patient has no pulse and is not breathing):    CPR (Attempt to Resuscitate)     Medical Interventions (Patient has pulse or is breathing):    Full Support       Signature: Electronically signed by Tobin Proctor MD, 06/02/25, 10:12 EDT.  [facility] Hospitalist Team

## 2025-06-02 NOTE — OUTREACH NOTE
Prep Survey      Flowsheet Row Responses   [facility] [place] patient discharged from? [facility]   Is LACE score < 7 ? No   Eligibility [facility]   Date of Admission 05/30/25   Date of Discharge 06/02/25   Discharge diagnosis COPD exacerbation   Does the patient have one of the following disease processes/diagnoses(primary or secondary)? COPD   Does the patient have Home health ordered? Yes   What is the Home health agency?  [facility] [place]   Prep survey completed? Yes            Alpa GRIJALVA - Registered Nurse

## 2025-06-03 ENCOUNTER — TRANSITIONAL CARE MANAGEMENT TELEPHONE ENCOUNTER (OUTPATIENT)
Dept: CALL CENTER | Facility: HOSPITAL | Age: 65
End: 2025-06-03
Payer: MEDICARE

## 2025-06-03 NOTE — OUTREACH NOTE
**ATTN; CASE MGMT - Pt daughter Desirae is asking for update on new nebulizer and walker with wheels. Pt has neither, nor have they heard from anyone. Will this be delivered to pt at home? Please updated latesha at [phone]. Thank you.**                                                                         D/C DX: COPD exacerbation - Per dtr Desirae pt is doing better. [facility] to follow. Daughter will call to schedule TCM APPT with PCP GABI Mireles as she has to work around other appts.

## 2025-06-03 NOTE — OUTREACH NOTE
Case Management Call Center Follow-up      Flowsheet Row Responses   [facility] Call Center Tracking Reason? No DME   Has the Call Center Case Management Follow-up issue been resolved? No   Follow-up Comments CM sent referral to [facility], pending review.            Margarette Beltran RN    6/3/2025, 12:02 EDT

## 2025-06-03 NOTE — CASE MANAGEMENT/SOCIAL WORK
Start PACC Note    Home Health Referral    Evaluated patient on Home Care and services available. Patient offered choice of available HHC and agreeable to RN/PT services with [facility].    Isolation Precautions: No active isolations    START PATIENT REGISTRATION INFORMATION  Order Information  Order Signing Physician: No att. providers found  Service Ordered RN?: Yes  Service Ordered PT?: Yes  Service Ordered OT?: No  Service Ordered ST?: No  Service Ordered MSW?: No  Service Ordered HHA?: No  Following Physician: Larry Mireles PA-C  Following Physician Phone: [phone]  Overseeing Physician: Larry Mireles PA-C  (Required for Residents Only)  Agreeable to Follow ? Yes  Date/Time of Call 06/03/25 09:08 EDT, Spoke with: per md    Care Coordination  Same Day SOC?: No  Primary Care Physician: Larry Mireles PA-C  Primary Care Physician Phone: [phone]  Primary Care Physician Address: [address] [place] [address] / [place] [facility]  Visit Instructions: N/A  Service Discharge Location Type: Home  Service Facility Name: N/A  Service Floor Facility: N/A  Service Room No: N/A    Demographics  Patient Last Name: Mark  Patient First Name: Andrew  Language/Communication Barrier: none  Service Address: [place] [address]  Service City: [place]  Service State: [place]  Service Zip: [address]  Service Home Phone: [phone]  Other Phone Numbers:   Telephone Information:   Mobile [phone]     Emergency Contact:   Extended Emergency Contact Information  Primary Emergency Contact: REN BATISTA([facility])  Mobile Phone: [phone]  Relation: Daughter  Secondary Emergency Contact: denisa bateman  Mobile Phone: [phone]  Relation: Daughter    Admission Information  Admit Date: 5/30/2025  Patient Status at Discharge: Inpatient  Admitting Diagnosis: Shortness of breath [R06.02]  Acute exacerbation of chronic obstructive pulmonary disease (COPD) [J44.1]  COPD exacerbation [J44.1]    Caregiver  Information  Caregiver First Name:   Caregiver Last Name:   Caregiver Relationship to Patient:   Caregiver Phone Number:   Caregiver Notes:     [employer]  No      END PATIENT REGISTRATION INFORMATION    Start PACC Summary    Additional Comments: home health to resume services post hospitalization    END PACC Summary    Discharge Date: Pending    Referral Source: [facility]    Signed By: Alpa Armstrong RN, 6/3/2025, 09:08 EDT     Date/Time: 06/03/25 09:08 EDT    End PACC Note

## 2025-06-03 NOTE — OUTREACH NOTE
Case Management Call Center Follow-up      Flowsheet Row Responses   [facility] Call Center Tracking Reason? No DME   Has the Call Center Case Management Follow-up issue been resolved? Yes   Follow-up Comments [employer] is mailing the patient rolling walker and nebulizer. Patient aware.            Margarette Beltran RN    6/3/2025, 13:48 EDT

## 2025-06-03 NOTE — DISCHARGE PLACEMENT REQUEST
"Andrew Flores \"Rex\" (64 y.o. Male)       Date of Birth   1960    Social Security Number       Address   [address] [place] [address]    Home Phone   [phone]    MRN   [id number]       [facility]    Marital Status   Legally                             Admission Date   5/30/2025    Admission Type   Emergency    Admitting Provider   Tobin Proctor MD    Attending Provider       Department, Room/Bed   [facility] 2F, 2203/1       Discharge Date   6/2/2025    Discharge Disposition   Home or Self Care    Discharge Destination                                 Attending Provider: (none)   Allergies: No Known Allergies    Isolation: None   Infection: None   Code Status: Prior    Ht: 175.3 cm (69\")   Wt: 94.3 kg (208 lb)    Admission Cmt: None   Principal Problem: Shortness of breath [R06.02]                   Active Insurance as of 5/30/2025       Primary Coverage       Payor Plan Insurance Group Employer/Plan Group    MEDICARE MEDICARE A & B        Payor Plan Address Payor Plan Phone Number Payor Plan Fax Number Effective Dates    [address] [phone]  8/1/2020 - None Entered    [place] [address]         Subscriber Name Subscriber Birth Date Member ID       ANDREW FLORES 1960 [id number]                     Emergency Contacts        (Rel.) Home Phone Work Phone Mobile Phone    REN BATISTA(POA) (Daughter) -- -- [phone]    denisa bateman (Daughter) -- -- [phone]    evangelina yang (Significant Other) -- -- [phone]                "

## 2025-06-04 LAB
BACTERIA SPEC AEROBE CULT: NORMAL
BACTERIA SPEC AEROBE CULT: NORMAL

## 2025-06-04 NOTE — CASE MANAGEMENT/SOCIAL WORK
Case Management Discharge Note      Final Note: Home with [facility]    Provided Post Acute Provider List?: N/A  Provided Post Acute Provider Quality & Resource List?: N/A    Selected Continued Care - Discharged on 6/2/2025 Admission date: 5/30/2025 - Discharge disposition: Home or Self Care      Home Medical Care Coordination complete.      Service Provider Services Address Phone Fax Patient Preferred    [facility] [address] [facility] [facility] [place] [address] [phone] --               Transportation Services  Private: Car    Final Discharge Disposition Code: 06 - home with home health care

## 2025-06-09 ENCOUNTER — OFFICE VISIT (OUTPATIENT)
Dept: FAMILY MEDICINE CLINIC | Facility: CLINIC | Age: 65
End: 2025-06-09
Payer: MEDICARE

## 2025-06-09 VITALS
SYSTOLIC BLOOD PRESSURE: 128 MMHG | HEIGHT: 69 IN | DIASTOLIC BLOOD PRESSURE: 72 MMHG | RESPIRATION RATE: 18 BRPM | HEART RATE: 86 BPM | BODY MASS INDEX: 31.1 KG/M2 | OXYGEN SATURATION: 98 % | WEIGHT: 210 LBS

## 2025-06-09 DIAGNOSIS — R06.81 WITNESSED EPISODE OF APNEA: ICD-10-CM

## 2025-06-09 DIAGNOSIS — R06.83 SNORING: ICD-10-CM

## 2025-06-09 DIAGNOSIS — M54.50 CHRONIC LOW BACK PAIN WITHOUT SCIATICA, UNSPECIFIED BACK PAIN LATERALITY: ICD-10-CM

## 2025-06-09 DIAGNOSIS — J41.1 MUCOPURULENT CHRONIC BRONCHITIS: Primary | Chronic | ICD-10-CM

## 2025-06-09 DIAGNOSIS — G89.29 CHRONIC LOW BACK PAIN WITHOUT SCIATICA, UNSPECIFIED BACK PAIN LATERALITY: ICD-10-CM

## 2025-06-09 DIAGNOSIS — F41.9 ANXIETY: ICD-10-CM

## 2025-06-09 PROCEDURE — 1125F AMNT PAIN NOTED PAIN PRSNT: CPT | Performed by: PHYSICIAN ASSISTANT

## 2025-06-09 PROCEDURE — 3078F DIAST BP <80 MM HG: CPT | Performed by: PHYSICIAN ASSISTANT

## 2025-06-09 PROCEDURE — 3074F SYST BP LT 130 MM HG: CPT | Performed by: PHYSICIAN ASSISTANT

## 2025-06-09 PROCEDURE — 99495 TRANSJ CARE MGMT MOD F2F 14D: CPT | Performed by: PHYSICIAN ASSISTANT

## 2025-06-09 PROCEDURE — 1111F DSCHRG MED/CURRENT MED MERGE: CPT | Performed by: PHYSICIAN ASSISTANT

## 2025-06-09 RX ORDER — CLONAZEPAM 0.5 MG/1
0.5 TABLET ORAL 4 TIMES DAILY
Qty: 120 TABLET | Refills: 5 | Status: SHIPPED | OUTPATIENT
Start: 2025-06-09

## 2025-06-09 RX ORDER — OXYCODONE AND ACETAMINOPHEN 7.5; 325 MG/1; MG/1
1 TABLET ORAL EVERY 6 HOURS PRN
Qty: 60 TABLET | Refills: 0 | Status: SHIPPED | OUTPATIENT
Start: 2025-06-09 | End: 2025-06-23 | Stop reason: SDUPTHER

## 2025-06-09 RX ORDER — FLUTICASONE FUROATE, UMECLIDINIUM BROMIDE AND VILANTEROL TRIFENATATE 100; 62.5; 25 UG/1; UG/1; UG/1
1 POWDER RESPIRATORY (INHALATION)
Qty: 28 EACH | Refills: 11 | Status: SHIPPED | OUTPATIENT
Start: 2025-06-09

## 2025-06-09 RX ORDER — VENLAFAXINE HYDROCHLORIDE 75 MG/1
75 CAPSULE, EXTENDED RELEASE ORAL DAILY
Qty: 90 CAPSULE | Refills: 1 | Status: SHIPPED | OUTPATIENT
Start: 2025-06-09

## 2025-06-09 RX ORDER — LIDOCAINE 50 MG/G
1 PATCH TOPICAL EVERY 24 HOURS
Qty: 30 PATCH | Refills: 3 | Status: SHIPPED | OUTPATIENT
Start: 2025-06-09

## 2025-06-09 NOTE — PROGRESS NOTES
Transitional Care Follow Up Visit  Subjective     Andrew Flores is a 64 y.o. male who presents for a transitional care management visit.    Within 48 business hours after discharge our office contacted him via telephone to coordinate his care and needs.      I reviewed and discussed the details of that call along with the discharge summary, hospital problems, inpatient lab results, inpatient diagnostic studies, and consultation reports with Andrew.     Current outpatient and discharge medications have been reconciled for the patient.  Reviewed by: Larry Mireles PA-C          6/2/2025     6:02 PM   Date of TCM Phone Call   [facility]   Date of Admission 5/30/2025   Date of Discharge 6/2/2025     Risk for Readmission (LACE) Score: 14 (6/2/2025  6:00 AM)      History of Present Illness   Course During Hospital Stay:  Patient presents to the office for follow up from recent admission for copd exacerbation, PVD, shortness of breath, and bradycardia.  He is also following up for his chronic pain and to discuss witnessed episodes of apnea, snoring, and daytime fatigue.  He was admitted and treated with steroids and azithromycin.  He had improvement over his hospitalization with his breathing and he continues to follow-up with pulmonology from an outpatient basis.  He had a cardiac rule out during this time as well which came back reassuring.  He continues to have pain but this is managed on his current regimen of pain medication.  Continues follow-up with neurology for Parkinson's disease and this is stable but obviously bothersome to him and difficult.  He has had witnessed episodes of apnea by hospital staff and also by family.  He snores on a regular basis and has excessive daytime fatigue.     The following portions of the patient's history were reviewed and updated as appropriate: allergies, current medications, past family history, past medical history, past social history, past surgical  "history, and problem list.    Review of Systems    Objective   /72   Pulse 86   Resp 18   Ht 175.3 cm (69\")   Wt 95.3 kg (210 lb)   SpO2 98%   BMI 31.01 kg/m²   Physical Exam  Constitutional:       Appearance: He is well-developed.   HENT:      Head: Normocephalic and atraumatic.   Eyes:      Conjunctiva/sclera: Conjunctivae normal.      Pupils: Pupils are equal, round, and reactive to light.   Cardiovascular:      Rate and Rhythm: Normal rate and regular rhythm.      Heart sounds: No murmur heard.  Pulmonary:      Effort: Pulmonary effort is normal.      Breath sounds: Normal breath sounds.   Abdominal:      General: Bowel sounds are normal.      Palpations: Abdomen is soft.   Musculoskeletal:         General: Tenderness present. No deformity. Normal range of motion.      Cervical back: Normal range of motion and neck supple.   Lymphadenopathy:      Cervical: No cervical adenopathy.   Skin:     General: Skin is warm and dry.      Capillary Refill: Capillary refill takes less than 2 seconds.      Findings: No rash.   Neurological:      Mental Status: He is alert and oriented to person, place, and time.      Cranial Nerves: No cranial nerve deficit.      Coordination: Coordination normal.      Gait: Gait normal.      Comments: Resting tremor and akathisia   Psychiatric:         Behavior: Behavior normal.         Thought Content: Thought content normal.         Judgment: Judgment normal.         Assessment & Plan   Diagnoses and all orders for this visit:    1. Mucopurulent chronic bronchitis (Primary)  Comments:  Order treeogy inhaler.  Continue to use nebulizer at home for exacerbation.    2. Witnessed episode of apnea  Comments:  Due to witnessed episodes of apnea as well as snoring we will need to order sleep study.  Orders:  -     Ambulatory Referral to Sleep Medicine    3. Snoring  -     Ambulatory Referral to Sleep Medicine    4. Anxiety  Comments:  Continue chronic medication  Orders:  -     " clonazePAM (KlonoPIN) 0.5 MG tablet; Take 1 tablet by mouth 4 (Four) Times a Day.  Dispense: 120 tablet; Refill: 5    5. Chronic low back pain without sciatica, unspecified back pain laterality  Comments:  Continue chronic medication and add Lidoderm patch  Orders:  -     oxyCODONE-acetaminophen (PERCOCET) 7.5-325 MG per tablet; Take 1 tablet by mouth Every 6 (Six) Hours As Needed for Moderate Pain.  Dispense: 60 tablet; Refill: 0    Other orders  -     Fluticasone-Umeclidin-Vilant (Trelegy Ellipta) 100-62.5-25 MCG/ACT inhaler; Inhale 1 puff Daily.  Dispense: 28 each; Refill: 11  -     lidocaine (LIDODERM) 5 %; Place 1 patch on the skin as directed by provider Daily. Remove & Discard patch within 12 hours or as directed by MD  Dispense: 30 patch; Refill: 3  -     venlafaxine XR (EFFEXOR-XR) 75 MG 24 hr capsule; Take 1 capsule by mouth Daily.  Dispense: 90 capsule; Refill: 1

## 2025-06-12 ENCOUNTER — READMISSION MANAGEMENT (OUTPATIENT)
Dept: CALL CENTER | Facility: HOSPITAL | Age: 65
End: 2025-06-12
Payer: MEDICARE

## 2025-06-12 NOTE — OUTREACH NOTE
COPD/PN Week 2 Survey      Flowsheet Row Responses   [facility] patient discharged from? [facility]   Does the patient have one of the following disease processes/diagnoses(primary or secondary)? COPD   Week 2 attempt successful? No   Unsuccessful attempts Attempt 1            Rahul DUMONT - Registered Nurse

## 2025-06-13 ENCOUNTER — TELEPHONE (OUTPATIENT)
Dept: FAMILY MEDICINE CLINIC | Facility: CLINIC | Age: 65
End: 2025-06-13
Payer: MEDICARE

## 2025-06-16 RX ORDER — CARBIDOPA AND LEVODOPA 25; 100 MG/1; MG/1
TABLET, EXTENDED RELEASE ORAL
Qty: 720 TABLET | Refills: 1 | Status: SHIPPED | OUTPATIENT
Start: 2025-06-16

## 2025-06-17 ENCOUNTER — READMISSION MANAGEMENT (OUTPATIENT)
Dept: CALL CENTER | Facility: HOSPITAL | Age: 65
End: 2025-06-17
Payer: MEDICARE

## 2025-06-17 NOTE — OUTREACH NOTE
COPD/PN Week 2 Survey      Flowsheet Row Responses   [facility] discharged from? [facility]   Does the patient have one of the following disease processes/diagnoses(primary or secondary)? COPD   Week 2 attempt successful? No   Unsuccessful attempts Attempt 2  [all numbers attempted.]            Debbie T - Registered Nurse

## 2025-06-18 ENCOUNTER — TELEPHONE (OUTPATIENT)
Dept: FAMILY MEDICINE CLINIC | Facility: CLINIC | Age: 65
End: 2025-06-18
Payer: MEDICARE

## 2025-06-18 NOTE — TELEPHONE ENCOUNTER
[facility] advises they need four order documents signed and faxed to [phone]. Orders are numbered [id number], [id number], [id number], and [id number].

## 2025-06-23 DIAGNOSIS — G62.9 NEUROPATHY: ICD-10-CM

## 2025-06-23 DIAGNOSIS — G89.29 CHRONIC LOW BACK PAIN WITHOUT SCIATICA, UNSPECIFIED BACK PAIN LATERALITY: ICD-10-CM

## 2025-06-23 DIAGNOSIS — M54.50 CHRONIC LOW BACK PAIN WITHOUT SCIATICA, UNSPECIFIED BACK PAIN LATERALITY: ICD-10-CM

## 2025-06-23 RX ORDER — GABAPENTIN 600 MG/1
600 TABLET ORAL 3 TIMES DAILY
Qty: 90 TABLET | Refills: 1 | Status: SHIPPED | OUTPATIENT
Start: 2025-06-23

## 2025-06-23 RX ORDER — OXYCODONE AND ACETAMINOPHEN 7.5; 325 MG/1; MG/1
1 TABLET ORAL EVERY 6 HOURS PRN
Qty: 60 TABLET | Refills: 0 | Status: SHIPPED | OUTPATIENT
Start: 2025-06-23

## 2025-06-26 ENCOUNTER — READMISSION MANAGEMENT (OUTPATIENT)
Dept: CALL CENTER | Facility: HOSPITAL | Age: 65
End: 2025-06-26
Payer: MEDICARE

## 2025-06-26 NOTE — OUTREACH NOTE
COPD/PN Week 3 Survey      Flowsheet Row Responses   [facility] patient discharged from? [facility]   Does the patient have one of the following disease processes/diagnoses(primary or secondary)? COPD   Week 3 attempt successful? No   Unsuccessful attempts Attempt 1            Riana PUGH - Licensed Nurse

## 2025-07-03 LAB
CV ZIO BASELINE AVG BPM: 60
CV ZIO BASELINE BPM HIGH: 123
CV ZIO BASELINE BPM LOW: 44

## 2025-07-09 DIAGNOSIS — E78.2 MIXED HYPERLIPIDEMIA: ICD-10-CM

## 2025-07-09 DIAGNOSIS — F41.9 ANXIETY: ICD-10-CM

## 2025-07-09 RX ORDER — LOSARTAN POTASSIUM 25 MG/1
25 TABLET ORAL
Qty: 30 TABLET | Refills: 0 | Status: SHIPPED | OUTPATIENT
Start: 2025-07-09 | End: 2025-07-10 | Stop reason: SDUPTHER

## 2025-07-10 RX ORDER — ATORVASTATIN CALCIUM 20 MG/1
20 TABLET, FILM COATED ORAL DAILY
Qty: 90 TABLET | Refills: 1 | Status: SHIPPED | OUTPATIENT
Start: 2025-07-10

## 2025-07-10 RX ORDER — MAGNESIUM 200 MG
1 TABLET ORAL DAILY
Qty: 90 TABLET | Refills: 3 | Status: SHIPPED | OUTPATIENT
Start: 2025-07-10

## 2025-07-10 RX ORDER — LOSARTAN POTASSIUM 25 MG/1
25 TABLET ORAL
Qty: 90 TABLET | Refills: 3 | Status: SHIPPED | OUTPATIENT
Start: 2025-07-10

## 2025-07-10 RX ORDER — CLONAZEPAM 0.5 MG/1
0.5 TABLET ORAL 4 TIMES DAILY
Qty: 120 TABLET | Refills: 5 | Status: SHIPPED | OUTPATIENT
Start: 2025-07-10

## 2025-07-11 DIAGNOSIS — G89.29 CHRONIC LOW BACK PAIN WITHOUT SCIATICA, UNSPECIFIED BACK PAIN LATERALITY: ICD-10-CM

## 2025-07-11 DIAGNOSIS — M54.50 CHRONIC LOW BACK PAIN WITHOUT SCIATICA, UNSPECIFIED BACK PAIN LATERALITY: ICD-10-CM

## 2025-07-11 PROBLEM — J44.1 COPD EXACERBATION: Status: RESOLVED | Noted: 2025-06-01 | Resolved: 2025-07-11

## 2025-07-11 PROBLEM — R00.1 BRADYCARDIA, SINUS: Status: ACTIVE | Noted: 2025-07-11

## 2025-07-14 RX ORDER — OXYCODONE AND ACETAMINOPHEN 7.5; 325 MG/1; MG/1
1 TABLET ORAL EVERY 6 HOURS PRN
Qty: 60 TABLET | Refills: 0 | Status: SHIPPED | OUTPATIENT
Start: 2025-07-14

## 2025-07-16 DIAGNOSIS — G20.A1 PARKINSON'S DISEASE WITHOUT DYSKINESIA, WITHOUT MENTION OF FLUCTUATIONS: ICD-10-CM

## 2025-07-16 RX ORDER — DONEPEZIL HYDROCHLORIDE 10 MG/1
10 TABLET, FILM COATED ORAL
Qty: 90 TABLET | Refills: 1 | Status: SHIPPED | OUTPATIENT
Start: 2025-07-16

## 2025-07-26 DIAGNOSIS — M54.50 CHRONIC LOW BACK PAIN WITHOUT SCIATICA, UNSPECIFIED BACK PAIN LATERALITY: ICD-10-CM

## 2025-07-26 DIAGNOSIS — G89.29 CHRONIC LOW BACK PAIN WITHOUT SCIATICA, UNSPECIFIED BACK PAIN LATERALITY: ICD-10-CM

## 2025-07-29 RX ORDER — OXYCODONE AND ACETAMINOPHEN 7.5; 325 MG/1; MG/1
1 TABLET ORAL EVERY 6 HOURS PRN
Qty: 60 TABLET | Refills: 0 | Status: SHIPPED | OUTPATIENT
Start: 2025-07-29

## 2025-08-13 DIAGNOSIS — G89.29 CHRONIC LOW BACK PAIN WITHOUT SCIATICA, UNSPECIFIED BACK PAIN LATERALITY: ICD-10-CM

## 2025-08-13 DIAGNOSIS — M54.50 CHRONIC LOW BACK PAIN WITHOUT SCIATICA, UNSPECIFIED BACK PAIN LATERALITY: ICD-10-CM

## 2025-08-13 DIAGNOSIS — F41.9 ANXIETY: ICD-10-CM

## 2025-08-14 RX ORDER — MAGNESIUM 200 MG
1 TABLET ORAL DAILY
Qty: 90 TABLET | Refills: 3 | Status: SHIPPED | OUTPATIENT
Start: 2025-08-14

## 2025-08-14 RX ORDER — OXYCODONE AND ACETAMINOPHEN 7.5; 325 MG/1; MG/1
1 TABLET ORAL EVERY 6 HOURS PRN
Qty: 60 TABLET | Refills: 0 | Status: SHIPPED | OUTPATIENT
Start: 2025-08-14

## 2025-08-14 RX ORDER — CLONAZEPAM 0.5 MG/1
0.5 TABLET ORAL 4 TIMES DAILY
Qty: 120 TABLET | Refills: 5 | Status: SHIPPED | OUTPATIENT
Start: 2025-08-14

## 2025-08-18 ENCOUNTER — APPOINTMENT (OUTPATIENT)
Dept: GENERAL RADIOLOGY | Facility: HOSPITAL | Age: 65
End: 2025-08-18
Payer: MEDICARE

## 2025-08-18 ENCOUNTER — HOSPITAL ENCOUNTER (EMERGENCY)
Facility: HOSPITAL | Age: 65
Discharge: HOME | End: 2025-08-18
Attending: EMERGENCY MEDICINE | Admitting: EMERGENCY MEDICINE
Payer: MEDICARE

## 2025-08-18 VITALS
HEIGHT: 69 IN | WEIGHT: 211.64 LBS | OXYGEN SATURATION: 93 % | RESPIRATION RATE: 16 BRPM | SYSTOLIC BLOOD PRESSURE: 96 MMHG | TEMPERATURE: 98.2 F | HEART RATE: 66 BPM | BODY MASS INDEX: 31.35 KG/M2 | DIASTOLIC BLOOD PRESSURE: 55 MMHG

## 2025-08-18 DIAGNOSIS — S22.32XA CLOSED FRACTURE OF ONE RIB OF LEFT SIDE, INITIAL ENCOUNTER: Primary | ICD-10-CM

## 2025-08-18 LAB
ALBUMIN SERPL-MCNC: 4.4 G/DL (ref 3.5–5.2)
ALBUMIN/GLOB SERPL: 1.6 G/DL
ALP SERPL-CCNC: 125 U/L (ref 39–117)
ALT SERPL W P-5'-P-CCNC: 7 U/L (ref 1–41)
ANION GAP SERPL CALCULATED.3IONS-SCNC: 12.3 MMOL/L (ref 5–15)
AST SERPL-CCNC: 36 U/L (ref 1–40)
BASOPHILS # BLD AUTO: 0.02 10*3/MM3 (ref 0–0.2)
BASOPHILS NFR BLD AUTO: 0.3 % (ref 0–1.5)
BILIRUB SERPL-MCNC: 0.7 MG/DL (ref 0–1.2)
BUN SERPL-MCNC: 15.8 MG/DL (ref 8–23)
BUN/CREAT SERPL: 16 (ref 7–25)
CALCIUM SPEC-SCNC: 9.4 MG/DL (ref 8.6–10.5)
CHLORIDE SERPL-SCNC: 101 MMOL/L (ref 98–107)
CO2 SERPL-SCNC: 26.7 MMOL/L (ref 22–29)
CREAT SERPL-MCNC: 0.99 MG/DL (ref 0.76–1.27)
DEPRECATED RDW RBC AUTO: 45.7 FL (ref 37–54)
EGFRCR SERPLBLD CKD-EPI 2021: 84 ML/MIN/1.73
EOSINOPHIL # BLD AUTO: 0 10*3/MM3 (ref 0–0.4)
EOSINOPHIL NFR BLD AUTO: 0 % (ref 0.3–6.2)
ERYTHROCYTE [DISTWIDTH] IN BLOOD BY AUTOMATED COUNT: 12.7 % (ref 12.3–15.4)
GLOBULIN UR ELPH-MCNC: 2.8 GM/DL
GLUCOSE SERPL-MCNC: 131 MG/DL (ref 65–99)
HCT VFR BLD AUTO: 39.1 % (ref 37.5–51)
HGB BLD-MCNC: 12.6 G/DL (ref 13–17.7)
HOLD SPECIMEN: NORMAL
IMM GRANULOCYTES # BLD AUTO: 0.01 10*3/MM3 (ref 0–0.05)
IMM GRANULOCYTES NFR BLD AUTO: 0.2 % (ref 0–0.5)
LYMPHOCYTES # BLD AUTO: 1.23 10*3/MM3 (ref 0.7–3.1)
LYMPHOCYTES NFR BLD AUTO: 19.5 % (ref 19.6–45.3)
MCH RBC QN AUTO: 31.2 PG (ref 26.6–33)
MCHC RBC AUTO-ENTMCNC: 32.2 G/DL (ref 31.5–35.7)
MCV RBC AUTO: 96.8 FL (ref 79–97)
MONOCYTES # BLD AUTO: 0.42 10*3/MM3 (ref 0.1–0.9)
MONOCYTES NFR BLD AUTO: 6.6 % (ref 5–12)
NEUTROPHILS NFR BLD AUTO: 4.64 10*3/MM3 (ref 1.7–7)
NEUTROPHILS NFR BLD AUTO: 73.4 % (ref 42.7–76)
NRBC BLD AUTO-RTO: 0 /100 WBC (ref 0–0.2)
PLATELET # BLD AUTO: 153 10*3/MM3 (ref 140–450)
PMV BLD AUTO: 10.2 FL (ref 6–12)
POTASSIUM SERPL-SCNC: 4.2 MMOL/L (ref 3.5–5.2)
PROT SERPL-MCNC: 7.2 G/DL (ref 6–8.5)
RBC # BLD AUTO: 4.04 10*6/MM3 (ref 4.14–5.8)
SODIUM SERPL-SCNC: 140 MMOL/L (ref 136–145)
WBC NRBC COR # BLD AUTO: 6.32 10*3/MM3 (ref 3.4–10.8)
WHOLE BLOOD HOLD COAG: NORMAL

## 2025-08-18 PROCEDURE — 80053 COMPREHEN METABOLIC PANEL: CPT | Performed by: EMERGENCY MEDICINE

## 2025-08-18 PROCEDURE — 85025 COMPLETE CBC W/AUTO DIFF WBC: CPT | Performed by: EMERGENCY MEDICINE

## 2025-08-18 PROCEDURE — 74022 RADEX COMPL AQT ABD SERIES: CPT

## 2025-08-18 PROCEDURE — 99283 EMERGENCY DEPT VISIT LOW MDM: CPT

## 2025-08-18 PROCEDURE — 71100 X-RAY EXAM RIBS UNI 2 VIEWS: CPT

## 2025-08-18 RX ORDER — SODIUM CHLORIDE 0.9 % (FLUSH) 0.9 %
10 SYRINGE (ML) INJECTION AS NEEDED
Status: DISCONTINUED | OUTPATIENT
Start: 2025-08-18 | End: 2025-08-18 | Stop reason: HOSPADM

## 2025-08-18 RX ORDER — TRAMADOL HYDROCHLORIDE 50 MG/1
50 TABLET ORAL EVERY 6 HOURS PRN
Qty: 12 TABLET | Refills: 0 | Status: SHIPPED | OUTPATIENT
Start: 2025-08-18

## 2025-08-19 ENCOUNTER — APPOINTMENT (OUTPATIENT)
Dept: GENERAL RADIOLOGY | Facility: HOSPITAL | Age: 65
End: 2025-08-19
Payer: MEDICARE

## 2025-08-19 ENCOUNTER — HOSPITAL ENCOUNTER (INPATIENT)
Facility: HOSPITAL | Age: 65
LOS: 1 days | Discharge: HOME | End: 2025-08-22
Attending: INTERNAL MEDICINE
Payer: MEDICARE

## 2025-08-19 ENCOUNTER — APPOINTMENT (OUTPATIENT)
Dept: CT IMAGING | Facility: HOSPITAL | Age: 65
End: 2025-08-19
Payer: MEDICARE

## 2025-08-20 ENCOUNTER — APPOINTMENT (OUTPATIENT)
Dept: NEUROLOGY | Facility: HOSPITAL | Age: 65
End: 2025-08-20
Payer: MEDICARE

## 2025-08-20 ENCOUNTER — APPOINTMENT (OUTPATIENT)
Dept: CT IMAGING | Facility: HOSPITAL | Age: 65
End: 2025-08-20
Payer: MEDICARE

## 2025-08-20 ENCOUNTER — APPOINTMENT (OUTPATIENT)
Dept: MRI IMAGING | Facility: HOSPITAL | Age: 65
End: 2025-08-20
Payer: MEDICARE

## 2025-08-20 PROBLEM — R41.82 AMS (ALTERED MENTAL STATUS): Status: ACTIVE | Noted: 2025-08-20

## 2025-08-21 PROBLEM — R41.82 ALTERED MENTAL STATUS: Status: ACTIVE | Noted: 2025-08-21

## 2025-08-22 ENCOUNTER — READMISSION MANAGEMENT (OUTPATIENT)
Dept: CALL CENTER | Facility: HOSPITAL | Age: 65
End: 2025-08-22
Payer: MEDICARE

## 2025-08-22 PROBLEM — G20.B2 PARKINSON'S DISEASE WITH DYSKINESIA AND FLUCTUATING MANIFESTATIONS: Status: ACTIVE | Noted: 2025-08-22

## 2025-08-22 PROBLEM — R14.0 ABDOMINAL DISTENSION (GASEOUS): Status: RESOLVED | Noted: 2025-08-22 | Resolved: 2025-08-22

## 2025-08-22 PROBLEM — R41.82 ALTERED MENTAL STATUS: Status: RESOLVED | Noted: 2025-08-21 | Resolved: 2025-08-22

## 2025-08-22 PROBLEM — Z79.899 OTHER LONG TERM (CURRENT) DRUG THERAPY: Status: ACTIVE | Noted: 2025-08-22

## 2025-08-22 PROBLEM — G93.41 ENCEPHALOPATHY, METABOLIC: Status: ACTIVE | Noted: 2025-08-22

## 2025-08-22 PROBLEM — R41.82 AMS (ALTERED MENTAL STATUS): Status: RESOLVED | Noted: 2025-08-20 | Resolved: 2025-08-22

## 2025-08-22 PROBLEM — Z79.899 OTHER LONG TERM (CURRENT) DRUG THERAPY: Status: RESOLVED | Noted: 2025-08-22 | Resolved: 2025-08-22

## 2025-08-22 PROBLEM — G93.41 ENCEPHALOPATHY, METABOLIC: Status: RESOLVED | Noted: 2025-08-22 | Resolved: 2025-08-22

## 2025-08-22 PROBLEM — R14.0 ABDOMINAL DISTENSION (GASEOUS): Status: ACTIVE | Noted: 2025-08-22

## (undated) DEVICE — DRAPE,U/ SHT,SPLIT,PLAS,STERIL: Brand: MEDLINE

## (undated) DEVICE — STPCK 3WY HP ROT

## (undated) DEVICE — PROXIMATE RH ROTATING HEAD SKIN STAPLERS (35 WIDE) CONTAINS 35 STAINLESS STEEL STAPLES: Brand: PROXIMATE

## (undated) DEVICE — GLV SURG SENSICARE PI LF PF 8 GRN STRL

## (undated) DEVICE — NEEDLE, QUINCKE, 20GX3.5": Brand: MEDLINE

## (undated) DEVICE — SPNG LAP PREWSH SFTPK 18X18IN STRL PK/5

## (undated) DEVICE — 6617 IOBAN II PATIENT ISOLATION DRAPE 5/BX,4BX/CS: Brand: STERI-DRAPE™ IOBAN™ 2

## (undated) DEVICE — PINNACLE INTRODUCER SHEATH: Brand: PINNACLE

## (undated) DEVICE — ELECTRD DEFIB M/FUNC PROPADZ RADIOL 2PK

## (undated) DEVICE — ANTIBACTERIAL UNDYED BRAIDED (POLYGLACTIN 910), SYNTHETIC ABSORBABLE SUTURE: Brand: COATED VICRYL

## (undated) DEVICE — SHEATH INTRO PINN CORNRY .038 7F10CM

## (undated) DEVICE — TBG NAMIC PRESS MONTR A/ F/M 12IN

## (undated) DEVICE — ST ACC MICROPUNCTURE STFF/CANN PLAT/TP 4F 21G 40CM

## (undated) DEVICE — DGW .035 FC J3MM 260CM TEF: Brand: EMERALD

## (undated) DEVICE — DRAPE,TOWEL,LARGE,INVISISHIELD: Brand: MEDLINE

## (undated) DEVICE — PATIENT RETURN ELECTRODE, SINGLE-USE, CONTACT QUALITY MONITORING, ADULT, WITH 9FT CORD, FOR PATIENTS WEIGING OVER 33LBS. (15KG): Brand: MEGADYNE

## (undated) DEVICE — SOL IRRIG NACL 1000ML

## (undated) DEVICE — CATH DIAG IMPULSE FL3.5 6F 100CM

## (undated) DEVICE — SWAN-GANZ THERMODILUTION CATHETER: Brand: SWAN-GANZ

## (undated) DEVICE — CATH DIAG IMPULSE FR4 6F 100CM

## (undated) DEVICE — Device

## (undated) DEVICE — TR BAND RADIAL ARTERY COMPRESSION DEVICE: Brand: TR BAND

## (undated) DEVICE — KT PT POSITION SUPINE HANA/PROFX TABL

## (undated) DEVICE — GLV SURG SENSICARE PI LF PF 7.5 GRN STRL

## (undated) DEVICE — 4.0MM LONG AO PILOT DRILL: Brand: TRIGEN

## (undated) DEVICE — SOLUTION,WATER,IRRIGATION,1000ML,STERILE: Brand: MEDLINE

## (undated) DEVICE — BALN NC/EUPHORA RX 2.50X12MM

## (undated) DEVICE — C-ARM: Brand: UNBRANDED

## (undated) DEVICE — SHT AIR TRANSFR COMFRT GLIDE LAT 40X80IN

## (undated) DEVICE — PK GAM NAIL 50

## (undated) DEVICE — CVR PROB ULTRASND CIVFLEX GEN/PURP TELESCP/FOLD 5.5X96IN LF

## (undated) DEVICE — CATH IMG IVUS EAGLE EYE PLATIN RX DIGITAL .014IN 5FR

## (undated) DEVICE — INTRO GLIDESHEATH SLENDER AKIT 20GA .025 6F 10CM

## (undated) DEVICE — 3.0MM X 1000MM BALL TIP GUIDE ROD: Brand: TRIGEN

## (undated) DEVICE — PK TRY HEART CATH 50

## (undated) DEVICE — DRP C/ARMOR

## (undated) DEVICE — BITEBLOCK ENDO W/STRAP 60F A/ LF DISP

## (undated) DEVICE — SINGLE-USE BIOPSY FORCEPS: Brand: RADIAL JAW 4

## (undated) DEVICE — GLV SURG SENSICARE PI ORTHO SZ7.5 LF STRL

## (undated) DEVICE — PAD,ABDOMINAL,5"X9",STERILE,LF,1/PK: Brand: MEDLINE INDUSTRIES, INC.

## (undated) DEVICE — DRSNG SLVR/ANTIBAC PRIMASEAL POST/OP ADHS 3.5X12IN

## (undated) DEVICE — KT SURG TURNOVER 050

## (undated) DEVICE — BNDG ELAS CO-FLEX SLF ADHR 4IN5YD LF STRL

## (undated) DEVICE — TRAP WIDEEYE POLYP

## (undated) DEVICE — GW RUNTHROUGH NS STR RESHP .014 3X180CM

## (undated) DEVICE — DRAPE SHEET ULTRAGARD: Brand: MEDLINE

## (undated) DEVICE — REAMER SHAFT, MOD.TRINKLE: Brand: BIXCUT

## (undated) DEVICE — KT SYR GEL ORISE SNGL PK 10ML

## (undated) DEVICE — 3M™ IOBAN™ 2 ANTIMICROBIAL INCISE DRAPE 6650EZ: Brand: IOBAN™ 2

## (undated) DEVICE — SNAR POLYP HOTSNARE/BRAIDED OVL/MINI 7F 2.8X10MM 230CM 1P/U

## (undated) DEVICE — GW EMR FIX EXCHG J STD .035 3MM 260CM

## (undated) DEVICE — PK ENDO GI 50

## (undated) DEVICE — GLV SURG SENSICARE PI ORTHO SZ8 LF STRL

## (undated) DEVICE — PENCL SMOKE/EVAC MEGADYNE TELESCP 15FT

## (undated) DEVICE — PROXIMATE PLUS MD MULTI-DIRECTIONAL RELEASE SKIN STAPLERS CONTAINS 35 STAINLESS STEEL STAPLES APPROXIMATE CLOSED DIMENSIONS: 6.9MM X 3.9MM WIDE: Brand: PROXIMATE

## (undated) DEVICE — DRP SURG RADL STRL

## (undated) DEVICE — FRCP BIOP COLD ENDOJAW ALLGTR CUP 2MM/CH 155CM

## (undated) DEVICE — GUIDE PIN 3.2MM X 343MM: Brand: TRIGEN

## (undated) DEVICE — GLIDESHEATH SLENDER ACCESS KIT: Brand: GLIDESHEATH SLENDER